# Patient Record
Sex: MALE | Race: BLACK OR AFRICAN AMERICAN | NOT HISPANIC OR LATINO | Employment: OTHER | URBAN - METROPOLITAN AREA
[De-identification: names, ages, dates, MRNs, and addresses within clinical notes are randomized per-mention and may not be internally consistent; named-entity substitution may affect disease eponyms.]

---

## 2017-10-13 ENCOUNTER — HISTORICAL (OUTPATIENT)
Dept: ADMINISTRATIVE | Facility: HOSPITAL | Age: 42
End: 2017-10-13

## 2017-10-20 LAB — FINAL CULTURE: NORMAL

## 2019-01-25 ENCOUNTER — HISTORICAL (OUTPATIENT)
Dept: ADMINISTRATIVE | Facility: HOSPITAL | Age: 44
End: 2019-01-25

## 2019-01-31 LAB
FINAL CULTURE: NORMAL
FINAL CULTURE: NORMAL

## 2019-02-01 LAB — FINAL CULTURE: NORMAL

## 2019-03-24 ENCOUNTER — HISTORICAL (OUTPATIENT)
Dept: ADMINISTRATIVE | Facility: HOSPITAL | Age: 44
End: 2019-03-24

## 2019-03-31 LAB
FINAL CULTURE: NORMAL
FINAL CULTURE: NORMAL

## 2022-04-10 ENCOUNTER — HISTORICAL (OUTPATIENT)
Dept: ADMINISTRATIVE | Facility: HOSPITAL | Age: 47
End: 2022-04-10

## 2022-04-11 ENCOUNTER — HISTORICAL (OUTPATIENT)
Dept: ADMINISTRATIVE | Facility: HOSPITAL | Age: 47
End: 2022-04-11

## 2022-04-28 VITALS
WEIGHT: 206.81 LBS | SYSTOLIC BLOOD PRESSURE: 152 MMHG | DIASTOLIC BLOOD PRESSURE: 86 MMHG | BODY MASS INDEX: 32.46 KG/M2 | OXYGEN SATURATION: 97 % | HEIGHT: 67 IN

## 2022-04-28 VITALS
DIASTOLIC BLOOD PRESSURE: 86 MMHG | OXYGEN SATURATION: 97 % | SYSTOLIC BLOOD PRESSURE: 152 MMHG | BODY MASS INDEX: 32.46 KG/M2 | HEIGHT: 67 IN | WEIGHT: 206.81 LBS

## 2022-11-13 ENCOUNTER — HOSPITAL ENCOUNTER (INPATIENT)
Facility: HOSPITAL | Age: 47
LOS: 4 days | Discharge: HOME | DRG: 101 | End: 2022-11-17
Attending: STUDENT IN AN ORGANIZED HEALTH CARE EDUCATION/TRAINING PROGRAM | Admitting: INTERNAL MEDICINE
Payer: MEDICAID

## 2022-11-13 DIAGNOSIS — N17.9 AKI (ACUTE KIDNEY INJURY): ICD-10-CM

## 2022-11-13 DIAGNOSIS — R56.9 SEIZURE: Primary | ICD-10-CM

## 2022-11-13 DIAGNOSIS — R73.9 HYPERGLYCEMIA: ICD-10-CM

## 2022-11-13 DIAGNOSIS — G40.909 SEIZURE DISORDER: ICD-10-CM

## 2022-11-13 LAB
ALBUMIN SERPL-MCNC: 1.6 GM/DL (ref 3.5–5)
ALBUMIN/GLOB SERPL: 0.4 RATIO (ref 1.1–2)
ALP SERPL-CCNC: 162 UNIT/L (ref 40–150)
ALT SERPL-CCNC: 101 UNIT/L (ref 0–55)
ANION GAP SERPL CALC-SCNC: 16 MMOL/L (ref 8–16)
APPEARANCE UR: CLEAR
AST SERPL-CCNC: 105 UNIT/L (ref 5–34)
B-OH-BUTYR SERPL-MCNC: 2.1 MMOL/L
BACTERIA #/AREA URNS AUTO: NORMAL /HPF
BASOPHILS # BLD AUTO: 0.14 X10(3)/MCL (ref 0–0.2)
BASOPHILS NFR BLD AUTO: 0.9 %
BILIRUB UR QL STRIP.AUTO: NEGATIVE MG/DL
BILIRUBIN DIRECT+TOT PNL SERPL-MCNC: 0.4 MG/DL
BUN SERPL-MCNC: 19.1 MG/DL (ref 8.9–20.6)
BUN SERPL-MCNC: 21 MG/DL (ref 6–30)
CALCIUM SERPL-MCNC: 7.4 MG/DL (ref 8.4–10.2)
CHLORIDE SERPL-SCNC: 103 MMOL/L (ref 98–107)
CHLORIDE SERPL-SCNC: 106 MMOL/L (ref 95–110)
CO2 SERPL-SCNC: 17 MMOL/L (ref 22–29)
COLOR UR AUTO: YELLOW
CORRECTED TEMPERATURE (PCO2): 42 MMHG
CORRECTED TEMPERATURE (PH): 7.26 (ref 7.29–7.61)
CORRECTED TEMPERATURE (PO2): 30 MMHG
CREAT SERPL-MCNC: 2 MG/DL (ref 0.5–1.4)
CREAT SERPL-MCNC: 2.43 MG/DL (ref 0.73–1.18)
EOSINOPHIL # BLD AUTO: 0.09 X10(3)/MCL (ref 0–0.9)
EOSINOPHIL NFR BLD AUTO: 0.6 %
ERYTHROCYTE [DISTWIDTH] IN BLOOD BY AUTOMATED COUNT: 12.9 % (ref 11.5–17)
GFR SERPLBLD CREATININE-BSD FMLA CKD-EPI: 33 MLS/MIN/1.73/M2
GLOBULIN SER-MCNC: 3.8 GM/DL (ref 2.4–3.5)
GLUCOSE SERPL-MCNC: 592 MG/DL (ref 70–110)
GLUCOSE SERPL-MCNC: 605 MG/DL (ref 74–100)
GLUCOSE UR QL STRIP.AUTO: ABNORMAL MG/DL
HCO3 UR-SCNC: 18.8 MMOL/L
HCT VFR BLD AUTO: 36.6 % (ref 42–52)
HCT VFR BLD CALC: 43 %PCV (ref 36–54)
HGB BLD-MCNC: 11.2 G/DL
HGB BLD-MCNC: 12.4 GM/DL (ref 14–18)
HGB BLD-MCNC: 15 G/DL
IMM GRANULOCYTES # BLD AUTO: 0.05 X10(3)/MCL (ref 0–0.04)
IMM GRANULOCYTES NFR BLD AUTO: 0.3 %
INR BLD: 1.06 (ref 0–1.3)
KETONES UR QL STRIP.AUTO: ABNORMAL MG/DL
LEUKOCYTE ESTERASE UR QL STRIP.AUTO: NEGATIVE UNIT/L
LYMPHOCYTES # BLD AUTO: 2.95 X10(3)/MCL (ref 0.6–4.6)
LYMPHOCYTES NFR BLD AUTO: 18.7 %
MCH RBC QN AUTO: 30.2 PG (ref 27–31)
MCHC RBC AUTO-ENTMCNC: 33.9 MG/DL (ref 33–36)
MCV RBC AUTO: 89.1 FL (ref 80–94)
MONOCYTES # BLD AUTO: 0.56 X10(3)/MCL (ref 0.1–1.3)
MONOCYTES NFR BLD AUTO: 3.5 %
NEUTROPHILS # BLD AUTO: 12 X10(3)/MCL (ref 2.1–9.2)
NEUTROPHILS NFR BLD AUTO: 76 %
NITRITE UR QL STRIP.AUTO: NEGATIVE
NRBC BLD AUTO-RTO: 0 %
PCO2 BLDA: 42 MMHG
PH SMN: 7.26 [PH] (ref 7.29–7.61)
PH UR STRIP.AUTO: 6 [PH]
PLATELET # BLD AUTO: 250 X10(3)/MCL (ref 130–400)
PMV BLD AUTO: 11.6 FL (ref 7.4–10.4)
PO2 BLDA: 30 MMHG
POC BASE DEFICIT: -7.9 MMOL/L
POC COHB: 1.8 %
POC IONIZED CALCIUM: 0.87 MMOL/L (ref 1.06–1.42)
POC IONIZED CALCIUM: 1.02 MMOL/L (ref 1.12–1.23)
POC METHB: 0.9 %
POC O2HB: 46.6 %
POC PTINR: 1.3 (ref 0.9–1.2)
POC PTWBT: 15.2 SEC (ref 9.7–14.3)
POC SATURATED O2: 46.2 %
POC TCO2 (MEASURED): 17 MMOL/L (ref 23–29)
POC TEMPERATURE: 37 °C
POCT GLUCOSE: 350 MG/DL (ref 70–110)
POCT GLUCOSE: >500 MG/DL (ref 70–110)
POTASSIUM BLD-SCNC: 3.1 MMOL/L (ref 3.5–5)
POTASSIUM BLD-SCNC: 4.5 MMOL/L (ref 3.5–5.1)
POTASSIUM SERPL-SCNC: 3.8 MMOL/L (ref 3.5–5.1)
PROT SERPL-MCNC: 5.4 GM/DL (ref 6.4–8.3)
PROT UR QL STRIP.AUTO: ABNORMAL MG/DL
PROTHROMBIN TIME: 13.7 SECONDS (ref 12.5–14.5)
RBC # BLD AUTO: 4.11 X10(6)/MCL (ref 4.7–6.1)
RBC #/AREA URNS AUTO: <5 /HPF
RBC UR QL AUTO: ABNORMAL UNIT/L
SAMPLE: ABNORMAL
SAMPLE: ABNORMAL
SODIUM BLD-SCNC: 134 MMOL/L (ref 136–145)
SODIUM BLD-SCNC: 134 MMOL/L (ref 137–145)
SODIUM SERPL-SCNC: 133 MMOL/L (ref 136–145)
SP GR UR STRIP.AUTO: 1.03 (ref 1–1.03)
SPECIMEN SOURCE: ABNORMAL
SQUAMOUS #/AREA URNS AUTO: <5 /HPF
TSH SERPL-ACNC: 3.61 UIU/ML (ref 0.35–4.94)
UROBILINOGEN UR STRIP-ACNC: 0.2 MG/DL
WBC # SPEC AUTO: 15.8 X10(3)/MCL (ref 4.5–11.5)
WBC #/AREA URNS AUTO: <5 /HPF

## 2022-11-13 PROCEDURE — 81003 URINALYSIS AUTO W/O SCOPE: CPT | Performed by: STUDENT IN AN ORGANIZED HEALTH CARE EDUCATION/TRAINING PROGRAM

## 2022-11-13 PROCEDURE — 93010 EKG 12-LEAD: ICD-10-PCS | Mod: ,,, | Performed by: INTERNAL MEDICINE

## 2022-11-13 PROCEDURE — 93010 ELECTROCARDIOGRAM REPORT: CPT | Mod: ,,, | Performed by: INTERNAL MEDICINE

## 2022-11-13 PROCEDURE — 84443 ASSAY THYROID STIM HORMONE: CPT | Performed by: STUDENT IN AN ORGANIZED HEALTH CARE EDUCATION/TRAINING PROGRAM

## 2022-11-13 PROCEDURE — 63600175 PHARM REV CODE 636 W HCPCS: Performed by: STUDENT IN AN ORGANIZED HEALTH CARE EDUCATION/TRAINING PROGRAM

## 2022-11-13 PROCEDURE — 80053 COMPREHEN METABOLIC PANEL: CPT | Performed by: STUDENT IN AN ORGANIZED HEALTH CARE EDUCATION/TRAINING PROGRAM

## 2022-11-13 PROCEDURE — 80047 BASIC METABLC PNL IONIZED CA: CPT

## 2022-11-13 PROCEDURE — 25000003 PHARM REV CODE 250: Performed by: STUDENT IN AN ORGANIZED HEALTH CARE EDUCATION/TRAINING PROGRAM

## 2022-11-13 PROCEDURE — 93005 ELECTROCARDIOGRAM TRACING: CPT

## 2022-11-13 PROCEDURE — 85025 COMPLETE CBC W/AUTO DIFF WBC: CPT | Performed by: STUDENT IN AN ORGANIZED HEALTH CARE EDUCATION/TRAINING PROGRAM

## 2022-11-13 PROCEDURE — 85610 PROTHROMBIN TIME: CPT | Performed by: STUDENT IN AN ORGANIZED HEALTH CARE EDUCATION/TRAINING PROGRAM

## 2022-11-13 PROCEDURE — 99285 EMERGENCY DEPT VISIT HI MDM: CPT | Mod: 25

## 2022-11-13 PROCEDURE — 96361 HYDRATE IV INFUSION ADD-ON: CPT

## 2022-11-13 PROCEDURE — 11000001 HC ACUTE MED/SURG PRIVATE ROOM

## 2022-11-13 PROCEDURE — 82010 KETONE BODYS QUAN: CPT | Performed by: STUDENT IN AN ORGANIZED HEALTH CARE EDUCATION/TRAINING PROGRAM

## 2022-11-13 PROCEDURE — 81001 URINALYSIS AUTO W/SCOPE: CPT | Performed by: STUDENT IN AN ORGANIZED HEALTH CARE EDUCATION/TRAINING PROGRAM

## 2022-11-13 PROCEDURE — 96376 TX/PRO/DX INJ SAME DRUG ADON: CPT

## 2022-11-13 PROCEDURE — 82803 BLOOD GASES ANY COMBINATION: CPT

## 2022-11-13 PROCEDURE — 96375 TX/PRO/DX INJ NEW DRUG ADDON: CPT

## 2022-11-13 PROCEDURE — 82962 GLUCOSE BLOOD TEST: CPT

## 2022-11-13 PROCEDURE — 96374 THER/PROPH/DIAG INJ IV PUSH: CPT

## 2022-11-13 PROCEDURE — 25500020 PHARM REV CODE 255: Performed by: STUDENT IN AN ORGANIZED HEALTH CARE EDUCATION/TRAINING PROGRAM

## 2022-11-13 PROCEDURE — 99900035 HC TECH TIME PER 15 MIN (STAT)

## 2022-11-13 RX ORDER — PANCRELIPASE 24000; 76000; 120000 [USP'U]/1; [USP'U]/1; [USP'U]/1
3 CAPSULE, DELAYED RELEASE PELLETS ORAL 3 TIMES DAILY
Status: ON HOLD | COMMUNITY
Start: 2022-10-10 | End: 2023-04-04 | Stop reason: HOSPADM

## 2022-11-13 RX ORDER — METOPROLOL TARTRATE 25 MG/1
25 TABLET, FILM COATED ORAL 2 TIMES DAILY
Status: DISCONTINUED | OUTPATIENT
Start: 2022-11-14 | End: 2022-11-17 | Stop reason: HOSPADM

## 2022-11-13 RX ORDER — SODIUM CHLORIDE 0.9 % (FLUSH) 0.9 %
10 SYRINGE (ML) INJECTION
Status: DISCONTINUED | OUTPATIENT
Start: 2022-11-14 | End: 2022-11-17 | Stop reason: HOSPADM

## 2022-11-13 RX ORDER — LEVETIRACETAM 500 MG/1
500 TABLET ORAL 2 TIMES DAILY
Status: ON HOLD | COMMUNITY
Start: 2022-10-10 | End: 2022-11-17 | Stop reason: SDUPTHER

## 2022-11-13 RX ORDER — ACETAMINOPHEN 325 MG/1
650 TABLET ORAL EVERY 6 HOURS PRN
Status: DISCONTINUED | OUTPATIENT
Start: 2022-11-14 | End: 2022-11-17 | Stop reason: HOSPADM

## 2022-11-13 RX ORDER — INSULIN GLARGINE 100 [IU]/ML
20 INJECTION, SOLUTION SUBCUTANEOUS NIGHTLY
Status: ON HOLD | COMMUNITY
End: 2023-12-22 | Stop reason: HOSPADM

## 2022-11-13 RX ORDER — FUROSEMIDE 40 MG/1
40 TABLET ORAL DAILY
Status: ON HOLD | COMMUNITY
Start: 2022-07-20 | End: 2023-04-04 | Stop reason: HOSPADM

## 2022-11-13 RX ORDER — AMLODIPINE BESYLATE 5 MG/1
10 TABLET ORAL DAILY
Status: DISCONTINUED | OUTPATIENT
Start: 2022-11-14 | End: 2022-11-17 | Stop reason: HOSPADM

## 2022-11-13 RX ORDER — AMLODIPINE BESYLATE 10 MG/1
10 TABLET ORAL DAILY
Status: ON HOLD | COMMUNITY
Start: 2022-10-10 | End: 2023-04-04 | Stop reason: HOSPADM

## 2022-11-13 RX ORDER — TAMSULOSIN HYDROCHLORIDE 0.4 MG/1
0.4 CAPSULE ORAL DAILY
Status: DISCONTINUED | OUTPATIENT
Start: 2022-11-14 | End: 2022-11-17 | Stop reason: HOSPADM

## 2022-11-13 RX ORDER — GABAPENTIN 300 MG/1
300 CAPSULE ORAL 3 TIMES DAILY
Status: DISCONTINUED | OUTPATIENT
Start: 2022-11-13 | End: 2022-11-17 | Stop reason: HOSPADM

## 2022-11-13 RX ORDER — TIZANIDINE 4 MG/1
TABLET ORAL
COMMUNITY
Start: 2022-06-07

## 2022-11-13 RX ORDER — LOPERAMIDE HYDROCHLORIDE 2 MG/1
CAPSULE ORAL
Status: ON HOLD | COMMUNITY
Start: 2022-10-10 | End: 2023-12-22 | Stop reason: HOSPADM

## 2022-11-13 RX ORDER — LABETALOL HYDROCHLORIDE 5 MG/ML
10 INJECTION, SOLUTION INTRAVENOUS
Status: COMPLETED | OUTPATIENT
Start: 2022-11-13 | End: 2022-11-13

## 2022-11-13 RX ORDER — ENOXAPARIN SODIUM 100 MG/ML
30 INJECTION SUBCUTANEOUS EVERY 24 HOURS
Status: DISCONTINUED | OUTPATIENT
Start: 2022-11-14 | End: 2022-11-14

## 2022-11-13 RX ORDER — CYCLOBENZAPRINE HYDROCHLORIDE 7.5 MG/1
7.5 TABLET, FILM COATED ORAL 3 TIMES DAILY PRN
Status: ON HOLD | COMMUNITY
Start: 2022-10-20 | End: 2023-03-12

## 2022-11-13 RX ORDER — IBUPROFEN 200 MG
24 TABLET ORAL
Status: DISCONTINUED | OUTPATIENT
Start: 2022-11-14 | End: 2022-11-17 | Stop reason: HOSPADM

## 2022-11-13 RX ORDER — GLUCAGON 1 MG
1 KIT INJECTION
Status: DISCONTINUED | OUTPATIENT
Start: 2022-11-14 | End: 2022-11-17 | Stop reason: HOSPADM

## 2022-11-13 RX ORDER — LEVETIRACETAM 500 MG/1
500 TABLET ORAL 2 TIMES DAILY
Status: DISCONTINUED | OUTPATIENT
Start: 2022-11-14 | End: 2022-11-17 | Stop reason: HOSPADM

## 2022-11-13 RX ORDER — INSULIN ASPART 100 [IU]/ML
1-10 INJECTION, SOLUTION INTRAVENOUS; SUBCUTANEOUS
Status: DISCONTINUED | OUTPATIENT
Start: 2022-11-14 | End: 2022-11-17 | Stop reason: HOSPADM

## 2022-11-13 RX ORDER — TAMSULOSIN HYDROCHLORIDE 0.4 MG/1
0.4 CAPSULE ORAL DAILY
Status: ON HOLD | COMMUNITY
Start: 2022-10-10 | End: 2023-04-04 | Stop reason: HOSPADM

## 2022-11-13 RX ORDER — TALC
6 POWDER (GRAM) TOPICAL NIGHTLY PRN
Status: DISCONTINUED | OUTPATIENT
Start: 2022-11-14 | End: 2022-11-17 | Stop reason: HOSPADM

## 2022-11-13 RX ORDER — IBUPROFEN 200 MG
16 TABLET ORAL
Status: DISCONTINUED | OUTPATIENT
Start: 2022-11-14 | End: 2022-11-17 | Stop reason: HOSPADM

## 2022-11-13 RX ORDER — SYRING-NEEDL,DISP,INSUL,0.3 ML 31GX15/64"
SYRINGE, EMPTY DISPOSABLE MISCELLANEOUS
COMMUNITY
Start: 2022-10-10

## 2022-11-13 RX ORDER — LEVETIRACETAM 10 MG/ML
1000 INJECTION INTRAVASCULAR
Status: COMPLETED | OUTPATIENT
Start: 2022-11-13 | End: 2022-11-13

## 2022-11-13 RX ORDER — INSULIN GLARGINE 100 [IU]/ML
10 INJECTION, SOLUTION SUBCUTANEOUS NIGHTLY
Status: ON HOLD | COMMUNITY
Start: 2022-10-10 | End: 2022-11-17 | Stop reason: HOSPADM

## 2022-11-13 RX ORDER — METOPROLOL TARTRATE 25 MG/1
25 TABLET, FILM COATED ORAL 2 TIMES DAILY
Status: ON HOLD | COMMUNITY
Start: 2022-10-10 | End: 2023-12-22 | Stop reason: HOSPADM

## 2022-11-13 RX ORDER — GABAPENTIN 400 MG/1
400 CAPSULE ORAL 2 TIMES DAILY
COMMUNITY
Start: 2021-09-22

## 2022-11-13 RX ORDER — SODIUM CHLORIDE 9 MG/ML
INJECTION, SOLUTION INTRAVENOUS CONTINUOUS
Status: DISCONTINUED | OUTPATIENT
Start: 2022-11-14 | End: 2022-11-17 | Stop reason: HOSPADM

## 2022-11-13 RX ORDER — HYDROCODONE BITARTRATE AND ACETAMINOPHEN 10; 325 MG/1; MG/1
1 TABLET ORAL EVERY 8 HOURS PRN
COMMUNITY
Start: 2022-10-20 | End: 2023-11-25 | Stop reason: CLARIF

## 2022-11-13 RX ORDER — POTASSIUM CHLORIDE 20 MEQ/1
20 TABLET, EXTENDED RELEASE ORAL DAILY
Status: ON HOLD | COMMUNITY
Start: 2022-07-20 | End: 2023-04-04 | Stop reason: HOSPADM

## 2022-11-13 RX ORDER — LISINOPRIL AND HYDROCHLOROTHIAZIDE 20; 25 MG/1; MG/1
TABLET ORAL
Status: ON HOLD | COMMUNITY
Start: 2021-09-22 | End: 2023-04-04 | Stop reason: HOSPADM

## 2022-11-13 RX ADMIN — SODIUM CHLORIDE, POTASSIUM CHLORIDE, SODIUM LACTATE AND CALCIUM CHLORIDE 1000 ML: 600; 310; 30; 20 INJECTION, SOLUTION INTRAVENOUS at 02:11

## 2022-11-13 RX ADMIN — LEVETIRACETAM 1000 MG: 10 INJECTION INTRAVENOUS at 02:11

## 2022-11-13 RX ADMIN — INSULIN HUMAN 5 UNITS: 100 INJECTION, SOLUTION PARENTERAL at 06:11

## 2022-11-13 RX ADMIN — IOPAMIDOL 100 ML: 755 INJECTION, SOLUTION INTRAVENOUS at 02:11

## 2022-11-13 RX ADMIN — INSULIN HUMAN 5 UNITS: 100 INJECTION, SOLUTION PARENTERAL at 07:11

## 2022-11-13 RX ADMIN — LABETALOL HYDROCHLORIDE 10 MG: 5 INJECTION, SOLUTION INTRAVENOUS at 03:11

## 2022-11-13 RX ADMIN — SODIUM CHLORIDE, POTASSIUM CHLORIDE, SODIUM LACTATE AND CALCIUM CHLORIDE 1000 ML: 600; 310; 30; 20 INJECTION, SOLUTION INTRAVENOUS at 04:11

## 2022-11-13 NOTE — ED PROVIDER NOTES
Encounter Date: 11/13/2022    SCRIBE #1 NOTE: I, Carmita Kincaid, am scribing for, and in the presence of,  Ajith Gonzalez MD. I have scribed the following portions of the note - the EKG reading. Other sections scribed: HPI, ROS, PE.     History     Chief Complaint   Patient presents with    Cerebrovascular Accident     Pt and family report new onset L side weakness, R side facial droop, L hand is weaker in  strength. Last seen normal at 1220 today.      Patient is a 47-year-old male with past medical history of HTN, DM, and CHF presenting to ED via EMS for new onset of right sided facial droop, slurred speech, and weakness onset today. Last known known 1220 per EMS. Pt reports of numbness to bilateral hands and weakness to bilateral lower extremities. Pt also c/o polyurias and polydipsia. Stroke protocol activated at this time.    PCP: Rodolfo RUBIN MD    The history is provided by the patient and the EMS personnel. No  was used.   Cerebrovascular Accident  Primary symptoms do not include headaches, dizziness, fever, nausea or vomiting. The symptoms began today. The symptoms are unchanged.   Additional symptoms include weakness. Medical issues also include diabetes and hypertension.   Review of patient's allergies indicates:  Not on File  No past medical history on file.  No past surgical history on file.  No family history on file.     Review of Systems   Constitutional:  Negative for chills, fatigue and fever.   HENT:  Negative for congestion, ear discharge, ear pain, nosebleeds, rhinorrhea and sore throat.         Polydipsia    Eyes:  Negative for pain, redness and visual disturbance.   Respiratory:  Negative for cough, chest tightness and shortness of breath.    Cardiovascular:  Negative for chest pain and leg swelling.   Gastrointestinal:  Negative for abdominal pain, blood in stool, constipation, diarrhea, nausea and vomiting.   Genitourinary:  Negative for dysuria and hematuria.         Polyuria    Musculoskeletal:  Negative for arthralgias, joint swelling, myalgias and neck pain.   Skin:  Negative for color change, pallor and wound.   Neurological:  Positive for speech difficulty, weakness and numbness. Negative for dizziness, light-headedness and headaches.     Physical Exam     Initial Vitals [11/13/22 1345]   BP Pulse Resp Temp SpO2   (!) 200/109 85 20 98.6 °F (37 °C) 95 %      MAP       --         Physical Exam    Nursing note and vitals reviewed.  Constitutional: He appears well-developed and well-nourished. He is not diaphoretic. No distress.   HENT:   Head: Normocephalic and atraumatic.   Right Ear: External ear normal.   Left Ear: External ear normal.   Nose: Nose normal.   Mouth/Throat: Oropharynx is clear and moist.   Bite marks to the right side of the tongue    Eyes: Conjunctivae and EOM are normal. Pupils are equal, round, and reactive to light. Right eye exhibits no discharge. Left eye exhibits no discharge.   Neck: Neck supple. No tracheal deviation present.   Normal range of motion.  Cardiovascular:  Normal rate, regular rhythm, normal heart sounds and intact distal pulses.     Exam reveals no gallop and no friction rub.       No murmur heard.  Pulmonary/Chest: Breath sounds normal. No stridor. No respiratory distress. He has no wheezes. He has no rhonchi. He has no rales. He exhibits no tenderness.   Abdominal: Abdomen is soft. Bowel sounds are normal. He exhibits no distension and no mass. There is no abdominal tenderness. There is no guarding.   Musculoskeletal:         General: No tenderness or edema. Normal range of motion.      Cervical back: Normal range of motion and neck supple.     Neurological: He is alert and oriented to person, place, and time.   Patient with right sided facial droop, slurred speech, and weakness to bilateral upper and lower extremities    Skin: Skin is warm and dry. Capillary refill takes less than 2 seconds. No rash noted. No erythema. No pallor.        ED Course   Procedures  Labs Reviewed   COMPREHENSIVE METABOLIC PANEL - Abnormal; Notable for the following components:       Result Value    Sodium Level 133 (*)     Carbon Dioxide 17 (*)     Glucose Level 605 (*)     Creatinine 2.43 (*)     Calcium Level Total 7.4 (*)     Protein Total 5.4 (*)     Albumin Level 1.6 (*)     Globulin 3.8 (*)     Albumin/Globulin Ratio 0.4 (*)     Alkaline Phosphatase 162 (*)     Alanine Aminotransferase 101 (*)     Aspartate Aminotransferase 105 (*)     All other components within normal limits   CBC WITH DIFFERENTIAL - Abnormal; Notable for the following components:    WBC 15.8 (*)     RBC 4.11 (*)     Hgb 12.4 (*)     Hct 36.6 (*)     MPV 11.6 (*)     Neut # 12.0 (*)     IG# 0.05 (*)     All other components within normal limits   URINALYSIS, REFLEX TO URINE CULTURE - Abnormal; Notable for the following components:    Protein, UA 4+ (*)     Glucose, UA 3+ (*)     Ketones, UA 1+ (*)     Blood, UA 2+ (*)     All other components within normal limits   BETA - HYDROXYBUTYRATE, SERUM - Abnormal; Notable for the following components:    Beta Hydroxybutyrate 2.10 (*)     All other components within normal limits   ISTAT PROCEDURE - Abnormal; Notable for the following components:    POC PTWBT 15.2 (*)     POC PTINR 1.3 (*)     All other components within normal limits   ISTAT CHEM8 - Abnormal; Notable for the following components:    POC Glucose 592 (*)     POC Creatinine 2.0 (*)     POC Sodium 134 (*)     POC TCO2 (MEASURED) 17 (*)     POC Ionized Calcium 0.87 (*)     All other components within normal limits   POCT GLUCOSE - Abnormal; Notable for the following components:    POCT Glucose >500 (*)     All other components within normal limits   POCT GLUCOSE - Abnormal; Notable for the following components:    POCT Glucose 350 (*)     All other components within normal limits   PROTIME-INR - Normal   TSH - Normal   URINALYSIS, MICROSCOPIC - Normal   CBC W/ AUTO DIFFERENTIAL     Narrative:     The following orders were created for panel order CBC W/ AUTO DIFFERENTIAL.  Procedure                               Abnormality         Status                     ---------                               -----------         ------                     CBC with Differential[[id number]]        Abnormal            Final result                 Please view results for these tests on the individual orders.   POCT GLUCOSE, HAND-HELD DEVICE     EKG Readings: (Independently Interpreted)   EKG done 1445: Sinus rhythm a 82 bpm with prolonged act at 521. No ST depression or elevations with poor R wave progression.      Imaging Results              CTA Head and Neck (xpd) (Final result)  Result time 11/13/22 14:59:38      Final result by Mirela Granados MD (11/13/22 14:59:38)                   Impression:      1. No large vessel occlusion.  2. Moderate focal narrowing of the right M1 segment.  3. Atherosclerotic changes of the carotid bulbs with 20-30% stenosis on the right by NASCET criteria.  4. Mild narrowing of the right intracranial vertebral artery and bilateral posterior cerebral arteries.      Electronically signed by: Mirela Granados  Date:    11/13/2022  Time:    14:59               Narrative:    EXAMINATION:  CTA HEAD AND NECK (XPD)    CLINICAL HISTORY:  Neuro deficit, acute, stroke suspected;    TECHNIQUE:  Axial images obtained through the cervical region and [place] of [place] before and after the administration of intravenous contrast.    Coronal, sagittal, MIP and 3D reconstructions were obtained from the axial data.    Automatic exposure control was utilized to limit radiation dose.    Radiation Dose:    Total DLP: 1977 mGy*cm    COMPARISON:  CT head from the same day    FINDINGS:  Head CT with contrast:    No interval changes when compared to the previous CT.    No enhancing abnormalities.    If present, stenosis of the carotid bulbs is measured based on NASCET criteria,    i.e. area of maximal  stenosis compared to the cervical ICA distal to the bulb.    Cervical CTA:    The origins of the great vessels are patent with mild scattered calcifications.    The common carotid arteries are normal in caliber.  There are atherosclerotic changes at the carotid bulbs with 20-30% stenosis on the right by NASCET criteria.  The internal carotid arteries are normal in caliber.    The vertebral arteries are patent.    Intracranial CTA:    The internal carotid arteries, most enlarged anterior cerebral arteries are patent with moderate focal narrowing of the right M1 segment.    The vertebral arteries, basilar artery and posterior cerebral arteries are patent with mild narrowing of the right vertebral artery and bilateral posterior cerebral arteries.    The dural venous sinuses are patent.                                       CT HEAD FOR STROKE (Final result)  Result time 11/13/22 14:10:16   Procedure changed from CT Head Without Contrast     Final result by Mirela Granados MD (11/13/22 14:10:16)                   Impression:      1. No acute intracranial abnormality.  2. Chronic microvascular ischemic changes.  Code fast findings given to Dr. Gonzalez at the time of dictation.      Electronically signed by: Mirela Granados  Date:    11/13/2022  Time:    14:10               Narrative:    EXAMINATION:  CT HEAD FOR STROKE    CLINICAL HISTORY:  Neuro deficit, acute, stroke suspected;    TECHNIQUE:  Axial scans were obtained from skull base to the vertex.    Coronal and sagittal reconstructions obtained from the axial data.    Automatic exposure control was utilized to limit radiation dose.    Contrast: None    Radiation Dose:    Total DLP: 2105 mGy*cm    COMPARISON:  None    FINDINGS:  There is no acute intracranial hemorrhage or edema. The gray-white matter differentiation is preserved.  Scattered hypodensities in the subcortical and periventricular white matter and right basal ganglia likely chronic microvascular  ischemic changes.    There is no mass effect or midline shift.  There is diffuse parenchymal volume loss.  The basal cisterns are patent. There is no abnormal extra-axial fluid collection.  Carotid and vertebral artery calcifications are noted.    The calvarium and skull base are intact.  There is a left mastoid effusion.                                       Medications   lactated ringers bolus 1,000 mL (0 mLs Intravenous Stopped 11/13/22 1840)   levETIRAcetam in NaCl (iso-os) IVPB 1,000 mg (0 mg Intravenous Stopped 11/13/22 1510)   labetaloL injection 10 mg (10 mg Intravenous Given 11/13/22 1500)   iopamidoL (ISOVUE-370) injection 100 mL (100 mLs Intravenous Given 11/13/22 1449)   lactated ringers bolus 1,000 mL (0 mLs Intravenous Stopped 11/13/22 1840)   insulin regular injection 5 Units 0.05 mL (5 Units Intravenous Given 11/13/22 1815)   insulin regular injection 5 Units 0.05 mL (5 Units Intravenous Given 11/13/22 1902)     Medical Decision Making:   Initial Assessment:   Patient presents with generalized weakness, altered mental status, possible facial droop  Differential Diagnosis:   CVA, TIA, electrolyte abnormality, generalized weakness, infection, viral syndrome, Juan Manuel's paralysis, postictal state  Clinical Tests:   Lab Tests: Reviewed and Ordered  Radiological Study: Reviewed and Ordered  Medical Tests: Reviewed and Ordered  ED Management:  Patient is initially poorly oriented,, diffusely weak.  History is difficult to gather.  EMS can not offer much.  However his stepmother arrives and states that he was found unresponsive on the floor, has history of seizures and he is acting like he does after he has a seizure.  Patient did indeed bite his tongue, unknown if he had any bowel or bladder incontinence.  He CT head is unremarkable.  As is his CTA of the head.  I have discussed with neurology who recommends loading with Keppra.  Patient's labs also reveal elevated glucose 600, elevated creatinine 2, this is  significantly elevated from his prior.  He is given some fluid, insulin here in the emergency department, his glucose reduces 02/03/2050.  He will be admitted for further evaluation and management.  On re-evaluation he is awake, his strength is improved, his facial droop has also improved.  Low NIH, not a candidate for tPA, also believe that this was all secondary to his seizure, postictal state.  Patient is agreeable to admission for further evaluation management.  He will be admitted to the hospitalist.     Dispo:  INPT      Data Reviewed/Counseling: I have personally reviewed the patient's vital signs, nursing notes, and other relevant tests, information, and imaging. I had a detailed discussion regarding the historical points, exam findings, and any diagnostic results supporting the discharge diagnosis.      Portions of this note were dictated using voice recognition software. Although it was reviewed for accuracy, some inherent voice recognition errors may have occurred and be present in this document.             Scribe Attestation:   Scribe #1: I performed the above scribed service and the documentation accurately describes the services I performed. I attest to the accuracy of the note.    Attending Attestation:           Physician Attestation for Scribe:  Physician Attestation Statement for Scribe #1: I, Ajith Gonzalez MD, reviewed documentation, as scribed by Carmita Kincaid in my presence, and it is both accurate and complete.           ED Course as of 11/13/22 2121   Sun Nov 13, 2022   1410 Nothing acute on non con CT per Radiology [MM]   1436 Paged Neurology  [DP]   1442 Call and Consult Dr. Fermin Andrade MD Neurology [DP]   9533 Spoke with stepmother in room, apparently patient has history of seizures, collapsed and was unresponsive briefly briefly and is very confused but is slowly returning to baseline per mother. [MM]   1447 Spoke with Dr. Andrade, neurologist, states concern for seizure especially  given history of seizures recommends loading with 1 g of Keppra, will follow-up.  Advised of slight right-sided facial droop, normal CT. [MM]   1720 Patient is significantly more awake and alert here.  It seems as postictal state seems to have worn off.  He states he has had polydipsia and polyuria.  His labs are noted to show hyperglycemia 600, acute kidney injury, today creatinine 2.4, normally 0.8.  No anion gap. [MM]   1737 Paged Hospitalist  [DP]      ED Course User Index  [DP] Amna Kincaid  [MM] Ajith Gonzalez MD                   Clinical Impression:   Final diagnoses:  [R56.9] Seizure (Primary)  [R73.9] Hyperglycemia  [N17.9] FABIO (acute kidney injury)      ED Disposition Condition    Admit Stable                  Ajith Gonzalez MD  11/13/22 2121

## 2022-11-14 PROBLEM — G40.909 SEIZURE DISORDER: Status: ACTIVE | Noted: 2022-11-14

## 2022-11-14 LAB
ALBUMIN SERPL-MCNC: 1.6 GM/DL (ref 3.5–5)
ALBUMIN/GLOB SERPL: 0.6 RATIO (ref 1.1–2)
ALP SERPL-CCNC: 140 UNIT/L (ref 40–150)
ALT SERPL-CCNC: 73 UNIT/L (ref 0–55)
AST SERPL-CCNC: 41 UNIT/L (ref 5–34)
B-OH-BUTYR SERPL-MCNC: 1.5 MMOL/L
BASOPHILS # BLD AUTO: 0.15 X10(3)/MCL (ref 0–0.2)
BASOPHILS NFR BLD AUTO: 1.1 %
BILIRUBIN DIRECT+TOT PNL SERPL-MCNC: 0.5 MG/DL
BUN SERPL-MCNC: 21.8 MG/DL (ref 8.9–20.6)
CALCIUM SERPL-MCNC: 7.3 MG/DL (ref 8.4–10.2)
CHLORIDE SERPL-SCNC: 107 MMOL/L (ref 98–107)
CO2 SERPL-SCNC: 19 MMOL/L (ref 22–29)
CREAT SERPL-MCNC: 2.1 MG/DL (ref 0.73–1.18)
EOSINOPHIL # BLD AUTO: 0.11 X10(3)/MCL (ref 0–0.9)
EOSINOPHIL NFR BLD AUTO: 0.8 %
ERYTHROCYTE [DISTWIDTH] IN BLOOD BY AUTOMATED COUNT: 12.6 % (ref 11.5–17)
GFR SERPLBLD CREATININE-BSD FMLA CKD-EPI: 39 MLS/MIN/1.73/M2
GLOBULIN SER-MCNC: 2.9 GM/DL (ref 2.4–3.5)
GLUCOSE SERPL-MCNC: 321 MG/DL (ref 74–100)
HCT VFR BLD AUTO: 32.6 % (ref 42–52)
HGB BLD-MCNC: 11.1 GM/DL (ref 14–18)
IMM GRANULOCYTES # BLD AUTO: 0.06 X10(3)/MCL (ref 0–0.04)
IMM GRANULOCYTES NFR BLD AUTO: 0.4 %
LACTATE SERPL-SCNC: 2.1 MMOL/L (ref 0.5–2.2)
LYMPHOCYTES # BLD AUTO: 4.05 X10(3)/MCL (ref 0.6–4.6)
LYMPHOCYTES NFR BLD AUTO: 28.5 %
MCH RBC QN AUTO: 29.7 PG (ref 27–31)
MCHC RBC AUTO-ENTMCNC: 34 MG/DL (ref 33–36)
MCV RBC AUTO: 87.2 FL (ref 80–94)
MONOCYTES # BLD AUTO: 0.8 X10(3)/MCL (ref 0.1–1.3)
MONOCYTES NFR BLD AUTO: 5.6 %
NEUTROPHILS # BLD AUTO: 9 X10(3)/MCL (ref 2.1–9.2)
NEUTROPHILS NFR BLD AUTO: 63.6 %
NRBC BLD AUTO-RTO: 0 %
PLATELET # BLD AUTO: 283 X10(3)/MCL (ref 130–400)
PMV BLD AUTO: 12 FL (ref 7.4–10.4)
POCT GLUCOSE: 143 MG/DL (ref 70–110)
POCT GLUCOSE: 150 MG/DL (ref 70–110)
POCT GLUCOSE: 226 MG/DL (ref 70–110)
POCT GLUCOSE: 318 MG/DL (ref 70–110)
POCT GLUCOSE: 67 MG/DL (ref 70–110)
POCT GLUCOSE: >500 MG/DL (ref 70–110)
POTASSIUM SERPL-SCNC: 3.2 MMOL/L (ref 3.5–5.1)
PROT SERPL-MCNC: 4.5 GM/DL (ref 6.4–8.3)
RBC # BLD AUTO: 3.74 X10(6)/MCL (ref 4.7–6.1)
SODIUM SERPL-SCNC: 134 MMOL/L (ref 136–145)
WBC # SPEC AUTO: 14.2 X10(3)/MCL (ref 4.5–11.5)

## 2022-11-14 PROCEDURE — 99222 1ST HOSP IP/OBS MODERATE 55: CPT | Mod: ,,, | Performed by: SPECIALIST

## 2022-11-14 PROCEDURE — 83605 ASSAY OF LACTIC ACID: CPT | Performed by: STUDENT IN AN ORGANIZED HEALTH CARE EDUCATION/TRAINING PROGRAM

## 2022-11-14 PROCEDURE — 63600175 PHARM REV CODE 636 W HCPCS: Performed by: STUDENT IN AN ORGANIZED HEALTH CARE EDUCATION/TRAINING PROGRAM

## 2022-11-14 PROCEDURE — 25000003 PHARM REV CODE 250: Performed by: INTERNAL MEDICINE

## 2022-11-14 PROCEDURE — 63600175 PHARM REV CODE 636 W HCPCS: Performed by: INTERNAL MEDICINE

## 2022-11-14 PROCEDURE — 85025 COMPLETE CBC W/AUTO DIFF WBC: CPT | Performed by: INTERNAL MEDICINE

## 2022-11-14 PROCEDURE — 25000003 PHARM REV CODE 250: Performed by: STUDENT IN AN ORGANIZED HEALTH CARE EDUCATION/TRAINING PROGRAM

## 2022-11-14 PROCEDURE — 82010 KETONE BODYS QUAN: CPT | Performed by: STUDENT IN AN ORGANIZED HEALTH CARE EDUCATION/TRAINING PROGRAM

## 2022-11-14 PROCEDURE — 99222 PR INITIAL HOSPITAL CARE,LEVL II: ICD-10-PCS | Mod: ,,, | Performed by: SPECIALIST

## 2022-11-14 PROCEDURE — 80053 COMPREHEN METABOLIC PANEL: CPT | Performed by: INTERNAL MEDICINE

## 2022-11-14 PROCEDURE — 21400001 HC TELEMETRY ROOM

## 2022-11-14 PROCEDURE — 11000001 HC ACUTE MED/SURG PRIVATE ROOM

## 2022-11-14 RX ORDER — POTASSIUM CHLORIDE 20 MEQ/1
40 TABLET, EXTENDED RELEASE ORAL ONCE
Status: COMPLETED | OUTPATIENT
Start: 2022-11-14 | End: 2022-11-14

## 2022-11-14 RX ORDER — POTASSIUM CHLORIDE 14.9 MG/ML
40 INJECTION INTRAVENOUS ONCE
Status: COMPLETED | OUTPATIENT
Start: 2022-11-14 | End: 2022-11-14

## 2022-11-14 RX ORDER — ENOXAPARIN SODIUM 100 MG/ML
40 INJECTION SUBCUTANEOUS EVERY 24 HOURS
Status: DISCONTINUED | OUTPATIENT
Start: 2022-11-14 | End: 2022-11-17 | Stop reason: HOSPADM

## 2022-11-14 RX ORDER — HYDROCODONE BITARTRATE AND ACETAMINOPHEN 7.5; 325 MG/1; MG/1
1 TABLET ORAL EVERY 8 HOURS PRN
Status: DISCONTINUED | OUTPATIENT
Start: 2022-11-14 | End: 2022-11-16

## 2022-11-14 RX ADMIN — POTASSIUM CHLORIDE 40 MEQ: 1500 TABLET, EXTENDED RELEASE ORAL at 12:11

## 2022-11-14 RX ADMIN — ACETAMINOPHEN 650 MG: 325 TABLET, FILM COATED ORAL at 09:11

## 2022-11-14 RX ADMIN — PANCRELIPASE 3 CAPSULE: 60000; 12000; 38000 CAPSULE, DELAYED RELEASE PELLETS ORAL at 04:11

## 2022-11-14 RX ADMIN — SODIUM CHLORIDE, POTASSIUM CHLORIDE, SODIUM LACTATE AND CALCIUM CHLORIDE 500 ML: 600; 310; 30; 20 INJECTION, SOLUTION INTRAVENOUS at 04:11

## 2022-11-14 RX ADMIN — INSULIN DETEMIR 20 UNITS: 100 INJECTION, SOLUTION SUBCUTANEOUS at 09:11

## 2022-11-14 RX ADMIN — LEVETIRACETAM 500 MG: 500 TABLET, FILM COATED ORAL at 09:11

## 2022-11-14 RX ADMIN — ENOXAPARIN SODIUM 40 MG: 40 INJECTION SUBCUTANEOUS at 04:11

## 2022-11-14 RX ADMIN — HYDROCODONE BITARTRATE AND ACETAMINOPHEN 1 TABLET: 7.5; 325 TABLET ORAL at 10:11

## 2022-11-14 RX ADMIN — HYDROCODONE BITARTRATE AND ACETAMINOPHEN 1 TABLET: 7.5; 325 TABLET ORAL at 12:11

## 2022-11-14 RX ADMIN — INSULIN DETEMIR 20 UNITS: 100 INJECTION, SOLUTION SUBCUTANEOUS at 12:11

## 2022-11-14 RX ADMIN — METOPROLOL TARTRATE 25 MG: 25 TABLET, FILM COATED ORAL at 09:11

## 2022-11-14 RX ADMIN — INSULIN ASPART 8 UNITS: 100 INJECTION, SOLUTION INTRAVENOUS; SUBCUTANEOUS at 06:11

## 2022-11-14 RX ADMIN — GABAPENTIN 300 MG: 300 CAPSULE ORAL at 09:11

## 2022-11-14 RX ADMIN — ACETAMINOPHEN 650 MG: 325 TABLET, FILM COATED ORAL at 03:11

## 2022-11-14 RX ADMIN — AMLODIPINE BESYLATE 10 MG: 5 TABLET ORAL at 09:11

## 2022-11-14 RX ADMIN — GABAPENTIN 300 MG: 300 CAPSULE ORAL at 12:11

## 2022-11-14 RX ADMIN — PANCRELIPASE 3 CAPSULE: 60000; 12000; 38000 CAPSULE, DELAYED RELEASE PELLETS ORAL at 09:11

## 2022-11-14 RX ADMIN — SODIUM CHLORIDE: 9 INJECTION, SOLUTION INTRAVENOUS at 12:11

## 2022-11-14 RX ADMIN — MELATONIN TAB 3 MG 6 MG: 3 TAB at 03:11

## 2022-11-14 RX ADMIN — GABAPENTIN 300 MG: 300 CAPSULE ORAL at 02:11

## 2022-11-14 RX ADMIN — TAMSULOSIN HYDROCHLORIDE 0.4 MG: 0.4 CAPSULE ORAL at 09:11

## 2022-11-14 RX ADMIN — POTASSIUM CHLORIDE 40 MEQ: 14.9 INJECTION, SOLUTION INTRAVENOUS at 12:11

## 2022-11-14 NOTE — H&P
[facility] Hospital Medicine History & Physical Examination       Patient Name: Kamran Huerta  MRN: [id number]  Patient Class: IP- Inpatient   Admission Date: 11/13/2022  1:59 PM  Length of Stay: 0  Admitting Service: Hospital Medicine   Attending Physician: Jose Curry MD   Primary Care Provider: Primary Doctor No  History source: EMR, patient and/or patient's family    CHIEF COMPLAINT   Cerebrovascular Accident (Pt and family report new onset L side weakness, R side facial droop, L hand is weaker in  strength. Last seen normal at 1220 today. )    HISTORY OF PRESENT ILLNESS:   Patient is a 47-year-old male with a history of seizure disorder, type 2 diabetes mellitus insulin dependent, and hypertension who was found unresponsive today on the ground possibly in a postictal state.  He was reported to have left-sided weakness and was brought to the ER where CT head was obtained and was unremarkable.  Neurology was consulted and related to the ER physician this was likely a postictal state and recommended Keppra loading  Hospitalist service was consulted for admission for hyperglycemia and acute kidney injury seen on laboratory work.  At bedside patient reports noncompliance with his home medications including Lantus, Keppra over the last 48 hours with the reason being that he felt tired.  He also reports having lumbar spine surgery about a month ago and chronic back pain and is asking for pain medication.  He reports chronic pain in his left lower extremity from the hip down that is unchanged this evening.    PAST MEDICAL HISTORY:   Hypertension  Seizure disorder   Insulin-dependent type 2 diabetes mellitus  Chronic back pain    PAST SURGICAL HISTORY:   Back surgery    ALLERGIES:   Patient has no allergy information on record.    FAMILY HISTORY:   Reviewed and non-contributory     SOCIAL HISTORY:   Quit drinking alcohol several months ago  Denies tobacco or drug  use    HOME MEDICATIONS:     Prior to Admission medications    Not on File       REVIEW OF SYSTEMS:   Except as documented, all other systems reviewed and negative     PHYSICAL EXAM:   T 98.1 °F (36.7 °C)   BP (!) 144/110   P 105   RR 12   O2 100 %  GENERAL: awake, alert, oriented and in no acute distress, non-toxic appearing   HEENT: normocephalic atraumatic   NECK: supple   LUNGS: Clear bilaterally, no wheezing or rales, no accessory muscle use   CVS: Regular rate and rhythm, normal peripheral perfusion  ABD: Soft, non-tender, non-distended, bowel sounds present  EXTREMITIES: no clubbing or cyanosis  SKIN: Warm, dry.   NEURO: alert and oriented, grossly without focal deficits (pain in left lower extremity limits him lifting it for prolonged period off the bed but he has good tone and strength otherwise)   PSYCHIATRIC: Cooperative    LABS AND IMAGING:     Recent Labs     11/13/22  1428 11/13/22  1518   WBC  --  15.8*   RBC  --  4.11*   HGB  --  12.4*   HCT 43 36.6*   MCV  --  89.1   MCH  --  30.2   MCHC  --  33.9   RDW  --  12.9   PLT  --  250     No results for input(s): LACTIC in the last 72 hours.  Recent Labs     11/13/22  1518   INR 1.06     No results for input(s): HGBA1C, CHOL, TRIG, LDL, VLDL, HDL in the last 72 hours.   Recent Labs     11/13/22  1518   *   K 3.8   CHLORIDE 103   CO2 17*   BUN 19.1   CREATININE 2.43*   GLUCOSE 605*   CALCIUM 7.4*   ALBUMIN 1.6*   GLOBULIN 3.8*   ALKPHOS 162*   *   *   BILITOT 0.4   TSH 3.6068     No results for input(s): BNP, CPK, TROPONINI in the last 72 hours.       CTA Head and Neck (xpd)  Impression: 1. No large vessel occlusion.  2. Moderate focal narrowing of the right M1 segment.  3. Atherosclerotic changes of the carotid bulbs with 20-30% stenosis on the right by NASCET criteria.  4. Mild narrowing of the right intracranial vertebral artery and bilateral posterior cerebral arteries.  Electronically signed by: Mirela  Darwin  Date:    11/13/2022  Time:    14:59    CT HEAD FOR STROKE  Impression: 1. No acute intracranial abnormality.  2. Chronic microvascular ischemic changes.  Code fast findings given to Dr. Gonzalez at the time of dictation.  Electronically signed by: Mirela Granados  Date:    11/13/2022  Time:    14:10      ASSESSMENT & PLAN:   Seizure and postictal state   Hyperglycemia, poorly controlled IDDM 2   Uncontrolled hypertension   Acute kidney injury   Medication noncompliance     -resume Keppra, seizure precautions   -IV fluids, ISS, holding renal sensitive meds  -encouraged compliance with home meds   -repeat labs in the morning if acute kidney injury improved he can be discharged home    DVT prophylaxis: lovenox  Code status: full code    If patient was admitted under observational status it is with my approval/permission.     At least 55 min was spent on this history and physical.  Time seen: 11PM   Jose Curry MD

## 2022-11-14 NOTE — SUBJECTIVE & OBJECTIVE
"Past Medical History:   Diagnosis Date    Chronic back pain     DM (diabetes mellitus)     HTN (hypertension)     Seizures        No past surgical history on file.    Review of patient's allergies indicates:  No Known Allergies    Current Neurological Medications:     No current facility-administered medications on file prior to encounter.     Current Outpatient Medications on File Prior to Encounter   Medication Sig    gabapentin (NEURONTIN) 400 MG capsule Take 400 mg by mouth.    lisinopriL-hydrochlorothiazide (PRINZIDE,ZESTORETIC) 20-25 mg Tab Take by mouth.    amLODIPine (NORVASC) 10 MG tablet Take 10 mg by mouth once daily.    BD VEO INSULIN SYR, HALF UNIT, 0.3 mL 31 gauge x 15" Syrg use as directed    CREON 24,000-76,000 -120,000 unit capsule Take 3 capsules by mouth 3 (three) times daily.    cyclobenzaprine (FEXMID) 7.5 MG Tab Take 7.5 mg by mouth 3 (three) times daily as needed.    furosemide (LASIX) 40 MG tablet Take 40 mg by mouth once daily.    HYDROcodone-acetaminophen (NORCO)  mg per tablet Take 1 tablet by mouth every 8 (eight) hours as needed.    insulin glargine 100 units/mL SubQ pen Inject 30 Units into the skin.    LANTUS U-100 INSULIN 100 unit/mL injection Inject 10 Units into the skin every evening.    levETIRAcetam (KEPPRA) 500 MG Tab Take 500 mg by mouth 2 (two) times daily.    loperamide (IMODIUM) 2 mg capsule Take by mouth.    metoprolol tartrate (LOPRESSOR) 25 MG tablet Take 25 mg by mouth 2 (two) times daily.    potassium chloride SA (K-DUR,KLOR-CON) 20 MEQ tablet Take 20 mEq by mouth once daily.    PRENATAL VITAMIN PLUS LOW IRON 27 mg iron- 1 mg Tab Take 1 tablet by mouth once daily.    tamsulosin (FLOMAX) 0.4 mg Cap SMARTSI Capsule(s) By Mouth Every Evening    tiZANidine (ZANAFLEX) 4 MG tablet Take by mouth.     Family History    None       Tobacco Use    Smoking status: Not on file    Smokeless tobacco: Not on file   Substance and Sexual Activity    Alcohol use: Not on file "    Drug use: Not on file    Sexual activity: Not on file     Review of Systems  A 14pt ros was reviewed & is negative unless o/w documented in the hpi    Objective:     Vital Signs (Most Recent):  Temp: 98.1 °F (36.7 °C) (11/13/22 1352)  Pulse: 91 (11/14/22 0800)  Resp: 16 (11/14/22 0800)  BP: (!) 136/104 (11/14/22 0800)  SpO2: 99 % (11/14/22 0800)   Vital Signs (24h Range):  Temp:  [98.1 °F (36.7 °C)-98.6 °F (37 °C)] 98.1 °F (36.7 °C)  Pulse:  [] 91  Resp:  [11-22] 16  SpO2:  [95 %-100 %] 99 %  BP: (117-217)/() 136/104     Weight: 87 kg (191 lb 12.8 oz)  Body mass index is 29.16 kg/m².    Physical Exam  Vitals reviewed.   Constitutional:       General: He is sleeping.      Appearance: Normal appearance.   HENT:      Head: Normocephalic and atraumatic.      Nose: Nose normal.      Mouth/Throat:      Mouth: Mucous membranes are moist.      Pharynx: Oropharynx is clear.   Eyes:      Extraocular Movements: Extraocular movements intact and EOM normal.      Pupils: Pupils are equal, round, and reactive to light.   Pulmonary:      Effort: Pulmonary effort is normal.   Skin:     General: Skin is warm and dry.   Neurological:      General: No focal deficit present.      Mental Status: He is oriented to person, place, and time and easily aroused.      Coordination: Finger-Nose-Finger Test normal.   Psychiatric:         Speech: Speech normal.       NEUROLOGICAL EXAMINATION:     MENTAL STATUS   Oriented to person, place, and time.   Follows 1 step commands.   Attention: normal. Concentration: normal.   Speech: speech is normal   Level of consciousness: drowsy ,  arousable by verbal stimuli  Knowledge: good.   Able to name object.     CRANIAL NERVES     CN II   Visual fields full to confrontation.     CN III, IV, VI   Pupils are equal, round, and reactive to light.  Extraocular motions are normal.   Nystagmus: none   Conjugate gaze: present    CN V   Facial sensation intact.     CN VII   Facial expression full,  symmetric.     MOTOR EXAM   Muscle bulk: normal  Overall muscle tone: normal    Strength   Right deltoid: 5/5  Left deltoid: 5/5  Right biceps: 5/5  Left biceps: 5/5  Right triceps: 5/5  Left triceps: 5/5  Right quadriceps: 5/5  Left quadriceps: 5/5  Right hamstrin/5  Left hamstrin/5  Right glutei: 5/5  Left glutei: 5/5  Right anterior tibial: 5/5  Left anterior tibial: 5/5  Right posterior tibial: 5/5  Left posterior tibial: 5/5    REFLEXES     Reflexes   Right plantar: normal  Left plantar: normal  Right ankle clonus: absent  Left ankle clonus: absent    SENSORY EXAM   Light touch normal.     GAIT AND COORDINATION      Coordination   Finger to nose coordination: normal    Significant Labs:   Recent Lab Results  (Last 5 results in the past 24 hours)        22  0530   22  2317   22  1948   22  1938        Base Deficit               Correct Temperature (PH)               Corrected Temperature (pCO2)               Corrected Temperature (pO2)               POC COHb               POC MetHb               POC O2Hb               Specimen source               Albumin/Globulin Ratio 0.6               Albumin 1.6               Alkaline Phosphatase 140               ALT 73               Appearance, UA     Clear           AST 41               Bacteria, UA     None Seen           Baso # 0.15               Basophil % 1.1               Beta-Hydroxybutyrate               BILIRUBIN TOTAL 0.5               Bilirubin, UA     Negative           BUN 21.8               Calcium 7.3               Chloride 107               CO2 19               Color, UA     Yellow           Creatinine 2.10               eGFR 39               Eos # 0.11               Eosinophil % 0.8               Globulin, Total 2.9               Glucose 321               Glucose, UA     3+           Hematocrit 32.6               Hemoglobin 11.1               Immature Grans (Abs) 0.06               Immature Granulocytes  0.4               INR               Ketones, UA     1+           Leukocytes, UA     Negative           Lymph # 4.05               LYMPH % 28.5               MCH 29.7               MCHC 34.0               MCV 87.2               Mono # 0.80               Mono % 5.6               MPV 12.0               Neut # 9.0               Neut % 63.6               NITRITE UA     Negative           nRBC 0.0               Occult Blood UA     2+           pH, UA     6.0           Platelets 283               POC HCO3               POC HEMOGLOBIN               POC Ionized Calcium               POC PCO2               POC PH               POC PO2               POC Potassium               POC SATURATED O2               POC Sodium               POC Temp               POCT Glucose   318     >500   350       Potassium 3.2               PROTEIN TOTAL 4.5               Protein, UA     4+           Protime               RBC 3.74               RBC, UA     <5           RDW 12.6               Sodium 134               Specific Gravity,UA     1.028           Squam Epithel, UA     <5           Thyroid Stimulating Hormone               Urobilinogen, UA     0.2           WBC, UA     <5           WBC 14.2                                      Significant Imaging:   CT head w/o 11/13/2022:  FINDINGS:  There is no acute intracranial hemorrhage or edema. The gray-white matter differentiation is preserved.  Scattered hypodensities in the subcortical and periventricular white matter and right basal ganglia likely chronic microvascular ischemic changes.     There is no mass effect or midline shift.  There is diffuse parenchymal volume loss.  The basal cisterns are patent. There is no abnormal extra-axial fluid collection.  Carotid and vertebral artery calcifications are noted.     The calvarium and skull base are intact.  There is a left mastoid effusion.     Impression:     1. No acute intracranial abnormality.  2. Chronic microvascular ischemic  changes.    CTA head/neck 11/13/2022:  FINDINGS:  Head CT with contrast:     No interval changes when compared to the previous CT.     No enhancing abnormalities.     If present, stenosis of the carotid bulbs is measured based on NASCET criteria,     i.e. area of maximal stenosis compared to the cervical ICA distal to the bulb.     Cervical CTA:     The origins of the great vessels are patent with mild scattered calcifications.     The common carotid arteries are normal in caliber.  There are atherosclerotic changes at the carotid bulbs with 20-30% stenosis on the right by NASCET criteria.  The internal carotid arteries are normal in caliber.     The vertebral arteries are patent.     Intracranial CTA:     The internal carotid arteries, most enlarged anterior cerebral arteries are patent with moderate focal narrowing of the right M1 segment.     The vertebral arteries, basilar artery and posterior cerebral arteries are patent with mild narrowing of the right vertebral artery and bilateral posterior cerebral arteries.     The dural venous sinuses are patent.     Impression:     1. No large vessel occlusion.  2. Moderate focal narrowing of the right M1 segment.  3. Atherosclerotic changes of the carotid bulbs with 20-30% stenosis on the right by NASCET criteria.  4. Mild narrowing of the right intracranial vertebral artery and bilateral posterior cerebral arteries.    I have reviewed all pertinent imaging results/findings within the past 24 hours.

## 2022-11-14 NOTE — CONSULTS
"[facility] - Emergency Dept  Neurology  Consult Note    Patient Name: Kamran Huerta  MRN: [id number]  Admission Date: 11/13/2022  Hospital Length of Stay: 1 days  Code Status: Full Code   Attending Provider: Jose Curry MD   Consulting Provider: ERNA JuneCNP-BC  Primary Care Physician: Primary Doctor No  Principal Problem:FABIO (acute kidney injury)    Inpatient consult to Neurology  Consult performed by: JAMAL June  Consult ordered by: Jose Curry MD         Subjective:     Chief Complaint:       HPI:   46 y/o M with PMH seizure d/o, DM II, chronic back pain, and HTN who presented to ED on 11/13 d/t being found unresponsive. pt reported had L sided weakness upon waking and was likely in postictal state on inital ED presentation. pt reportedly noncompliant with lantus and keppra for approx 48 hrs PTA d/t c/o being tired. Reports missing 2-3 doses. O/p AED keppra 500 mg BID.     CT head w/o, CTA head/neck, and MRI brain w/o  unrevaling for acute intracranial findings.    Pt reports that he lives with his brother and does not drive.        Past Medical History:   Diagnosis Date    Chronic back pain     DM (diabetes mellitus)     HTN (hypertension)     Seizures        No past surgical history on file.    Review of patient's allergies indicates:  No Known Allergies    Current Neurological Medications:     No current facility-administered medications on file prior to encounter.     Current Outpatient Medications on File Prior to Encounter   Medication Sig    gabapentin (NEURONTIN) 400 MG capsule Take 400 mg by mouth.    lisinopriL-hydrochlorothiazide (PRINZIDE,ZESTORETIC) 20-25 mg Tab Take by mouth.    amLODIPine (NORVASC) 10 MG tablet Take 10 mg by mouth once daily.    BD VEO INSULIN SYR, HALF UNIT, 0.3 mL 31 gauge x 15/64" Syrg use as directed    CREON 24,000-76,000 -120,000 unit capsule Take 3 capsules by mouth 3 (three) times daily.    cyclobenzaprine " (FEXMID) 7.5 MG Tab Take 7.5 mg by mouth 3 (three) times daily as needed.    furosemide (LASIX) 40 MG tablet Take 40 mg by mouth once daily.    HYDROcodone-acetaminophen (NORCO)  mg per tablet Take 1 tablet by mouth every 8 (eight) hours as needed.    insulin glargine 100 units/mL SubQ pen Inject 30 Units into the skin.    LANTUS U-100 INSULIN 100 unit/mL injection Inject 10 Units into the skin every evening.    levETIRAcetam (KEPPRA) 500 MG Tab Take 500 mg by mouth 2 (two) times daily.    loperamide (IMODIUM) 2 mg capsule Take by mouth.    metoprolol tartrate (LOPRESSOR) 25 MG tablet Take 25 mg by mouth 2 (two) times daily.    potassium chloride SA (K-DUR,KLOR-CON) 20 MEQ tablet Take 20 mEq by mouth once daily.    PRENATAL VITAMIN PLUS LOW IRON 27 mg iron- 1 mg Tab Take 1 tablet by mouth once daily.    tamsulosin (FLOMAX) 0.4 mg Cap SMARTSI Capsule(s) By Mouth Every Evening    tiZANidine (ZANAFLEX) 4 MG tablet Take by mouth.     Family History    None       Tobacco Use    Smoking status: Not on file    Smokeless tobacco: Not on file   Substance and Sexual Activity    Alcohol use: Not on file    Drug use: Not on file    Sexual activity: Not on file     Review of Systems  A 14pt ros was reviewed & is negative unless o/w documented in the hpi    Objective:     Vital Signs (Most Recent):  Temp: 98.1 °F (36.7 °C) (22 1352)  Pulse: 91 (22 0800)  Resp: 16 (22 0800)  BP: (!) 136/104 (22 0800)  SpO2: 99 % (22 0800)   Vital Signs (24h Range):  Temp:  [98.1 °F (36.7 °C)-98.6 °F (37 °C)] 98.1 °F (36.7 °C)  Pulse:  [] 91  Resp:  [11-22] 16  SpO2:  [95 %-100 %] 99 %  BP: (117-217)/() 136/104     Weight: 87 kg (191 lb 12.8 oz)  Body mass index is 29.16 kg/m².    Physical Exam  Vitals reviewed.   Constitutional:       General: He is sleeping.      Appearance: Normal appearance.   HENT:      Head: Normocephalic and atraumatic.      Nose: Nose normal.       Mouth/Throat:      Mouth: Mucous membranes are moist.      Pharynx: Oropharynx is clear.   Eyes:      Extraocular Movements: Extraocular movements intact and EOM normal.      Pupils: Pupils are equal, round, and reactive to light.   Pulmonary:      Effort: Pulmonary effort is normal.   Skin:     General: Skin is warm and dry.   Neurological:      General: No focal deficit present.      Mental Status: He is oriented to person, place, and time and easily aroused.      Coordination: Finger-Nose-Finger Test normal.   Psychiatric:         Speech: Speech normal.       NEUROLOGICAL EXAMINATION:     MENTAL STATUS   Oriented to person, place, and time.   Follows 1 step commands.   Attention: normal. Concentration: normal.   Speech: speech is normal   Level of consciousness: drowsy ,  arousable by verbal stimuli  Knowledge: good.   Able to name object.     CRANIAL NERVES     CN II   Visual fields full to confrontation.     CN III, IV, VI   Pupils are equal, round, and reactive to light.  Extraocular motions are normal.   Nystagmus: none   Conjugate gaze: present    CN V   Facial sensation intact.     CN VII   Facial expression full, symmetric.     MOTOR EXAM   Muscle bulk: normal  Overall muscle tone: normal    Strength   Right deltoid: 5/5  Left deltoid: 5/5  Right biceps: 5/5  Left biceps: 5/5  Right triceps: 5/5  Left triceps: 5/5  Right quadriceps: 5/5  Left quadriceps: 5/5  Right hamstrin/5  Left hamstrin/5  Right glutei: 5/5  Left glutei: 5/5  Right anterior tibial: 5/5  Left anterior tibial: 5/5  Right posterior tibial: 5/5  Left posterior tibial: 5/5    REFLEXES     Reflexes   Right plantar: normal  Left plantar: normal  Right ankle clonus: absent  Left ankle clonus: absent    SENSORY EXAM   Light touch normal.     GAIT AND COORDINATION      Coordination   Finger to nose coordination: normal    Significant Labs:   Recent Lab Results  (Last 5 results in the past 24 hours)        22  3407   22  1165    11/13/22 2031 11/13/22 1948 11/13/22 1938        Base Deficit               Correct Temperature (PH)               Corrected Temperature (pCO2)               Corrected Temperature (pO2)               POC COHb               POC MetHb               POC O2Hb               Specimen source               Albumin/Globulin Ratio 0.6               Albumin 1.6               Alkaline Phosphatase 140               ALT 73               Appearance, UA     Clear           AST 41               Bacteria, UA     None Seen           Baso # 0.15               Basophil % 1.1               Beta-Hydroxybutyrate               BILIRUBIN TOTAL 0.5               Bilirubin, UA     Negative           BUN 21.8               Calcium 7.3               Chloride 107               CO2 19               Color, UA     Yellow           Creatinine 2.10               eGFR 39               Eos # 0.11               Eosinophil % 0.8               Globulin, Total 2.9               Glucose 321               Glucose, UA     3+           Hematocrit 32.6               Hemoglobin 11.1               Immature Grans (Abs) 0.06               Immature Granulocytes 0.4               INR               Ketones, UA     1+           Leukocytes, UA     Negative           Lymph # 4.05               LYMPH % 28.5               MCH 29.7               MCHC 34.0               MCV 87.2               Mono # 0.80               Mono % 5.6               MPV 12.0               Neut # 9.0               Neut % 63.6               NITRITE UA     Negative           nRBC 0.0               Occult Blood UA     2+           pH, UA     6.0           Platelets 283               POC HCO3               POC HEMOGLOBIN               POC Ionized Calcium               POC PCO2               POC PH               POC PO2               POC Potassium               POC SATURATED O2               POC Sodium               POC Temp               POCT Glucose   318     >500   350       Potassium 3.2                PROTEIN TOTAL 4.5               Protein, UA     4+           Protime               RBC 3.74               RBC, UA     <5           RDW 12.6               Sodium 134               Specific Gravity,UA     1.028           Squam Epithel, UA     <5           Thyroid Stimulating Hormone               Urobilinogen, UA     0.2           WBC, UA     <5           WBC 14.2                                      Significant Imaging:   CT head w/o 11/13/2022:  FINDINGS:  There is no acute intracranial hemorrhage or edema. The gray-white matter differentiation is preserved.  Scattered hypodensities in the subcortical and periventricular white matter and right basal ganglia likely chronic microvascular ischemic changes.     There is no mass effect or midline shift.  There is diffuse parenchymal volume loss.  The basal cisterns are patent. There is no abnormal extra-axial fluid collection.  Carotid and vertebral artery calcifications are noted.     The calvarium and skull base are intact.  There is a left mastoid effusion.     Impression:     1. No acute intracranial abnormality.  2. Chronic microvascular ischemic changes.    CTA head/neck 11/13/2022:  FINDINGS:  Head CT with contrast:     No interval changes when compared to the previous CT.     No enhancing abnormalities.     If present, stenosis of the carotid bulbs is measured based on NASCET criteria,     i.e. area of maximal stenosis compared to the cervical ICA distal to the bulb.     Cervical CTA:     The origins of the great vessels are patent with mild scattered calcifications.     The common carotid arteries are normal in caliber.  There are atherosclerotic changes at the carotid bulbs with 20-30% stenosis on the right by NASCET criteria.  The internal carotid arteries are normal in caliber.     The vertebral arteries are patent.     Intracranial CTA:     The internal carotid arteries, most enlarged anterior cerebral arteries are patent with moderate focal  narrowing of the right M1 segment.     The vertebral arteries, basilar artery and posterior cerebral arteries are patent with mild narrowing of the right vertebral artery and bilateral posterior cerebral arteries.     The dural venous sinuses are patent.     Impression:     1. No large vessel occlusion.  2. Moderate focal narrowing of the right M1 segment.  3. Atherosclerotic changes of the carotid bulbs with 20-30% stenosis on the right by NASCET criteria.  4. Mild narrowing of the right intracranial vertebral artery and bilateral posterior cerebral arteries.    I have reviewed all pertinent imaging results/findings within the past 24 hours.    Assessment and Plan:     Seizure disorder  Continue keppra 500 mg BID PO  Seizure precautions   Notify neurology for additional witnessed seizures          VTE Risk Mitigation (From admission, onward)         Ordered     enoxaparin injection 30 mg  Daily         11/13/22 2317     IP VTE HIGH RISK PATIENT  Once         11/13/22 2317     Place sequential compression device  Until discontinued         11/13/22 2317                Thank you for your consult. Further recommendations to follow per MD Tanisha Finnegan, AGACNP-BC  Inpatient Neurology  [facility] - Emergency Dept

## 2022-11-14 NOTE — ASSESSMENT & PLAN NOTE
Continue keppra 500 mg BID PO  Seizure precautions   Notify neurology for additional witnessed seizures

## 2022-11-14 NOTE — HPI
48 y/o M with PMH seizure d/o, DM II, chronic back pain, and HTN who presented to ED on 11/13 d/t being found unresponsive. pt reported had L sided weakness upon waking and was likely in postictal state on inital ED presentation. pt reportedly noncompliant with lantus and keppra for approx 48 hrs PTA d/t c/o being tired. Reports missing 2-3 doses. O/p AED keppra 500 mg BID.     CT head w/o, CTA head/neck, and MRI brain w/o  unrevaling for acute intracranial findings.    Pt reports that he lives with his brother and does not drive.

## 2022-11-14 NOTE — PROGRESS NOTES
[facility] Hospital Medicine Progress Note        Chief Complaint: Inpatient Follow-up for     HPI:   Mr Huerta is a 47-year-old gentleman with PMH of Seizure, Type 2 DM, HTN who was found unresponsive on the ground possibly in a postictal state. He was reported to have left-sided weakness and was brought to the ER where CT Head was obtained and was unremarkable. Neurology was consulted and related to the ER physician this was likely a postictal state and recommended loading with Keppra. Hospitalist service was consulted for admission for hyperglycemia and acute kidney injury seen on laboratory work.  At bedside patient reported noncompliance with his home medications including Lantus, Keppra over the last 48 hours because he felt tired.  He also reports having lumbar spine surgery about a month ago and chronic back pain and is asking for pain medication.  He reports chronic pain in his left lower extremity from the hip down. He was given 2 L IV LR as boluses and kept on IV  ml/hr.     Interval Hx:   Today, Mr Huerta was seen at bedside in the ER. He was noted to be awake but a little drowsy and oriented x 3. His BUN/Cr was noted to be 21.8/2.10 which was improved since yesterday. He will be started on [place] 7.5-325 q8hrs PRN for his back pain. Will give another 500 ml IV LR as bolus & monitor renal indices for improvement. BGs in 200s today, will monitor & increase insulin if needed. MRI Brain showed chronic microvascular ischemic changes. Will continue to monitor    Objective/physical exam:  General: alert male lying comfortably in bed, in no acute distress  HENT: oral and oropharyngeal mucosa moist, pink, with no erythema or exudates, no ear pain or discharge  Neck: normal neck movement, no lymph nodes or swellings, no JVD or Carotid bruit  Respiratory: clear breathing sounds bilaterally, no crackles, rales, ronchi or wheezes  Cardiovascular: clear S1 and S2, no murmurs, rubs or  gallops  Peripheral Vascular: no lesions, ulcers or erosions, normal peripheral pulses and no pedal edema  Gastrointestinal: soft, non-tender, non-distended abdomen, no guarding, rigidity or rebound tenderness, normal bowel sounds  Integumentary: normal skin color, no rashes or lesions  Neuro: AAO x 3; motor strength 5/5 in B/L UEs & 4/5 in B/L LEs; sensation diminished in B/L LEs to gross and fine touch B/L particularly in feet & anterior legs; CN II-XII grossly intact      VITAL SIGNS: 24 HRS MIN & MAX LAST   Temp  Min: 98.1 °F (36.7 °C)  Max: 98.6 °F (37 °C) 98.1 °F (36.7 °C)   BP  Min: 117/94  Max: 217/116 (!) 136/104     Pulse  Min: 85  Max: 105  86   Resp  Min: 11  Max: 22 16   SpO2  Min: 95 %  Max: 100 % 100 %       Recent Labs   Lab 11/13/22  1428 11/13/22  1518 11/14/22  0530   WBC  --  15.8* 14.2*   RBC  --  4.11* 3.74*   HGB  --  12.4* 11.1*   HCT 43 36.6* 32.6*   MCV  --  89.1 87.2   MCH  --  30.2 29.7   MCHC  --  33.9 34.0   RDW  --  12.9 12.6   PLT  --  250 283   MPV  --  11.6* 12.0*       Recent Labs   Lab 11/13/22  1518 11/13/22  1937 11/14/22  0530   *  --  134*   K 3.8  --  3.2*   CO2 17*  --  19*   BUN 19.1  --  21.8*   CREATININE 2.43*  --  2.10*   CALCIUM 7.4*  --  7.3*   PH  --  7.26*  --    ALBUMIN 1.6*  --  1.6*   ALKPHOS 162*  --  140   *  --  73*   *  --  41*   BILITOT 0.4  --  0.5          Microbiology Results (last 7 days)       ** No results found for the last 168 hours. **             See below for Radiology    Scheduled Med:   amLODIPine  10 mg Oral Daily    enoxaparin  30 mg Subcutaneous Daily    gabapentin  300 mg Oral TID    insulin detemir U-100  20 Units Subcutaneous BID    levETIRAcetam  500 mg Oral BID    lipase-protease-amylase 12,000-38,000-60,000 units  3 capsule Oral TID    metoprolol tartrate  25 mg Oral BID    potassium chloride  40 mEq Intravenous Once    potassium chloride  40 mEq Oral Once    tamsulosin  0.4 mg Oral Daily        Continuous  Infusions:   sodium chloride 0.9% 125 mL/hr at 11/14/22 0000        PRN Meds:  acetaminophen, dextrose 10%, dextrose 10%, glucagon (human recombinant), glucose, glucose, insulin aspart U-100, melatonin, sodium chloride 0.9%     Assessment/Plan:  FABIO 2/2 Intravascular Depletion  AMS 2/2 Post-Ictal State  Seizures  HTN  DM II  Recent Spinal Surgery with Chronic Back Pain & B/L LE Numbness  Normocytic Anemia  Leukocytosis  Hypokalemia  AGMA 2/2 Ketonemia 2/2 Insulin Noncompliance vs Starvation    - Patient continues to be admitted for close monitoring  - Will continue on IV /hr & give a 500 ml IV LR bolus  - Will continue Keppra 500 mg BID  - Continued on Amlodipine 10 mg, Metoprolol Tartrate 25 mg BID  - On Detemir 20 units BID & ISS  - PRN [place] 7.5-325 q8hrs  - Continue monitoring symptoms    VTE prophylaxis: Lovenox    Patient condition:  Stable    Anticipated discharge and Disposition:     Pending renal function recovery; PT recs pending    All diagnosis and differential diagnosis have been reviewed; assessment and plan has been documented; I have personally reviewed the labs and test results that are presently available; I have reviewed the patients medication list; I have reviewed the consulting providers response and recommendations. I have reviewed or attempted to review medical records based upon their availability    All of the patient's questions have been  addressed and answered. Patient's is agreeable to the above stated plan. I will continue to monitor closely and make adjustments to medical management as needed.  _____________________________________________________________________    Nutrition Status: Diabetic    Radiology:  MRI Brain Without Contrast  Narrative: EXAMINATION:  MRI BRAIN WITHOUT CONTRAST    CLINICAL HISTORY:  Seizure disorder, clinical change;    TECHNIQUE:  Multiplanar, multisequence MR images of the brain were obtained without the administration of intravenous  contrast.    COMPARISON:  CT head and CT angio head neck dated 11/13/2022    FINDINGS:  There is no restricted diffusion, hemorrhage or edema.  There are mild scattered T2/FLAIR hyperintensities in the subcortical and periventricular white matter and right basal ganglia, nonspecific but likely representing chronic microvascular ischemic changes.  The hippocampi are similar in size and signal.    There is no mass effect or midline shift.  The basal cisterns are patent.  There is mild diffuse parenchymal volume loss.  There is no hydrocephalus or abnormal extra-axial fluid collection.  The major intracranial flow voids are patent.  The paranasal sinuses are clear.  There are bilateral mastoid effusions.  Impression: 1. No acute intracranial abnormality.  2. Mild chronic microvascular ischemic changes.    Electronically signed by: Mirela Granados  Date:    11/14/2022  Time:    10:28      Marcos Saldana MD   11/14/2022

## 2022-11-15 LAB
ALBUMIN SERPL-MCNC: 1.5 GM/DL (ref 3.5–5)
ALBUMIN/GLOB SERPL: 0.4 RATIO (ref 1.1–2)
ALP SERPL-CCNC: 139 UNIT/L (ref 40–150)
ALT SERPL-CCNC: 58 UNIT/L (ref 0–55)
AST SERPL-CCNC: 48 UNIT/L (ref 5–34)
BASOPHILS # BLD AUTO: 0.11 X10(3)/MCL (ref 0–0.2)
BASOPHILS NFR BLD AUTO: 0.9 %
BILIRUBIN DIRECT+TOT PNL SERPL-MCNC: 0.2 MG/DL
BUN SERPL-MCNC: 19 MG/DL (ref 8.9–20.6)
CALCIUM SERPL-MCNC: 7.4 MG/DL (ref 8.4–10.2)
CHLORIDE SERPL-SCNC: 111 MMOL/L (ref 98–107)
CO2 SERPL-SCNC: 20 MMOL/L (ref 22–29)
CREAT SERPL-MCNC: 1.78 MG/DL (ref 0.73–1.18)
EOSINOPHIL # BLD AUTO: 0.34 X10(3)/MCL (ref 0–0.9)
EOSINOPHIL NFR BLD AUTO: 2.8 %
ERYTHROCYTE [DISTWIDTH] IN BLOOD BY AUTOMATED COUNT: 12.8 % (ref 11.5–17)
GFR SERPLBLD CREATININE-BSD FMLA CKD-EPI: 47 MLS/MIN/1.73/M2
GLOBULIN SER-MCNC: 3.6 GM/DL (ref 2.4–3.5)
GLUCOSE SERPL-MCNC: 143 MG/DL (ref 74–100)
HCT VFR BLD AUTO: 34.7 % (ref 42–52)
HGB BLD-MCNC: 11.7 GM/DL (ref 14–18)
IMM GRANULOCYTES # BLD AUTO: 0.04 X10(3)/MCL (ref 0–0.04)
IMM GRANULOCYTES NFR BLD AUTO: 0.3 %
LYMPHOCYTES # BLD AUTO: 3.5 X10(3)/MCL (ref 0.6–4.6)
LYMPHOCYTES NFR BLD AUTO: 28.6 %
MAGNESIUM SERPL-MCNC: 1.1 MG/DL (ref 1.6–2.6)
MCH RBC QN AUTO: 29.7 PG (ref 27–31)
MCHC RBC AUTO-ENTMCNC: 33.7 MG/DL (ref 33–36)
MCV RBC AUTO: 88.1 FL (ref 80–94)
MONOCYTES # BLD AUTO: 0.6 X10(3)/MCL (ref 0.1–1.3)
MONOCYTES NFR BLD AUTO: 4.9 %
NEUTROPHILS # BLD AUTO: 7.6 X10(3)/MCL (ref 2.1–9.2)
NEUTROPHILS NFR BLD AUTO: 62.5 %
NRBC BLD AUTO-RTO: 0 %
PHOSPHATE SERPL-MCNC: 3.5 MG/DL (ref 2.3–4.7)
PLATELET # BLD AUTO: 308 X10(3)/MCL (ref 130–400)
PMV BLD AUTO: 12.1 FL (ref 7.4–10.4)
POCT GLUCOSE: 141 MG/DL (ref 70–110)
POCT GLUCOSE: 154 MG/DL (ref 70–110)
POCT GLUCOSE: 252 MG/DL (ref 70–110)
POCT GLUCOSE: 93 MG/DL (ref 70–110)
POTASSIUM SERPL-SCNC: 3.3 MMOL/L (ref 3.5–5.1)
PROT SERPL-MCNC: 5.1 GM/DL (ref 6.4–8.3)
RBC # BLD AUTO: 3.94 X10(6)/MCL (ref 4.7–6.1)
SODIUM SERPL-SCNC: 136 MMOL/L (ref 136–145)
WBC # SPEC AUTO: 12.2 X10(3)/MCL (ref 4.5–11.5)

## 2022-11-15 PROCEDURE — 25000003 PHARM REV CODE 250: Performed by: STUDENT IN AN ORGANIZED HEALTH CARE EDUCATION/TRAINING PROGRAM

## 2022-11-15 PROCEDURE — 80053 COMPREHEN METABOLIC PANEL: CPT | Performed by: STUDENT IN AN ORGANIZED HEALTH CARE EDUCATION/TRAINING PROGRAM

## 2022-11-15 PROCEDURE — 36410 VNPNXR 3YR/> PHY/QHP DX/THER: CPT

## 2022-11-15 PROCEDURE — 83735 ASSAY OF MAGNESIUM: CPT | Performed by: STUDENT IN AN ORGANIZED HEALTH CARE EDUCATION/TRAINING PROGRAM

## 2022-11-15 PROCEDURE — 25000003 PHARM REV CODE 250: Performed by: INTERNAL MEDICINE

## 2022-11-15 PROCEDURE — A4216 STERILE WATER/SALINE, 10 ML: HCPCS | Performed by: INTERNAL MEDICINE

## 2022-11-15 PROCEDURE — 85025 COMPLETE CBC W/AUTO DIFF WBC: CPT | Performed by: STUDENT IN AN ORGANIZED HEALTH CARE EDUCATION/TRAINING PROGRAM

## 2022-11-15 PROCEDURE — 36415 COLL VENOUS BLD VENIPUNCTURE: CPT | Performed by: STUDENT IN AN ORGANIZED HEALTH CARE EDUCATION/TRAINING PROGRAM

## 2022-11-15 PROCEDURE — 97162 PT EVAL MOD COMPLEX 30 MIN: CPT

## 2022-11-15 PROCEDURE — 84100 ASSAY OF PHOSPHORUS: CPT | Performed by: STUDENT IN AN ORGANIZED HEALTH CARE EDUCATION/TRAINING PROGRAM

## 2022-11-15 PROCEDURE — 63600175 PHARM REV CODE 636 W HCPCS: Performed by: INTERNAL MEDICINE

## 2022-11-15 PROCEDURE — 21400001 HC TELEMETRY ROOM

## 2022-11-15 PROCEDURE — C1751 CATH, INF, PER/CENT/MIDLINE: HCPCS

## 2022-11-15 RX ORDER — SODIUM CHLORIDE 0.9 % (FLUSH) 0.9 %
10 SYRINGE (ML) INJECTION
Status: DISCONTINUED | OUTPATIENT
Start: 2022-11-15 | End: 2022-11-17 | Stop reason: HOSPADM

## 2022-11-15 RX ORDER — SODIUM CHLORIDE 0.9 % (FLUSH) 0.9 %
10 SYRINGE (ML) INJECTION EVERY 6 HOURS
Status: DISCONTINUED | OUTPATIENT
Start: 2022-11-15 | End: 2022-11-17 | Stop reason: HOSPADM

## 2022-11-15 RX ADMIN — ENOXAPARIN SODIUM 40 MG: 40 INJECTION SUBCUTANEOUS at 04:11

## 2022-11-15 RX ADMIN — SODIUM CHLORIDE: 9 INJECTION, SOLUTION INTRAVENOUS at 10:11

## 2022-11-15 RX ADMIN — TAMSULOSIN HYDROCHLORIDE 0.4 MG: 0.4 CAPSULE ORAL at 09:11

## 2022-11-15 RX ADMIN — AMLODIPINE BESYLATE 10 MG: 5 TABLET ORAL at 09:11

## 2022-11-15 RX ADMIN — SODIUM CHLORIDE, PRESERVATIVE FREE 10 ML: 5 INJECTION INTRAVENOUS at 10:11

## 2022-11-15 RX ADMIN — HYDROCODONE BITARTRATE AND ACETAMINOPHEN 1 TABLET: 7.5; 325 TABLET ORAL at 04:11

## 2022-11-15 RX ADMIN — INSULIN DETEMIR 20 UNITS: 100 INJECTION, SOLUTION SUBCUTANEOUS at 09:11

## 2022-11-15 RX ADMIN — SODIUM CHLORIDE, PRESERVATIVE FREE 10 ML: 5 INJECTION INTRAVENOUS at 04:11

## 2022-11-15 RX ADMIN — GABAPENTIN 300 MG: 300 CAPSULE ORAL at 09:11

## 2022-11-15 RX ADMIN — LEVETIRACETAM 500 MG: 500 TABLET, FILM COATED ORAL at 09:11

## 2022-11-15 RX ADMIN — METOPROLOL TARTRATE 25 MG: 25 TABLET, FILM COATED ORAL at 09:11

## 2022-11-15 RX ADMIN — PANCRELIPASE 3 CAPSULE: 60000; 12000; 38000 CAPSULE, DELAYED RELEASE PELLETS ORAL at 04:11

## 2022-11-15 RX ADMIN — INSULIN DETEMIR 20 UNITS: 100 INJECTION, SOLUTION SUBCUTANEOUS at 10:11

## 2022-11-15 RX ADMIN — PANCRELIPASE 3 CAPSULE: 60000; 12000; 38000 CAPSULE, DELAYED RELEASE PELLETS ORAL at 09:11

## 2022-11-15 RX ADMIN — GABAPENTIN 300 MG: 300 CAPSULE ORAL at 04:11

## 2022-11-15 RX ADMIN — INSULIN ASPART 6 UNITS: 100 INJECTION, SOLUTION INTRAVENOUS; SUBCUTANEOUS at 11:11

## 2022-11-15 NOTE — PT/OT/SLP EVAL
Physical Therapy Evaluation/Re-Evaluation    Patient Name:  Kamran Huerta   MRN:  [id number]    Recommendations:     Discharge Recommendations:  HH PT  Discharge Equipment Recommendations: none  Home Modification Needed: none  Barriers to discharge: None  Recommended Discharge Transportation: Private Auto  Recommendations for activity with nursing staff: Can ambulate with nursing    Assessment:     Kamran Huerta is a 47 y.o. male admitted with a medical diagnosis of FABIO. Patient reports living with brother in house with 3 steps to enter. He is usually independent and ambulates with RW. The following impairments/functional limitations were identified:  weakness, impaired endurance, impaired self care skills, impaired functional mobility, gait instability, impaired balance, decreased lower extremity function. He required MIN A for bed mobility. Ambulated 80 ft with RW & CGA. Slowed pace. Unsteady at times. Will need HH PT at discharge. Progress patient as tolerated.         Rehab Prognosis: Good; patient would benefit from acute skilled PT services to address above deficits and reach maximum level of function.    Recent Surgery: * No surgery found *  .      History:     Past Medical History:   Diagnosis Date    Chronic back pain     DM (diabetes mellitus)     HTN (hypertension)     Seizures        No past surgical history on file.      Plan:     During this hospitalization, patient to be seen   to address the identified rehab impairments and progress toward the following goals:    Plan of Care Expires:     GOALS:   Multidisciplinary Problems       Physical Therapy Goals          Problem: Physical Therapy    Goal Priority Disciplines Outcome Goal Variances Interventions   Physical Therapy Goal     PT, PT/OT Ongoing, Progressing     Description: Goals to be met by: 12/15/22     Patient will increase functional independence with mobility by performin. Supine to sit with [place]  2. Sit to supine  with [place]  3. Gait  x 300 feet with Modified [place] using Rolling Walker.   4. Ascend/descend 3 steps with standby assistance                             Subjective     Chief Complaint: none  Patient/Family Comments/goals: none  Pain/Comfort: 4/10 LBP    Living Environment:  Lives with brother in single level home with 3 steps to enter.   Prior to admission, patients level of function was independent   Equipment used at home: rolling walker   Upon discharge, patient will have assistance from: brother    Objective:     Communicated with nurse prior to session.  Patient found supine     General Precautions: fall  Orthopedic Precautions:    Braces:       Tests and Measures:    Cognitive Exam:  Patient Affect:awake and alert.  Basic command following: WFL  Patient oriented to Oriented x4        Sensation:   Right Upper Extremity    Light Touch Intact    Left Upper Extremity    Intact    Right Lower Extremity    numbness    Left Lower Extremity    numbness      Lower Extremity Strength:  Right Lower Extremity:     Knee Extension 4-  Knee Flexion 4-  Hip Flexion 4-  Hip Abduction 4-  Hip Adduction 4-  Ankle Dorsiflexion 4-  Ankle Plantar Flexion 4-    Left Lower Extremity:     Knee Extension 3+  Knee Flexion 3+  Hip Flexion 3+  Hip Abduction 3+  Hip Adduction 3+  Ankle Dorsiflexion 3+  Ankle Plantar Flexion 3+      Balance:    Static Sitting- fair                    Dynamic Sitting-  fair                    Static Standing- fair                    Dynamic Standing-  fair    Functional Mobility:     Bed Mobility:      Supine to Sit: minimum assistance  Sit to Supine: minimum assistance    Transfers:    Sit to Stand:  contact guard assistance with rolling walker    Gait:  contact guard   Device used: walker   Distance: 80 feet    Patient Safety:   Skin Integrity-Therapeutic positioning provided at conclusion of session to maintain skin integrity.    Gait belt applied - Yes    Rehabilitation technician present  to enhance patient safety- Yes    Patient left supine with all lines intact and call button in reach.    Case Conference: Assessment/recommendations discussed with patient's nurse      Time Tracking:   PT Received On: 11/15/22  PT Start Time: 0800     PT Stop Time: 0820  PT Total Time (min): 20 min     Billable Minutes: Evaluation 20 minutes      11/15/2022

## 2022-11-15 NOTE — PROGRESS NOTES
[facility] Hospital Medicine Progress Note        Chief Complaint: Inpatient Follow-up for FABIO     HPI:   Mr Huerta is a 47-year-old gentleman with PMH of Seizure, Type 2 DM, HTN who was found unresponsive on the ground possibly in a postictal state. He was reported to have left-sided weakness and was brought to the ER where CT Head was obtained and was unremarkable. Neurology was consulted and related to the ER physician this was likely a postictal state and recommended loading with Keppra. Hospitalist service was consulted for admission for hyperglycemia and acute kidney injury seen on laboratory work.  At bedside patient reported noncompliance with his home medications including Lantus, Keppra over the last 48 hours because he felt tired.  He also reports having lumbar spine surgery about a month ago and chronic back pain and is asking for pain medication.  He reports chronic pain in his left lower extremity from the hip down. He was given 2 L IV LR as boluses and kept on IV  ml/hr. His BUN/Cr was noted to be 21.8/2.10 which was improved since yesterday. He will be started on [place] 7.5-325 q8hrs PRN for his back pain. MRI Brain showed chronic microvascular ischemic changes.     Interval Hx:   Today, Mr Huerta was seen at bedside, he reported no new complaints. Will recheck labs & plan for DC tomorrow if renal indices improved.    Objective/physical exam:  General: alert male lying comfortably in bed, in no acute distress  HENT: oral and oropharyngeal mucosa moist, pink, with no erythema or exudates, no ear pain or discharge  Neck: normal neck movement, no lymph nodes or swellings, no JVD or Carotid bruit  Respiratory: clear breathing sounds bilaterally, no crackles, rales, ronchi or wheezes  Cardiovascular: clear S1 and S2, no murmurs, rubs or gallops  Peripheral Vascular: no lesions, ulcers or erosions, normal peripheral pulses and no pedal edema  Gastrointestinal: soft,  non-tender, non-distended abdomen, no guarding, rigidity or rebound tenderness, normal bowel sounds  Integumentary: normal skin color, no rashes or lesions  Neuro: AAO x 3; motor strength 5/5 in B/L UEs & 4/5 in B/L LEs; sensation diminished in B/L LEs to gross and fine touch B/L particularly in feet & anterior legs; CN II-XII grossly intact      VITAL SIGNS: 24 HRS MIN & MAX LAST   Temp  Min: 97.2 °F (36.2 °C)  Max: 98.1 °F (36.7 °C) 97.2 °F (36.2 °C)   BP  Min: 128/76  Max: 167/102 138/72     Pulse  Min: 56  Max: 75  75   Resp  Min: 18  Max: 20 20   SpO2  Min: 97 %  Max: 100 % 100 %       Recent Labs   Lab 11/13/22  1428 11/13/22  1518 11/14/22  0530   WBC  --  15.8* 14.2*   RBC  --  4.11* 3.74*   HGB  --  12.4* 11.1*   HCT 43 36.6* 32.6*   MCV  --  89.1 87.2   MCH  --  30.2 29.7   MCHC  --  33.9 34.0   RDW  --  12.9 12.6   PLT  --  250 283   MPV  --  11.6* 12.0*         Recent Labs   Lab 11/13/22  1518 11/13/22  1937 11/14/22  0530   *  --  134*   K 3.8  --  3.2*   CO2 17*  --  19*   BUN 19.1  --  21.8*   CREATININE 2.43*  --  2.10*   CALCIUM 7.4*  --  7.3*   PH  --  7.26*  --    ALBUMIN 1.6*  --  1.6*   ALKPHOS 162*  --  140   *  --  73*   *  --  41*   BILITOT 0.4  --  0.5            Microbiology Results (last 7 days)       ** No results found for the last 168 hours. **             See below for Radiology    Scheduled Med:   amLODIPine  10 mg Oral Daily    enoxaparin  40 mg Subcutaneous Daily    gabapentin  300 mg Oral TID    insulin detemir U-100  20 Units Subcutaneous BID    levETIRAcetam  500 mg Oral BID    lipase-protease-amylase 12,000-38,000-60,000 units  3 capsule Oral TID    metoprolol tartrate  25 mg Oral BID    sodium chloride 0.9%  10 mL Intravenous Q6H    tamsulosin  0.4 mg Oral Daily        Continuous Infusions:   sodium chloride 0.9% 125 mL/hr at 11/14/22 0000        PRN Meds:  acetaminophen, dextrose 10%, dextrose 10%, glucagon (human recombinant), glucose, glucose,  HYDROcodone-acetaminophen, insulin aspart U-100, melatonin, sodium chloride 0.9%, [place] PICC Protocol **AND** sodium chloride 0.9% **AND** sodium chloride 0.9%     Assessment/Plan:  FABIO 2/2 Intravascular Depletion  AMS 2/2 Post-Ictal State  Seizures  HTN  DM II  Recent Spinal Surgery with Chronic Back Pain & B/L LE Numbness  Normocytic Anemia  Leukocytosis  Hypokalemia  AGMA 2/2 Ketonemia 2/2 Insulin Noncompliance vs Starvation    - Patient continues to be admitted for close monitoring  - Will continue on IV /hr & give a 500 ml IV LR bolus  - Will continue Keppra 500 mg BID as unable to increase to 750 mg BID; will increase to 1000 mg BID on DC  - Continued on Amlodipine 10 mg, Metoprolol Tartrate 25 mg BID  - On Detemir 20 units BID & ISS  - PRN Norco 7.5-325 q8hrs  - Continue monitoring symptoms    VTE prophylaxis: Lovenox    Patient condition:  Stable    Anticipated discharge and Disposition:     Pending renal function recovery; PT recs pending    All diagnosis and differential diagnosis have been reviewed; assessment and plan has been documented; I have personally reviewed the labs and test results that are presently available; I have reviewed the patients medication list; I have reviewed the consulting providers response and recommendations. I have reviewed or attempted to review medical records based upon their availability    All of the patient's questions have been  addressed and answered. Patient's is agreeable to the above stated plan. I will continue to monitor closely and make adjustments to medical management as needed.  _____________________________________________________________________    Nutrition Status: Diabetic    Radiology:  MRI Brain Without Contrast  Narrative: EXAMINATION:  MRI BRAIN WITHOUT CONTRAST    CLINICAL HISTORY:  Seizure disorder, clinical change;    TECHNIQUE:  Multiplanar, multisequence MR images of the brain were obtained without the administration of intravenous  contrast.    COMPARISON:  CT head and CT angio head neck dated 11/13/2022    FINDINGS:  There is no restricted diffusion, hemorrhage or edema.  There are mild scattered T2/FLAIR hyperintensities in the subcortical and periventricular white matter and right basal ganglia, nonspecific but likely representing chronic microvascular ischemic changes.  The hippocampi are similar in size and signal.    There is no mass effect or midline shift.  The basal cisterns are patent.  There is mild diffuse parenchymal volume loss.  There is no hydrocephalus or abnormal extra-axial fluid collection.  The major intracranial flow voids are patent.  The paranasal sinuses are clear.  There are bilateral mastoid effusions.  Impression: 1. No acute intracranial abnormality.  2. Mild chronic microvascular ischemic changes.    Electronically signed by: Mirela Granados  Date:    11/14/2022  Time:    10:28      Marcos Saldana MD   11/15/2022

## 2022-11-15 NOTE — PLAN OF CARE
Problem: Skin Injury Risk Increased  Goal: Skin Health and Integrity  11/15/2022 0741 by Randi Merida RN  Outcome: Ongoing, Progressing  11/14/2022 1810 by Randi Merida RN  Outcome: Ongoing, Progressing     Problem: Adult Inpatient Plan of Care  Goal: Plan of Care Review  11/15/2022 0741 by Randi Merida RN  Outcome: Ongoing, Progressing  11/14/2022 1810 by Randi Merida RN  Outcome: Ongoing, Progressing  Goal: Patient-Specific Goal (Individualized)  11/15/2022 0741 by Randi Merida RN  Outcome: Ongoing, Progressing  11/14/2022 1810 by Randi Merida RN  Outcome: Ongoing, Progressing  Goal: Absence of Hospital-Acquired Illness or Injury  11/15/2022 0741 by Randi Merida RN  Outcome: Ongoing, Progressing  11/14/2022 1810 by Randi Merida RN  Outcome: Ongoing, Progressing  Goal: Optimal Comfort and Wellbeing  11/15/2022 0741 by Randi Merida RN  Outcome: Ongoing, Progressing  11/14/2022 1810 by Randi Merida RN  Outcome: Ongoing, Progressing  Goal: Readiness for Transition of Care  11/15/2022 0741 by Randi Merida RN  Outcome: Ongoing, Progressing  11/14/2022 1810 by Randi Merida RN  Outcome: Ongoing, Progressing     Problem: Adult Inpatient Plan of Care  Goal: Plan of Care Review  11/15/2022 0741 by Randi Merida RN  Outcome: Ongoing, Progressing  11/14/2022 1810 by Randi Merida RN  Outcome: Ongoing, Progressing  Goal: Patient-Specific Goal (Individualized)  11/15/2022 0741 by Randi Merida RN  Outcome: Ongoing, Progressing  11/14/2022 1810 by Randi Merida RN  Outcome: Ongoing, Progressing  Goal: Absence of Hospital-Acquired Illness or Injury  11/15/2022 0741 by Randi Merida RN  Outcome: Ongoing, Progressing  11/14/2022 1810 by Randi Merida RN  Outcome: Ongoing, Progressing  Goal: Optimal Comfort and Wellbeing  11/15/2022 0741 by Randi Merida RN  Outcome: Ongoing, Progressing  11/14/2022 1810 by Randi Merida RN  Outcome: Ongoing, Progressing  Goal: Readiness for Transition of Care  11/15/2022 0741 by Randi Merida, RN  Outcome: Ongoing, Progressing  11/14/2022 1810 by  Randi Merida RN  Outcome: Ongoing, Progressing     Problem: Fluid and Electrolyte Imbalance (Acute Kidney Injury/Impairment)  Goal: Fluid and Electrolyte Balance  11/15/2022 0741 by Randi Merida RN  Outcome: Ongoing, Progressing  11/14/2022 1810 by Randi Merida RN  Outcome: Ongoing, Progressing     Problem: Oral Intake Inadequate (Acute Kidney Injury/Impairment)  Goal: Optimal Nutrition Intake  11/15/2022 0741 by Randi Merida RN  Outcome: Ongoing, Progressing  11/14/2022 1810 by Randi Merida RN  Outcome: Ongoing, Progressing     Problem: Renal Function Impairment (Acute Kidney Injury/Impairment)  Goal: Effective Renal Function  11/15/2022 0741 by Randi Merida RN  Outcome: Ongoing, Progressing  11/14/2022 1810 by Randi Merida RN  Outcome: Ongoing, Progressing

## 2022-11-15 NOTE — PLAN OF CARE
Problem: Skin Injury Risk Increased  Goal: Skin Health and Integrity  Outcome: Ongoing, Progressing     Problem: Adult Inpatient Plan of Care  Goal: Plan of Care Review  Outcome: Ongoing, Progressing  Goal: Patient-Specific Goal (Individualized)  Outcome: Ongoing, Progressing  Goal: Absence of Hospital-Acquired Illness or Injury  Outcome: Ongoing, Progressing  Goal: Optimal Comfort and Wellbeing  Outcome: Ongoing, Progressing  Goal: Readiness for Transition of Care  Outcome: Ongoing, Progressing     Problem: Fluid and Electrolyte Imbalance (Acute Kidney Injury/Impairment)  Goal: Fluid and Electrolyte Balance  Outcome: Ongoing, Progressing

## 2022-11-15 NOTE — PLAN OF CARE
Problem: Skin Injury Risk Increased  Goal: Skin Health and Integrity  11/15/2022 0655 by Gonzalo Chin RN  Outcome: Ongoing, Progressing  11/15/2022 0654 by Gonzalo Chin RN  Outcome: Ongoing, Progressing     Problem: Adult Inpatient Plan of Care  Goal: Plan of Care Review  11/15/2022 0655 by Gonzalo Chin RN  Outcome: Ongoing, Progressing  11/15/2022 0654 by Gonzalo Chin RN  Outcome: Ongoing, Progressing  Goal: Patient-Specific Goal (Individualized)  11/15/2022 0655 by Gonzalo Chin RN  Outcome: Ongoing, Progressing  11/15/2022 0654 by Gonzalo Chin RN  Outcome: Ongoing, Progressing  Goal: Absence of Hospital-Acquired Illness or Injury  11/15/2022 0655 by Gonzalo Chin RN  Outcome: Ongoing, Progressing  11/15/2022 0654 by Gonzalo Chin RN  Outcome: Ongoing, Progressing

## 2022-11-15 NOTE — PROCEDURES
"Kamran Huerta is a 47 y.o. male patient.    Temp: 97.6 °F (36.4 °C) (11/15/22 0712)  Pulse: 70 (11/15/22 0712)  Resp: 20 (11/15/22 0407)  BP: (!) 164/102 (11/15/22 0712)  SpO2: 100 % (11/15/22 0712)  Weight: 87 kg (191 lb 12.8 oz) (11/13/22 1400)  Height: 5' 8" (172.7 cm) (11/13/22 1345)    PICC  Date/Time: 11/15/2022 8:39 AM  Performed by: Shahbaz Gay RN  Consent Done: Yes  Time out: Immediately prior to procedure a time out was called to verify the correct patient, procedure, equipment, support staff and site/side marked as required  Indications: med administration  Preparation: skin prepped with ChloraPrep  Skin prep agent dried: skin prep agent completely dried prior to procedure  Sterile barriers: all five maximum sterile barriers used - cap, mask, sterile gown, sterile gloves, and large sterile sheet  Hand hygiene: hand hygiene performed prior to central venous catheter insertion  Location details: left brachial  Catheter type: single lumen  Catheter size: 4 Fr  Catheter Length: 15cm    Ultrasound guidance: yes  Vessel Caliber: medium and small and patent, compressibility normal  Vascular Doppler: not done  Needle advanced into vessel with real time Ultrasound guidance.  Guidewire confirmed in vessel.  Sterile sheath used.  Number of attempts: 1  Post-procedure: blood return through all ports          Shahbaz Gay RN  11/15/2022    "

## 2022-11-15 NOTE — PLAN OF CARE
Problem: Physical Therapy  Goal: Physical Therapy Goal  Description: Goals to be met by: 12/15/22     Patient will increase functional independence with mobility by performin. Supine to sit with [place]  2. Sit to supine with [place]  3. Gait  x 300 feet with Modified [place] using Rolling Walker.   4. Ascend/descend 3 steps with standby assistance    Outcome: Ongoing, Progressing

## 2022-11-16 LAB
ALBUMIN SERPL-MCNC: 1.8 GM/DL (ref 3.5–5)
ALBUMIN/GLOB SERPL: 0.4 RATIO (ref 1.1–2)
ALP SERPL-CCNC: 147 UNIT/L (ref 40–150)
ALT SERPL-CCNC: 59 UNIT/L (ref 0–55)
ANION GAP SERPL CALC-SCNC: 5 MEQ/L
AST SERPL-CCNC: 43 UNIT/L (ref 5–34)
BILIRUBIN DIRECT+TOT PNL SERPL-MCNC: 0.3 MG/DL
BUN SERPL-MCNC: 16.8 MG/DL (ref 8.9–20.6)
BUN SERPL-MCNC: 17.8 MG/DL (ref 8.9–20.6)
CALCIUM SERPL-MCNC: 7.1 MG/DL (ref 8.4–10.2)
CALCIUM SERPL-MCNC: 7.8 MG/DL (ref 8.4–10.2)
CHLORIDE SERPL-SCNC: 113 MMOL/L (ref 98–107)
CHLORIDE SERPL-SCNC: 114 MMOL/L (ref 98–107)
CO2 SERPL-SCNC: 17 MMOL/L (ref 22–29)
CO2 SERPL-SCNC: 19 MMOL/L (ref 22–29)
CREAT SERPL-MCNC: 1.46 MG/DL (ref 0.73–1.18)
CREAT SERPL-MCNC: 1.56 MG/DL (ref 0.73–1.18)
CREAT/UREA NIT SERPL: 11
GFR SERPLBLD CREATININE-BSD FMLA CKD-EPI: 55 MLS/MIN/1.73/M2
GFR SERPLBLD CREATININE-BSD FMLA CKD-EPI: 60 MLS/MIN/1.73/M2
GLOBULIN SER-MCNC: 4.2 GM/DL (ref 2.4–3.5)
GLUCOSE SERPL-MCNC: 139 MG/DL (ref 74–100)
GLUCOSE SERPL-MCNC: 190 MG/DL (ref 74–100)
MAGNESIUM SERPL-MCNC: 1.2 MG/DL (ref 1.6–2.6)
MAGNESIUM SERPL-MCNC: 1.5 MG/DL (ref 1.6–2.6)
PHOSPHATE SERPL-MCNC: 3.5 MG/DL (ref 2.3–4.7)
PHOSPHATE SERPL-MCNC: 3.6 MG/DL (ref 2.3–4.7)
POCT GLUCOSE: 174 MG/DL (ref 70–110)
POCT GLUCOSE: 264 MG/DL (ref 70–110)
POCT GLUCOSE: 276 MG/DL (ref 70–110)
POCT GLUCOSE: 80 MG/DL (ref 70–110)
POTASSIUM SERPL-SCNC: 3.5 MMOL/L (ref 3.5–5.1)
POTASSIUM SERPL-SCNC: 4.5 MMOL/L (ref 3.5–5.1)
PROT SERPL-MCNC: 6 GM/DL (ref 6.4–8.3)
SODIUM SERPL-SCNC: 136 MMOL/L (ref 136–145)
SODIUM SERPL-SCNC: 138 MMOL/L (ref 136–145)

## 2022-11-16 PROCEDURE — 84100 ASSAY OF PHOSPHORUS: CPT | Performed by: STUDENT IN AN ORGANIZED HEALTH CARE EDUCATION/TRAINING PROGRAM

## 2022-11-16 PROCEDURE — 21400001 HC TELEMETRY ROOM

## 2022-11-16 PROCEDURE — 80053 COMPREHEN METABOLIC PANEL: CPT | Performed by: STUDENT IN AN ORGANIZED HEALTH CARE EDUCATION/TRAINING PROGRAM

## 2022-11-16 PROCEDURE — 63600175 PHARM REV CODE 636 W HCPCS: Performed by: STUDENT IN AN ORGANIZED HEALTH CARE EDUCATION/TRAINING PROGRAM

## 2022-11-16 PROCEDURE — A4216 STERILE WATER/SALINE, 10 ML: HCPCS | Performed by: INTERNAL MEDICINE

## 2022-11-16 PROCEDURE — 25000003 PHARM REV CODE 250: Performed by: STUDENT IN AN ORGANIZED HEALTH CARE EDUCATION/TRAINING PROGRAM

## 2022-11-16 PROCEDURE — 83735 ASSAY OF MAGNESIUM: CPT | Performed by: STUDENT IN AN ORGANIZED HEALTH CARE EDUCATION/TRAINING PROGRAM

## 2022-11-16 PROCEDURE — 36415 COLL VENOUS BLD VENIPUNCTURE: CPT | Performed by: STUDENT IN AN ORGANIZED HEALTH CARE EDUCATION/TRAINING PROGRAM

## 2022-11-16 PROCEDURE — 25000003 PHARM REV CODE 250: Performed by: INTERNAL MEDICINE

## 2022-11-16 PROCEDURE — 97530 THERAPEUTIC ACTIVITIES: CPT

## 2022-11-16 PROCEDURE — 63600175 PHARM REV CODE 636 W HCPCS: Performed by: INTERNAL MEDICINE

## 2022-11-16 RX ORDER — MAGNESIUM SULFATE HEPTAHYDRATE 40 MG/ML
2 INJECTION, SOLUTION INTRAVENOUS ONCE
Status: COMPLETED | OUTPATIENT
Start: 2022-11-16 | End: 2022-11-16

## 2022-11-16 RX ORDER — MAGNESIUM SULFATE HEPTAHYDRATE 40 MG/ML
4 INJECTION, SOLUTION INTRAVENOUS ONCE
Status: DISCONTINUED | OUTPATIENT
Start: 2022-11-16 | End: 2022-11-16

## 2022-11-16 RX ORDER — MAGNESIUM SULFATE HEPTAHYDRATE 40 MG/ML
4 INJECTION, SOLUTION INTRAVENOUS ONCE
Status: COMPLETED | OUTPATIENT
Start: 2022-11-16 | End: 2022-11-16

## 2022-11-16 RX ORDER — POTASSIUM CHLORIDE 14.9 MG/ML
40 INJECTION INTRAVENOUS ONCE
Status: COMPLETED | OUTPATIENT
Start: 2022-11-16 | End: 2022-11-16

## 2022-11-16 RX ORDER — HYDROCODONE BITARTRATE AND ACETAMINOPHEN 7.5; 325 MG/1; MG/1
1 TABLET ORAL EVERY 6 HOURS PRN
Status: DISCONTINUED | OUTPATIENT
Start: 2022-11-16 | End: 2022-11-17 | Stop reason: HOSPADM

## 2022-11-16 RX ORDER — POTASSIUM CHLORIDE 20 MEQ/1
40 TABLET, EXTENDED RELEASE ORAL ONCE
Status: COMPLETED | OUTPATIENT
Start: 2022-11-16 | End: 2022-11-16

## 2022-11-16 RX ORDER — HYDRALAZINE HYDROCHLORIDE 20 MG/ML
10 INJECTION INTRAMUSCULAR; INTRAVENOUS EVERY 4 HOURS PRN
Status: DISCONTINUED | OUTPATIENT
Start: 2022-11-16 | End: 2022-11-17 | Stop reason: HOSPADM

## 2022-11-16 RX ADMIN — METOPROLOL TARTRATE 25 MG: 25 TABLET, FILM COATED ORAL at 09:11

## 2022-11-16 RX ADMIN — SODIUM CHLORIDE 1000 ML: 9 INJECTION, SOLUTION INTRAVENOUS at 11:11

## 2022-11-16 RX ADMIN — PANCRELIPASE 3 CAPSULE: 60000; 12000; 38000 CAPSULE, DELAYED RELEASE PELLETS ORAL at 09:11

## 2022-11-16 RX ADMIN — PANCRELIPASE 3 CAPSULE: 60000; 12000; 38000 CAPSULE, DELAYED RELEASE PELLETS ORAL at 02:11

## 2022-11-16 RX ADMIN — MAGNESIUM SULFATE HEPTAHYDRATE 2 G: 40 INJECTION, SOLUTION INTRAVENOUS at 09:11

## 2022-11-16 RX ADMIN — SODIUM CHLORIDE, PRESERVATIVE FREE 10 ML: 5 INJECTION INTRAVENOUS at 06:11

## 2022-11-16 RX ADMIN — MAGNESIUM SULFATE HEPTAHYDRATE 2 G: 40 INJECTION, SOLUTION INTRAVENOUS at 06:11

## 2022-11-16 RX ADMIN — SODIUM CHLORIDE: 9 INJECTION, SOLUTION INTRAVENOUS at 11:11

## 2022-11-16 RX ADMIN — PANCRELIPASE 3 CAPSULE: 60000; 12000; 38000 CAPSULE, DELAYED RELEASE PELLETS ORAL at 08:11

## 2022-11-16 RX ADMIN — GABAPENTIN 300 MG: 300 CAPSULE ORAL at 02:11

## 2022-11-16 RX ADMIN — HYDROCODONE BITARTRATE AND ACETAMINOPHEN 1 TABLET: 7.5; 325 TABLET ORAL at 02:11

## 2022-11-16 RX ADMIN — POTASSIUM CHLORIDE 40 MEQ: 1500 TABLET, EXTENDED RELEASE ORAL at 09:11

## 2022-11-16 RX ADMIN — HYDROCODONE BITARTRATE AND ACETAMINOPHEN 1 TABLET: 7.5; 325 TABLET ORAL at 12:11

## 2022-11-16 RX ADMIN — GABAPENTIN 300 MG: 300 CAPSULE ORAL at 09:11

## 2022-11-16 RX ADMIN — INSULIN DETEMIR 20 UNITS: 100 INJECTION, SOLUTION SUBCUTANEOUS at 09:11

## 2022-11-16 RX ADMIN — GABAPENTIN 300 MG: 300 CAPSULE ORAL at 08:11

## 2022-11-16 RX ADMIN — METOPROLOL TARTRATE 25 MG: 25 TABLET, FILM COATED ORAL at 06:11

## 2022-11-16 RX ADMIN — SODIUM CHLORIDE: 9 INJECTION, SOLUTION INTRAVENOUS at 04:11

## 2022-11-16 RX ADMIN — AMLODIPINE BESYLATE 10 MG: 5 TABLET ORAL at 06:11

## 2022-11-16 RX ADMIN — INSULIN ASPART 6 UNITS: 100 INJECTION, SOLUTION INTRAVENOUS; SUBCUTANEOUS at 11:11

## 2022-11-16 RX ADMIN — HYDROCODONE BITARTRATE AND ACETAMINOPHEN 1 TABLET: 7.5; 325 TABLET ORAL at 10:11

## 2022-11-16 RX ADMIN — SODIUM CHLORIDE, PRESERVATIVE FREE 10 ML: 5 INJECTION INTRAVENOUS at 12:11

## 2022-11-16 RX ADMIN — SODIUM CHLORIDE, PRESERVATIVE FREE 10 ML: 5 INJECTION INTRAVENOUS at 11:11

## 2022-11-16 RX ADMIN — INSULIN DETEMIR 20 UNITS: 100 INJECTION, SOLUTION SUBCUTANEOUS at 08:11

## 2022-11-16 RX ADMIN — LEVETIRACETAM 500 MG: 500 TABLET, FILM COATED ORAL at 09:11

## 2022-11-16 RX ADMIN — TAMSULOSIN HYDROCHLORIDE 0.4 MG: 0.4 CAPSULE ORAL at 08:11

## 2022-11-16 RX ADMIN — LEVETIRACETAM 500 MG: 500 TABLET, FILM COATED ORAL at 08:11

## 2022-11-16 RX ADMIN — SODIUM CHLORIDE, PRESERVATIVE FREE 10 ML: 5 INJECTION INTRAVENOUS at 01:11

## 2022-11-16 RX ADMIN — POTASSIUM CHLORIDE 20 MEQ: 14.9 INJECTION, SOLUTION INTRAVENOUS at 11:11

## 2022-11-16 RX ADMIN — HYDROCODONE BITARTRATE AND ACETAMINOPHEN 1 TABLET: 7.5; 325 TABLET ORAL at 09:11

## 2022-11-16 NOTE — PLAN OF CARE
Problem: Skin Injury Risk Increased  Goal: Skin Health and Integrity  11/16/2022 1538 by Michelle Valdez RN  Outcome: Ongoing, Progressing  11/16/2022 1538 by Michelle Valdez RN  Outcome: Ongoing, Progressing     Problem: Adult Inpatient Plan of Care  Goal: Plan of Care Review  11/16/2022 1538 by Michelle Valdez RN  Outcome: Ongoing, Progressing  11/16/2022 1538 by Michelle Valdez RN  Outcome: Ongoing, Progressing  Goal: Patient-Specific Goal (Individualized)  11/16/2022 1538 by Michelle Valdez RN  Outcome: Ongoing, Progressing  11/16/2022 1538 by Michelle Valdez RN  Outcome: Ongoing, Progressing  Goal: Absence of Hospital-Acquired Illness or Injury  11/16/2022 1538 by Michelle Valdez RN  Outcome: Ongoing, Progressing  11/16/2022 1538 by Michelle Valdez RN  Outcome: Ongoing, Progressing  Goal: Optimal Comfort and Wellbeing  11/16/2022 1538 by Michelle Valdez RN  Outcome: Ongoing, Progressing  11/16/2022 1538 by Michelle Valdez RN  Outcome: Ongoing, Progressing  Goal: Readiness for Transition of Care  11/16/2022 1538 by Michelle Valdez RN  Outcome: Ongoing, Progressing  11/16/2022 1538 by Michelle Valdez RN  Outcome: Ongoing, Progressing     Problem: Fluid and Electrolyte Imbalance (Acute Kidney Injury/Impairment)  Goal: Fluid and Electrolyte Balance  11/16/2022 1538 by Michelle Valdez RN  Outcome: Ongoing, Progressing  11/16/2022 1538 by Michelle Valdez RN  Outcome: Ongoing, Progressing     Problem: Oral Intake Inadequate (Acute Kidney Injury/Impairment)  Goal: Optimal Nutrition Intake  11/16/2022 1538 by Michelle Valdez RN  Outcome: Ongoing, Progressing  11/16/2022 1538 by Michelle Valdez RN  Outcome: Ongoing, Progressing     Problem: Renal Function Impairment (Acute Kidney Injury/Impairment)  Goal: Effective Renal Function  11/16/2022 1538 by Michelle Valdez RN  Outcome: Ongoing, Progressing  11/16/2022 1538 by Michelle Valdez RN  Outcome: Ongoing, Progressing     Problem: Infection  Goal: Absence of Infection Signs and  Symptoms  11/16/2022 1538 by Michelle Valdez, RN  Outcome: Ongoing, Progressing  11/16/2022 1538 by Michelle Valdez, RN  Outcome: Ongoing, Progressing

## 2022-11-16 NOTE — PLAN OF CARE
Problem: Skin Injury Risk Increased  Goal: Skin Health and Integrity  Outcome: Ongoing, Progressing     Problem: Adult Inpatient Plan of Care  Goal: Plan of Care Review  Outcome: Ongoing, Progressing  Goal: Patient-Specific Goal (Individualized)  Outcome: Ongoing, Progressing  Goal: Absence of Hospital-Acquired Illness or Injury  Outcome: Ongoing, Progressing  Goal: Optimal Comfort and Wellbeing  Outcome: Ongoing, Progressing  Goal: Readiness for Transition of Care  Outcome: Ongoing, Progressing     Problem: Fluid and Electrolyte Imbalance (Acute Kidney Injury/Impairment)  Goal: Fluid and Electrolyte Balance  Outcome: Ongoing, Progressing     Problem: Oral Intake Inadequate (Acute Kidney Injury/Impairment)  Goal: Optimal Nutrition Intake  Outcome: Ongoing, Progressing     Problem: Renal Function Impairment (Acute Kidney Injury/Impairment)  Goal: Effective Renal Function  Outcome: Ongoing, Progressing     Problem: Infection  Goal: Absence of Infection Signs and Symptoms  Outcome: Ongoing, Progressing

## 2022-11-16 NOTE — PLAN OF CARE
PT states he lives with his dad and brother in a single level home with a ramp to enter home. Pt states family assist in his care. He states he was at [facility] last month. Pt is in agreement for hh at [place]. Will call to receive hh in rotation. Pt states family will drive him home. Denies any other dc needs at this time.

## 2022-11-16 NOTE — PROGRESS NOTES
[facility] Hospital Medicine Progress Note        Chief Complaint: Inpatient Follow-up for FABIO     HPI:   Mr Huerta is a 47-year-old gentleman with PMH of Seizure, Type 2 DM, HTN who was found unresponsive on the ground possibly in a postictal state. He was reported to have left-sided weakness and was brought to the ER where CT Head was obtained and was unremarkable. Neurology was consulted and related to the ER physician this was likely a postictal state and recommended loading with Keppra. Hospitalist service was consulted for admission for hyperglycemia and acute kidney injury seen on laboratory work.  At bedside patient reported noncompliance with his home medications including Lantus, Keppra over the last 48 hours because he felt tired.  He also reports having lumbar spine surgery about a month ago and chronic back pain and is asking for pain medication.  He reports chronic pain in his left lower extremity from the hip down. He was given 2 L IV LR as boluses and kept on IV  ml/hr. His BUN/Cr was noted to be 21.8/2.10 which was improved since yesterday. He will be started on [place] 7.5-325 q8hrs PRN for his back pain. MRI Brain showed chronic microvascular ischemic changes. Renal indices improving with IVF.    Interval Hx:   Today, Mr Huerta was seen at bedside, he reported no new complaints. Will recheck labs & plan for DC. Repleting K & Mg. CM consulted for HH with PT.    Objective/physical exam:  General: alert male lying comfortably in bed, in no acute distress  HENT: oral and oropharyngeal mucosa moist, pink, with no erythema or exudates, no ear pain or discharge  Neck: normal neck movement, no lymph nodes or swellings, no JVD or Carotid bruit  Respiratory: clear breathing sounds bilaterally, no crackles, rales, ronchi or wheezes  Cardiovascular: clear S1 and S2, no murmurs, rubs or gallops  Peripheral Vascular: no lesions, ulcers or erosions, normal peripheral pulses and  no pedal edema  Gastrointestinal: soft, non-tender, non-distended abdomen, no guarding, rigidity or rebound tenderness, normal bowel sounds  Integumentary: normal skin color, no rashes or lesions  Neuro: AAO x 3; motor strength 5/5 in B/L UEs & 4/5 in B/L LEs; sensation diminished in B/L LEs to gross and fine touch B/L particularly in feet & anterior legs; CN II-XII grossly intact    VITAL SIGNS: 24 HRS MIN & MAX LAST   Temp  Min: 97.2 °F (36.2 °C)  Max: 97.8 °F (36.6 °C) 97.5 °F (36.4 °C)   BP  Min: 137/90  Max: 176/101 (!) 174/113     Pulse  Min: 68  Max: 78  78   Resp  Min: 18  Max: 20 18   SpO2  Min: 99 %  Max: 100 % 100 %       Recent Labs   Lab 11/13/22  1518 11/14/22  0530 11/15/22  1543   WBC 15.8* 14.2* 12.2*   RBC 4.11* 3.74* 3.94*   HGB 12.4* 11.1* 11.7*   HCT 36.6* 32.6* 34.7*   MCV 89.1 87.2 88.1   MCH 30.2 29.7 29.7   MCHC 33.9 34.0 33.7   RDW 12.9 12.6 12.8    283 308   MPV 11.6* 12.0* 12.1*         Recent Labs   Lab 11/13/22  1518 11/13/22  1937 11/14/22  0530 11/15/22  1543   *  --  134* 136   K 3.8  --  3.2* 3.3*   CO2 17*  --  19* 20*   BUN 19.1  --  21.8* 19.0   CREATININE 2.43*  --  2.10* 1.78*   CALCIUM 7.4*  --  7.3* 7.4*   PH  --  7.26*  --   --    MG  --   --   --  1.10*   ALBUMIN 1.6*  --  1.6* 1.5*   ALKPHOS 162*  --  140 139   *  --  73* 58*   *  --  41* 48*   BILITOT 0.4  --  0.5 0.2            Microbiology Results (last 7 days)       ** No results found for the last 168 hours. **             See below for Radiology    Scheduled Med:   amLODIPine  10 mg Oral Daily    enoxaparin  40 mg Subcutaneous Daily    gabapentin  300 mg Oral TID    insulin detemir U-100  20 Units Subcutaneous BID    levETIRAcetam  500 mg Oral BID    lipase-protease-amylase 12,000-38,000-60,000 units  3 capsule Oral TID    magnesium sulfate IVPB  4 g Intravenous Once    metoprolol tartrate  25 mg Oral BID    potassium chloride  40 mEq Intravenous Once    potassium chloride  40 mEq Oral Once     sodium chloride 0.9%  10 mL Intravenous Q6H    tamsulosin  0.4 mg Oral Daily        Continuous Infusions:   sodium chloride 0.9% 125 mL/hr at 11/16/22 0419        PRN Meds:  acetaminophen, dextrose 10%, dextrose 10%, glucagon (human recombinant), glucose, glucose, hydrALAZINE, HYDROcodone-acetaminophen, insulin aspart U-100, melatonin, sodium chloride 0.9%, [place] PICC Protocol **AND** sodium chloride 0.9% **AND** sodium chloride 0.9%     Assessment/Plan:  FABIO 2/2 Intravascular Depletion - Improving  AMS 2/2 Post-Ictal State  Seizures  HTN  DM II  Recent Spinal Surgery with Chronic Back Pain & B/L LE Numbness  Normocytic Anemia  Leukocytosis  Hypokalemia  AGMA 2/2 Ketonemia 2/2 Insulin Noncompliance vs Starvation    - Patient continues to be admitted for close monitoring  - Will continue on IV /hr & give a 1000 ml IV NS bolus  - Will continue Keppra 500 mg BID as unable to increase to 750 mg BID; will increase to 1000 mg BID on DC  - Continued on Amlodipine 10 mg, Metoprolol Tartrate 25 mg BID  - On Detemir 20 units BID & ISS; BG well controlled  - PRN [place] 7.5-325 q6hrs  - Plan for DC today with HH if renal indices improved enough  - Continue monitoring symptoms    VTE prophylaxis: Lovenox    Patient condition:  Stable    Anticipated discharge and Disposition:     Pending renal function recovery; PT recs pending    All diagnosis and differential diagnosis have been reviewed; assessment and plan has been documented; I have personally reviewed the labs and test results that are presently available; I have reviewed the patients medication list; I have reviewed the consulting providers response and recommendations. I have reviewed or attempted to review medical records based upon their availability    All of the patient's questions have been  addressed and answered. Patient's is agreeable to the above stated plan. I will continue to monitor closely and make adjustments to medical management as  needed.  _____________________________________________________________________    Nutrition Status: Diabetic    Radiology:  MRI Brain Without Contrast  Narrative: EXAMINATION:  MRI BRAIN WITHOUT CONTRAST    CLINICAL HISTORY:  Seizure disorder, clinical change;    TECHNIQUE:  Multiplanar, multisequence MR images of the brain were obtained without the administration of intravenous contrast.    COMPARISON:  CT head and CT angio head neck dated 11/13/2022    FINDINGS:  There is no restricted diffusion, hemorrhage or edema.  There are mild scattered T2/FLAIR hyperintensities in the subcortical and periventricular white matter and right basal ganglia, nonspecific but likely representing chronic microvascular ischemic changes.  The hippocampi are similar in size and signal.    There is no mass effect or midline shift.  The basal cisterns are patent.  There is mild diffuse parenchymal volume loss.  There is no hydrocephalus or abnormal extra-axial fluid collection.  The major intracranial flow voids are patent.  The paranasal sinuses are clear.  There are bilateral mastoid effusions.  Impression: 1. No acute intracranial abnormality.  2. Mild chronic microvascular ischemic changes.    Electronically signed by: Mirela Granados  Date:    11/14/2022  Time:    10:28      Marcos Saldana MD   11/16/2022

## 2022-11-16 NOTE — PT/OT/SLP PROGRESS
Physical Therapy Treatment    Patient Name:  Kamran Huerta   MRN:  [id number]    Recommendations:     Discharge Recommendations:  home with home health   Discharge Equipment Recommendations: none   Barriers to discharge: None    Assessment:     Kamran Huerta is a 47 y.o. male admitted with a medical diagnosis of FABIO (acute kidney injury).  He presents with the following impairments/functional limitations:  weakness, impaired endurance, impaired self care skills, impaired functional mobility, gait instability, impaired balance, decreased lower extremity function, decreased safety awareness. Patient going home with father. Has all necessary equipment. Ready for discharge.     Rehab Prognosis: Good; patient would benefit from acute skilled PT services to address these deficits and reach maximum level of function.    Recent Surgery: * No surgery found *      Plan:     During this hospitalization, patient to be seen 5 x/week to address the identified rehab impairments via gait training, therapeutic exercises, therapeutic activities, neuromuscular re-education and progress toward the following goals:    Plan of Care Expires:  12/16/22    Subjective     Chief Complaint: none  Patient/Family Comments/goals: none  Pain/Comfort:  Pain Rating 1: 0/10      Objective:     Communicated with nurse prior to session.  Patient found supine with telemetry upon PT entry to room.     General Precautions: Standard, fall   Orthopedic Precautions:N/A   Braces: N/A  Respiratory Status: Room air     Functional Mobility:  Bed Mobility:     Supine to Sit: stand by assistance  Transfers:     Sit to Stand:  stand by assistance with rolling walker  Gait: 215 ft with RW & SBA  Balance: fair      AM-PAC 6 CLICK MOBILITY  Turning over in bed (including adjusting bedclothes, sheets and blankets)?: 4  Sitting down on and standing up from a chair with arms (e.g., wheelchair, bedside commode, etc.): 4  Moving from lying on back to sitting on  the side of the bed?: 4  Moving to and from a bed to a chair (including a wheelchair)?: 3  Need to walk in hospital room?: 3  Climbing 3-5 steps with a railing?: 3  Basic Mobility Total Score: 21     Patient left up in chair with all lines intact and call button in reach..    GOALS:   Multidisciplinary Problems       Physical Therapy Goals          Problem: Physical Therapy    Goal Priority Disciplines Outcome Goal Variances Interventions   Physical Therapy Goal     PT, PT/OT Ongoing, Progressing     Description: Goals to be met by: 12/15/22     Patient will increase functional independence with mobility by performin. Supine to sit with [place]  2. Sit to supine with [place]  3. Gait  x 300 feet with Modified [place] using Rolling Walker.   4. Ascend/descend 3 steps with standby assistance                         Time Tracking:     PT Received On: 22  PT Start Time: 735     PT Stop Time: 0755  PT Total Time (min): 20 min     Billable Minutes: Therapeutic Activity 20 minutes    Treatment Type: Treatment  PT/PTA: PT     PTA Visit Number: 1     2022

## 2022-11-17 VITALS
RESPIRATION RATE: 18 BRPM | TEMPERATURE: 97 F | HEIGHT: 68 IN | DIASTOLIC BLOOD PRESSURE: 103 MMHG | HEART RATE: 75 BPM | SYSTOLIC BLOOD PRESSURE: 164 MMHG | BODY MASS INDEX: 29.07 KG/M2 | WEIGHT: 191.81 LBS | OXYGEN SATURATION: 100 %

## 2022-11-17 LAB
ALBUMIN SERPL-MCNC: 1.5 GM/DL (ref 3.5–5)
ALBUMIN/GLOB SERPL: 0.5 RATIO (ref 1.1–2)
ALP SERPL-CCNC: 144 UNIT/L (ref 40–150)
ALT SERPL-CCNC: 54 UNIT/L (ref 0–55)
AST SERPL-CCNC: 45 UNIT/L (ref 5–34)
BILIRUBIN DIRECT+TOT PNL SERPL-MCNC: 0.2 MG/DL
BUN SERPL-MCNC: 19.2 MG/DL (ref 8.9–20.6)
CALCIUM SERPL-MCNC: 7.2 MG/DL (ref 8.4–10.2)
CHLORIDE SERPL-SCNC: 112 MMOL/L (ref 98–107)
CO2 SERPL-SCNC: 18 MMOL/L (ref 22–29)
CREAT SERPL-MCNC: 1.47 MG/DL (ref 0.73–1.18)
GFR SERPLBLD CREATININE-BSD FMLA CKD-EPI: 60 MLS/MIN/1.73/M2
GLOBULIN SER-MCNC: 3.3 GM/DL (ref 2.4–3.5)
GLUCOSE SERPL-MCNC: 318 MG/DL (ref 74–100)
MAGNESIUM SERPL-MCNC: 1.7 MG/DL (ref 1.6–2.6)
PHOSPHATE SERPL-MCNC: 3.5 MG/DL (ref 2.3–4.7)
POCT GLUCOSE: 274 MG/DL (ref 70–110)
POCT GLUCOSE: 294 MG/DL (ref 70–110)
POTASSIUM SERPL-SCNC: 4.1 MMOL/L (ref 3.5–5.1)
PROT SERPL-MCNC: 4.8 GM/DL (ref 6.4–8.3)
SODIUM SERPL-SCNC: 135 MMOL/L (ref 136–145)

## 2022-11-17 PROCEDURE — 97530 THERAPEUTIC ACTIVITIES: CPT

## 2022-11-17 PROCEDURE — A4216 STERILE WATER/SALINE, 10 ML: HCPCS | Performed by: INTERNAL MEDICINE

## 2022-11-17 PROCEDURE — 63600175 PHARM REV CODE 636 W HCPCS: Performed by: INTERNAL MEDICINE

## 2022-11-17 PROCEDURE — 63600175 PHARM REV CODE 636 W HCPCS: Performed by: NURSE PRACTITIONER

## 2022-11-17 PROCEDURE — 25000003 PHARM REV CODE 250: Performed by: INTERNAL MEDICINE

## 2022-11-17 PROCEDURE — 83735 ASSAY OF MAGNESIUM: CPT | Performed by: STUDENT IN AN ORGANIZED HEALTH CARE EDUCATION/TRAINING PROGRAM

## 2022-11-17 PROCEDURE — 80053 COMPREHEN METABOLIC PANEL: CPT | Performed by: STUDENT IN AN ORGANIZED HEALTH CARE EDUCATION/TRAINING PROGRAM

## 2022-11-17 PROCEDURE — 84100 ASSAY OF PHOSPHORUS: CPT | Performed by: STUDENT IN AN ORGANIZED HEALTH CARE EDUCATION/TRAINING PROGRAM

## 2022-11-17 PROCEDURE — 25000003 PHARM REV CODE 250: Performed by: STUDENT IN AN ORGANIZED HEALTH CARE EDUCATION/TRAINING PROGRAM

## 2022-11-17 PROCEDURE — 36415 COLL VENOUS BLD VENIPUNCTURE: CPT | Performed by: STUDENT IN AN ORGANIZED HEALTH CARE EDUCATION/TRAINING PROGRAM

## 2022-11-17 RX ORDER — LEVETIRACETAM 500 MG/1
1000 TABLET ORAL 2 TIMES DAILY
Qty: 60 TABLET | Refills: 6 | Status: ON HOLD | OUTPATIENT
Start: 2022-11-17 | End: 2023-04-04 | Stop reason: HOSPADM

## 2022-11-17 RX ADMIN — HYDRALAZINE HYDROCHLORIDE 10 MG: 20 INJECTION INTRAMUSCULAR; INTRAVENOUS at 12:11

## 2022-11-17 RX ADMIN — PANCRELIPASE 3 CAPSULE: 60000; 12000; 38000 CAPSULE, DELAYED RELEASE PELLETS ORAL at 08:11

## 2022-11-17 RX ADMIN — INSULIN ASPART 6 UNITS: 100 INJECTION, SOLUTION INTRAVENOUS; SUBCUTANEOUS at 11:11

## 2022-11-17 RX ADMIN — SODIUM CHLORIDE, PRESERVATIVE FREE 10 ML: 5 INJECTION INTRAVENOUS at 05:11

## 2022-11-17 RX ADMIN — HYDRALAZINE HYDROCHLORIDE 10 MG: 20 INJECTION INTRAMUSCULAR; INTRAVENOUS at 05:11

## 2022-11-17 RX ADMIN — INSULIN DETEMIR 20 UNITS: 100 INJECTION, SOLUTION SUBCUTANEOUS at 08:11

## 2022-11-17 RX ADMIN — AMLODIPINE BESYLATE 10 MG: 5 TABLET ORAL at 08:11

## 2022-11-17 RX ADMIN — HYDROCODONE BITARTRATE AND ACETAMINOPHEN 1 TABLET: 7.5; 325 TABLET ORAL at 05:11

## 2022-11-17 RX ADMIN — TAMSULOSIN HYDROCHLORIDE 0.4 MG: 0.4 CAPSULE ORAL at 08:11

## 2022-11-17 RX ADMIN — GABAPENTIN 300 MG: 300 CAPSULE ORAL at 08:11

## 2022-11-17 RX ADMIN — LEVETIRACETAM 500 MG: 500 TABLET, FILM COATED ORAL at 08:11

## 2022-11-17 RX ADMIN — METOPROLOL TARTRATE 25 MG: 25 TABLET, FILM COATED ORAL at 08:11

## 2022-11-17 RX ADMIN — INSULIN ASPART 6 UNITS: 100 INJECTION, SOLUTION INTRAVENOUS; SUBCUTANEOUS at 05:11

## 2022-11-17 NOTE — PLAN OF CARE
11/17/22 1447   Final Note   Assessment Type Final Discharge Note   Anticipated Discharge Disposition Home-Health  ([facility])   Post-Acute Status   Post-Acute Authorization Home Health   Home Health Status Set-up Complete/Auth obtained   Discharge information sent to [facility] via [facility].

## 2022-11-17 NOTE — PT/OT/SLP PROGRESS
Physical Therapy Treatment    Patient Name:  Kamran Huerta   MRN:  [id number]    Recommendations:     Discharge Recommendations:  home with home health   Discharge Equipment Recommendations: none   Barriers to discharge: None    Assessment:     Kamran Huerta is a 47 y.o. male admitted with a medical diagnosis of FABIO (acute kidney injury).  He presents with the following impairments/functional limitations:  weakness, impaired self care skills, impaired functional mobility, gait instability, impaired balance, decreased lower extremity function.    Rehab Prognosis: Good; patient would benefit from acute skilled PT services to address these deficits and reach maximum level of function.    Recent Surgery: * No surgery found *      Plan:     During this hospitalization, patient to be seen 5 x/week to address the identified rehab impairments via gait training, therapeutic activities, therapeutic exercises, neuromuscular re-education and progress toward the following goals:    Plan of Care Expires:  12/16/22    Subjective     Chief Complaint: none  Patient/Family Comments/goals: none  Pain/Comfort:  Pain Rating 1: 0/10      Objective:     Communicated with nurse prior to session.  Patient found up in chair with telemetry upon PT entry to room.     General Precautions: Standard, fall   Orthopedic Precautions:N/A   Braces: N/A  Respiratory Status: Room air     Functional Mobility:  Transfers:     Sit to Stand:  stand by assistance with rolling walker  Gait: 215 SBA  Balance: fair      AM-PAC 6 CLICK MOBILITY  Turning over in bed (including adjusting bedclothes, sheets and blankets)?: 4  Sitting down on and standing up from a chair with arms (e.g., wheelchair, bedside commode, etc.): 4  Moving from lying on back to sitting on the side of the bed?: 4  Moving to and from a bed to a chair (including a wheelchair)?: 4  Need to walk in hospital room?: 3  Climbing 3-5 steps with a railing?: 3  Basic Mobility Total Score:         Patient left up in chair with all lines intact and call button in reach..    GOALS:   Multidisciplinary Problems       Physical Therapy Goals          Problem: Physical Therapy    Goal Priority Disciplines Outcome Goal Variances Interventions   Physical Therapy Goal     PT, PT/OT Ongoing, Progressing     Description: Goals to be met by: 12/15/22     Patient will increase functional independence with mobility by performin. Supine to sit with [place]  2. Sit to supine with [place]  3. Gait  x 300 feet with Modified [place] using Rolling Walker.   4. Ascend/descend 3 steps with standby assistance                         Time Tracking:     PT Received On: 22  PT Start Time: 1027     PT Stop Time: 1040  PT Total Time (min): 13 min     Billable Minutes: Therapeutic Activity 13 minutes    Treatment Type: Treatment  PT/PTA: PT     PTA Visit Number: 2     2022

## 2022-11-17 NOTE — DISCHARGE SUMMARY
[facility] Medicine Discharge Summary    Admit Date: 11/13/2022  Discharge Date and Time: 11/17/202211:24 AM  Admitting Physician:  Team  Discharging Physician: Marcos Saldana MD.  Primary Care Physician: Ailyn Reynolds MD  Consults: Neurology    Discharge Diagnoses:  AMS 2/2 Postictal State  FABIO 2/2 Intravascular Depletion    Hospital Course:   Mr Huerta is a 47-year-old gentleman with PMH of Seizure, Type 2 DM, HTN who was found unresponsive on the ground possibly in a postictal state. He was reported to have left-sided weakness and was brought to the ER where CT Head was obtained and was unremarkable. Neurology was consulted and related to the ER physician this was likely a postictal state and recommended loading with Keppra. Hospitalist service was consulted for admission for hyperglycemia and acute kidney injury seen on laboratory work.  At bedside patient reported noncompliance with his home medications including Lantus, Keppra over the last 48 hours because he felt tired.  He also reports having lumbar spine surgery about a month ago and chronic back pain and is asking for pain medication.  He reports chronic pain in his left lower extremity from the hip down. He was given 2 L IV LR as boluses and kept on IV  ml/hr. His BUN/Cr was noted to be 21.8/2.10 which was improved since yesterday. He will be started on [place] 7.5-325 q8hrs PRN for his back pain. MRI Brain showed chronic microvascular ischemic changes. Renal indices improving with IVF. CM consulted for HH with PT.    Mr Huerta was seen and examined on the day of discharge. He stated that he was doing well and had no new complaints. He wished to be discharged so he could visit his pain management physician.    Vitals:  VITAL SIGNS: 24 HRS MIN & MAX LAST   Temp  Min: 97.2 °F (36.2 °C)  Max: 98.2 °F (36.8 °C) 97.2 °F (36.2 °C)   BP  Min: 137/94  Max: 164/103 (!) 164/103     Pulse  Min: 75  Max: 86  75   Resp   Min: 18  Max: 18 18   SpO2  Min: 100 %  Max: 100 % 100 %       Physical Exam:  General: alert male lying comfortably in bed, in no acute distress  HENT: oral and oropharyngeal mucosa moist, pink, with no erythema or exudates, no ear pain or discharge  Neck: normal neck movement, no lymph nodes or swellings, no JVD or Carotid bruit  Respiratory: clear breathing sounds bilaterally, no crackles, rales, ronchi or wheezes  Cardiovascular: clear S1 and S2, no murmurs, rubs or gallops  Peripheral Vascular: no lesions, ulcers or erosions, normal peripheral pulses and no pedal edema  Gastrointestinal: soft, non-tender, non-distended abdomen, no guarding, rigidity or rebound tenderness, normal bowel sounds  Integumentary: normal skin color, no rashes or lesions  Neuro: AAO x 3; motor strength 5/5 in B/L UEs & 4/5 in B/L LEs; sensation diminished in B/L LEs to gross and fine touch B/L particularly in feet & anterior legs; CN II-XII grossly intact    Procedures Performed: No admission procedures for hospital encounter.     Significant Diagnostic Studies: See Full reports for all details    Recent Labs   Lab 11/13/22  1518 11/14/22  0530 11/15/22  1543   WBC 15.8* 14.2* 12.2*   RBC 4.11* 3.74* 3.94*   HGB 12.4* 11.1* 11.7*   HCT 36.6* 32.6* 34.7*   MCV 89.1 87.2 88.1   MCH 30.2 29.7 29.7   MCHC 33.9 34.0 33.7   RDW 12.9 12.6 12.8    283 308   MPV 11.6* 12.0* 12.1*       Recent Labs   Lab 11/13/22  1937 11/14/22  0530 11/15/22  1543 11/16/22  0811 11/16/22  1604 11/17/22  0407   NA  --    < > 136 138 136 135*   K  --    < > 3.3* 3.5 4.5 4.1   CO2  --    < > 20* 19* 17* 18*   BUN  --    < > 19.0 16.8 17.8 19.2   CREATININE  --    < > 1.78* 1.46* 1.56* 1.47*   CALCIUM  --    < > 7.4* 7.8* 7.1* 7.2*   PH 7.26*  --   --   --   --   --    MG  --    < > 1.10* 1.20* 1.50* 1.70   ALBUMIN  --    < > 1.5* 1.8*  --  1.5*   ALKPHOS  --    < > 139 147  --  144   ALT  --    < > 58* 59*  --  54   AST  --    < > 48* 43*  --  45*   BILITOT  " --    < > 0.2 0.3  --  0.2    < > = values in this interval not displayed.        Microbiology Results (last 7 days)       ** No results found for the last 168 hours. **             MRI Brain Without Contrast  Narrative: EXAMINATION:  MRI BRAIN WITHOUT CONTRAST    CLINICAL HISTORY:  Seizure disorder, clinical change;    TECHNIQUE:  Multiplanar, multisequence MR images of the brain were obtained without the administration of intravenous contrast.    COMPARISON:  CT head and CT angio head neck dated 11/13/2022    FINDINGS:  There is no restricted diffusion, hemorrhage or edema.  There are mild scattered T2/FLAIR hyperintensities in the subcortical and periventricular white matter and right basal ganglia, nonspecific but likely representing chronic microvascular ischemic changes.  The hippocampi are similar in size and signal.    There is no mass effect or midline shift.  The basal cisterns are patent.  There is mild diffuse parenchymal volume loss.  There is no hydrocephalus or abnormal extra-axial fluid collection.  The major intracranial flow voids are patent.  The paranasal sinuses are clear.  There are bilateral mastoid effusions.  Impression: 1. No acute intracranial abnormality.  2. Mild chronic microvascular ischemic changes.    Electronically signed by: Mirela Granados  Date:    11/14/2022  Time:    10:28         Medication List        CHANGE how you take these medications      insulin glargine 100 units/mL SubQ pen  What changed: Another medication with the same name was removed. Continue taking this medication, and follow the directions you see here.     levETIRAcetam 500 MG Tab  Commonly known as: KEPPRA  Take 2 tablets (1,000 mg total) by mouth 2 (two) times daily.  What changed: how much to take            CONTINUE taking these medications      amLODIPine 10 MG tablet  Commonly known as: NORVASC     BD VEO INSULIN SYR (HALF UNIT) 0.3 mL 31 gauge x 15/64" Syrg  Generic drug: insulin syr/ndl U100 half " aaron LEMA 24,000-76,000 -120,000 unit capsule  Generic drug: lipase-protease-amylase 24,000-76,000-120,000 units     cyclobenzaprine 7.5 MG Tab  Commonly known as: FEXMID     furosemide 40 MG tablet  Commonly known as: LASIX     gabapentin 400 MG capsule  Commonly known as: NEURONTIN     HYDROcodone-acetaminophen  mg per tablet  Commonly known as: NORCO     lisinopriL-hydrochlorothiazide 20-25 mg Tab  Commonly known as: PRINZIDE,ZESTORETIC     loperamide 2 mg capsule  Commonly known as: IMODIUM     metoprolol tartrate 25 MG tablet  Commonly known as: LOPRESSOR     potassium chloride SA 20 MEQ tablet  Commonly known as: K-DUR,KLOR-CON     PRENATAL VITAMIN PLUS LOW IRON 27 mg iron- 1 mg Tab  Generic drug: PNV,calcium 72-iron-folic acid     tamsulosin 0.4 mg Cap  Commonly known as: FLOMAX     tiZANidine 4 MG tablet  Commonly known as: ZANAFLEX               Where to Get Your Medications        You can get these medications from any pharmacy    Bring a paper prescription for each of these medications  levETIRAcetam 500 MG Tab          Explained in detail to the patient about the discharge plan, medications, and follow-up visits. Pt understands and agrees with the treatment plan  Discharge Disposition: Home  Discharged Condition: Stable  Diet - Diabetic  Dietary Orders (From admission, onward)       Start     Ordered    11/13/22 2318  Diet diabetic  Diet effective now         11/13/22 2318                   Medications Per [place] med rec  Activities as tolerated   Follow-up Information       [facility]    Specialties: Home Health Services, Home Therapy Services, Home Living Aide Services  Why: This will be your home health provider.  Contact information:  [address] [facility] [address]  [phone]             Ailyn Reynolds MD Follow up in 1 week(s).    Specialty: Family Medicine  Contact information:  [address] [facility] [address]  [phone]                            For further questions contact hospitalist office    Discharge time 33 minutes    For worsening symptoms, chest pain, shortness of breath, increased abdominal pain, high grade fever, stroke or stroke like symptoms, immediately go to the nearest Emergency Room or call 911 as soon as possible.      Marcos Wynne M.D, on 11/17/2022. at 11:24 AM.

## 2022-11-18 ENCOUNTER — PATIENT OUTREACH (OUTPATIENT)
Dept: ADMINISTRATIVE | Facility: CLINIC | Age: 47
End: 2022-11-18
Payer: MEDICAID

## 2022-11-18 NOTE — PROGRESS NOTES
C3 nurse attempted to contact Kamran Huerta for a TCC post hospital discharge follow up call. No answer, VM box not set up, unable to leave msg.  The patient does not have a scheduled HOSFU appointment that I can locate.

## 2022-11-21 NOTE — PROGRESS NOTES
C3 nurse spoke with Kamran Huerta   for a TCC post hospital discharge follow up call. The patient does not have a scheduled HOSFU appointment with Ailyn Reynolds MD   within 5-7 days post hospital discharge date 11/17/2022. C3 nurse was unable to schedule HOSFU appointment in [facility].  Needs refill on medications , needs cough medication, also needs to varify hsinsulin type and dosage to be taken, He also stated he has  some depression due to being sick for some time.

## 2023-02-13 PROBLEM — N17.9 AKI (ACUTE KIDNEY INJURY): Status: RESOLVED | Noted: 2022-11-13 | Resolved: 2023-02-13

## 2023-03-11 ENCOUNTER — HOSPITAL ENCOUNTER (INPATIENT)
Facility: HOSPITAL | Age: 48
LOS: 24 days | Discharge: HOME HEALTH | DRG: 853 | End: 2023-04-04
Attending: EMERGENCY MEDICINE | Admitting: INTERNAL MEDICINE
Payer: MEDICARE

## 2023-03-11 DIAGNOSIS — M86.471 CHRONIC OSTEOMYELITIS OF RIGHT FOOT WITH DRAINING SINUS: ICD-10-CM

## 2023-03-11 DIAGNOSIS — E11.65 TYPE 2 DIABETES MELLITUS WITH HYPERGLYCEMIA, WITH LONG-TERM CURRENT USE OF INSULIN: Primary | ICD-10-CM

## 2023-03-11 DIAGNOSIS — N49.2 CELLULITIS OF SCROTUM: ICD-10-CM

## 2023-03-11 DIAGNOSIS — R06.02 SHORTNESS OF BREATH: ICD-10-CM

## 2023-03-11 DIAGNOSIS — R60.9 SWELLING: ICD-10-CM

## 2023-03-11 DIAGNOSIS — R06.02 SOB (SHORTNESS OF BREATH): ICD-10-CM

## 2023-03-11 DIAGNOSIS — Z79.4 TYPE 2 DIABETES MELLITUS WITH HYPERGLYCEMIA, WITH LONG-TERM CURRENT USE OF INSULIN: Primary | ICD-10-CM

## 2023-03-11 DIAGNOSIS — I73.9 PAD (PERIPHERAL ARTERY DISEASE): ICD-10-CM

## 2023-03-11 DIAGNOSIS — R07.9 CHEST PAIN: ICD-10-CM

## 2023-03-11 DIAGNOSIS — I82.409 DVT (DEEP VENOUS THROMBOSIS): ICD-10-CM

## 2023-03-11 DIAGNOSIS — K70.31 ALCOHOLIC CIRRHOSIS OF LIVER WITH ASCITES: ICD-10-CM

## 2023-03-11 DIAGNOSIS — N39.0 ACUTE UTI: ICD-10-CM

## 2023-03-11 DIAGNOSIS — E88.09 HYPOALBUMINEMIA: ICD-10-CM

## 2023-03-11 DIAGNOSIS — R60.1 ANASARCA: ICD-10-CM

## 2023-03-11 DIAGNOSIS — R60.0 PERIPHERAL EDEMA: ICD-10-CM

## 2023-03-11 PROBLEM — A41.9 SEPSIS: Status: ACTIVE | Noted: 2023-03-11

## 2023-03-11 PROBLEM — K74.60 CIRRHOSIS: Status: ACTIVE | Noted: 2023-03-11

## 2023-03-11 LAB
ALBUMIN SERPL-MCNC: 1.3 G/DL (ref 3.5–5)
ALBUMIN/GLOB SERPL: 0.3 RATIO (ref 1.1–2)
ALP SERPL-CCNC: 155 UNIT/L (ref 40–150)
ALT SERPL-CCNC: 37 UNIT/L (ref 0–55)
APPEARANCE UR: ABNORMAL
AST SERPL-CCNC: 29 UNIT/L (ref 5–34)
BACTERIA #/AREA URNS AUTO: ABNORMAL /HPF
BASOPHILS # BLD AUTO: 0.07 X10(3)/MCL (ref 0–0.2)
BASOPHILS NFR BLD AUTO: 0.4 %
BILIRUB UR QL STRIP.AUTO: NEGATIVE MG/DL
BILIRUBIN DIRECT+TOT PNL SERPL-MCNC: 0.3 MG/DL
BNP BLD-MCNC: 146.2 PG/ML
BUN SERPL-MCNC: 34.6 MG/DL (ref 8.9–20.6)
CALCIUM SERPL-MCNC: 8 MG/DL (ref 8.4–10.2)
CHLORIDE SERPL-SCNC: 116 MMOL/L (ref 98–107)
CO2 SERPL-SCNC: 16 MMOL/L (ref 22–29)
COLOR UR AUTO: YELLOW
CREAT SERPL-MCNC: 2.17 MG/DL (ref 0.73–1.18)
CREAT UR-MCNC: 29.6 MG/DL (ref 63–166)
EOSINOPHIL # BLD AUTO: 0.27 X10(3)/MCL (ref 0–0.9)
EOSINOPHIL NFR BLD AUTO: 1.7 %
ERYTHROCYTE [DISTWIDTH] IN BLOOD BY AUTOMATED COUNT: 14.8 % (ref 11.5–17)
GFR SERPLBLD CREATININE-BSD FMLA CKD-EPI: 37 MLS/MIN/1.73/M2
GLOBULIN SER-MCNC: 4.2 GM/DL (ref 2.4–3.5)
GLUCOSE SERPL-MCNC: 420 MG/DL (ref 74–100)
GLUCOSE UR QL STRIP.AUTO: ABNORMAL MG/DL
HCT VFR BLD AUTO: 36.2 % (ref 42–52)
HGB BLD-MCNC: 11.4 G/DL (ref 14–18)
IMM GRANULOCYTES # BLD AUTO: 0.08 X10(3)/MCL (ref 0–0.04)
IMM GRANULOCYTES NFR BLD AUTO: 0.5 %
KETONES UR QL STRIP.AUTO: NEGATIVE MG/DL
LEUKOCYTE ESTERASE UR QL STRIP.AUTO: ABNORMAL UNIT/L
LYMPHOCYTES # BLD AUTO: 2.38 X10(3)/MCL (ref 0.6–4.6)
LYMPHOCYTES NFR BLD AUTO: 14.6 %
MCH RBC QN AUTO: 30.4 PG
MCHC RBC AUTO-ENTMCNC: 31.5 G/DL (ref 33–36)
MCV RBC AUTO: 96.5 FL (ref 80–94)
MONOCYTES # BLD AUTO: 1.2 X10(3)/MCL (ref 0.1–1.3)
MONOCYTES NFR BLD AUTO: 7.3 %
NEUTROPHILS # BLD AUTO: 12.34 X10(3)/MCL (ref 2.1–9.2)
NEUTROPHILS NFR BLD AUTO: 75.5 %
NITRITE UR QL STRIP.AUTO: NEGATIVE
NRBC BLD AUTO-RTO: 0 %
PH UR STRIP.AUTO: 7 [PH]
PLATELET # BLD AUTO: 346 X10(3)/MCL (ref 130–400)
PMV BLD AUTO: 11.3 FL (ref 7.4–10.4)
POCT GLUCOSE: 255 MG/DL (ref 70–110)
POCT GLUCOSE: 281 MG/DL (ref 70–110)
POCT GLUCOSE: 330 MG/DL (ref 70–110)
POCT GLUCOSE: 386 MG/DL (ref 70–110)
POTASSIUM SERPL-SCNC: 5 MMOL/L (ref 3.5–5.1)
PROT SERPL-MCNC: 5.5 GM/DL (ref 6.4–8.3)
PROT UR QL STRIP.AUTO: ABNORMAL MG/DL
PROT UR STRIP-MCNC: 322.2 MG/DL
RBC # BLD AUTO: 3.75 X10(6)/MCL (ref 4.7–6.1)
RBC #/AREA URNS AUTO: ABNORMAL /HPF
RBC UR QL AUTO: ABNORMAL UNIT/L
SODIUM SERPL-SCNC: 139 MMOL/L (ref 136–145)
SODIUM UR-SCNC: 114 MMOL/L
SP GR UR STRIP.AUTO: 1.02 (ref 1–1.03)
SQUAMOUS #/AREA URNS AUTO: ABNORMAL /HPF
TROPONIN I SERPL-MCNC: <0.01 NG/ML (ref 0–0.04)
URINE PROTEIN/CREATININE RATIO (OHS): 10.9
UROBILINOGEN UR STRIP-ACNC: 0.2 MG/DL
UUN UR-MCNC: 139 MG/DL
WBC # SPEC AUTO: 16.3 X10(3)/MCL (ref 4.5–11.5)
WBC #/AREA URNS AUTO: >=100 /HPF

## 2023-03-11 PROCEDURE — 87040 BLOOD CULTURE FOR BACTERIA: CPT | Performed by: EMERGENCY MEDICINE

## 2023-03-11 PROCEDURE — 96365 THER/PROPH/DIAG IV INF INIT: CPT

## 2023-03-11 PROCEDURE — 93010 EKG 12-LEAD: ICD-10-PCS | Mod: ,,, | Performed by: INTERNAL MEDICINE

## 2023-03-11 PROCEDURE — 84484 ASSAY OF TROPONIN QUANT: CPT | Performed by: PHYSICIAN ASSISTANT

## 2023-03-11 PROCEDURE — 63600175 PHARM REV CODE 636 W HCPCS: Performed by: EMERGENCY MEDICINE

## 2023-03-11 PROCEDURE — 93005 ELECTROCARDIOGRAM TRACING: CPT

## 2023-03-11 PROCEDURE — 82570 ASSAY OF URINE CREATININE: CPT | Performed by: INTERNAL MEDICINE

## 2023-03-11 PROCEDURE — 96367 TX/PROPH/DG ADDL SEQ IV INF: CPT

## 2023-03-11 PROCEDURE — 80053 COMPREHEN METABOLIC PANEL: CPT | Performed by: PHYSICIAN ASSISTANT

## 2023-03-11 PROCEDURE — 82962 GLUCOSE BLOOD TEST: CPT

## 2023-03-11 PROCEDURE — P9047 ALBUMIN (HUMAN), 25%, 50ML: HCPCS | Mod: JZ,JG | Performed by: EMERGENCY MEDICINE

## 2023-03-11 PROCEDURE — 93010 ELECTROCARDIOGRAM REPORT: CPT | Mod: ,,, | Performed by: INTERNAL MEDICINE

## 2023-03-11 PROCEDURE — 87088 URINE BACTERIA CULTURE: CPT | Performed by: EMERGENCY MEDICINE

## 2023-03-11 PROCEDURE — 81001 URINALYSIS AUTO W/SCOPE: CPT | Performed by: EMERGENCY MEDICINE

## 2023-03-11 PROCEDURE — 87077 CULTURE AEROBIC IDENTIFY: CPT | Performed by: EMERGENCY MEDICINE

## 2023-03-11 PROCEDURE — 25500020 PHARM REV CODE 255: Performed by: EMERGENCY MEDICINE

## 2023-03-11 PROCEDURE — 85025 COMPLETE CBC W/AUTO DIFF WBC: CPT | Performed by: PHYSICIAN ASSISTANT

## 2023-03-11 PROCEDURE — 83880 ASSAY OF NATRIURETIC PEPTIDE: CPT | Performed by: PHYSICIAN ASSISTANT

## 2023-03-11 PROCEDURE — 25000003 PHARM REV CODE 250: Performed by: EMERGENCY MEDICINE

## 2023-03-11 PROCEDURE — 96375 TX/PRO/DX INJ NEW DRUG ADDON: CPT

## 2023-03-11 PROCEDURE — 96366 THER/PROPH/DIAG IV INF ADDON: CPT

## 2023-03-11 PROCEDURE — 84520 ASSAY OF UREA NITROGEN: CPT | Performed by: INTERNAL MEDICINE

## 2023-03-11 PROCEDURE — 99285 EMERGENCY DEPT VISIT HI MDM: CPT | Mod: 25

## 2023-03-11 PROCEDURE — 11000001 HC ACUTE MED/SURG PRIVATE ROOM

## 2023-03-11 PROCEDURE — 84300 ASSAY OF URINE SODIUM: CPT | Performed by: INTERNAL MEDICINE

## 2023-03-11 RX ORDER — GLUCAGON 1 MG
1 KIT INJECTION
Status: DISCONTINUED | OUTPATIENT
Start: 2023-03-12 | End: 2023-04-04 | Stop reason: HOSPADM

## 2023-03-11 RX ORDER — TALC
6 POWDER (GRAM) TOPICAL NIGHTLY PRN
Status: DISCONTINUED | OUTPATIENT
Start: 2023-03-11 | End: 2023-04-04 | Stop reason: HOSPADM

## 2023-03-11 RX ORDER — VANCOMYCIN HCL IN 5 % DEXTROSE 1G/250ML
2000 PLASTIC BAG, INJECTION (ML) INTRAVENOUS
Status: COMPLETED | OUTPATIENT
Start: 2023-03-11 | End: 2023-03-11

## 2023-03-11 RX ORDER — AMLODIPINE BESYLATE 5 MG/1
10 TABLET ORAL
Status: COMPLETED | OUTPATIENT
Start: 2023-03-11 | End: 2023-03-11

## 2023-03-11 RX ORDER — ALBUMIN HUMAN 250 G/1000ML
12.5 SOLUTION INTRAVENOUS ONCE
Status: COMPLETED | OUTPATIENT
Start: 2023-03-11 | End: 2023-03-11

## 2023-03-11 RX ORDER — FUROSEMIDE 10 MG/ML
80 INJECTION INTRAMUSCULAR; INTRAVENOUS
Status: DISCONTINUED | OUTPATIENT
Start: 2023-03-11 | End: 2023-03-13

## 2023-03-11 RX ORDER — INSULIN ASPART 100 [IU]/ML
1-10 INJECTION, SOLUTION INTRAVENOUS; SUBCUTANEOUS
Status: DISCONTINUED | OUTPATIENT
Start: 2023-03-12 | End: 2023-04-04 | Stop reason: HOSPADM

## 2023-03-11 RX ORDER — LEVETIRACETAM 500 MG/1
1000 TABLET ORAL 2 TIMES DAILY
Status: DISCONTINUED | OUTPATIENT
Start: 2023-03-11 | End: 2023-03-27

## 2023-03-11 RX ORDER — SODIUM CHLORIDE 0.9 % (FLUSH) 0.9 %
10 SYRINGE (ML) INJECTION
Status: DISCONTINUED | OUTPATIENT
Start: 2023-03-11 | End: 2023-04-04 | Stop reason: HOSPADM

## 2023-03-11 RX ORDER — HYDROCODONE BITARTRATE AND ACETAMINOPHEN 5; 325 MG/1; MG/1
1 TABLET ORAL EVERY 6 HOURS PRN
Status: DISCONTINUED | OUTPATIENT
Start: 2023-03-11 | End: 2023-03-21

## 2023-03-11 RX ORDER — ENOXAPARIN SODIUM 100 MG/ML
30 INJECTION SUBCUTANEOUS EVERY 24 HOURS
Status: DISCONTINUED | OUTPATIENT
Start: 2023-03-12 | End: 2023-03-11

## 2023-03-11 RX ORDER — LISINOPRIL 10 MG/1
20 TABLET ORAL
Status: COMPLETED | OUTPATIENT
Start: 2023-03-11 | End: 2023-03-11

## 2023-03-11 RX ORDER — ACETAMINOPHEN 500 MG
1000 TABLET ORAL EVERY 6 HOURS PRN
Status: DISCONTINUED | OUTPATIENT
Start: 2023-03-11 | End: 2023-04-04 | Stop reason: HOSPADM

## 2023-03-11 RX ORDER — HYDROCODONE BITARTRATE AND ACETAMINOPHEN 10; 325 MG/1; MG/1
1 TABLET ORAL EVERY 6 HOURS PRN
Status: DISCONTINUED | OUTPATIENT
Start: 2023-03-12 | End: 2023-03-14

## 2023-03-11 RX ORDER — AMOXICILLIN 250 MG
2 CAPSULE ORAL 2 TIMES DAILY PRN
Status: DISCONTINUED | OUTPATIENT
Start: 2023-03-11 | End: 2023-04-04 | Stop reason: HOSPADM

## 2023-03-11 RX ORDER — ONDANSETRON 2 MG/ML
4 INJECTION INTRAMUSCULAR; INTRAVENOUS
Status: COMPLETED | OUTPATIENT
Start: 2023-03-11 | End: 2023-03-11

## 2023-03-11 RX ORDER — ALBUMIN HUMAN 250 G/1000ML
25 SOLUTION INTRAVENOUS EVERY 12 HOURS
Status: COMPLETED | OUTPATIENT
Start: 2023-03-11 | End: 2023-03-12

## 2023-03-11 RX ORDER — FUROSEMIDE 10 MG/ML
40 INJECTION INTRAMUSCULAR; INTRAVENOUS
Status: COMPLETED | OUTPATIENT
Start: 2023-03-11 | End: 2023-03-11

## 2023-03-11 RX ORDER — MUPIROCIN 20 MG/G
OINTMENT TOPICAL 2 TIMES DAILY
Status: COMPLETED | OUTPATIENT
Start: 2023-03-12 | End: 2023-03-16

## 2023-03-11 RX ORDER — HYDROCHLOROTHIAZIDE 25 MG/1
25 TABLET ORAL
Status: COMPLETED | OUTPATIENT
Start: 2023-03-11 | End: 2023-03-11

## 2023-03-11 RX ORDER — METOPROLOL TARTRATE 25 MG/1
25 TABLET, FILM COATED ORAL 2 TIMES DAILY
Status: DISCONTINUED | OUTPATIENT
Start: 2023-03-12 | End: 2023-04-04 | Stop reason: HOSPADM

## 2023-03-11 RX ORDER — METOPROLOL TARTRATE 25 MG/1
25 TABLET, FILM COATED ORAL
Status: COMPLETED | OUTPATIENT
Start: 2023-03-11 | End: 2023-03-11

## 2023-03-11 RX ORDER — DEXTROSE 40 %
30 GEL (GRAM) ORAL
Status: DISCONTINUED | OUTPATIENT
Start: 2023-03-12 | End: 2023-04-04 | Stop reason: HOSPADM

## 2023-03-11 RX ORDER — MORPHINE SULFATE 4 MG/ML
4 INJECTION, SOLUTION INTRAMUSCULAR; INTRAVENOUS
Status: COMPLETED | OUTPATIENT
Start: 2023-03-11 | End: 2023-03-11

## 2023-03-11 RX ORDER — MAG HYDROX/ALUMINUM HYD/SIMETH 200-200-20
30 SUSPENSION, ORAL (FINAL DOSE FORM) ORAL 4 TIMES DAILY PRN
Status: DISCONTINUED | OUTPATIENT
Start: 2023-03-11 | End: 2023-04-04 | Stop reason: HOSPADM

## 2023-03-11 RX ORDER — PROCHLORPERAZINE EDISYLATE 5 MG/ML
5 INJECTION INTRAMUSCULAR; INTRAVENOUS EVERY 6 HOURS PRN
Status: DISCONTINUED | OUTPATIENT
Start: 2023-03-11 | End: 2023-04-04 | Stop reason: HOSPADM

## 2023-03-11 RX ORDER — ACETAMINOPHEN 325 MG/1
650 TABLET ORAL EVERY 4 HOURS PRN
Status: DISCONTINUED | OUTPATIENT
Start: 2023-03-11 | End: 2023-04-04 | Stop reason: HOSPADM

## 2023-03-11 RX ORDER — SIMETHICONE 80 MG
1 TABLET,CHEWABLE ORAL 4 TIMES DAILY PRN
Status: DISCONTINUED | OUTPATIENT
Start: 2023-03-11 | End: 2023-04-04 | Stop reason: HOSPADM

## 2023-03-11 RX ORDER — ONDANSETRON 2 MG/ML
4 INJECTION INTRAMUSCULAR; INTRAVENOUS EVERY 4 HOURS PRN
Status: DISCONTINUED | OUTPATIENT
Start: 2023-03-11 | End: 2023-04-04 | Stop reason: HOSPADM

## 2023-03-11 RX ORDER — GABAPENTIN 400 MG/1
400 CAPSULE ORAL 2 TIMES DAILY
Status: DISCONTINUED | OUTPATIENT
Start: 2023-03-12 | End: 2023-03-27

## 2023-03-11 RX ORDER — DEXTROSE 40 %
15 GEL (GRAM) ORAL
Status: DISCONTINUED | OUTPATIENT
Start: 2023-03-12 | End: 2023-04-04 | Stop reason: HOSPADM

## 2023-03-11 RX ORDER — POLYETHYLENE GLYCOL 3350 17 G/17G
17 POWDER, FOR SOLUTION ORAL 2 TIMES DAILY PRN
Status: DISCONTINUED | OUTPATIENT
Start: 2023-03-11 | End: 2023-03-30

## 2023-03-11 RX ORDER — ENOXAPARIN SODIUM 100 MG/ML
40 INJECTION SUBCUTANEOUS EVERY 24 HOURS
Status: DISCONTINUED | OUTPATIENT
Start: 2023-03-12 | End: 2023-03-23

## 2023-03-11 RX ORDER — AMLODIPINE BESYLATE 5 MG/1
10 TABLET ORAL DAILY
Status: DISCONTINUED | OUTPATIENT
Start: 2023-03-12 | End: 2023-03-17

## 2023-03-11 RX ADMIN — MORPHINE SULFATE 4 MG: 4 INJECTION INTRAVENOUS at 07:03

## 2023-03-11 RX ADMIN — VANCOMYCIN HYDROCHLORIDE 2000 MG: 1 INJECTION, POWDER, LYOPHILIZED, FOR SOLUTION INTRAVENOUS at 06:03

## 2023-03-11 RX ADMIN — ALBUMIN (HUMAN) 12.5 G: 5 SOLUTION INTRAVENOUS at 07:03

## 2023-03-11 RX ADMIN — INSULIN ASPART 4 UNITS: 100 INJECTION, SOLUTION INTRAVENOUS; SUBCUTANEOUS at 11:03

## 2023-03-11 RX ADMIN — METOPROLOL TARTRATE 25 MG: 25 TABLET, FILM COATED ORAL at 05:03

## 2023-03-11 RX ADMIN — LISINOPRIL 20 MG: 10 TABLET ORAL at 05:03

## 2023-03-11 RX ADMIN — AMLODIPINE BESYLATE 10 MG: 5 TABLET ORAL at 05:03

## 2023-03-11 RX ADMIN — INSULIN HUMAN 10 UNITS: 100 INJECTION, SOLUTION PARENTERAL at 05:03

## 2023-03-11 RX ADMIN — PIPERACILLIN AND TAZOBACTAM 4.5 G: 4; .5 INJECTION, POWDER, LYOPHILIZED, FOR SOLUTION INTRAVENOUS; PARENTERAL at 07:03

## 2023-03-11 RX ADMIN — FUROSEMIDE 40 MG: 10 INJECTION, SOLUTION INTRAMUSCULAR; INTRAVENOUS at 05:03

## 2023-03-11 RX ADMIN — HYDROCHLOROTHIAZIDE 25 MG: 25 TABLET ORAL at 05:03

## 2023-03-11 RX ADMIN — ONDANSETRON 4 MG: 2 INJECTION INTRAMUSCULAR; INTRAVENOUS at 07:03

## 2023-03-11 RX ADMIN — IOPAMIDOL 100 ML: 755 INJECTION, SOLUTION INTRAVENOUS at 08:03

## 2023-03-11 NOTE — FIRST PROVIDER EVALUATION
Medical screening examination initiated.  I have conducted a focused provider triage encounter, findings are as follows:    Chief Complaint   Patient presents with    Chest Pain    Shortness of Breath    Leg Swelling    Groin Swelling     Pt presents c/o bilateral lower extremity swelling/ testicular swelling, CP and SOB.  Onset x 2 weeks/ admit to [facility]/ [place]/ dr [email or web address].       Brief history of present illness: 48 y.o. male presents to the ED with SOB, chest pain, along with bilateral LE and groin swelling for the last 2 weeks. Has been seen at [facility] recently for the same. Hx of CHF, on fluid pills which he has been compliant with. Cardiologist is Dr Christopher. Notes significant weight gain from the fluid over the last few weeks.     Vitals:    03/11/23 1552   BP: (!) 179/102   BP Location: Left arm   Patient Position: Sitting   Pulse: 104   Resp: 18   Temp: 97.5 °F (36.4 °C)   TempSrc: Oral   SpO2: 100%   Weight: 117.5 kg (259 lb)       Pertinent physical exam:  Awake, alert, non-labored respirations    Brief workup plan:  labs, UA, EKG, CXR     Preliminary workup initiated; this workup will be continued and followed by the physician or advanced practice provider that is assigned to the patient when roomed.

## 2023-03-11 NOTE — ED PROVIDER NOTES
Encounter Date: 3/11/2023    SCRIBE #1 NOTE: I, John Yun, am scribing for, and in the presence of,  Randi Higuera DO. I have scribed the following portions of the note - Other sections scribed: HPI, ROS, PE, MDM.     History     Chief Complaint   Patient presents with    Chest Pain    Shortness of Breath    Leg Swelling    Groin Swelling     Pt presents c/o bilateral lower extremity swelling/ testicular swelling, CP and SOB.  Onset x 2 weeks/ admit to [facility]/ [place]F/ dr [email or web address].       47 y/o male with a history of CHF, HTN, DM, and seizures presents to the ED with left-sided chest pain and chest, testicular, and bilateral lower extremity swelling. Pt had a recent admission into the hospital and was discharged 3 weeks ago, but swelling has worsened since then. He also complains of SOB. His family at the bedside says that he has been experienced multiple falls recently and has trouble walking small distances. Pt has been compliant with his fluid pills.     The history is provided by the patient and the spouse. No  was used.   Chest Pain  The current episode started several weeks ago. The chest pain is worsening. Primary symptoms include shortness of breath.   The patient's medical history is significant for CHF.   Associated symptoms include lower extremity edema. He tried medication for the symptoms. Risk factors include male gender.   His past medical history is significant for CHF, diabetes and hypertension.   Review of patient's allergies indicates:  No Known Allergies  Past Medical History:   Diagnosis Date    Chronic back pain     DM (diabetes mellitus)     HTN (hypertension)     Seizures      No past surgical history on file.  No family history on file.     Review of Systems   Respiratory:  Positive for shortness of breath.    Cardiovascular:  Positive for chest pain and leg swelling.   Genitourinary:         Testicular swelling     Physical Exam     Initial Vitals [03/11/23 1552]   BP  Pulse Resp Temp SpO2   (!) 179/102 104 18 97.5 °F (36.4 °C) 100 %      MAP       --         Physical Exam    Nursing note and vitals reviewed.  Constitutional: He appears well-developed and well-nourished. He is not diaphoretic. No distress.   HENT:   Head: Normocephalic and atraumatic.   Right Ear: External ear normal.   Left Ear: External ear normal.   Eyes: Conjunctivae are normal.   Neck: Neck supple.   Normal range of motion.  Cardiovascular:  Normal rate, regular rhythm and normal heart sounds.           Pulmonary/Chest: Effort normal and breath sounds normal. No respiratory distress.   Abdominal: He exhibits no distension.   Genitourinary:    Genitourinary Comments: Significant edema to penis and scrotum, induration to scrotum. Purulent drainage from urethra      Musculoskeletal:      Cervical back: Normal range of motion and neck supple.      Comments: 2+ pitting edema to bilateral lower extremities     Neurological: He is alert and oriented to person, place, and time. He has normal strength. GCS score is 15. GCS eye subscore is 4. GCS verbal subscore is 5. GCS motor subscore is 6.   Skin: Skin is warm and dry.   Psychiatric: He has a normal mood and affect.       ED Course   Procedures  Labs Reviewed   COMPREHENSIVE METABOLIC PANEL - Abnormal; Notable for the following components:       Result Value    Chloride 116 (*)     Carbon Dioxide 16 (*)     Glucose Level 420 (*)     Blood Urea Nitrogen 34.6 (*)     Creatinine 2.17 (*)     Calcium Level Total 8.0 (*)     Protein Total 5.5 (*)     Albumin Level 1.3 (*)     Globulin 4.2 (*)     Albumin/Globulin Ratio 0.3 (*)     Alkaline Phosphatase 155 (*)     All other components within normal limits   B-TYPE NATRIURETIC PEPTIDE - Abnormal; Notable for the following components:    Natriuretic Peptide 146.2 (*)     All other components within normal limits   CBC WITH DIFFERENTIAL - Abnormal; Notable for the following components:    WBC 16.3 (*)     RBC 3.75 (*)      Hgb 11.4 (*)     Hct 36.2 (*)     MCV 96.5 (*)     MCHC 31.5 (*)     MPV 11.3 (*)     Neut # 12.34 (*)     IG# 0.08 (*)     All other components within normal limits   URINALYSIS, REFLEX TO URINE CULTURE - Abnormal; Notable for the following components:    Appearance, UA Turbid (*)     Protein, UA 3+ (*)     Glucose, UA Trace (*)     Blood, UA 2+ (*)     Leukocyte Esterase, UA 3+ (*)     All other components within normal limits   URINALYSIS, MICROSCOPIC - Abnormal; Notable for the following components:    WBC, UA >=100 (*)     Bacteria, UA 2+ (*)     All other components within normal limits   POCT GLUCOSE - Abnormal; Notable for the following components:    POCT Glucose 386 (*)     All other components within normal limits   POCT GLUCOSE - Abnormal; Notable for the following components:    POCT Glucose 281 (*)     All other components within normal limits   POCT GLUCOSE - Abnormal; Notable for the following components:    POCT Glucose 255 (*)     All other components within normal limits   TROPONIN I - Normal   BLOOD CULTURE [facility]   BLOOD CULTURE [facility]   WOUND CULTURE ([facility])   CULTURE, URINE   CBC W/ AUTO DIFFERENTIAL    Narrative:     The following orders were created for panel order CBC auto differential.  Procedure                               Abnormality         Status                     ---------                               -----------         ------                     CBC with Differential[[id number]]        Abnormal            Final result                 Please view results for these tests on the individual orders.          Imaging Results              CT Abdomen Pelvis With Contrast (Final result)  Result time 03/11/23 20:39:42      Final result by Dm Alberto MD (03/11/23 20:39:42)                   Impression:      1. Left pleural effusion.    2. Hepatic cirrhotic morphology, splenomegaly, upper abdominal varices and ascites.  3.  Chronic pancreatitis changes.    4. Extensive subcutaneous anasarca  edema.      Electronically signed by: Dm Wangahim  Date:    03/11/2023  Time:    20:39               Narrative:    EXAMINATION:  CT ABDOMEN PELVIS WITH CONTRAST    CLINICAL HISTORY:  penile/testicular cellulitis/drainage;    TECHNIQUE:  Multidetector axial images were obtained of the abdomen and pelvis following the administration of IV contrast. Oral contrast was not administered.    Dose length product of 948 mGycm. Automated exposure control was utilized to minimize radiation dose.    COMPARISON:  None available    FINDINGS:  There is small left dependent pleural effusion and left basilar atelectasis.    There is hepatic cirrhotic morphology and there is moderate intraperitoneal free fluid consistent with ascites. Gallbladder wall is not thickened and there is no intra luminal calcified calculus. No biliary dilation identified.  There are multiple pancreatic calcifications which is atrophic consistent with chronic pancreatitis.  There is also some dilatation pancreatic duct.  Spleen is mildly enlarged in size with maximum diameter of 13.5 cm without focal space occupying lesion.  Upper abdomen multiple varices..    The adrenal glands appear within normal limits. The kidneys are unremarkable in size and contour. No solid or cystic renal lesion identified. There is no hydronephrosis. No perinephric fluid strandings or collections identified.    Stomach is mostly decompressed.  No abnormal dilatation of the loops of small bowel.  There is also no abnormal dilatation of the colon which contains moderate fecal loading.  No suggestion of bowel obstruction.  No pneumoperitoneum.  Extensive abdomen and pelvic subcutaneous anasarca edema.    Urinary bladder is decompressed around the Segovia's catheter.  There are degenerative changes of lumbar spine which are more pronounced at the level of L5-S1.                                       X-Ray Chest AP Portable (Final result)  Result time 03/11/23 16:31:09      Final result  by Chad Espinoza MD (03/11/23 16:31:09)                   Impression:      No acute cardiopulmonary process.      Electronically signed by: Chad Espinoza  Date:    03/11/2023  Time:    16:31               Narrative:    EXAMINATION:  XR CHEST AP PORTABLE    CLINICAL HISTORY:  shortness of breath;    TECHNIQUE:  Single view of the chest    COMPARISON:  No prior imaging available for comparison.    FINDINGS:  No focal opacification, pleural effusion, or pneumothorax.    The cardiomediastinal silhouette is within normal limits.    No acute osseous abnormality.                                       Medications   insulin regular injection 10 Units 0.1 mL (10 Units Intravenous Given 3/11/23 1736)   metoprolol tartrate (LOPRESSOR) tablet 25 mg (25 mg Oral Given 3/11/23 1750)   amLODIPine tablet 10 mg (10 mg Oral Given 3/11/23 1750)   lisinopriL tablet 20 mg (20 mg Oral Given 3/11/23 1751)   hydroCHLOROthiazide tablet 25 mg (25 mg Oral Given 3/11/23 1750)   furosemide injection 40 mg (40 mg Intravenous Given 3/11/23 1751)   albumin human 25% bottle 12.5 g (12.5 g Intravenous New Bag 3/11/23 1915)   vancomycin in dextrose 5 % 1 gram/250 mL IVPB 2,000 mg (2,000 mg Intravenous New Bag 3/11/23 1815)   piperacillin-tazobactam (ZOSYN) 4.5 g in dextrose 5 % in water (D5W) 5 % 100 mL IVPB (MB+) (0 g Intravenous Stopped 3/11/23 1930)   morphine injection 4 mg (4 mg Intravenous Given 3/11/23 1900)   ondansetron injection 4 mg (4 mg Intravenous Given 3/11/23 1900)   iopamidoL (ISOVUE-370) injection 100 mL (100 mLs Intravenous Given 3/11/23 2023)     Medical Decision Making:   Clinical Tests:   Lab Tests: Ordered and Reviewed  Radiological Study: Ordered and Reviewed        Scribe Attestation:   Scribe #1: I performed the above scribed service and the documentation accurately describes the services I performed. I attest to the accuracy of the note.    Attending Attestation:           Physician Attestation for Scribe:  Physician  Attestation Statement for Scribe #1: I, Randi Higuera, DO, reviewed documentation, as scribed by John Yun in my presence, and it is both accurate and complete.                 Medical Decision Making  Peripheral edema, scrotal cellulitis/edema and discharge from urethra    DDX:  Necrotizing fasciitis, scrotal cellulitis, UTI, DKA, CHF, electrolyte abnormality, renal failure      Home blood pressure medications were given with improvement of blood pressure   IV Lasix for edema  Replaced albumin  IV insulin for hyperglycemia  Vanc and Zosyn after blood cultures were obtained  CT scan without significant findings  Consults-hospitalist for admission    Problems Addressed:  Acute UTI: acute illness or injury that poses a threat to life or bodily functions  Cellulitis of scrotum: acute illness or injury that poses a threat to life or bodily functions  Hypoalbuminemia: acute illness or injury that poses a threat to life or bodily functions  Peripheral edema: acute illness or injury that poses a threat to life or bodily functions  Shortness of breath: acute illness or injury that poses a threat to life or bodily functions  Type 2 diabetes mellitus with hyperglycemia, with long-term current use of insulin: chronic illness or injury with exacerbation, progression, or side effects of treatment    Amount and/or Complexity of Data Reviewed  External Data Reviewed: notes.     Details: Reviewed last hospitalization which was November 2022 for seizure.  He was also found to have hyperglycemia and acute kidney injury.  Patient at that time reported noncompliance with his medications.  Labs: ordered. Decision-making details documented in ED Course.     Details: hyperglycemia, leukocytosis, hypoalbuminemia, slightly worsened CKD  Radiology: ordered. Decision-making details documented in ED Course.    Risk  Prescription drug management.  Decision regarding hospitalization.             Clinical Impression:   Final  diagnoses:  [R06.02] Shortness of breath  [N39.0] Acute UTI  [N49.2] Cellulitis of scrotum  [R60.9] Peripheral edema  [E11.65, Z79.4] Type 2 diabetes mellitus with hyperglycemia, with long-term current use of insulin (Primary)  [E88.09] Hypoalbuminemia        ED Disposition Condition    Admit Stable                Randi Higuera MD  03/11/23 2056

## 2023-03-12 LAB
AFP-TM SERPL-MCNC: 2.8 NG/ML
ALBUMIN SERPL-MCNC: 2.3 G/DL (ref 3.5–5)
ALBUMIN/GLOB SERPL: 0.6 RATIO (ref 1.1–2)
ALP SERPL-CCNC: 156 UNIT/L (ref 40–150)
ALT SERPL-CCNC: 38 UNIT/L (ref 0–55)
AST SERPL-CCNC: 27 UNIT/L (ref 5–34)
AV INDEX (PROSTH): 0.81
AV MEAN GRADIENT: 3 MMHG
AV PEAK GRADIENT: 6 MMHG
AV VELOCITY RATIO: 0.86
BASOPHILS # BLD AUTO: 0.07 X10(3)/MCL (ref 0–0.2)
BASOPHILS NFR BLD AUTO: 0.5 %
BILIRUBIN DIRECT+TOT PNL SERPL-MCNC: 0.3 MG/DL
BUN SERPL-MCNC: 34.6 MG/DL (ref 8.9–20.6)
C TRACH DNA SPEC QL NAA+PROBE: NOT DETECTED
C3 SERPL-MCNC: 157 MG/DL (ref 80–173)
C4 SERPL-MCNC: 49.7 MG/DL (ref 13–46)
CALCIUM SERPL-MCNC: 8.5 MG/DL (ref 8.4–10.2)
CHLORIDE SERPL-SCNC: 115 MMOL/L (ref 98–107)
CO2 SERPL-SCNC: 17 MMOL/L (ref 22–29)
CREAT SERPL-MCNC: 2.07 MG/DL (ref 0.73–1.18)
CRP SERPL HS-MCNC: 12.85 MG/L
CV ECHO LV RWT: 0.53 CM
DEPRECATED CALCIDIOL+CALCIFEROL SERPL-MC: <3.5 NG/ML (ref 30–80)
DOP CALC AO PEAK VEL: 1.27 M/S
DOP CALC AO VTI: 27.2 CM
DOP CALC LVOT PEAK VEL: 1.09 M/S
DOP CALCLVOT PEAK VEL VTI: 21.9 CM
E WAVE DECELERATION TIME: 174 MSEC
E/A RATIO: 1.35
E/E' RATIO: 9.58 M/S
ECHO LV POSTERIOR WALL: 1.32 CM (ref 0.6–1.1)
EJECTION FRACTION: 60 %
EOSINOPHIL # BLD AUTO: 0.3 X10(3)/MCL (ref 0–0.9)
EOSINOPHIL NFR BLD AUTO: 2 %
ERYTHROCYTE [DISTWIDTH] IN BLOOD BY AUTOMATED COUNT: 15 % (ref 11.5–17)
ERYTHROCYTE [SEDIMENTATION RATE] IN BLOOD: 76 MM/HR (ref 0–15)
EST. AVERAGE GLUCOSE BLD GHB EST-MCNC: 191.5 MG/DL
FERRITIN SERPL-MCNC: 304.82 NG/ML (ref 21.81–274.66)
FRACTIONAL SHORTENING: 34 % (ref 28–44)
GFR SERPLBLD CREATININE-BSD FMLA CKD-EPI: 39 MLS/MIN/1.73/M2
GLOBULIN SER-MCNC: 3.9 GM/DL (ref 2.4–3.5)
GLUCOSE SERPL-MCNC: 204 MG/DL (ref 74–100)
HBA1C MFR BLD: 8.3 %
HCT VFR BLD AUTO: 33 % (ref 42–52)
HGB BLD-MCNC: 10.3 G/DL (ref 14–18)
HIV 1+2 AB+HIV1 P24 AG SERPL QL IA: NONREACTIVE
IMM GRANULOCYTES # BLD AUTO: 0.07 X10(3)/MCL (ref 0–0.04)
IMM GRANULOCYTES NFR BLD AUTO: 0.5 %
INR BLD: 0.96 (ref 0–1.3)
INTERVENTRICULAR SEPTUM: 1.24 CM (ref 0.6–1.1)
IRON SATN MFR SERPL: 22 % (ref 20–50)
IRON SERPL-MCNC: 35 UG/DL (ref 65–175)
LEFT ATRIUM SIZE: 3.8 CM
LEFT ATRIUM VOLUME MOD: 50.7 CM3
LEFT INTERNAL DIMENSION IN SYSTOLE: 3.25 CM (ref 2.1–4)
LEFT VENTRICLE DIASTOLIC VOLUME: 115 ML
LEFT VENTRICLE SYSTOLIC VOLUME: 42.5 ML
LEFT VENTRICULAR INTERNAL DIMENSION IN DIASTOLE: 4.94 CM (ref 3.5–6)
LEFT VENTRICULAR MASS: 251.3 G
LV LATERAL E/E' RATIO: 7.19 M/S
LV SEPTAL E/E' RATIO: 14.38 M/S
LVOT MG: 3 MMHG
LVOT MV: 0.73 CM/S
LYMPHOCYTES # BLD AUTO: 3.62 X10(3)/MCL (ref 0.6–4.6)
LYMPHOCYTES NFR BLD AUTO: 24.5 %
MAGNESIUM SERPL-MCNC: 1.3 MG/DL (ref 1.6–2.6)
MCH RBC QN AUTO: 30.3 PG
MCHC RBC AUTO-ENTMCNC: 31.2 G/DL (ref 33–36)
MCV RBC AUTO: 97.1 FL (ref 80–94)
MONOCYTES # BLD AUTO: 0.9 X10(3)/MCL (ref 0.1–1.3)
MONOCYTES NFR BLD AUTO: 6.1 %
MV PEAK A VEL: 0.85 M/S
MV PEAK E VEL: 1.15 M/S
N GONORRHOEA DNA SPEC QL NAA+PROBE: NOT DETECTED
NEUTROPHILS # BLD AUTO: 9.84 X10(3)/MCL (ref 2.1–9.2)
NEUTROPHILS NFR BLD AUTO: 66.4 %
NRBC BLD AUTO-RTO: 0 %
PHOSPHATE SERPL-MCNC: 4.5 MG/DL (ref 2.3–4.7)
PLATELET # BLD AUTO: 434 X10(3)/MCL (ref 130–400)
PMV BLD AUTO: 11.1 FL (ref 7.4–10.4)
POCT GLUCOSE: 140 MG/DL (ref 70–110)
POCT GLUCOSE: 176 MG/DL (ref 70–110)
POTASSIUM SERPL-SCNC: 4.3 MMOL/L (ref 3.5–5.1)
PROT SERPL-MCNC: 6.2 GM/DL (ref 6.4–8.3)
PROTHROMBIN TIME: 12.7 SECONDS (ref 12.5–14.5)
PTH-INTACT SERPL-MCNC: 305.2 PG/ML (ref 8.7–77)
PV PEAK VELOCITY: 0.8 CM/S
RBC # BLD AUTO: 3.4 X10(6)/MCL (ref 4.7–6.1)
SODIUM SERPL-SCNC: 142 MMOL/L (ref 136–145)
T PALLIDUM AB SER QL: NONREACTIVE
TDI LATERAL: 0.16 M/S
TDI SEPTAL: 0.08 M/S
TDI: 0.12 M/S
TIBC SERPL-MCNC: 122 UG/DL (ref 69–240)
TIBC SERPL-MCNC: 157 UG/DL (ref 250–450)
TRANSFERRIN SERPL-MCNC: 131 MG/DL (ref 174–364)
TRICUSPID ANNULAR PLANE SYSTOLIC EXCURSION: 1.98 CM
VIT B12 SERPL-MCNC: 925 PG/ML (ref 213–816)
WBC # SPEC AUTO: 14.8 X10(3)/MCL (ref 4.5–11.5)

## 2023-03-12 PROCEDURE — 82525 ASSAY OF COPPER: CPT | Mod: 90 | Performed by: INTERNAL MEDICINE

## 2023-03-12 PROCEDURE — 25000003 PHARM REV CODE 250: Performed by: INTERNAL MEDICINE

## 2023-03-12 PROCEDURE — 82607 VITAMIN B-12: CPT | Performed by: INTERNAL MEDICINE

## 2023-03-12 PROCEDURE — 83036 HEMOGLOBIN GLYCOSYLATED A1C: CPT | Performed by: INTERNAL MEDICINE

## 2023-03-12 PROCEDURE — 86381 MITOCHONDRIAL ANTIBODY EACH: CPT | Mod: 90 | Performed by: INTERNAL MEDICINE

## 2023-03-12 PROCEDURE — 82390 ASSAY OF CERULOPLASMIN: CPT | Mod: 90 | Performed by: INTERNAL MEDICINE

## 2023-03-12 PROCEDURE — 63600175 PHARM REV CODE 636 W HCPCS: Performed by: INTERNAL MEDICINE

## 2023-03-12 PROCEDURE — 80074 ACUTE HEPATITIS PANEL: CPT | Performed by: INTERNAL MEDICINE

## 2023-03-12 PROCEDURE — 80053 COMPREHEN METABOLIC PANEL: CPT | Performed by: INTERNAL MEDICINE

## 2023-03-12 PROCEDURE — 86039 ANTINUCLEAR ANTIBODIES (ANA): CPT | Mod: 90 | Performed by: INTERNAL MEDICINE

## 2023-03-12 PROCEDURE — 82728 ASSAY OF FERRITIN: CPT | Performed by: INTERNAL MEDICINE

## 2023-03-12 PROCEDURE — 82306 VITAMIN D 25 HYDROXY: CPT | Performed by: INTERNAL MEDICINE

## 2023-03-12 PROCEDURE — 83550 IRON BINDING TEST: CPT | Performed by: INTERNAL MEDICINE

## 2023-03-12 PROCEDURE — 86160 COMPLEMENT ANTIGEN: CPT | Performed by: INTERNAL MEDICINE

## 2023-03-12 PROCEDURE — 83970 ASSAY OF PARATHORMONE: CPT | Performed by: INTERNAL MEDICINE

## 2023-03-12 PROCEDURE — 87389 HIV-1 AG W/HIV-1&-2 AB AG IA: CPT | Performed by: INTERNAL MEDICINE

## 2023-03-12 PROCEDURE — P9047 ALBUMIN (HUMAN), 25%, 50ML: HCPCS | Mod: JZ,JG | Performed by: INTERNAL MEDICINE

## 2023-03-12 PROCEDURE — 85651 RBC SED RATE NONAUTOMATED: CPT | Performed by: INTERNAL MEDICINE

## 2023-03-12 PROCEDURE — 82105 ALPHA-FETOPROTEIN SERUM: CPT | Performed by: INTERNAL MEDICINE

## 2023-03-12 PROCEDURE — 85610 PROTHROMBIN TIME: CPT | Performed by: INTERNAL MEDICINE

## 2023-03-12 PROCEDURE — 86036 ANCA SCREEN EACH ANTIBODY: CPT | Mod: 90 | Performed by: INTERNAL MEDICINE

## 2023-03-12 PROCEDURE — 87591 N.GONORRHOEAE DNA AMP PROB: CPT | Performed by: INTERNAL MEDICINE

## 2023-03-12 PROCEDURE — 11000001 HC ACUTE MED/SURG PRIVATE ROOM

## 2023-03-12 PROCEDURE — 84100 ASSAY OF PHOSPHORUS: CPT | Performed by: INTERNAL MEDICINE

## 2023-03-12 PROCEDURE — 86376 MICROSOMAL ANTIBODY EACH: CPT | Mod: 90 | Performed by: INTERNAL MEDICINE

## 2023-03-12 PROCEDURE — 86141 C-REACTIVE PROTEIN HS: CPT | Performed by: INTERNAL MEDICINE

## 2023-03-12 PROCEDURE — 83735 ASSAY OF MAGNESIUM: CPT | Performed by: INTERNAL MEDICINE

## 2023-03-12 PROCEDURE — 85025 COMPLETE CBC W/AUTO DIFF WBC: CPT | Performed by: INTERNAL MEDICINE

## 2023-03-12 PROCEDURE — 21400001 HC TELEMETRY ROOM

## 2023-03-12 PROCEDURE — 86780 TREPONEMA PALLIDUM: CPT | Performed by: INTERNAL MEDICINE

## 2023-03-12 PROCEDURE — 86015 ACTIN ANTIBODY EACH: CPT | Performed by: INTERNAL MEDICINE

## 2023-03-12 RX ORDER — ERGOCALCIFEROL 1.25 MG/1
50000 CAPSULE ORAL
Status: DISCONTINUED | OUTPATIENT
Start: 2023-03-12 | End: 2023-04-04 | Stop reason: HOSPADM

## 2023-03-12 RX ORDER — MAGNESIUM SULFATE HEPTAHYDRATE 40 MG/ML
4 INJECTION, SOLUTION INTRAVENOUS ONCE
Status: COMPLETED | OUTPATIENT
Start: 2023-03-12 | End: 2023-03-12

## 2023-03-12 RX ORDER — LABETALOL HYDROCHLORIDE 5 MG/ML
10 INJECTION, SOLUTION INTRAVENOUS EVERY 4 HOURS PRN
Status: DISCONTINUED | OUTPATIENT
Start: 2023-03-12 | End: 2023-04-04 | Stop reason: HOSPADM

## 2023-03-12 RX ORDER — HYDRALAZINE HYDROCHLORIDE 20 MG/ML
10 INJECTION INTRAMUSCULAR; INTRAVENOUS EVERY 4 HOURS PRN
Status: DISCONTINUED | OUTPATIENT
Start: 2023-03-12 | End: 2023-03-12

## 2023-03-12 RX ORDER — INSULIN ASPART 100 [IU]/ML
8 INJECTION, SOLUTION INTRAVENOUS; SUBCUTANEOUS
Status: DISCONTINUED | OUTPATIENT
Start: 2023-03-12 | End: 2023-03-13

## 2023-03-12 RX ORDER — HYDRALAZINE HYDROCHLORIDE 20 MG/ML
10 INJECTION INTRAMUSCULAR; INTRAVENOUS EVERY 4 HOURS PRN
Status: DISCONTINUED | OUTPATIENT
Start: 2023-03-12 | End: 2023-04-04 | Stop reason: HOSPADM

## 2023-03-12 RX ORDER — CLONIDINE HYDROCHLORIDE 0.2 MG/1
0.2 TABLET ORAL 3 TIMES DAILY PRN
Status: DISCONTINUED | OUTPATIENT
Start: 2023-03-12 | End: 2023-04-04 | Stop reason: HOSPADM

## 2023-03-12 RX ORDER — SODIUM BICARBONATE 650 MG/1
1300 TABLET ORAL 3 TIMES DAILY
Status: DISCONTINUED | OUTPATIENT
Start: 2023-03-12 | End: 2023-04-01

## 2023-03-12 RX ADMIN — METOPROLOL TARTRATE 25 MG: 25 TABLET, FILM COATED ORAL at 09:03

## 2023-03-12 RX ADMIN — ERGOCALCIFEROL 50000 UNITS: 1.25 CAPSULE ORAL at 08:03

## 2023-03-12 RX ADMIN — LEVETIRACETAM 1000 MG: 500 TABLET, FILM COATED ORAL at 09:03

## 2023-03-12 RX ADMIN — ALBUMIN (HUMAN) 25 G: 0.25 INJECTION, SOLUTION INTRAVENOUS at 09:03

## 2023-03-12 RX ADMIN — HYDROCODONE BITARTRATE AND ACETAMINOPHEN 1 TABLET: 10; 325 TABLET ORAL at 09:03

## 2023-03-12 RX ADMIN — CEFTRIAXONE 2 G: 2 INJECTION, POWDER, FOR SOLUTION INTRAMUSCULAR; INTRAVENOUS at 12:03

## 2023-03-12 RX ADMIN — GABAPENTIN 400 MG: 100 CAPSULE ORAL at 09:03

## 2023-03-12 RX ADMIN — HYDROCODONE BITARTRATE AND ACETAMINOPHEN 1 TABLET: 10; 325 TABLET ORAL at 06:03

## 2023-03-12 RX ADMIN — NEPHROCAP 1 CAPSULE: 1 CAP ORAL at 09:03

## 2023-03-12 RX ADMIN — FUROSEMIDE 80 MG: 10 INJECTION, SOLUTION INTRAMUSCULAR; INTRAVENOUS at 12:03

## 2023-03-12 RX ADMIN — CEFTRIAXONE 2 G: 2 INJECTION, POWDER, FOR SOLUTION INTRAMUSCULAR; INTRAVENOUS at 11:03

## 2023-03-12 RX ADMIN — INSULIN DETEMIR 30 UNITS: 100 INJECTION, SOLUTION SUBCUTANEOUS at 12:03

## 2023-03-12 RX ADMIN — Medication 6 MG: at 12:03

## 2023-03-12 RX ADMIN — MUPIROCIN: 20 OINTMENT TOPICAL at 09:03

## 2023-03-12 RX ADMIN — PANCRELIPASE 6 CAPSULE: 60000; 12000; 38000 CAPSULE, DELAYED RELEASE PELLETS ORAL at 09:03

## 2023-03-12 RX ADMIN — LEVETIRACETAM 1000 MG: 500 TABLET, FILM COATED ORAL at 12:03

## 2023-03-12 RX ADMIN — SODIUM BICARBONATE 1300 MG: 650 TABLET ORAL at 06:03

## 2023-03-12 RX ADMIN — ONDANSETRON 4 MG: 2 INJECTION INTRAMUSCULAR; INTRAVENOUS at 12:03

## 2023-03-12 RX ADMIN — AMLODIPINE BESYLATE 10 MG: 5 TABLET ORAL at 09:03

## 2023-03-12 RX ADMIN — ALBUMIN (HUMAN) 25 G: 0.25 INJECTION, SOLUTION INTRAVENOUS at 12:03

## 2023-03-12 RX ADMIN — HYDRALAZINE HYDROCHLORIDE 10 MG: 20 INJECTION INTRAMUSCULAR; INTRAVENOUS at 12:03

## 2023-03-12 RX ADMIN — HYDROCODONE BITARTRATE AND ACETAMINOPHEN 1 TABLET: 10; 325 TABLET ORAL at 03:03

## 2023-03-12 RX ADMIN — FUROSEMIDE 80 MG: 10 INJECTION, SOLUTION INTRAMUSCULAR; INTRAVENOUS at 11:03

## 2023-03-12 RX ADMIN — HYDROCODONE BITARTRATE AND ACETAMINOPHEN 1 TABLET: 10; 325 TABLET ORAL at 12:03

## 2023-03-12 RX ADMIN — SODIUM BICARBONATE 1300 MG: 650 TABLET ORAL at 09:03

## 2023-03-12 RX ADMIN — SODIUM BICARBONATE 1300 MG: 650 TABLET ORAL at 03:03

## 2023-03-12 RX ADMIN — PANCRELIPASE 6 CAPSULE: 60000; 12000; 38000 CAPSULE, DELAYED RELEASE PELLETS ORAL at 03:03

## 2023-03-12 RX ADMIN — ENOXAPARIN SODIUM 40 MG: 40 INJECTION SUBCUTANEOUS at 09:03

## 2023-03-12 RX ADMIN — MAGNESIUM SULFATE HEPTAHYDRATE 4 G: 40 INJECTION, SOLUTION INTRAVENOUS at 09:03

## 2023-03-12 NOTE — NURSING
Nurses Note -- 4 Eyes      3/12/2023   6:02 PM      Skin assessed during: Admit      [] No Pressure Injuries Present    []Prevention Measures Documented      [] Yes- Altered Skin Integrity Present or Discovered   [] LDA Added if Not in Epic (Describe Wound)   [] New Altered Skin Integrity was Present on Admit and Documented in LDA   [] Wound Image Taken    Wound Care Consulted? No    Attending Nurse:  Martha Canseco RN     Second RN/Staff Member:   Claudia Dominguez RN -> Flower Mann RN-> Angela CarpenterRN

## 2023-03-12 NOTE — PROGRESS NOTES
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     Present to the ED with complaint of diffuse body swelling specially abdomen, groin, penis and testicles and bilateral lower extremities, dyspnea on minimal exertion and bilateral lower extremity pain and cramping.  Report subjective fever and chills.  Report he was just hospitalized for similar symptom at [facility] about 2 weeks ago and was discharged home after few days.     On arrival to ED, he was tachycardic, hypertensive, saturating 100% on room air.  EKG appears sinus rhythm on my review without ischemic changes.  Labs notable for WBC 16.3, hemoglobin 11.4, platelets 346, sodium 139, potassium 5.0, chloride 116, CO2 16, creatinine 2.17, BUN 34, glucose 420, calcium 8.0, albumin 1.3, , negative troponin.     CT abdomen and pelvis with contrast show left pleural effusion, hepatic cirrhotic morphology, splenomegaly and upper abdominal varices and ascites, chronic pancreatitis changes, extensive subcutaneous anasarca edema.  Kidneys unremarkable without hydronephrosis.     Per ED provider exam he was noted to have purulent penile discharge, sent for culture and Escobar catheter placed.  He was given albumin 12.5 g, Lasix 40 mg IV, vancomycin and Zosyn and subsequently referred to hospital medicine service for further evaluation and management.     Urinated about 700 mL since the Lasix 40.  We added urine protein creatinine ratio which came about 10.9.     Interval Hx:   Patient today awake but lethargic. States he has generalized body pain. Has a escobar draining clear urine. Denies any chest pain, SOB, fever or chills.    [place] [facility] [place]     Objective/physical exam:  General: In no acute distress, Obese   Chest: Clear to auscultation bilaterally  Heart: RRR, +S1, S2, no appreciable murmur  Abdomen: + distension, nontender, BS +  MSK:+ pitting edema and thickening of skin from abdomen to foot  + Penile and scrotal edema   Neurologic: Alert and oriented x4, Cranial nerve II-XII intact, Strength 5/5 in all 4 extremities    VITAL SIGNS: 24 HRS MIN & MAX LAST   Temp  Min: 97.5 °F (36.4 °C)  Max: 97.5 °F (36.4 °C) 97.5 °F (36.4 °C)   BP  Min: 124/82  Max: 184/119 136/80   Pulse  Min: 75  Max: 110  91   Resp  Min: 16  Max: 22 (!) 21     SpO2  Min: 98 %  Max: 100 % 98 %       Recent Labs   Lab 03/11/23  1625 03/12/23  0617   WBC 16.3* 14.8*   RBC 3.75* 3.40*   HGB 11.4* 10.3*   HCT 36.2* 33.0*   MCV 96.5* 97.1*   MCH 30.4 30.3   MCHC 31.5* 31.2*   RDW 14.8 15.0    434*   MPV 11.3* 11.1*       Recent Labs   Lab 03/11/23  1625 03/12/23  0617    142   K 5.0 4.3   CO2 16* 17*   BUN 34.6* 34.6*   CREATININE 2.17* 2.07*   CALCIUM 8.0* 8.5   MG  --  1.30*   ALBUMIN 1.3* 2.3*   ALKPHOS 155* 156*   ALT 37 38   AST 29 27   BILITOT 0.3 0.3          Microbiology Results (last 7 days)       Procedure Component Value Units Date/Time    Wound Culture [[id number]]  (Abnormal) Collected: 03/11/23 1835    Order Status: Completed Specimen: Drainage from Urethral Updated: 03/12/23 0800     Wound Culture Many Gram-negative Rods    Chlamydia/GC, PCR [[id number]] Collected: 03/12/23 0618    Order Status: Resulted Specimen: Urine Updated: 03/12/23 0623    Urine culture [[id number]] Collected: 03/11/23 2005    Order Status: Sent Specimen: Urine Updated: 03/11/23 2039    Blood Culture #1 **CANNOT BE ORDERED STAT** [[id number]] Collected: 03/11/23 1848    Order Status: Resulted Specimen: Blood Updated: 03/11/23 1901    Blood Culture #2 **CANNOT BE ORDERED STAT** [[id number]] Collected: 03/11/23 1848    Order Status: Resulted  Specimen: Blood Updated: 03/11/23 9657             See below for Radiology    Scheduled Med:   albumin human 25%  25 g Intravenous Q12H    amLODIPine  10 mg Oral Daily    cefTRIAXone (ROCEPHIN) IVPB  2 g Intravenous Q24H    enoxaparin  40 mg Subcutaneous Daily    ergocalciferol  50,000 Units Oral Q3 Days    furosemide (LASIX) injection  80 mg Intravenous Q12H    gabapentin  400 mg Oral BID    insulin aspart U-100  8 Units Subcutaneous TIDWM    insulin detemir U-100  30 Units Subcutaneous QHS    levETIRAcetam  1,000 mg Oral BID    lipase-protease-amylase 12,000-38,000-60,000 units  6 capsule Oral TID    magnesium sulfate IVPB  4 g Intravenous Once    metoprolol tartrate  25 mg Oral BID    mupirocin   Nasal BID    sodium bicarbonate  1,300 mg Oral TID    vitamin renal formula (B-complex-vitamin c-folic acid)  1 capsule Oral Daily        Continuous Infusions:       PRN Meds:  acetaminophen, acetaminophen, aluminum-magnesium hydroxide-simethicone, dextrose 10%, dextrose 10%, dextrose, dextrose, glucagon (human recombinant), HYDROcodone-acetaminophen, HYDROcodone-acetaminophen, insulin aspart U-100, melatonin, ondansetron, polyethylene glycol, prochlorperazine, senna-docusate 8.6-50 mg, simethicone, sodium chloride 0.9%       Assessment/Plan:  Sepsis secondary to Urethritis + GNR  Anasarca  FABIO superimposed on CKD2  Nephrotic syndrome/ proteinuria 10.9 g  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 Will cont po prn pain meds  He has massive proteinuria and anasarca.  Cont IV lasix and low salt diet   F/U on renal team recommendations  Will closely monitor patients daily weight, urine out put, renal parameters and volume status    May need kidney biopsy for definitive diagnosis   Blood sugars is uncontrolled. Will cont levemir and added aspart  Will  adjust insulin dose to target glucose of <180  BP is now much better. Cont Norvasc and Metoprolol   ACEI/ARB when ok with renal team   Mag replaced   Cont IV rocephin for urethritis, F/U on final C&S   Reviewed today's labs and  WBC 14.8, Hb 10, Plt 434, Na 142, K 4.3, BUN 34.6, Crt 2.07, Mag 1.30, Alb 2.3  Personally reviewed the XR and other images and I agree with the radiology interpretation     Cont supportive care   Avoid NSAIDs     Labs in am       Critical care note:  Critical care diagnosis: Anasarca/Volume overload needing iv lasix   Critical care interventions: Hands-on evaluation, review of labs/radiographs/records and discussion with patient and family if present  Critical care time spent: 35 minutes       VTE prophylaxis: Lovenox     Patient condition:  Guarded    Anticipated discharge and Disposition:         All diagnosis and differential diagnosis have been reviewed; assessment and plan has been documented; I have personally reviewed the labs and test results that are presently available; I have reviewed the patients medication list; I have reviewed the consulting providers response and recommendations. I have reviewed or attempted to review medical records based upon their availability    All of the patient's questions have been  addressed and answered. Patient's is agreeable to the above stated plan. I will continue to monitor closely and make adjustments to medical management as needed.  _____________________________________________________________________    Nutrition Status:    Radiology:  US Liver with Doppler (xpd)  START OF REPORT:  Technique: Real time gray scale and color Doppler ultrasound was performed on the liver.    Comparison: None.    Clinical history: ER20. Severe pain. Cirrhosis evaluation.    Findings: Evaluation is limited as the patient was uncooperative due to severe pain and the examination was terminated.  Liver: Unremarkable appearing liver which measures 14.5 cmin greatest  dimension. There is slight intrahepatic biliary duct dilatation.  Biliary ducts: The common bile duct is suboptimally visualized.  Gallbladder: Unremarkable.  Pancreas: The pancreas is suboptimally visualized due to bowel gas.  Aorta: The aorta is within normal limits to the extent visualized.  Vascular: The main, right and left hepatic arteries are patent. The Doppler evaluation was not performed as the patient was uncooperative due to pain.  IVC: The IVC is within normal limits.  Portal vein: Portal flow parameters are within normal limits with hepatopetal flow. The velocity measures 29.6 cm/sec. The right and left main portal veins are patent and demonstrate hepatopetal flow. The main, right and left hepatic veins are patent. The peak systolic velocity of the right hepatic is 23.3 cm.    Impression:  1. There is slight intrahepatic biliary duct dilatation.  2. Portal flow parameters are within normal limits with hepatopetal flow. The hepatic veins and arteries are patent. However, Doppler evaluation was not performed as the patient was uncooperative.  3. Evaluation is limited as the patient was uncooperative due to severe pain and the examination was terminated.  4. Details and other findings as above.      Figueroa Daniels MD   03/12/2023

## 2023-03-12 NOTE — NURSING
Nurses Note -- 4 Eyes      3/12/2023   6:04 PM      Skin assessed during: Admit      [x] No Pressure Injuries Present    []Prevention Measures Documented      [] Yes- Altered Skin Integrity Present or Discovered   [] LDA Added if Not in Epic (Describe Wound)   [] New Altered Skin Integrity was Present on Admit and Documented in LDA   [] Wound Image Taken    Wound Care Consulted? No    Attending Nurse:  Martha Canseco RN     Second RN/Staff Member:  Claudia Dominguez RN->Flower Mann RN->Angela CarpenterRN

## 2023-03-12 NOTE — H&P
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    Present to the ED with complaint of diffuse body swelling specially abdomen, groin, penis and testicles and bilateral lower extremities, dyspnea on minimal exertion and bilateral lower extremity pain and cramping.  Report subjective fever and chills.  Report he was just hospitalized for similar symptom at [facility] about 2 weeks ago and was discharged home after few days.    On arrival to ED, he was tachycardic, hypertensive, saturating 100% on room air.  EKG appears sinus rhythm on my review without ischemic changes.  Labs notable for WBC 16.3, hemoglobin 11.4, platelets 346, sodium 139, potassium 5.0, chloride 116, CO2 16, creatinine 2.17, BUN 34, glucose 420, calcium 8.0, albumin 1.3, , negative troponin.    CT abdomen and pelvis with contrast show left pleural effusion, hepatic cirrhotic morphology, splenomegaly and upper abdominal varices and ascites, chronic pancreatitis changes, extensive subcutaneous anasarca edema.  Kidneys unremarkable without hydronephrosis.    Per ED provider exam he was noted to have purulent penile discharge, sent for culture and Segovia catheter placed.  He was given albumin 12.5 g, Lasix 40 mg IV, vancomycin and Zosyn and subsequently referred to hospital medicine service for further evaluation and management.    Urinated about 700 mL since the  Lasix 40.  We added urine protein creatinine ratio which came about 10.9.    ROS   Except as documented, all other systems reviewed and negative     Past Medical History   Poorly controlled T2DM  Diabetic neuropathy  CKD stage 2-3b ( Cr 1.47 in 11/2022)  Hypertension  Seizure disorder  Chronic back pain/DDD/lumbar radiculopathy  Obesity  History of alcoholic liver disease  Pancreatic insufficiency/chronic pancreatitis    Past Surgical History   Denies    Social History   Former daily alcohol use/beer quit beginning of 2022  Denies smoking or illicit drug use    Family History   Reviewed and negative    Allergies   Patient has no known allergies.    Home Medications     Prior to Admission medications    Medication Sig Start Date End Date Taking? Authorizing Provider   CREON 24,000-76,000 -120,000 unit capsule Take 3 capsules by mouth 3 (three) times daily. 10/10/22  Yes Historical Provider   furosemide (LASIX) 40 MG tablet Take 40 mg by mouth once daily. 7/20/22  Yes Historical Provider   gabapentin (NEURONTIN) 400 MG capsule Take 400 mg by mouth. 9/22/21  Yes Historical Provider   HYDROcodone-acetaminophen (NORCO)  mg per tablet Take 1 tablet by mouth every 8 (eight) hours as needed. 10/20/22  Yes Historical Provider   insulin glargine 100 units/mL SubQ pen Inject 30 Units into the skin.   Yes Historical Provider   levETIRAcetam (KEPPRA) 500 MG Tab Take 2 tablets (1,000 mg total) by mouth 2 (two) times daily. 11/17/22  Yes Marcos Saldana MD   lisinopriL-hydrochlorothiazide (PRINZIDE,ZESTORETIC) 20-25 mg Tab Take by mouth. 9/22/21  Yes Historical Provider   metoprolol tartrate (LOPRESSOR) 25 MG tablet Take 25 mg by mouth 2 (two) times daily. 10/10/22  Yes Historical Provider   potassium chloride SA (K-DUR,KLOR-CON) 20 MEQ tablet Take 20 mEq by mouth once daily. 7/20/22  Yes Historical Provider   PRENATAL VITAMIN PLUS LOW IRON 27 mg iron- 1 mg Tab Take 1 tablet by mouth once daily. 10/10/22  Yes Historical  "Provider   amLODIPine (NORVASC) 10 MG tablet Take 10 mg by mouth once daily. 10/10/22   Historical Provider   BD VEO INSULIN SYR, HALF UNIT, 0.3 mL 31 gauge x 15/64" Syrg use as directed 10/10/22   Historical Provider   cyclobenzaprine (FEXMID) 7.5 MG Tab Take 7.5 mg by mouth 3 (three) times daily as needed. 10/20/22   Historical Provider   loperamide (IMODIUM) 2 mg capsule Take by mouth. 10/10/22   Historical Provider   tamsulosin (FLOMAX) 0.4 mg Cap SMARTSI Capsule(s) By Mouth Every Evening 10/10/22   Historical Provider   tiZANidine (ZANAFLEX) 4 MG tablet Take by mouth. 22   Historical Provider        Physical Exam   Vital Signs  Temp:  [97.5 °F (36.4 °C)]   Pulse:  []   Resp:  [16-22]   BP: (124-184)/()   SpO2:  [100 %]    General: Appears comfortable  HEENT: NC/AT  Neck:  No JVD  Chest: CTABL  CVS: Regular rhythm. Normal S1/S2. +4 pitting edema bilaterally up to the pelvis  Abdomen: distended, normoactive BS, soft and non-tender, Segovia catheter in place with clear yellow urine  MSK: No obvious deformity or joint swelling  Skin: Warm and dry  Neuro: AAOx3, no focal neurological deficit  Psych: Cooperative    Labs     Recent Labs     23  1625   WBC 16.3*   RBC 3.75*   HGB 11.4*   HCT 36.2*   MCV 96.5*   MCH 30.4   MCHC 31.5*   RDW 14.8           Recent Labs     23  1625      K 5.0   CHLORIDE 116*   CO2 16*   BUN 34.6*   CREATININE 2.17*   EGFRNORACEVR 37   GLUCOSE 420*   CALCIUM 8.0*   ALBUMIN 1.3*   GLOBULIN 4.2*   ALKPHOS 155*   ALT 37   AST 29   BILITOT 0.3   .2*     Recent Labs     23  1625   TROPONINI <0.010        Microbiology Results (last 7 days)       Procedure Component Value Units Date/Time    Chlamydia/GC, PCR [[id number]] Collected: 232    Order Status: Sent Specimen: Urine Updated: 23 2319    Urine culture [[id number]] Collected: 23    Order Status: Sent Specimen: Urine Updated: 23    Blood Culture #1 " **CANNOT BE ORDERED STAT** [[id number]] Collected: 03/11/23 1848    Order Status: Resulted Specimen: Blood Updated: 03/11/23 1901    Blood Culture #2 **CANNOT BE ORDERED STAT** [[id number]] Collected: 03/11/23 1848    Order Status: Resulted Specimen: Blood Updated: 03/11/23 1858    Wound Culture [[id number]] Collected: 03/11/23 1835    Order Status: Sent Specimen: Drainage from Urethral Updated: 03/11/23 1837           Imaging     CT Abdomen Pelvis With Contrast   Final Result      1. Left pleural effusion.      2. Hepatic cirrhotic morphology, splenomegaly, upper abdominal varices and ascites.  3.  Chronic pancreatitis changes.      4. Extensive subcutaneous anasarca edema.         Electronically signed by: Dm Alberto   Date:    03/11/2023   Time:    20:39      X-Ray Chest AP Portable   Final Result      No acute cardiopulmonary process.         Electronically signed by: Chad Espinoza   Date:    03/11/2023   Time:    16:31      US Liver with Doppler (xpd)    (Results Pending)     Assessment & Plan   Sepsis secondary to UTI  Anasarca  FABIO superimposed on CKD2  Nephrotic syndrome/ proteinuria 10.9 g  Liver cirrhosis  -alcoholic vs GARCIA  Hyperglycemia-T2DM  Hypertensive urgency  Vitamin-D deficiency  Chronic anemia    HX Seizure, chronic pancreatitis    Plan:      Liver ultrasound with Doppler  Transthoracic echo  BLE venous and arterial US  ESR, CRP, C3, C4, HI  AMA, ASMA, LKM, ceruloplasmin, Ferritin, iron profile  INR, AFP level  HIV, hepatitis panel, gonorrhea and chlamydia, syphilis  ---  Change antibiotic to ceftriaxone 2 g IV daily, urine and blood cultures sent  Lasix 80 mg IV q12h, Accurate I/O, daily weight  Albumin 25g q12h for 3 doses  Sodium bicarb 1300 mg TID  SSI Q6H, resume long-acting 30 units at bedtime, check A1c  Start ergocalciferol 50K q 3 days for 12 doses  Resume amlodipine, metoprolol, Keppra, gabapentin  Hold HCTZ-lisinopril  Nephrology consult  VTE Prophylaxis:  Enoxaparin 40 mg subQ  daily    Critical care time: 35 minutes  Critical care diagnosis:  Anasarca due to nephrotic syndrome, cirrhosis and decompensated diastolic heart failure    Neno Moran MD  Internal Medicine

## 2023-03-12 NOTE — CONSULTS
[facility]  Nephrology  Consult Note    Patient Name: Kamran Huerta  MRN: [id number]  Admission Date: 3/11/2023  Hospital Length of Stay: 1 days  Attending Provider: Figueroa Daniels MD   Primary Care Physician: Ailyn Reynolds MD  Principal Problem:Sepsis    Consults  Subjective:     HPI:  48-year-old male with history CKD 3B followed by Dr. Alonso in [facility] with last reported creatinine 1.47, HTN, seizure disorder, alcoholic liver disease, chronic pancreatitis.  Presented to the ED with complaints of worsening swelling in his abdomen, and groin area.  Also complained of subjective fever and chills.  Reports being hospitalized at least 3 times at [facility] in the last month for these symptoms.  NAD was noted to be hypertensive and tachycardic with O2 sat of 100% on room air.  Notable labs significant for WBC 16.3, sodium 139, K 5.0, CO2 16, creatinine 2.17, BUN 34, calcium 8.0, albumin 1.3, .  CT abdomen and pelvis with contrast with findings of left pleural effusion, hepatic cirrhotic morphology, splenomegaly, upper abdominal varices and ascites, chronic pancreatitis changes, and extensive subcutaneous anasarca edema.  He does tell me that he was told that he was supposed to have a biopsy recently but has not had that done yet.  He was admitted and started on Lasix push b.i.d..  Urinated approximately 700 mL with 1st dose.  Of no protein and creatinine ratio with 10.9 g    Past Medical History:   Diagnosis Date    Chronic back pain     DM (diabetes mellitus)     HTN (hypertension)     Seizures        No past surgical history on file.    Review of patient's allergies indicates:  No Known Allergies  Current Facility-Administered Medications   Medication Frequency    acetaminophen tablet 1,000 mg Q6H PRN    acetaminophen tablet 650 mg Q4H PRN    albumin human 25% bottle 25 g Q12H    aluminum-magnesium hydroxide-simethicone 200-200-20 mg/5 mL suspension 30 mL QID PRN     amLODIPine tablet 10 mg Daily    cefTRIAXone (ROCEPHIN) 2 g in dextrose 5 % in water (D5W) 5 % 50 mL IVPB (MB+) Q24H    dextrose 10% bolus 125 mL 125 mL PRN    dextrose 10% bolus 250 mL 250 mL PRN    dextrose 40 % gel 15,000 mg PRN    dextrose 40 % gel 30,000 mg PRN    enoxaparin injection 40 mg Daily    ergocalciferol capsule 50,000 Units Q3 Days    furosemide injection 80 mg Q12H    gabapentin capsule 400 mg BID    glucagon (human recombinant) injection 1 mg PRN    hydrALAZINE injection 10 mg Q4H PRN    HYDROcodone-acetaminophen  mg per tablet 1 tablet Q6H PRN    HYDROcodone-acetaminophen 5-325 mg per tablet 1 tablet Q6H PRN    insulin aspart U-100 injection 1-10 Units QID (AC + HS) PRN    insulin aspart U-100 injection 8 Units TIDWM    insulin detemir U-100 injection 30 Units QHS    levETIRAcetam tablet 1,000 mg BID    lipase-protease-amylase 12,000-38,000-60,000 units per capsule 6 capsule TID    melatonin tablet 6 mg Nightly PRN    metoprolol tartrate (LOPRESSOR) tablet 25 mg BID    mupirocin 2 % ointment BID    ondansetron injection 4 mg Q4H PRN    polyethylene glycol packet 17 g BID PRN    prochlorperazine injection Soln 5 mg Q6H PRN    senna-docusate 8.6-50 mg per tablet 2 tablet BID PRN    simethicone chewable tablet 80 mg QID PRN    sodium bicarbonate tablet 1,300 mg TID    sodium chloride 0.9% flush 10 mL PRN    vitamin renal formula (B-complex-vitamin c-folic acid) 1 mg per capsule 1 capsule Daily     Current Outpatient Medications   Medication    CREON 24,000-76,000 -120,000 unit capsule    furosemide (LASIX) 40 MG tablet    gabapentin (NEURONTIN) 400 MG capsule    HYDROcodone-acetaminophen (NORCO)  mg per tablet    insulin glargine 100 units/mL SubQ pen    levETIRAcetam (KEPPRA) 500 MG Tab    lisinopriL-hydrochlorothiazide (PRINZIDE,ZESTORETIC) 20-25 mg Tab    metoprolol tartrate (LOPRESSOR) 25 MG tablet    potassium chloride SA (K-DUR,KLOR-CON) 20 MEQ tablet    PRENATAL VITAMIN PLUS LOW  "IRON 27 mg iron- 1 mg Tab    amLODIPine (NORVASC) 10 MG tablet    BD VEO INSULIN SYR, HALF UNIT, 0.3 mL 31 gauge x 15/64" Syrg    cyclobenzaprine (FEXMID) 7.5 MG Tab    loperamide (IMODIUM) 2 mg capsule    tamsulosin (FLOMAX) 0.4 mg Cap    tiZANidine (ZANAFLEX) 4 MG tablet     Family History    None       Tobacco Use    Smoking status: Not on file    Smokeless tobacco: Not on file   Substance and Sexual Activity    Alcohol use: Not on file    Drug use: Not on file    Sexual activity: Not on file     Review of Systems   Constitutional:  Positive for fatigue.   Respiratory:  Positive for shortness of breath. Negative for chest tightness and wheezing.    Cardiovascular:  Positive for leg swelling. Negative for palpitations.   Gastrointestinal:  Positive for abdominal pain and constipation. Negative for diarrhea, nausea and vomiting.   Genitourinary:  Positive for decreased urine volume, difficulty urinating, penile discharge, penile swelling and scrotal swelling.   Musculoskeletal:  Positive for back pain.   Skin: Negative.    Neurological:  Positive for weakness (Left-sided weakness with history of CVA).   Objective:     Vital Signs (Most Recent):  Temp: 97.5 °F (36.4 °C) (03/11/23 1552)  Pulse: 74 (03/12/23 1302)  Resp: 18 (03/12/23 1302)  BP: 123/89 (03/12/23 1302)  SpO2: 100 % (03/12/23 1302) Vital Signs (24h Range):  Temp:  [97.5 °F (36.4 °C)] 97.5 °F (36.4 °C)  Pulse:  [] 74  Resp:  [16-22] 18  SpO2:  [98 %-100 %] 100 %  BP: (123-184)/() 123/89     Weight: 117.5 kg (259 lb) (03/11/23 1552)  Body mass index is 39.38 kg/m².  Body surface area is 2.37 meters squared.    I/O last 3 completed shifts:  In: 150 [IV Piggyback:150]  Out: 1000 [Urine:1000]    Physical Exam  Vitals reviewed.   Constitutional:       General: He is not in acute distress.     Appearance: He is ill-appearing.   HENT:      Head: Normocephalic and atraumatic.   Eyes:      Extraocular Movements: Extraocular movements intact.      " Pupils: Pupils are equal, round, and reactive to light.   Neck:      Comments: thick  Cardiovascular:      Rate and Rhythm: Normal rate and regular rhythm.   Pulmonary:      Comments: Labored  Abdominal:      General: There is distension.      Tenderness: There is abdominal tenderness.      Comments: + abdominal wall edema   Musculoskeletal:      Comments: Generalized full body anasarca tense pitting edema by including abdominal wall   Skin:     General: Skin is warm and dry.   Neurological:      Mental Status: He is alert. Mental status is at baseline.       Significant Labs:  BMP:   Recent Labs   Lab 03/12/23 0617      K 4.3   CO2 17*   BUN 34.6*   CREATININE 2.07*   CALCIUM 8.5   MG 1.30*     CBC:   Recent Labs   Lab 03/12/23 0617   WBC 14.8*   RBC 3.40*   HGB 10.3*   HCT 33.0*   *   MCV 97.1*   MCH 30.3   MCHC 31.2*     CMP:   Recent Labs   Lab 03/12/23 0617   CALCIUM 8.5   ALBUMIN 2.3*      K 4.3   CO2 17*   BUN 34.6*   CREATININE 2.07*   ALKPHOS 156*   ALT 38   AST 27   BILITOT 0.3     PTH:   Recent Labs   Lab 03/12/23 0617   .2*     Recent Labs   Lab 03/11/23 2005   PROTEINUA 3+*   BACTERIA 2+*   LEUKOCYTESUR 3+*   UROBILINOGEN 0.2       Assessment/Plan:     FABIO/CKDIII- possible diabetic nephropathy vs other etiology.  Nephrotic range proteinuria with UPCR 10.9 g. Concern for RONALDO with contrasted imaging.   Sepsis 2/2 urethritis   Liver Cirrhosis  Metabolic acidosis   Anasarca  HTN  DMII    Continue IVP lasix for now-   Check serologies   Will need kidney biopsy but cannot be done now with positive urine culture  Sodium bicarbonate  Antibiotics per hospitalist       Tamara Dumont, ACNP  Nephrology  [facility] - Emergency Dept

## 2023-03-13 LAB
ALBUMIN SERPL-MCNC: 1.6 G/DL (ref 3.5–5)
ALBUMIN/GLOB SERPL: 0.7 RATIO (ref 1.1–2)
ALP SERPL-CCNC: 109 UNIT/L (ref 40–150)
ALT SERPL-CCNC: 22 UNIT/L (ref 0–55)
AST SERPL-CCNC: 19 UNIT/L (ref 5–34)
BACTERIA UR CULT: ABNORMAL
BASOPHILS # BLD AUTO: 0.05 X10(3)/MCL (ref 0–0.2)
BASOPHILS NFR BLD AUTO: 0.5 %
BILIRUBIN DIRECT+TOT PNL SERPL-MCNC: 0.2 MG/DL
BUN SERPL-MCNC: 32.8 MG/DL (ref 8.9–20.6)
CALCIUM SERPL-MCNC: 7.5 MG/DL (ref 8.4–10.2)
CHLORIDE SERPL-SCNC: 116 MMOL/L (ref 98–107)
CO2 SERPL-SCNC: 17 MMOL/L (ref 22–29)
CREAT SERPL-MCNC: 1.93 MG/DL (ref 0.73–1.18)
EOSINOPHIL # BLD AUTO: 0.29 X10(3)/MCL (ref 0–0.9)
EOSINOPHIL NFR BLD AUTO: 2.8 %
ERYTHROCYTE [DISTWIDTH] IN BLOOD BY AUTOMATED COUNT: 15 % (ref 11.5–17)
GFR SERPLBLD CREATININE-BSD FMLA CKD-EPI: 43 MLS/MIN/1.73/M2
GLOBULIN SER-MCNC: 2.4 GM/DL (ref 2.4–3.5)
GLUCOSE SERPL-MCNC: 276 MG/DL (ref 74–100)
HAV IGM SERPL QL IA: NONREACTIVE
HBV CORE IGM SERPL QL IA: NONREACTIVE
HBV SURFACE AG SERPL QL IA: NONREACTIVE
HCT VFR BLD AUTO: 27.2 % (ref 42–52)
HCV AB SERPL QL IA: NONREACTIVE
HGB BLD-MCNC: 8.7 G/DL (ref 14–18)
IMM GRANULOCYTES # BLD AUTO: 0.04 X10(3)/MCL (ref 0–0.04)
IMM GRANULOCYTES NFR BLD AUTO: 0.4 %
LYMPHOCYTES # BLD AUTO: 2.66 X10(3)/MCL (ref 0.6–4.6)
LYMPHOCYTES NFR BLD AUTO: 25.8 %
MAGNESIUM SERPL-MCNC: 1.5 MG/DL (ref 1.6–2.6)
MCH RBC QN AUTO: 30.6 PG
MCHC RBC AUTO-ENTMCNC: 32 G/DL (ref 33–36)
MCV RBC AUTO: 95.8 FL (ref 80–94)
MONOCYTES # BLD AUTO: 0.82 X10(3)/MCL (ref 0.1–1.3)
MONOCYTES NFR BLD AUTO: 8 %
NEUTROPHILS # BLD AUTO: 6.44 X10(3)/MCL (ref 2.1–9.2)
NEUTROPHILS NFR BLD AUTO: 62.5 %
NRBC BLD AUTO-RTO: 0 %
PLATELET # BLD AUTO: 305 X10(3)/MCL (ref 130–400)
PMV BLD AUTO: 11.4 FL (ref 7.4–10.4)
POCT GLUCOSE: 295 MG/DL (ref 70–110)
POCT GLUCOSE: 339 MG/DL (ref 70–110)
POTASSIUM SERPL-SCNC: 4.2 MMOL/L (ref 3.5–5.1)
PROT SERPL-MCNC: 4 GM/DL (ref 6.4–8.3)
RBC # BLD AUTO: 2.84 X10(6)/MCL (ref 4.7–6.1)
SODIUM SERPL-SCNC: 138 MMOL/L (ref 136–145)
URATE SERPL-MCNC: 9.7 MG/DL (ref 3.5–7.2)
WBC # SPEC AUTO: 10.3 X10(3)/MCL (ref 4.5–11.5)

## 2023-03-13 PROCEDURE — 25000003 PHARM REV CODE 250: Performed by: NURSE PRACTITIONER

## 2023-03-13 PROCEDURE — 63600175 PHARM REV CODE 636 W HCPCS: Performed by: NURSE PRACTITIONER

## 2023-03-13 PROCEDURE — 85025 COMPLETE CBC W/AUTO DIFF WBC: CPT | Performed by: INTERNAL MEDICINE

## 2023-03-13 PROCEDURE — 21400001 HC TELEMETRY ROOM

## 2023-03-13 PROCEDURE — 63600175 PHARM REV CODE 636 W HCPCS: Performed by: INTERNAL MEDICINE

## 2023-03-13 PROCEDURE — 83735 ASSAY OF MAGNESIUM: CPT | Performed by: INTERNAL MEDICINE

## 2023-03-13 PROCEDURE — 80053 COMPREHEN METABOLIC PANEL: CPT | Performed by: INTERNAL MEDICINE

## 2023-03-13 PROCEDURE — 25000003 PHARM REV CODE 250: Performed by: INTERNAL MEDICINE

## 2023-03-13 PROCEDURE — P9047 ALBUMIN (HUMAN), 25%, 50ML: HCPCS | Mod: JZ,JG | Performed by: NURSE PRACTITIONER

## 2023-03-13 PROCEDURE — 84550 ASSAY OF BLOOD/URIC ACID: CPT | Performed by: INTERNAL MEDICINE

## 2023-03-13 RX ORDER — INSULIN ASPART 100 [IU]/ML
10 INJECTION, SOLUTION INTRAVENOUS; SUBCUTANEOUS
Status: DISCONTINUED | OUTPATIENT
Start: 2023-03-13 | End: 2023-03-15

## 2023-03-13 RX ORDER — ALBUMIN HUMAN 250 G/1000ML
12.5 SOLUTION INTRAVENOUS DAILY
Status: DISCONTINUED | OUTPATIENT
Start: 2023-03-13 | End: 2023-03-14

## 2023-03-13 RX ADMIN — MUPIROCIN: 20 OINTMENT TOPICAL at 10:03

## 2023-03-13 RX ADMIN — PANCRELIPASE 6 CAPSULE: 60000; 12000; 38000 CAPSULE, DELAYED RELEASE PELLETS ORAL at 09:03

## 2023-03-13 RX ADMIN — GABAPENTIN 400 MG: 100 CAPSULE ORAL at 09:03

## 2023-03-13 RX ADMIN — ALBUMIN (HUMAN) 12.5 G: 0.25 INJECTION, SOLUTION INTRAVENOUS at 02:03

## 2023-03-13 RX ADMIN — INSULIN ASPART 10 UNITS: 100 INJECTION, SOLUTION INTRAVENOUS; SUBCUTANEOUS at 05:03

## 2023-03-13 RX ADMIN — MUPIROCIN: 20 OINTMENT TOPICAL at 09:03

## 2023-03-13 RX ADMIN — METOPROLOL TARTRATE 25 MG: 25 TABLET, FILM COATED ORAL at 09:03

## 2023-03-13 RX ADMIN — FUROSEMIDE 80 MG: 10 INJECTION, SOLUTION INTRAMUSCULAR; INTRAVENOUS at 12:03

## 2023-03-13 RX ADMIN — HYDROCODONE BITARTRATE AND ACETAMINOPHEN 1 TABLET: 10; 325 TABLET ORAL at 05:03

## 2023-03-13 RX ADMIN — SODIUM BICARBONATE 1300 MG: 650 TABLET ORAL at 09:03

## 2023-03-13 RX ADMIN — FUROSEMIDE 10 MG/HR: 10 INJECTION, SOLUTION INTRAMUSCULAR; INTRAVENOUS at 02:03

## 2023-03-13 RX ADMIN — LEVETIRACETAM 1000 MG: 500 TABLET, FILM COATED ORAL at 09:03

## 2023-03-13 RX ADMIN — ENOXAPARIN SODIUM 40 MG: 40 INJECTION SUBCUTANEOUS at 05:03

## 2023-03-13 RX ADMIN — HYDROCODONE BITARTRATE AND ACETAMINOPHEN 1 TABLET: 10; 325 TABLET ORAL at 09:03

## 2023-03-13 RX ADMIN — SODIUM BICARBONATE 1300 MG: 650 TABLET ORAL at 03:03

## 2023-03-13 RX ADMIN — PANCRELIPASE 6 CAPSULE: 60000; 12000; 38000 CAPSULE, DELAYED RELEASE PELLETS ORAL at 03:03

## 2023-03-13 RX ADMIN — AMLODIPINE BESYLATE 10 MG: 5 TABLET ORAL at 09:03

## 2023-03-13 RX ADMIN — INSULIN ASPART 8 UNITS: 100 INJECTION, SOLUTION INTRAVENOUS; SUBCUTANEOUS at 08:03

## 2023-03-13 RX ADMIN — HYDROCODONE BITARTRATE AND ACETAMINOPHEN 1 TABLET: 10; 325 TABLET ORAL at 01:03

## 2023-03-13 RX ADMIN — INSULIN DETEMIR 35 UNITS: 100 INJECTION, SOLUTION SUBCUTANEOUS at 09:03

## 2023-03-13 RX ADMIN — INSULIN ASPART 10 UNITS: 100 INJECTION, SOLUTION INTRAVENOUS; SUBCUTANEOUS at 11:03

## 2023-03-13 RX ADMIN — NEPHROCAP 1 CAPSULE: 1 CAP ORAL at 09:03

## 2023-03-13 NOTE — PROGRESS NOTES
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     Present to the ED with complaint of diffuse body swelling specially abdomen, groin, penis and testicles and bilateral lower extremities, dyspnea on minimal exertion and bilateral lower extremity pain and cramping.  Report subjective fever and chills.  Report he was just hospitalized for similar symptom at [facility] about 2 weeks ago and was discharged home after few days.     On arrival to ED, he was tachycardic, hypertensive, saturating 100% on room air.  EKG appears sinus rhythm on my review without ischemic changes.  Labs notable for WBC 16.3, hemoglobin 11.4, platelets 346, sodium 139, potassium 5.0, chloride 116, CO2 16, creatinine 2.17, BUN 34, glucose 420, calcium 8.0, albumin 1.3, , negative troponin.     CT abdomen and pelvis with contrast show left pleural effusion, hepatic cirrhotic morphology, splenomegaly and upper abdominal varices and ascites, chronic pancreatitis changes, extensive subcutaneous anasarca edema.  Kidneys unremarkable without hydronephrosis.     Per ED provider exam he was noted to have purulent penile discharge, sent for culture and Segovia catheter placed.  He was given albumin 12.5 g, Lasix 40 mg IV, vancomycin and Zosyn and subsequently referred to hospital medicine service for further evaluation and management.     Urinated about 700 mL since the Lasix 40.  We added urine protein creatinine ratio which came about 10.9.    Patient was continued on iv lasix. Seen by Renal team and recommended iv lasix drip and renal biopsy.      Interval Hx:   Patient today awake and more alert today.  Denies any fever, chills or SOB. Has not been ambulating much. He is still swollen. Segovia draining clear urine.     Objective/physical exam:  General: In no acute distress, Obese   Chest: Clear to auscultation bilaterally  Heart: RRR, +S1, S2, no appreciable murmur  Abdomen: + distension, nontender, BS +  MSK:+ pitting edema and thickening of skin from abdomen to foot  + Penile and scrotal edema   Neurologic: Alert and oriented x4, Cranial nerve II-XII intact, Strength 5/5 in all 4 extremities    VITAL SIGNS: 24 HRS MIN & MAX LAST   Temp  Min: 97.6 °F (36.4 °C)  Max: 98.2 °F (36.8 °C) 97.6 °F (36.4 °C)   BP  Min: 123/75  Max: 142/83 138/84   Pulse  Min: 75  Max: 86  75   Resp  Min: 16  Max: 20 18     SpO2  Min: 99 %  Max: 100 % 100 %       Recent Labs   Lab 03/11/23  1625 03/12/23  0617 03/13/23  0710   WBC 16.3* 14.8* 10.3   RBC 3.75* 3.40* 2.84*   HGB 11.4* 10.3* 8.7*   HCT 36.2* 33.0* 27.2*   MCV 96.5* 97.1* 95.8*   MCH 30.4 30.3 30.6   MCHC 31.5* 31.2* 32.0*   RDW 14.8 15.0 15.0    434* 305   MPV 11.3* 11.1* 11.4*       Recent Labs   Lab 03/11/23  1625 03/12/23  0617 03/13/23  0710    142 138   K 5.0 4.3 4.2   CO2 16* 17* 17*   BUN 34.6* 34.6* 32.8*   CREATININE 2.17* 2.07* 1.93*   CALCIUM 8.0* 8.5 7.5*   MG  --  1.30* 1.50*   ALBUMIN 1.3* 2.3* 1.6*   ALKPHOS 155* 156* 109   ALT 37 38 22   AST 29 27 19   BILITOT 0.3 0.3 0.2          Microbiology Results (last 7 days)       Procedure Component Value Units Date/Time    Wound Culture [[id number]]  (Abnormal) Collected: 03/11/23 1835    Order Status: Completed Specimen: Drainage from Urethral Updated: 03/13/23 0834     Wound Culture Many Gram-negative Rods      Many Staphylococcus aureus    Urine culture [[id number]]  (Abnormal)  (Susceptibility) Collected: 03/11/23 2005    Order Status: Completed Specimen: Urine Updated: 03/13/23 0749     Urine Culture >/= 100,000 colonies/ml Pluralibacter gergoviae    Blood Culture #2 **CANNOT BE ORDERED STAT**  [[id number]]  (Normal) Collected: 03/11/23 1848    Order Status: Completed Specimen: Blood Updated: 03/12/23 2102     CULTURE, BLOOD ([facility]) No Growth At 24 Hours    Blood Culture #1 **CANNOT BE ORDERED STAT** [[id number]]  (Normal) Collected: 03/11/23 1848    Order Status: Completed Specimen: Blood Updated: 03/12/23 2102     CULTURE, BLOOD ([facility]) No Growth At 24 Hours    Chlamydia/GC, PCR [[id number]]  (Normal) Collected: 03/12/23 0618    Order Status: Completed Specimen: Urine Updated: 03/12/23 0901     Chlamydia trachomatis PCR Not Detected     N. gonorrhea PCR Not Detected    Narrative:      The Xpert CT/NG test, performed on the GeneXpert system is a qualitative in vitro real-time polymerase chain reaction (PCR) test for the automated detected and differentiation for genomic DNA from Chlamydia trachomatis (CT) and/or Neisseria gonorrhoeae (NG).             See below for Radiology    Scheduled Med:   albumin human 25%  12.5 g Intravenous Daily    amLODIPine  10 mg Oral Daily    cefTRIAXone (ROCEPHIN) IVPB  2 g Intravenous Q24H    enoxaparin  40 mg Subcutaneous Daily    ergocalciferol  50,000 Units Oral Q3 Days    gabapentin  400 mg Oral BID    insulin aspart U-100  10 Units Subcutaneous TIDWM    insulin detemir U-100  35 Units Subcutaneous QHS    levETIRAcetam  1,000 mg Oral BID    lipase-protease-amylase 12,000-38,000-60,000 units  6 capsule Oral TID    metoprolol tartrate  25 mg Oral BID    mupirocin   Nasal BID    sodium bicarbonate  1,300 mg Oral TID    vitamin renal formula (B-complex-vitamin c-folic acid)  1 capsule Oral Daily        Continuous Infusions:   furosemide (LASIX) 2 mg/mL continuous infusion (non-titrating)          PRN Meds:  acetaminophen, acetaminophen, aluminum-magnesium hydroxide-simethicone, cloNIDine, dextrose 10%, dextrose 10%, dextrose, dextrose, glucagon (human recombinant), hydrALAZINE, HYDROcodone-acetaminophen, HYDROcodone-acetaminophen, insulin aspart U-100, labetalol, melatonin,  ondansetron, polyethylene glycol, prochlorperazine, senna-docusate 8.6-50 mg, simethicone, sodium chloride 0.9%       Assessment/Plan:  Sepsis secondary to UTI/Urethritis + Staph and Pluralibacter gergoviae    Anasarca  FABIO superimposed on CKD2  Nephrotic syndrome/ proteinuria 10.9 g  Liver cirrhosis  -alcoholic vs GARCIA  Hyperglycemia-T2DM  Hypertension, benign   Vitamin-D deficiency  Chronic anemia  Hypomagnesemia      HX Seizure, chronic pancreatitis    Plan:  Patient looks more comfortable today. Has been afebrile.   Cont IV lasix/albumin drip per renal team   IR consulted for renal biopsy   Will closely monitor patients daily weight, urine out put, renal parameters and volume status    Blood sugars is uncontrolled. Will cont levemir and aspart  Will adjust insulin dose to target glucose of <180  BP is now much better. Cont Norvasc and Metoprolol   ACEI/ARB when ok with renal team   Mag replaced   Cont IV rocephin day 2 for urethritis and UTI  Reviewed today's labs and  WBC 10, Hb 8.7, Plt 305, Na 138, K 4.2, BUN 32, Crt 1.93, Mag 1.50, Alb 1.6  Monitor H&H closely   Will also get GI team involved to evaluate his cirrhosis   ECHO was unremarkable   Cont supportive care   Avoid NSAIDs     Labs in am       Critical care note:  Critical care diagnosis: Anasarca/Volume overload needing iv lasix drip   Critical care interventions: Hands-on evaluation, review of labs/radiographs/records and discussion with patient and family if present  Critical care time spent: 35 minutes       VTE prophylaxis: Lovenox     Patient condition:  Guarded    Anticipated discharge and Disposition:         All diagnosis and differential diagnosis have been reviewed; assessment and plan has been documented; I have personally reviewed the labs and test results that are presently available; I have reviewed the patients medication list; I have reviewed the consulting providers response and recommendations. I have reviewed or attempted to review  medical records based upon their availability    All of the patient's questions have been  addressed and answered. Patient's is agreeable to the above stated plan. I will continue to monitor closely and make adjustments to medical management as needed.  _____________________________________________________________________    Nutrition Status:    Radiology:  Echo  · Concentric hypertrophy and normal systolic function.  · The estimated ejection fraction is 60%.  · Normal left ventricular diastolic function.  · Normal right ventricular size with normal right ventricular systolic   function.     US Liver with Doppler (xpd)  Narrative: Current report    Technique:Real time gray scale and color Doppler ultrasound was performed on the liver.    Comparison:None.    Clinical history:ER20. Severe pain. Cirrhosis evaluation.    Findings:Evaluation is limited as the patient was uncooperative due to severe pain and the examination was terminated.    Liver:Unremarkable appearing liver which measures 14.5 cm in greatest dimension. There is slight intrahepatic biliary duct dilatation.    Biliary ducts:The common bile duct is suboptimally visualized.    Gallbladder: Partially visualized gallbladder is un remark unremarkable.    Vascular:The main, right and left hepatic arteries are patent. The Doppler evaluation was not performed as the patient was uncooperative due to pain.    IVC:The IVC is within normal limits.    Portal vein:Portal flow parameters are within normal limits with hepatopetal flow. The velocity measures 29.6 cm/sec. The right and left main portal veins are patent and demonstrate hepatopetal flow. The main, right and left hepatic veins are patent. The peak systolic velocity of the right hepatic is 23.3 cm.  Impression: Impression:    1. There is slight intrahepatic biliary duct dilatation.    2. Portal flow parameters are within normal limits with hepatopetal flow. The hepatic veins and arteries are patent. However,  Doppler evaluation was not performed as the patient was uncooperative.    3. Evaluation is limited as the patient was uncooperative due to severe pain and the examination was terminated.    4. Details and other findings as above.    No significant discrepancy with overnight report.    Electronically signed by: Dm Alberto  Date:    03/12/2023  Time:    09:58      Figueroa Daniels MD   03/13/2023

## 2023-03-13 NOTE — PROGRESS NOTES
"[facility]  Nephrology Progress Note  Patient Name: Kamran Huerta  Age: 48 y.o.  : 1975  MRN: [id number]  Admission Date: 3/11/2023        Chief Complaint: Chest Pain, Shortness of Breath, Leg Swelling, and Groin Swelling (Pt presents c/o bilateral lower extremity swelling/ testicular swelling, CP and SOB.  Onset x 2 weeks/ admit to [facility]/ CHF/ dr [email or web address].  )      History of Present Illness:  48-year-old male with history CKD 3B followed by Dr. Alonso in [facility] with last reported creatinine 1.47, HTN, seizure disorder, alcoholic liver disease, chronic pancreatitis.  Presented to the ED with complaints of worsening swelling in his abdomen, and groin area.  Also complained of subjective fever and chills.  Reports being hospitalized at least 3 times at [facility] in the last month for these symptoms.  NAD was noted to be hypertensive and tachycardic with O2 sat of 100% on room air.  Notable labs significant for WBC 16.3, sodium 139, K 5.0, CO2 16, creatinine 2.17, BUN 34, calcium 8.0, albumin 1.3, .  CT abdomen and pelvis with contrast with findings of left pleural effusion, hepatic cirrhotic morphology, splenomegaly, upper abdominal varices and ascites, chronic pancreatitis changes, and extensive subcutaneous anasarca edema.      Patient was pending outpatient biopsy that was put off for uncontrolled HTN. He was started on IV diuresis and urinating well.     Patient is diuresing well with IV Lasix putting out 4600mL in the past 24 hours. Renal indices stable overnight. HE remains markedly edematous. According to patient, dry weight is about 205 lb and he is currently near 270 lb.     Physical Exam:   BP (!) 140/88   Pulse 79   Temp 98.2 °F (36.8 °C) (Oral)   Resp 16   Ht 5' 5" (1.651 m)   Wt 121.9 kg (268 lb 11.9 oz)   SpO2 100%   BMI 44.72 kg/m²  Body mass index is 44.72 kg/m².    Physical Exam  Constitutional:       Appearance: He is obese. He is ill-appearing. "   HENT:      Head: Atraumatic.      Nose: Nose normal.      Mouth/Throat:      Mouth: Mucous membranes are moist.      Pharynx: Oropharynx is clear.   Eyes:      Extraocular Movements: Extraocular movements intact.      Conjunctiva/sclera: Conjunctivae normal.   Cardiovascular:      Rate and Rhythm: Normal rate and regular rhythm.      Pulses: Normal pulses.   Pulmonary:      Breath sounds: Normal breath sounds.   Abdominal:      Palpations: Abdomen is soft.      Comments: Abdominal wall edema, protuberant    Genitourinary:     Comments: Urinary catheter with light yellow clear urine   Musculoskeletal:         General: Swelling present.      Cervical back: Neck supple.      Comments: Tense anasarca    Skin:     General: Skin is warm and dry.   Neurological:      Mental Status: He is alert and oriented to person, place, and time.   Psychiatric:         Mood and Affect: Mood normal.         Behavior: Behavior normal.         Inpatient Medications:     Current Facility-Administered Medications:     acetaminophen tablet 1,000 mg, 1,000 mg, Oral, Q6H PRN, Neno Moran MD    acetaminophen tablet 650 mg, 650 mg, Oral, Q4H PRN, Neno Moran MD    aluminum-magnesium hydroxide-simethicone 200-200-20 mg/5 mL suspension 30 mL, 30 mL, Oral, QID PRN, Neno Moran MD    amLODIPine tablet 10 mg, 10 mg, Oral, Daily, Neno Moran MD, 10 mg at 03/13/23 0921    cefTRIAXone (ROCEPHIN) 2 g in dextrose 5 % in water (D5W) 5 % 50 mL IVPB (MB+), 2 g, Intravenous, Q24H, Neno Moran MD, Stopped at 03/13/23 0028    cloNIDine tablet 0.2 mg, 0.2 mg, Oral, TID PRN, Neno Moran MD    dextrose 10% bolus 125 mL 125 mL, 12.5 g, Intravenous, PRN, Neno Moran MD    dextrose 10% bolus 250 mL 250 mL, 25 g, Intravenous, PRN, Neno Moran MD    dextrose 40 % gel 15,000 mg, 15 g, Oral, PRN, Neno Moran MD    dextrose 40 % gel 30,000 mg, 30 g, Oral, PRN, Neno Moran MD    enoxaparin injection 40 mg, 40 mg, Subcutaneous, Daily, Neno Moran MD, 40 mg at  03/12/23 2137    ergocalciferol capsule 50,000 Units, 50,000 Units, Oral, Q3 Days, Neno Moran MD, 50,000 Units at 03/12/23 0830    furosemide injection 80 mg, 80 mg, Intravenous, Q12H, Neno Moran MD, 80 mg at 03/12/23 2359    gabapentin capsule 400 mg, 400 mg, Oral, BID, Neno Moran MD, 400 mg at 03/13/23 0921    glucagon (human recombinant) injection 1 mg, 1 mg, Intramuscular, PRN, Neno Moran MD    hydrALAZINE injection 10 mg, 10 mg, Intravenous, Q4H PRN, Figueroa Daniels MD, 10 mg at 03/12/23 1224    HYDROcodone-acetaminophen  mg per tablet 1 tablet, 1 tablet, Oral, Q6H PRN, Neno Moran MD, 1 tablet at 03/13/23 0540    HYDROcodone-acetaminophen 5-325 mg per tablet 1 tablet, 1 tablet, Oral, Q6H PRN, Neno Moran MD    insulin aspart U-100 injection 1-10 Units, 1-10 Units, Subcutaneous, QID (AC + HS) PRN, Neno Moran MD, 10 Units at 03/13/23 0540    insulin aspart U-100 injection 10 Units, 10 Units, Subcutaneous, TIDWM, Figueroa Daniels MD    insulin detemir U-100 injection 35 Units, 35 Units, Subcutaneous, QHS, Figueroa Daniels MD    labetaloL injection 10 mg, 10 mg, Intravenous, Q4H PRN, Neno Moran MD    levETIRAcetam tablet 1,000 mg, 1,000 mg, Oral, BID, Neno Moran MD, 1,000 mg at 03/13/23 0921    lipase-protease-amylase 12,000-38,000-60,000 units per capsule 6 capsule, 6 capsule, Oral, TID, Neno Moran MD, 6 capsule at 03/13/23 0900    melatonin tablet 6 mg, 6 mg, Oral, Nightly PRN, Neno Moran MD, 6 mg at 03/12/23 0012    metoprolol tartrate (LOPRESSOR) tablet 25 mg, 25 mg, Oral, BID, Neno Moran MD, 25 mg at 03/13/23 0921    mupirocin 2 % ointment, , Nasal, BID, Neno Moran MD, Given at 03/13/23 0921    ondansetron injection 4 mg, 4 mg, Intravenous, Q4H PRN, Neno Moran MD, 4 mg at 03/12/23 0011    polyethylene glycol packet 17 g, 17 g, Oral, BID PRN, eNno Moran MD    prochlorperazine injection Soln 5 mg, 5 mg, Intravenous, Q6H PRN, Neno Moran MD    senna-docusate  8.6-50 mg per tablet 2 tablet, 2 tablet, Oral, BID PRN, Neno Moran MD    simethicone chewable tablet 80 mg, 1 tablet, Oral, QID PRN, Neno Moran MD    sodium bicarbonate tablet 1,300 mg, 1,300 mg, Oral, TID, Neno Moran MD, 1,300 mg at 03/13/23 0921    sodium chloride 0.9% flush 10 mL, 10 mL, Intravenous, PRN, Neno Moran MD    vitamin renal formula (B-complex-vitamin c-folic acid) 1 mg per capsule 1 capsule, 1 capsule, Oral, Daily, Neno Moran MD, 1 capsule at 03/13/23 0921     Imaging:  US Liver with Doppler (xpd)   Final Result   Impression:      1. There is slight intrahepatic biliary duct dilatation.      2. Portal flow parameters are within normal limits with hepatopetal flow. The hepatic veins and arteries are patent. However, Doppler evaluation was not performed as the patient was uncooperative.      3. Evaluation is limited as the patient was uncooperative due to severe pain and the examination was terminated.      4. Details and other findings as above.      No significant discrepancy with overnight report.         Electronically signed by: Dm Alberto   Date:    03/12/2023   Time:    09:58      CT Abdomen Pelvis With Contrast   Final Result      1. Left pleural effusion.      2. Hepatic cirrhotic morphology, splenomegaly, upper abdominal varices and ascites.  3.  Chronic pancreatitis changes.      4. Extensive subcutaneous anasarca edema.         Electronically signed by: Dm Alberto   Date:    03/11/2023   Time:    20:39      X-Ray Chest AP Portable   Final Result      No acute cardiopulmonary process.         Electronically signed by: Chad Espinoza   Date:    03/11/2023   Time:    16:31          Laboratory Data:  Recent Labs   Lab 03/12/23  0617 03/13/23  0710    138   K 4.3 4.2   CO2 17* 17*   BUN 34.6* 32.8*   CREATININE 2.07* 1.93*   GLUCOSE 204* 276*   CALCIUM 8.5 7.5*   PHOS 4.5  --      Recent Labs   Lab 03/13/23  0710   WBC 10.3   HGB 8.7*   HCT 27.2*            Impression:    FABIO/CKDIII- possible diabetic nephropathy vs other etiology.  Nephrotic range proteinuria with UPCR 10.9 g. Concern for RONALDO with contrasted imaging.   Sepsis 2/2 urethritis   Liver Cirrhosis  Metabolic acidosis   Anasarca  HTN  DM uncontrolled A1C >8  Severe Vit D Deficiency       -Change diuretics to continuous Lasix infusion to deliver 10 mg per hour of Lasix with daily Albumin   -Continue treatment of UTI  -Consult IR not to defer timing of biopsy to radiologist       Jaqueline Avina, ASMARIA  Nephrology  3/13/2023 12:37 PM

## 2023-03-13 NOTE — PLAN OF CARE
Problem: Infection  Goal: Absence of Infection Signs and Symptoms  Outcome: Ongoing, Progressing     Problem: Adult Inpatient Plan of Care  Goal: Plan of Care Review  Outcome: Ongoing, Progressing  Goal: Absence of Hospital-Acquired Illness or Injury  Outcome: Ongoing, Progressing  Goal: Optimal Comfort and Wellbeing  Outcome: Ongoing, Progressing     Problem: Adjustment to Illness (Sepsis/Septic Shock)  Goal: Optimal Coping  Outcome: Ongoing, Progressing     Problem: Infection Progression (Sepsis/Septic Shock)  Goal: Absence of Infection Signs and Symptoms  Outcome: Ongoing, Progressing     Problem: Nutrition Impaired (Sepsis/Septic Shock)  Goal: Optimal Nutrition Intake  Outcome: Ongoing, Progressing     Problem: Fluid and Electrolyte Imbalance (Acute Kidney Injury/Impairment)  Goal: Fluid and Electrolyte Balance  Outcome: Ongoing, Progressing     Problem: Oral Intake Inadequate (Acute Kidney Injury/Impairment)  Goal: Optimal Nutrition Intake  Outcome: Ongoing, Progressing     Problem: Bariatric Environmental Safety  Goal: Safety Maintained with Care  Outcome: Ongoing, Progressing

## 2023-03-14 LAB
ANA SER QL HEP2 SUBST: NORMAL
ANION GAP SERPL CALC-SCNC: 9 MEQ/L
BASOPHILS # BLD AUTO: 0.07 X10(3)/MCL (ref 0–0.2)
BASOPHILS NFR BLD AUTO: 0.6 %
BUN SERPL-MCNC: 31.1 MG/DL (ref 8.9–20.6)
C-ANCA TITR SER IF: NEGATIVE {TITER}
CALCIUM SERPL-MCNC: 7.6 MG/DL (ref 8.4–10.2)
CERULOPLASMIN SERPL-MCNC: 17.5 MG/DL (ref 19–31)
CHLORIDE SERPL-SCNC: 116 MMOL/L (ref 98–107)
CO2 SERPL-SCNC: 18 MMOL/L (ref 22–29)
COPPER SERPL-MCNC: 38 MCG/DL (ref 73–129)
CREAT SERPL-MCNC: 1.97 MG/DL (ref 0.73–1.18)
CREAT/UREA NIT SERPL: 16
EOSINOPHIL # BLD AUTO: 0.26 X10(3)/MCL (ref 0–0.9)
EOSINOPHIL NFR BLD AUTO: 2.3 %
ERYTHROCYTE [DISTWIDTH] IN BLOOD BY AUTOMATED COUNT: 15 % (ref 11.5–17)
GFR SERPLBLD CREATININE-BSD FMLA CKD-EPI: 42 MLS/MIN/1.73/M2
GLUCOSE SERPL-MCNC: 340 MG/DL (ref 74–100)
HCT VFR BLD AUTO: 28.6 % (ref 42–52)
HGB BLD-MCNC: 9.1 G/DL (ref 14–18)
IMM GRANULOCYTES # BLD AUTO: 0.05 X10(3)/MCL (ref 0–0.04)
IMM GRANULOCYTES NFR BLD AUTO: 0.4 %
LKM-1 AB SER-ACNC: <5 U
LYMPHOCYTES # BLD AUTO: 3.27 X10(3)/MCL (ref 0.6–4.6)
LYMPHOCYTES NFR BLD AUTO: 28.9 %
MCH RBC QN AUTO: 30.6 PG
MCHC RBC AUTO-ENTMCNC: 31.8 G/DL (ref 33–36)
MCV RBC AUTO: 96.3 FL (ref 80–94)
MONOCYTES # BLD AUTO: 0.92 X10(3)/MCL (ref 0.1–1.3)
MONOCYTES NFR BLD AUTO: 8.1 %
NEUTROPHILS # BLD AUTO: 6.76 X10(3)/MCL (ref 2.1–9.2)
NEUTROPHILS NFR BLD AUTO: 59.7 %
NRBC BLD AUTO-RTO: 0 %
P-ANCA SER QL IF: NEGATIVE
PATH REV: NORMAL
PLATELET # BLD AUTO: 334 X10(3)/MCL (ref 130–400)
PMV BLD AUTO: 11.5 FL (ref 7.4–10.4)
POCT GLUCOSE: 239 MG/DL (ref 70–110)
POCT GLUCOSE: 251 MG/DL (ref 70–110)
POCT GLUCOSE: 301 MG/DL (ref 70–110)
POCT GLUCOSE: 358 MG/DL (ref 70–110)
POTASSIUM SERPL-SCNC: 4.2 MMOL/L (ref 3.5–5.1)
RBC # BLD AUTO: 2.97 X10(6)/MCL (ref 4.7–6.1)
SMOOTH MUSCLE AB SER QL IF: NEGATIVE
SODIUM SERPL-SCNC: 143 MMOL/L (ref 136–145)
WBC # SPEC AUTO: 11.3 X10(3)/MCL (ref 4.5–11.5)

## 2023-03-14 PROCEDURE — 21400001 HC TELEMETRY ROOM

## 2023-03-14 PROCEDURE — 36410 VNPNXR 3YR/> PHY/QHP DX/THER: CPT

## 2023-03-14 PROCEDURE — 80048 BASIC METABOLIC PNL TOTAL CA: CPT | Performed by: INTERNAL MEDICINE

## 2023-03-14 PROCEDURE — 85025 COMPLETE CBC W/AUTO DIFF WBC: CPT | Performed by: INTERNAL MEDICINE

## 2023-03-14 PROCEDURE — 85610 PROTHROMBIN TIME: CPT | Performed by: PHYSICIAN ASSISTANT

## 2023-03-14 PROCEDURE — A4216 STERILE WATER/SALINE, 10 ML: HCPCS | Performed by: INTERNAL MEDICINE

## 2023-03-14 PROCEDURE — 63600175 PHARM REV CODE 636 W HCPCS: Mod: JZ,JG | Performed by: INTERNAL MEDICINE

## 2023-03-14 PROCEDURE — 63600175 PHARM REV CODE 636 W HCPCS: Performed by: INTERNAL MEDICINE

## 2023-03-14 PROCEDURE — P9047 ALBUMIN (HUMAN), 25%, 50ML: HCPCS | Mod: JZ,JG | Performed by: INTERNAL MEDICINE

## 2023-03-14 PROCEDURE — 25000003 PHARM REV CODE 250: Performed by: INTERNAL MEDICINE

## 2023-03-14 RX ORDER — ALBUMIN HUMAN 250 G/1000ML
25 SOLUTION INTRAVENOUS DAILY
Status: DISCONTINUED | OUTPATIENT
Start: 2023-03-14 | End: 2023-03-28

## 2023-03-14 RX ORDER — FENTANYL CITRATE 50 UG/ML
INJECTION, SOLUTION INTRAMUSCULAR; INTRAVENOUS
Status: DISCONTINUED
Start: 2023-03-14 | End: 2023-03-14 | Stop reason: WASHOUT

## 2023-03-14 RX ORDER — LIDOCAINE HYDROCHLORIDE 20 MG/ML
INJECTION, SOLUTION EPIDURAL; INFILTRATION; INTRACAUDAL; PERINEURAL
Status: DISPENSED
Start: 2023-03-14 | End: 2023-03-14

## 2023-03-14 RX ORDER — MIDAZOLAM HYDROCHLORIDE 1 MG/ML
INJECTION INTRAMUSCULAR; INTRAVENOUS
Status: DISCONTINUED
Start: 2023-03-14 | End: 2023-03-14 | Stop reason: WASHOUT

## 2023-03-14 RX ORDER — SODIUM CHLORIDE 0.9 % (FLUSH) 0.9 %
10 SYRINGE (ML) INJECTION EVERY 6 HOURS
Status: DISCONTINUED | OUTPATIENT
Start: 2023-03-14 | End: 2023-04-04 | Stop reason: HOSPADM

## 2023-03-14 RX ORDER — LISINOPRIL 10 MG/1
20 TABLET ORAL DAILY
Status: DISCONTINUED | OUTPATIENT
Start: 2023-03-14 | End: 2023-03-15

## 2023-03-14 RX ORDER — SODIUM CHLORIDE 0.9 % (FLUSH) 0.9 %
10 SYRINGE (ML) INJECTION
Status: DISCONTINUED | OUTPATIENT
Start: 2023-03-14 | End: 2023-04-04 | Stop reason: HOSPADM

## 2023-03-14 RX ORDER — HYDROCODONE BITARTRATE AND ACETAMINOPHEN 10; 325 MG/1; MG/1
1 TABLET ORAL EVERY 4 HOURS PRN
Status: DISCONTINUED | OUTPATIENT
Start: 2023-03-14 | End: 2023-03-17

## 2023-03-14 RX ADMIN — SODIUM BICARBONATE 1300 MG: 650 TABLET ORAL at 02:03

## 2023-03-14 RX ADMIN — INSULIN ASPART 10 UNITS: 100 INJECTION, SOLUTION INTRAVENOUS; SUBCUTANEOUS at 12:03

## 2023-03-14 RX ADMIN — METOPROLOL TARTRATE 25 MG: 25 TABLET, FILM COATED ORAL at 08:03

## 2023-03-14 RX ADMIN — HYDROCODONE BITARTRATE AND ACETAMINOPHEN 1 TABLET: 10; 325 TABLET ORAL at 04:03

## 2023-03-14 RX ADMIN — CEFTRIAXONE 2 G: 2 INJECTION, POWDER, FOR SOLUTION INTRAMUSCULAR; INTRAVENOUS at 01:03

## 2023-03-14 RX ADMIN — FUROSEMIDE 12 MG/HR: 10 INJECTION, SOLUTION INTRAMUSCULAR; INTRAVENOUS at 10:03

## 2023-03-14 RX ADMIN — SODIUM CHLORIDE, PRESERVATIVE FREE 10 ML: 5 INJECTION INTRAVENOUS at 06:03

## 2023-03-14 RX ADMIN — HYDROCODONE BITARTRATE AND ACETAMINOPHEN 1 TABLET: 10; 325 TABLET ORAL at 08:03

## 2023-03-14 RX ADMIN — PANCRELIPASE 6 CAPSULE: 60000; 12000; 38000 CAPSULE, DELAYED RELEASE PELLETS ORAL at 08:03

## 2023-03-14 RX ADMIN — MUPIROCIN: 20 OINTMENT TOPICAL at 08:03

## 2023-03-14 RX ADMIN — MUPIROCIN: 20 OINTMENT TOPICAL at 10:03

## 2023-03-14 RX ADMIN — NEPHROCAP 1 CAPSULE: 1 CAP ORAL at 08:03

## 2023-03-14 RX ADMIN — LEVETIRACETAM 1000 MG: 500 TABLET, FILM COATED ORAL at 08:03

## 2023-03-14 RX ADMIN — ENOXAPARIN SODIUM 40 MG: 40 INJECTION SUBCUTANEOUS at 04:03

## 2023-03-14 RX ADMIN — GABAPENTIN 400 MG: 100 CAPSULE ORAL at 08:03

## 2023-03-14 RX ADMIN — ALBUMIN (HUMAN) 25 G: 5 SOLUTION INTRAVENOUS at 08:03

## 2023-03-14 RX ADMIN — INSULIN ASPART 10 UNITS: 100 INJECTION, SOLUTION INTRAVENOUS; SUBCUTANEOUS at 04:03

## 2023-03-14 RX ADMIN — LISINOPRIL 20 MG: 10 TABLET ORAL at 08:03

## 2023-03-14 RX ADMIN — AMLODIPINE BESYLATE 10 MG: 5 TABLET ORAL at 08:03

## 2023-03-14 RX ADMIN — SODIUM BICARBONATE 1300 MG: 650 TABLET ORAL at 08:03

## 2023-03-14 RX ADMIN — PANCRELIPASE 6 CAPSULE: 60000; 12000; 38000 CAPSULE, DELAYED RELEASE PELLETS ORAL at 02:03

## 2023-03-14 RX ADMIN — INSULIN DETEMIR 40 UNITS: 100 INJECTION, SOLUTION SUBCUTANEOUS at 08:03

## 2023-03-14 RX ADMIN — INSULIN ASPART 10 UNITS: 100 INJECTION, SOLUTION INTRAVENOUS; SUBCUTANEOUS at 06:03

## 2023-03-14 RX ADMIN — HYDROCODONE BITARTRATE AND ACETAMINOPHEN 1 TABLET: 10; 325 TABLET ORAL at 12:03

## 2023-03-14 NOTE — PLAN OF CARE
03/14/23 1026   Discharge Assessment   Assessment Type Discharge Planning Assessment   Confirmed/corrected address, phone number and insurance Yes   Source of Information patient   When was your last doctors appointment?   (Patient reports last PCP appointment was about 6 months ago.)   Reason For Admission Body Swelling   People in Home sibling(s)   Do you expect to return to your current living situation? Yes   Do you have help at home or someone to help you manage your care at home? Yes   Current cognitive status: Alert/Oriented   Walking or Climbing Stairs ambulation difficulty, requires equipment  (Ambulates with a cane.)   Home Accessibility stairs to enter home   Number of Stairs, Main Entrance five   Home Layout Able to live on 1st floor;Other (see comments)   Equipment Currently Used at Home walker, rolling;cane, straight   Readmission within 30 days? No   Patient currently being followed by outpatient case management? No   Do you currently have service(s) that help you manage your care at home? No   Do you take prescription medications? Yes   Do you have prescription coverage? Yes   Do you have any problems affording any of your prescribed medications? No   How do you get to doctors appointments? car, drives self   Are you on dialysis? No   Do you take coumadin? No   Discharge Plan A Home   Discharge Plan B Home with family   Discharge Plan discussed with: Patient

## 2023-03-14 NOTE — CONSULTS
Gastroenterology Consult    Reason for Consult:     Cirrhosis    HPI:  Kamran Huerta is a 48 y.o. male unknown to our service w/ pmhx of CHF, CVA, poorly controlled DM, obesity, HTN, CKD, seizures, etoh abuse, reported chronic pancreatitis admitted 3/11/23 with acutely worsened renal function, anasarca, proteinuria. Awaiting renal biopsy.  We have been consulted to evaluate new finding of cirrhosis on CT.     Patient states that he used to be a very heavy daily drinker for many years, but reports that he quit drinking completely about 6 months ago at the advice of his doctors.  He has no known history of cirrhosis and has never been told that he has liver disease.  He does state that he had 1 prior screening colonoscopy done in [place], reportedly a long time ago.  No history of upper endoscopy.  He has no known relevant family history of GI disease.  States that his stools have been looser and more frequent than usual, but denies melena or hematochezia.  He also denies drug use and history of any abdominal surgeries.  He denies ever having an abdominal paracentesis.    He does endorse intermittent abdominal pain over the past couple of years, which is located to different parts of the abdomen without any associated signs or symptoms. Currently has abd pain located to the mid-abdomen, periumbilical area.     Tbili 0.3, ---109, AST/ALT wnl. AFP wnl, 2.8. viral hep panel, HIV, and syphilis ab are negative. CT abd/pelv w/ iv contrast 3/11/23 reveals left pleural effusion, hepatic cirrhosis, splenomegaly, upper abd varices, ascites, chronic pancreatitis, extensive subcu anasarca edema.  H&H since admission has been stable.    US liver w/ doppler 3/12/23 reveals slight intrahepatic biliary duct dilatation, normal PV flow, patent hepatic veins/arteries. Pt noted to be uncooperative with doppler evaluation due to pain and US was terminated prematurely.        PCP:  Ailyn Reynolds MD    Review of  patient's allergies indicates:  No Known Allergies    Current Facility-Administered Medications   Medication Dose Route Frequency Provider Last Rate Last Admin    fentaNYL (SUBLIMAZE) 50 mcg/mL injection             LIDOcaine (PF) 20 mg/mL (2%) 20 mg/mL (2 %) injection             midazolam (VERSED) 1 mg/mL injection             acetaminophen tablet 1,000 mg  1,000 mg Oral Q6H PRN [facility] MD Dean        acetaminophen tablet 650 mg  650 mg Oral Q4H PRN Ali M MD Dean        albumin human 25% bottle 25 g  25 g Intravenous Daily Figueroa Daniels MD        aluminum-magnesium hydroxide-simethicone 200-200-20 mg/5 mL suspension 30 mL  30 mL Oral QID PRN [facility] MD Dean        amLODIPine tablet 10 mg  10 mg Oral Daily [facility] MD Dean   10 mg at 03/13/23 0921    cefTRIAXone (ROCEPHIN) 2 g in dextrose 5 % in water (D5W) 5 % 50 mL IVPB (MB+)  2 g Intravenous Q24H Ali M MD Dean   Stopped at 03/14/23 0140    cloNIDine tablet 0.2 mg  0.2 mg Oral TID PRN Ali M MD Dean        dextrose 10% bolus 125 mL 125 mL  12.5 g Intravenous PRN Ali M MD Dean        dextrose 10% bolus 250 mL 250 mL  25 g Intravenous PRN Ali M MD Dean        dextrose 40 % gel 15,000 mg  15 g Oral PRN Ali M MD Dean        dextrose 40 % gel 30,000 mg  30 g Oral PRN Ali M MD Dean        enoxaparin injection 40 mg  40 mg Subcutaneous Daily Ali M MD Dean   40 mg at 03/13/23 1704    ergocalciferol capsule 50,000 Units  50,000 Units Oral Q3 Days Ali M MD Dean   50,000 Units at 03/12/23 0830    furosemide (LASIX) 200 mg in dextrose 5 % (D5W) SolP 100 mL continuous infusion (conc: 2 mg/mL)  12 mg/hr Intravenous Continuous Benjamín Sharp MD 7.5 mL/hr at 03/14/23 0353 15 mg/hr at 03/14/23 0353    gabapentin capsule 400 mg  400 mg Oral BID Ali M MD Dean   400 mg at 03/13/23 2147    glucagon (human recombinant) injection 1 mg  1 mg Intramuscular PRN Ali M MD Dean        hydrALAZINE injection 10 mg  10 mg Intravenous Q4H PRN Figueroa Daniels MD    10 mg at 03/12/23 1224    HYDROcodone-acetaminophen  mg per tablet 1 tablet  1 tablet Oral Q4H PRN Figueroa Daniels MD   1 tablet at 03/14/23 0434    HYDROcodone-acetaminophen 5-325 mg per tablet 1 tablet  1 tablet Oral Q6H PRN Neno Moran MD        insulin aspart U-100 injection 1-10 Units  1-10 Units Subcutaneous QID (AC + HS) PRN Neno Moran MD   10 Units at 03/13/23 0540    insulin aspart U-100 injection 10 Units  10 Units Subcutaneous TIDWM Figueroa Daniels MD   10 Units at 03/14/23 0655    insulin detemir U-100 injection 40 Units  40 Units Subcutaneous QHS Figueroa Daniels MD        labetaloL injection 10 mg  10 mg Intravenous Q4H PRN Neno Moran MD        levETIRAcetam tablet 1,000 mg  1,000 mg Oral BID eNno Moran MD   1,000 mg at 03/13/23 2147    lipase-protease-amylase 12,000-38,000-60,000 units per capsule 6 capsule  6 capsule Oral TID Neno Moran MD   6 capsule at 03/13/23 2154    lisinopriL tablet 20 mg  20 mg Oral Daily Figueroa Daniels MD        melatonin tablet 6 mg  6 mg Oral Nightly PRN Neno Moran MD   6 mg at 03/12/23 0012    metoprolol tartrate (LOPRESSOR) tablet 25 mg  25 mg Oral BID Neno Moran MD   25 mg at 03/13/23 2147    mupirocin 2 % ointment   Nasal BID Neno Moran MD   Given at 03/13/23 2242    ondansetron injection 4 mg  4 mg Intravenous Q4H PRN Neno Moran MD   4 mg at 03/12/23 0011    polyethylene glycol packet 17 g  17 g Oral BID PRN Neno Moran MD        prochlorperazine injection Soln 5 mg  5 mg Intravenous Q6H PRN Neno Moran MD        senna-docusate 8.6-50 mg per tablet 2 tablet  2 tablet Oral BID PRN Neno Moran MD        simethicone chewable tablet 80 mg  1 tablet Oral QID PRN Neno Moran MD        sodium bicarbonate tablet 1,300 mg  1,300 mg Oral TID Neno Moran MD   1,300 mg at 03/13/23 2147    sodium chloride 0.9% flush 10 mL  10 mL Intravenous PRN Neno Moran MD        vitamin renal formula (B-complex-vitamin c-folic acid) 1 mg per  "capsule 1 capsule  1 capsule Oral Daily Neno Moran MD   1 capsule at 03/13/23 0921     Medications Prior to Admission   Medication Sig Dispense Refill Last Dose    amLODIPine (NORVASC) 10 MG tablet Take 10 mg by mouth once daily.   3/11/2023    CREON 24,000-76,000 -120,000 unit capsule Take 3 capsules by mouth 3 (three) times daily.   3/11/2023    furosemide (LASIX) 40 MG tablet Take 40 mg by mouth once daily.   3/11/2023    gabapentin (NEURONTIN) 400 MG capsule Take 400 mg by mouth 2 (two) times daily.   3/11/2023    HYDROcodone-acetaminophen (NORCO)  mg per tablet Take 1 tablet by mouth every 8 (eight) hours as needed.   3/10/2023    insulin glargine 100 units/mL SubQ pen Inject 30 Units into the skin.   3/10/2023    levETIRAcetam (KEPPRA) 500 MG Tab Take 2 tablets (1,000 mg total) by mouth 2 (two) times daily. 60 tablet 6 3/11/2023    lisinopriL-hydrochlorothiazide (PRINZIDE,ZESTORETIC) 20-25 mg Tab Take by mouth.   3/11/2023    metoprolol tartrate (LOPRESSOR) 25 MG tablet Take 25 mg by mouth 2 (two) times daily.   3/12/2023    potassium chloride SA (K-DUR,KLOR-CON) 20 MEQ tablet Take 20 mEq by mouth once daily.   3/11/2023    PRENATAL VITAMIN PLUS LOW IRON 27 mg iron- 1 mg Tab Take 1 tablet by mouth once daily.   3/11/2023    BD VEO INSULIN SYR, HALF UNIT, 0.3 mL 31 gauge x 15/64" Syrg use as directed       loperamide (IMODIUM) 2 mg capsule Take by mouth.       tamsulosin (FLOMAX) 0.4 mg Cap Take 0.4 mg by mouth once daily.       tiZANidine (ZANAFLEX) 4 MG tablet Take by mouth.       [DISCONTINUED] cyclobenzaprine (FEXMID) 7.5 MG Tab Take 7.5 mg by mouth 3 (three) times daily as needed.   Unknown       Past Medical History:  Past Medical History:   Diagnosis Date    CHF (congestive heart failure)     Chronic back pain     CVA (cerebral vascular accident) 01/2023    DM (diabetes mellitus)     HTN (hypertension)     Seizures        Past Surgical History:  Past Surgical History:   Procedure Laterality Date "    BACK SURGERY         Family History:  No family history on file.    Social History:  Social History     Tobacco Use    Smoking status: Never    Smokeless tobacco: Never   Substance Use Topics    Alcohol use: Not Currently           Review of Systems:    Review of Systems   Constitutional:  Negative for fever and unexpected weight change.   Respiratory:  Negative for cough and shortness of breath.    Cardiovascular:  Negative for chest pain and leg swelling.   Gastrointestinal:  Positive for abdominal distention. Negative for abdominal pain, blood in stool, diarrhea, nausea and vomiting.   Genitourinary:  Positive for scrotal swelling.   Musculoskeletal:  Negative for back pain and myalgias.   Skin:  Negative for color change and pallor.   Neurological:  Negative for speech difficulty and weakness.   All other systems reviewed and are negative.    Objective:      VITAL SIGNS: 24 HR MIN & MAX LAST  Temp  Min: 97.6 °F (36.4 °C)  Max: 98.2 °F (36.8 °C) 98.2 °F (36.8 °C)  BP  Min: 138/84  Max: 162/99 (!) 162/99  Pulse  Min: 75  Max: 93 82  Resp  Min: 16  Max: 18 18  SpO2  Min: 93 %  Max: 100 % 100 %      Intake/Output Summary (Last 24 hours) at 3/14/2023 0821  Last data filed at 3/14/2023 0511  Gross per 24 hour   Intake 240 ml   Output 3275 ml   Net -3035 ml       Physical Exam  Vitals and nursing note reviewed.   Constitutional:       Appearance: Normal appearance.   HENT:      Head: Normocephalic and atraumatic.   Eyes:      General: No scleral icterus.     Extraocular Movements: Extraocular movements intact.   Cardiovascular:      Rate and Rhythm: Normal rate and regular rhythm.      Heart sounds: Normal heart sounds.   Pulmonary:      Effort: Pulmonary effort is normal. No respiratory distress.      Breath sounds: Normal breath sounds.   Abdominal:      General: Abdomen is flat. Bowel sounds are normal. There is distension.      Palpations: Abdomen is soft.      Tenderness: There is abdominal tenderness.    Musculoskeletal:         General: No swelling or deformity. Normal range of motion.      Right lower leg: No edema.      Left lower leg: No edema.   Skin:     General: Skin is warm and dry.   Neurological:      General: No focal deficit present.      Mental Status: He is alert and oriented to person, place, and time.   Psychiatric:         Mood and Affect: Mood normal.         Behavior: Behavior normal.       Recent Results (from the past 48 hour(s))   Echo    Collection Time: 03/12/23  9:30 AM   Result Value Ref Range    TDI SEPTAL 0.08 m/s    LV LATERAL E/E' RATIO 7.19 m/s    LV SEPTAL E/E' RATIO 14.38 m/s    EF 60 %    Left Ventricular Outflow Tract Mean Velocity 0.73 cm/s    Left Ventricular Outflow Tract Mean Gradient 3.00 mmHg    TDI LATERAL 0.16 m/s    PV PEAK VELOCITY 0.80 cm/s    LVIDd 4.94 3.5 - 6.0 cm    IVS 1.24 (A) 0.6 - 1.1 cm    Posterior Wall 1.32 (A) 0.6 - 1.1 cm    LVIDs 3.25 2.1 - 4.0 cm    FS 34 28 - 44 %    LV mass 251.30 g    LA size 3.80 cm    TAPSE 1.98 cm    Left Ventricle Relative Wall Thickness 0.53 cm    AV mean gradient 3 mmHg    AV Velocity Ratio 0.86     AV index (prosthetic) 0.81     E/A ratio 1.35     Mean e' 0.12 m/s    E wave deceleration time 174.00 msec    LVOT peak cliff 1.09 m/s    LVOT peak VTI 21.90 cm    Ao peak cliff 1.27 m/s    Ao VTI 27.2 cm    AV peak gradient 6 mmHg    E/E' ratio 9.58 m/s    MV Peak E Cliff 1.15 m/s    MV Peak A Cliff 0.85 m/s    LV Systolic Volume 42.50 mL    LV Diastolic Volume 115.00 mL    LA volume (mod) 50.70 cm3   POCT glucose    Collection Time: 03/12/23 11:03 AM   Result Value Ref Range    POCT Glucose 140 (H) 70 - 110 mg/dL   POCT glucose    Collection Time: 03/13/23  5:34 AM   Result Value Ref Range    POCT Glucose 339 (H) 70 - 110 mg/dL   Comprehensive Metabolic Panel    Collection Time: 03/13/23  7:10 AM   Result Value Ref Range    Sodium Level 138 136 - 145 mmol/L    Potassium Level 4.2 3.5 - 5.1 mmol/L    Chloride 116 (H) 98 - 107 mmol/L     Carbon Dioxide 17 (L) 22 - 29 mmol/L    Glucose Level 276 (H) 74 - 100 mg/dL    Blood Urea Nitrogen 32.8 (H) 8.9 - 20.6 mg/dL    Creatinine 1.93 (H) 0.73 - 1.18 mg/dL    Calcium Level Total 7.5 (L) 8.4 - 10.2 mg/dL    Protein Total 4.0 (L) 6.4 - 8.3 gm/dL    Albumin Level 1.6 (L) 3.5 - 5.0 g/dL    Globulin 2.4 2.4 - 3.5 gm/dL    Albumin/Globulin Ratio 0.7 (L) 1.1 - 2.0 ratio    Bilirubin Total 0.2 <=1.5 mg/dL    Alkaline Phosphatase 109 40 - 150 unit/L    Alanine Aminotransferase 22 0 - 55 unit/L    Aspartate Aminotransferase 19 5 - 34 unit/L    eGFR 43 mls/min/1.73/m2   Magnesium    Collection Time: 03/13/23  7:10 AM   Result Value Ref Range    Magnesium Level 1.50 (L) 1.60 - 2.60 mg/dL   Uric Acid    Collection Time: 03/13/23  7:10 AM   Result Value Ref Range    Uric Acid 9.7 (H) 3.5 - 7.2 mg/dL   CBC with Differential    Collection Time: 03/13/23  7:10 AM   Result Value Ref Range    WBC 10.3 4.5 - 11.5 x10(3)/mcL    RBC 2.84 (L) 4.70 - 6.10 x10(6)/mcL    Hgb 8.7 (L) 14.0 - 18.0 g/dL    Hct 27.2 (L) 42.0 - 52.0 %    MCV 95.8 (H) 80.0 - 94.0 fL    MCH 30.6 pg    MCHC 32.0 (L) 33.0 - 36.0 g/dL    RDW 15.0 11.5 - 17.0 %    Platelet 305 130 - 400 x10(3)/mcL    MPV 11.4 (H) 7.4 - 10.4 fL    Neut % 62.5 %    Lymph % 25.8 %    Mono % 8.0 %    Eos % 2.8 %    Basophil % 0.5 %    Lymph # 2.66 0.6 - 4.6 x10(3)/mcL    Neut # 6.44 2.1 - 9.2 x10(3)/mcL    Mono # 0.82 0.1 - 1.3 x10(3)/mcL    Eos # 0.29 0 - 0.9 x10(3)/mcL    Baso # 0.05 0 - 0.2 x10(3)/mcL    IG# 0.04 0 - 0.04 x10(3)/mcL    IG% 0.4 %    NRBC% 0.0 %   POCT glucose    Collection Time: 03/13/23 12:31 PM   Result Value Ref Range    POCT Glucose 295 (H) 70 - 110 mg/dL   POCT glucose    Collection Time: 03/13/23  8:11 PM   Result Value Ref Range    POCT Glucose 239 (H) 70 - 110 mg/dL   Basic Metabolic Panel    Collection Time: 03/14/23  3:44 AM   Result Value Ref Range    Sodium Level 143 136 - 145 mmol/L    Potassium Level 4.2 3.5 - 5.1 mmol/L    Chloride 116 (H) 98 -  107 mmol/L    Carbon Dioxide 18 (L) 22 - 29 mmol/L    Glucose Level 340 (H) 74 - 100 mg/dL    Blood Urea Nitrogen 31.1 (H) 8.9 - 20.6 mg/dL    Creatinine 1.97 (H) 0.73 - 1.18 mg/dL    BUN/Creatinine Ratio 16     Calcium Level Total 7.6 (L) 8.4 - 10.2 mg/dL    Anion Gap 9.0 mEq/L    eGFR 42 mls/min/1.73/m2   CBC with Differential    Collection Time: 03/14/23  3:44 AM   Result Value Ref Range    WBC 11.3 4.5 - 11.5 x10(3)/mcL    RBC 2.97 (L) 4.70 - 6.10 x10(6)/mcL    Hgb 9.1 (L) 14.0 - 18.0 g/dL    Hct 28.6 (L) 42.0 - 52.0 %    MCV 96.3 (H) 80.0 - 94.0 fL    MCH 30.6 pg    MCHC 31.8 (L) 33.0 - 36.0 g/dL    RDW 15.0 11.5 - 17.0 %    Platelet 334 130 - 400 x10(3)/mcL    MPV 11.5 (H) 7.4 - 10.4 fL    Neut % 59.7 %    Lymph % 28.9 %    Mono % 8.1 %    Eos % 2.3 %    Basophil % 0.6 %    Lymph # 3.27 0.6 - 4.6 x10(3)/mcL    Neut # 6.76 2.1 - 9.2 x10(3)/mcL    Mono # 0.92 0.1 - 1.3 x10(3)/mcL    Eos # 0.26 0 - 0.9 x10(3)/mcL    Baso # 0.07 0 - 0.2 x10(3)/mcL    IG# 0.05 (H) 0 - 0.04 x10(3)/mcL    IG% 0.4 %    NRBC% 0.0 %   POCT glucose    Collection Time: 03/14/23  5:02 AM   Result Value Ref Range    POCT Glucose 358 (H) 70 - 110 mg/dL       CT Abdomen Pelvis With Contrast    Result Date: 3/11/2023  EXAMINATION: CT ABDOMEN PELVIS WITH CONTRAST CLINICAL HISTORY: penile/testicular cellulitis/drainage; TECHNIQUE: Multidetector axial images were obtained of the abdomen and pelvis following the administration of IV contrast. Oral contrast was not administered. Dose length product of 948 mGycm. Automated exposure control was utilized to minimize radiation dose. COMPARISON: None available FINDINGS: There is small left dependent pleural effusion and left basilar atelectasis. There is hepatic cirrhotic morphology and there is moderate intraperitoneal free fluid consistent with ascites. Gallbladder wall is not thickened and there is no intra luminal calcified calculus. No biliary dilation identified.  There are multiple pancreatic  calcifications which is atrophic consistent with chronic pancreatitis.  There is also some dilatation pancreatic duct.  Spleen is mildly enlarged in size with maximum diameter of 13.5 cm without focal space occupying lesion.  Upper abdomen multiple varices.. The adrenal glands appear within normal limits. The kidneys are unremarkable in size and contour. No solid or cystic renal lesion identified. There is no hydronephrosis. No perinephric fluid strandings or collections identified. Stomach is mostly decompressed.  No abnormal dilatation of the loops of small bowel.  There is also no abnormal dilatation of the colon which contains moderate fecal loading.  No suggestion of bowel obstruction.  No pneumoperitoneum.  Extensive abdomen and pelvic subcutaneous anasarca edema. Urinary bladder is decompressed around the Segovia's catheter.  There are degenerative changes of lumbar spine which are more pronounced at the level of L5-S1.     1. Left pleural effusion. 2. Hepatic cirrhotic morphology, splenomegaly, upper abdominal varices and ascites.  3.  Chronic pancreatitis changes. 4. Extensive subcutaneous anasarca edema. Electronically signed by: Dm Alberto Date:    03/11/2023 Time:    20:39    X-Ray Chest AP Portable    Result Date: 3/11/2023  EXAMINATION: XR CHEST AP PORTABLE CLINICAL HISTORY: shortness of breath; TECHNIQUE: Single view of the chest COMPARISON: No prior imaging available for comparison. FINDINGS: No focal opacification, pleural effusion, or pneumothorax. The cardiomediastinal silhouette is within normal limits. No acute osseous abnormality.     No acute cardiopulmonary process. Electronically signed by: Chad Espinoza Date:    03/11/2023 Time:    16:31    Echo    Result Date: 3/12/2023  · Concentric hypertrophy and normal systolic function. · The estimated ejection fraction is 60%. · Normal left ventricular diastolic function. · Normal right ventricular size with normal right ventricular systolic  function.      CV Ultrasound doppler arterial legs bilat    Result Date: 3/13/2023  TDS: Severe edema. Unable to visualize distal femoral arteries. The right lower extremity demonstrated mild to moderate arterial flow reduction primarily in the tibial vessels. The left lower extremity demonstrated mild arterial flow reduction primarily in the tibial vessels. Non compressable tibial vessels.    CV Ultrasound doppler venous legs bilat    Result Date: 3/13/2023  TDS: Severe edema.  Unable to visualize bilateral distal femoral veins. There was no evidence of deep or superficial vein thrombosis in bilateral lower extremities.    US Liver with Doppler (xpd)    Result Date: 3/12/2023  Current report Technique:Real time gray scale and color Doppler ultrasound was performed on the liver. Comparison:None. Clinical history:ER20. Severe pain. Cirrhosis evaluation. Findings:Evaluation is limited as the patient was uncooperative due to severe pain and the examination was terminated. Liver:Unremarkable appearing liver which measures 14.5 cm in greatest dimension. There is slight intrahepatic biliary duct dilatation. Biliary ducts:The common bile duct is suboptimally visualized. Gallbladder: Partially visualized gallbladder is un remark unremarkable. Vascular:The main, right and left hepatic arteries are patent. The Doppler evaluation was not performed as the patient was uncooperative due to pain. IVC:The IVC is within normal limits. Portal vein:Portal flow parameters are within normal limits with hepatopetal flow. The velocity measures 29.6 cm/sec. The right and left main portal veins are patent and demonstrate hepatopetal flow. The main, right and left hepatic veins are patent. The peak systolic velocity of the right hepatic is 23.3 cm.     Impression: 1. There is slight intrahepatic biliary duct dilatation. 2. Portal flow parameters are within normal limits with hepatopetal flow. The hepatic veins and arteries are patent.  However, Doppler evaluation was not performed as the patient was uncooperative. 3. Evaluation is limited as the patient was uncooperative due to severe pain and the examination was terminated. 4. Details and other findings as above. No significant discrepancy with overnight report. Electronically signed by: Dm Alberto Date:    03/12/2023 Time:    09:58        Assessment & Plan:  48 y.o. male unknown to our service w/ pmhx of CHF, CVA, poorly controlled DM, obesity, HTN, CKD, seizures, etoh abuse, reported chronic pancreatitis admitted 3/11/23 with acutely worsened renal function, anasarca, proteinuria. Awaiting renal biopsy.  We have been consulted to evaluate new finding of cirrhosis on CT.     Acute on chronic CKD  - nephrology following. Renal bx pending    Poorly controlled DM 2  - BG > 400 on admission. Still >300 today    3. Cirrhosis - new diagnosis  - cirrhotic liver morphology, varices, and ascites noted on CT abd/pelv w/ contrast 3/11/23  - etiology: Hx of etoh abuse (quit 6 months ago).   - Transplant candidate? Will refer for evaluation w/ hepatologist   - obtain repeat PT/INR today for accurate MELD calculation  - US liver w/o doppler reveals patent vasculature (doppler canceled due to pain)  - ascites: modrate amount present on CT. No hx of paracentesis. Currently on lasix gtt.   - varices: noted on CT. No hx of screening EGD  - No clinical s/s of HE. No asterixis.   - AFP wnl. No signs of liver lesions/neoplasm on imaging  - INR 0.96 on 3/12/23  - plts wnl 334,000   - continue prophylactic rocephin 2 g daily  - LFTs wnl     4. Chronic pancreatitis  - continue digestive enzyme supplementation    - renal bx pending today.  - continue low sodium diet  - No signs of active GIB. There are varices noted on CT  - may require diagnostic paracentesis this admission.     - EGD tomorrow. NPO after midnight      Thank you for allowing us to participate in this patient's care.    Lluvia Sharp PA-C  [place]  [facility]

## 2023-03-14 NOTE — PROGRESS NOTES
NEPHROLOGY: Progress   48-year-old a.m. with history of poorly controlled type 2 diabetes, obesity, creatinine of 1.47 in November of 2022 with a GFR at that time of approximately 60 however , it was also as low as 33 at that time.  He was being followed by Dr. Alonso in [facility].  He has a history of poorly controlled hypertension as well, seizure disorder and alcoholic liver disease with chronic pancreatitis.  Over the past several months the patient has had increasing volume retention, worsening anasarca and increased immobility due to the edema.  He has reportedly gained 65 lb over the past several months.    Patient with high-grade proteinuria over 10 g likely related to diabetic nephropathy but percutaneous biopsy is planned for today.    Currently receiving 15 mg furosemide per hour.  Is diuresing well (3.5 L overnight).  Scrotal discomfort is improved              Current Facility-Administered Medications:     fentaNYL (SUBLIMAZE) 50 mcg/mL injection, , , ,     LIDOcaine (PF) 20 mg/mL (2%) 20 mg/mL (2 %) injection, , , ,     midazolam (VERSED) 1 mg/mL injection, , , ,     acetaminophen tablet 1,000 mg, 1,000 mg, Oral, Q6H PRN, Neno Moran MD    acetaminophen tablet 650 mg, 650 mg, Oral, Q4H PRN, Neno Moran MD    albumin human 25% bottle 25 g, 25 g, Intravenous, Daily, Figueroa Daniels MD    aluminum-magnesium hydroxide-simethicone 200-200-20 mg/5 mL suspension 30 mL, 30 mL, Oral, QID PRN, Neno Moran MD    amLODIPine tablet 10 mg, 10 mg, Oral, Daily, Neno Moran MD, 10 mg at 03/13/23 0921    cefTRIAXone (ROCEPHIN) 2 g in dextrose 5 % in water (D5W) 5 % 50 mL IVPB (MB+), 2 g, Intravenous, Q24H, Neno Moran MD, Stopped at 03/14/23 0140    cloNIDine tablet 0.2 mg, 0.2 mg, Oral, TID PRN, Neno Moran MD    dextrose 10% bolus 125 mL 125 mL, 12.5 g, Intravenous, PRN, Ali M  MD Dean    dextrose 10% bolus 250 mL 250 mL, 25 g, Intravenous, PRN, Neno Moran MD    dextrose 40 % gel 15,000 mg, 15 g, Oral, PRN, Neno Moran MD    dextrose 40 % gel 30,000 mg, 30 g, Oral, PRN, Neno Moran MD    enoxaparin injection 40 mg, 40 mg, Subcutaneous, Daily, Neno Moran MD, 40 mg at 03/13/23 1704    ergocalciferol capsule 50,000 Units, 50,000 Units, Oral, Q3 Days, Neno Moran MD, 50,000 Units at 03/12/23 0830    furosemide (LASIX) 200 mg in dextrose 5 % (D5W) SolP 100 mL continuous infusion (conc: 2 mg/mL), 12 mg/hr, Intravenous, Continuous, Benjamín Sharp MD, Last Rate: 7.5 mL/hr at 03/14/23 0353, 15 mg/hr at 03/14/23 0353    gabapentin capsule 400 mg, 400 mg, Oral, BID, Neno Moran MD, 400 mg at 03/13/23 2147    glucagon (human recombinant) injection 1 mg, 1 mg, Intramuscular, PRN, Neno Moran MD    hydrALAZINE injection 10 mg, 10 mg, Intravenous, Q4H PRN, Figueroa Daniels MD, 10 mg at 03/12/23 1224    HYDROcodone-acetaminophen  mg per tablet 1 tablet, 1 tablet, Oral, Q4H PRN, Figueroa Daniels MD, 1 tablet at 03/14/23 0434    HYDROcodone-acetaminophen 5-325 mg per tablet 1 tablet, 1 tablet, Oral, Q6H PRN, Neno Moran MD    insulin aspart U-100 injection 1-10 Units, 1-10 Units, Subcutaneous, QID (AC + HS) PRN, Neno Moran MD, 10 Units at 03/13/23 0540    insulin aspart U-100 injection 10 Units, 10 Units, Subcutaneous, TIDWM, Figueroa Daniels MD, 10 Units at 03/14/23 0655    insulin detemir U-100 injection 35 Units, 35 Units, Subcutaneous, QHS, Figueroa Daniels MD, 35 Units at 03/13/23 2147    labetaloL injection 10 mg, 10 mg, Intravenous, Q4H PRN, Neno Moran MD    levETIRAcetam tablet 1,000 mg, 1,000 mg, Oral, BID, Neno Moran MD, 1,000 mg at 03/13/23 2147    lipase-protease-amylase 12,000-38,000-60,000 units per capsule 6 capsule, 6 capsule, Oral, TID, Neno Moran MD, 6 capsule at 03/13/23 2154    melatonin tablet 6 mg, 6 mg, Oral, Nightly PRN, Neno Moran MD, 6  "mg at 03/12/23 0012    metoprolol tartrate (LOPRESSOR) tablet 25 mg, 25 mg, Oral, BID, Neno Moran MD, 25 mg at 03/13/23 2147    mupirocin 2 % ointment, , Nasal, BID, Neno Moran MD, Given at 03/13/23 2242    ondansetron injection 4 mg, 4 mg, Intravenous, Q4H PRN, Neno Moran MD, 4 mg at 03/12/23 0011    polyethylene glycol packet 17 g, 17 g, Oral, BID PRN, Neno Moran MD    prochlorperazine injection Soln 5 mg, 5 mg, Intravenous, Q6H PRN, Neno Moran MD    senna-docusate 8.6-50 mg per tablet 2 tablet, 2 tablet, Oral, BID PRN, Neno Moran MD    simethicone chewable tablet 80 mg, 1 tablet, Oral, QID PRN, Neno Moran MD    sodium bicarbonate tablet 1,300 mg, 1,300 mg, Oral, TID, Neno Moran MD, 1,300 mg at 03/13/23 2147    sodium chloride 0.9% flush 10 mL, 10 mL, Intravenous, PRN, Neno Moran MD    vitamin renal formula (B-complex-vitamin c-folic acid) 1 mg per capsule 1 capsule, 1 capsule, Oral, Daily, Neno Moran MD, 1 capsule at 03/13/23 0921        BP (!) 162/99   Pulse 82   Temp 98.2 °F (36.8 °C) (Oral)   Resp 18   Ht 5' 5" (1.651 m)   Wt 126.1 kg (278 lb)   SpO2 100%   BMI 46.26 kg/m²     Physical Exam:    GEN: Awake, alert and appropriate.  No distress.  Complaining of discomfort in the scrotum area.   HEENT: Atraumatic. EOMI, no icterus  NECK : No JVD  CARD : RRR s rub or gallop  LUNGS :  Decreased breath sounds at the bases  ABD : Soft,non-tender. BS active, obese.  :  Scrotum is still swollen but improved.  EXT :  Patient with 3 to 4+ pitting edema up to his thighs bilaterally.  Some abdominal wall edema also noted.           Intake/Output Summary (Last 24 hours) at 3/14/2023 0820  Last data filed at 3/14/2023 0511  Gross per 24 hour   Intake 240 ml   Output 3275 ml   Net -3035 ml         Laboratory:  Recent Results (from the past 24 hour(s))   POCT glucose    Collection Time: 03/13/23 12:31 PM   Result Value Ref Range    POCT Glucose 295 (H) 70 - 110 mg/dL   POCT glucose    Collection " Time: 03/13/23  8:11 PM   Result Value Ref Range    POCT Glucose 239 (H) 70 - 110 mg/dL   Basic Metabolic Panel    Collection Time: 03/14/23  3:44 AM   Result Value Ref Range    Sodium Level 143 136 - 145 mmol/L    Potassium Level 4.2 3.5 - 5.1 mmol/L    Chloride 116 (H) 98 - 107 mmol/L    Carbon Dioxide 18 (L) 22 - 29 mmol/L    Glucose Level 340 (H) 74 - 100 mg/dL    Blood Urea Nitrogen 31.1 (H) 8.9 - 20.6 mg/dL    Creatinine 1.97 (H) 0.73 - 1.18 mg/dL    BUN/Creatinine Ratio 16     Calcium Level Total 7.6 (L) 8.4 - 10.2 mg/dL    Anion Gap 9.0 mEq/L    eGFR 42 mls/min/1.73/m2   CBC with Differential    Collection Time: 03/14/23  3:44 AM   Result Value Ref Range    WBC 11.3 4.5 - 11.5 x10(3)/mcL    RBC 2.97 (L) 4.70 - 6.10 x10(6)/mcL    Hgb 9.1 (L) 14.0 - 18.0 g/dL    Hct 28.6 (L) 42.0 - 52.0 %    MCV 96.3 (H) 80.0 - 94.0 fL    MCH 30.6 pg    MCHC 31.8 (L) 33.0 - 36.0 g/dL    RDW 15.0 11.5 - 17.0 %    Platelet 334 130 - 400 x10(3)/mcL    MPV 11.5 (H) 7.4 - 10.4 fL    Neut % 59.7 %    Lymph % 28.9 %    Mono % 8.1 %    Eos % 2.3 %    Basophil % 0.6 %    Lymph # 3.27 0.6 - 4.6 x10(3)/mcL    Neut # 6.76 2.1 - 9.2 x10(3)/mcL    Mono # 0.92 0.1 - 1.3 x10(3)/mcL    Eos # 0.26 0 - 0.9 x10(3)/mcL    Baso # 0.07 0 - 0.2 x10(3)/mcL    IG# 0.05 (H) 0 - 0.04 x10(3)/mcL    IG% 0.4 %    NRBC% 0.0 %   POCT glucose    Collection Time: 03/14/23  5:02 AM   Result Value Ref Range    POCT Glucose 358 (H) 70 - 110 mg/dL         Assessment/Plan:   Advanced stage III CKD likely diabetic nephropathy   Poorly controlled hypertension and diabetes   Hepatic cirrhosis   UTI   Profound vitamin-D deficiency-56299 units ergo q 72  Secondary hyperparathyroidism    Plans for percutaneous biopsy today.  Reduce furosemide to 12 mg/hour   Case discussed with attending Dr. Daniels and will begin to add ACE inhibitors today.  With this degree of proteinuria it is not likely that any significant reduction will occur that will prevent rapidly  deteriorating renal function.  We will leave [place] in at least another day due to the significant penile retraction but scrotal edema is improved    Benjamín Sharp MD, FASN

## 2023-03-14 NOTE — PLAN OF CARE
Problem: Infection  Goal: Absence of Infection Signs and Symptoms  Outcome: Ongoing, Progressing     Problem: Adult Inpatient Plan of Care  Goal: Plan of Care Review  Outcome: Ongoing, Progressing  Goal: Patient-Specific Goal (Individualized)  Outcome: Ongoing, Progressing  Goal: Absence of Hospital-Acquired Illness or Injury  Outcome: Ongoing, Progressing  Goal: Optimal Comfort and Wellbeing  Outcome: Ongoing, Progressing  Goal: Readiness for Transition of Care  Outcome: Ongoing, Progressing     Problem: Adjustment to Illness (Sepsis/Septic Shock)  Goal: Optimal Coping  Outcome: Ongoing, Progressing     Problem: Bleeding (Sepsis/Septic Shock)  Goal: Absence of Bleeding  Outcome: Ongoing, Progressing     Problem: Glycemic Control Impaired (Sepsis/Septic Shock)  Goal: Blood Glucose Level Within Desired Range  Outcome: Ongoing, Progressing     Problem: Infection Progression (Sepsis/Septic Shock)  Goal: Absence of Infection Signs and Symptoms  Outcome: Ongoing, Progressing     Problem: Nutrition Impaired (Sepsis/Septic Shock)  Goal: Optimal Nutrition Intake  Outcome: Ongoing, Progressing     Problem: Fluid and Electrolyte Imbalance (Acute Kidney Injury/Impairment)  Goal: Fluid and Electrolyte Balance  Outcome: Ongoing, Progressing     Problem: Oral Intake Inadequate (Acute Kidney Injury/Impairment)  Goal: Optimal Nutrition Intake  Outcome: Ongoing, Progressing     Problem: Renal Function Impairment (Acute Kidney Injury/Impairment)  Goal: Effective Renal Function  Outcome: Ongoing, Progressing     Problem: Bariatric Environmental Safety  Goal: Safety Maintained with Care  Outcome: Ongoing, Progressing     Problem: Skin Injury Risk Increased  Goal: Skin Health and Integrity  Outcome: Ongoing, Progressing

## 2023-03-14 NOTE — PROCEDURES
"Kamran Huerta is a 48 y.o. male patient.    Temp: 98.2 °F (36.8 °C) (03/14/23 0751)  Pulse: 83 (03/14/23 1134)  Resp: 18 (03/14/23 1234)  BP: 137/85 (03/14/23 1235)  SpO2: 95 % (03/14/23 1134)  Weight: 126.1 kg (278 lb) (03/14/23 0700)  Height: 5' 5" (165.1 cm) (03/12/23 1745)    PICC  Date/Time: 3/14/2023 2:39 PM  Performed by: Dillon Mann RN  Consent Done: Yes  Time out: Immediately prior to procedure a time out was called to verify the correct patient, procedure, equipment, support staff and site/side marked as required  Preparation: skin prepped with ChloraPrep  Skin prep agent dried: skin prep agent completely dried prior to procedure  Sterile barriers: all five maximum sterile barriers used - cap, mask, sterile gown, sterile gloves, and large sterile sheet  Hand hygiene: hand hygiene performed prior to central venous catheter insertion  Location details: right brachial  Catheter type: single lumen  Catheter size: 4 Fr  Catheter Length: 16cm    Ultrasound guidance: yes  Vessel Caliber: medium and patent, compressibility normal  Number of attempts: 1  Post-procedure: blood return through all ports and sterile dressing applied          Name Dillon Mann RN  3/14/2023    "

## 2023-03-14 NOTE — PROGRESS NOTES
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     Present to the ED with complaint of diffuse body swelling specially abdomen, groin, penis and testicles and bilateral lower extremities, dyspnea on minimal exertion and bilateral lower extremity pain and cramping.  Report subjective fever and chills.  Report he was just hospitalized for similar symptom at [facility] about 2 weeks ago and was discharged home after few days.     On arrival to ED, he was tachycardic, hypertensive, saturating 100% on room air.  EKG appears sinus rhythm on my review without ischemic changes.  Labs notable for WBC 16.3, hemoglobin 11.4, platelets 346, sodium 139, potassium 5.0, chloride 116, CO2 16, creatinine 2.17, BUN 34, glucose 420, calcium 8.0, albumin 1.3, , negative troponin.     CT abdomen and pelvis with contrast show left pleural effusion, hepatic cirrhotic morphology, splenomegaly and upper abdominal varices and ascites, chronic pancreatitis changes, extensive subcutaneous anasarca edema.  Kidneys unremarkable without hydronephrosis.     Per ED provider exam he was noted to have purulent penile discharge, sent for culture and [place] catheter placed.  He was given albumin 12.5 g, Lasix 40 mg IV, vancomycin and Zosyn and subsequently referred to hospital medicine service for further evaluation and management.     Urinated about 700 mL since the Lasix 40.  We added urine protein creatinine ratio which came about 10.9.    Patient was continued on iv lasix. Seen by Renal team and recommended iv lasix drip and renal biopsy.      Interval Hx:   Patient today awake and alert today. Feels  his leg swelling is better today. [place] draining clear urine. Eating well.   Educated on life style modification and strict DM and HTN control.     Objective/physical exam:  General: In no acute distress, Obese   Chest: Clear to auscultation bilaterally  Heart: RRR, +S1, S2, no appreciable murmur  Abdomen: + distension, nontender, BS +  MSK:+ pitting edema and thickening of skin from abdomen to foot  + Penile and scrotal edema   Neurologic: Alert and oriented x4, Cranial nerve II-XII intact, Strength 5/5 in all 4 extremities    VITAL SIGNS: 24 HRS MIN & MAX LAST   Temp  Min: 97.6 °F (36.4 °C)  Max: 98.2 °F (36.8 °C) 98.2 °F (36.8 °C)   BP  Min: 137/85  Max: 175/104 137/85   Pulse  Min: 75  Max: 93  83   Resp  Min: 10  Max: 18 18     SpO2  Min: 93 %  Max: 100 % 95 %       Recent Labs   Lab 03/12/23  0617 03/13/23  0710 03/14/23  0344   WBC 14.8* 10.3 11.3   RBC 3.40* 2.84* 2.97*   HGB 10.3* 8.7* 9.1*   HCT 33.0* 27.2* 28.6*   MCV 97.1* 95.8* 96.3*   MCH 30.3 30.6 30.6   MCHC 31.2* 32.0* 31.8*   RDW 15.0 15.0 15.0   * 305 334   MPV 11.1* 11.4* 11.5*       Recent Labs   Lab 03/11/23  1625 03/12/23  0617 03/13/23  0710 03/14/23  0344    142 138 143   K 5.0 4.3 4.2 4.2   CO2 16* 17* 17* 18*   BUN 34.6* 34.6* 32.8* 31.1*   CREATININE 2.17* 2.07* 1.93* 1.97*   CALCIUM 8.0* 8.5 7.5* 7.6*   MG  --  1.30* 1.50*  --    ALBUMIN 1.3* 2.3* 1.6*  --    ALKPHOS 155* 156* 109  --    ALT 37 38 22  --    AST 29 27 19  --    BILITOT 0.3 0.3 0.2  --           Microbiology Results (last 7 days)       Procedure Component Value Units Date/Time    Wound Culture [[id number]]  (Abnormal) Collected: 03/11/23 1835    Order Status: Completed Specimen: Drainage from Urethral Updated: 03/14/23 0957     Wound Culture Many Pluralibacter gergoviae      Many Staphylococcus aureus    Blood Culture #2 **CANNOT BE ORDERED STAT** [[id number]]  (Normal) Collected: 03/11/23 1848    Order Status: Completed Specimen: Blood Updated: 03/13/23 2100      CULTURE, BLOOD ([facility]) No Growth At 48 Hours    Blood Culture #1 **CANNOT BE ORDERED STAT** [[id number]]  (Normal) Collected: 03/11/23 1848    Order Status: Completed Specimen: Blood Updated: 03/13/23 2100     CULTURE, BLOOD ([facility]) No Growth At 48 Hours    Urine culture [[id number]]  (Abnormal)  (Susceptibility) Collected: 03/11/23 2005    Order Status: Completed Specimen: Urine Updated: 03/13/23 0749     Urine Culture >/= 100,000 colonies/ml Pluralibacter gergoviae    Chlamydia/GC, PCR [[id number]]  (Normal) Collected: 03/12/23 0618    Order Status: Completed Specimen: Urine Updated: 03/12/23 0901     Chlamydia trachomatis PCR Not Detected     N. gonorrhea PCR Not Detected    Narrative:      The Xpert CT/NG test, performed on the GeneXpert system is a qualitative in vitro real-time polymerase chain reaction (PCR) test for the automated detected and differentiation for genomic DNA from Chlamydia trachomatis (CT) and/or Neisseria gonorrhoeae (NG).             See below for Radiology    Scheduled Med:   albumin human 25%  25 g Intravenous Daily    amLODIPine  10 mg Oral Daily    cefTRIAXone (ROCEPHIN) IVPB  2 g Intravenous Q24H    enoxaparin  40 mg Subcutaneous Daily    ergocalciferol  50,000 Units Oral Q3 Days    fentaNYL        gabapentin  400 mg Oral BID    insulin aspart U-100  10 Units Subcutaneous TIDWM    insulin detemir U-100  40 Units Subcutaneous QHS    levETIRAcetam  1,000 mg Oral BID    LIDOcaine (PF) 20 mg/mL (2%)        lipase-protease-amylase 12,000-38,000-60,000 units  6 capsule Oral TID    lisinopriL  20 mg Oral Daily    metoprolol tartrate  25 mg Oral BID    midazolam        mupirocin   Nasal BID    sodium bicarbonate  1,300 mg Oral TID    vitamin renal formula (B-complex-vitamin c-folic acid)  1 capsule Oral Daily        Continuous Infusions:   furosemide (LASIX) 2 mg/mL continuous infusion (non-titrating) 12 mg/hr (03/14/23 0815)        PRN Meds:  acetaminophen, acetaminophen, aluminum-magnesium  hydroxide-simethicone, cloNIDine, dextrose 10%, dextrose 10%, dextrose, dextrose, glucagon (human recombinant), hydrALAZINE, HYDROcodone-acetaminophen, HYDROcodone-acetaminophen, insulin aspart U-100, labetalol, melatonin, ondansetron, polyethylene glycol, prochlorperazine, senna-docusate 8.6-50 mg, simethicone, sodium chloride 0.9%       Assessment/Plan:  Sepsis secondary to UTI/Urethritis + Staph and Pluralibacter gergoviae    Anasarca  FABIO superimposed on CKD2  Nephrotic syndrome/ proteinuria 10.9 g  Liver cirrhosis  -alcoholic vs GARCIA  Hyperglycemia-T2DM  Hypertension, benign   Vitamin-D deficiency  Chronic anemia  Hypomagnesemia      HX Seizure, chronic pancreatitis    Plan:  Patient feeling better today. Encouraged to ambulate with PT   Cont IV lasix/albumin drip per renal team   IR consulted for renal biopsy, will be done in am   Will closely monitor patients daily weight, urine out put, renal parameters and volume status    Blood sugars is uncontrolled. Will cont levemir and aspart and adjust insulin dose to target glucose of <180  BP is now much better. Cont Norvasc and Metoprolol   ACEI/ARB when ok with renal team   Mag replaced   Cont IV rocephin day 3/5 for urethritis and UTI   Reviewed today's labs and  WBC 11, Hb 9.1, Plt 334, Na 143, K 4.2, BUN 31, Crt 1.97  H&H is now stable.   F/U on GI recommendations for cirrhosis     Tolerating po diet     Cont supportive care   Avoid NSAIDs     Labs in am       Critical care note:  Critical care diagnosis: Anasarca/Volume overload needing iv lasix drip   Critical care interventions: Hands-on evaluation, review of labs/radiographs/records and discussion with patient and family if present  Critical care time spent: 35 minutes       VTE prophylaxis: Lovenox     Patient condition:  Guarded    Anticipated discharge and Disposition:         All diagnosis and differential diagnosis have been reviewed; assessment and plan has been documented; I have personally reviewed  the labs and test results that are presently available; I have reviewed the patients medication list; I have reviewed the consulting providers response and recommendations. I have reviewed or attempted to review medical records based upon their availability    All of the patient's questions have been  addressed and answered. Patient's is agreeable to the above stated plan. I will continue to monitor closely and make adjustments to medical management as needed.  _____________________________________________________________________    Nutrition Status:    Radiology:  US Scrotum And Testicles  Narrative: EXAMINATION:  US SCROTUM AND TESTICLES    CLINICAL HISTORY:  acute scrotal pain and edema;    COMPARISON:  No priors    FINDINGS:  Real-time exam with grayscale, color, and spectral imaging of the scrotum was performed.    The right testicle measures 3.0 x 2.7 x 2.3 cm and the left measures 3.4 x 2.5 x 2.2 cm. Color-flow and arterial waveforms are present within both testicles. There are no findings of torsion. The testicles are homogeneous in echotexture without intratesticular mass.    There are moderate bilateral hydroceles.  There is prominent scrotal wall thickening/edema.  Impression: 1. Negative for testicular torsion and solid mass.  2. Moderate bilateral hydroceles.  3. Prominent scrotal wall thickening/edema.    Electronically signed by: Esvin Trejo  Date:    03/14/2023  Time:    11:54      Figueroa Daniels MD   03/14/2023

## 2023-03-15 ENCOUNTER — ANESTHESIA (OUTPATIENT)
Dept: ENDOSCOPY | Facility: HOSPITAL | Age: 48
DRG: 853 | End: 2023-03-15
Payer: MEDICARE

## 2023-03-15 ENCOUNTER — ANESTHESIA EVENT (OUTPATIENT)
Dept: ENDOSCOPY | Facility: HOSPITAL | Age: 48
DRG: 853 | End: 2023-03-15
Payer: MEDICARE

## 2023-03-15 LAB
ANION GAP SERPL CALC-SCNC: 8 MEQ/L
BACTERIA SPEC CULT: ABNORMAL
BACTERIA SPEC CULT: ABNORMAL
BASOPHILS # BLD AUTO: 0.07 X10(3)/MCL (ref 0–0.2)
BASOPHILS NFR BLD AUTO: 0.6 %
BUN SERPL-MCNC: 32.9 MG/DL (ref 8.9–20.6)
CALCIUM SERPL-MCNC: 7.4 MG/DL (ref 8.4–10.2)
CHLORIDE SERPL-SCNC: 116 MMOL/L (ref 98–107)
CO2 SERPL-SCNC: 19 MMOL/L (ref 22–29)
CREAT SERPL-MCNC: 1.88 MG/DL (ref 0.73–1.18)
CREAT/UREA NIT SERPL: 18
EOSINOPHIL # BLD AUTO: 0.2 X10(3)/MCL (ref 0–0.9)
EOSINOPHIL NFR BLD AUTO: 1.8 %
ERYTHROCYTE [DISTWIDTH] IN BLOOD BY AUTOMATED COUNT: 14.8 % (ref 11.5–17)
GFR SERPLBLD CREATININE-BSD FMLA CKD-EPI: 44 MLS/MIN/1.73/M2
GLUCOSE SERPL-MCNC: 292 MG/DL (ref 74–100)
HCT VFR BLD AUTO: 26.2 % (ref 42–52)
HGB BLD-MCNC: 8.7 G/DL (ref 14–18)
IMM GRANULOCYTES # BLD AUTO: 0.05 X10(3)/MCL (ref 0–0.04)
IMM GRANULOCYTES NFR BLD AUTO: 0.5 %
INR BLD: 1.05 (ref 0–1.3)
LYMPHOCYTES # BLD AUTO: 3.14 X10(3)/MCL (ref 0.6–4.6)
LYMPHOCYTES NFR BLD AUTO: 28.6 %
MCH RBC QN AUTO: 31.6 PG
MCHC RBC AUTO-ENTMCNC: 33.2 G/DL (ref 33–36)
MCV RBC AUTO: 95.3 FL (ref 80–94)
MONOCYTES # BLD AUTO: 0.91 X10(3)/MCL (ref 0.1–1.3)
MONOCYTES NFR BLD AUTO: 8.3 %
NEUTROPHILS # BLD AUTO: 6.59 X10(3)/MCL (ref 2.1–9.2)
NEUTROPHILS NFR BLD AUTO: 60.2 %
NRBC BLD AUTO-RTO: 0 %
PLATELET # BLD AUTO: 355 X10(3)/MCL (ref 130–400)
PMV BLD AUTO: 11.6 FL (ref 7.4–10.4)
POCT GLUCOSE: 200 MG/DL (ref 70–110)
POCT GLUCOSE: 221 MG/DL (ref 70–110)
POCT GLUCOSE: 271 MG/DL (ref 70–110)
POCT GLUCOSE: 321 MG/DL (ref 70–110)
POTASSIUM SERPL-SCNC: 4.4 MMOL/L (ref 3.5–5.1)
PROTHROMBIN TIME: 13.6 SECONDS (ref 12.5–14.5)
RBC # BLD AUTO: 2.75 X10(6)/MCL (ref 4.7–6.1)
SODIUM SERPL-SCNC: 143 MMOL/L (ref 136–145)
WBC # SPEC AUTO: 11 X10(3)/MCL (ref 4.5–11.5)

## 2023-03-15 PROCEDURE — 63600175 PHARM REV CODE 636 W HCPCS: Performed by: INTERNAL MEDICINE

## 2023-03-15 PROCEDURE — A4216 STERILE WATER/SALINE, 10 ML: HCPCS | Performed by: INTERNAL MEDICINE

## 2023-03-15 PROCEDURE — 37000008 HC ANESTHESIA 1ST 15 MINUTES: Performed by: INTERNAL MEDICINE

## 2023-03-15 PROCEDURE — 82962 GLUCOSE BLOOD TEST: CPT | Performed by: INTERNAL MEDICINE

## 2023-03-15 PROCEDURE — 37000009 HC ANESTHESIA EA ADD 15 MINS: Performed by: INTERNAL MEDICINE

## 2023-03-15 PROCEDURE — 25000003 PHARM REV CODE 250: Performed by: NURSE PRACTITIONER

## 2023-03-15 PROCEDURE — 25000003 PHARM REV CODE 250: Performed by: ANESTHESIOLOGY

## 2023-03-15 PROCEDURE — 80048 BASIC METABOLIC PNL TOTAL CA: CPT | Performed by: INTERNAL MEDICINE

## 2023-03-15 PROCEDURE — 85025 COMPLETE CBC W/AUTO DIFF WBC: CPT | Performed by: INTERNAL MEDICINE

## 2023-03-15 PROCEDURE — 96365 THER/PROPH/DIAG IV INF INIT: CPT | Performed by: INTERNAL MEDICINE

## 2023-03-15 PROCEDURE — 25000003 PHARM REV CODE 250: Performed by: INTERNAL MEDICINE

## 2023-03-15 PROCEDURE — 27201423 OPTIME MED/SURG SUP & DEVICES STERILE SUPPLY: Performed by: INTERNAL MEDICINE

## 2023-03-15 PROCEDURE — 25000003 PHARM REV CODE 250: Performed by: NURSE ANESTHETIST, CERTIFIED REGISTERED

## 2023-03-15 PROCEDURE — P9047 ALBUMIN (HUMAN), 25%, 50ML: HCPCS | Mod: JZ,JG | Performed by: INTERNAL MEDICINE

## 2023-03-15 PROCEDURE — 21400001 HC TELEMETRY ROOM

## 2023-03-15 PROCEDURE — 63600175 PHARM REV CODE 636 W HCPCS: Performed by: NURSE ANESTHETIST, CERTIFIED REGISTERED

## 2023-03-15 PROCEDURE — 43239 EGD BIOPSY SINGLE/MULTIPLE: CPT | Performed by: INTERNAL MEDICINE

## 2023-03-15 RX ORDER — PROPOFOL 10 MG/ML
VIAL (ML) INTRAVENOUS
Status: DISPENSED
Start: 2023-03-15 | End: 2023-03-16

## 2023-03-15 RX ORDER — SODIUM CHLORIDE 9 MG/ML
INJECTION, SOLUTION INTRAVENOUS ONCE
Status: COMPLETED | OUTPATIENT
Start: 2023-03-15 | End: 2023-03-15

## 2023-03-15 RX ORDER — LIDOCAINE HYDROCHLORIDE 10 MG/ML
1 INJECTION, SOLUTION EPIDURAL; INFILTRATION; INTRACAUDAL; PERINEURAL ONCE
Status: CANCELLED | OUTPATIENT
Start: 2023-03-15 | End: 2023-03-15

## 2023-03-15 RX ORDER — IPRATROPIUM BROMIDE AND ALBUTEROL SULFATE 2.5; .5 MG/3ML; MG/3ML
3 SOLUTION RESPIRATORY (INHALATION)
Status: CANCELLED | OUTPATIENT
Start: 2023-03-15

## 2023-03-15 RX ORDER — MEPERIDINE HYDROCHLORIDE 25 MG/ML
12.5 INJECTION INTRAMUSCULAR; INTRAVENOUS; SUBCUTANEOUS EVERY 10 MIN PRN
Status: CANCELLED | OUTPATIENT
Start: 2023-03-15 | End: 2023-03-16

## 2023-03-15 RX ORDER — INSULIN ASPART 100 [IU]/ML
14 INJECTION, SOLUTION INTRAVENOUS; SUBCUTANEOUS
Status: DISCONTINUED | OUTPATIENT
Start: 2023-03-15 | End: 2023-04-04 | Stop reason: HOSPADM

## 2023-03-15 RX ORDER — MIDAZOLAM HYDROCHLORIDE 1 MG/ML
INJECTION INTRAMUSCULAR; INTRAVENOUS
Status: DISCONTINUED
Start: 2023-03-15 | End: 2023-03-15 | Stop reason: WASHOUT

## 2023-03-15 RX ORDER — LIDOCAINE HCL/PF 100 MG/5ML
SYRINGE (ML) INTRAVENOUS
Status: DISCONTINUED | OUTPATIENT
Start: 2023-03-15 | End: 2023-03-15

## 2023-03-15 RX ORDER — LORAZEPAM 2 MG/ML
0.25 INJECTION INTRAMUSCULAR ONCE AS NEEDED
Status: CANCELLED | OUTPATIENT
Start: 2023-03-15 | End: 2034-08-11

## 2023-03-15 RX ORDER — PROPOFOL 10 MG/ML
VIAL (ML) INTRAVENOUS
Status: COMPLETED
Start: 2023-03-15 | End: 2023-03-15

## 2023-03-15 RX ORDER — SODIUM CHLORIDE, SODIUM GLUCONATE, SODIUM ACETATE, POTASSIUM CHLORIDE AND MAGNESIUM CHLORIDE 30; 37; 368; 526; 502 MG/100ML; MG/100ML; MG/100ML; MG/100ML; MG/100ML
INJECTION, SOLUTION INTRAVENOUS CONTINUOUS
Status: CANCELLED | OUTPATIENT
Start: 2023-03-15 | End: 2023-04-14

## 2023-03-15 RX ORDER — HYDROCHLOROTHIAZIDE 12.5 MG/1
12.5 TABLET ORAL DAILY
Status: DISCONTINUED | OUTPATIENT
Start: 2023-03-15 | End: 2023-03-16

## 2023-03-15 RX ORDER — DOXAZOSIN 1 MG/1
2 TABLET ORAL NIGHTLY
Status: DISCONTINUED | OUTPATIENT
Start: 2023-03-15 | End: 2023-04-04 | Stop reason: HOSPADM

## 2023-03-15 RX ORDER — PROPOFOL 10 MG/ML
INJECTION, EMULSION INTRAVENOUS
Status: DISCONTINUED | OUTPATIENT
Start: 2023-03-15 | End: 2023-03-15

## 2023-03-15 RX ORDER — ONDANSETRON 4 MG/1
8 TABLET, ORALLY DISINTEGRATING ORAL EVERY 6 HOURS PRN
Status: CANCELLED | OUTPATIENT
Start: 2023-03-15

## 2023-03-15 RX ORDER — FENTANYL CITRATE 50 UG/ML
INJECTION, SOLUTION INTRAMUSCULAR; INTRAVENOUS
Status: DISCONTINUED
Start: 2023-03-15 | End: 2023-03-15 | Stop reason: WASHOUT

## 2023-03-15 RX ORDER — LIDOCAINE HYDROCHLORIDE 20 MG/ML
INJECTION, SOLUTION EPIDURAL; INFILTRATION; INTRACAUDAL; PERINEURAL
Status: DISPENSED
Start: 2023-03-15 | End: 2023-03-15

## 2023-03-15 RX ORDER — ONDANSETRON 2 MG/ML
4 INJECTION INTRAMUSCULAR; INTRAVENOUS DAILY PRN
Status: CANCELLED | OUTPATIENT
Start: 2023-03-15

## 2023-03-15 RX ORDER — PROCHLORPERAZINE EDISYLATE 5 MG/ML
5 INJECTION INTRAMUSCULAR; INTRAVENOUS EVERY 30 MIN PRN
Status: CANCELLED | OUTPATIENT
Start: 2023-03-15

## 2023-03-15 RX ORDER — LISINOPRIL 10 MG/1
20 TABLET ORAL 2 TIMES DAILY
Status: DISCONTINUED | OUTPATIENT
Start: 2023-03-15 | End: 2023-03-27

## 2023-03-15 RX ADMIN — PANCRELIPASE 6 CAPSULE: 60000; 12000; 38000 CAPSULE, DELAYED RELEASE PELLETS ORAL at 02:03

## 2023-03-15 RX ADMIN — HYDROCODONE BITARTRATE AND ACETAMINOPHEN 1 TABLET: 10; 325 TABLET ORAL at 02:03

## 2023-03-15 RX ADMIN — PROPOFOL 40 MG: 10 INJECTION, EMULSION INTRAVENOUS at 11:03

## 2023-03-15 RX ADMIN — CEFTRIAXONE 2 G: 2 INJECTION, POWDER, FOR SOLUTION INTRAMUSCULAR; INTRAVENOUS at 02:03

## 2023-03-15 RX ADMIN — AMLODIPINE BESYLATE 10 MG: 5 TABLET ORAL at 08:03

## 2023-03-15 RX ADMIN — SODIUM BICARBONATE 1300 MG: 650 TABLET ORAL at 02:03

## 2023-03-15 RX ADMIN — SODIUM CHLORIDE, PRESERVATIVE FREE 10 ML: 5 INJECTION INTRAVENOUS at 06:03

## 2023-03-15 RX ADMIN — METOPROLOL TARTRATE 25 MG: 25 TABLET, FILM COATED ORAL at 08:03

## 2023-03-15 RX ADMIN — LISINOPRIL 20 MG: 10 TABLET ORAL at 09:03

## 2023-03-15 RX ADMIN — LIDOCAINE HYDROCHLORIDE 80 MG: 20 INJECTION, SOLUTION INTRAVENOUS at 11:03

## 2023-03-15 RX ADMIN — HYDROCODONE BITARTRATE AND ACETAMINOPHEN 1 TABLET: 10; 325 TABLET ORAL at 11:03

## 2023-03-15 RX ADMIN — HYDROCODONE BITARTRATE AND ACETAMINOPHEN 1 TABLET: 10; 325 TABLET ORAL at 01:03

## 2023-03-15 RX ADMIN — ENOXAPARIN SODIUM 40 MG: 40 INJECTION SUBCUTANEOUS at 04:03

## 2023-03-15 RX ADMIN — NEPHROCAP 1 CAPSULE: 1 CAP ORAL at 08:03

## 2023-03-15 RX ADMIN — GABAPENTIN 400 MG: 100 CAPSULE ORAL at 09:03

## 2023-03-15 RX ADMIN — LEVETIRACETAM 1000 MG: 500 TABLET, FILM COATED ORAL at 09:03

## 2023-03-15 RX ADMIN — SODIUM BICARBONATE 1300 MG: 650 TABLET ORAL at 09:03

## 2023-03-15 RX ADMIN — FUROSEMIDE 12 MG/HR: 10 INJECTION, SOLUTION INTRAMUSCULAR; INTRAVENOUS at 11:03

## 2023-03-15 RX ADMIN — ALBUMIN (HUMAN) 25 G: 5 SOLUTION INTRAVENOUS at 08:03

## 2023-03-15 RX ADMIN — PROPOFOL 50 MG: 10 INJECTION, EMULSION INTRAVENOUS at 11:03

## 2023-03-15 RX ADMIN — LISINOPRIL 20 MG: 10 TABLET ORAL at 08:03

## 2023-03-15 RX ADMIN — HYDROCODONE BITARTRATE AND ACETAMINOPHEN 1 TABLET: 10; 325 TABLET ORAL at 08:03

## 2023-03-15 RX ADMIN — HYDROCHLOROTHIAZIDE 12.5 MG: 12.5 TABLET ORAL at 02:03

## 2023-03-15 RX ADMIN — METOPROLOL TARTRATE 25 MG: 25 TABLET, FILM COATED ORAL at 09:03

## 2023-03-15 RX ADMIN — INSULIN DETEMIR 45 UNITS: 100 INJECTION, SOLUTION SUBCUTANEOUS at 09:03

## 2023-03-15 RX ADMIN — INSULIN ASPART 6 UNITS: 100 INJECTION, SOLUTION INTRAVENOUS; SUBCUTANEOUS at 08:03

## 2023-03-15 RX ADMIN — LEVETIRACETAM 1000 MG: 500 TABLET, FILM COATED ORAL at 08:03

## 2023-03-15 RX ADMIN — MUPIROCIN: 20 OINTMENT TOPICAL at 08:03

## 2023-03-15 RX ADMIN — SODIUM BICARBONATE 1300 MG: 650 TABLET ORAL at 08:03

## 2023-03-15 RX ADMIN — MUPIROCIN: 20 OINTMENT TOPICAL at 09:03

## 2023-03-15 RX ADMIN — PANCRELIPASE 6 CAPSULE: 60000; 12000; 38000 CAPSULE, DELAYED RELEASE PELLETS ORAL at 09:03

## 2023-03-15 RX ADMIN — GABAPENTIN 400 MG: 100 CAPSULE ORAL at 08:03

## 2023-03-15 RX ADMIN — HYDROCODONE BITARTRATE AND ACETAMINOPHEN 1 TABLET: 10; 325 TABLET ORAL at 07:03

## 2023-03-15 RX ADMIN — INSULIN ASPART 2 UNITS: 100 INJECTION, SOLUTION INTRAVENOUS; SUBCUTANEOUS at 02:03

## 2023-03-15 RX ADMIN — INSULIN ASPART 14 UNITS: 100 INJECTION, SOLUTION INTRAVENOUS; SUBCUTANEOUS at 04:03

## 2023-03-15 RX ADMIN — SODIUM CHLORIDE: 9 INJECTION, SOLUTION INTRAVENOUS at 10:03

## 2023-03-15 RX ADMIN — DOXAZOSIN 2 MG: 1 TABLET ORAL at 09:03

## 2023-03-15 NOTE — ANESTHESIA POSTPROCEDURE EVALUATION
Anesthesia Post Evaluation    Patient: Kamran Huerta    Procedure(s) Performed: Procedure(s) (LRB):  EGD (N/A)  EGD, WITH CLOSED BIOPSY    Final Anesthesia Type: general      Patient location during evaluation: GI PACU  Patient participation: Yes- Able to Participate  Level of consciousness: awake and alert  Post-procedure vital signs: reviewed and stable  Pain management: adequate  Airway patency: patent    PONV status at discharge: No PONV  Anesthetic complications: no      Cardiovascular status: blood pressure returned to baseline  Respiratory status: spontaneous ventilation and room air  Hydration status: euvolemic  Follow-up not needed.          Vitals Value Taken Time   /88 03/15/23 1212   Temp 36.8 °C (98.2 °F) 03/15/23 1205   Pulse 75 03/15/23 1212   Resp 18 03/15/23 1212   SpO2 99 % 03/15/23 1212         No case tracking events are documented in the log.      Pain/Mary Score: Pain Rating Prior to Med Admin: 8 (3/15/2023  1:52 PM)  Pain Rating Post Med Admin: 2 (3/15/2023  9:06 AM)  Mary Score: 8 (3/15/2023 12:05 PM)

## 2023-03-15 NOTE — PLAN OF CARE
Received from [facility], with on-going albumin infusion about to consume. Discontinued here in GI lab.  -On going continuous furosemide drip running at 12 mg/minute via infusion pump.   -informed  [place] about it and he is happy to continue furosemide drip.  -CBG checked and recorded  -kept monitored  -consents witnessed.

## 2023-03-15 NOTE — ANESTHESIA PREPROCEDURE EVALUATION
03/15/2023  Kamran Huerta is a 48 y.o., male with history of alcoholism and chronic pancreatitis and cirrhosis and ascites admitted March 11th with anasarca and further workup revealed UTI/sepsis with acute on chronic renal insufficiency.  Patient tolerated general anesthesia for mandibular surgery at outside hospital in 2018 (grade 1 view with video laryngoscopy).  GI has been consulted for EGD to rule out varices.  Patient notes normal bowel movements and flatus.  At present patient is on a Lasix drip and receiving albumin.     Transthoracic echo March 12, 2023   EF 60% with normal diastolic function  No significant valvular disease noted    .CMP  Sodium Level   Date Value Ref Range Status   03/15/2023 143 136 - 145 mmol/L Final     Potassium Level   Date Value Ref Range Status   03/15/2023 4.4 3.5 - 5.1 mmol/L Final     Carbon Dioxide   Date Value Ref Range Status   03/15/2023 19 (L) 22 - 29 mmol/L Final     Blood Urea Nitrogen   Date Value Ref Range Status   03/15/2023 32.9 (H) 8.9 - 20.6 mg/dL Final     Creatinine   Date Value Ref Range Status   03/15/2023 1.88 (H) 0.73 - 1.18 mg/dL Final     Calcium Level Total   Date Value Ref Range Status   03/15/2023 7.4 (L) 8.4 - 10.2 mg/dL Final     Albumin Level   Date Value Ref Range Status   03/13/2023 1.6 (L) 3.5 - 5.0 g/dL Final     Bilirubin Total   Date Value Ref Range Status   03/13/2023 0.2 <=1.5 mg/dL Final     Alkaline Phosphatase   Date Value Ref Range Status   03/13/2023 109 40 - 150 unit/L Final     Aspartate Aminotransferase   Date Value Ref Range Status   03/13/2023 19 5 - 34 unit/L Final     Alanine Aminotransferase   Date Value Ref Range Status   03/13/2023 22 0 - 55 unit/L Final     Estimated GFR-Non    Date Value Ref Range Status   01/20/2021 >60 mL/min/1.73 m2 Final        Lab Results   Component Value Date    HGBA1C 8.3  (H) 03/12/2023         BNP  Recent Labs   Lab 03/11/23  1625   .2*        Recent Labs   Lab 03/11/23  1625   TROPONINI <0.010        Lab Results   Component Value Date    INR 1.05 03/14/2023    INR 0.96 03/12/2023    INR 1.06 11/13/2022    PROTIME 13.6 03/14/2023    PROTIME 12.7 03/12/2023    PROTIME 13.7 11/13/2022       Pre-op Assessment    I have reviewed the Patient Summary Reports.    I have reviewed the NPO Status.   I have reviewed the Medications.     Review of Systems  Anesthesia Hx:   Denies Personal Hx of Anesthesia complications.   Social:  Non-Smoker    Cardiovascular:   Hypertension  Functional Capacity good / => 4 METS    Renal/:   Chronic Renal Disease, ARF, CKD    Hepatic/GI:  Liver Disease , Cirrhosis  Pancreatic Disease   Neurological:   CVA Seizures    Endocrine:   Diabetes, type 2  Morbid Obesity / BMI > 40      Physical Exam  General: Well nourished, Cooperative, Alert and Oriented    Airway:  Mallampati: II   Mouth Opening: Normal  TM Distance: Normal  Tongue: Normal  Neck ROM: Normal ROM    Dental:  Intact    Chest/Lungs:  Clear to auscultation, Normal Respiratory Rate    Heart:  Rate: Normal  Rhythm: Regular Rhythm    Abdomen:  Soft, Distention        Anesthesia Plan  Type of Anesthesia, risks & benefits discussed:    Anesthesia Type: Gen Natural Airway  Intra-op Monitoring Plan: Standard ASA Monitors  Induction:  IV  Informed Consent: Informed consent signed with the Patient and all parties understand the risks and agree with anesthesia plan.  All questions answered.   ASA Score: 3  Day of Surgery Review of History & Physical: H&P Update referred to the surgeon/provider.    Ready For Surgery From Anesthesia Perspective.     .

## 2023-03-15 NOTE — PROGRESS NOTES
NEPHROLOGY: Progress   48-year-old a.m. with history of poorly controlled type 2 diabetes, obesity, creatinine of 1.47 in November of 2022 with a GFR at that time of approximately 60 however , it was also as low as 33 at that time.  He was being followed by Dr. Alonso in [facility].  He has a history of poorly controlled hypertension as well, seizure disorder and alcoholic liver disease with chronic pancreatitis.  Over the past several months the patient has had increasing volume retention, worsening anasarca and increased immobility due to the edema.  He has reportedly gained 65 lb over the past several months.    Patient with high-grade proteinuria over 10 g likely related to diabetic nephropathy but percutaneous biopsy is planned for today.    Currently receiving 12 mg furosemide per hour. He has not lost any weight despite diuresis. Report of eating food and drink from outside sources.     He is s/p EGD today s/t new cirrhosis diagnosis. Report pending.             Current Facility-Administered Medications:     acetaminophen tablet 1,000 mg, 1,000 mg, Oral, Q6H PRN, Neno Moran MD    acetaminophen tablet 650 mg, 650 mg, Oral, Q4H PRN, Neno Moran MD    albumin human 25% bottle 25 g, 25 g, Intravenous, Daily, Figueroa Daniels MD, Stopped at 03/15/23 0917    aluminum-magnesium hydroxide-simethicone 200-200-20 mg/5 mL suspension 30 mL, 30 mL, Oral, QID PRN, Neno Moran MD    amLODIPine tablet 10 mg, 10 mg, Oral, Daily, Neno Moran MD, 10 mg at 03/15/23 0806    cefTRIAXone (ROCEPHIN) 2 g in dextrose 5 % in water (D5W) 5 % 50 mL IVPB (MB+), 2 g, Intravenous, Q24H, Neno Moran MD, Stopped at 03/15/23 0237    cloNIDine tablet 0.2 mg, 0.2 mg, Oral, TID PRN, Neno Moran MD    dextrose 10% bolus 125 mL 125 mL, 12.5 g, Intravenous, PRN, Neno Moran MD    dextrose 10% bolus 250 mL 250  mL, 25 g, Intravenous, PRN, Neno Moran MD    dextrose 40 % gel 15,000 mg, 15 g, Oral, PRN, Neno Moran MD    dextrose 40 % gel 30,000 mg, 30 g, Oral, PRN, Neno Moran MD    enoxaparin injection 40 mg, 40 mg, Subcutaneous, Daily, Neno Moran MD, 40 mg at 03/14/23 1605    ergocalciferol capsule 50,000 Units, 50,000 Units, Oral, Q3 Days, Neno Moran MD, 50,000 Units at 03/12/23 0830    furosemide (LASIX) 200 mg in dextrose 5 % (D5W) SolP 100 mL continuous infusion (conc: 2 mg/mL), 12 mg/hr, Intravenous, Continuous, Benjamín Sharp MD, Last Rate: 6 mL/hr at 03/14/23 2209, 12 mg/hr at 03/14/23 2209    gabapentin capsule 400 mg, 400 mg, Oral, BID, Neno Moran MD, 400 mg at 03/15/23 0806    glucagon (human recombinant) injection 1 mg, 1 mg, Intramuscular, PRN, Neno Moran MD    hydrALAZINE injection 10 mg, 10 mg, Intravenous, Q4H PRN, Figueroa Daniels MD, 10 mg at 03/12/23 1224    HYDROcodone-acetaminophen  mg per tablet 1 tablet, 1 tablet, Oral, Q4H PRN, Figueroa Daniels MD, 1 tablet at 03/15/23 1352    HYDROcodone-acetaminophen 5-325 mg per tablet 1 tablet, 1 tablet, Oral, Q6H PRN, Neno Moran MD    insulin aspart U-100 injection 1-10 Units, 1-10 Units, Subcutaneous, QID (AC + HS) PRN, Neno Moran MD, 2 Units at 03/15/23 1400    insulin aspart U-100 injection 14 Units, 14 Units, Subcutaneous, TIDWM, Figueroa Daniels MD    insulin detemir U-100 injection 45 Units, 45 Units, Subcutaneous, QHS, Figueroa Daniels MD    labetaloL injection 10 mg, 10 mg, Intravenous, Q4H PRN, Neno Moran MD    levETIRAcetam tablet 1,000 mg, 1,000 mg, Oral, BID, Neno Moran MD, 1,000 mg at 03/15/23 0805    LIDOcaine (PF) 20 mg/mL (2%) 20 mg/mL (2 %) injection, , , ,     lipase-protease-amylase 12,000-38,000-60,000 units per capsule 6 capsule, 6 capsule, Oral, TID, Neno Moran MD, 6 capsule at 03/14/23 2053    lisinopriL tablet 20 mg, 20 mg, Oral, Daily, Figueroa Daniels MD, 20 mg at 03/15/23 0806     "melatonin tablet 6 mg, 6 mg, Oral, Nightly PRN, Neno Moran MD, 6 mg at 03/12/23 0012    metoprolol tartrate (LOPRESSOR) tablet 25 mg, 25 mg, Oral, BID, Neno Moran MD, 25 mg at 03/15/23 0806    mupirocin 2 % ointment, , Nasal, BID, Neno Moran MD, Given at 03/15/23 0818    ondansetron injection 4 mg, 4 mg, Intravenous, Q4H PRN, Neno Moran MD, 4 mg at 03/12/23 0011    polyethylene glycol packet 17 g, 17 g, Oral, BID PRN, Neno Moran MD    prochlorperazine injection Soln 5 mg, 5 mg, Intravenous, Q6H PRN, Neno Moran MD    propofol (DIPRIVAN) 10 mg/mL IVP injection, , , ,     senna-docusate 8.6-50 mg per tablet 2 tablet, 2 tablet, Oral, BID PRN, Neno Moran MD    simethicone chewable tablet 80 mg, 1 tablet, Oral, QID PRN, Neno Moran MD    sodium bicarbonate tablet 1,300 mg, 1,300 mg, Oral, TID, Neno Moran MD, 1,300 mg at 03/15/23 0805    sodium chloride 0.9% flush 10 mL, 10 mL, Intravenous, PRN, Neno Moran MD    Flushing PICC Protocol, , , Until Discontinued **AND** sodium chloride 0.9% flush 10 mL, 10 mL, Intravenous, Q6H, 10 mL at 03/14/23 1827 **AND** sodium chloride 0.9% flush 10 mL, 10 mL, Intravenous, PRN, Figueroa Daniels MD    vitamin renal formula (B-complex-vitamin c-folic acid) 1 mg per capsule 1 capsule, 1 capsule, Oral, Daily, Neno Moran MD, 1 capsule at 03/15/23 0806        BP (!) 177/108   Pulse 81   Temp 98.2 °F (36.8 °C)   Resp 17   Ht 5' 5" (1.651 m)   Wt 126.1 kg (278 lb)   SpO2 97%   BMI 46.26 kg/m²     Physical Exam:    GEN: Awake, alert and appropriate.    HEENT: Atraumatic. EOMI, no icterus  NECK : No JVD  CARD : RRR s rub or gallop  LUNGS :  Decreased breath sounds  ABD : Soft,non-tender. BS active, obese.  :  urinary catheter draining yellow urine   EXT :  Patient with 3 to 4+ pitting edema up to his thighs bilaterally.  Some abdominal wall edema also noted.           Intake/Output Summary (Last 24 hours) at 3/15/2023 1405  Last data filed at 3/15/2023 1102  Gross " per 24 hour   Intake 480 ml   Output 4200 ml   Net -3720 ml           Laboratory:  Recent Results (from the past 24 hour(s))   POCT glucose    Collection Time: 03/14/23  4:04 PM   Result Value Ref Range    POCT Glucose 251 (H) 70 - 110 mg/dL   Protime-INR    Collection Time: 03/14/23 11:50 PM   Result Value Ref Range    PT 13.6 12.5 - 14.5 seconds    INR 1.05 0.00 - 1.30   POCT glucose    Collection Time: 03/15/23  4:26 AM   Result Value Ref Range    POCT Glucose 271 (H) 70 - 110 mg/dL   Basic Metabolic Panel    Collection Time: 03/15/23  5:50 AM   Result Value Ref Range    Sodium Level 143 136 - 145 mmol/L    Potassium Level 4.4 3.5 - 5.1 mmol/L    Chloride 116 (H) 98 - 107 mmol/L    Carbon Dioxide 19 (L) 22 - 29 mmol/L    Glucose Level 292 (H) 74 - 100 mg/dL    Blood Urea Nitrogen 32.9 (H) 8.9 - 20.6 mg/dL    Creatinine 1.88 (H) 0.73 - 1.18 mg/dL    BUN/Creatinine Ratio 18     Calcium Level Total 7.4 (L) 8.4 - 10.2 mg/dL    Anion Gap 8.0 mEq/L    eGFR 44 mls/min/1.73/m2   CBC with Differential    Collection Time: 03/15/23  5:50 AM   Result Value Ref Range    WBC 11.0 4.5 - 11.5 x10(3)/mcL    RBC 2.75 (L) 4.70 - 6.10 x10(6)/mcL    Hgb 8.7 (L) 14.0 - 18.0 g/dL    Hct 26.2 (L) 42.0 - 52.0 %    MCV 95.3 (H) 80.0 - 94.0 fL    MCH 31.6 pg    MCHC 33.2 33.0 - 36.0 g/dL    RDW 14.8 11.5 - 17.0 %    Platelet 355 130 - 400 x10(3)/mcL    MPV 11.6 (H) 7.4 - 10.4 fL    Neut % 60.2 %    Lymph % 28.6 %    Mono % 8.3 %    Eos % 1.8 %    Basophil % 0.6 %    Lymph # 3.14 0.6 - 4.6 x10(3)/mcL    Neut # 6.59 2.1 - 9.2 x10(3)/mcL    Mono # 0.91 0.1 - 1.3 x10(3)/mcL    Eos # 0.20 0 - 0.9 x10(3)/mcL    Baso # 0.07 0 - 0.2 x10(3)/mcL    IG# 0.05 (H) 0 - 0.04 x10(3)/mcL    IG% 0.5 %    NRBC% 0.0 %   POCT glucose    Collection Time: 03/15/23  9:53 AM   Result Value Ref Range    POCT Glucose 221 (H) 70 - 110 mg/dL         Assessment/Plan:   Advanced stage III CKD likely diabetic nephropathy   Poorly controlled hypertension and diabetes    Hepatic cirrhosis   UTI   Profound vitamin-D deficiency-94863 units ergo q 72  Secondary hyperparathyroidism    Continue present plan  Renal biopsy deferred per radiologist due to uncontrolled HTN  Increase Lisinopril to twice daily, Add HCTZ.   Repeat lab tomorrow

## 2023-03-15 NOTE — SEDATION DOCUMENTATION
Repeated blood pressure 190/130s, MD spoke to ordering physician and biopsy to be rescheduled for later date.   Nurse notified.

## 2023-03-16 LAB
ALBUMIN SERPL-MCNC: 1.7 G/DL (ref 3.5–5)
ALBUMIN/GLOB SERPL: 0.7 RATIO (ref 1.1–2)
ALP SERPL-CCNC: 125 UNIT/L (ref 40–150)
ALT SERPL-CCNC: 30 UNIT/L (ref 0–55)
AST SERPL-CCNC: 51 UNIT/L (ref 5–34)
BACTERIA BLD CULT: NORMAL
BACTERIA BLD CULT: NORMAL
BASOPHILS # BLD AUTO: 0.05 X10(3)/MCL (ref 0–0.2)
BASOPHILS NFR BLD AUTO: 0.5 %
BILIRUBIN DIRECT+TOT PNL SERPL-MCNC: 0.1 MG/DL
BUN SERPL-MCNC: 32.4 MG/DL (ref 8.9–20.6)
CALCIUM SERPL-MCNC: 7.2 MG/DL (ref 8.4–10.2)
CHLORIDE SERPL-SCNC: 111 MMOL/L (ref 98–107)
CO2 SERPL-SCNC: 21 MMOL/L (ref 22–29)
CREAT SERPL-MCNC: 1.79 MG/DL (ref 0.73–1.18)
EOSINOPHIL # BLD AUTO: 0.19 X10(3)/MCL (ref 0–0.9)
EOSINOPHIL NFR BLD AUTO: 1.8 %
ERYTHROCYTE [DISTWIDTH] IN BLOOD BY AUTOMATED COUNT: 14.5 % (ref 11.5–17)
GFR SERPLBLD CREATININE-BSD FMLA CKD-EPI: 47 MLS/MIN/1.73/M2
GLOBULIN SER-MCNC: 2.5 GM/DL (ref 2.4–3.5)
GLUCOSE SERPL-MCNC: 356 MG/DL (ref 74–100)
HCT VFR BLD AUTO: 25.5 % (ref 42–52)
HGB BLD-MCNC: 8.1 G/DL (ref 14–18)
IMM GRANULOCYTES # BLD AUTO: 0.04 X10(3)/MCL (ref 0–0.04)
IMM GRANULOCYTES NFR BLD AUTO: 0.4 %
LYMPHOCYTES # BLD AUTO: 3.03 X10(3)/MCL (ref 0.6–4.6)
LYMPHOCYTES NFR BLD AUTO: 28 %
MACROCYTES BLD QL SMEAR: ABNORMAL
MCH RBC QN AUTO: 30.5 PG
MCHC RBC AUTO-ENTMCNC: 31.8 G/DL (ref 33–36)
MCV RBC AUTO: 95.9 FL (ref 80–94)
MONOCYTES # BLD AUTO: 0.79 X10(3)/MCL (ref 0.1–1.3)
MONOCYTES NFR BLD AUTO: 7.3 %
NEUTROPHILS # BLD AUTO: 6.71 X10(3)/MCL (ref 2.1–9.2)
NEUTROPHILS NFR BLD AUTO: 62 %
NRBC BLD AUTO-RTO: 0 %
PLATELET # BLD AUTO: 238 X10(3)/MCL (ref 130–400)
PLATELET # BLD EST: ADEQUATE 10*3/UL
PMV BLD AUTO: 11.8 FL (ref 7.4–10.4)
POCT GLUCOSE: 237 MG/DL (ref 70–110)
POCT GLUCOSE: 327 MG/DL (ref 70–110)
POCT GLUCOSE: 381 MG/DL (ref 70–110)
POCT GLUCOSE: 387 MG/DL (ref 70–110)
POCT GLUCOSE: 471 MG/DL (ref 70–110)
POTASSIUM SERPL-SCNC: 4.1 MMOL/L (ref 3.5–5.1)
PROT SERPL-MCNC: 4.2 GM/DL (ref 6.4–8.3)
PSYCHE PATHOLOGY RESULT: NORMAL
RBC # BLD AUTO: 2.66 X10(6)/MCL (ref 4.7–6.1)
RBC MORPH BLD: ABNORMAL
SODIUM SERPL-SCNC: 139 MMOL/L (ref 136–145)
WBC # SPEC AUTO: 10.8 X10(3)/MCL (ref 4.5–11.5)

## 2023-03-16 PROCEDURE — 25000003 PHARM REV CODE 250: Performed by: INTERNAL MEDICINE

## 2023-03-16 PROCEDURE — P9047 ALBUMIN (HUMAN), 25%, 50ML: HCPCS | Mod: JZ,JG | Performed by: INTERNAL MEDICINE

## 2023-03-16 PROCEDURE — 63600175 PHARM REV CODE 636 W HCPCS: Performed by: INTERNAL MEDICINE

## 2023-03-16 PROCEDURE — 97162 PT EVAL MOD COMPLEX 30 MIN: CPT

## 2023-03-16 PROCEDURE — 27000207 HC ISOLATION

## 2023-03-16 PROCEDURE — 80053 COMPREHEN METABOLIC PANEL: CPT | Performed by: INTERNAL MEDICINE

## 2023-03-16 PROCEDURE — 25000003 PHARM REV CODE 250: Performed by: NURSE PRACTITIONER

## 2023-03-16 PROCEDURE — 25000003 PHARM REV CODE 250: Performed by: STUDENT IN AN ORGANIZED HEALTH CARE EDUCATION/TRAINING PROGRAM

## 2023-03-16 PROCEDURE — 63600175 PHARM REV CODE 636 W HCPCS: Mod: JZ,JG | Performed by: INTERNAL MEDICINE

## 2023-03-16 PROCEDURE — 85025 COMPLETE CBC W/AUTO DIFF WBC: CPT | Performed by: INTERNAL MEDICINE

## 2023-03-16 PROCEDURE — 21400001 HC TELEMETRY ROOM

## 2023-03-16 PROCEDURE — A4216 STERILE WATER/SALINE, 10 ML: HCPCS | Performed by: INTERNAL MEDICINE

## 2023-03-16 RX ORDER — MELOXICAM 7.5 MG/1
7.5 TABLET ORAL EVERY 6 HOURS PRN
Status: DISCONTINUED | OUTPATIENT
Start: 2023-03-16 | End: 2023-03-16

## 2023-03-16 RX ORDER — METOLAZONE 5 MG/1
10 TABLET ORAL DAILY
Status: DISCONTINUED | OUTPATIENT
Start: 2023-03-16 | End: 2023-03-24

## 2023-03-16 RX ORDER — KETOROLAC TROMETHAMINE 30 MG/ML
15 INJECTION, SOLUTION INTRAMUSCULAR; INTRAVENOUS ONCE
Status: COMPLETED | OUTPATIENT
Start: 2023-03-16 | End: 2023-03-16

## 2023-03-16 RX ORDER — FUROSEMIDE 10 MG/ML
80 INJECTION INTRAMUSCULAR; INTRAVENOUS EVERY 8 HOURS
Status: DISCONTINUED | OUTPATIENT
Start: 2023-03-16 | End: 2023-03-24

## 2023-03-16 RX ORDER — MELOXICAM 7.5 MG/1
7.5 TABLET ORAL DAILY
Status: DISCONTINUED | OUTPATIENT
Start: 2023-03-16 | End: 2023-03-17

## 2023-03-16 RX ADMIN — INSULIN ASPART 5 UNITS: 100 INJECTION, SOLUTION INTRAVENOUS; SUBCUTANEOUS at 10:03

## 2023-03-16 RX ADMIN — LEVETIRACETAM 1000 MG: 500 TABLET, FILM COATED ORAL at 09:03

## 2023-03-16 RX ADMIN — SODIUM CHLORIDE, PRESERVATIVE FREE 10 ML: 5 INJECTION INTRAVENOUS at 06:03

## 2023-03-16 RX ADMIN — HYDROCHLOROTHIAZIDE 12.5 MG: 12.5 TABLET ORAL at 08:03

## 2023-03-16 RX ADMIN — GABAPENTIN 400 MG: 100 CAPSULE ORAL at 09:03

## 2023-03-16 RX ADMIN — SODIUM BICARBONATE 1300 MG: 650 TABLET ORAL at 02:03

## 2023-03-16 RX ADMIN — INSULIN ASPART 14 UNITS: 100 INJECTION, SOLUTION INTRAVENOUS; SUBCUTANEOUS at 05:03

## 2023-03-16 RX ADMIN — HYDROCODONE BITARTRATE AND ACETAMINOPHEN 1 TABLET: 10; 325 TABLET ORAL at 12:03

## 2023-03-16 RX ADMIN — LISINOPRIL 20 MG: 10 TABLET ORAL at 08:03

## 2023-03-16 RX ADMIN — AMLODIPINE BESYLATE 10 MG: 5 TABLET ORAL at 08:03

## 2023-03-16 RX ADMIN — INSULIN DETEMIR 45 UNITS: 100 INJECTION, SOLUTION SUBCUTANEOUS at 10:03

## 2023-03-16 RX ADMIN — PANCRELIPASE 6 CAPSULE: 60000; 12000; 38000 CAPSULE, DELAYED RELEASE PELLETS ORAL at 02:03

## 2023-03-16 RX ADMIN — MUPIROCIN: 20 OINTMENT TOPICAL at 09:03

## 2023-03-16 RX ADMIN — PANCRELIPASE 6 CAPSULE: 60000; 12000; 38000 CAPSULE, DELAYED RELEASE PELLETS ORAL at 09:03

## 2023-03-16 RX ADMIN — GABAPENTIN 400 MG: 100 CAPSULE ORAL at 08:03

## 2023-03-16 RX ADMIN — HYDROCODONE BITARTRATE AND ACETAMINOPHEN 1 TABLET: 10; 325 TABLET ORAL at 05:03

## 2023-03-16 RX ADMIN — ALBUMIN (HUMAN) 25 G: 5 SOLUTION INTRAVENOUS at 08:03

## 2023-03-16 RX ADMIN — INSULIN ASPART 4 UNITS: 100 INJECTION, SOLUTION INTRAVENOUS; SUBCUTANEOUS at 12:03

## 2023-03-16 RX ADMIN — METOPROLOL TARTRATE 25 MG: 25 TABLET, FILM COATED ORAL at 08:03

## 2023-03-16 RX ADMIN — SODIUM BICARBONATE 1300 MG: 650 TABLET ORAL at 08:03

## 2023-03-16 RX ADMIN — HYDROCODONE BITARTRATE AND ACETAMINOPHEN 1 TABLET: 10; 325 TABLET ORAL at 09:03

## 2023-03-16 RX ADMIN — HYDROCODONE BITARTRATE AND ACETAMINOPHEN 1 TABLET: 10; 325 TABLET ORAL at 08:03

## 2023-03-16 RX ADMIN — MELOXICAM 7.5 MG: 7.5 TABLET ORAL at 02:03

## 2023-03-16 RX ADMIN — SODIUM BICARBONATE 1300 MG: 650 TABLET ORAL at 09:03

## 2023-03-16 RX ADMIN — LISINOPRIL 20 MG: 10 TABLET ORAL at 09:03

## 2023-03-16 RX ADMIN — PANCRELIPASE 6 CAPSULE: 60000; 12000; 38000 CAPSULE, DELAYED RELEASE PELLETS ORAL at 08:03

## 2023-03-16 RX ADMIN — METOPROLOL TARTRATE 25 MG: 25 TABLET, FILM COATED ORAL at 09:03

## 2023-03-16 RX ADMIN — SODIUM CHLORIDE, PRESERVATIVE FREE 10 ML: 5 INJECTION INTRAVENOUS at 12:03

## 2023-03-16 RX ADMIN — METOLAZONE 10 MG: 5 TABLET ORAL at 10:03

## 2023-03-16 RX ADMIN — NEPHROCAP 1 CAPSULE: 1 CAP ORAL at 08:03

## 2023-03-16 RX ADMIN — ENOXAPARIN SODIUM 40 MG: 40 INJECTION SUBCUTANEOUS at 05:03

## 2023-03-16 RX ADMIN — CEFTRIAXONE 2 G: 2 INJECTION, POWDER, FOR SOLUTION INTRAMUSCULAR; INTRAVENOUS at 01:03

## 2023-03-16 RX ADMIN — FUROSEMIDE 80 MG: 10 INJECTION, SOLUTION INTRAMUSCULAR; INTRAVENOUS at 02:03

## 2023-03-16 RX ADMIN — MUPIROCIN: 20 OINTMENT TOPICAL at 08:03

## 2023-03-16 RX ADMIN — KETOROLAC TROMETHAMINE 15 MG: 30 INJECTION, SOLUTION INTRAMUSCULAR; INTRAVENOUS at 10:03

## 2023-03-16 RX ADMIN — INSULIN ASPART 14 UNITS: 100 INJECTION, SOLUTION INTRAVENOUS; SUBCUTANEOUS at 07:03

## 2023-03-16 RX ADMIN — DOXAZOSIN 2 MG: 1 TABLET ORAL at 09:03

## 2023-03-16 RX ADMIN — FUROSEMIDE 80 MG: 10 INJECTION, SOLUTION INTRAMUSCULAR; INTRAVENOUS at 09:03

## 2023-03-16 RX ADMIN — LEVETIRACETAM 1000 MG: 500 TABLET, FILM COATED ORAL at 08:03

## 2023-03-16 NOTE — PROGRESS NOTES
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     Present to the ED with complaint of diffuse body swelling specially abdomen, groin, penis and testicles and bilateral lower extremities, dyspnea on minimal exertion and bilateral lower extremity pain and cramping.  Report subjective fever and chills.  Report he was just hospitalized for similar symptom at [facility] about 2 weeks ago and was discharged home after few days.     On arrival to ED, he was tachycardic, hypertensive, saturating 100% on room air.  EKG appears sinus rhythm on my review without ischemic changes.  Labs notable for WBC 16.3, hemoglobin 11.4, platelets 346, sodium 139, potassium 5.0, chloride 116, CO2 16, creatinine 2.17, BUN 34, glucose 420, calcium 8.0, albumin 1.3, , negative troponin.     CT abdomen and pelvis with contrast show left pleural effusion, hepatic cirrhotic morphology, splenomegaly and upper abdominal varices and ascites, chronic pancreatitis changes, extensive subcutaneous anasarca edema.  Kidneys unremarkable without hydronephrosis.     Per ED provider exam he was noted to have purulent penile discharge, sent for culture and Segovia catheter placed.  He was given albumin 12.5 g, Lasix 40 mg IV, vancomycin and Zosyn and subsequently referred to hospital medicine service for further evaluation and management.     Urinated about 700 mL since the Lasix 40.  We added urine protein creatinine ratio which came about 10.9.    Patient was continued on iv lasix. Seen by Renal team and recommended iv lasix drip and renal biopsy. He was also seen by GI team for evaluation of cirrhosis and EGD  was done to r/o EV.      Interval Hx:   Patient today awake and alert today. No new issues. Making good urine. Denies any fever or chills. EGD planned today. Biopsy with IR was planned later for today.      Objective/physical exam:  General: In no acute distress, Obese   Chest: Clear to auscultation bilaterally  Heart: RRR, +S1, S2, no appreciable murmur  Abdomen: + distension, nontender, BS +  MSK:+ pitting edema and thickening of skin from abdomen to foot  + Penile and scrotal edema   Neurologic: Alert and oriented x4, Cranial nerve II-XII intact, Strength 5/5 in all 4 extremities    VITAL SIGNS: 24 HRS MIN & MAX LAST   Temp  Min: 97.2 °F (36.2 °C)  Max: 98.6 °F (37 °C) 98.6 °F (37 °C)   BP  Min: 118/87  Max: 177/108 (!) 171/96   Pulse  Min: 70  Max: 86  84   Resp  Min: 11  Max: 20 20     SpO2  Min: 97 %  Max: 100 % 100 %       Recent Labs   Lab 03/13/23  0710 03/14/23  0344 03/15/23  0550   WBC 10.3 11.3 11.0   RBC 2.84* 2.97* 2.75*   HGB 8.7* 9.1* 8.7*   HCT 27.2* 28.6* 26.2*   MCV 95.8* 96.3* 95.3*   MCH 30.6 30.6 31.6   MCHC 32.0* 31.8* 33.2   RDW 15.0 15.0 14.8    334 355   MPV 11.4* 11.5* 11.6*       Recent Labs   Lab 03/11/23  1625 03/12/23  0617 03/13/23  0710 03/14/23  0344 03/15/23  0550    142 138 143 143   K 5.0 4.3 4.2 4.2 4.4   CO2 16* 17* 17* 18* 19*   BUN 34.6* 34.6* 32.8* 31.1* 32.9*   CREATININE 2.17* 2.07* 1.93* 1.97* 1.88*   CALCIUM 8.0* 8.5 7.5* 7.6* 7.4*   MG  --  1.30* 1.50*  --   --    ALBUMIN 1.3* 2.3* 1.6*  --   --    ALKPHOS 155* 156* 109  --   --    ALT 37 38 22  --   --    AST 29 27 19  --   --    BILITOT 0.3 0.3 0.2  --   --           Microbiology Results (last 7 days)       Procedure Component Value Units Date/Time    Blood Culture #1 **CANNOT BE ORDERED STAT** [[id number]]  (Normal) Collected: 03/11/23 1848    Order Status: Completed Specimen: Blood Updated: 03/15/23 2100     CULTURE, BLOOD ([facility]) No Growth At 96 Hours    Blood Culture #2 **CANNOT BE ORDERED STAT**  [[id number]]  (Normal) Collected: 03/11/23 1848    Order Status: Completed Specimen: Blood Updated: 03/15/23 2100     CULTURE, BLOOD ([facility]) No Growth At 96 Hours    Wound Culture [[id number]]  (Abnormal)  (Susceptibility) Collected: 03/11/23 1835    Order Status: Completed Specimen: Drainage from Urethral Updated: 03/15/23 1128     Wound Culture Many Pluralibacter gergoviae      Many Methicillin resistant Staphylococcus aureus    Urine culture [[id number]]  (Abnormal)  (Susceptibility) Collected: 03/11/23 2005    Order Status: Completed Specimen: Urine Updated: 03/13/23 0749     Urine Culture >/= 100,000 colonies/ml Pluralibacter gergoviae    Chlamydia/GC, PCR [[id number]]  (Normal) Collected: 03/12/23 0618    Order Status: Completed Specimen: Urine Updated: 03/12/23 0901     Chlamydia trachomatis PCR Not Detected     N. gonorrhea PCR Not Detected    Narrative:      The Xpert CT/NG test, performed on the GeneXpert system is a qualitative in vitro real-time polymerase chain reaction (PCR) test for the automated detected and differentiation for genomic DNA from Chlamydia trachomatis (CT) and/or Neisseria gonorrhoeae (NG).             See below for Radiology    Scheduled Med:   albumin human 25%  25 g Intravenous Daily    amLODIPine  10 mg Oral Daily    cefTRIAXone (ROCEPHIN) IVPB  2 g Intravenous Q24H    doxazosin  2 mg Oral QHS    enoxaparin  40 mg Subcutaneous Daily    ergocalciferol  50,000 Units Oral Q3 Days    gabapentin  400 mg Oral BID    hydroCHLOROthiazide  12.5 mg Oral Daily    insulin aspart U-100  14 Units Subcutaneous TIDWM    insulin detemir U-100  45 Units Subcutaneous QHS    levETIRAcetam  1,000 mg Oral BID    LIDOcaine (PF) 20 mg/mL (2%)        lipase-protease-amylase 12,000-38,000-60,000 units  6 capsule Oral TID    lisinopriL  20 mg Oral BID    metoprolol tartrate  25 mg Oral BID    mupirocin   Nasal BID    propofol        sodium bicarbonate  1,300 mg Oral TID    sodium chloride 0.9%  10 mL Intravenous  Q6H    vitamin renal formula (B-complex-vitamin c-folic acid)  1 capsule Oral Daily        Continuous Infusions:   furosemide (LASIX) 2 mg/mL continuous infusion (non-titrating) 12 mg/hr (03/14/23 2209)        PRN Meds:  acetaminophen, acetaminophen, aluminum-magnesium hydroxide-simethicone, cloNIDine, dextrose 10%, dextrose 10%, dextrose, dextrose, glucagon (human recombinant), hydrALAZINE, HYDROcodone-acetaminophen, HYDROcodone-acetaminophen, insulin aspart U-100, labetalol, melatonin, ondansetron, polyethylene glycol, prochlorperazine, senna-docusate 8.6-50 mg, simethicone, sodium chloride 0.9%, Flushing PICC Protocol **AND** sodium chloride 0.9% **AND** sodium chloride 0.9%       Assessment/Plan:  Sepsis secondary to UTI/Urethritis + Staph and Pluralibacter gergoviae    Anasarca  FABIO superimposed on CKD2  Nephrotic syndrome/ proteinuria 10.9 g  Liver cirrhosis  -alcoholic vs GARCIA  Hyperglycemia-T2DM  Hypertension, benign   Vitamin-D deficiency  Chronic anemia  Hypomagnesemia   Adriel LE weakness      HX Seizure, chronic pancreatitis    Plan:  Patient having good diuresis. Has been afebrile  S/P EGD, F/U on results when available  Not sure if IR will be doing his renal biopsy today. There was some concern about his elevated BP. Will f/u the nurse   Cont IV lasix/albumin drip per renal team   Will closely monitor patients daily weight, urine out put, renal parameters and volume status    Blood sugars is uncontrolled. Will cont levemir and aspart and adjust insulin dose to target glucose of <180  BP is now much better. Cont Norvasc and Metoprolol   Started on lisinopril per renal recommendations   Cont IV rocephin day 4/5 for urethritis and UTI   Reviewed today's labs and  WBC 11, Hb 8.7, Plt 355, Na 143, K 4.4, BUN 32, Crt 1.88  H&H is now stable.     Encouraged to participate with PT  Cont supportive care   Avoid NSAIDs     Labs in am       Critical care note:  Critical care diagnosis: Anasarca/Volume overload  needing iv lasix drip   Critical care interventions: Hands-on evaluation, review of labs/radiographs/records and discussion with patient and family if present  Critical care time spent: 35 minutes       VTE prophylaxis: Lovenox     Patient condition:  Guarded    Anticipated discharge and Disposition:         All diagnosis and differential diagnosis have been reviewed; assessment and plan has been documented; I have personally reviewed the labs and test results that are presently available; I have reviewed the patients medication list; I have reviewed the consulting providers response and recommendations. I have reviewed or attempted to review medical records based upon their availability    All of the patient's questions have been  addressed and answered. Patient's is agreeable to the above stated plan. I will continue to monitor closely and make adjustments to medical management as needed.  _____________________________________________________________________    Nutrition Status:    Radiology:  US Scrotum And Testicles  Narrative: EXAMINATION:  US SCROTUM AND TESTICLES    CLINICAL HISTORY:  acute scrotal pain and edema;    COMPARISON:  No priors    FINDINGS:  Real-time exam with grayscale, color, and spectral imaging of the scrotum was performed.    The right testicle measures 3.0 x 2.7 x 2.3 cm and the left measures 3.4 x 2.5 x 2.2 cm. Color-flow and arterial waveforms are present within both testicles. There are no findings of torsion. The testicles are homogeneous in echotexture without intratesticular mass.    There are moderate bilateral hydroceles.  There is prominent scrotal wall thickening/edema.  Impression: 1. Negative for testicular torsion and solid mass.  2. Moderate bilateral hydroceles.  3. Prominent scrotal wall thickening/edema.    Electronically signed by: Esvin Trejo  Date:    03/14/2023  Time:    11:54      Figueroa Daniels MD   03/15/2023

## 2023-03-16 NOTE — PT/OT/SLP PROGRESS
Occupational Therapy   Treatment    Name: Kamran Huerta  MRN: [id number]    OT eval order received, and chart review completed. OT attempted to see pt for eval. However, pt refused to participate in bed mobility tasks 2/2 pain and edema of scrotum. RN notified. OT will attempt again tomorrow.

## 2023-03-16 NOTE — PROGRESS NOTES
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     Presented to the ED with complaints of diffuse body swelling specially abdomen, groin, penis and testicles and bilateral lower extremities, dyspnea on minimal exertion and bilateral lower extremity pain and cramping.  Report subjective fever and chills.  Report he was just hospitalized for similar symptom at [facility] about 2 weeks ago and was discharged home after few days.     On arrival to ED, he was tachycardic, hypertensive, saturating 100% on room air.  EKG appears sinus rhythm on my review without ischemic changes.  Labs notable for WBC 16.3, hemoglobin 11.4, platelets 346, sodium 139, potassium 5.0, chloride 116, CO2 16, creatinine 2.17, BUN 34, glucose 420, calcium 8.0, albumin 1.3, , negative troponin.     CT abdomen and pelvis with contrast show left pleural effusion, hepatic cirrhotic morphology, splenomegaly and upper abdominal varices and ascites, chronic pancreatitis changes, extensive subcutaneous anasarca edema.  Kidneys unremarkable without hydronephrosis.     Per ED provider exam he was noted to have purulent penile discharge, sent for culture and Segovia catheter placed.  He was given albumin 12.5 g, Lasix 40 mg IV, Vancomycin and Zosyn and subsequently referred to hospital medicine service for further evaluation and management.     Urinated about 700 mL since the Lasix 40.  We added urine protein creatinine ratio which came about 10.9.    Patient was continued on IV Lasix. Seen by Renal team and recommended IV Lasix/Albumin drip and renal biopsy. He was also seen by GI team for evaluation of cirrhosis and EGD  was done to r/o EV. EGD showed erosive gastropathy, normal esophagus & no other evidence of cirrhosis.     Interval Hx:   Today, Mr Huerta complained of persistent swelling and pain in  his B/L LE & scrotum. Nephrology changed IV Lasix/Albumin drip to IV Lasix 80 mg TID with tentative plan for hemodialysis given patient's diuretic resistance. IR to plan for renal biopsy which could not be performed yesterday due to uncontrolled HTN.    Objective/physical exam:  General: In no acute distress, Obese   Chest: Clear to auscultation bilaterally  Heart: RRR, +S1, S2, no appreciable murmur  Abdomen: + distension, nontender, BS +  MSK: 2+ pitting edema and thickening of skin from abdomen to foot; + penile and scrotal edema   Neurologic: Alert and oriented x4, Cranial nerve II-XII intact, Strength 5/5 in all 4 extremities    VITAL SIGNS: 24 HRS MIN & MAX LAST   Temp  Min: 97.9 °F (36.6 °C)  Max: 98.2 °F (36.8 °C) 98.2 °F (36.8 °C)   BP  Min: 124/76  Max: 150/80 (!) 143/81   Pulse  Min: 78  Max: 86  79   Resp  Min: 13  Max: 20 18     SpO2  Min: 98 %  Max: 100 % 98 %       Recent Labs   Lab 03/14/23  0344 03/15/23  0550 03/16/23  0418   WBC 11.3 11.0 10.8   RBC 2.97* 2.75* 2.66*   HGB 9.1* 8.7* 8.1*   HCT 28.6* 26.2* 25.5*   MCV 96.3* 95.3* 95.9*   MCH 30.6 31.6 30.5   MCHC 31.8* 33.2 31.8*   RDW 15.0 14.8 14.5    355 238   MPV 11.5* 11.6* 11.8*         Recent Labs   Lab 03/12/23  0617 03/13/23  0710 03/14/23  0344 03/15/23  0550 03/16/23  0418    138 143 143 139   K 4.3 4.2 4.2 4.4 4.1   CO2 17* 17* 18* 19* 21*   BUN 34.6* 32.8* 31.1* 32.9* 32.4*   CREATININE 2.07* 1.93* 1.97* 1.88* 1.79*   CALCIUM 8.5 7.5* 7.6* 7.4* 7.2*   MG 1.30* 1.50*  --   --   --    ALBUMIN 2.3* 1.6*  --   --  1.7*   ALKPHOS 156* 109  --   --  125   ALT 38 22  --   --  30   AST 27 19  --   --  51*   BILITOT 0.3 0.2  --   --  0.1       Microbiology Results (last 7 days)       Procedure Component Value Units Date/Time    Blood Culture #1  **CANNOT BE ORDERED STAT** [[id number]]  (Normal) Collected: 03/11/23 1848    Order Status: Completed Specimen: Blood Updated: 03/15/23 2100     CULTURE, BLOOD ([facility]) No Growth At 96 Hours    Blood Culture #2 **CANNOT BE ORDERED STAT** [[id number]]  (Normal) Collected: 03/11/23 1848    Order Status: Completed Specimen: Blood Updated: 03/15/23 2100     CULTURE, BLOOD ([facility]) No Growth At 96 Hours    Wound Culture [[id number]]  (Abnormal)  (Susceptibility) Collected: 03/11/23 1835    Order Status: Completed Specimen: Drainage from Urethral Updated: 03/15/23 1128     Wound Culture Many Pluralibacter gergoviae      Many Methicillin resistant Staphylococcus aureus    Urine culture [[id number]]  (Abnormal)  (Susceptibility) Collected: 03/11/23 2005    Order Status: Completed Specimen: Urine Updated: 03/13/23 0749     Urine Culture >/= 100,000 colonies/ml Pluralibacter gergoviae    Chlamydia/GC, PCR [[id number]]  (Normal) Collected: 03/12/23 0618    Order Status: Completed Specimen: Urine Updated: 03/12/23 0901     Chlamydia trachomatis PCR Not Detected     N. gonorrhea PCR Not Detected    Narrative:      The Xpert CT/NG test, performed on the GeneXpert system is a qualitative in vitro real-time polymerase chain reaction (PCR) test for the automated detected and differentiation for genomic DNA from Chlamydia trachomatis (CT) and/or Neisseria gonorrhoeae (NG).           Scheduled Med:   albumin human 25%  25 g Intravenous Daily    amLODIPine  10 mg Oral Daily    doxazosin  2 mg Oral QHS    enoxaparin  40 mg Subcutaneous Daily    ergocalciferol  50,000 Units Oral Q3 Days    furosemide (LASIX) injection  80 mg Intravenous Q8H    gabapentin  400 mg Oral BID    insulin aspart U-100  14 Units Subcutaneous TIDWM    insulin detemir U-100  45 Units Subcutaneous QHS    levETIRAcetam  1,000 mg Oral BID    lipase-protease-amylase 12,000-38,000-60,000 units  6 capsule Oral TID    lisinopriL  20 mg Oral BID    meloxicam  7.5 mg Oral Daily     metOLazone  10 mg Oral Daily    metoprolol tartrate  25 mg Oral BID    mupirocin   Nasal BID    sodium bicarbonate  1,300 mg Oral TID    sodium chloride 0.9%  10 mL Intravenous Q6H    vitamin renal formula (B-complex-vitamin c-folic acid)  1 capsule Oral Daily     PRN Meds:  acetaminophen, acetaminophen, aluminum-magnesium hydroxide-simethicone, cloNIDine, dextrose 10%, dextrose 10%, dextrose, dextrose, glucagon (human recombinant), hydrALAZINE, HYDROcodone-acetaminophen, HYDROcodone-acetaminophen, insulin aspart U-100, labetalol, melatonin, ondansetron, polyethylene glycol, prochlorperazine, senna-docusate 8.6-50 mg, simethicone, sodium chloride 0.9%, Flushing PICC Protocol **AND** sodium chloride 0.9% **AND** sodium chloride 0.9%     Assessment/Plan:  Sepsis 2/2 UTI/Urethritis + Staph and Pluralibacter gergoviae    Anasarca 2/2 possible Diabetic Nephropathy  FABIO superimposed on CKD2  Nephrotic Syndrome/Proteinuria 10.9 g  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  T2DM  Hypertension  Vitamin-D deficiency  Chronic Anemia   B/L LE Weakness   Seizure  Chronic Pancreatitis    Plan:  Patient having good diuresis. Has been afebrile  Nephrology on-board; following recommendations  IR consulted for renal biopsy; will F/U recommendations  Continued on IV Lasix 80 mg TID & Metolazone 10 mg daily with tentative plan for HD  Will closely monitor patients daily weight, urine out put, renal parameters and volume status    Blood sugars is uncontrolled. Will cont levemir 45 units and aspart 14 units TID; adjust insulin dose to target glucose of <180  Continuing Keppra 1000 mg BID, Metoprolol Tartrate 25 mg BID  BP is now much better. Cont Norvasc and Metoprolol   Started on lisinopril per renal recommendations   Completing 5 days of IV Rocephin today for urethritis and UTI   Encouraged to participate with PT  Cont supportive care   Avoid NSAIDs    VTE prophylaxis: Lovenox     Patient condition:  Guarded    Anticipated discharge and  Disposition:     Pending    All diagnosis and differential diagnosis have been reviewed; assessment and plan has been documented; I have personally reviewed the labs and test results that are presently available; I have reviewed the patients medication list; I have reviewed the consulting providers response and recommendations. I have reviewed or attempted to review medical records based upon their availability    All of the patient's questions have been  addressed and answered. Patient's is agreeable to the above stated plan. I will continue to monitor closely and make adjustments to medical management as needed.  _____________________________________________________________________    Nutrition Status: Diabetic    Radiology:  US Scrotum And Testicles  Narrative: EXAMINATION:  US SCROTUM AND TESTICLES    CLINICAL HISTORY:  acute scrotal pain and edema;    COMPARISON:  No priors    FINDINGS:  Real-time exam with grayscale, color, and spectral imaging of the scrotum was performed.    The right testicle measures 3.0 x 2.7 x 2.3 cm and the left measures 3.4 x 2.5 x 2.2 cm. Color-flow and arterial waveforms are present within both testicles. There are no findings of torsion. The testicles are homogeneous in echotexture without intratesticular mass.    There are moderate bilateral hydroceles.  There is prominent scrotal wall thickening/edema.  Impression: 1. Negative for testicular torsion and solid mass.  2. Moderate bilateral hydroceles.  3. Prominent scrotal wall thickening/edema.    Electronically signed by: Esvin Trejo  Date:    03/14/2023  Time:    11:54      Marcos Saldana MD   03/16/2023

## 2023-03-16 NOTE — PLAN OF CARE
Problem: Physical Therapy  Goal: Physical Therapy Goal  Description: Goals to be met by: 23     Patient will increase functional independence with mobility by performin. Supine to sit with Stand-by Assistance  2. Sit to stand transfer with Supervision  3. Bed to chair transfer with Supervision using Rolling Walker  4. Gait  x 50 feet with Min A using Rolling Walker.   5. Ascend/descend 4 stair with right Handrails Minimal Assistance using Rolling Walker.     Outcome: Ongoing, Progressing

## 2023-03-16 NOTE — PROGRESS NOTES
Inpatient Nutrition Evaluation    Admit Date: 3/11/2023   Total duration of encounter: 5 days    Nutrition Recommendation/Prescription     Continue on current diet.  Monitor I&Os, appetite, PO intake, and labs.    Nutrition Assessment     Chart Review    Reason Seen: continuous nutrition monitoring dx-Uncontrolled Hyperglycemia    Malnutrition Screening Tool Results   Have you recently lost weight without trying?: No  Have you been eating poorly because of a decreased appetite?: No   MST Score: 0     Diagnosis:  Advanced stage III CKD likely diabetic nephropathy   Poorly controlled hypertension and diabetes   Hepatic cirrhosis   UTI   Profound vitamin-D deficiency-01671 units ergo q 72  Secondary hyperparathyroidism    Relevant Medical History:   Poorly controlled T2DM  Diabetic neuropathy  CKD stage 2-3b ( Cr 1.47 in 11/2022)  Hypertension  Seizure disorder  Chronic back pain/DDD/lumbar radiculopathy  Obesity  History of alcoholic liver disease  Pancreatic insufficiency/chronic pancreatitis    Nutrition-Related Medications: Albumin, ergocalciferol, furosemide, insulin aspart 14 Units, insulin detemir 45 Units, Lipase-protease-amylase, Sodium bicarbonate, Vitamin Renal formula    Nutrition-Related Labs:  3/16/2023: H/H 8.1/25.5, Cl 111, BUN 32.4, Crea 1.79, Ca 7.2, Alb 1.7, AST 51, Gluc 237    Diet Order: Diet diabetic  Oral Supplement Order: none  Appetite/Oral Intake: good/% of meals  Factors Affecting Nutritional Intake: none identified  Food/Church/Cultural Preferences: none reported  Food Allergies: none reported    Skin Integrity: cracked  Wound(s):       Comments    3/16/2023: Pt reports good appetite/ PO intake prior to admit. Pt reports good appetite/PO intake currently. No reported N/V/D/C and chewing/swallowing difficulties. Pt reports UBW as 95.45 kg. Pt reports gaining 50+ lbs over the past several months due to fluid. Encouraged continuation of Diabetic diet. Will  "monitor.    Anthropometrics    Height: 5' 5" (165.1 cm) Height Method: Stated  Last Weight:  (bed off) (23 0500) Weight Method: Stated  BMI (Calculated): 46.3  BMI Classification: obese grade III (BMI >/=40)        Ideal Body Weight (IBW), Male: 136 lb     % Ideal Body Weight, Male (lb): 204.41 %                 Usual Body Weight (UBW), k.45 kg  % Usual Body Weight: 132.39     Usual Weight Provided By: patient    Wt Readings from Last 3 Encounters:   03/15/23 0945 126.1 kg (278 lb)   23 0700 126.1 kg (278 lb)   23 0426 121.9 kg (268 lb 11.9 oz)   23 1552 117.5 kg (259 lb)   22 1400 87 kg (191 lb 12.8 oz)   22 1345 92.1 kg (203 lb)   21 1414 93.8 kg (206 lb 12.7 oz)   21 1234 77.4 kg (170 lb 10.2 oz)      Weight Change(s) Since Admission:  Admit Weight: 117.5 kg (259 lb) (23 1552)  3/15/2023: 126.1 kg    Patient Education    Not applicable.    Monitoring & Evaluation     Dietitian will monitor food and beverage intake, weight, electrolyte/renal panel, glucose/endocrine profile, and gastrointestinal profile.  Nutrition Risk/Follow-Up: low (follow-up in 5-7 days)  Patients assigned 'low nutrition risk' status do not qualify for a full nutritional assessment but will be monitored and re-evaluated in a 5-7 day time period. Please consult if re-evaluation needed sooner.    "

## 2023-03-16 NOTE — PROVATION PATIENT INSTRUCTIONS
Discharge Summary/Instructions after an Endoscopic Procedure  Patient Name: Kamran Huerta  Patient MRN: [id number]  Patient YOB: 1975  Wednesday, March 15, 2023  Tj Faith MD  Dear patient,  As a result of recent federal legislation (The Federal Cures Act), you may   receive lab or pathology results from your procedure in your [facility]   account before your physician is able to contact you. Your physician or   their representative will relay the results to you with their   recommendations at their soonest availability.  Thank you,  RESTRICTIONS:  During your procedure today, you received medications for sedation.  These   medications may affect your judgment, balance and coordination.  Therefore,   for 24 hours, you have the following restrictions:   - DO NOT drive a car, operate machinery, make legal/financial decisions,   sign important papers or drink alcohol.    ACTIVITY:  Today: no heavy lifting, straining or running due to procedural   sedation/anesthesia.  The following day: return to full activity including work.  DIET:  Eat and drink normally unless instructed otherwise.     TREATMENT FOR COMMON SIDE EFFECTS:  - Mild abdominal pain, nausea, belching, bloating or excessive gas:  rest,   eat lightly and use a heating pad.  - Sore Throat: treat with throat lozenges and/or gargle with warm salt   water.  - Because air was used during the procedure, expelling large amounts of air   from your rectum or belching is normal.  - If a bowel prep was taken, you may not have a bowel movement for 1-3 days.    This is normal.  SYMPTOMS TO WATCH FOR AND REPORT TO YOUR PHYSICIAN:  1. Abdominal pain or bloating, other than gas cramps.  2. Chest pain.  3. Back pain.  4. Signs of infection such as: chills or fever occurring within 24 hours   after the procedure.  5. Rectal bleeding, which would show as bright red, maroon, or black stools.   (A tablespoon of blood from the rectum is not serious, especially  if   hemorrhoids are present.)  6. Vomiting.  7. Weakness or dizziness.  GO DIRECTLY TO THE NEAREST EMERGENCY ROOM IF YOU HAVE ANY OF THE FOLLOWING:      Difficulty breathing              Chills and/or fever over 101 F   Persistent vomiting and/or vomiting blood   Severe abdominal pain   Severe chest pain   Black, tarry stools   Bleeding- more than one tablespoon   Any other symptom or condition that you feel may need urgent attention  Your doctor recommends these additional instructions:  If any biopsies were taken, your doctors clinic will contact you in 1 to 2   weeks with any results.  - [facility] patient to [facility] for [facility] care.   - Resume previous diet today.   - Continue present medications.   - Observe patient's clinical course.   - Await pathology results.   - No further GI recs at this time; no evidence of Cirrhosis on EGD  For questions, problems or results please call your physician - Tj Faith MD at Work:  [phone].  [facility], EMERGENCY ROOM PHONE NUMBER: [phone]  IF A COMPLICATION OR EMERGENCY SITUATION ARISES AND YOU ARE UNABLE TO REACH   YOUR PHYSICIAN - GO DIRECTLY TO THE EMERGENCY ROOM.  Tj Faith MD  3/15/2023 12:03:50 PM  This report has been verified and signed electronically.  Dear patient,  As a result of recent federal legislation (The Federal Cures Act), you may   receive lab or pathology results from your procedure in your [facility]   account before your physician is able to contact you. Your physician or   their representative will relay the results to you with their   recommendations at their soonest availability.  Thank you,  PROVATION

## 2023-03-16 NOTE — PT/OT/SLP EVAL
Physical Therapy Evaluation    Patient Name:  Kamran Huerta   MRN:  [id number]    Recommendations:     Discharge Recommendations: home with home health, rehabilitation facility   Discharge Equipment Recommendations: other (see comments) (TBD)   Barriers to discharge: Inaccessible home and decline from baseline    Assessment:     Kamran Huerta is a 48 y.o. male admitted with a medical diagnosis of Sepsis, SOB, acute UTI, cellulitis of scrotum, peripheral edema, DM2, hypoalbuminemia. Pt reports residual L-sided weakness from recent CVA.  He presents with the following impairments/functional limitations: weakness, impaired endurance, impaired functional mobility, decreased lower extremity function, pain, edema . Pt with severe activity limitations today due to pain related to widespread edema. Unable to tolerate sitting EOB or attempts at standing/transfers on IE.     Rehab Prognosis: Fair; patient would benefit from acute skilled PT services to address these deficits and reach maximum level of function.    Recent Surgery: Procedure(s) (LRB):  EGD (N/A)  EGD, WITH CLOSED BIOPSY 1 Day Post-Op    Plan:     During this hospitalization, patient to be seen 6 x/week to address the identified rehab impairments via gait training, therapeutic activities, therapeutic exercises, neuromuscular re-education and progress toward the following goals:    Plan of Care Expires:  04/16/23    Subjective     Chief Complaint: headache and pain in scrotum  Patient/Family Comments/goals: pain management  Pain/Comfort:  Pain Rating 1: 10/10  Location - Orientation 1: generalized  Location 1: scrotum  Pain Addressed 1: Nurse notified    Patients cultural, spiritual, Voodoo conflicts given the current situation: no    Living Environment:  Pt lives in a [facility] with his brother, 5 CAROLYNN at front entrance and 4 CAROLYNN with 1 HR in rear entrance. Pt uses 4-step entry more often. Reports needing increased amount of assistance with ADLs and stair  negotiation from bother and has been reliant on his RW for mobility.   Prior to admission, patients level of function was required assist.  Equipment used at home: walker, rolling, cane, straight, shower chair.  DME owned (not currently used): none.  Upon discharge, patient will have assistance from brother.    Objective:     Communicated with RN prior to session.  Patient found supine with escobar catheter, peripheral IV, telemetry  upon PT entry to room.    General Precautions: Standard, fall, contact  Orthopedic Precautions:    Braces:    Respiratory Status: Room air    Exams:  Cognitive Exam:  Patient is oriented to Person, Place, Time, and Situation  Skin Integrity/Edema:      -       Edema: Severe scrotum, abdomen  RLE Strength: unable to tolerate MMT, moves BLE through limited range in gravity eliminated position  LLE Strength: see above^    Functional Mobility:  Bed Mobility:     Supine to Sit: maximal assistance      AM-PAC 6 CLICK MOBILITY  Total Score:9       Treatment & Education:  - Edu on importance of mobility in acute setting for recovery. Edu on safety precautions, call light use.     Patient left HOB elevated with all lines intact, call button in reach, and RN notified.    GOALS:   Multidisciplinary Problems       Physical Therapy Goals          Problem: Physical Therapy    Goal Priority Disciplines Outcome Goal Variances Interventions   Physical Therapy Goal     PT, PT/OT Ongoing, Progressing     Description: Goals to be met by: 23     Patient will increase functional independence with mobility by performin. Supine to sit with Stand-by Assistance  2. Sit to stand transfer with Supervision  3. Bed to chair transfer with Supervision using Rolling Walker  4. Gait  x 50 feet with Min A using Rolling Walker.   5. Ascend/descend 4 stair with right Handrails Minimal Assistance using Rolling Walker.                          History:     Past Medical History:   Diagnosis Date    CHF (congestive  heart failure)     Chronic back pain     CVA (cerebral vascular accident) 01/2023    DM (diabetes mellitus)     HTN (hypertension)     Seizures        Past Surgical History:   Procedure Laterality Date    BACK SURGERY      EGD, WITH CLOSED BIOPSY  3/15/2023    Procedure: EGD, WITH CLOSED BIOPSY;  Surgeon: Tj Faith MD;  Location: [facility] ENDOSCOPY;  Service: Gastroenterology;;    ESOPHAGOGASTRODUODENOSCOPY N/A 3/15/2023    Procedure: EGD;  Surgeon: Tj Faith MD;  Location: [facility] ENDOSCOPY;  Service: Gastroenterology;  Laterality: N/A;       Time Tracking:     PT Received On: 03/16/23  PT Start Time: 0941     PT Stop Time: 1005  PT Total Time (min): 24 min     Billable Minutes: Evaluation 24 03/16/2023

## 2023-03-16 NOTE — PROGRESS NOTES
NEPHROLOGY: Progress   48-year-old a.m. with history of poorly controlled type 2 diabetes, obesity, creatinine of 1.47 in November of 2022 with a GFR at that time of approximately 60 however , it was also as low as 33 at that time.  He was being followed by Dr. Alonso in [facility].  He has a history of poorly controlled hypertension as well, seizure disorder and alcoholic liver disease with chronic pancreatitis.  Over the past several months the patient has had increasing volume retention, worsening anasarca and increased immobility due to the edema.  He has reportedly gained 65 lb over the past several months.    Patient with high-grade proteinuria over 10 g likely related to diabetic nephropathy but percutaneous biopsy is planned for today.    Currently receiving 12 mg furosemide per hour.                Current Facility-Administered Medications:     acetaminophen tablet 1,000 mg, 1,000 mg, Oral, Q6H PRN, Neno Moran MD    acetaminophen tablet 650 mg, 650 mg, Oral, Q4H PRN, Neno Moran MD    albumin human 25% bottle 25 g, 25 g, Intravenous, Daily, Figueroa Daniels MD, Last Rate: 100 mL/hr at 03/16/23 0841, 25 g at 03/16/23 0841    aluminum-magnesium hydroxide-simethicone 200-200-20 mg/5 mL suspension 30 mL, 30 mL, Oral, QID PRN, Neno Moran MD    amLODIPine tablet 10 mg, 10 mg, Oral, Daily, Neno Moran MD, 10 mg at 03/16/23 0840    cloNIDine tablet 0.2 mg, 0.2 mg, Oral, TID PRN, Neno Moran MD    dextrose 10% bolus 125 mL 125 mL, 12.5 g, Intravenous, PRN, Neno Moran MD    dextrose 10% bolus 250 mL 250 mL, 25 g, Intravenous, PRN, Neno Moran MD    dextrose 40 % gel 15,000 mg, 15 g, Oral, PRN, Neno Moran MD    dextrose 40 % gel 30,000 mg, 30 g, Oral, PRN, Neno Moran MD    doxazosin tablet 2 mg, 2 mg, Oral, QHS, Jaqueline Avina, AGNP, 2 mg at 03/15/23 2113    enoxaparin  injection 40 mg, 40 mg, Subcutaneous, Daily, Neno Moran MD, 40 mg at 03/15/23 1656    ergocalciferol capsule 50,000 Units, 50,000 Units, Oral, Q3 Days, Neno Moran MD, 50,000 Units at 03/12/23 0830    furosemide (LASIX) 200 mg in dextrose 5 % (D5W) SolP 100 mL continuous infusion (conc: 2 mg/mL), 12 mg/hr, Intravenous, Continuous, Benjamín Sharp MD, Last Rate: 6 mL/hr at 03/15/23 2334, 12 mg/hr at 03/15/23 2334    gabapentin capsule 400 mg, 400 mg, Oral, BID, Neno Moran MD, 400 mg at 03/16/23 0841    glucagon (human recombinant) injection 1 mg, 1 mg, Intramuscular, PRN, Neno Moran MD    hydrALAZINE injection 10 mg, 10 mg, Intravenous, Q4H PRN, Figueroa Daniels MD, 10 mg at 03/12/23 1224    hydroCHLOROthiazide tablet 12.5 mg, 12.5 mg, Oral, Daily, Jaqueline Avina, AGNP, 12.5 mg at 03/16/23 0840    HYDROcodone-acetaminophen  mg per tablet 1 tablet, 1 tablet, Oral, Q4H PRN, Figueroa Daniels MD, 1 tablet at 03/16/23 0840    HYDROcodone-acetaminophen 5-325 mg per tablet 1 tablet, 1 tablet, Oral, Q6H PRN, Neno Moran MD    insulin aspart U-100 injection 1-10 Units, 1-10 Units, Subcutaneous, QID (AC + HS) PRN, Neno Moran MD, 2 Units at 03/15/23 1400    insulin aspart U-100 injection 14 Units, 14 Units, Subcutaneous, TIDWM, Figueroa Daniels MD, 14 Units at 03/16/23 0721    insulin detemir U-100 injection 45 Units, 45 Units, Subcutaneous, QHS, Figueroa Daniels MD, 45 Units at 03/15/23 2114    labetaloL injection 10 mg, 10 mg, Intravenous, Q4H PRN, Neno Moran MD    levETIRAcetam tablet 1,000 mg, 1,000 mg, Oral, BID, Neno Moran MD, 1,000 mg at 03/16/23 0840    lipase-protease-amylase 12,000-38,000-60,000 units per capsule 6 capsule, 6 capsule, Oral, TID, Neno Moran MD, 6 capsule at 03/16/23 0841    lisinopriL tablet 20 mg, 20 mg, Oral, BID, Jaqueline Avina, AGNP, 20 mg at 03/16/23 0841    melatonin tablet 6 mg, 6 mg, Oral, Nightly PRN, Neno Moran MD, 6 mg at 03/12/23 0012     "metoprolol tartrate (LOPRESSOR) tablet 25 mg, 25 mg, Oral, BID, Neno Moran MD, 25 mg at 03/16/23 0841    mupirocin 2 % ointment, , Nasal, BID, Neno Moran MD, Given at 03/16/23 0844    ondansetron injection 4 mg, 4 mg, Intravenous, Q4H PRN, Neno Moran MD, 4 mg at 03/12/23 0011    polyethylene glycol packet 17 g, 17 g, Oral, BID PRN, Neno Moran MD    prochlorperazine injection Soln 5 mg, 5 mg, Intravenous, Q6H PRN, Neno Moran MD    senna-docusate 8.6-50 mg per tablet 2 tablet, 2 tablet, Oral, BID PRN, Neno Moran MD    simethicone chewable tablet 80 mg, 1 tablet, Oral, QID PRN, Neno Moran MD    sodium bicarbonate tablet 1,300 mg, 1,300 mg, Oral, TID, Neno Moran MD, 1,300 mg at 03/16/23 0840    sodium chloride 0.9% flush 10 mL, 10 mL, Intravenous, PRN, Neno Moran MD    Flushing PICC Protocol, , , Until Discontinued **AND** sodium chloride 0.9% flush 10 mL, 10 mL, Intravenous, Q6H, 10 mL at 03/15/23 1818 **AND** sodium chloride 0.9% flush 10 mL, 10 mL, Intravenous, PRN, Figueroa Daniels MD    vitamin renal formula (B-complex-vitamin c-folic acid) 1 mg per capsule 1 capsule, 1 capsule, Oral, Daily, Neno Moran MD, 1 capsule at 03/16/23 0841        BP (!) 148/75   Pulse 83   Temp 98.1 °F (36.7 °C) (Oral)   Resp 18   Ht 5' 5" (1.651 m)   Wt 126.1 kg (278 lb)   SpO2 100%   BMI 46.26 kg/m²     Physical Exam:    GEN: Awake, alert and appropriate.  No distress.  Complaining of discomfort in the scrotum area.   HEENT: Atraumatic. EOMI, no icterus  NECK : No JVD  CARD : RRR s rub or gallop  LUNGS :  Decreased breath sounds at the bases  ABD : Soft,non-tender. BS active, obese.  :  Scrotum is still swollen but improved.  EXT :  Patient with 3 to 4+ pitting edema up to his thighs bilaterally.  Some abdominal wall edema also noted.           Intake/Output Summary (Last 24 hours) at 3/16/2023 0914  Last data filed at 3/16/2023 0500  Gross per 24 hour   Intake 720 ml   Output 3400 ml   Net -2680 ml "         Laboratory:  Recent Results (from the past 24 hour(s))   POCT glucose    Collection Time: 03/15/23  9:53 AM   Result Value Ref Range    POCT Glucose 221 (H) 70 - 110 mg/dL   POCT glucose    Collection Time: 03/15/23  1:51 PM   Result Value Ref Range    POCT Glucose 200 (H) 70 - 110 mg/dL   POCT glucose    Collection Time: 03/15/23  9:12 PM   Result Value Ref Range    POCT Glucose 321 (H) 70 - 110 mg/dL   POCT glucose    Collection Time: 03/16/23  3:40 AM   Result Value Ref Range    POCT Glucose 387 (H) 70 - 110 mg/dL   Comprehensive Metabolic Panel    Collection Time: 03/16/23  4:18 AM   Result Value Ref Range    Sodium Level 139 136 - 145 mmol/L    Potassium Level 4.1 3.5 - 5.1 mmol/L    Chloride 111 (H) 98 - 107 mmol/L    Carbon Dioxide 21 (L) 22 - 29 mmol/L    Glucose Level 356 (H) 74 - 100 mg/dL    Blood Urea Nitrogen 32.4 (H) 8.9 - 20.6 mg/dL    Creatinine 1.79 (H) 0.73 - 1.18 mg/dL    Calcium Level Total 7.2 (L) 8.4 - 10.2 mg/dL    Protein Total 4.2 (L) 6.4 - 8.3 gm/dL    Albumin Level 1.7 (L) 3.5 - 5.0 g/dL    Globulin 2.5 2.4 - 3.5 gm/dL    Albumin/Globulin Ratio 0.7 (L) 1.1 - 2.0 ratio    Bilirubin Total 0.1 <=1.5 mg/dL    Alkaline Phosphatase 125 40 - 150 unit/L    Alanine Aminotransferase 30 0 - 55 unit/L    Aspartate Aminotransferase 51 (H) 5 - 34 unit/L    eGFR 47 mls/min/1.73/m2   CBC with Differential    Collection Time: 03/16/23  4:18 AM   Result Value Ref Range    WBC 10.8 4.5 - 11.5 x10(3)/mcL    RBC 2.66 (L) 4.70 - 6.10 x10(6)/mcL    Hgb 8.1 (L) 14.0 - 18.0 g/dL    Hct 25.5 (L) 42.0 - 52.0 %    MCV 95.9 (H) 80.0 - 94.0 fL    MCH 30.5 pg    MCHC 31.8 (L) 33.0 - 36.0 g/dL    RDW 14.5 11.5 - 17.0 %    Platelet 238 130 - 400 x10(3)/mcL    MPV 11.8 (H) 7.4 - 10.4 fL    Neut % 62.0 %    Lymph % 28.0 %    Mono % 7.3 %    Eos % 1.8 %    Basophil % 0.5 %    Lymph # 3.03 0.6 - 4.6 x10(3)/mcL    Neut # 6.71 2.1 - 9.2 x10(3)/mcL    Mono # 0.79 0.1 - 1.3 x10(3)/mcL    Eos # 0.19 0 - 0.9 x10(3)/mcL     Baso # 0.05 0 - 0.2 x10(3)/mcL    IG# 0.04 0 - 0.04 x10(3)/mcL    IG% 0.4 %    NRBC% 0.0 %   Blood Smear Microscopic Exam    Collection Time: 03/16/23  4:18 AM   Result Value Ref Range    RBC Morph Abnormal (A) Normal    Macrocyte 1+ (A) (none)    Platelet Est Adequate Normal, Adequate         Assessment/Plan:   Advanced stage III CKD likely diabetic nephropathy   Poorly controlled hypertension and diabetes   Hepatic cirrhosis   UTI   Profound vitamin-D deficiency-05310 units ergo q 72  Secondary hyperparathyroidism    Percutaneous biopsy had to be postponed due to poorly controlled hypertension.  He is currently receiving high-dose intravenous furosemide on an hourly rate of 12 mg.  I discussed with the patient his severe diuretic resistance and the possible need for renal replacement therapy at least on a temporary basis until the extracellular in extravascular volume could be mobilized.  I am going to change his Lasix to 120 mg boluses every 8 hours and if diuresis is not improved we will pursue dialysis in the next 24 hours.  We need daily weights.        Benjamín Sharp MD, LUKE

## 2023-03-17 LAB
ALBUMIN SERPL-MCNC: 1.8 G/DL (ref 3.5–5)
ALBUMIN/GLOB SERPL: 0.6 RATIO (ref 1.1–2)
ALP SERPL-CCNC: 131 UNIT/L (ref 40–150)
ALT SERPL-CCNC: 39 UNIT/L (ref 0–55)
AST SERPL-CCNC: 49 UNIT/L (ref 5–34)
BASOPHILS # BLD AUTO: 0.05 X10(3)/MCL (ref 0–0.2)
BASOPHILS NFR BLD AUTO: 0.5 %
BILIRUBIN DIRECT+TOT PNL SERPL-MCNC: 0.2 MG/DL
BUN SERPL-MCNC: 34 MG/DL (ref 8.9–20.6)
CALCIUM SERPL-MCNC: 7.5 MG/DL (ref 8.4–10.2)
CHLORIDE SERPL-SCNC: 109 MMOL/L (ref 98–107)
CO2 SERPL-SCNC: 20 MMOL/L (ref 22–29)
CREAT SERPL-MCNC: 1.68 MG/DL (ref 0.73–1.18)
EOSINOPHIL # BLD AUTO: 0.21 X10(3)/MCL (ref 0–0.9)
EOSINOPHIL NFR BLD AUTO: 2 %
ERYTHROCYTE [DISTWIDTH] IN BLOOD BY AUTOMATED COUNT: 14 % (ref 11.5–17)
GFR SERPLBLD CREATININE-BSD FMLA CKD-EPI: 50 MLS/MIN/1.73/M2
GLOBULIN SER-MCNC: 2.9 GM/DL (ref 2.4–3.5)
GLUCOSE SERPL-MCNC: 264 MG/DL (ref 74–100)
HCT VFR BLD AUTO: 26.5 % (ref 42–52)
HGB BLD-MCNC: 8.4 G/DL (ref 14–18)
IMM GRANULOCYTES # BLD AUTO: 0.03 X10(3)/MCL (ref 0–0.04)
IMM GRANULOCYTES NFR BLD AUTO: 0.3 %
LYMPHOCYTES # BLD AUTO: 2.77 X10(3)/MCL (ref 0.6–4.6)
LYMPHOCYTES NFR BLD AUTO: 26.5 %
MCH RBC QN AUTO: 30.3 PG
MCHC RBC AUTO-ENTMCNC: 31.7 G/DL (ref 33–36)
MCV RBC AUTO: 95.7 FL (ref 80–94)
MITOCHONDRIA M2 AB SER IA-ACNC: NORMAL
MONOCYTES # BLD AUTO: 0.87 X10(3)/MCL (ref 0.1–1.3)
MONOCYTES NFR BLD AUTO: 8.3 %
NEUTROPHILS # BLD AUTO: 6.54 X10(3)/MCL (ref 2.1–9.2)
NEUTROPHILS NFR BLD AUTO: 62.4 %
NRBC BLD AUTO-RTO: 0 %
PLATELET # BLD AUTO: 263 X10(3)/MCL (ref 130–400)
PMV BLD AUTO: 11.3 FL (ref 7.4–10.4)
POCT GLUCOSE: 160 MG/DL (ref 70–110)
POCT GLUCOSE: 184 MG/DL (ref 70–110)
POCT GLUCOSE: 189 MG/DL (ref 70–110)
POCT GLUCOSE: 265 MG/DL (ref 70–110)
POCT GLUCOSE: 280 MG/DL (ref 70–110)
POTASSIUM SERPL-SCNC: 3.8 MMOL/L (ref 3.5–5.1)
PROT SERPL-MCNC: 4.7 GM/DL (ref 6.4–8.3)
RBC # BLD AUTO: 2.77 X10(6)/MCL (ref 4.7–6.1)
SODIUM SERPL-SCNC: 139 MMOL/L (ref 136–145)
WBC # SPEC AUTO: 10.5 X10(3)/MCL (ref 4.5–11.5)

## 2023-03-17 PROCEDURE — 80053 COMPREHEN METABOLIC PANEL: CPT | Performed by: INTERNAL MEDICINE

## 2023-03-17 PROCEDURE — 27000207 HC ISOLATION

## 2023-03-17 PROCEDURE — 25000003 PHARM REV CODE 250: Performed by: STUDENT IN AN ORGANIZED HEALTH CARE EDUCATION/TRAINING PROGRAM

## 2023-03-17 PROCEDURE — 63600175 PHARM REV CODE 636 W HCPCS: Performed by: INTERNAL MEDICINE

## 2023-03-17 PROCEDURE — 25000003 PHARM REV CODE 250: Performed by: INTERNAL MEDICINE

## 2023-03-17 PROCEDURE — 21400001 HC TELEMETRY ROOM

## 2023-03-17 PROCEDURE — 25000003 PHARM REV CODE 250: Performed by: NURSE PRACTITIONER

## 2023-03-17 PROCEDURE — 97530 THERAPEUTIC ACTIVITIES: CPT

## 2023-03-17 PROCEDURE — A4216 STERILE WATER/SALINE, 10 ML: HCPCS | Performed by: INTERNAL MEDICINE

## 2023-03-17 PROCEDURE — P9047 ALBUMIN (HUMAN), 25%, 50ML: HCPCS | Mod: JZ,JG | Performed by: INTERNAL MEDICINE

## 2023-03-17 PROCEDURE — 97166 OT EVAL MOD COMPLEX 45 MIN: CPT

## 2023-03-17 PROCEDURE — 85025 COMPLETE CBC W/AUTO DIFF WBC: CPT | Performed by: INTERNAL MEDICINE

## 2023-03-17 RX ORDER — NIFEDIPINE 90 MG/1
90 TABLET, EXTENDED RELEASE ORAL DAILY
Status: DISCONTINUED | OUTPATIENT
Start: 2023-03-17 | End: 2023-04-01

## 2023-03-17 RX ORDER — OXYCODONE AND ACETAMINOPHEN 5; 325 MG/1; MG/1
1 TABLET ORAL EVERY 4 HOURS PRN
Status: DISCONTINUED | OUTPATIENT
Start: 2023-03-17 | End: 2023-03-21

## 2023-03-17 RX ORDER — OXYCODONE AND ACETAMINOPHEN 5; 325 MG/1; MG/1
1 TABLET ORAL EVERY 6 HOURS PRN
Status: DISCONTINUED | OUTPATIENT
Start: 2023-03-17 | End: 2023-03-17

## 2023-03-17 RX ADMIN — METOLAZONE 10 MG: 5 TABLET ORAL at 08:03

## 2023-03-17 RX ADMIN — HYDROCODONE BITARTRATE AND ACETAMINOPHEN 1 TABLET: 10; 325 TABLET ORAL at 08:03

## 2023-03-17 RX ADMIN — NIFEDIPINE 90 MG: 90 TABLET, FILM COATED, EXTENDED RELEASE ORAL at 09:03

## 2023-03-17 RX ADMIN — GABAPENTIN 400 MG: 100 CAPSULE ORAL at 09:03

## 2023-03-17 RX ADMIN — METOPROLOL TARTRATE 25 MG: 25 TABLET, FILM COATED ORAL at 09:03

## 2023-03-17 RX ADMIN — SODIUM BICARBONATE 1300 MG: 650 TABLET ORAL at 08:03

## 2023-03-17 RX ADMIN — OXYCODONE HYDROCHLORIDE AND ACETAMINOPHEN 1 TABLET: 5; 325 TABLET ORAL at 09:03

## 2023-03-17 RX ADMIN — LISINOPRIL 20 MG: 10 TABLET ORAL at 09:03

## 2023-03-17 RX ADMIN — PANCRELIPASE 6 CAPSULE: 60000; 12000; 38000 CAPSULE, DELAYED RELEASE PELLETS ORAL at 08:03

## 2023-03-17 RX ADMIN — INSULIN ASPART 14 UNITS: 100 INJECTION, SOLUTION INTRAVENOUS; SUBCUTANEOUS at 08:03

## 2023-03-17 RX ADMIN — INSULIN ASPART 14 UNITS: 100 INJECTION, SOLUTION INTRAVENOUS; SUBCUTANEOUS at 11:03

## 2023-03-17 RX ADMIN — AMLODIPINE BESYLATE 10 MG: 5 TABLET ORAL at 08:03

## 2023-03-17 RX ADMIN — MELOXICAM 7.5 MG: 7.5 TABLET ORAL at 08:03

## 2023-03-17 RX ADMIN — SODIUM BICARBONATE 1300 MG: 650 TABLET ORAL at 03:03

## 2023-03-17 RX ADMIN — ENOXAPARIN SODIUM 40 MG: 40 INJECTION SUBCUTANEOUS at 04:03

## 2023-03-17 RX ADMIN — LISINOPRIL 20 MG: 10 TABLET ORAL at 08:03

## 2023-03-17 RX ADMIN — PANCRELIPASE 6 CAPSULE: 60000; 12000; 38000 CAPSULE, DELAYED RELEASE PELLETS ORAL at 09:03

## 2023-03-17 RX ADMIN — SODIUM BICARBONATE 1300 MG: 650 TABLET ORAL at 09:03

## 2023-03-17 RX ADMIN — FUROSEMIDE 80 MG: 10 INJECTION, SOLUTION INTRAMUSCULAR; INTRAVENOUS at 05:03

## 2023-03-17 RX ADMIN — LEVETIRACETAM 1000 MG: 500 TABLET, FILM COATED ORAL at 08:03

## 2023-03-17 RX ADMIN — NEPHROCAP 1 CAPSULE: 1 CAP ORAL at 08:03

## 2023-03-17 RX ADMIN — PANCRELIPASE 6 CAPSULE: 60000; 12000; 38000 CAPSULE, DELAYED RELEASE PELLETS ORAL at 03:03

## 2023-03-17 RX ADMIN — OXYCODONE HYDROCHLORIDE AND ACETAMINOPHEN 1 TABLET: 5; 325 TABLET ORAL at 11:03

## 2023-03-17 RX ADMIN — GABAPENTIN 400 MG: 100 CAPSULE ORAL at 08:03

## 2023-03-17 RX ADMIN — SODIUM CHLORIDE, PRESERVATIVE FREE 10 ML: 5 INJECTION INTRAVENOUS at 05:03

## 2023-03-17 RX ADMIN — SODIUM CHLORIDE, PRESERVATIVE FREE 10 ML: 5 INJECTION INTRAVENOUS at 12:03

## 2023-03-17 RX ADMIN — INSULIN ASPART 14 UNITS: 100 INJECTION, SOLUTION INTRAVENOUS; SUBCUTANEOUS at 04:03

## 2023-03-17 RX ADMIN — FUROSEMIDE 80 MG: 10 INJECTION, SOLUTION INTRAMUSCULAR; INTRAVENOUS at 03:03

## 2023-03-17 RX ADMIN — HYDROCODONE BITARTRATE AND ACETAMINOPHEN 1 TABLET: 10; 325 TABLET ORAL at 02:03

## 2023-03-17 RX ADMIN — FUROSEMIDE 80 MG: 10 INJECTION, SOLUTION INTRAMUSCULAR; INTRAVENOUS at 09:03

## 2023-03-17 RX ADMIN — METOPROLOL TARTRATE 25 MG: 25 TABLET, FILM COATED ORAL at 08:03

## 2023-03-17 RX ADMIN — SODIUM CHLORIDE, PRESERVATIVE FREE 10 ML: 5 INJECTION INTRAVENOUS at 11:03

## 2023-03-17 RX ADMIN — DOXAZOSIN 2 MG: 1 TABLET ORAL at 09:03

## 2023-03-17 RX ADMIN — ALBUMIN (HUMAN) 25 G: 5 SOLUTION INTRAVENOUS at 09:03

## 2023-03-17 RX ADMIN — LEVETIRACETAM 1000 MG: 500 TABLET, FILM COATED ORAL at 09:03

## 2023-03-17 RX ADMIN — OXYCODONE HYDROCHLORIDE AND ACETAMINOPHEN 1 TABLET: 5; 325 TABLET ORAL at 03:03

## 2023-03-17 NOTE — PT/OT/SLP PROGRESS
Physical Therapy Treatment    Patient Name:  Kamran Huerta   MRN:  [id number]    Recommendations:     Discharge Recommendations: rehabilitation facility  Discharge Equipment Recommendations: other (see comments) (TBD)  Barriers to discharge: Inaccessible home and decline from baseline    Assessment:     Pt seen in 2 sessions today. Able to tolerate multiple sit to stand t/f, standing balance, and stand pivot t/f to recliner today with maxAx2. Tolerated sitting up in chair for ~1.5 hours. Significant improvement though appears severely deconditioned from reported baseline. Most limited by widespread edema and residual L-sided weakness from prior CVA.    Rehab Prognosis: Good; patient would benefit from acute skilled PT services to address these deficits and reach maximum level of function.    Recent Surgery: Procedure(s) (LRB):  EGD (N/A)  EGD, WITH CLOSED BIOPSY 2 Days Post-Op    Plan:     During this hospitalization, patient to be seen 6 x/week to address the identified rehab impairments via gait training, therapeutic activities, therapeutic exercises, neuromuscular re-education and progress toward the following goals:    Plan of Care Expires:  04/16/23    Subjective     Chief Complaint: scrotum discomfort 2/2 swelling  Patient/Family Comments/goals: improve pain and get stronger  Pain/Comfort:  Pain Rating 1: 10/10  Location - Orientation 1: generalized  Location 1: scrotum  Pain Addressed 1: Reposition      Objective:     Communicated with RN prior to session.  Patient found sitting edge of bed with peripheral IV, escobar catheter, telemetry upon PT entry to room.     General Precautions: Standard, fall  Orthopedic Precautions:    Braces:    Respiratory Status: Room air     Functional Mobility:  Bed Mobility:     Supine to Sit: maximal assistance and of 2 persons  Sit to Supine: maximal assistance and of 2 persons  Transfers:     Sit to Stand:  maximal assistance and of 2 persons with rolling walker  Bed to  "Chair: maximal assistance and of 2 persons with  rolling walker  using  Stand Pivot  Gait: unable  Balance: modAx1-2, mild trunk sway upon initial stand  Stairs:  Pt ascended/descended 2" curb step with Rolling Walker with bilateral handrails with Maximum Assistance and 2 person assist.       AM-PAC 6 CLICK MOBILITY  Turning over in bed (including adjusting bedclothes, sheets and blankets)?: 3  Sitting down on and standing up from a chair with arms (e.g., wheelchair, bedside commode, etc.): 2  Moving from lying on back to sitting on the side of the bed?: 2  Moving to and from a bed to a chair (including a wheelchair)?: 2  Need to walk in hospital room?: 1  Climbing 3-5 steps with a railing?: 1  Basic Mobility Total Score: 11       Treatment & Education:  Pt seen in 2 sessions  AM:  - Co-tx with OT. STS with modAx2 to RW with TC to maintain neutral hip alignment due to tendency to ER. PT provided modA for standing balance while OT donned scrotum sling in standing. MaxAx2 to negotiate onto scale for nsg to weigh patient and change out bed. MaxAx2 stand pivot t/f to recliner, poor eccentric control. MaxAx2 STS to place pillow under pt.  PM:  - PT only. Pt tolerated sitting UIC x1.5 hrs. MaxAx2 STS and stand pivot t/f to bed. MaxAx2 for sit>sup for trunk and LE management. Increased edema present throughout abdomen, scrotum, and BLEs after sitting up in chair.     VSS throughout sessions.    Patient left HOB elevated with all lines intact, call button in reach, and RN present..    GOALS:   Multidisciplinary Problems       Physical Therapy Goals          Problem: Physical Therapy    Goal Priority Disciplines Outcome Goal Variances Interventions   Physical Therapy Goal     PT, PT/OT Ongoing, Progressing     Description: Goals to be met by: 23     Patient will increase functional independence with mobility by performin. Supine to sit with Stand-by Assistance  2. Sit to stand transfer with Supervision  3. Bed to " chair transfer with Supervision using Rolling Walker  4. Gait  x 50 feet with Min A using Rolling Walker.   5. Ascend/descend 4 stair with right Handrails Minimal Assistance using Rolling Walker.                          Time Tracking:     PT Received On: 03/17/23  PT Start Time: 1130     PT Stop Time: 1155  PT Total Time (min): 25 min     PM: 7565-2038 = 21 min    Billable Minutes: Therapeutic Activity 46 min    Treatment Type: Treatment  PT/PTA: PT     Number of PTA visits since last PT visit: 1 03/17/2023

## 2023-03-17 NOTE — PROGRESS NOTES
NEPHROLOGY: Progress   48-year-old a.m. with history of poorly controlled type 2 diabetes, obesity, creatinine of 1.47 in November of 2022 with a GFR at that time of approximately 60 however , it was also as low as 33 at that time.  He was being followed by Dr. Alonso in [facility].  He has a history of poorly controlled hypertension as well, seizure disorder and alcoholic liver disease with chronic pancreatitis.  Over the past several months the patient has had increasing volume retention, worsening anasarca and increased immobility due to the edema.  He has reportedly gained 65 lb over the past several months.    Patient with high-grade proteinuria over 10 g likely related to diabetic nephropathy but percutaneous biopsy is planned for today.    Patient is on furosemide 80 mg every 8 hours and metolazone 10 mg yesterday and this morning.  He has urinated 5 L in 24 hours but anasarca persists.    Unsure if patient is watching his fluid intake.  We will put an order in for 1500 cc limit.                Current Facility-Administered Medications:     acetaminophen tablet 1,000 mg, 1,000 mg, Oral, Q6H PRN, Neno Moran MD    acetaminophen tablet 650 mg, 650 mg, Oral, Q4H PRN, Neno Moran MD    albumin human 25% bottle 25 g, 25 g, Intravenous, Daily, Figueroa Daniels MD, Last Rate: 100 mL/hr at 03/17/23 0900, 25 g at 03/17/23 0900    aluminum-magnesium hydroxide-simethicone 200-200-20 mg/5 mL suspension 30 mL, 30 mL, Oral, QID PRN, Neno Moran MD    amLODIPine tablet 10 mg, 10 mg, Oral, Daily, Neno Moran MD, 10 mg at 03/17/23 0806    cloNIDine tablet 0.2 mg, 0.2 mg, Oral, TID PRN, Neno Moran MD    dextrose 10% bolus 125 mL 125 mL, 12.5 g, Intravenous, PRN, Neno Moran MD    dextrose 10% bolus 250 mL 250 mL, 25 g, Intravenous, PRN, Neno Moran MD    dextrose 40 % gel 15,000 mg, 15 g,  Oral, PRN, Neno Moran MD    dextrose 40 % gel 30,000 mg, 30 g, Oral, PRN, Neno Moran MD    doxazosin tablet 2 mg, 2 mg, Oral, QHS, Jaqueline Avina, AGNP, 2 mg at 03/16/23 2148    enoxaparin injection 40 mg, 40 mg, Subcutaneous, Daily, Neno Moran MD, 40 mg at 03/16/23 1744    ergocalciferol capsule 50,000 Units, 50,000 Units, Oral, Q3 Days, Neno Moran MD, 50,000 Units at 03/12/23 0830    furosemide injection 80 mg, 80 mg, Intravenous, Q8H, Benjamín Sharp MD, 80 mg at 03/17/23 0516    gabapentin capsule 400 mg, 400 mg, Oral, BID, Neno Moran MD, 400 mg at 03/17/23 0806    glucagon (human recombinant) injection 1 mg, 1 mg, Intramuscular, PRN, Neno Moran MD    hydrALAZINE injection 10 mg, 10 mg, Intravenous, Q4H PRN, Figueroa Daniels MD, 10 mg at 03/12/23 1224    HYDROcodone-acetaminophen  mg per tablet 1 tablet, 1 tablet, Oral, Q4H PRN, Figueroa Daniels MD, 1 tablet at 03/17/23 0806    HYDROcodone-acetaminophen 5-325 mg per tablet 1 tablet, 1 tablet, Oral, Q6H PRN, Neno Moran MD    insulin aspart U-100 injection 1-10 Units, 1-10 Units, Subcutaneous, QID (AC + HS) PRN, Neno Moran MD, 5 Units at 03/16/23 2202    insulin aspart U-100 injection 14 Units, 14 Units, Subcutaneous, TIDWM, Figueroa Daniels MD, 14 Units at 03/17/23 0810    insulin detemir U-100 injection 45 Units, 45 Units, Subcutaneous, QHS, Figueroa Daniels MD, 45 Units at 03/16/23 2200    labetaloL injection 10 mg, 10 mg, Intravenous, Q4H PRN, Neno Moran MD    levETIRAcetam tablet 1,000 mg, 1,000 mg, Oral, BID, Neno Moran MD, 1,000 mg at 03/17/23 0806    lipase-protease-amylase 12,000-38,000-60,000 units per capsule 6 capsule, 6 capsule, Oral, TID, Neno Moran MD, 6 capsule at 03/17/23 0805    lisinopriL tablet 20 mg, 20 mg, Oral, BID, Jaqueline Avina, AGNP, 20 mg at 03/17/23 0806    melatonin tablet 6 mg, 6 mg, Oral, Nightly PRN, Neno Moran MD, 6 mg at 03/12/23 0012    meloxicam tablet 7.5 mg, 7.5 mg, Oral,  "Daily, Marcos Saldana MD, 7.5 mg at 03/17/23 0806    metOLazone tablet 10 mg, 10 mg, Oral, Daily, Benjamín Sharp MD, 10 mg at 03/17/23 0805    metoprolol tartrate (LOPRESSOR) tablet 25 mg, 25 mg, Oral, BID, Neno Moran MD, 25 mg at 03/17/23 0806    ondansetron injection 4 mg, 4 mg, Intravenous, Q4H PRN, Neno Moran MD, 4 mg at 03/12/23 0011    polyethylene glycol packet 17 g, 17 g, Oral, BID PRN, Neno Moran MD    prochlorperazine injection Soln 5 mg, 5 mg, Intravenous, Q6H PRN, Neno Moran MD    senna-docusate 8.6-50 mg per tablet 2 tablet, 2 tablet, Oral, BID PRN, Neno Moran MD    simethicone chewable tablet 80 mg, 1 tablet, Oral, QID PRN, Neno Moran MD    sodium bicarbonate tablet 1,300 mg, 1,300 mg, Oral, TID, Neno Moran MD, 1,300 mg at 03/17/23 0806    sodium chloride 0.9% flush 10 mL, 10 mL, Intravenous, PRN, Neno Moran MD    Flushing PICC Protocol, , , Until Discontinued **AND** sodium chloride 0.9% flush 10 mL, 10 mL, Intravenous, Q6H, 10 mL at 03/17/23 0516 **AND** sodium chloride 0.9% flush 10 mL, 10 mL, Intravenous, PRN, Figueroa Daniels MD    vitamin renal formula (B-complex-vitamin c-folic acid) 1 mg per capsule 1 capsule, 1 capsule, Oral, Daily, Neno Moran MD, 1 capsule at 03/17/23 0806        BP (!) 145/81   Pulse 83   Temp 98.4 °F (36.9 °C) (Oral)   Resp 16   Ht 5' 5" (1.651 m)   Wt 126.1 kg (278 lb)   SpO2 100%   BMI 46.26 kg/m²     Physical Exam:    GEN:  Morbidly obese and chronically ill-appearing black male.  No distress.  Complaining of discomfort in the scrotum area.   HEENT: Atraumatic. EOMI, no icterus  NECK : No JVD  CARD : RRR s rub or gallop  LUNGS :  Decreased breath sounds at the bases  ABD : Soft,non-tender. BS active, obese.  :  Scrotum is still swollen  EXT :  Patient with 3 to 4+ pitting edema up to his thighs bilaterally.  Some abdominal wall edema also noted.           Intake/Output Summary (Last 24 hours) at 3/17/2023 0926  Last data filed at 3/17/2023 " 0600  Gross per 24 hour   Intake 2260 ml   Output 3900 ml   Net -1640 ml         Laboratory:  Recent Results (from the past 24 hour(s))   POCT glucose    Collection Time: 03/16/23 11:30 AM   Result Value Ref Range    POCT Glucose 237 (H) 70 - 110 mg/dL   POCT glucose    Collection Time: 03/16/23  5:44 PM   Result Value Ref Range    POCT Glucose 381 (H) 70 - 110 mg/dL   POCT glucose    Collection Time: 03/16/23  9:58 PM   Result Value Ref Range    POCT Glucose 471 (HH) 70 - 110 mg/dL   Comprehensive Metabolic Panel    Collection Time: 03/17/23  7:44 AM   Result Value Ref Range    Sodium Level 139 136 - 145 mmol/L    Potassium Level 3.8 3.5 - 5.1 mmol/L    Chloride 109 (H) 98 - 107 mmol/L    Carbon Dioxide 20 (L) 22 - 29 mmol/L    Glucose Level 264 (H) 74 - 100 mg/dL    Blood Urea Nitrogen 34.0 (H) 8.9 - 20.6 mg/dL    Creatinine 1.68 (H) 0.73 - 1.18 mg/dL    Calcium Level Total 7.5 (L) 8.4 - 10.2 mg/dL    Protein Total 4.7 (L) 6.4 - 8.3 gm/dL    Albumin Level 1.8 (L) 3.5 - 5.0 g/dL    Globulin 2.9 2.4 - 3.5 gm/dL    Albumin/Globulin Ratio 0.6 (L) 1.1 - 2.0 ratio    Bilirubin Total 0.2 <=1.5 mg/dL    Alkaline Phosphatase 131 40 - 150 unit/L    Alanine Aminotransferase 39 0 - 55 unit/L    Aspartate Aminotransferase 49 (H) 5 - 34 unit/L    eGFR 50 mls/min/1.73/m2   CBC with Differential    Collection Time: 03/17/23  7:44 AM   Result Value Ref Range    WBC 10.5 4.5 - 11.5 x10(3)/mcL    RBC 2.77 (L) 4.70 - 6.10 x10(6)/mcL    Hgb 8.4 (L) 14.0 - 18.0 g/dL    Hct 26.5 (L) 42.0 - 52.0 %    MCV 95.7 (H) 80.0 - 94.0 fL    MCH 30.3 pg    MCHC 31.7 (L) 33.0 - 36.0 g/dL    RDW 14.0 11.5 - 17.0 %    Platelet 263 130 - 400 x10(3)/mcL    MPV 11.3 (H) 7.4 - 10.4 fL    Neut % 62.4 %    Lymph % 26.5 %    Mono % 8.3 %    Eos % 2.0 %    Basophil % 0.5 %    Lymph # 2.77 0.6 - 4.6 x10(3)/mcL    Neut # 6.54 2.1 - 9.2 x10(3)/mcL    Mono # 0.87 0.1 - 1.3 x10(3)/mcL    Eos # 0.21 0 - 0.9 x10(3)/mcL    Baso # 0.05 0 - 0.2 x10(3)/mcL    IG# 0.03 0  - 0.04 x10(3)/mcL    IG% 0.3 %    NRBC% 0.0 %         Assessment/Plan:   Advanced stage III CKD likely diabetic nephropathy   Poorly controlled hypertension and diabetes   Hepatic cirrhosis   UTI   Profound vitamin-D deficiency-84624 units ergo q 72  Secondary hyperparathyroidism    Percutaneous biopsy had to be postponed due to poorly controlled hypertension.   He seems to be responding to diuresis but still appears severely volume overloaded.  We will limit oral fluids to 1500 mL per day.  We discussed the possibility of hemodialysis as well but his renal function is actually stable and improved.  We will apply an athletic support to help with scrotal discomfort.  It has been difficult to obtain daily weights due to his body habitus and his inability to stand.  Bed scale has not been zeroed apparently        Benjamín Sharp MD, LUKE

## 2023-03-17 NOTE — PLAN OF CARE
Problem: Infection  Goal: Absence of Infection Signs and Symptoms  Outcome: Ongoing, Progressing     Problem: Adult Inpatient Plan of Care  Goal: Plan of Care Review  Outcome: Ongoing, Progressing  Goal: Patient-Specific Goal (Individualized)  Outcome: Ongoing, Progressing  Goal: Absence of Hospital-Acquired Illness or Injury  Outcome: Ongoing, Progressing  Goal: Optimal Comfort and Wellbeing  Outcome: Ongoing, Progressing  Goal: Readiness for Transition of Care  Outcome: Ongoing, Progressing     Problem: Adjustment to Illness (Sepsis/Septic Shock)  Goal: Optimal Coping  Outcome: Ongoing, Progressing     Problem: Bleeding (Sepsis/Septic Shock)  Goal: Absence of Bleeding  Outcome: Ongoing, Progressing     Problem: Glycemic Control Impaired (Sepsis/Septic Shock)  Goal: Blood Glucose Level Within Desired Range  Outcome: Ongoing, Progressing     Problem: Infection Progression (Sepsis/Septic Shock)  Goal: Absence of Infection Signs and Symptoms  Outcome: Ongoing, Progressing     Problem: Nutrition Impaired (Sepsis/Septic Shock)  Goal: Optimal Nutrition Intake  Outcome: Ongoing, Progressing     Problem: Fluid and Electrolyte Imbalance (Acute Kidney Injury/Impairment)  Goal: Fluid and Electrolyte Balance  Outcome: Ongoing, Progressing     Problem: Oral Intake Inadequate (Acute Kidney Injury/Impairment)  Goal: Optimal Nutrition Intake  Outcome: Ongoing, Progressing     Problem: Renal Function Impairment (Acute Kidney Injury/Impairment)  Goal: Effective Renal Function  Outcome: Ongoing, Progressing     Problem: Bariatric Environmental Safety  Goal: Safety Maintained with Care  Outcome: Ongoing, Progressing     Problem: Skin Injury Risk Increased  Goal: Skin Health and Integrity  Outcome: Ongoing, Progressing      See today's assessment and documentation for interventions.

## 2023-03-17 NOTE — PLAN OF CARE
Problem: Adult Inpatient Plan of Care  Goal: Plan of Care Review  Outcome: Ongoing, Progressing     Problem: Infection  Goal: Absence of Infection Signs and Symptoms  Outcome: Ongoing, Not Progressing     Problem: Adult Inpatient Plan of Care  Goal: Patient-Specific Goal (Individualized)  Outcome: Ongoing, Not Progressing   Patient AAOx4 with no s/s of acute distress.  Pain prn administered for swollen painful scrotum.  IV c/d/I.  Taught to shift weight while in bed.  Elevated blood sugar readings.  Left sided weakness; patient states old CVA.  Lasix administered with 350 mL clear urine in [place].  Safety rounds performed.  All needs addressed.

## 2023-03-17 NOTE — NURSING
Patient AAOx4 with no s/s of acute distress.  Prn for pain administered.  RA.  Scrotal and leg edema.  Safety checks performed.  Turned q2h.  IV c/d/I.  All needs addressed.

## 2023-03-17 NOTE — PT/OT/SLP EVAL
"Occupational Therapy   Evaluation    Name: Kamran Huerta  MRN: [id number]  Admitting Diagnosis: Sepsis  Recent Surgery: Procedure(s) (LRB):  EGD (N/A)  EGD, WITH CLOSED BIOPSY 2 Days Post-Op    Recommendations:     Discharge Recommendations: rehabilitation facility  Discharge Equipment Recommendations: bath bench, dressing devices (TBD, pending progress)  Barriers to discharge: medical dx    Assessment:     Kamran Huerta is a 48 y.o. male with a medical diagnosis of Sepsis.  He presents with c/o scrotum and stomach pain. Performance deficits affecting function: weakness, impaired endurance, impaired sensation, impaired self care skills, impaired functional mobility, gait instability, impaired balance, decreased safety awareness, decreased upper extremity function, decreased lower extremity function, pain, decreased ROM, edema.      Rehab Prognosis: Good; patient would benefit from acute skilled OT services to address these deficits and reach maximum level of function.       Plan:     Patient to be seen 5 x/week, 6 x/week to address the above listed problems via self-care/home management, therapeutic activities, therapeutic exercises  Plan of Care Expires: 03/31/23  Plan of Care Reviewed with: patient    Subjective     Chief Complaint: Pt c/o scrotum and stomach pain.  Patient/Family Comments/goals: Increase functional independence.  Pt reported h/o CVA (which occurred ~6 months ago).     Occupational Profile:  Living Environment: Lives with brother (who does not work, but has medical issues ((specifically, "heart problems") per pt) in [facility]. Pt owns 5 CAROLYNN front entrance with B HR and 4 CAROLYNN back entrance with L HR. Owns tub/shower with shower chair.  Previous level of function: Required assist with ADLs, dep A with IADLs, and pt was not driving.  DME: Pt owns walker, rolling (was utilizing at all times), bedside commode, shower chair, cane, straight  Assistance upon Discharge: Pt's parents (who live next " door) and friends per pt. However, pt would benefit from placement at this time to increase functional independence and decrease burden of care.    Pain/Comfort:  Pain Rating 1: 10/10    Patients cultural, spiritual, Religion conflicts given the current situation: no    Objective:     Communicated with: RN prior to session. Patient found supine upon OT entry to room.    General Precautions: Standard, fall  Respiratory Status: Room air    Occupational Performance:    Bed Mobility:    Patient completed Sit to Supine with max A x2 and vcs provided, using bed rail.    Functional Mobility/Transfers:  Patient completed Sit <> Stand Transfer from bed with mod A x2 and from chair with max A x2 (2/2 low surface) and vcs provided for proper hand placement, using rolling walker. Pt's B feet were blocked in order to increase pt's safety.  Functional Mobility: Pt t/f on/off standing scale (placed in front) with min A-CGA and vcs provided, using RW and HRs on scale.     Activities of Daily Living:  Lower Body Dressing: Pt required dep A to don scrotum sling.    Cognitive/Visual Perceptual:  Cognitive/Psychosocial Skills:     -       Follows Commands/attention:Follows one-step commands and Follows two-step commands  -       Safety awareness/insight to disability: impaired     Physical Exam:  Edema:  Noted in LUE.  Sensation:    -       Impaired  light/touch on B feet per pt.  Upper Extremity Range of Motion:     -       Right Upper Extremity: WFL  -       Left Upper Extremity: Deficits: Severe deficits noted in AROM of L wrist ext (in gravity eliminated plane), mod deficits noted in AROM of L shoulder flex, and min deficits noted in AROM of L IR.  Upper Extremity Strength:    -       Right Upper Extremity: Grossly 4/5  -       Left Upper Extremity:    L wrist ext=2-/5  L shoulder flex and IR=3-/5   L elbow flex and ER=4-/5   L shoulder ext, elbow ext, and wrist flex=4/5  Fine Motor Coordination:    -       Intact B  finger-to-thumb opposition skills    Patient left up in chair with all lines intact, call button in reach, and RN notified    GOALS:   Multidisciplinary Problems       Occupational Therapy Goals          Problem: Occupational Therapy    Goal Priority Disciplines Outcome Interventions   Occupational Therapy Goal     OT, PT/OT Ongoing, Progressing    Description: Goals to be met by: 3/31/23     Patient will increase functional independence with ADLs by performing:    UE Dressing with Set-up Assistance.  LE Dressing with Stand-by Assistance and Assistive Devices as needed.  Grooming while standing at sink with Stand-by Assistance.  Toileting from toilet (with BSC placed over toilet if needed) with Stand-by Assistance for hygiene and clothing management.                          History:     Past Medical History:   Diagnosis Date    CHF (congestive heart failure)     Chronic back pain     CVA (cerebral vascular accident) 01/2023    DM (diabetes mellitus)     HTN (hypertension)     Seizures          Past Surgical History:   Procedure Laterality Date    BACK SURGERY      EGD, WITH CLOSED BIOPSY  3/15/2023    Procedure: EGD, WITH CLOSED BIOPSY;  Surgeon: Tj Faith MD;  Location: [facility] ENDOSCOPY;  Service: Gastroenterology;;    ESOPHAGOGASTRODUODENOSCOPY N/A 3/15/2023    Procedure: EGD;  Surgeon: Tj Faith MD;  Location: [facility] ENDOSCOPY;  Service: Gastroenterology;  Laterality: N/A;       Time Tracking:     OT Date of Treatment: 3/17/23  OT Start Time: 1138  OT Stop Time: 1211  OT Total Time (min): 33 min    Billable Minutes: Evaluation Mod complexity 33 mins    3/17/2023

## 2023-03-17 NOTE — PLAN OF CARE
Problem: Occupational Therapy  Goal: Occupational Therapy Goal  Description: Goals to be met by: 3/31/23     Patient will increase functional independence with ADLs by performing:    UE Dressing with Set-up Assistance.  LE Dressing with Stand-by Assistance and Assistive Devices as needed.  Grooming while standing at sink with Stand-by Assistance.  Toileting from toilet (with BSC placed over toilet if needed) with Stand-by Assistance for hygiene and clothing management.     Outcome: Ongoing, Progressing

## 2023-03-18 LAB
ALBUMIN SERPL-MCNC: 2 G/DL (ref 3.5–5)
ALBUMIN/GLOB SERPL: 0.9 RATIO (ref 1.1–2)
ALP SERPL-CCNC: 107 UNIT/L (ref 40–150)
ALT SERPL-CCNC: 37 UNIT/L (ref 0–55)
AST SERPL-CCNC: 54 UNIT/L (ref 5–34)
BASOPHILS # BLD AUTO: 0.06 X10(3)/MCL (ref 0–0.2)
BASOPHILS NFR BLD AUTO: 0.5 %
BILIRUBIN DIRECT+TOT PNL SERPL-MCNC: 0.3 MG/DL
BUN SERPL-MCNC: 36.7 MG/DL (ref 8.9–20.6)
CALCIUM SERPL-MCNC: 7.2 MG/DL (ref 8.4–10.2)
CHLORIDE SERPL-SCNC: 110 MMOL/L (ref 98–107)
CO2 SERPL-SCNC: 21 MMOL/L (ref 22–29)
CREAT SERPL-MCNC: 1.81 MG/DL (ref 0.73–1.18)
EOSINOPHIL # BLD AUTO: 0.25 X10(3)/MCL (ref 0–0.9)
EOSINOPHIL NFR BLD AUTO: 2.1 %
ERYTHROCYTE [DISTWIDTH] IN BLOOD BY AUTOMATED COUNT: 13.7 % (ref 11.5–17)
GFR SERPLBLD CREATININE-BSD FMLA CKD-EPI: 46 MLS/MIN/1.73/M2
GLOBULIN SER-MCNC: 2.3 GM/DL (ref 2.4–3.5)
GLUCOSE SERPL-MCNC: 309 MG/DL (ref 74–100)
HCT VFR BLD AUTO: 25.1 % (ref 42–52)
HGB BLD-MCNC: 8 G/DL (ref 14–18)
IMM GRANULOCYTES # BLD AUTO: 0.05 X10(3)/MCL (ref 0–0.04)
IMM GRANULOCYTES NFR BLD AUTO: 0.4 %
LYMPHOCYTES # BLD AUTO: 2.67 X10(3)/MCL (ref 0.6–4.6)
LYMPHOCYTES NFR BLD AUTO: 22.1 %
MCH RBC QN AUTO: 30.2 PG
MCHC RBC AUTO-ENTMCNC: 31.9 G/DL (ref 33–36)
MCV RBC AUTO: 94.7 FL (ref 80–94)
MONOCYTES # BLD AUTO: 1.04 X10(3)/MCL (ref 0.1–1.3)
MONOCYTES NFR BLD AUTO: 8.6 %
NEUTROPHILS # BLD AUTO: 8.02 X10(3)/MCL (ref 2.1–9.2)
NEUTROPHILS NFR BLD AUTO: 66.3 %
NRBC BLD AUTO-RTO: 0 %
PLATELET # BLD AUTO: 250 X10(3)/MCL (ref 130–400)
PMV BLD AUTO: 11.7 FL (ref 7.4–10.4)
POCT GLUCOSE: 176 MG/DL (ref 70–110)
POCT GLUCOSE: 239 MG/DL (ref 70–110)
POCT GLUCOSE: 285 MG/DL (ref 70–110)
POCT GLUCOSE: 308 MG/DL (ref 70–110)
POCT GLUCOSE: 362 MG/DL (ref 70–110)
POCT GLUCOSE: 81 MG/DL (ref 70–110)
POTASSIUM SERPL-SCNC: 4.5 MMOL/L (ref 3.5–5.1)
PROT SERPL-MCNC: 4.3 GM/DL (ref 6.4–8.3)
RBC # BLD AUTO: 2.65 X10(6)/MCL (ref 4.7–6.1)
SODIUM SERPL-SCNC: 139 MMOL/L (ref 136–145)
WBC # SPEC AUTO: 12.1 X10(3)/MCL (ref 4.5–11.5)

## 2023-03-18 PROCEDURE — A4216 STERILE WATER/SALINE, 10 ML: HCPCS | Performed by: INTERNAL MEDICINE

## 2023-03-18 PROCEDURE — 25000003 PHARM REV CODE 250: Performed by: NURSE PRACTITIONER

## 2023-03-18 PROCEDURE — 80053 COMPREHEN METABOLIC PANEL: CPT | Performed by: INTERNAL MEDICINE

## 2023-03-18 PROCEDURE — 25000003 PHARM REV CODE 250: Performed by: INTERNAL MEDICINE

## 2023-03-18 PROCEDURE — P9047 ALBUMIN (HUMAN), 25%, 50ML: HCPCS | Mod: JZ,JG | Performed by: INTERNAL MEDICINE

## 2023-03-18 PROCEDURE — 25000003 PHARM REV CODE 250: Performed by: STUDENT IN AN ORGANIZED HEALTH CARE EDUCATION/TRAINING PROGRAM

## 2023-03-18 PROCEDURE — 63600175 PHARM REV CODE 636 W HCPCS: Performed by: INTERNAL MEDICINE

## 2023-03-18 PROCEDURE — 21400001 HC TELEMETRY ROOM

## 2023-03-18 PROCEDURE — 85025 COMPLETE CBC W/AUTO DIFF WBC: CPT | Performed by: INTERNAL MEDICINE

## 2023-03-18 RX ADMIN — INSULIN ASPART 14 UNITS: 100 INJECTION, SOLUTION INTRAVENOUS; SUBCUTANEOUS at 06:03

## 2023-03-18 RX ADMIN — ENOXAPARIN SODIUM 40 MG: 40 INJECTION SUBCUTANEOUS at 05:03

## 2023-03-18 RX ADMIN — SODIUM CHLORIDE, PRESERVATIVE FREE 10 ML: 5 INJECTION INTRAVENOUS at 05:03

## 2023-03-18 RX ADMIN — INSULIN DETEMIR 45 UNITS: 100 INJECTION, SOLUTION SUBCUTANEOUS at 09:03

## 2023-03-18 RX ADMIN — SODIUM CHLORIDE, PRESERVATIVE FREE 10 ML: 5 INJECTION INTRAVENOUS at 11:03

## 2023-03-18 RX ADMIN — METOPROLOL TARTRATE 25 MG: 25 TABLET, FILM COATED ORAL at 09:03

## 2023-03-18 RX ADMIN — ERGOCALCIFEROL 50000 UNITS: 1.25 CAPSULE ORAL at 09:03

## 2023-03-18 RX ADMIN — INSULIN ASPART 14 UNITS: 100 INJECTION, SOLUTION INTRAVENOUS; SUBCUTANEOUS at 05:03

## 2023-03-18 RX ADMIN — PANCRELIPASE 6 CAPSULE: 60000; 12000; 38000 CAPSULE, DELAYED RELEASE PELLETS ORAL at 09:03

## 2023-03-18 RX ADMIN — OXYCODONE HYDROCHLORIDE AND ACETAMINOPHEN 1 TABLET: 5; 325 TABLET ORAL at 03:03

## 2023-03-18 RX ADMIN — NEPHROCAP 1 CAPSULE: 1 CAP ORAL at 09:03

## 2023-03-18 RX ADMIN — OXYCODONE HYDROCHLORIDE AND ACETAMINOPHEN 1 TABLET: 5; 325 TABLET ORAL at 11:03

## 2023-03-18 RX ADMIN — ALBUMIN (HUMAN) 25 G: 5 SOLUTION INTRAVENOUS at 09:03

## 2023-03-18 RX ADMIN — SODIUM BICARBONATE 1300 MG: 650 TABLET ORAL at 09:03

## 2023-03-18 RX ADMIN — OXYCODONE HYDROCHLORIDE AND ACETAMINOPHEN 1 TABLET: 5; 325 TABLET ORAL at 06:03

## 2023-03-18 RX ADMIN — METOLAZONE 10 MG: 5 TABLET ORAL at 09:03

## 2023-03-18 RX ADMIN — NIFEDIPINE 90 MG: 90 TABLET, FILM COATED, EXTENDED RELEASE ORAL at 09:03

## 2023-03-18 RX ADMIN — LEVETIRACETAM 1000 MG: 500 TABLET, FILM COATED ORAL at 09:03

## 2023-03-18 RX ADMIN — INSULIN ASPART 14 UNITS: 100 INJECTION, SOLUTION INTRAVENOUS; SUBCUTANEOUS at 11:03

## 2023-03-18 RX ADMIN — GABAPENTIN 400 MG: 100 CAPSULE ORAL at 09:03

## 2023-03-18 RX ADMIN — FUROSEMIDE 80 MG: 10 INJECTION, SOLUTION INTRAMUSCULAR; INTRAVENOUS at 05:03

## 2023-03-18 RX ADMIN — DOXAZOSIN 2 MG: 1 TABLET ORAL at 09:03

## 2023-03-18 RX ADMIN — LISINOPRIL 20 MG: 10 TABLET ORAL at 09:03

## 2023-03-18 RX ADMIN — OXYCODONE HYDROCHLORIDE AND ACETAMINOPHEN 1 TABLET: 5; 325 TABLET ORAL at 02:03

## 2023-03-18 RX ADMIN — FUROSEMIDE 80 MG: 10 INJECTION, SOLUTION INTRAMUSCULAR; INTRAVENOUS at 03:03

## 2023-03-18 RX ADMIN — FUROSEMIDE 80 MG: 10 INJECTION, SOLUTION INTRAMUSCULAR; INTRAVENOUS at 09:03

## 2023-03-18 RX ADMIN — PANCRELIPASE 6 CAPSULE: 60000; 12000; 38000 CAPSULE, DELAYED RELEASE PELLETS ORAL at 03:03

## 2023-03-18 RX ADMIN — OXYCODONE HYDROCHLORIDE AND ACETAMINOPHEN 1 TABLET: 5; 325 TABLET ORAL at 07:03

## 2023-03-18 RX ADMIN — SODIUM BICARBONATE 1300 MG: 650 TABLET ORAL at 03:03

## 2023-03-18 NOTE — PROGRESS NOTES
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     Presented to the ED with complaints of diffuse body swelling specially abdomen, groin, penis and testicles and bilateral lower extremities, dyspnea on minimal exertion and bilateral lower extremity pain and cramping.  Report subjective fever and chills.  Report he was just hospitalized for similar symptom at [facility] about 2 weeks ago and was discharged home after few days.     On arrival to ED, he was tachycardic, hypertensive, saturating 100% on room air.  EKG appears sinus rhythm on my review without ischemic changes.  Labs notable for WBC 16.3, hemoglobin 11.4, platelets 346, sodium 139, potassium 5.0, chloride 116, CO2 16, creatinine 2.17, BUN 34, glucose 420, calcium 8.0, albumin 1.3, , negative troponin.     CT abdomen and pelvis with contrast show left pleural effusion, hepatic cirrhotic morphology, splenomegaly and upper abdominal varices and ascites, chronic pancreatitis changes, extensive subcutaneous anasarca edema.  Kidneys unremarkable without hydronephrosis.     Per ED provider exam he was noted to have purulent penile discharge, sent for culture and Seg[place]ia catheter placed.  He was given albumin 12.5 g, Lasix 40 mg IV, Vancomycin and Zosyn and subsequently referred to hospital medicine service for further evaluation and management.     Urinated about 700 mL since the Lasix 40.  We added urine protein creatinine ratio which resulted as 10.9.    Patient was continued on IV Lasix. Seen by Renal team and recommended IV Lasix/Albumin drip and renal biopsy. He was also seen by GI team for evaluation of cirrhosis and EGD  was done to r/o EV. EGD showed erosive gastropathy, normal esophagus & no other evidence of cirrhosis. Nephrology changed IV Lasix/Albumin drip to IV Lasix 80 mg TID with tentative plan for hemodialysis given patient's diuretic resistance. IR to plan for renal biopsy which could not be performed 03/15 due to uncontrolled HTN.     Interval Hx:   Today, Mr Huerta stated he was feeling better than yesterday but still endorsed persistent swelling and pain in B/L LE & scrotum.  Continues to have significant UOP with IV Lasix; UOP of 6900 mL since yesterday.  Nephrology continuing IV Lasix 80 mg t.i.d. and metolazone 10 mg given stable creatinine and holding off on HD for now.  PT/OT continuing to work with patient.  Will add p.r.n. Percocet 5/325 q.4 hours and will discontinue Norco for pain control.  Patient asking for Dilaudid and was told that p.o. pain medication must be tried 1st.  Patient on 1500 mL fluid restriction.  OT to provide brace for scrotal support given patient's discomfort.    Objective/physical exam:  General: In no acute distress, Obese   Chest: Clear to auscultation bilaterally  Heart: RRR, +S1, S2, no appreciable murmur  Abdomen: + distension, nontender, BS +  MSK: 2+ pitting edema and thickening of skin from abdomen to foot; + penile and scrotal edema   Neurologic: Alert and oriented x4, Cranial nerve II-XII intact, Strength 5/5 in all 4 extremities    VITAL SIGNS: 24 HRS MIN & MAX LAST   Temp  Min: 98.1 °F (36.7 °C)  Max: 98.4 °F (36.9 °C) 98.2 °F (36.8 °C)   BP  Min: 138/76  Max: 157/84 (!) 145/78   Pulse  Min: 79  Max: 92  85   Resp  Min: 16  Max: 20 18     SpO2  Min: 96 %  Max: 100 % 98 %       Recent Labs   Lab 03/15/23  0550 03/16/23  0418 03/17/23  0744   WBC 11.0 10.8 10.5   RBC 2.75* 2.66* 2.77*   HGB 8.7* 8.1* 8.4*   HCT 26.2* 25.5* 26.5*   MCV 95.3* 95.9* 95.7*   MCH 31.6 30.5 30.3   MCHC 33.2 31.8* 31.7*   RDW 14.8 14.5 14.0    238 263   MPV 11.6* 11.8* 11.3*         Recent Labs    Lab 03/12/23  0617 03/13/23  0710 03/14/23  0344 03/15/23  0550 03/16/23  0418 03/17/23  0744    138   < > 143 139 139   K 4.3 4.2   < > 4.4 4.1 3.8   CO2 17* 17*   < > 19* 21* 20*   BUN 34.6* 32.8*   < > 32.9* 32.4* 34.0*   CREATININE 2.07* 1.93*   < > 1.88* 1.79* 1.68*   CALCIUM 8.5 7.5*   < > 7.4* 7.2* 7.5*   MG 1.30* 1.50*  --   --   --   --    ALBUMIN 2.3* 1.6*  --   --  1.7* 1.8*   ALKPHOS 156* 109  --   --  125 131   ALT 38 22  --   --  30 39   AST 27 19  --   --  51* 49*   BILITOT 0.3 0.2  --   --  0.1 0.2    < > = values in this interval not displayed.       Microbiology Results (last 7 days)       Procedure Component Value Units Date/Time    Blood Culture #2 **CANNOT BE ORDERED STAT** [[id number]]  (Normal) Collected: 03/11/23 1848    Order Status: Completed Specimen: Blood Updated: 03/16/23 2100     CULTURE, BLOOD ([facility]) No Growth at 5 days    Blood Culture #1 **CANNOT BE ORDERED STAT** [[id number]]  (Normal) Collected: 03/11/23 1848    Order Status: Completed Specimen: Blood Updated: 03/16/23 2100     CULTURE, BLOOD ([facility]) No Growth at 5 days    Wound Culture [[id number]]  (Abnormal)  (Susceptibility) Collected: 03/11/23 1835    Order Status: Completed Specimen: Drainage from Urethral Updated: 03/15/23 1128     Wound Culture Many Pluralibacter gergoviae      Many Methicillin resistant Staphylococcus aureus    Urine culture [[id number]]  (Abnormal)  (Susceptibility) Collected: 03/11/23 2005    Order Status: Completed Specimen: Urine Updated: 03/13/23 0749     Urine Culture >/= 100,000 colonies/ml Pluralibacter gergoviae    Chlamydia/GC, PCR [[id number]]  (Normal) Collected: 03/12/23 0618    Order Status: Completed Specimen: Urine Updated: 03/12/23 0901     Chlamydia trachomatis PCR Not Detected     N. gonorrhea PCR Not Detected    Narrative:      The Xpert CT/NG test, performed on the GeneXpert system is a qualitative in vitro real-time polymerase chain reaction (PCR) test for the automated detected and  differentiation for genomic DNA from Chlamydia trachomatis (CT) and/or Neisseria gonorrhoeae (NG).           Scheduled Med:   albumin human 25%  25 g Intravenous Daily    doxazosin  2 mg Oral QHS    enoxaparin  40 mg Subcutaneous Daily    ergocalciferol  50,000 Units Oral Q3 Days    furosemide (LASIX) injection  80 mg Intravenous Q8H    gabapentin  400 mg Oral BID    insulin aspart U-100  14 Units Subcutaneous TIDWM    insulin detemir U-100  45 Units Subcutaneous QHS    levETIRAcetam  1,000 mg Oral BID    lipase-protease-amylase 12,000-38,000-60,000 units  6 capsule Oral TID    lisinopriL  20 mg Oral BID    metOLazone  10 mg Oral Daily    metoprolol tartrate  25 mg Oral BID    NIFEdipine  90 mg Oral Daily    sodium bicarbonate  1,300 mg Oral TID    sodium chloride 0.9%  10 mL Intravenous Q6H    vitamin renal formula (B-complex-vitamin c-folic acid)  1 capsule Oral Daily     PRN Meds:  acetaminophen, acetaminophen, aluminum-magnesium hydroxide-simethicone, cloNIDine, dextrose 10%, dextrose 10%, dextrose, dextrose, glucagon (human recombinant), hydrALAZINE, HYDROcodone-acetaminophen, insulin aspart U-100, labetalol, melatonin, ondansetron, oxyCODONE-acetaminophen, polyethylene glycol, prochlorperazine, senna-docusate 8.6-50 mg, simethicone, sodium chloride 0.9%, Flushing PICC Protocol **AND** sodium chloride 0.9% **AND** sodium chloride 0.9%     Assessment/Plan:  Sepsis 2/2 UTI/Urethritis + Staph and Pluralibacter gergoviae    Anasarca 2/2 possible Diabetic Nephropathy  FABIO superimposed on CKD2  Nephrotic Syndrome/Proteinuria 10.9 g  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  T2DM  Hypertension  Vitamin-D deficiency  Chronic Anemia   B/L LE Weakness   Seizure  Chronic Pancreatitis    Plan:  Patient continues to be admitted for close monitoring and diuresis  Patient having adequate UOP with IV Lasix and Metolazone; produced close to 7 L of urine since yesterday  Placed on 1500 mL fluid restriction  Nephrology on-board; following  recommendations  IR re-consulted for renal biopsy; will F/U recommendations  Continued on IV Lasix 80 mg TID & Metolazone 10 mg daily with tentative plan for HD   Blood sugars is uncontrolled. Will cont levemir 45 units and aspart 14 units TID; ISS LD ordered  Continuing Keppra 1000 mg BID, Metoprolol Tartrate 25 mg BID  BP is now much better. Cont Norvasc and Metoprolol   Started on lisinopril per renal recommendations   Completing 5 days of IV Rocephin today for urethritis and UTI   PT/OT on board; recommending rehab placement  Cont supportive care   Avoid NSAIDs    VTE prophylaxis: Lovenox     Patient condition:  Guarded    Anticipated discharge and Disposition:     Pending    All diagnosis and differential diagnosis have been reviewed; assessment and plan has been documented; I have personally reviewed the labs and test results that are presently available; I have reviewed the patients medication list; I have reviewed the consulting providers response and recommendations. I have reviewed or attempted to review medical records based upon their availability    All of the patient's questions have been  addressed and answered. Patient's is agreeable to the above stated plan. I will continue to monitor closely and make adjustments to medical management as needed.  _____________________________________________________________________    Nutrition Status: Diabetic    Radiology:  US Scrotum And Testicles  Narrative: EXAMINATION:  US SCROTUM AND TESTICLES    CLINICAL HISTORY:  acute scrotal pain and edema;    COMPARISON:  No priors    FINDINGS:  Real-time exam with grayscale, color, and spectral imaging of the scrotum was performed.    The right testicle measures 3.0 x 2.7 x 2.3 cm and the left measures 3.4 x 2.5 x 2.2 cm. Color-flow and arterial waveforms are present within both testicles. There are no findings of torsion. The testicles are homogeneous in echotexture without intratesticular mass.    There are moderate  bilateral hydroceles.  There is prominent scrotal wall thickening/edema.  Impression: 1. Negative for testicular torsion and solid mass.  2. Moderate bilateral hydroceles.  3. Prominent scrotal wall thickening/edema.    Electronically signed by: Esvin Trejo  Date:    03/14/2023  Time:    11:54      Marcos Saldana MD   03/17/2023

## 2023-03-18 NOTE — PROGRESS NOTES
NEPHROLOGY: Progress   48-year-old a.m. with history of poorly controlled type 2 diabetes, obesity, creatinine of 1.47 in November of 2022 with a GFR at that time of approximately 60 however , it was also as low as 33 at that time.  He was being followed by Dr. Alonso in [facility].  He has a history of poorly controlled hypertension as well, seizure disorder and alcoholic liver disease with chronic pancreatitis.  Over the past several months the patient has had increasing volume retention, worsening anasarca and increased immobility due to the edema.  He has reportedly gained 65 lb over the past several months.    Patient with high-grade proteinuria over 10 g likely related to diabetic nephropathy but percutaneous biopsy is planned for today.    Patient is on furosemide 80 mg every 8 hours and metolazone 10 mg post discontinuation of lasix drip. He was also placed on fluid restriction. According to nurse, he is eating a lot of outside food including pizza last night. He is markedly edematous including pitting edema to BUE.           Current Facility-Administered Medications:     acetaminophen tablet 1,000 mg, 1,000 mg, Oral, Q6H PRN, Neno Moran MD    acetaminophen tablet 650 mg, 650 mg, Oral, Q4H PRN, Neno Moran MD    albumin human 25% bottle 25 g, 25 g, Intravenous, Daily, Figueroa Daniels MD, Stopped at 03/18/23 1000    aluminum-magnesium hydroxide-simethicone 200-200-20 mg/5 mL suspension 30 mL, 30 mL, Oral, QID PRN, Neno Moran MD    cloNIDine tablet 0.2 mg, 0.2 mg, Oral, TID PRN, Neno Moran MD    dextrose 10% bolus 125 mL 125 mL, 12.5 g, Intravenous, PRN, Neno Moran MD    dextrose 10% bolus 250 mL 250 mL, 25 g, Intravenous, PRN, Neno Moran MD    dextrose 40 % gel 15,000 mg, 15 g, Oral, PRN, Neno Moran MD    dextrose 40 % gel 30,000 mg, 30 g, Oral, PRN, Ali M Dean,  MD    doxazosin tablet 2 mg, 2 mg, Oral, QHS, Jaqueline Avina, AGNP, 2 mg at 03/17/23 2134    enoxaparin injection 40 mg, 40 mg, Subcutaneous, Daily, Neno Moran MD, 40 mg at 03/17/23 1639    ergocalciferol capsule 50,000 Units, 50,000 Units, Oral, Q3 Days, Neno Moran MD, 50,000 Units at 03/18/23 0909    furosemide injection 80 mg, 80 mg, Intravenous, Q8H, Benjamín Sharp MD, 80 mg at 03/18/23 0513    gabapentin capsule 400 mg, 400 mg, Oral, BID, Neno Moran MD, 400 mg at 03/18/23 0905    glucagon (human recombinant) injection 1 mg, 1 mg, Intramuscular, PRN, Neno Moran MD    hydrALAZINE injection 10 mg, 10 mg, Intravenous, Q4H PRN, Figueroa Daniels MD, 10 mg at 03/12/23 1224    HYDROcodone-acetaminophen 5-325 mg per tablet 1 tablet, 1 tablet, Oral, Q6H PRN, Neno Moran MD    insulin aspart U-100 injection 1-10 Units, 1-10 Units, Subcutaneous, QID (AC + HS) PRN, Neno Moran MD, 5 Units at 03/16/23 2202    insulin aspart U-100 injection 14 Units, 14 Units, Subcutaneous, TIDWM, Figueroa Daniels MD, 14 Units at 03/18/23 1103    insulin detemir U-100 injection 45 Units, 45 Units, Subcutaneous, QHS, Figueroa Daniels MD, 45 Units at 03/16/23 2200    labetaloL injection 10 mg, 10 mg, Intravenous, Q4H PRN, Neno Moran MD    levETIRAcetam tablet 1,000 mg, 1,000 mg, Oral, BID, Neno Moran MD, 1,000 mg at 03/18/23 0905    lipase-protease-amylase 12,000-38,000-60,000 units per capsule 6 capsule, 6 capsule, Oral, TID, Neno Moran MD, 6 capsule at 03/18/23 0905    lisinopriL tablet 20 mg, 20 mg, Oral, BID, Jaqueline Avina, AGNP, 20 mg at 03/18/23 0905    melatonin tablet 6 mg, 6 mg, Oral, Nightly PRN, Neno Moran MD, 6 mg at 03/12/23 0012    metOLazone tablet 10 mg, 10 mg, Oral, Daily, Benjamín Sharp MD, 10 mg at 03/18/23 0905    metoprolol tartrate (LOPRESSOR) tablet 25 mg, 25 mg, Oral, BID, Neno Moran MD, 25 mg at 03/18/23 0905    NIFEdipine 24 hr tablet 90 mg, 90 mg, Oral, Daily, Benjamín Sharp MD, 90  "mg at 03/18/23 0905    ondansetron injection 4 mg, 4 mg, Intravenous, Q4H PRN, Neno Moran MD, 4 mg at 03/12/23 0011    oxyCODONE-acetaminophen 5-325 mg per tablet 1 tablet, 1 tablet, Oral, Q4H PRN, Marcos Saldana MD, 1 tablet at 03/18/23 1102    polyethylene glycol packet 17 g, 17 g, Oral, BID PRN, Neno Moran MD    prochlorperazine injection Soln 5 mg, 5 mg, Intravenous, Q6H PRN, Neno Moran MD    senna-docusate 8.6-50 mg per tablet 2 tablet, 2 tablet, Oral, BID PRN, Neno Moran MD    simethicone chewable tablet 80 mg, 1 tablet, Oral, QID PRN, Neno Moran MD    sodium bicarbonate tablet 1,300 mg, 1,300 mg, Oral, TID, Neno Moran MD, 1,300 mg at 03/18/23 0905    sodium chloride 0.9% flush 10 mL, 10 mL, Intravenous, PRN, Neno Moran MD    Flushing PICC Protocol, , , Until Discontinued **AND** sodium chloride 0.9% flush 10 mL, 10 mL, Intravenous, Q6H, 10 mL at 03/18/23 1102 **AND** sodium chloride 0.9% flush 10 mL, 10 mL, Intravenous, PRN, Figueroa Daniels MD    vitamin renal formula (B-complex-vitamin c-folic acid) 1 mg per capsule 1 capsule, 1 capsule, Oral, Daily, Neno Moran MD, 1 capsule at 03/18/23 0905        BP (!) 177/83   Pulse 88   Temp 98.2 °F (36.8 °C) (Oral)   Resp 18   Ht 5' 5" (1.651 m)   Wt 124.2 kg (273 lb 13 oz)   SpO2 99%   BMI 45.56 kg/m²     Physical Exam:    GEN:  Morbidly obese and chronically ill-appearing black male.  No distress.   HEENT: Atraumatic. EOMI, no icterus  NECK : No JVD  CARD : RRR s rub or gallop  LUNGS :  Decreased breath sounds at the bases  ABD : Soft,non-tender. BS active, obese.  :  urinary catheter with large volume output   EXT :  Patient with 3 to 4+ pitting edema up to his thighs bilaterally and through BUE        Intake/Output Summary (Last 24 hours) at 3/18/2023 1114  Last data filed at 3/18/2023 0600  Gross per 24 hour   Intake 1200 ml   Output 3350 ml   Net -2150 ml           Laboratory:  Recent Results (from the past 24 hour(s))   POCT " glucose    Collection Time: 03/17/23  4:37 PM   Result Value Ref Range    POCT Glucose 160 (H) 70 - 110 mg/dL   Comprehensive Metabolic Panel    Collection Time: 03/18/23  5:11 AM   Result Value Ref Range    Sodium Level 139 136 - 145 mmol/L    Potassium Level 4.5 3.5 - 5.1 mmol/L    Chloride 110 (H) 98 - 107 mmol/L    Carbon Dioxide 21 (L) 22 - 29 mmol/L    Glucose Level 309 (H) 74 - 100 mg/dL    Blood Urea Nitrogen 36.7 (H) 8.9 - 20.6 mg/dL    Creatinine 1.81 (H) 0.73 - 1.18 mg/dL    Calcium Level Total 7.2 (L) 8.4 - 10.2 mg/dL    Protein Total 4.3 (L) 6.4 - 8.3 gm/dL    Albumin Level 2.0 (L) 3.5 - 5.0 g/dL    Globulin 2.3 (L) 2.4 - 3.5 gm/dL    Albumin/Globulin Ratio 0.9 (L) 1.1 - 2.0 ratio    Bilirubin Total 0.3 <=1.5 mg/dL    Alkaline Phosphatase 107 40 - 150 unit/L    Alanine Aminotransferase 37 0 - 55 unit/L    Aspartate Aminotransferase 54 (H) 5 - 34 unit/L    eGFR 46 mls/min/1.73/m2   CBC with Differential    Collection Time: 03/18/23  5:11 AM   Result Value Ref Range    WBC 12.1 (H) 4.5 - 11.5 x10(3)/mcL    RBC 2.65 (L) 4.70 - 6.10 x10(6)/mcL    Hgb 8.0 (L) 14.0 - 18.0 g/dL    Hct 25.1 (L) 42.0 - 52.0 %    MCV 94.7 (H) 80.0 - 94.0 fL    MCH 30.2 pg    MCHC 31.9 (L) 33.0 - 36.0 g/dL    RDW 13.7 11.5 - 17.0 %    Platelet 250 130 - 400 x10(3)/mcL    MPV 11.7 (H) 7.4 - 10.4 fL    Neut % 66.3 %    Lymph % 22.1 %    Mono % 8.6 %    Eos % 2.1 %    Basophil % 0.5 %    Lymph # 2.67 0.6 - 4.6 x10(3)/mcL    Neut # 8.02 2.1 - 9.2 x10(3)/mcL    Mono # 1.04 0.1 - 1.3 x10(3)/mcL    Eos # 0.25 0 - 0.9 x10(3)/mcL    Baso # 0.06 0 - 0.2 x10(3)/mcL    IG# 0.05 (H) 0 - 0.04 x10(3)/mcL    IG% 0.4 %    NRBC% 0.0 %   POCT glucose    Collection Time: 03/18/23 11:01 AM   Result Value Ref Range    POCT Glucose 285 (H) 70 - 110 mg/dL         Assessment/Plan:   Advanced stage III CKD likely diabetic nephropathy   Poorly controlled hypertension and diabetes   Hepatic cirrhosis   UTI   Profound vitamin-D deficiency-27569 units ergo q  72  Secondary hyperparathyroidism    Continue present treatment with three times daily Lasix and daily metolazone   Patient likely not making tremendous progress due to noncompliance   We will continue to follow

## 2023-03-19 LAB
ANION GAP SERPL CALC-SCNC: 8 MEQ/L
BUN SERPL-MCNC: 39.1 MG/DL (ref 8.9–20.6)
CALCIUM SERPL-MCNC: 7.5 MG/DL (ref 8.4–10.2)
CHLORIDE SERPL-SCNC: 108 MMOL/L (ref 98–107)
CO2 SERPL-SCNC: 23 MMOL/L (ref 22–29)
CREAT SERPL-MCNC: 1.57 MG/DL (ref 0.73–1.18)
CREAT/UREA NIT SERPL: 25
GFR SERPLBLD CREATININE-BSD FMLA CKD-EPI: 55 MLS/MIN/1.73/M2
GLUCOSE SERPL-MCNC: 142 MG/DL (ref 74–100)
MAGNESIUM SERPL-MCNC: 0.9 MG/DL (ref 1.6–2.6)
PHOSPHATE SERPL-MCNC: 4 MG/DL (ref 2.3–4.7)
POCT GLUCOSE: 120 MG/DL (ref 70–110)
POCT GLUCOSE: 143 MG/DL (ref 70–110)
POCT GLUCOSE: 173 MG/DL (ref 70–110)
POTASSIUM SERPL-SCNC: 3.9 MMOL/L (ref 3.5–5.1)
SODIUM SERPL-SCNC: 139 MMOL/L (ref 136–145)

## 2023-03-19 PROCEDURE — 63600175 PHARM REV CODE 636 W HCPCS: Mod: JZ,JG | Performed by: INTERNAL MEDICINE

## 2023-03-19 PROCEDURE — 21400001 HC TELEMETRY ROOM

## 2023-03-19 PROCEDURE — 25000003 PHARM REV CODE 250: Performed by: INTERNAL MEDICINE

## 2023-03-19 PROCEDURE — 80048 BASIC METABOLIC PNL TOTAL CA: CPT | Performed by: NURSE PRACTITIONER

## 2023-03-19 PROCEDURE — 25000003 PHARM REV CODE 250: Performed by: STUDENT IN AN ORGANIZED HEALTH CARE EDUCATION/TRAINING PROGRAM

## 2023-03-19 PROCEDURE — 83735 ASSAY OF MAGNESIUM: CPT | Performed by: INTERNAL MEDICINE

## 2023-03-19 PROCEDURE — A4216 STERILE WATER/SALINE, 10 ML: HCPCS | Performed by: INTERNAL MEDICINE

## 2023-03-19 PROCEDURE — 63600175 PHARM REV CODE 636 W HCPCS: Performed by: INTERNAL MEDICINE

## 2023-03-19 PROCEDURE — 63600175 PHARM REV CODE 636 W HCPCS: Performed by: STUDENT IN AN ORGANIZED HEALTH CARE EDUCATION/TRAINING PROGRAM

## 2023-03-19 PROCEDURE — 25000003 PHARM REV CODE 250: Performed by: NURSE PRACTITIONER

## 2023-03-19 PROCEDURE — P9047 ALBUMIN (HUMAN), 25%, 50ML: HCPCS | Mod: JZ,JG | Performed by: INTERNAL MEDICINE

## 2023-03-19 PROCEDURE — 84100 ASSAY OF PHOSPHORUS: CPT | Performed by: INTERNAL MEDICINE

## 2023-03-19 RX ORDER — MAGNESIUM SULFATE HEPTAHYDRATE 40 MG/ML
4 INJECTION, SOLUTION INTRAVENOUS ONCE
Status: COMPLETED | OUTPATIENT
Start: 2023-03-19 | End: 2023-03-19

## 2023-03-19 RX ADMIN — PANCRELIPASE 6 CAPSULE: 60000; 12000; 38000 CAPSULE, DELAYED RELEASE PELLETS ORAL at 03:03

## 2023-03-19 RX ADMIN — FUROSEMIDE 80 MG: 10 INJECTION, SOLUTION INTRAMUSCULAR; INTRAVENOUS at 06:03

## 2023-03-19 RX ADMIN — FUROSEMIDE 80 MG: 10 INJECTION, SOLUTION INTRAMUSCULAR; INTRAVENOUS at 09:03

## 2023-03-19 RX ADMIN — PANCRELIPASE 6 CAPSULE: 60000; 12000; 38000 CAPSULE, DELAYED RELEASE PELLETS ORAL at 08:03

## 2023-03-19 RX ADMIN — ENOXAPARIN SODIUM 40 MG: 40 INJECTION SUBCUTANEOUS at 04:03

## 2023-03-19 RX ADMIN — OXYCODONE HYDROCHLORIDE AND ACETAMINOPHEN 1 TABLET: 5; 325 TABLET ORAL at 08:03

## 2023-03-19 RX ADMIN — OXYCODONE HYDROCHLORIDE AND ACETAMINOPHEN 1 TABLET: 5; 325 TABLET ORAL at 12:03

## 2023-03-19 RX ADMIN — SODIUM CHLORIDE, PRESERVATIVE FREE 10 ML: 5 INJECTION INTRAVENOUS at 12:03

## 2023-03-19 RX ADMIN — LISINOPRIL 20 MG: 10 TABLET ORAL at 08:03

## 2023-03-19 RX ADMIN — METOPROLOL TARTRATE 25 MG: 25 TABLET, FILM COATED ORAL at 08:03

## 2023-03-19 RX ADMIN — SODIUM BICARBONATE 1300 MG: 650 TABLET ORAL at 03:03

## 2023-03-19 RX ADMIN — GABAPENTIN 400 MG: 100 CAPSULE ORAL at 08:03

## 2023-03-19 RX ADMIN — OXYCODONE HYDROCHLORIDE AND ACETAMINOPHEN 1 TABLET: 5; 325 TABLET ORAL at 04:03

## 2023-03-19 RX ADMIN — FUROSEMIDE 80 MG: 10 INJECTION, SOLUTION INTRAMUSCULAR; INTRAVENOUS at 03:03

## 2023-03-19 RX ADMIN — INSULIN ASPART 14 UNITS: 100 INJECTION, SOLUTION INTRAVENOUS; SUBCUTANEOUS at 12:03

## 2023-03-19 RX ADMIN — MAGNESIUM SULFATE HEPTAHYDRATE 4 G: 40 INJECTION, SOLUTION INTRAVENOUS at 08:03

## 2023-03-19 RX ADMIN — INSULIN DETEMIR 45 UNITS: 100 INJECTION, SOLUTION SUBCUTANEOUS at 08:03

## 2023-03-19 RX ADMIN — NIFEDIPINE 90 MG: 90 TABLET, FILM COATED, EXTENDED RELEASE ORAL at 10:03

## 2023-03-19 RX ADMIN — SODIUM CHLORIDE, PRESERVATIVE FREE 10 ML: 5 INJECTION INTRAVENOUS at 06:03

## 2023-03-19 RX ADMIN — ALBUMIN (HUMAN) 25 G: 5 SOLUTION INTRAVENOUS at 10:03

## 2023-03-19 RX ADMIN — SODIUM BICARBONATE 1300 MG: 650 TABLET ORAL at 08:03

## 2023-03-19 RX ADMIN — LISINOPRIL 20 MG: 10 TABLET ORAL at 10:03

## 2023-03-19 RX ADMIN — NEPHROCAP 1 CAPSULE: 1 CAP ORAL at 08:03

## 2023-03-19 RX ADMIN — DOXAZOSIN 2 MG: 1 TABLET ORAL at 08:03

## 2023-03-19 RX ADMIN — LEVETIRACETAM 1000 MG: 500 TABLET, FILM COATED ORAL at 08:03

## 2023-03-19 RX ADMIN — OXYCODONE HYDROCHLORIDE AND ACETAMINOPHEN 1 TABLET: 5; 325 TABLET ORAL at 03:03

## 2023-03-19 RX ADMIN — METOLAZONE 10 MG: 5 TABLET ORAL at 08:03

## 2023-03-19 RX ADMIN — INSULIN ASPART 14 UNITS: 100 INJECTION, SOLUTION INTRAVENOUS; SUBCUTANEOUS at 08:03

## 2023-03-19 RX ADMIN — SODIUM CHLORIDE, PRESERVATIVE FREE 10 ML: 5 INJECTION INTRAVENOUS at 05:03

## 2023-03-19 RX ADMIN — INSULIN ASPART 14 UNITS: 100 INJECTION, SOLUTION INTRAVENOUS; SUBCUTANEOUS at 04:03

## 2023-03-19 NOTE — PROGRESS NOTES
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     Presented to the ED with complaints of diffuse body swelling specially abdomen, groin, penis and testicles and bilateral lower extremities, dyspnea on minimal exertion and bilateral lower extremity pain and cramping.  Report subjective fever and chills.  Report he was just hospitalized for similar symptom at [facility] about 2 weeks ago and was discharged home after few days.     On arrival to ED, he was tachycardic, hypertensive, saturating 100% on room air.  EKG appears sinus rhythm on my review without ischemic changes.  Labs notable for WBC 16.3, hemoglobin 11.4, platelets 346, sodium 139, potassium 5.0, chloride 116, CO2 16, creatinine 2.17, BUN 34, glucose 420, calcium 8.0, albumin 1.3, , negative troponin.     CT abdomen and pelvis with contrast show left pleural effusion, hepatic cirrhotic morphology, splenomegaly and upper abdominal varices and ascites, chronic pancreatitis changes, extensive subcutaneous anasarca edema.  Kidneys unremarkable without hydronephrosis.     Per ED provider exam he was noted to have purulent penile discharge, sent for culture and Segovia catheter placed.  He was given albumin 12.5 g, Lasix 40 mg IV, Vancomycin and Zosyn and subsequently referred to hospital medicine service for further evaluation and management.     Urinated about 700 mL since the Lasix 40.  We added urine protein creatinine ratio which resulted as 10.9.    Patient was continued on IV Lasix. Seen by Renal team; recommendations made for IV Lasix/Albumin drip and renal biopsy. He was also seen by GI team for evaluation of  cirrhosis and EGD was done to r/o EV. EGD showed erosive gastropathy, normal esophagus & no other evidence of cirrhosis. Nephrology changed IV Lasix/Albumin drip to IV Lasix 80 mg TID with tentative plan for hemodialysis given patient's diuretic resistance. IR to plan for renal biopsy which could not be performed 03/15 due to uncontrolled HTN. Continues to have significant UOP with IV Lasix.  Nephrology continuing IV Lasix 80 mg t.i.d. and Metolazone 10 mg given stable creatinine and holding off on HD for now. Patient on 1500 mL fluid restriction.  OT to provide brace for scrotal support given patient's discomfort.     Interval Hx:   Today, Mr Huerta stated he was feeling better than yesterday but still endorsed persistent swelling and pain in B/L LE & scrotum.  Continued on IV Lasix 80 mg t.i.d. and Metolazone 10 mg with UOP of approximately 5 L since yesterday. Patient noted to have marginally improved bilateral lower extremity edema and abdominal wall edema with persistent scrotal swelling.  Continues to be noncompliant and is eating food from outside including pizza.    Objective/physical exam:  General: In no acute distress, Obese   Chest: Clear to auscultation bilaterally  Heart: RRR, +S1, S2, no appreciable murmur  Abdomen: + distension, nontender, BS +  MSK: 2+ pitting edema and thickening of skin from abdomen to foot; + penile and scrotal edema   Neurologic: Alert and oriented x4, Cranial nerve II-XII intact, Strength 5/5 in all 4 extremities    VITAL SIGNS: 24 HRS MIN & MAX LAST   Temp  Min: 98.1 °F (36.7 °C)  Max: 98.8 °F (37.1 °C) 98.1 °F (36.7 °C)   BP  Min: 120/73  Max: 183/85 (!) 145/77   Pulse  Min: 81  Max: 93  83   Resp  Min: 16  Max: 18 18     SpO2  Min: 94 %  Max: 99 % 97 %       Recent Labs   Lab 03/16/23  0418 03/17/23  0744 03/18/23  0511   WBC 10.8 10.5 12.1*   RBC 2.66* 2.77* 2.65*   HGB 8.1* 8.4* 8.0*   HCT 25.5* 26.5* 25.1*   MCV 95.9* 95.7* 94.7*   MCH 30.5 30.3 30.2   MCHC 31.8* 31.7*  31.9*   RDW 14.5 14.0 13.7    263 250   MPV 11.8* 11.3* 11.7*         Recent Labs   Lab 03/12/23  0617 03/13/23  0710 03/14/23  0344 03/16/23  0418 03/17/23  0744 03/18/23  0511    138   < > 139 139 139   K 4.3 4.2   < > 4.1 3.8 4.5   CO2 17* 17*   < > 21* 20* 21*   BUN 34.6* 32.8*   < > 32.4* 34.0* 36.7*   CREATININE 2.07* 1.93*   < > 1.79* 1.68* 1.81*   CALCIUM 8.5 7.5*   < > 7.2* 7.5* 7.2*   MG 1.30* 1.50*  --   --   --   --    ALBUMIN 2.3* 1.6*  --  1.7* 1.8* 2.0*   ALKPHOS 156* 109  --  125 131 107   ALT 38 22  --  30 39 37   AST 27 19  --  51* 49* 54*   BILITOT 0.3 0.2  --  0.1 0.2 0.3    < > = values in this interval not displayed.       Microbiology Results (last 7 days)       Procedure Component Value Units Date/Time    Blood Culture #2 **CANNOT BE ORDERED STAT** [[id number]]  (Normal) Collected: 03/11/23 1848    Order Status: Completed Specimen: Blood Updated: 03/16/23 2100     CULTURE, BLOOD ([facility]) No Growth at 5 days    Blood Culture #1 **CANNOT BE ORDERED STAT** [[id number]]  (Normal) Collected: 03/11/23 1848    Order Status: Completed Specimen: Blood Updated: 03/16/23 2100     CULTURE, BLOOD ([facility]) No Growth at 5 days    Wound Culture [[id number]]  (Abnormal)  (Susceptibility) Collected: 03/11/23 1835    Order Status: Completed Specimen: Drainage from Urethral Updated: 03/15/23 1128     Wound Culture Many Pluralibacter gergoviae      Many Methicillin resistant Staphylococcus aureus    Urine culture [[id number]]  (Abnormal)  (Susceptibility) Collected: 03/11/23 2005    Order Status: Completed Specimen: Urine Updated: 03/13/23 0749     Urine Culture >/= 100,000 colonies/ml Pluralibacter gergoviae    Chlamydia/GC, PCR [[id number]]  (Normal) Collected: 03/12/23 0618    Order Status: Completed Specimen: Urine Updated: 03/12/23 0901     Chlamydia trachomatis PCR Not Detected     N. gonorrhea PCR Not Detected    Narrative:      The Xpert CT/NG test, performed on the GeneXpert system is a qualitative in  vitro real-time polymerase chain reaction (PCR) test for the automated detected and differentiation for genomic DNA from Chlamydia trachomatis (CT) and/or Neisseria gonorrhoeae (NG).           Scheduled Med:   albumin human 25%  25 g Intravenous Daily    doxazosin  2 mg Oral QHS    enoxaparin  40 mg Subcutaneous Daily    ergocalciferol  50,000 Units Oral Q3 Days    furosemide (LASIX) injection  80 mg Intravenous Q8H    gabapentin  400 mg Oral BID    insulin aspart U-100  14 Units Subcutaneous TIDWM    insulin detemir U-100  45 Units Subcutaneous QHS    levETIRAcetam  1,000 mg Oral BID    lipase-protease-amylase 12,000-38,000-60,000 units  6 capsule Oral TID    lisinopriL  20 mg Oral BID    metOLazone  10 mg Oral Daily    metoprolol tartrate  25 mg Oral BID    NIFEdipine  90 mg Oral Daily    sodium bicarbonate  1,300 mg Oral TID    sodium chloride 0.9%  10 mL Intravenous Q6H    vitamin renal formula (B-complex-vitamin c-folic acid)  1 capsule Oral Daily     PRN Meds:  acetaminophen, acetaminophen, aluminum-magnesium hydroxide-simethicone, cloNIDine, dextrose 10%, dextrose 10%, dextrose, dextrose, glucagon (human recombinant), hydrALAZINE, HYDROcodone-acetaminophen, insulin aspart U-100, labetalol, melatonin, ondansetron, oxyCODONE-acetaminophen, polyethylene glycol, prochlorperazine, senna-docusate 8.6-50 mg, simethicone, sodium chloride 0.9%, Flushing PICC Protocol **AND** sodium chloride 0.9% **AND** sodium chloride 0.9%     Assessment/Plan:  Sepsis 2/2 UTI/Urethritis + Staph and Pluralibacter gergoviae    Anasarca 2/2 possible Diabetic Nephropathy  FABIO superimposed on CKD2  Nephrotic Syndrome/Proteinuria 10.9 g 2/2 Diabetic Nephropathy  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  T2DM  Hypertension  Vitamin-D deficiency  Chronic Anemia   B/L LE Weakness   Seizure  Chronic Pancreatitis    Plan:  Patient continues to be admitted for close monitoring and diuresis  Patient having adequate UOP with IV Lasix and Metolazone; produced  close to 5 L of urine since yesterday  Placed on 1500 mL fluid restriction  Patient noncompliant with low sodium diet and fluid restriction; ate ramsesa that he ordered yesterday  Nephrology on-board; following recommendations  IR re-consulted for renal biopsy; will F/U recommendations  Continued on IV Lasix 80 mg TID & Metolazone 10 mg daily with tentative plan for HD   Will continue levemir 45 units, Aspart 14 units TID & ISS LD  Continuing Keppra 1000 mg BID, Metoprolol Tartrate 25 mg BID  BP is now much better. Cont Norvasc and Metoprolol   On Lisinopril per renal recommendations   Completing 5 days of IV Rocephin for urethritis and UTI   PT/OT on board; recommending rehab placement  Continue supportive care   Avoid NSAIDs    VTE prophylaxis: Lovenox     Patient condition:  Guarded    Anticipated discharge and Disposition:     Pending    All diagnosis and differential diagnosis have been reviewed; assessment and plan has been documented; I have personally reviewed the labs and test results that are presently available; I have reviewed the patients medication list; I have reviewed the consulting providers response and recommendations. I have reviewed or attempted to review medical records based upon their availability    All of the patient's questions have been  addressed and answered. Patient's is agreeable to the above stated plan. I will continue to monitor closely and make adjustments to medical management as needed.  _____________________________________________________________________    Nutrition Status: Diabetic    Radiology:  US Scrotum And Testicles  Narrative: EXAMINATION:  US SCROTUM AND TESTICLES    CLINICAL HISTORY:  acute scrotal pain and edema;    COMPARISON:  No priors    FINDINGS:  Real-time exam with grayscale, color, and spectral imaging of the scrotum was performed.    The right testicle measures 3.0 x 2.7 x 2.3 cm and the left measures 3.4 x 2.5 x 2.2 cm. Color-flow and arterial waveforms are  present within both testicles. There are no findings of torsion. The testicles are homogeneous in echotexture without intratesticular mass.    There are moderate bilateral hydroceles.  There is prominent scrotal wall thickening/edema.  Impression: 1. Negative for testicular torsion and solid mass.  2. Moderate bilateral hydroceles.  3. Prominent scrotal wall thickening/edema.    Electronically signed by: Esvin Trejo  Date:    03/14/2023  Time:    11:54      Marcos Saldana MD   03/18/2023

## 2023-03-19 NOTE — PT/OT/SLP PROGRESS
Attempted at 1117; pt declined despite encouragement. PTA educated on PT plan of care and importance of mobilizing with therapy. Pt states that he feels weak and is requesting to have something to eat before attempting therapy.

## 2023-03-20 LAB
ALBUMIN SERPL-MCNC: 2.1 G/DL (ref 3.5–5)
ALBUMIN/GLOB SERPL: 1 RATIO (ref 1.1–2)
ALP SERPL-CCNC: 73 UNIT/L (ref 40–150)
ALT SERPL-CCNC: 28 UNIT/L (ref 0–55)
AST SERPL-CCNC: 28 UNIT/L (ref 5–34)
BILIRUBIN DIRECT+TOT PNL SERPL-MCNC: 0.4 MG/DL
BUN SERPL-MCNC: 38.6 MG/DL (ref 8.9–20.6)
CALCIUM SERPL-MCNC: 7.5 MG/DL (ref 8.4–10.2)
CHLORIDE SERPL-SCNC: 110 MMOL/L (ref 98–107)
CO2 SERPL-SCNC: 26 MMOL/L (ref 22–29)
CREAT SERPL-MCNC: 1.6 MG/DL (ref 0.73–1.18)
GFR SERPLBLD CREATININE-BSD FMLA CKD-EPI: 53 MLS/MIN/1.73/M2
GLOBULIN SER-MCNC: 2.2 GM/DL (ref 2.4–3.5)
GLUCOSE SERPL-MCNC: 80 MG/DL (ref 74–100)
MAGNESIUM SERPL-MCNC: 1.4 MG/DL (ref 1.6–2.6)
POCT GLUCOSE: 104 MG/DL (ref 70–110)
POCT GLUCOSE: 163 MG/DL (ref 70–110)
POCT GLUCOSE: 210 MG/DL (ref 70–110)
POCT GLUCOSE: 222 MG/DL (ref 70–110)
POCT GLUCOSE: 51 MG/DL (ref 70–110)
POCT GLUCOSE: 71 MG/DL (ref 70–110)
POTASSIUM SERPL-SCNC: 3.9 MMOL/L (ref 3.5–5.1)
PROT SERPL-MCNC: 4.3 GM/DL (ref 6.4–8.3)
SODIUM SERPL-SCNC: 142 MMOL/L (ref 136–145)
URATE SERPL-MCNC: 10.1 MG/DL (ref 3.5–7.2)

## 2023-03-20 PROCEDURE — 63600175 PHARM REV CODE 636 W HCPCS: Performed by: INTERNAL MEDICINE

## 2023-03-20 PROCEDURE — 83735 ASSAY OF MAGNESIUM: CPT | Performed by: STUDENT IN AN ORGANIZED HEALTH CARE EDUCATION/TRAINING PROGRAM

## 2023-03-20 PROCEDURE — 97530 THERAPEUTIC ACTIVITIES: CPT | Mod: CQ

## 2023-03-20 PROCEDURE — 21400001 HC TELEMETRY ROOM

## 2023-03-20 PROCEDURE — A4216 STERILE WATER/SALINE, 10 ML: HCPCS | Performed by: INTERNAL MEDICINE

## 2023-03-20 PROCEDURE — 88305 TISSUE EXAM BY PATHOLOGIST: CPT | Performed by: STUDENT IN AN ORGANIZED HEALTH CARE EDUCATION/TRAINING PROGRAM

## 2023-03-20 PROCEDURE — 25000003 PHARM REV CODE 250: Performed by: INTERNAL MEDICINE

## 2023-03-20 PROCEDURE — 63600175 PHARM REV CODE 636 W HCPCS: Mod: JZ,JG | Performed by: INTERNAL MEDICINE

## 2023-03-20 PROCEDURE — 88313 SPECIAL STAINS GROUP 2: CPT

## 2023-03-20 PROCEDURE — 25000003 PHARM REV CODE 250: Performed by: STUDENT IN AN ORGANIZED HEALTH CARE EDUCATION/TRAINING PROGRAM

## 2023-03-20 PROCEDURE — 88312 SPECIAL STAINS GROUP 1: CPT

## 2023-03-20 PROCEDURE — 97530 THERAPEUTIC ACTIVITIES: CPT | Mod: CO

## 2023-03-20 PROCEDURE — 63600175 PHARM REV CODE 636 W HCPCS: Performed by: RADIOLOGY

## 2023-03-20 PROCEDURE — 25000003 PHARM REV CODE 250: Performed by: RADIOLOGY

## 2023-03-20 PROCEDURE — 63600175 PHARM REV CODE 636 W HCPCS: Performed by: STUDENT IN AN ORGANIZED HEALTH CARE EDUCATION/TRAINING PROGRAM

## 2023-03-20 PROCEDURE — 80053 COMPREHEN METABOLIC PANEL: CPT | Performed by: STUDENT IN AN ORGANIZED HEALTH CARE EDUCATION/TRAINING PROGRAM

## 2023-03-20 PROCEDURE — 84550 ASSAY OF BLOOD/URIC ACID: CPT | Performed by: INTERNAL MEDICINE

## 2023-03-20 PROCEDURE — 25000003 PHARM REV CODE 250: Performed by: NURSE PRACTITIONER

## 2023-03-20 PROCEDURE — P9047 ALBUMIN (HUMAN), 25%, 50ML: HCPCS | Mod: JZ,JG | Performed by: INTERNAL MEDICINE

## 2023-03-20 RX ORDER — HYDRALAZINE HYDROCHLORIDE 20 MG/ML
INJECTION INTRAMUSCULAR; INTRAVENOUS
Status: DISPENSED
Start: 2023-03-20 | End: 2023-03-20

## 2023-03-20 RX ORDER — FENTANYL CITRATE 50 UG/ML
INJECTION, SOLUTION INTRAMUSCULAR; INTRAVENOUS
Status: DISPENSED
Start: 2023-03-20 | End: 2023-03-20

## 2023-03-20 RX ORDER — LIDOCAINE HYDROCHLORIDE 20 MG/ML
INJECTION, SOLUTION INFILTRATION; PERINEURAL
Status: COMPLETED | OUTPATIENT
Start: 2023-03-20 | End: 2023-03-20

## 2023-03-20 RX ORDER — MIDAZOLAM HYDROCHLORIDE 1 MG/ML
INJECTION INTRAMUSCULAR; INTRAVENOUS
Status: COMPLETED | OUTPATIENT
Start: 2023-03-20 | End: 2023-03-20

## 2023-03-20 RX ORDER — HYDRALAZINE HYDROCHLORIDE 20 MG/ML
INJECTION INTRAMUSCULAR; INTRAVENOUS
Status: COMPLETED | OUTPATIENT
Start: 2023-03-20 | End: 2023-03-20

## 2023-03-20 RX ORDER — LANOLIN ALCOHOL/MO/W.PET/CERES
400 CREAM (GRAM) TOPICAL 2 TIMES DAILY
Status: DISCONTINUED | OUTPATIENT
Start: 2023-03-20 | End: 2023-04-02

## 2023-03-20 RX ORDER — MIDAZOLAM HYDROCHLORIDE 1 MG/ML
INJECTION INTRAMUSCULAR; INTRAVENOUS
Status: DISPENSED
Start: 2023-03-20 | End: 2023-03-20

## 2023-03-20 RX ORDER — LIDOCAINE HYDROCHLORIDE 20 MG/ML
INJECTION, SOLUTION EPIDURAL; INFILTRATION; INTRACAUDAL; PERINEURAL
Status: DISPENSED
Start: 2023-03-20 | End: 2023-03-20

## 2023-03-20 RX ORDER — MAGNESIUM SULFATE HEPTAHYDRATE 40 MG/ML
4 INJECTION, SOLUTION INTRAVENOUS ONCE
Status: COMPLETED | OUTPATIENT
Start: 2023-03-20 | End: 2023-03-20

## 2023-03-20 RX ORDER — FENTANYL CITRATE 50 UG/ML
INJECTION, SOLUTION INTRAMUSCULAR; INTRAVENOUS
Status: COMPLETED | OUTPATIENT
Start: 2023-03-20 | End: 2023-03-20

## 2023-03-20 RX ORDER — MAGNESIUM SULFATE HEPTAHYDRATE 40 MG/ML
2 INJECTION, SOLUTION INTRAVENOUS ONCE
Status: DISCONTINUED | OUTPATIENT
Start: 2023-03-20 | End: 2023-03-20

## 2023-03-20 RX ADMIN — PANCRELIPASE 6 CAPSULE: 60000; 12000; 38000 CAPSULE, DELAYED RELEASE PELLETS ORAL at 02:03

## 2023-03-20 RX ADMIN — FENTANYL CITRATE 25 MCG: 50 INJECTION, SOLUTION INTRAMUSCULAR; INTRAVENOUS at 09:03

## 2023-03-20 RX ADMIN — PANCRELIPASE 6 CAPSULE: 60000; 12000; 38000 CAPSULE, DELAYED RELEASE PELLETS ORAL at 08:03

## 2023-03-20 RX ADMIN — LEVETIRACETAM 1000 MG: 500 TABLET, FILM COATED ORAL at 08:03

## 2023-03-20 RX ADMIN — LIDOCAINE HYDROCHLORIDE 5 ML: 20 INJECTION, SOLUTION INFILTRATION; PERINEURAL at 09:03

## 2023-03-20 RX ADMIN — SODIUM BICARBONATE 1300 MG: 650 TABLET ORAL at 02:03

## 2023-03-20 RX ADMIN — HYDRALAZINE HYDROCHLORIDE 10 MG: 20 INJECTION INTRAMUSCULAR; INTRAVENOUS at 09:03

## 2023-03-20 RX ADMIN — SODIUM CHLORIDE, PRESERVATIVE FREE 10 ML: 5 INJECTION INTRAVENOUS at 05:03

## 2023-03-20 RX ADMIN — OXYCODONE HYDROCHLORIDE AND ACETAMINOPHEN 1 TABLET: 5; 325 TABLET ORAL at 12:03

## 2023-03-20 RX ADMIN — DOXAZOSIN 2 MG: 1 TABLET ORAL at 08:03

## 2023-03-20 RX ADMIN — Medication 400 MG: at 08:03

## 2023-03-20 RX ADMIN — NIFEDIPINE 90 MG: 90 TABLET, FILM COATED, EXTENDED RELEASE ORAL at 08:03

## 2023-03-20 RX ADMIN — INSULIN DETEMIR 40 UNITS: 100 INJECTION, SOLUTION SUBCUTANEOUS at 08:03

## 2023-03-20 RX ADMIN — ALBUMIN (HUMAN) 25 G: 5 SOLUTION INTRAVENOUS at 08:03

## 2023-03-20 RX ADMIN — METOLAZONE 10 MG: 5 TABLET ORAL at 08:03

## 2023-03-20 RX ADMIN — SODIUM BICARBONATE 1300 MG: 650 TABLET ORAL at 08:03

## 2023-03-20 RX ADMIN — METOPROLOL TARTRATE 25 MG: 25 TABLET, FILM COATED ORAL at 08:03

## 2023-03-20 RX ADMIN — NEPHROCAP 1 CAPSULE: 1 CAP ORAL at 08:03

## 2023-03-20 RX ADMIN — GABAPENTIN 400 MG: 100 CAPSULE ORAL at 08:03

## 2023-03-20 RX ADMIN — INSULIN ASPART 14 UNITS: 100 INJECTION, SOLUTION INTRAVENOUS; SUBCUTANEOUS at 04:03

## 2023-03-20 RX ADMIN — MIDAZOLAM HYDROCHLORIDE 1 MG: 1 INJECTION, SOLUTION INTRAMUSCULAR; INTRAVENOUS at 09:03

## 2023-03-20 RX ADMIN — OXYCODONE HYDROCHLORIDE AND ACETAMINOPHEN 1 TABLET: 5; 325 TABLET ORAL at 03:03

## 2023-03-20 RX ADMIN — OXYCODONE HYDROCHLORIDE AND ACETAMINOPHEN 1 TABLET: 5; 325 TABLET ORAL at 06:03

## 2023-03-20 RX ADMIN — FUROSEMIDE 80 MG: 10 INJECTION, SOLUTION INTRAMUSCULAR; INTRAVENOUS at 08:03

## 2023-03-20 RX ADMIN — MAGNESIUM SULFATE HEPTAHYDRATE 4 G: 40 INJECTION, SOLUTION INTRAVENOUS at 11:03

## 2023-03-20 RX ADMIN — SODIUM CHLORIDE, PRESERVATIVE FREE 10 ML: 5 INJECTION INTRAVENOUS at 11:03

## 2023-03-20 RX ADMIN — ENOXAPARIN SODIUM 40 MG: 40 INJECTION SUBCUTANEOUS at 04:03

## 2023-03-20 RX ADMIN — LISINOPRIL 20 MG: 10 TABLET ORAL at 08:03

## 2023-03-20 RX ADMIN — GLUCAGON 1 MG: 1 INJECTION, POWDER, LYOPHILIZED, FOR SOLUTION INTRAMUSCULAR; INTRAVENOUS at 05:03

## 2023-03-20 RX ADMIN — OXYCODONE HYDROCHLORIDE AND ACETAMINOPHEN 1 TABLET: 5; 325 TABLET ORAL at 10:03

## 2023-03-20 RX ADMIN — FENTANYL CITRATE 50 MCG: 50 INJECTION, SOLUTION INTRAMUSCULAR; INTRAVENOUS at 09:03

## 2023-03-20 RX ADMIN — FUROSEMIDE 80 MG: 10 INJECTION, SOLUTION INTRAMUSCULAR; INTRAVENOUS at 05:03

## 2023-03-20 RX ADMIN — OXYCODONE HYDROCHLORIDE AND ACETAMINOPHEN 1 TABLET: 5; 325 TABLET ORAL at 08:03

## 2023-03-20 RX ADMIN — SODIUM CHLORIDE, PRESERVATIVE FREE 10 ML: 5 INJECTION INTRAVENOUS at 12:03

## 2023-03-20 RX ADMIN — FUROSEMIDE 80 MG: 10 INJECTION, SOLUTION INTRAMUSCULAR; INTRAVENOUS at 02:03

## 2023-03-20 NOTE — PROGRESS NOTES
NEPHROLOGY: Progress   48-year-old a.m. with history of poorly controlled type 2 diabetes, obesity, creatinine of 1.47 in November of 2022 with a GFR at that time of approximately 60 however , it was also as low as 33 at that time.  He was being followed by Dr. Alonso in [facility].  He has a history of poorly controlled hypertension as well, seizure disorder and alcoholic liver disease with chronic pancreatitis.  Over the past several months the patient has had increasing volume retention, worsening anasarca and increased immobility due to the edema.  He has reportedly gained 65 lb over the past several months.    Patient with high-grade proteinuria over 10 g likely related to diabetic nephropathy but percutaneous biopsy is planned for today.    Patient is on furosemide 80 mg every 8 hours and metolazone 10 mg daily.    Unsure if patient is watching his fluid intake.  We will put an order in for 1500 cc limit.                Current Facility-Administered Medications:     acetaminophen tablet 1,000 mg, 1,000 mg, Oral, Q6H PRN, Neno Moran MD    acetaminophen tablet 650 mg, 650 mg, Oral, Q4H PRN, Neno Moran MD    albumin human 25% bottle 25 g, 25 g, Intravenous, Daily, Figueroa Daniels MD, Stopped at 03/20/23 0951    aluminum-magnesium hydroxide-simethicone 200-200-20 mg/5 mL suspension 30 mL, 30 mL, Oral, QID PRN, Neno Moran MD    cloNIDine tablet 0.2 mg, 0.2 mg, Oral, TID PRN, Neno Moran MD    dextrose 10% bolus 125 mL 125 mL, 12.5 g, Intravenous, PRN, Neno Moran MD    dextrose 10% bolus 250 mL 250 mL, 25 g, Intravenous, PRN, Neno Moran MD    dextrose 40 % gel 15,000 mg, 15 g, Oral, PRN, Neno Moran MD    dextrose 40 % gel 30,000 mg, 30 g, Oral, PRN, Neno Moran MD    doxazosin tablet 2 mg, 2 mg, Oral, QHS, Jaqueline Avina, AGNP, 2 mg at 03/19/23 2009    enoxaparin  injection 40 mg, 40 mg, Subcutaneous, Daily, Neno Moran MD, 40 mg at 03/19/23 1616    ergocalciferol capsule 50,000 Units, 50,000 Units, Oral, Q3 Days, Neno Moran MD, 50,000 Units at 03/18/23 0909    fentaNYL (SUBLIMAZE) 50 mcg/mL injection, , , ,     furosemide injection 80 mg, 80 mg, Intravenous, Q8H, Benjamín Sharp MD, 80 mg at 03/20/23 1420    gabapentin capsule 400 mg, 400 mg, Oral, BID, Neno Moran MD, 400 mg at 03/20/23 0851    glucagon (human recombinant) injection 1 mg, 1 mg, Intramuscular, PRN, Neno Moran MD, 1 mg at 03/20/23 0545    hydrALAZINE (APRESOLINE) 20 mg/mL injection, , , ,     hydrALAZINE injection 10 mg, 10 mg, Intravenous, Q4H PRN, Figueroa Daniels MD, 10 mg at 03/12/23 1224    HYDROcodone-acetaminophen 5-325 mg per tablet 1 tablet, 1 tablet, Oral, Q6H PRN, Neno Moran MD    insulin aspart U-100 injection 1-10 Units, 1-10 Units, Subcutaneous, QID (AC + HS) PRN, Neno Moran MD, 5 Units at 03/16/23 2202    insulin aspart U-100 injection 14 Units, 14 Units, Subcutaneous, TIDWM, Figueroa Daniels MD, 14 Units at 03/19/23 1645    insulin detemir U-100 injection 45 Units, 45 Units, Subcutaneous, QHS, Figueroa Daniels MD, 45 Units at 03/19/23 2010    labetaloL injection 10 mg, 10 mg, Intravenous, Q4H PRN, Neno Moran MD    levETIRAcetam tablet 1,000 mg, 1,000 mg, Oral, BID, Neno Moran MD, 1,000 mg at 03/20/23 0851    LIDOcaine (PF) 20 mg/mL (2%) 20 mg/mL (2 %) injection, , , ,     lipase-protease-amylase 12,000-38,000-60,000 units per capsule 6 capsule, 6 capsule, Oral, TID, Neno Moran MD, 6 capsule at 03/20/23 1420    lisinopriL tablet 20 mg, 20 mg, Oral, BID, Jaqueline Avina, SUSAN, 20 mg at 03/20/23 0851    magnesium oxide tablet 400 mg, 400 mg, Oral, BID, Benjamín Sharp MD    magnesium sulfate 2g in water 50mL IVPB (premix), 2 g, Intravenous, Once, Benjamín Sharp MD    melatonin tablet 6 mg, 6 mg, Oral, Nightly PRN, Neno Moran MD, 6 mg at 03/12/23 0012    metOLazone  "tablet 10 mg, 10 mg, Oral, Daily, Benjamín Sharp MD, 10 mg at 03/20/23 0851    metoprolol tartrate (LOPRESSOR) tablet 25 mg, 25 mg, Oral, BID, Neno Moran MD, 25 mg at 03/20/23 0851    midazolam (VERSED) 1 mg/mL injection, , , ,     NIFEdipine 24 hr tablet 90 mg, 90 mg, Oral, Daily, Benjamín Sharp MD, 90 mg at 03/20/23 0851    ondansetron injection 4 mg, 4 mg, Intravenous, Q4H PRN, Neno Moran MD, 4 mg at 03/12/23 0011    oxyCODONE-acetaminophen 5-325 mg per tablet 1 tablet, 1 tablet, Oral, Q4H PRN, Marcos Saldana MD, 1 tablet at 03/20/23 1513    polyethylene glycol packet 17 g, 17 g, Oral, BID PRN, Neno Moran MD    prochlorperazine injection Soln 5 mg, 5 mg, Intravenous, Q6H PRN, Neno Moran MD    senna-docusate 8.6-50 mg per tablet 2 tablet, 2 tablet, Oral, BID PRN, eNno Moran MD    simethicone chewable tablet 80 mg, 1 tablet, Oral, QID PRN, Neno Moran MD    sodium bicarbonate tablet 1,300 mg, 1,300 mg, Oral, TID, Neno Moran MD, 1,300 mg at 03/20/23 1420    sodium chloride 0.9% flush 10 mL, 10 mL, Intravenous, PRN, Neno Moran MD    Flushing [facility] Protocol, , , Until Discontinued **AND** sodium chloride 0.9% flush 10 mL, 10 mL, Intravenous, Q6H, 10 mL at 03/20/23 1104 **AND** sodium chloride 0.9% flush 10 mL, 10 mL, Intravenous, PRN, Figueroa Daniels MD    vitamin renal formula (B-complex-vitamin c-folic acid) 1 mg per capsule 1 capsule, 1 capsule, Oral, Daily, Neno Moran MD, 1 capsule at 03/20/23 0851        BP (!) 145/77   Pulse 93   Temp 98.1 °F (36.7 °C) (Oral)   Resp 20   Ht 5' 5" (1.651 m)   Wt 123 kg (271 lb 2.7 oz)   SpO2 96%   BMI 45.12 kg/m²     Physical Exam:    GEN:  Morbidly obese and chronically ill-appearing black male.  No distress.  Complaining of discomfort in the scrotum area.   HEENT: Atraumatic. EOMI, no icterus  NECK : No JVD  CARD : RRR s rub or gallop  LUNGS :  Decreased breath sounds at the bases  ABD : Soft,non-tender. BS active, obese.  :  Scrotum is much less " swollen  EXT :  Patient with 2-3 + pitting edema up to his thighs bilaterally.  Some abdominal wall edema also noted.           Intake/Output Summary (Last 24 hours) at 3/20/2023 1606  Last data filed at 3/20/2023 1449  Gross per 24 hour   Intake 1300 ml   Output 6650 ml   Net -5350 ml         Laboratory:  Recent Results (from the past 24 hour(s))   POCT glucose    Collection Time: 03/19/23  4:38 PM   Result Value Ref Range    POCT Glucose 120 (H) 70 - 110 mg/dL   POCT glucose    Collection Time: 03/19/23  7:15 PM   Result Value Ref Range    POCT Glucose 163 (H) 70 - 110 mg/dL   Comprehensive Metabolic Panel    Collection Time: 03/20/23  4:10 AM   Result Value Ref Range    Sodium Level 142 136 - 145 mmol/L    Potassium Level 3.9 3.5 - 5.1 mmol/L    Chloride 110 (H) 98 - 107 mmol/L    Carbon Dioxide 26 22 - 29 mmol/L    Glucose Level 80 74 - 100 mg/dL    Blood Urea Nitrogen 38.6 (H) 8.9 - 20.6 mg/dL    Creatinine 1.60 (H) 0.73 - 1.18 mg/dL    Calcium Level Total 7.5 (L) 8.4 - 10.2 mg/dL    Protein Total 4.3 (L) 6.4 - 8.3 gm/dL    Albumin Level 2.1 (L) 3.5 - 5.0 g/dL    Globulin 2.2 (L) 2.4 - 3.5 gm/dL    Albumin/Globulin Ratio 1.0 (L) 1.1 - 2.0 ratio    Bilirubin Total 0.4 <=1.5 mg/dL    Alkaline Phosphatase 73 40 - 150 unit/L    Alanine Aminotransferase 28 0 - 55 unit/L    Aspartate Aminotransferase 28 5 - 34 unit/L    eGFR 53 mls/min/1.73/m2   Magnesium    Collection Time: 03/20/23  4:10 AM   Result Value Ref Range    Magnesium Level 1.40 (L) 1.60 - 2.60 mg/dL   Uric Acid    Collection Time: 03/20/23  4:10 AM   Result Value Ref Range    Uric Acid 10.1 (H) 3.5 - 7.2 mg/dL   POCT glucose    Collection Time: 03/20/23  5:31 AM   Result Value Ref Range    POCT Glucose 71 70 - 110 mg/dL   POCT glucose    Collection Time: 03/20/23 10:29 AM   Result Value Ref Range    POCT Glucose 51 (L) 70 - 110 mg/dL         Assessment/Plan:   Advanced stage III CKD likely diabetic nephropathy   Poorly controlled hypertension and  diabetes   Hepatic cirrhosis   UTI   Profound vitamin-D deficiency-35548 units ergo q 72  Secondary hyperparathyroidism  Anasarca with diuretic resistance    Percutaneous biopsy was performed earlier today.  Patient's weight is essentially unchanged over the past 5 days.  He is diuresing exceptionally well.  He must be drinking exorbitant amount of unreported or recorded intake.   We will limit oral fluids to 1500 mL per day.  We discussed the possibility of hemodialysis as well but his renal function is actually stable and improved.  We will apply an athletic support to help with scrotal discomfort.  It has been difficult to obtain daily weights due to his body habitus and his inability to stand.  His weight appears to be relatively stable for the past 4 days between 123 and 124 kilos.  He is significantly hypomagnesemic so will replace today IV and start oral magnesium        Benjamín Sharp MD, LUKE

## 2023-03-20 NOTE — PLAN OF CARE
Problem: Infection  Goal: Absence of Infection Signs and Symptoms  Outcome: Ongoing, Progressing     Problem: Adult Inpatient Plan of Care  Goal: Plan of Care Review  Outcome: Ongoing, Progressing  Goal: Patient-Specific Goal (Individualized)  Outcome: Ongoing, Progressing  Goal: Absence of Hospital-Acquired Illness or Injury  Outcome: Ongoing, Progressing  Goal: Optimal Comfort and Wellbeing  Outcome: Ongoing, Progressing     Problem: Adjustment to Illness (Sepsis/Septic Shock)  Goal: Optimal Coping  Outcome: Ongoing, Progressing     Problem: Bleeding (Sepsis/Septic Shock)  Goal: Absence of Bleeding  Outcome: Ongoing, Progressing     Problem: Glycemic Control Impaired (Sepsis/Septic Shock)  Goal: Blood Glucose Level Within Desired Range  Outcome: Ongoing, Progressing     Problem: Infection Progression (Sepsis/Septic Shock)  Goal: Absence of Infection Signs and Symptoms  Outcome: Ongoing, Progressing     Problem: Nutrition Impaired (Sepsis/Septic Shock)  Goal: Optimal Nutrition Intake  Outcome: Ongoing, Progressing     Problem: Fluid and Electrolyte Imbalance (Acute Kidney Injury/Impairment)  Goal: Fluid and Electrolyte Balance  Outcome: Ongoing, Progressing     Problem: Oral Intake Inadequate (Acute Kidney Injury/Impairment)  Goal: Optimal Nutrition Intake  Outcome: Ongoing, Progressing     Problem: Renal Function Impairment (Acute Kidney Injury/Impairment)  Goal: Effective Renal Function  Outcome: Ongoing, Progressing     Problem: Bariatric Environmental Safety  Goal: Safety Maintained with Care  Outcome: Ongoing, Progressing     Problem: Skin Injury Risk Increased  Goal: Skin Health and Integrity  Outcome: Ongoing, Progressing       See today's assessment and documentation for interventions.

## 2023-03-20 NOTE — PROGRESS NOTES
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 Presented to the ED with complaints of diffuse body swelling specially abdomen, groin, penis and testicles and bilateral lower extremities, dyspnea on minimal exertion and bilateral lower extremity pain and cramping.  Report subjective fever and chills.  Report he was just hospitalized for similar symptom at [facility] about 2 weeks ago and was discharged home after few days. On arrival to ED, he was tachycardic, hypertensive, saturating 100% on room air.  EKG appears sinus rhythm on my review without ischemic changes.  Labs notable for WBC 16.3, hemoglobin 11.4, platelets 346, sodium 139, potassium 5.0, chloride 116, CO2 16, creatinine 2.17, BUN 34, glucose 420, calcium 8.0, albumin 1.3, , negative troponin. CT A/P with Contrast showed left pleural effusion, hepatic cirrhotic morphology, splenomegaly and upper abdominal varices and ascites, chronic pancreatitis changes, extensive subcutaneous anasarca edema.  Kidneys unremarkable without hydronephrosis.     Per ED provider exam he was noted to have purulent penile discharge, sent for culture and Segovia catheter placed.  He was given albumin 12.5 g, Lasix 40 mg IV, Vancomycin and Zosyn and subsequently referred to hospital medicine service for further evaluation and management.  mL with IV Lasix 40. Urine protein creatinine ratio noted to be 10.9. Patient was continued on IV Lasix. Seen by Renal team; recommendations made for IV Lasix/Albumin drip and renal biopsy. He was also seen by GI team for evaluation of cirrhosis and EGD was done to r/o EV. EGD showed erosive gastropathy,  normal esophagus & no other evidence of cirrhosis. Nephrology changed IV Lasix/Albumin drip to IV Lasix 80 mg TID with tentative plan for hemodialysis given patient's diuretic resistance. IR to plan for renal biopsy which could not be performed 03/15 due to uncontrolled HTN. Continues to have significant UOP with IV Lasix.  Nephrology continuing IV Lasix 80 mg t.i.d. and Metolazone 10 mg given stable creatinine and holding off on HD for now. Patient on 1500 mL fluid restriction.  OT to provide brace for scrotal support given patient's discomfort. Counseled regarding compliance with low-sodium diet and fluid restriction in order to optimize volume status with diuresis.     Interval Hx:   Today, Mr Huerta reported feeling better than yesterday with mild improvement in bilateral lower extremity swelling as well as scrotal swelling.  UOP of 6.1 L yesterday and 2.7 L today.    Continued on IV Lasix 80 mg t.i.d. and Metolazone 10 mg. Patient may not be compliant with fluid restriction as despite profound UOP with diuretics his weight is unchanged in the last 4-5 days. Underwent renal biopsy with IR today, will F/U results.    Objective/physical exam:  General: In no acute distress, obese   Chest: Clear to auscultation bilaterally  Heart: RRR, +S1, S2, no appreciable murmur  Abdomen: + distension, nontender, BS +  MSK: 2+ pitting edema and thickening of skin from abdomen to foot; + penile and scrotal edema   Neurologic: Alert and oriented x4, Cranial nerve II-XII intact, Strength 5/5 in all 4 extremities    VITAL SIGNS: 24 HRS MIN & MAX LAST   Temp  Min: 98 °F (36.7 °C)  Max: 99.2 °F (37.3 °C) 98.1 °F (36.7 °C)   BP  Min: 142/79  Max: 173/96 (!) 145/77   Pulse  Min: 77  Max: 99  95   Resp  Min: 16  Max: 20 20     SpO2  Min: 94 %  Max: 100 % 96 %       Recent Labs   Lab 03/16/23  0418 03/17/23  0744 03/18/23  0511   WBC 10.8 10.5 12.1*   RBC 2.66* 2.77* 2.65*   HGB 8.1* 8.4* 8.0*   HCT 25.5* 26.5* 25.1*   MCV 95.9* 95.7*  94.7*   MCH 30.5 30.3 30.2   MCHC 31.8* 31.7* 31.9*   RDW 14.5 14.0 13.7    263 250   MPV 11.8* 11.3* 11.7*         Recent Labs   Lab 03/17/23  0744 03/18/23  0511 03/19/23  0644 03/20/23  0410    139 139 142   K 3.8 4.5 3.9 3.9   CO2 20* 21* 23 26   BUN 34.0* 36.7* 39.1* 38.6*   CREATININE 1.68* 1.81* 1.57* 1.60*   CALCIUM 7.5* 7.2* 7.5* 7.5*   MG  --   --  0.90* 1.40*   ALBUMIN 1.8* 2.0*  --  2.1*   ALKPHOS 131 107  --  73   ALT 39 37  --  28   AST 49* 54*  --  28   BILITOT 0.2 0.3  --  0.4       Microbiology Results (last 7 days)       Procedure Component Value Units Date/Time    Blood Culture #2 **CANNOT BE ORDERED STAT** [[id number]]  (Normal) Collected: 03/11/23 1848    Order Status: Completed Specimen: Blood Updated: 03/16/23 2100     CULTURE, BLOOD ([facility]) No Growth at 5 days    Blood Culture #1 **CANNOT BE ORDERED STAT** [[id number]]  (Normal) Collected: 03/11/23 1848    Order Status: Completed Specimen: Blood Updated: 03/16/23 2100     CULTURE, BLOOD ([facility]) No Growth at 5 days    Wound Culture [[id number]]  (Abnormal)  (Susceptibility) Collected: 03/11/23 1835    Order Status: Completed Specimen: Drainage from Urethral Updated: 03/15/23 1128     Wound Culture Many Pluralibacter gergoviae      Many Methicillin resistant Staphylococcus aureus           Scheduled Med:   albumin human 25%  25 g Intravenous Daily    doxazosin  2 mg Oral QHS    enoxaparin  40 mg Subcutaneous Daily    ergocalciferol  50,000 Units Oral Q3 Days    fentaNYL        furosemide (LASIX) injection  80 mg Intravenous Q8H    gabapentin  400 mg Oral BID    hydrALAZINE        insulin aspart U-100  14 Units Subcutaneous TIDWM    insulin detemir U-100  45 Units Subcutaneous QHS    levETIRAcetam  1,000 mg Oral BID    LIDOcaine (PF) 20 mg/mL (2%)        lipase-protease-amylase 12,000-38,000-60,000 units  6 capsule Oral TID    lisinopriL  20 mg Oral BID    magnesium oxide  400 mg Oral BID    metOLazone  10 mg Oral Daily    metoprolol  tartrate  25 mg Oral BID    midazolam        NIFEdipine  90 mg Oral Daily    sodium bicarbonate  1,300 mg Oral TID    sodium chloride 0.9%  10 mL Intravenous Q6H    vitamin renal formula (B-complex-vitamin c-folic acid)  1 capsule Oral Daily     PRN Meds:  acetaminophen, acetaminophen, aluminum-magnesium hydroxide-simethicone, cloNIDine, dextrose 10%, dextrose 10%, dextrose, dextrose, glucagon (human recombinant), hydrALAZINE, HYDROcodone-acetaminophen, insulin aspart U-100, labetalol, melatonin, ondansetron, oxyCODONE-acetaminophen, polyethylene glycol, prochlorperazine, senna-docusate 8.6-50 mg, simethicone, sodium chloride 0.9%, Flushing PICC Protocol **AND** sodium chloride 0.9% **AND** sodium chloride 0.9%     Assessment/Plan:  Sepsis 2/2 UTI/Urethritis + Staph and Pluralibacter gergoviae    Anasarca 2/2 possible Diabetic Nephropathy  FABIO superimposed on CKD2  Nephrotic Syndrome/Proteinuria 10.9 g 2/2 Diabetic Nephropathy  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  T2DM  Hypertension  Vitamin-D deficiency  Chronic Anemia   B/L LE Weakness   Seizure  Chronic Pancreatitis  Hypomagnesemia    Plan:  Patient continues to be admitted for close monitoring and diuresis  Patient having adequate UOP with IV Lasix and Metolazone; UOP 5.8 L yesterday and 2.8 L today  Placed on 1500 mL fluid restriction  Patient likely noncompliant with low sodium diet and fluid restriction as his weight is unchanged despite adequate diuresis; counseled regarding importance of compliance with fluid restriction and low-sodium diet for maximal results with diuresis  Nephrology on-board; following recommendations  IR performed L sided renal biopsy today; will F/U report  Continued on IV Lasix 80 mg TID & Metolazone 10 mg daily with possible plan for HD   Will continue Levemir 40 units, Aspart 14 units TID & ISS LD  Continuing Keppra 1000 mg BID, Metoprolol Tartrate 25 mg BID  Continued on Doxazosin 2 mg, IV Abumin 25 gm daily, Ergocalciferol 66751 u  q3days, Gabapentin 400 mg BID, NG 0 400 mg b.i.d., nifedipine 90 mg daily, NaHCO3 1300 mg t.i.d.  Mg being repleted IV MgSO4  On Lisinopril per renal recommendations   Completed 5 days of IV Rocephin for urethritis and UTI   PT/OT on board; recommending rehab placement  Continue supportive care   Avoid NSAIDs    VTE prophylaxis: Lovenox     Patient condition:  Guarded    Anticipated discharge and Disposition:     Pending    All diagnosis and differential diagnosis have been reviewed; assessment and plan has been documented; I have personally reviewed the labs and test results that are presently available; I have reviewed the patients medication list; I have reviewed the consulting providers response and recommendations. I have reviewed or attempted to review medical records based upon their availability    All of the patient's questions have been  addressed and answered. Patient's is agreeable to the above stated plan. I will continue to monitor closely and make adjustments to medical management as needed.  _____________________________________________________________________    Nutrition Status: Diabetic    Radiology:  CT Biopsy Kidney (xpd)  Narrative: EXAMINATION:  CT BIOPSY KIDNEY (XPD)    CLINICAL HISTORY:  kidney core;    TECHNIQUE:  ATTENDING: Chad Espinoza M.D.    ANESTHESIA:Local and Moderate Sedation - Sedation was provided by physician: An independent trained observer, sedation nurse was present to assist in the monitoring of the patients level of consciousness and physiologic status.    Moderate Sedation was performed for 20 minutes.    Automatic exposure control was utilized to reduce patient radiation dosage.    DLP = 318    CT guided Biopsy of a none targeted left kidney.    After the risks, benefits and alternatives were discussed, consent was obtained. A time out was performed to verify the patient's identity and procedure.    The none targeted left kidney was localized with CT to confirm a window  for biopsy. The skin over the biopsy site was cleaned and prepped in normal sterile fashion. Subcutaneous tissues were anesthetized with a lidocaine solution. A 10 cm 19 gauge coaxial needle was advanced to the in.    After confirming the position the stylus was removed and 2 core biopsies were obtained..  Sample sent to pathology for analysis.    The patient tolerated the procedure well.    Estimated blood loss: < 10 mL.    Followup CT scan demonstrated no evidence of hematoma.  Impression: Successful biopsy    Electronically signed by: Chad Espinoza  Date:    03/20/2023  Time:    10:06      Marcos Saldana MD   03/20/2023

## 2023-03-20 NOTE — PROGRESS NOTES
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     Presented to the ED with complaints of diffuse body swelling specially abdomen, groin, penis and testicles and bilateral lower extremities, dyspnea on minimal exertion and bilateral lower extremity pain and cramping.  Report subjective fever and chills.  Report he was just hospitalized for similar symptom at [facility] about 2 weeks ago and was discharged home after few days.     On arrival to ED, he was tachycardic, hypertensive, saturating 100% on room air.  EKG appears sinus rhythm on my review without ischemic changes.  Labs notable for WBC 16.3, hemoglobin 11.4, platelets 346, sodium 139, potassium 5.0, chloride 116, CO2 16, creatinine 2.17, BUN 34, glucose 420, calcium 8.0, albumin 1.3, , negative troponin.     CT abdomen and pelvis with contrast show left pleural effusion, hepatic cirrhotic morphology, splenomegaly and upper abdominal varices and ascites, chronic pancreatitis changes, extensive subcutaneous anasarca edema.  Kidneys unremarkable without hydronephrosis.     Per ED provider exam he was noted to have purulent penile discharge, sent for culture and Segovia catheter placed.  He was given albumin 12.5 g, Lasix 40 mg IV, Vancomycin and Zosyn and subsequently referred to hospital medicine service for further evaluation and management.  mL with IV Lasix 40. Urine protein creatinine ratio noted to be 10.9. Patient was continued on IV Lasix. Seen by Renal team; recommendations made for IV Lasix/Albumin drip and renal biopsy. He was also seen by GI team for evaluation of cirrhosis and EGD was done to r/o EV. EGD  showed erosive gastropathy, normal esophagus & no other evidence of cirrhosis. Nephrology changed IV Lasix/Albumin drip to IV Lasix 80 mg TID with tentative plan for hemodialysis given patient's diuretic resistance. IR to plan for renal biopsy which could not be performed 03/15 due to uncontrolled HTN. Continues to have significant UOP with IV Lasix.  Nephrology continuing IV Lasix 80 mg t.i.d. and Metolazone 10 mg given stable creatinine and holding off on HD for now. Patient on 1500 mL fluid restriction.  OT to provide brace for scrotal support given patient's discomfort.     Interval Hx:   Today, Mr Huerta stated he was feeling better than yesterday with improvement in bilateral lower extremity swelling as well as scrotal swelling.  UOP of 5.8 L yesterday and 2.8 L today.  Counseled regarding compliance with low-sodium diet and fluid restriction in order to optimize volume status with diuresis.  Continued on IV Lasix 80 mg t.i.d. and Metolazone 10 mg.     Objective/physical exam:  General: In no acute distress, Obese   Chest: Clear to auscultation bilaterally  Heart: RRR, +S1, S2, no appreciable murmur  Abdomen: + distension, nontender, BS +  MSK: 2+ pitting edema and thickening of skin from abdomen to foot; + penile and scrotal edema   Neurologic: Alert and oriented x4, Cranial nerve II-XII intact, Strength 5/5 in all 4 extremities    VITAL SIGNS: 24 HRS MIN & MAX LAST   Temp  Min: 98.5 °F (36.9 °C)  Max: 99.1 °F (37.3 °C) 98.7 °F (37.1 °C)   BP  Min: 124/78  Max: 163/80 (!) 142/79   Pulse  Min: 81  Max: 92  92   Resp  Min: 18  Max: 18 18     SpO2  Min: 95 %  Max: 100 % 95 %       Recent Labs   Lab 03/16/23  0418 03/17/23  0744 03/18/23  0511   WBC 10.8 10.5 12.1*   RBC 2.66* 2.77* 2.65*   HGB 8.1* 8.4* 8.0*   HCT 25.5* 26.5* 25.1*   MCV 95.9* 95.7* 94.7*   MCH 30.5 30.3 30.2   MCHC 31.8* 31.7* 31.9*   RDW 14.5 14.0 13.7    263 250   MPV 11.8* 11.3* 11.7*         Recent Labs   Lab 03/13/23  0737  03/14/23  0344 03/16/23  0418 03/17/23  0744 03/18/23  0511 03/19/23  0644      < > 139 139 139 139   K 4.2   < > 4.1 3.8 4.5 3.9   CO2 17*   < > 21* 20* 21* 23   BUN 32.8*   < > 32.4* 34.0* 36.7* 39.1*   CREATININE 1.93*   < > 1.79* 1.68* 1.81* 1.57*   CALCIUM 7.5*   < > 7.2* 7.5* 7.2* 7.5*   MG 1.50*  --   --   --   --  0.90*   ALBUMIN 1.6*  --  1.7* 1.8* 2.0*  --    ALKPHOS 109  --  125 131 107  --    ALT 22  --  30 39 37  --    AST 19  --  51* 49* 54*  --    BILITOT 0.2  --  0.1 0.2 0.3  --     < > = values in this interval not displayed.       Microbiology Results (last 7 days)       Procedure Component Value Units Date/Time    Blood Culture #2 **CANNOT BE ORDERED STAT** [[id number]]  (Normal) Collected: 03/11/23 1848    Order Status: Completed Specimen: Blood Updated: 03/16/23 2100     CULTURE, BLOOD ([facility]) No Growth at 5 days    Blood Culture #1 **CANNOT BE ORDERED STAT** [[id number]]  (Normal) Collected: 03/11/23 1848    Order Status: Completed Specimen: Blood Updated: 03/16/23 2100     CULTURE, BLOOD ([facility]) No Growth at 5 days    Wound Culture [[id number]]  (Abnormal)  (Susceptibility) Collected: 03/11/23 1835    Order Status: Completed Specimen: Drainage from Urethral Updated: 03/15/23 1128     Wound Culture Many Pluralibacter gergoviae      Many Methicillin resistant Staphylococcus aureus    Urine culture [[id number]]  (Abnormal)  (Susceptibility) Collected: 03/11/23 2005    Order Status: Completed Specimen: Urine Updated: 03/13/23 0749     Urine Culture >/= 100,000 colonies/ml Pluralibacter gergoviae           Scheduled Med:   albumin human 25%  25 g Intravenous Daily    doxazosin  2 mg Oral QHS    enoxaparin  40 mg Subcutaneous Daily    ergocalciferol  50,000 Units Oral Q3 Days    furosemide (LASIX) injection  80 mg Intravenous Q8H    gabapentin  400 mg Oral BID    insulin aspart U-100  14 Units Subcutaneous TIDWM    insulin detemir U-100  45 Units Subcutaneous QHS    levETIRAcetam  1,000 mg Oral BID     lipase-protease-amylase 12,000-38,000-60,000 units  6 capsule Oral TID    lisinopriL  20 mg Oral BID    metOLazone  10 mg Oral Daily    metoprolol tartrate  25 mg Oral BID    NIFEdipine  90 mg Oral Daily    sodium bicarbonate  1,300 mg Oral TID    sodium chloride 0.9%  10 mL Intravenous Q6H    vitamin renal formula (B-complex-vitamin c-folic acid)  1 capsule Oral Daily     PRN Meds:  acetaminophen, acetaminophen, aluminum-magnesium hydroxide-simethicone, cloNIDine, dextrose 10%, dextrose 10%, dextrose, dextrose, glucagon (human recombinant), hydrALAZINE, HYDROcodone-acetaminophen, insulin aspart U-100, labetalol, melatonin, ondansetron, oxyCODONE-acetaminophen, polyethylene glycol, prochlorperazine, senna-docusate 8.6-50 mg, simethicone, sodium chloride 0.9%, Flushing PICC Protocol **AND** sodium chloride 0.9% **AND** sodium chloride 0.9%     Assessment/Plan:  Sepsis 2/2 UTI/Urethritis + Staph and Pluralibacter gergoviae    Anasarca 2/2 possible Diabetic Nephropathy  FABIO superimposed on CKD2  Nephrotic Syndrome/Proteinuria 10.9 g 2/2 Diabetic Nephropathy  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  T2DM  Hypertension  Vitamin-D deficiency  Chronic Anemia   B/L LE Weakness   Seizure  Chronic Pancreatitis  Hypomagnesemia    Plan:  Patient continues to be admitted for close monitoring and diuresis  Patient having adequate UOP with IV Lasix and Metolazone; UOP 5.8 L yesterday and 2.8 L today  Placed on 1500 mL fluid restriction  Patient noncompliant with low sodium diet and fluid restriction; ate pizza that he ordered yesterday; counseled regarding importance of compliance with fluid restriction and low-sodium diet for maximal results were diuresis  Nephrology on-board; following recommendations  IR re-consulted for renal biopsy; will F/U recommendations  Continued on IV Lasix 80 mg TID & Metolazone 10 mg daily with tentative plan for HD   Will continue levemir 45 units, Aspart 14 units TID & ISS LD  Continuing Keppra 1000 mg BID,  Metoprolol Tartrate 25 mg BID  Giving 4 g IV MgSO4  BP is now much better. Cont Norvasc and Metoprolol   On Lisinopril per renal recommendations   Completing 5 days of IV Rocephin for urethritis and UTI   PT/OT on board; recommending rehab placement  Continue supportive care   Avoid NSAIDs    VTE prophylaxis: Lovenox     Patient condition:  Guarded    Anticipated discharge and Disposition:     Pending    All diagnosis and differential diagnosis have been reviewed; assessment and plan has been documented; I have personally reviewed the labs and test results that are presently available; I have reviewed the patients medication list; I have reviewed the consulting providers response and recommendations. I have reviewed or attempted to review medical records based upon their availability    All of the patient's questions have been  addressed and answered. Patient's is agreeable to the above stated plan. I will continue to monitor closely and make adjustments to medical management as needed.  _____________________________________________________________________    Nutrition Status: Diabetic    Radiology:  US Scrotum And Testicles  Narrative: EXAMINATION:  US SCROTUM AND TESTICLES    CLINICAL HISTORY:  acute scrotal pain and edema;    COMPARISON:  No priors    FINDINGS:  Real-time exam with grayscale, color, and spectral imaging of the scrotum was performed.    The right testicle measures 3.0 x 2.7 x 2.3 cm and the left measures 3.4 x 2.5 x 2.2 cm. Color-flow and arterial waveforms are present within both testicles. There are no findings of torsion. The testicles are homogeneous in echotexture without intratesticular mass.    There are moderate bilateral hydroceles.  There is prominent scrotal wall thickening/edema.  Impression: 1. Negative for testicular torsion and solid mass.  2. Moderate bilateral hydroceles.  3. Prominent scrotal wall thickening/edema.    Electronically signed by: Esvin  Maya  Date:    03/14/2023  Time:    11:54      Marcos Saldana MD   03/19/2023

## 2023-03-20 NOTE — PT/OT/SLP PROGRESS
Physical Therapy Treatment    Patient Name:  Kamran Huerta   MRN:  [id number]    Recommendations:     Discharge Recommendations: rehabilitation facility  Discharge Equipment Recommendations: other (see comments) (TBD)  Barriers to discharge:  decreased functional mobility; decline from baseline    Assessment:     Kamran Huerta is a 48 y.o. male admitted with a medical diagnosis of Sepsis.  He presents with the following impairments/functional limitations: weakness, gait instability, impaired endurance, impaired balance, impaired self care skills, impaired functional mobility, decreased lower extremity function, decreased upper extremity function, pain.    Rehab Prognosis: Good; patient would benefit from acute skilled PT services to address these deficits and reach maximum level of function.    Recent Surgery: Procedure(s) (LRB):  EGD (N/A)  EGD, WITH CLOSED BIOPSY 5 Days Post-Op    Plan:     During this hospitalization, patient to be seen 6 x/week to address the identified rehab impairments via gait training, therapeutic activities, therapeutic exercises and progress toward the following goals:    Plan of Care Expires:  04/16/23    Subjective     Chief Complaint: pain from edema  Patient/Family Comments/goals: to get stronger; walk tomorrow   Pain/Comfort:  Pain Rating 1:  (reported pain from edema but did not rate pain)  Pain Addressed 1: Reposition      Objective:     Communicated with RN prior to session.  Patient found up in chair with  Segovia catheter upon PTA entry to room.     General Precautions: Standard, fall  Orthopedic Precautions: N/A  Braces: N/A  Respiratory Status: Room air  Blood Pressure:  Skin Integrity: Visible skin intact      Functional Mobility:  Transfers:    Sit<->stand; min assist  Stand step min assist 2' from recliner to bed with HHA; improved ability to advance LLE and weight shift. Declined to ambulate today but agreed to try tomorrow.  Bed Mobility:  Sit to supine with min  assist to adjust LE's    Therapeutic Activities/Exercises:  Pt performed anterior scooting in chair, improved ability to take steps and overall decrease in edema.     Education:  Patient provided with verbal education regarding POC and reviewed HEP.  Understanding was verbalized.     Patient left HOB elevated with all lines intact and call button in reach..    GOALS:   Multidisciplinary Problems       Physical Therapy Goals          Problem: Physical Therapy    Goal Priority Disciplines Outcome Goal Variances Interventions   Physical Therapy Goal     PT, PT/OT Ongoing, Progressing     Description: Goals to be met by: 23     Patient will increase functional independence with mobility by performin. Supine to sit with Stand-by Assistance  2. Sit to stand transfer with Supervision  3. Bed to chair transfer with Supervision using Rolling Walker  4. Gait  x 50 feet with Min A using Rolling Walker.   5. Ascend/descend 4 stair with right Handrails Minimal Assistance using Rolling Walker.                          Time Tracking:     PT Received On: 23  PT Start Time: 1240     PT Stop Time: 1300  PT Total Time (min): 20 min     Billable Minutes: Therapeutic Activity 1 unit    Treatment Type: Treatment  PT/PTA: PTA     Number of PTA visits since last PT visit: 2     2023

## 2023-03-20 NOTE — PT/OT/SLP PROGRESS
Physical Therapy      Patient Name:  Kamran Huerta   MRN:  [id number]    Patient leaving floor for CT guided biopsy.

## 2023-03-20 NOTE — OP NOTE
Radiology Post-Procedure Note    Pre Op Diagnosis: Renal Dysfunction    Post Op Diagnosis:  As Above    Procedure: Non Targeted renal biopsy    Procedure performed by: Chad Espinoza M.D.    Written Informed Consent Obtained: Yes    Specimen Removed: YES     Estimated Blood Loss: Minimal    Findings:   Standard CT Renal Biopsy    Patient tolerated procedure well.    Chad Espinoza MD

## 2023-03-20 NOTE — PT/OT/SLP PROGRESS
Occupational Therapy   Treatment    Name: Kamran Huerta  MRN: [id number]  Admitting Diagnosis:  Sepsis  5 Days Post-Op    Recommendations:     Discharge Recommendations: rehabilitation facility  Discharge Equipment Recommendations:  bath bench, dressing device  Barriers to discharge:       Assessment:     Kamran Huerta is a 48 y.o. male with a medical diagnosis of Sepsis. Performance deficits affecting function are weakness, impaired endurance, impaired sensation, impaired self care skills, impaired functional mobility, gait instability, impaired balance, decreased safety awareness, decreased upper extremity function, decreased lower extremity function, pain, decreased ROM, edema.     Rehab Prognosis:  Fair; patient would benefit from acute skilled OT services to address these deficits and reach maximum level of function.       Plan:     Patient to be seen 5 x/week, 6 x/week to address the above listed problems via self-care/home management, therapeutic activities, therapeutic exercises  Plan of Care Expires: 03/31/23  Plan of Care Reviewed with: patient    Subjective     Pain/Comfort:       Objective:     Communicated with: RN prior to session.  Patient found HOB elevated with escobar catheter upon OT entry to room.    General Precautions: Standard, fall    Orthopedic Precautions:   Braces:    Respiratory Status: Room air     Occupational Performance:     Bed Mobility:    Patient completed Supine to Sit with moderate assistance     Functional Mobility/Transfers:  Patient completed Sit <> Stand Transfer with minimum assistance  with  rolling walker   Patient completed Bed <> Chair Transfer using Step Transfer technique with moderate assistance with rolling walker  Functional Mobility: patient transferring from EOB to bedside chair using RW requiring Mod A to maintain balance 2/2 patient L LE weak to advance during transfer.     Therapeutic Positioning  Skin integrity: Visible skin intact     The following  interventions were performed in an effort to prevent and/or reduce acquired pressure ulcers: all visible skin was assessed during the course of treatment      Patient left up in chair with all lines intact, call button in reach, and RN notified    GOALS:   Multidisciplinary Problems       Occupational Therapy Goals          Problem: Occupational Therapy    Goal Priority Disciplines Outcome Interventions   Occupational Therapy Goal     OT, PT/OT Ongoing, Progressing    Description: Goals to be met by: 3/31/23     Patient will increase functional independence with ADLs by performing:    UE Dressing with Set-up Assistance.  LE Dressing with Stand-by Assistance and Assistive Devices as needed.  Grooming while standing at sink with Stand-by Assistance.  Toileting from toilet (with BSC placed over toilet if needed) with Stand-by Assistance for hygiene and clothing management.                          Time Tracking:     OT Date of Treatment: 03/20/23  OT Start Time: 1048  OT Stop Time: 1114  OT Total Time (min): 26 min    Billable Minutes:Therapeutic Activity 2    OT/ARIANA: ARIANA     Number of ARIANA visits since last OT visit: 1    3/20/2023

## 2023-03-21 LAB
ALBUMIN SERPL-MCNC: 2.1 G/DL (ref 3.5–5)
ALBUMIN/GLOB SERPL: 0.8 RATIO (ref 1.1–2)
ALP SERPL-CCNC: 78 UNIT/L (ref 40–150)
ALT SERPL-CCNC: 26 UNIT/L (ref 0–55)
AST SERPL-CCNC: 21 UNIT/L (ref 5–34)
BASOPHILS # BLD AUTO: 0.07 X10(3)/MCL (ref 0–0.2)
BASOPHILS NFR BLD AUTO: 0.5 %
BILIRUBIN DIRECT+TOT PNL SERPL-MCNC: 0.5 MG/DL
BUN SERPL-MCNC: 39.1 MG/DL (ref 8.9–20.6)
CALCIUM SERPL-MCNC: 7.8 MG/DL (ref 8.4–10.2)
CHLORIDE SERPL-SCNC: 104 MMOL/L (ref 98–107)
CO2 SERPL-SCNC: 25 MMOL/L (ref 22–29)
CREAT SERPL-MCNC: 1.66 MG/DL (ref 0.73–1.18)
EOSINOPHIL # BLD AUTO: 0.29 X10(3)/MCL (ref 0–0.9)
EOSINOPHIL NFR BLD AUTO: 2.1 %
ERYTHROCYTE [DISTWIDTH] IN BLOOD BY AUTOMATED COUNT: 13.2 % (ref 11.5–17)
GFR SERPLBLD CREATININE-BSD FMLA CKD-EPI: 51 MLS/MIN/1.73/M2
GLOBULIN SER-MCNC: 2.5 GM/DL (ref 2.4–3.5)
GLUCOSE SERPL-MCNC: 158 MG/DL (ref 74–100)
HCT VFR BLD AUTO: 23.7 % (ref 42–52)
HGB BLD-MCNC: 7.7 G/DL (ref 14–18)
IMM GRANULOCYTES # BLD AUTO: 0.07 X10(3)/MCL (ref 0–0.04)
IMM GRANULOCYTES NFR BLD AUTO: 0.5 %
LYMPHOCYTES # BLD AUTO: 2.14 X10(3)/MCL (ref 0.6–4.6)
LYMPHOCYTES NFR BLD AUTO: 15.2 %
MAGNESIUM SERPL-MCNC: 1.6 MG/DL (ref 1.6–2.6)
MCH RBC QN AUTO: 30.2 PG
MCHC RBC AUTO-ENTMCNC: 32.5 G/DL (ref 33–36)
MCV RBC AUTO: 92.9 FL (ref 80–94)
MONOCYTES # BLD AUTO: 1.47 X10(3)/MCL (ref 0.1–1.3)
MONOCYTES NFR BLD AUTO: 10.5 %
NEUTROPHILS # BLD AUTO: 10.02 X10(3)/MCL (ref 2.1–9.2)
NEUTROPHILS NFR BLD AUTO: 71.2 %
NRBC BLD AUTO-RTO: 0 %
PLATELET # BLD AUTO: 215 X10(3)/MCL (ref 130–400)
PMV BLD AUTO: 11.5 FL (ref 7.4–10.4)
POCT GLUCOSE: 124 MG/DL (ref 70–110)
POCT GLUCOSE: 168 MG/DL (ref 70–110)
POCT GLUCOSE: 175 MG/DL (ref 70–110)
POCT GLUCOSE: 95 MG/DL (ref 70–110)
POTASSIUM SERPL-SCNC: 3.7 MMOL/L (ref 3.5–5.1)
PROT SERPL-MCNC: 4.6 GM/DL (ref 6.4–8.3)
RBC # BLD AUTO: 2.55 X10(6)/MCL (ref 4.7–6.1)
SODIUM SERPL-SCNC: 139 MMOL/L (ref 136–145)
WBC # SPEC AUTO: 14.1 X10(3)/MCL (ref 4.5–11.5)

## 2023-03-21 PROCEDURE — 97530 THERAPEUTIC ACTIVITIES: CPT | Mod: CQ

## 2023-03-21 PROCEDURE — 80053 COMPREHEN METABOLIC PANEL: CPT | Performed by: INTERNAL MEDICINE

## 2023-03-21 PROCEDURE — 25000003 PHARM REV CODE 250: Performed by: NURSE PRACTITIONER

## 2023-03-21 PROCEDURE — 25000003 PHARM REV CODE 250: Performed by: INTERNAL MEDICINE

## 2023-03-21 PROCEDURE — 63600175 PHARM REV CODE 636 W HCPCS: Performed by: INTERNAL MEDICINE

## 2023-03-21 PROCEDURE — 25000003 PHARM REV CODE 250: Performed by: STUDENT IN AN ORGANIZED HEALTH CARE EDUCATION/TRAINING PROGRAM

## 2023-03-21 PROCEDURE — A4216 STERILE WATER/SALINE, 10 ML: HCPCS | Performed by: INTERNAL MEDICINE

## 2023-03-21 PROCEDURE — 83735 ASSAY OF MAGNESIUM: CPT | Performed by: INTERNAL MEDICINE

## 2023-03-21 PROCEDURE — 97116 GAIT TRAINING THERAPY: CPT | Mod: CQ

## 2023-03-21 PROCEDURE — 21400001 HC TELEMETRY ROOM

## 2023-03-21 PROCEDURE — P9047 ALBUMIN (HUMAN), 25%, 50ML: HCPCS | Mod: JZ,JG | Performed by: INTERNAL MEDICINE

## 2023-03-21 PROCEDURE — 85025 COMPLETE CBC W/AUTO DIFF WBC: CPT | Performed by: INTERNAL MEDICINE

## 2023-03-21 RX ORDER — HYDROCODONE BITARTRATE AND ACETAMINOPHEN 5; 325 MG/1; MG/1
1 TABLET ORAL EVERY 4 HOURS PRN
Status: DISCONTINUED | OUTPATIENT
Start: 2023-03-21 | End: 2023-04-04 | Stop reason: HOSPADM

## 2023-03-21 RX ORDER — MICONAZOLE NITRATE 2 %
POWDER (GRAM) TOPICAL 2 TIMES DAILY
Status: DISCONTINUED | OUTPATIENT
Start: 2023-03-21 | End: 2023-04-04 | Stop reason: HOSPADM

## 2023-03-21 RX ADMIN — PANCRELIPASE 6 CAPSULE: 60000; 12000; 38000 CAPSULE, DELAYED RELEASE PELLETS ORAL at 08:03

## 2023-03-21 RX ADMIN — INSULIN ASPART 14 UNITS: 100 INJECTION, SOLUTION INTRAVENOUS; SUBCUTANEOUS at 08:03

## 2023-03-21 RX ADMIN — METOPROLOL TARTRATE 25 MG: 25 TABLET, FILM COATED ORAL at 08:03

## 2023-03-21 RX ADMIN — INSULIN ASPART 14 UNITS: 100 INJECTION, SOLUTION INTRAVENOUS; SUBCUTANEOUS at 04:03

## 2023-03-21 RX ADMIN — GABAPENTIN 400 MG: 100 CAPSULE ORAL at 08:03

## 2023-03-21 RX ADMIN — ALBUMIN (HUMAN) 25 G: 5 SOLUTION INTRAVENOUS at 08:03

## 2023-03-21 RX ADMIN — MICONAZOLE NITRATE: 20 POWDER TOPICAL at 08:03

## 2023-03-21 RX ADMIN — HYDROCODONE BITARTRATE AND ACETAMINOPHEN 1 TABLET: 5; 325 TABLET ORAL at 08:03

## 2023-03-21 RX ADMIN — OXYCODONE HYDROCHLORIDE AND ACETAMINOPHEN 1 TABLET: 5; 325 TABLET ORAL at 04:03

## 2023-03-21 RX ADMIN — LISINOPRIL 20 MG: 10 TABLET ORAL at 08:03

## 2023-03-21 RX ADMIN — PANCRELIPASE 6 CAPSULE: 60000; 12000; 38000 CAPSULE, DELAYED RELEASE PELLETS ORAL at 03:03

## 2023-03-21 RX ADMIN — SODIUM CHLORIDE, PRESERVATIVE FREE 10 ML: 5 INJECTION INTRAVENOUS at 11:03

## 2023-03-21 RX ADMIN — MICONAZOLE NITRATE: 20 POWDER TOPICAL at 10:03

## 2023-03-21 RX ADMIN — HYDROCODONE BITARTRATE AND ACETAMINOPHEN 1 TABLET: 5; 325 TABLET ORAL at 01:03

## 2023-03-21 RX ADMIN — SODIUM BICARBONATE 1300 MG: 650 TABLET ORAL at 03:03

## 2023-03-21 RX ADMIN — FUROSEMIDE 80 MG: 10 INJECTION, SOLUTION INTRAMUSCULAR; INTRAVENOUS at 01:03

## 2023-03-21 RX ADMIN — Medication 400 MG: at 08:03

## 2023-03-21 RX ADMIN — FUROSEMIDE 80 MG: 10 INJECTION, SOLUTION INTRAMUSCULAR; INTRAVENOUS at 09:03

## 2023-03-21 RX ADMIN — NIFEDIPINE 90 MG: 90 TABLET, FILM COATED, EXTENDED RELEASE ORAL at 08:03

## 2023-03-21 RX ADMIN — FUROSEMIDE 80 MG: 10 INJECTION, SOLUTION INTRAMUSCULAR; INTRAVENOUS at 05:03

## 2023-03-21 RX ADMIN — HYDROCODONE BITARTRATE AND ACETAMINOPHEN 1 TABLET: 5; 325 TABLET ORAL at 05:03

## 2023-03-21 RX ADMIN — ENOXAPARIN SODIUM 40 MG: 40 INJECTION SUBCUTANEOUS at 04:03

## 2023-03-21 RX ADMIN — LEVETIRACETAM 1000 MG: 500 TABLET, FILM COATED ORAL at 08:03

## 2023-03-21 RX ADMIN — SODIUM CHLORIDE, PRESERVATIVE FREE 10 ML: 5 INJECTION INTRAVENOUS at 05:03

## 2023-03-21 RX ADMIN — NEPHROCAP 1 CAPSULE: 1 CAP ORAL at 08:03

## 2023-03-21 RX ADMIN — OXYCODONE HYDROCHLORIDE AND ACETAMINOPHEN 1 TABLET: 5; 325 TABLET ORAL at 12:03

## 2023-03-21 RX ADMIN — METOLAZONE 10 MG: 5 TABLET ORAL at 08:03

## 2023-03-21 RX ADMIN — ERGOCALCIFEROL 50000 UNITS: 1.25 CAPSULE ORAL at 08:03

## 2023-03-21 RX ADMIN — SODIUM BICARBONATE 1300 MG: 650 TABLET ORAL at 08:03

## 2023-03-21 RX ADMIN — DOXAZOSIN 2 MG: 1 TABLET ORAL at 08:03

## 2023-03-21 RX ADMIN — OXYCODONE HYDROCHLORIDE AND ACETAMINOPHEN 1 TABLET: 5; 325 TABLET ORAL at 08:03

## 2023-03-21 RX ADMIN — Medication: at 10:03

## 2023-03-21 RX ADMIN — SODIUM CHLORIDE, PRESERVATIVE FREE 10 ML: 5 INJECTION INTRAVENOUS at 09:03

## 2023-03-21 RX ADMIN — Medication: at 08:03

## 2023-03-21 NOTE — PT/OT/SLP PROGRESS
Physical Therapy      Patient Name:  Kamran Huerta   MRN:  [id number]    Noted podiatry consult placed for R foot wound. Will await recommendations prior to ambulating.

## 2023-03-21 NOTE — PT/OT/SLP PROGRESS
Occupational Therapy      Patient Name:  Kamran Huerta   MRN:  [id number]    Patient not seen today secondary to patient currently working with PT. Patient with wound on R LE awaiting RN to assess. Will follow-up as able.    3/21/2023

## 2023-03-21 NOTE — PROGRESS NOTES
[facility] - 6th Floor Medical Telemetry  Wound Care    Patient Name:  Kamran Huerta   MRN:  [id number]  Date: 3/21/2023  Diagnosis: Sepsis    History:     Past Medical History:   Diagnosis Date    CHF (congestive heart failure)     Chronic back pain     CVA (cerebral vascular accident) 01/2023    DM (diabetes mellitus)     HTN (hypertension)     Seizures        Social History     Socioeconomic History    Marital status: Single   Tobacco Use    Smoking status: Never    Smokeless tobacco: Never   Substance and Sexual Activity    Alcohol use: Not Currently    Drug use: Not Currently    Sexual activity: Not Currently     Social Determinants of Health     Social Connections: Unknown    Marital Status:        Precautions:     Allergies as of 03/11/2023    (No Known Allergies)       WO Assessment Details/Treatment     Initial visit, affected areas at right plantar foot and right lateral foot, cleansed and assesed and redressed. Patient reports wound at lateral foot chronic and he is followed by wound clinic in [place], he was unaware of large blister / bullae at plantar foot . Discussed same with assigned nurse Flower SKY, recommended consult to DPM or surgery regarding right foot affected areas, she is to followup  with attending MD for consult.      03/21/23 1422   Pain Reassessment   Pain Rating Post Med Admin 3        Altered Skin Integrity 03/21/23 1030 Right lateral Foot Non pressure chronic ulcer Partial thickness tissue loss. Shallow open ulcer with a red or pink wound bed, without slough. Intact or Open/Ruptured Serum-filled blister.   Date First Assessed/Time First Assessed: 03/21/23 1030   Altered Skin Integrity Present on Admission - Did Patient arrive to the hospital with altered skin?: (c) yes  Side: Right  Orientation: lateral  Location: Foot  Primary Wound Type: Non pressure ...   Wound Image    Description of Altered Skin Integrity   (diabetic foot wound)   Dressing Appearance  Dried drainage   Drainage Amount Small   Drainage Characteristics/Odor Serosanguineous   Appearance Red;Moist   Tissue loss description Full thickness   Red (%), Wound Tissue Color 100 %   Periwound Area Dry  (calloused)   Wound Edges Defined   Wound Length (cm) 2.5 cm   Wound Width (cm) 0.7 cm   Wound Depth (cm) 1 cm   Wound Volume (cm^3) 1.75 cm^3   Wound Surface Area (cm^2) 1.75 cm^2   Care Antimicrobial agent   Dressing Changed;Silver;Calcium alginate;Gauze;Rolled gauze   Off Loading Football dressing   Dressing Change Due 03/22/23        Altered Skin Integrity 03/21/23 1031 Right Plantar Blister(s)   Date First Assessed/Time First Assessed: 03/21/23 1031   Altered Skin Integrity Present on Admission - Did Patient arrive to the hospital with altered skin?: suspected hospital acquired  Side: Right  Location: Plantar  Primary Wound Type: Blister(s)   Wound Image    Description of Altered Skin Integrity Partial thickness tissue loss. Shallow open ulcer with a red or pink wound bed, without slough. Intact or Open/Ruptured Serum-filled blister.   Drainage Amount Small   Drainage Characteristics/Odor Serous   Appearance Maroon;Pink;Tan  (bullae)   Tissue loss description Partial thickness   Periwound Area Intact   Wound Length (cm) 4 cm   Wound Width (cm) 7 cm   Wound Surface Area (cm^2) 28 cm^2   Care Antimicrobial agent   Dressing Changed;Gauze;Rolled gauze   Dressing Change Due 03/22/23       Recommendations made to primary team for local wound care and MD consult  . Orders placed.     03/21/2023

## 2023-03-21 NOTE — PLAN OF CARE
Problem: Infection  Goal: Absence of Infection Signs and Symptoms  Outcome: Ongoing, Progressing     Problem: Adult Inpatient Plan of Care  Goal: Plan of Care Review  Outcome: Ongoing, Progressing  Goal: Patient-Specific Goal (Individualized)  Outcome: Ongoing, Progressing  Goal: Absence of Hospital-Acquired Illness or Injury  Outcome: Ongoing, Progressing  Goal: Optimal Comfort and Wellbeing  Outcome: Ongoing, Progressing  Goal: Readiness for Transition of Care  Outcome: Ongoing, Progressing     Problem: Adjustment to Illness (Sepsis/Septic Shock)  Goal: Optimal Coping  Outcome: Ongoing, Progressing     Problem: Bleeding (Sepsis/Septic Shock)  Goal: Absence of Bleeding  Outcome: Ongoing, Progressing     Problem: Glycemic Control Impaired (Sepsis/Septic Shock)  Goal: Blood Glucose Level Within Desired Range  Outcome: Ongoing, Progressing     Problem: Infection Progression (Sepsis/Septic Shock)  Goal: Absence of Infection Signs and Symptoms  Outcome: Ongoing, Progressing     Problem: Nutrition Impaired (Sepsis/Septic Shock)  Goal: Optimal Nutrition Intake  Outcome: Ongoing, Progressing     Problem: Fluid and Electrolyte Imbalance (Acute Kidney Injury/Impairment)  Goal: Fluid and Electrolyte Balance  Outcome: Ongoing, Progressing     Problem: Oral Intake Inadequate (Acute Kidney Injury/Impairment)  Goal: Optimal Nutrition Intake  Outcome: Ongoing, Progressing     Problem: Renal Function Impairment (Acute Kidney Injury/Impairment)  Goal: Effective Renal Function  Outcome: Ongoing, Progressing     Problem: Bariatric Environmental Safety  Goal: Safety Maintained with Care  Outcome: Ongoing, Progressing     Problem: Skin Injury Risk Increased  Goal: Skin Health and Integrity  Outcome: Ongoing, Progressing     Problem: Impaired Wound Healing  Goal: Optimal Wound Healing  Outcome: Ongoing, Progressing       See today's assessment and documentation for interventions.

## 2023-03-21 NOTE — PT/OT/SLP PROGRESS
Physical Therapy Treatment    Patient Name:  Kamran Huerta   MRN:  [id number]    Recommendations:     Discharge Recommendations: rehabilitation facility  Discharge Equipment Recommendations: other (see comments) (TBD)  Barriers to discharge:  decreased functional mobility; decline from baseline    Assessment:     Kamran Huerta is a 48 y.o. male admitted with a medical diagnosis of Sepsis.  He presents with the following impairments/functional limitations: weakness, gait instability, impaired endurance, impaired balance, impaired self care skills, impaired functional mobility, decreased lower extremity function, decreased upper extremity function, pain.    Rehab Prognosis: Good; patient would benefit from acute skilled PT services to address these deficits and reach maximum level of function.    Recent Surgery: Procedure(s) (LRB):  EGD (N/A)  EGD, WITH CLOSED BIOPSY 6 Days Post-Op    Plan:     During this hospitalization, patient to be seen 6 x/week to address the identified rehab impairments via gait training, therapeutic activities, therapeutic exercises and progress toward the following goals:    Plan of Care Expires:  04/16/23    Subjective     Chief Complaint: pain from edema  Patient/Family Comments/goals: to get stronger  Pain/Comfort:  Pain Rating 1: 0/10 (reported scrotal edema pain overnight)      Objective:     Communicated with RN prior to session.  Patient found HOB elevated with  Segovia catheter upon PTA entry to room.     General Precautions: Standard, fall  Orthopedic Precautions: N/A  Braces: N/A  Respiratory Status: Room air  Blood Pressure:  Skin Integrity: Visible skin intact, however upon standing noted drainage on the floor, discovered a wound on R foot (lateral border and fluid filled blister on plantar surface). RN called to assess and redress wound.       Functional Mobility:  Bed Mobility:   Supine<->sit with SBA x 2 trials; returned to bed to allow RN to assess foot  Transfers:  **  t/f onto standing scale  Sit<->stand; min assist x 2 trials at RW  Gait: 30' with RW, min assist with chair following for safety. Decreased stance phase on LLE noted. Distance limited by therapist; waiting for wound care consult to assess wound prior to ambulating long distances.     *Pt declined scrotal sling; did not report pain with mobility.     Education:  Patient provided with verbal education regarding POC.  Understanding was verbalized.     Patient left HOB elevated with all lines intact and call button in reach..    GOALS:   Multidisciplinary Problems       Physical Therapy Goals          Problem: Physical Therapy    Goal Priority Disciplines Outcome Goal Variances Interventions   Physical Therapy Goal     PT, PT/OT Ongoing, Progressing     Description: Goals to be met by: 23     Patient will increase functional independence with mobility by performin. Supine to sit with Stand-by Assistance  2. Sit to stand transfer with Supervision  3. Bed to chair transfer with Supervision using Rolling Walker  4. Gait  x 50 feet with Min A using Rolling Walker.   5. Ascend/descend 4 stair with right Handrails Minimal Assistance using Rolling Walker.                          Time Tracking:     PT Received On: 23  PT Start Time: 920     PT Stop Time: 1005  PT Total Time (min): 45 min     Billable Minutes: Gait Training 1 unit and Therapeutic Activity 2 units    Treatment Type: Treatment  PT/PTA: PTA     Number of PTA visits since last PT visit: 3     2023

## 2023-03-22 LAB
ALBUMIN SERPL-MCNC: 2.2 G/DL (ref 3.5–5)
ALBUMIN/GLOB SERPL: 0.7 RATIO (ref 1.1–2)
ALP SERPL-CCNC: 79 UNIT/L (ref 40–150)
ALT SERPL-CCNC: 22 UNIT/L (ref 0–55)
AST SERPL-CCNC: 23 UNIT/L (ref 5–34)
BASOPHILS # BLD AUTO: 0.1 X10(3)/MCL (ref 0–0.2)
BASOPHILS NFR BLD AUTO: 0.7 %
BILIRUBIN DIRECT+TOT PNL SERPL-MCNC: 0.6 MG/DL
BUN SERPL-MCNC: 39.3 MG/DL (ref 8.9–20.6)
CALCIUM SERPL-MCNC: 8.3 MG/DL (ref 8.4–10.2)
CHLORIDE SERPL-SCNC: 104 MMOL/L (ref 98–107)
CO2 SERPL-SCNC: 24 MMOL/L (ref 22–29)
CREAT SERPL-MCNC: 1.72 MG/DL (ref 0.73–1.18)
EOSINOPHIL # BLD AUTO: 0.31 X10(3)/MCL (ref 0–0.9)
EOSINOPHIL NFR BLD AUTO: 2.2 %
ERYTHROCYTE [DISTWIDTH] IN BLOOD BY AUTOMATED COUNT: 12.9 % (ref 11.5–17)
GFR SERPLBLD CREATININE-BSD FMLA CKD-EPI: 49 MLS/MIN/1.73/M2
GLOBULIN SER-MCNC: 3 GM/DL (ref 2.4–3.5)
GLUCOSE SERPL-MCNC: 291 MG/DL (ref 74–100)
HCT VFR BLD AUTO: 24 % (ref 42–52)
HGB BLD-MCNC: 7.8 G/DL (ref 14–18)
IMM GRANULOCYTES # BLD AUTO: 0.12 X10(3)/MCL (ref 0–0.04)
IMM GRANULOCYTES NFR BLD AUTO: 0.8 %
LYMPHOCYTES # BLD AUTO: 2.51 X10(3)/MCL (ref 0.6–4.6)
LYMPHOCYTES NFR BLD AUTO: 17.8 %
MAGNESIUM SERPL-MCNC: 1.5 MG/DL (ref 1.6–2.6)
MCH RBC QN AUTO: 30.5 PG
MCHC RBC AUTO-ENTMCNC: 32.5 G/DL (ref 33–36)
MCV RBC AUTO: 93.8 FL (ref 80–94)
MONOCYTES # BLD AUTO: 1.55 X10(3)/MCL (ref 0.1–1.3)
MONOCYTES NFR BLD AUTO: 11 %
NEUTROPHILS # BLD AUTO: 9.54 X10(3)/MCL (ref 2.1–9.2)
NEUTROPHILS NFR BLD AUTO: 67.5 %
NRBC BLD AUTO-RTO: 0 %
PHOSPHATE SERPL-MCNC: 3.8 MG/DL (ref 2.3–4.7)
PLATELET # BLD AUTO: 220 X10(3)/MCL (ref 130–400)
PMV BLD AUTO: 12 FL (ref 7.4–10.4)
POCT GLUCOSE: 261 MG/DL (ref 70–110)
POCT GLUCOSE: 270 MG/DL (ref 70–110)
POCT GLUCOSE: 306 MG/DL (ref 70–110)
POCT GLUCOSE: 308 MG/DL (ref 70–110)
POCT GLUCOSE: 316 MG/DL (ref 70–110)
POTASSIUM SERPL-SCNC: 4.2 MMOL/L (ref 3.5–5.1)
PROT SERPL-MCNC: 5.2 GM/DL (ref 6.4–8.3)
RBC # BLD AUTO: 2.56 X10(6)/MCL (ref 4.7–6.1)
SODIUM SERPL-SCNC: 137 MMOL/L (ref 136–145)
WBC # SPEC AUTO: 14.1 X10(3)/MCL (ref 4.5–11.5)

## 2023-03-22 PROCEDURE — 25000003 PHARM REV CODE 250: Performed by: INTERNAL MEDICINE

## 2023-03-22 PROCEDURE — 25000003 PHARM REV CODE 250: Performed by: STUDENT IN AN ORGANIZED HEALTH CARE EDUCATION/TRAINING PROGRAM

## 2023-03-22 PROCEDURE — 63600175 PHARM REV CODE 636 W HCPCS: Performed by: INTERNAL MEDICINE

## 2023-03-22 PROCEDURE — 63600175 PHARM REV CODE 636 W HCPCS: Mod: JZ,JG | Performed by: INTERNAL MEDICINE

## 2023-03-22 PROCEDURE — 83735 ASSAY OF MAGNESIUM: CPT | Performed by: STUDENT IN AN ORGANIZED HEALTH CARE EDUCATION/TRAINING PROGRAM

## 2023-03-22 PROCEDURE — 63600175 PHARM REV CODE 636 W HCPCS: Performed by: STUDENT IN AN ORGANIZED HEALTH CARE EDUCATION/TRAINING PROGRAM

## 2023-03-22 PROCEDURE — 21400001 HC TELEMETRY ROOM

## 2023-03-22 PROCEDURE — 27000221 HC OXYGEN, UP TO 24 HOURS

## 2023-03-22 PROCEDURE — 85025 COMPLETE CBC W/AUTO DIFF WBC: CPT | Performed by: STUDENT IN AN ORGANIZED HEALTH CARE EDUCATION/TRAINING PROGRAM

## 2023-03-22 PROCEDURE — P9047 ALBUMIN (HUMAN), 25%, 50ML: HCPCS | Mod: JZ,JG | Performed by: INTERNAL MEDICINE

## 2023-03-22 PROCEDURE — A4216 STERILE WATER/SALINE, 10 ML: HCPCS | Performed by: INTERNAL MEDICINE

## 2023-03-22 PROCEDURE — 80053 COMPREHEN METABOLIC PANEL: CPT | Performed by: STUDENT IN AN ORGANIZED HEALTH CARE EDUCATION/TRAINING PROGRAM

## 2023-03-22 PROCEDURE — 97530 THERAPEUTIC ACTIVITIES: CPT | Mod: CO

## 2023-03-22 PROCEDURE — 84100 ASSAY OF PHOSPHORUS: CPT | Performed by: STUDENT IN AN ORGANIZED HEALTH CARE EDUCATION/TRAINING PROGRAM

## 2023-03-22 PROCEDURE — 25000003 PHARM REV CODE 250: Performed by: NURSE PRACTITIONER

## 2023-03-22 RX ORDER — MAGNESIUM SULFATE HEPTAHYDRATE 40 MG/ML
2 INJECTION, SOLUTION INTRAVENOUS ONCE
Status: COMPLETED | OUTPATIENT
Start: 2023-03-22 | End: 2023-03-22

## 2023-03-22 RX ORDER — OXYCODONE AND ACETAMINOPHEN 5; 325 MG/1; MG/1
1 TABLET ORAL EVERY 8 HOURS PRN
Status: DISCONTINUED | OUTPATIENT
Start: 2023-03-22 | End: 2023-03-22

## 2023-03-22 RX ORDER — OXYCODONE AND ACETAMINOPHEN 5; 325 MG/1; MG/1
1 TABLET ORAL EVERY 6 HOURS PRN
Status: DISCONTINUED | OUTPATIENT
Start: 2023-03-22 | End: 2023-04-04 | Stop reason: HOSPADM

## 2023-03-22 RX ADMIN — HYDROCODONE BITARTRATE AND ACETAMINOPHEN 1 TABLET: 5; 325 TABLET ORAL at 05:03

## 2023-03-22 RX ADMIN — NIFEDIPINE 90 MG: 90 TABLET, FILM COATED, EXTENDED RELEASE ORAL at 09:03

## 2023-03-22 RX ADMIN — METOPROLOL TARTRATE 25 MG: 25 TABLET, FILM COATED ORAL at 09:03

## 2023-03-22 RX ADMIN — INSULIN ASPART 14 UNITS: 100 INJECTION, SOLUTION INTRAVENOUS; SUBCUTANEOUS at 04:03

## 2023-03-22 RX ADMIN — HYDROCODONE BITARTRATE AND ACETAMINOPHEN 1 TABLET: 5; 325 TABLET ORAL at 09:03

## 2023-03-22 RX ADMIN — HYDROCODONE BITARTRATE AND ACETAMINOPHEN 1 TABLET: 5; 325 TABLET ORAL at 01:03

## 2023-03-22 RX ADMIN — LEVETIRACETAM 1000 MG: 500 TABLET, FILM COATED ORAL at 09:03

## 2023-03-22 RX ADMIN — INSULIN ASPART 14 UNITS: 100 INJECTION, SOLUTION INTRAVENOUS; SUBCUTANEOUS at 11:03

## 2023-03-22 RX ADMIN — LISINOPRIL 20 MG: 10 TABLET ORAL at 09:03

## 2023-03-22 RX ADMIN — OXYCODONE HYDROCHLORIDE AND ACETAMINOPHEN 1 TABLET: 5; 325 TABLET ORAL at 10:03

## 2023-03-22 RX ADMIN — DOXAZOSIN 2 MG: 1 TABLET ORAL at 09:03

## 2023-03-22 RX ADMIN — SODIUM CHLORIDE, PRESERVATIVE FREE 10 ML: 5 INJECTION INTRAVENOUS at 10:03

## 2023-03-22 RX ADMIN — MICONAZOLE NITRATE: 20 POWDER TOPICAL at 09:03

## 2023-03-22 RX ADMIN — PANCRELIPASE 6 CAPSULE: 60000; 12000; 38000 CAPSULE, DELAYED RELEASE PELLETS ORAL at 09:03

## 2023-03-22 RX ADMIN — METOLAZONE 10 MG: 5 TABLET ORAL at 09:03

## 2023-03-22 RX ADMIN — SODIUM BICARBONATE 1300 MG: 650 TABLET ORAL at 09:03

## 2023-03-22 RX ADMIN — ENOXAPARIN SODIUM 40 MG: 40 INJECTION SUBCUTANEOUS at 05:03

## 2023-03-22 RX ADMIN — FUROSEMIDE 80 MG: 10 INJECTION, SOLUTION INTRAMUSCULAR; INTRAVENOUS at 09:03

## 2023-03-22 RX ADMIN — ALBUMIN (HUMAN) 25 G: 5 SOLUTION INTRAVENOUS at 09:03

## 2023-03-22 RX ADMIN — SODIUM BICARBONATE 1300 MG: 650 TABLET ORAL at 05:03

## 2023-03-22 RX ADMIN — Medication 400 MG: at 09:03

## 2023-03-22 RX ADMIN — PANCRELIPASE 6 CAPSULE: 60000; 12000; 38000 CAPSULE, DELAYED RELEASE PELLETS ORAL at 05:03

## 2023-03-22 RX ADMIN — NEPHROCAP 1 CAPSULE: 1 CAP ORAL at 09:03

## 2023-03-22 RX ADMIN — GABAPENTIN 400 MG: 100 CAPSULE ORAL at 09:03

## 2023-03-22 RX ADMIN — FUROSEMIDE 80 MG: 10 INJECTION, SOLUTION INTRAMUSCULAR; INTRAVENOUS at 05:03

## 2023-03-22 RX ADMIN — Medication: at 09:03

## 2023-03-22 RX ADMIN — OXYCODONE HYDROCHLORIDE AND ACETAMINOPHEN 1 TABLET: 5; 325 TABLET ORAL at 05:03

## 2023-03-22 RX ADMIN — INSULIN ASPART 14 UNITS: 100 INJECTION, SOLUTION INTRAVENOUS; SUBCUTANEOUS at 09:03

## 2023-03-22 RX ADMIN — FUROSEMIDE 80 MG: 10 INJECTION, SOLUTION INTRAMUSCULAR; INTRAVENOUS at 01:03

## 2023-03-22 RX ADMIN — INSULIN DETEMIR 43 UNITS: 100 INJECTION, SOLUTION SUBCUTANEOUS at 09:03

## 2023-03-22 RX ADMIN — HYDROCODONE BITARTRATE AND ACETAMINOPHEN 1 TABLET: 5; 325 TABLET ORAL at 12:03

## 2023-03-22 RX ADMIN — MAGNESIUM SULFATE HEPTAHYDRATE 2 G: 40 INJECTION, SOLUTION INTRAVENOUS at 11:03

## 2023-03-22 RX ADMIN — INSULIN ASPART 8 UNITS: 100 INJECTION, SOLUTION INTRAVENOUS; SUBCUTANEOUS at 05:03

## 2023-03-22 NOTE — PLAN OF CARE
Problem: Infection  Goal: Absence of Infection Signs and Symptoms  Outcome: Ongoing, Progressing     Problem: Adult Inpatient Plan of Care  Goal: Plan of Care Review  Outcome: Ongoing, Progressing  Goal: Patient-Specific Goal (Individualized)  Outcome: Ongoing, Progressing  Goal: Absence of Hospital-Acquired Illness or Injury  Outcome: Ongoing, Progressing  Goal: Optimal Comfort and Wellbeing  Outcome: Ongoing, Progressing  Goal: Readiness for Transition of Care  Outcome: Ongoing, Progressing     Problem: Adjustment to Illness (Sepsis/Septic Shock)  Goal: Optimal Coping  Outcome: Ongoing, Progressing     Problem: Bleeding (Sepsis/Septic Shock)  Goal: Absence of Bleeding  Outcome: Ongoing, Progressing     Problem: Glycemic Control Impaired (Sepsis/Septic Shock)  Goal: Blood Glucose Level Within Desired Range  Outcome: Ongoing, Progressing     Problem: Infection Progression (Sepsis/Septic Shock)  Goal: Absence of Infection Signs and Symptoms  Outcome: Ongoing, Progressing     Problem: Nutrition Impaired (Sepsis/Septic Shock)  Goal: Optimal Nutrition Intake  Outcome: Ongoing, Progressing     Problem: Fluid and Electrolyte Imbalance (Acute Kidney Injury/Impairment)  Goal: Fluid and Electrolyte Balance  Outcome: Ongoing, Progressing     Problem: Oral Intake Inadequate (Acute Kidney Injury/Impairment)  Goal: Optimal Nutrition Intake  Outcome: Ongoing, Progressing     Problem: Renal Function Impairment (Acute Kidney Injury/Impairment)  Goal: Effective Renal Function  Outcome: Ongoing, Progressing     Problem: Bariatric Environmental Safety  Goal: Safety Maintained with Care  Outcome: Ongoing, Progressing     Problem: Skin Injury Risk Increased  Goal: Skin Health and Integrity  Outcome: Ongoing, Progressing     Problem: Impaired Wound Healing  Goal: Optimal Wound Healing  Outcome: Ongoing, Progressing

## 2023-03-22 NOTE — PROGRESS NOTES
[facility]                       [address] [address]    PATIENT NAME:       GODFREY MORLEY  YOB: 1975  CSN:                [id number]   MRN:                [id number]  ADMIT DATE:         03/11/2023 15:58:00  PHYSICIAN:          Emre Vazquez DPM                            PROGRESS NOTE    DATE:  03/22/2023 00:00:00    SUBJECTIVE:  The patient was seen today.  He is doing well.  He is sitting up in   his chair.  Not voicing any major complaints.  No reported fevers or chills.    PHYSICAL EXAM:  VITAL SIGNS:  Stable.  He is currently afebrile.    LABORATORY DATA:  Reviewed.  White cell count 14.1, H and H 7.8 and 24, BUN   39.3, creatinine 1.7.    His x-rays of the right foot reveals some destructive changes to the 5th   metatarsal head and base of the proximal phalanx suggesting infectious process.    Wound site is about the same.  Wound is relatively clean.  There is no   significant amount of purulence or drainage from the area.  Really no sinus   tracts or bone exposure.    ASSESSMENT:  Nonhealing, recurrent wound, right foot with evidence of suspected   chronic osteomyelitis, 5th MPJ.    PLAN:  The patient was instructed on the findings of physical exam, labs, x-ray.    At this point, I have recommended proceeding with amputation and/or   debridement of the area in an effort to eradicate this process and allow   healing.  The patient is now in agreement whereas in the past he refused to have   the procedure.  We will go ahead and tentatively put him on the schedule for   Friday.  Continue with all the care.        ______________________________  Emre Vazquez DPM    GAS/AQS  DD:  03/22/2023  Time:  06:07PM  DT:  03/22/2023  Time:  06:40PM  Job #:  [id number]      PROGRESS NOTE

## 2023-03-22 NOTE — PT/OT/SLP PROGRESS
Occupational Therapy   Treatment    Name: Kamran Huerta  MRN: [id number]  Admitting Diagnosis:  Sepsis  7 Days Post-Op    Recommendations:     Discharge Recommendations: rehabilitation facility  Discharge Equipment Recommendations:  bath bench, dressing device  Barriers to discharge:       Assessment:     Kamran Huerta is a 48 y.o. male with a medical diagnosis of Sepsis.  Performance deficits affecting function are weakness, impaired endurance, impaired sensation, impaired self care skills, impaired functional mobility, gait instability, impaired balance, decreased safety awareness, decreased upper extremity function, decreased lower extremity function, pain, decreased ROM, edema.     Rehab Prognosis:  Good; patient would benefit from acute skilled OT services to address these deficits and reach maximum level of function.       Plan:     Patient to be seen 5 x/week, 6 x/week to address the above listed problems via self-care/home management, therapeutic activities, therapeutic exercises  Plan of Care Expires: 03/31/23  Plan of Care Reviewed with: patient    Subjective     Pain/Comfort:       Objective:     Communicated with: RN prior to session.  Patient found HOB elevated with   upon OT entry to room.    General Precautions: Standard, fall    Orthopedic Precautions: Patient with new R LE wound 3/21 reached out to podiatrist 3/22 clearing patient to ambulate with darco shoe   Braces:       Occupational Performance:     Bed Mobility:    Patient completed Supine to Sit with stand by assistance     Functional Mobility/Transfers:  Patient completed Sit <> Stand Transfer with stand by assistance  with  rolling walker   Patient completed Bed <> Chair Transfer using Step Transfer technique with stand by assistance with rolling walker  Functional Mobility: from EOB to bedisde chair wearing darco shoe    Activities of Daily Living:  Lower Body Dressing: total assistance donning darco shoe    Therapeutic  Positioning  Skin integrity: Visible skin intact     Patient left up in chair with all lines intact and call button in reach    GOALS:   Multidisciplinary Problems       Occupational Therapy Goals          Problem: Occupational Therapy    Goal Priority Disciplines Outcome Interventions   Occupational Therapy Goal     OT, PT/OT Ongoing, Progressing    Description: Goals to be met by: 3/31/23     Patient will increase functional independence with ADLs by performing:    UE Dressing with Set-up Assistance.  LE Dressing with Stand-by Assistance and Assistive Devices as needed.  Grooming while standing at sink with Stand-by Assistance.  Toileting from toilet (with BSC placed over toilet if needed) with Stand-by Assistance for hygiene and clothing management.                          Time Tracking:     OT Date of Treatment: 03/22/23  OT Start Time: 1545  OT Stop Time: 1600  OT Total Time (min): 15 min    Billable Minutes:Therapeutic Activity 1    OT/ARIANA: ARIANA     Number of ARIANA visits since last OT visit: 2    3/22/2023

## 2023-03-22 NOTE — CONSULTS
[facility]                       [address] [place] [address]    PATIENT NAME:       GODFREY MORLEY  YOB: 1975  CSN:                [id number]   MRN:                [id number]  ADMIT DATE:         03/11/2023 15:58:00  PHYSICIAN:          Emre Vazquez DPM                            CONSULTATION    DATE OF CONSULT:  03/21/2023 08:24:43    HISTORY OF PRESENT ILLNESS:  Mr. Morley is a 48-year-old    gentleman with multiple comorbidities, who presented to the hospital with   generalized anasarca, shortness of breath, dyspnea on exertion, pain, cramping.    He has got a history of poorly-controlled diabetes, renal insufficiency,   hypertension, chronic pancreatitis, and alcoholic liver disease.  I had seen him   in the past concerning wounds on his right foot, in which the patient refused   to have any procedure done here and instead ended up going to [facility]   and apparently had some type of limited amputation of his digit along with some   formal debridements.  He has been and going to the wound care center there,   which he states regularly for further evaluation.  He states he was just there   prior to him coming to this facility.  He has no other complaints.  I have been   asked to see him concerning this wound along with the development of a blister   on the bottom of the foot.  He is not using Darco shoes for any mobilization.    There are multiple specialties on board taking care of him.  Was seen by Wound   Care earlier today, in which they recommended I re-evaluate him.    PAST MEDICAL HISTORY:  Again extensive for recent sepsis, anasarca,   acute-on-chronic kidney disease, cirrhosis, diabetes with neuropathy,   hypertension, lower extremity weakness, anemia, seizure disorder, pancreatitis,   hypomagnesemia.    MEDICATIONS:  As per the chart.    ALLERGIES:  NO KNOWN DRUG OR  FOOD ALLERGIES.     SOCIAL HISTORY:  Denies tobacco, alcohol, or IV drug abuse at this time.  He   quit drinking about a year ago.    FAMILY HISTORY:  Noncontributory at this point.    PHYSICAL EXAMINATION:  GENERAL:  Reveals an older-appearing gentleman than stated age.  Currently lying   in bed, does not appear to be in any distress.  VITAL SIGNS:  His current vital signs are stable and he is afebrile.  HEART/LUNGS/ABDOMEN:  Deferred.  EXTREMITIES:  He has got generalized edema throughout, although it has been   reported that he is markedly improved from admission.  The feet and the legs are   warm.  I am unable to palpate any pedal pulses secondary to that.  Elevation   dependent tests were deferred.  NEUROLOGICAL:  He definitely has a loss of monofilament testing on both feet.    He states he is able to perceive touch.  MUSCULOSKELETAL:  He is able to move the extremities, although he is a little   bit weaker.  Stance and gait exam deferred.  No contractures.  DERM:  He has got residual wound over the lateral 5th metatarsal head, which   looks relatively clean.  He has got previous postsurgical changes to the 5th   digit.  He also has a bullous lesion, plantar aspect of the lateral forefoot,   which is relatively clear.  No necrosis subjacent.    LABORATORY DATA:  Labs reviewed.  Most recent white cell count is 14.1, H and H   7.7 and 23.7.  BUN and creatinine 39 and 1.6.    ASSESSMENT:    1. Chronic right foot wound with what I suspect is possibly some chronic   osteomyelitic process due to the recurrences.    2. Diabetes with neuropathy.    PLAN:  The patient was instructed on the findings of physical exam.  Dressings   were replaced.  He has had previous cultures.  He is currently off antibiotics.    We will continue the current care.  X-rays will determine any further needs   prior to any sort of debridement of this bullous area or the wound itself.  The   patient is in agreement with the plan.    Thank you  for the consultation.        ______________________________  WHITNEY Moyer  DD:  03/21/2023  Time:  07:20PM  DT:  03/21/2023  Time:  10:16PM  Job #:  [id number]      CONSULTATION

## 2023-03-22 NOTE — PROGRESS NOTES
"                                                                                                                        NEPHROLOGY: Progress   48-year-old a.m. with history of poorly controlled type 2 diabetes, obesity, creatinine of 1.47 in November of 2022 with a GFR at that time of approximately 60 however , it was also as low as 33 at that time.  He was being followed by Dr. Alonso in [facility].  He has a history of poorly controlled hypertension as well, seizure disorder and alcoholic liver disease with chronic pancreatitis.  Over the past several months the patient has had increasing volume retention, worsening anasarca and increased immobility due to the edema.  He has reportedly gained 65 lb over the past several months.    Patient with high-grade proteinuria over 10 g likely related to diabetic nephropathy but percutaneous biopsy is planned for today.    Patient is on furosemide 80 mg every 8 hours and metolazone 10 mg daily.    Finally progress noted on both weight and physical appearance. "I can move my legs now."         Current Facility-Administered Medications:     acetaminophen tablet 1,000 mg, 1,000 mg, Oral, Q6H PRN, Neno Moran MD    acetaminophen tablet 650 mg, 650 mg, Oral, Q4H PRN, Neno Moran MD    albumin human 25% bottle 25 g, 25 g, Intravenous, Daily, Figueroa Daniels MD, Stopped at 03/22/23 1015    aluminum-magnesium hydroxide-simethicone 200-200-20 mg/5 mL suspension 30 mL, 30 mL, Oral, QID PRN, Neno Moran MD    cloNIDine tablet 0.2 mg, 0.2 mg, Oral, TID PRN, Neno Moran MD    dextrose 10% bolus 125 mL 125 mL, 12.5 g, Intravenous, PRN, Neno Moran MD    dextrose 10% bolus 250 mL 250 mL, 25 g, Intravenous, PRN, Neno Moran MD    dextrose 40 % gel 15,000 mg, 15 g, Oral, PRN, Neno Moran MD    dextrose 40 % gel 30,000 mg, 30 g, Oral, PRN, Neno Moran MD    doxazosin tablet 2 mg, 2 mg, Oral, QHS, Jaqueline Avina, VIELKAP, 2 mg at 03/21/23 2043    enoxaparin injection 40 mg, " 40 mg, Subcutaneous, Daily, Neno Moran MD, 40 mg at 03/21/23 1656    ergocalciferol capsule 50,000 Units, 50,000 Units, Oral, Q3 Days, Neno Moran MD, 50,000 Units at 03/21/23 0833    furosemide injection 80 mg, 80 mg, Intravenous, Q8H, Benjamín Sharp MD, 80 mg at 03/22/23 0509    gabapentin capsule 400 mg, 400 mg, Oral, BID, Neno Moran MD, 400 mg at 03/22/23 0913    glucagon (human recombinant) injection 1 mg, 1 mg, Intramuscular, PRN, Neno Moran MD, 1 mg at 03/20/23 0545    hydrALAZINE injection 10 mg, 10 mg, Intravenous, Q4H PRN, Figueroa Daniels MD, 10 mg at 03/12/23 1224    HYDROcodone-acetaminophen 5-325 mg per tablet 1 tablet, 1 tablet, Oral, Q4H PRN, Marcos Saldana MD, 1 tablet at 03/22/23 0913    insulin aspart U-100 injection 1-10 Units, 1-10 Units, Subcutaneous, QID (AC + HS) PRN, Neno Moran MD, 8 Units at 03/22/23 0523    insulin aspart U-100 injection 14 Units, 14 Units, Subcutaneous, TIDWM, Figueroa Daniels MD, 14 Units at 03/22/23 0912    insulin detemir U-100 injection 40 Units, 40 Units, Subcutaneous, QHS, Marcos Saldana MD, 40 Units at 03/20/23 2039    labetaloL injection 10 mg, 10 mg, Intravenous, Q4H PRN, Neno Moran MD    levETIRAcetam tablet 1,000 mg, 1,000 mg, Oral, BID, Neno Moran MD, 1,000 mg at 03/22/23 0914    lipase-protease-amylase 12,000-38,000-60,000 units per capsule 6 capsule, 6 capsule, Oral, TID, Neno Moran MD, 6 capsule at 03/22/23 0913    lisinopriL tablet 20 mg, 20 mg, Oral, BID, SUSAN Zabala, 20 mg at 03/22/23 0915    magnesium oxide tablet 400 mg, 400 mg, Oral, BID, Benjamín Sharp MD, 400 mg at 03/22/23 0914    melatonin tablet 6 mg, 6 mg, Oral, Nightly PRN, Neno Moran MD, 6 mg at 03/12/23 0012    metOLazone tablet 10 mg, 10 mg, Oral, Daily, Benjamín Sharp MD, 10 mg at 03/22/23 0912    metoprolol tartrate (LOPRESSOR) tablet 25 mg, 25 mg, Oral, BID, Neno Moran MD, 25 mg at 03/22/23 0914    miconazole NITRATE 2 % top powder, , Topical (Top),  "BID, Marcos Saldana MD, Given at 03/21/23 2045    NIFEdipine 24 hr tablet 90 mg, 90 mg, Oral, Daily, Benjamín Sharp MD, 90 mg at 03/22/23 0914    ondansetron injection 4 mg, 4 mg, Intravenous, Q4H PRN, Neno Moran MD, 4 mg at 03/12/23 0011    polyethylene glycol packet 17 g, 17 g, Oral, BID PRN, Neno Moran MD    prochlorperazine injection Soln 5 mg, 5 mg, Intravenous, Q6H PRN, Neno Moran MD    senna-docusate 8.6-50 mg per tablet 2 tablet, 2 tablet, Oral, BID PRN, Neno Moran MD    simethicone chewable tablet 80 mg, 1 tablet, Oral, QID PRN, Neno Moran MD    sodium bicarbonate tablet 1,300 mg, 1,300 mg, Oral, TID, Neno Moran MD, 1,300 mg at 03/22/23 0913    sodium chloride 0.9% flush 10 mL, 10 mL, Intravenous, PRN, Neno Moran MD    Flushing PICC Protocol, , , Until Discontinued **AND** sodium chloride 0.9% flush 10 mL, 10 mL, Intravenous, Q6H, 10 mL at 03/21/23 2100 **AND** sodium chloride 0.9% flush 10 mL, 10 mL, Intravenous, PRN, Figueroa Daniels MD    vitamin renal formula (B-complex-vitamin c-folic acid) 1 mg per capsule 1 capsule, 1 capsule, Oral, Daily, Neno Moran MD, 1 capsule at 03/22/23 0912    zinc oxide-cod liver oil 40 % paste, , Topical (Top), BID, Marcos Saldana MD, Given at 03/21/23 2045        BP (!) 162/85   Pulse 92   Temp 98.8 °F (37.1 °C) (Oral)   Resp 18   Ht 5' 5" (1.651 m)   Wt 115.2 kg (253 lb 15.5 oz)   SpO2 (!) 92%   BMI 42.26 kg/m²     Physical Exam:    GEN:  Morbidly obese and chronically ill-appearing black male.    HEENT: Atraumatic. EOMI, no icterus  NECK : No JVD  CARD : RRR s rub or gallop  LUNGS :  Decreased breath sounds at the bases  ABD : Soft,non-tender. BS active, obese.  EXT :  Patient with 2-3 + pitting edema up to his thighs bilaterally with now noted softening and improvement         Intake/Output Summary (Last 24 hours) at 3/22/2023 1042  Last data filed at 3/22/2023 0824  Gross per 24 hour   Intake 1560 ml   Output 7200 ml   Net -5640 ml "           Laboratory:  Recent Results (from the past 24 hour(s))   POCT glucose    Collection Time: 03/21/23  4:56 PM   Result Value Ref Range    POCT Glucose 168 (H) 70 - 110 mg/dL   POCT glucose    Collection Time: 03/21/23  8:39 PM   Result Value Ref Range    POCT Glucose 124 (H) 70 - 110 mg/dL   POCT glucose    Collection Time: 03/22/23  5:18 AM   Result Value Ref Range    POCT Glucose 308 (H) 70 - 110 mg/dL   POCT glucose    Collection Time: 03/22/23  9:11 AM   Result Value Ref Range    POCT Glucose 316 (H) 70 - 110 mg/dL   Comprehensive Metabolic Panel    Collection Time: 03/22/23  9:16 AM   Result Value Ref Range    Sodium Level 137 136 - 145 mmol/L    Potassium Level 4.2 3.5 - 5.1 mmol/L    Chloride 104 98 - 107 mmol/L    Carbon Dioxide 24 22 - 29 mmol/L    Glucose Level 291 (H) 74 - 100 mg/dL    Blood Urea Nitrogen 39.3 (H) 8.9 - 20.6 mg/dL    Creatinine 1.72 (H) 0.73 - 1.18 mg/dL    Calcium Level Total 8.3 (L) 8.4 - 10.2 mg/dL    Protein Total 5.2 (L) 6.4 - 8.3 gm/dL    Albumin Level 2.2 (L) 3.5 - 5.0 g/dL    Globulin 3.0 2.4 - 3.5 gm/dL    Albumin/Globulin Ratio 0.7 (L) 1.1 - 2.0 ratio    Bilirubin Total 0.6 <=1.5 mg/dL    Alkaline Phosphatase 79 40 - 150 unit/L    Alanine Aminotransferase 22 0 - 55 unit/L    Aspartate Aminotransferase 23 5 - 34 unit/L    eGFR 49 mls/min/1.73/m2   Magnesium    Collection Time: 03/22/23  9:16 AM   Result Value Ref Range    Magnesium Level 1.50 (L) 1.60 - 2.60 mg/dL   Phosphorus    Collection Time: 03/22/23  9:16 AM   Result Value Ref Range    Phosphorus Level 3.8 2.3 - 4.7 mg/dL   CBC with Differential    Collection Time: 03/22/23  9:16 AM   Result Value Ref Range    WBC 14.1 (H) 4.5 - 11.5 x10(3)/mcL    RBC 2.56 (L) 4.70 - 6.10 x10(6)/mcL    Hgb 7.8 (L) 14.0 - 18.0 g/dL    Hct 24.0 (L) 42.0 - 52.0 %    MCV 93.8 80.0 - 94.0 fL    MCH 30.5 pg    MCHC 32.5 (L) 33.0 - 36.0 g/dL    RDW 12.9 11.5 - 17.0 %    Platelet 220 130 - 400 x10(3)/mcL    MPV 12.0 (H) 7.4 - 10.4 fL     Neut % 67.5 %    Lymph % 17.8 %    Mono % 11.0 %    Eos % 2.2 %    Basophil % 0.7 %    Lymph # 2.51 0.6 - 4.6 x10(3)/mcL    Neut # 9.54 (H) 2.1 - 9.2 x10(3)/mcL    Mono # 1.55 (H) 0.1 - 1.3 x10(3)/mcL    Eos # 0.31 0 - 0.9 x10(3)/mcL    Baso # 0.10 0 - 0.2 x10(3)/mcL    IG# 0.12 (H) 0 - 0.04 x10(3)/mcL    IG% 0.8 %    NRBC% 0.0 %         Assessment/Plan:   Advanced stage III CKD likely diabetic nephropathy   Poorly controlled hypertension and diabetes   Hepatic cirrhosis   UTI   Profound vitamin-D deficiency-37314 units ergo q 72  Secondary hyperparathyroidism  Anasarca with diuretic resistance    Awaiting renal biopsy results, done 3/20  For now, continue present Lasix and Metolazone regimen. He is now mobile enough to be weighed on standing scale. Repeat lab tomorrow

## 2023-03-22 NOTE — PROGRESS NOTES
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 Presented to the ED with complaints of diffuse body swelling specially abdomen, groin, penis and testicles and bilateral lower extremities, dyspnea on minimal exertion and bilateral lower extremity pain and cramping.  Report subjective fever and chills.  Report he was just hospitalized for similar symptom at [facility] about 2 weeks ago and was discharged home after few days. On arrival to ED, he was tachycardic, hypertensive, saturating 100% on room air.  EKG appears sinus rhythm on my review without ischemic changes.  Labs notable for WBC 16.3, hemoglobin 11.4, platelets 346, sodium 139, potassium 5.0, chloride 116, CO2 16, creatinine 2.17, BUN 34, glucose 420, calcium 8.0, albumin 1.3, , negative troponin. CT A/P with Contrast showed left pleural effusion, hepatic cirrhotic morphology, splenomegaly and upper abdominal varices and ascites, chronic pancreatitis changes, extensive subcutaneous anasarca edema.  Kidneys unremarkable without hydronephrosis.     Per ED provider exam he was noted to have purulent penile discharge, sent for culture and Segovia catheter placed.  He was given albumin 12.5 g, Lasix 40 mg IV, Vancomycin and Zosyn and subsequently referred to hospital medicine service for further evaluation and management.  mL with IV Lasix 40. Urine protein creatinine ratio noted to be 10.9. Patient was continued on IV Lasix. Seen by Renal team; recommendations made for IV Lasix/Albumin drip and renal biopsy. He was also seen by GI team for evaluation of cirrhosis and EGD was done to r/o EV. EGD showed erosive gastropathy,  normal esophagus & no other evidence of cirrhosis. Nephrology changed IV Lasix/Albumin drip to IV Lasix 80 mg TID with tentative plan for hemodialysis given patient's diuretic resistance. IR to plan for renal biopsy which could not be performed 03/15 due to uncontrolled HTN. Continues to have significant UOP with IV Lasix.  Nephrology continuing IV Lasix 80 mg t.i.d. and Metolazone 10 mg given stable creatinine and holding off on HD for now. Patient on 1500 mL fluid restriction.  OT to provide brace for scrotal support given patient's discomfort. Counseled regarding compliance with low-sodium diet and fluid restriction in order to optimize volume status with diuresis. Underwent renal biopsy with IR 03/20, will F/U results.     Interval Hx:   Today, Mr Huerta reported feeling better than yesterday with mild improvement in bilateral lower extremity swelling as well as scrotal swelling.  He was noted to have lost 7 kg since yesterday.  UOP of 5.9 L yesterday and 3 L today. Continued on IV Lasix 80 mg t.i.d. and Metolazone 10 mg.  Per Nephrology patient will require hemodialysis soon or later, will follow recommendations.    Objective/physical exam:  General: In no acute distress, obese   Chest: Clear to auscultation bilaterally  Heart: RRR, +S1, S2, no appreciable murmur  Abdomen: + distension, nontender, BS +  MSK: 2+ pitting edema and thickening of skin from abdomen to foot; + penile and scrotal edema   Neurologic: Alert and oriented x4, Cranial nerve II-XII intact, Strength 5/5 in all 4 extremities    VITAL SIGNS: 24 HRS MIN & MAX LAST   Temp  Min: 98.2 °F (36.8 °C)  Max: 99 °F (37.2 °C) 98.4 °F (36.9 °C)   BP  Min: 145/77  Max: 174/81 (!) 153/95   Pulse  Min: 75  Max: 93  87   Resp  Min: 18  Max: 20 18     SpO2  Min: 93 %  Max: 97 % 97 %       Recent Labs   Lab 03/17/23  0744 03/18/23  0511 03/21/23  0643   WBC 10.5 12.1* 14.1*   RBC 2.77* 2.65* 2.55*   HGB 8.4* 8.0* 7.7*   HCT 26.5* 25.1* 23.7*   MCV 95.7* 94.7*  92.9   MCH 30.3 30.2 30.2   MCHC 31.7* 31.9* 32.5*   RDW 14.0 13.7 13.2    250 215   MPV 11.3* 11.7* 11.5*         Recent Labs   Lab 03/18/23  0511 03/19/23  0644 03/20/23  0410 03/21/23  0643    139 142 139   K 4.5 3.9 3.9 3.7   CO2 21* 23 26 25   BUN 36.7* 39.1* 38.6* 39.1*   CREATININE 1.81* 1.57* 1.60* 1.66*   CALCIUM 7.2* 7.5* 7.5* 7.8*   MG  --  0.90* 1.40* 1.60   ALBUMIN 2.0*  --  2.1* 2.1*   ALKPHOS 107  --  73 78   ALT 37  --  28 26   AST 54*  --  28 21   BILITOT 0.3  --  0.4 0.5       Microbiology Results (last 7 days)       Procedure Component Value Units Date/Time    Blood Culture #2 **CANNOT BE ORDERED STAT** [[id number]]  (Normal) Collected: 03/11/23 1848    Order Status: Completed Specimen: Blood Updated: 03/16/23 2100     CULTURE, BLOOD ([facility]) No Growth at 5 days    Blood Culture #1 **CANNOT BE ORDERED STAT** [[id number]]  (Normal) Collected: 03/11/23 1848    Order Status: Completed Specimen: Blood Updated: 03/16/23 2100     CULTURE, BLOOD ([facility]) No Growth at 5 days    Wound Culture [[id number]]  (Abnormal)  (Susceptibility) Collected: 03/11/23 1835    Order Status: Completed Specimen: Drainage from Urethral Updated: 03/15/23 1128     Wound Culture Many Pluralibacter gergoviae      Many Methicillin resistant Staphylococcus aureus           Scheduled Med:   albumin human 25%  25 g Intravenous Daily    doxazosin  2 mg Oral QHS    enoxaparin  40 mg Subcutaneous Daily    ergocalciferol  50,000 Units Oral Q3 Days    furosemide (LASIX) injection  80 mg Intravenous Q8H    gabapentin  400 mg Oral BID    insulin aspart U-100  14 Units Subcutaneous TIDWM    insulin detemir U-100  40 Units Subcutaneous QHS    levETIRAcetam  1,000 mg Oral BID    lipase-protease-amylase 12,000-38,000-60,000 units  6 capsule Oral TID    lisinopriL  20 mg Oral BID    magnesium oxide  400 mg Oral BID    metOLazone  10 mg Oral Daily    metoprolol tartrate  25 mg Oral BID    miconazole NITRATE 2 %   Topical (Top) BID     NIFEdipine  90 mg Oral Daily    sodium bicarbonate  1,300 mg Oral TID    sodium chloride 0.9%  10 mL Intravenous Q6H    vitamin renal formula (B-complex-vitamin c-folic acid)  1 capsule Oral Daily    zinc oxide-cod liver oil   Topical (Top) BID     PRN Meds:  acetaminophen, acetaminophen, aluminum-magnesium hydroxide-simethicone, cloNIDine, dextrose 10%, dextrose 10%, dextrose, dextrose, glucagon (human recombinant), hydrALAZINE, HYDROcodone-acetaminophen, insulin aspart U-100, labetalol, melatonin, ondansetron, polyethylene glycol, prochlorperazine, senna-docusate 8.6-50 mg, simethicone, sodium chloride 0.9%, Flushing PICC Protocol **AND** sodium chloride 0.9% **AND** sodium chloride 0.9%     Assessment/Plan:  Sepsis 2/2 UTI/Urethritis + Staph and Pluralibacter gergoviae    Anasarca 2/2 possible Diabetic Nephropathy  FABIO superimposed on CKD2  Nephrotic Syndrome/Proteinuria 10.9 g 2/2 Diabetic Nephropathy  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  T2DM  Hypertension  Vitamin-D deficiency  Chronic Anemia   B/L LE Weakness   Seizure  Chronic Pancreatitis  Hypomagnesemia    Plan:  - Patient continues to be admitted for close monitoring and diuresis  - Patient having adequate UOP with IV Lasix and Metolazone; UOP 5.9 L yesterday and 3 L today  - Placed on 1500 mL fluid restriction  - Patient likely noncompliant with low sodium diet and fluid restriction as his weight is unchanged despite adequate diuresis; counseled regarding importance of compliance with fluid restriction and low-sodium diet for maximal results with diuresis  - Nephrology on-board; following recommendations  - IR performed L sided renal biopsy 03/20; will F/U report  - Continued on IV Lasix 80 mg TID & Metolazone 10 mg daily with possible plan for HD   - Will continue Levemir 40 units, Aspart 14 units TID & ISS LD  - Continuing Keppra 1000 mg BID, Metoprolol Tartrate 25 mg BID  - Continued on Doxazosin 2 mg, IV Abumin 25 gm daily, Ergocalciferol 86597 u q3days,  Gabapentin 400 mg BID, NG 0 400 mg b.i.d., nifedipine 90 mg daily, NaHCO3 1300 mg t.i.d.  Mg being repleted IV MgSO4  - On Lisinopril per renal recommendations   - Completed 5 days of IV Rocephin for urethritis and UTI   - PT/OT on board; recommending rehab placement  - Continue supportive care   - Avoid NSAIDs    VTE prophylaxis: Lovenox     Patient condition:  Guarded    Anticipated discharge and Disposition:     Pending    All diagnosis and differential diagnosis have been reviewed; assessment and plan has been documented; I have personally reviewed the labs and test results that are presently available; I have reviewed the patients medication list; I have reviewed the consulting providers response and recommendations. I have reviewed or attempted to review medical records based upon their availability    All of the patient's questions have been  addressed and answered. Patient's is agreeable to the above stated plan. I will continue to monitor closely and make adjustments to medical management as needed.  _____________________________________________________________________    Nutrition Status: Diabetic    Radiology:  CT Biopsy Kidney (xpd)  Narrative: EXAMINATION:  CT BIOPSY KIDNEY (XPD)    CLINICAL HISTORY:  kidney core;    TECHNIQUE:  ATTENDING: Chad Espinoza M.D.    ANESTHESIA:Local and Moderate Sedation - Sedation was provided by physician: An independent trained observer, sedation nurse was present to assist in the monitoring of the patients level of consciousness and physiologic status.    Moderate Sedation was performed for 20 minutes.    Automatic exposure control was utilized to reduce patient radiation dosage.    DLP = 318    CT guided Biopsy of a none targeted left kidney.    After the risks, benefits and alternatives were discussed, consent was obtained. A time out was performed to verify the patient's identity and procedure.    The none targeted left kidney was localized with CT to confirm a  window for biopsy. The skin over the biopsy site was cleaned and prepped in normal sterile fashion. Subcutaneous tissues were anesthetized with a lidocaine solution. A 10 cm 19 gauge coaxial needle was advanced to the in.    After confirming the position the stylus was removed and 2 core biopsies were obtained..  Sample sent to pathology for analysis.    The patient tolerated the procedure well.    Estimated blood loss: < 10 mL.    Followup CT scan demonstrated no evidence of hematoma.  Impression: Successful biopsy    Electronically signed by: Chad Espinoza  Date:    03/20/2023  Time:    10:06      Marcos Saldana MD   03/21/2023

## 2023-03-23 LAB
AMMONIA PLAS-MSCNC: 22.3 UMOL/L (ref 18–72)
ANION GAP SERPL CALC-SCNC: 12 MEQ/L
ANION GAP SERPL CALC-SCNC: 9 MEQ/L
BASOPHILS # BLD AUTO: 0.07 X10(3)/MCL (ref 0–0.2)
BASOPHILS NFR BLD AUTO: 0.5 %
BUN SERPL-MCNC: 43.4 MG/DL (ref 8.9–20.6)
BUN SERPL-MCNC: 48.2 MG/DL (ref 8.9–20.6)
CALCIUM SERPL-MCNC: 8.3 MG/DL (ref 8.4–10.2)
CALCIUM SERPL-MCNC: 8.5 MG/DL (ref 8.4–10.2)
CHLORIDE SERPL-SCNC: 102 MMOL/L (ref 98–107)
CHLORIDE SERPL-SCNC: 103 MMOL/L (ref 98–107)
CO2 SERPL-SCNC: 22 MMOL/L (ref 22–29)
CO2 SERPL-SCNC: 24 MMOL/L (ref 22–29)
CORRECTED TEMPERATURE (PCO2): 39 MMHG (ref 35–45)
CORRECTED TEMPERATURE (PCO2): 40 MMHG (ref 35–45)
CORRECTED TEMPERATURE (PH): 7.48 (ref 7.35–7.45)
CORRECTED TEMPERATURE (PH): 7.48 (ref 7.35–7.45)
CORRECTED TEMPERATURE (PO2): 55 MMHG
CORRECTED TEMPERATURE (PO2): 56 MMHG (ref 80–100)
CREAT SERPL-MCNC: 1.8 MG/DL (ref 0.73–1.18)
CREAT SERPL-MCNC: 1.87 MG/DL (ref 0.73–1.18)
CREAT/UREA NIT SERPL: 24
CREAT/UREA NIT SERPL: 26
EOSINOPHIL # BLD AUTO: 0.36 X10(3)/MCL (ref 0–0.9)
EOSINOPHIL NFR BLD AUTO: 2.4 %
ERYTHROCYTE [DISTWIDTH] IN BLOOD BY AUTOMATED COUNT: 12.6 % (ref 11.5–17)
GFR SERPLBLD CREATININE-BSD FMLA CKD-EPI: 44 MLS/MIN/1.73/M2
GFR SERPLBLD CREATININE-BSD FMLA CKD-EPI: 46 MLS/MIN/1.73/M2
GLUCOSE SERPL-MCNC: 190 MG/DL (ref 74–100)
GLUCOSE SERPL-MCNC: 214 MG/DL (ref 74–100)
HCO3 UR-SCNC: 29 MMOL/L (ref 22–26)
HCO3 UR-SCNC: 29.8 MMOL/L (ref 22–26)
HCT VFR BLD AUTO: 23.3 % (ref 42–52)
HGB BLD-MCNC: 7.6 G/DL (ref 14–18)
HGB BLD-MCNC: 8 G/DL (ref 12–16)
HGB BLD-MCNC: 8.2 G/DL (ref 12–16)
IMM GRANULOCYTES # BLD AUTO: 0.08 X10(3)/MCL (ref 0–0.04)
IMM GRANULOCYTES NFR BLD AUTO: 0.5 %
LYMPHOCYTES # BLD AUTO: 2.1 X10(3)/MCL (ref 0.6–4.6)
LYMPHOCYTES NFR BLD AUTO: 14.1 %
MAGNESIUM SERPL-MCNC: 1.7 MG/DL (ref 1.6–2.6)
MCH RBC QN AUTO: 30.3 PG
MCHC RBC AUTO-ENTMCNC: 32.6 G/DL (ref 33–36)
MCV RBC AUTO: 92.8 FL (ref 80–94)
MONOCYTES # BLD AUTO: 1.47 X10(3)/MCL (ref 0.1–1.3)
MONOCYTES NFR BLD AUTO: 9.9 %
NEUTROPHILS # BLD AUTO: 10.79 X10(3)/MCL (ref 2.1–9.2)
NEUTROPHILS NFR BLD AUTO: 72.6 %
NRBC BLD AUTO-RTO: 0 %
PCO2 BLDA: 39 MMHG (ref 35–45)
PCO2 BLDA: 40 MMHG (ref 35–45)
PH SMN: 7.48 [PH] (ref 7.35–7.45)
PH SMN: 7.48 [PH] (ref 7.35–7.45)
PLATELET # BLD AUTO: 238 X10(3)/MCL (ref 130–400)
PMV BLD AUTO: 12 FL (ref 7.4–10.4)
PO2 BLDA: 55 MMHG
PO2 BLDA: 56 MMHG (ref 80–100)
POC BASE DEFICIT: 5.1 MMOL/L (ref -2–2)
POC BASE DEFICIT: 5.8 MMOL/L (ref -2–2)
POC COHB: 2.2 %
POC COHB: 2.2 %
POC IONIZED CALCIUM: 1.1 MMOL/L (ref 1.12–1.23)
POC IONIZED CALCIUM: 1.1 MMOL/L (ref 1.12–1.23)
POC METHB: 1 % (ref 0.4–1.5)
POC METHB: 1.2 % (ref 0.4–1.5)
POC O2HB: 88.5 % (ref 94–97)
POC O2HB: 89 % (ref 94–97)
POC SATURATED O2: 90.5 %
POC SATURATED O2: 90.9 %
POC TEMPERATURE: 37 °C
POC TEMPERATURE: 37 °C
POCT GLUCOSE: 115 MG/DL (ref 70–110)
POCT GLUCOSE: 161 MG/DL (ref 70–110)
POCT GLUCOSE: 172 MG/DL (ref 70–110)
POCT GLUCOSE: 224 MG/DL (ref 70–110)
POTASSIUM BLD-SCNC: 3.6 MMOL/L (ref 3.5–5)
POTASSIUM BLD-SCNC: 3.8 MMOL/L (ref 3.5–5)
POTASSIUM SERPL-SCNC: 4.6 MMOL/L (ref 3.5–5.1)
POTASSIUM SERPL-SCNC: 4.9 MMOL/L (ref 3.5–5.1)
RBC # BLD AUTO: 2.51 X10(6)/MCL (ref 4.7–6.1)
SODIUM BLD-SCNC: 134 MMOL/L (ref 137–145)
SODIUM BLD-SCNC: 134 MMOL/L (ref 137–145)
SODIUM SERPL-SCNC: 136 MMOL/L (ref 136–145)
SODIUM SERPL-SCNC: 136 MMOL/L (ref 136–145)
SPECIMEN SOURCE: ABNORMAL
SPECIMEN SOURCE: ABNORMAL
WBC # SPEC AUTO: 14.9 X10(3)/MCL (ref 4.5–11.5)

## 2023-03-23 PROCEDURE — 94761 N-INVAS EAR/PLS OXIMETRY MLT: CPT

## 2023-03-23 PROCEDURE — 25000003 PHARM REV CODE 250: Performed by: INTERNAL MEDICINE

## 2023-03-23 PROCEDURE — 21400001 HC TELEMETRY ROOM

## 2023-03-23 PROCEDURE — 97530 THERAPEUTIC ACTIVITIES: CPT | Mod: CO

## 2023-03-23 PROCEDURE — 82803 BLOOD GASES ANY COMBINATION: CPT

## 2023-03-23 PROCEDURE — 85025 COMPLETE CBC W/AUTO DIFF WBC: CPT | Performed by: STUDENT IN AN ORGANIZED HEALTH CARE EDUCATION/TRAINING PROGRAM

## 2023-03-23 PROCEDURE — 63600175 PHARM REV CODE 636 W HCPCS: Performed by: STUDENT IN AN ORGANIZED HEALTH CARE EDUCATION/TRAINING PROGRAM

## 2023-03-23 PROCEDURE — 63600175 PHARM REV CODE 636 W HCPCS: Performed by: INTERNAL MEDICINE

## 2023-03-23 PROCEDURE — 97530 THERAPEUTIC ACTIVITIES: CPT | Mod: CQ

## 2023-03-23 PROCEDURE — 99900035 HC TECH TIME PER 15 MIN (STAT)

## 2023-03-23 PROCEDURE — 80048 BASIC METABOLIC PNL TOTAL CA: CPT | Performed by: NURSE PRACTITIONER

## 2023-03-23 PROCEDURE — 36600 WITHDRAWAL OF ARTERIAL BLOOD: CPT

## 2023-03-23 PROCEDURE — 82140 ASSAY OF AMMONIA: CPT | Performed by: INTERNAL MEDICINE

## 2023-03-23 PROCEDURE — 25000003 PHARM REV CODE 250: Performed by: NURSE PRACTITIONER

## 2023-03-23 PROCEDURE — 80048 BASIC METABOLIC PNL TOTAL CA: CPT | Performed by: INTERNAL MEDICINE

## 2023-03-23 PROCEDURE — 27000221 HC OXYGEN, UP TO 24 HOURS

## 2023-03-23 PROCEDURE — 86900 BLOOD TYPING SEROLOGIC ABO: CPT | Performed by: STUDENT IN AN ORGANIZED HEALTH CARE EDUCATION/TRAINING PROGRAM

## 2023-03-23 PROCEDURE — 37799 UNLISTED PX VASCULAR SURGERY: CPT

## 2023-03-23 PROCEDURE — 63600175 PHARM REV CODE 636 W HCPCS: Mod: JZ,JG | Performed by: INTERNAL MEDICINE

## 2023-03-23 PROCEDURE — A4216 STERILE WATER/SALINE, 10 ML: HCPCS | Performed by: INTERNAL MEDICINE

## 2023-03-23 PROCEDURE — 86923 COMPATIBILITY TEST ELECTRIC: CPT | Performed by: INTERNAL MEDICINE

## 2023-03-23 PROCEDURE — 25000003 PHARM REV CODE 250: Performed by: STUDENT IN AN ORGANIZED HEALTH CARE EDUCATION/TRAINING PROGRAM

## 2023-03-23 PROCEDURE — 83735 ASSAY OF MAGNESIUM: CPT | Performed by: STUDENT IN AN ORGANIZED HEALTH CARE EDUCATION/TRAINING PROGRAM

## 2023-03-23 PROCEDURE — P9047 ALBUMIN (HUMAN), 25%, 50ML: HCPCS | Mod: JZ,JG | Performed by: INTERNAL MEDICINE

## 2023-03-23 RX ORDER — ENOXAPARIN SODIUM 100 MG/ML
40 INJECTION SUBCUTANEOUS EVERY 12 HOURS
Status: DISCONTINUED | OUTPATIENT
Start: 2023-03-23 | End: 2023-04-04 | Stop reason: HOSPADM

## 2023-03-23 RX ADMIN — GABAPENTIN 400 MG: 100 CAPSULE ORAL at 08:03

## 2023-03-23 RX ADMIN — ENOXAPARIN SODIUM 40 MG: 40 INJECTION SUBCUTANEOUS at 04:03

## 2023-03-23 RX ADMIN — INSULIN DETEMIR 43 UNITS: 100 INJECTION, SOLUTION SUBCUTANEOUS at 09:03

## 2023-03-23 RX ADMIN — NEPHROCAP 1 CAPSULE: 1 CAP ORAL at 08:03

## 2023-03-23 RX ADMIN — METOPROLOL TARTRATE 25 MG: 25 TABLET, FILM COATED ORAL at 08:03

## 2023-03-23 RX ADMIN — FUROSEMIDE 80 MG: 10 INJECTION, SOLUTION INTRAMUSCULAR; INTRAVENOUS at 05:03

## 2023-03-23 RX ADMIN — FUROSEMIDE 80 MG: 10 INJECTION, SOLUTION INTRAMUSCULAR; INTRAVENOUS at 10:03

## 2023-03-23 RX ADMIN — DOXAZOSIN 2 MG: 1 TABLET ORAL at 10:03

## 2023-03-23 RX ADMIN — LISINOPRIL 20 MG: 10 TABLET ORAL at 08:03

## 2023-03-23 RX ADMIN — METOPROLOL TARTRATE 25 MG: 25 TABLET, FILM COATED ORAL at 10:03

## 2023-03-23 RX ADMIN — GABAPENTIN 400 MG: 100 CAPSULE ORAL at 10:03

## 2023-03-23 RX ADMIN — OXYCODONE HYDROCHLORIDE AND ACETAMINOPHEN 1 TABLET: 5; 325 TABLET ORAL at 07:03

## 2023-03-23 RX ADMIN — PIPERACILLIN AND TAZOBACTAM 4.5 G: 4; .5 INJECTION, POWDER, FOR SOLUTION INTRAVENOUS; PARENTERAL at 10:03

## 2023-03-23 RX ADMIN — SODIUM CHLORIDE, PRESERVATIVE FREE 10 ML: 5 INJECTION INTRAVENOUS at 10:03

## 2023-03-23 RX ADMIN — INSULIN ASPART 14 UNITS: 100 INJECTION, SOLUTION INTRAVENOUS; SUBCUTANEOUS at 04:03

## 2023-03-23 RX ADMIN — Medication: at 10:03

## 2023-03-23 RX ADMIN — PANCRELIPASE 6 CAPSULE: 60000; 12000; 38000 CAPSULE, DELAYED RELEASE PELLETS ORAL at 08:03

## 2023-03-23 RX ADMIN — LEVETIRACETAM 1000 MG: 500 TABLET, FILM COATED ORAL at 08:03

## 2023-03-23 RX ADMIN — METOLAZONE 10 MG: 5 TABLET ORAL at 08:03

## 2023-03-23 RX ADMIN — INSULIN ASPART 14 UNITS: 100 INJECTION, SOLUTION INTRAVENOUS; SUBCUTANEOUS at 12:03

## 2023-03-23 RX ADMIN — Medication 400 MG: at 08:03

## 2023-03-23 RX ADMIN — SODIUM BICARBONATE 1300 MG: 650 TABLET ORAL at 10:03

## 2023-03-23 RX ADMIN — PANCRELIPASE 6 CAPSULE: 60000; 12000; 38000 CAPSULE, DELAYED RELEASE PELLETS ORAL at 10:03

## 2023-03-23 RX ADMIN — OXYCODONE HYDROCHLORIDE AND ACETAMINOPHEN 1 TABLET: 5; 325 TABLET ORAL at 12:03

## 2023-03-23 RX ADMIN — VANCOMYCIN HYDROCHLORIDE 2000 MG: 10 INJECTION, POWDER, LYOPHILIZED, FOR SOLUTION INTRAVENOUS at 03:03

## 2023-03-23 RX ADMIN — NIFEDIPINE 90 MG: 90 TABLET, FILM COATED, EXTENDED RELEASE ORAL at 08:03

## 2023-03-23 RX ADMIN — Medication 400 MG: at 10:03

## 2023-03-23 RX ADMIN — LISINOPRIL 20 MG: 10 TABLET ORAL at 10:03

## 2023-03-23 RX ADMIN — PIPERACILLIN AND TAZOBACTAM 4.5 G: 4; .5 INJECTION, POWDER, FOR SOLUTION INTRAVENOUS; PARENTERAL at 03:03

## 2023-03-23 RX ADMIN — SODIUM BICARBONATE 1300 MG: 650 TABLET ORAL at 03:03

## 2023-03-23 RX ADMIN — MICONAZOLE NITRATE: 20 POWDER TOPICAL at 10:03

## 2023-03-23 RX ADMIN — OXYCODONE HYDROCHLORIDE AND ACETAMINOPHEN 1 TABLET: 5; 325 TABLET ORAL at 05:03

## 2023-03-23 RX ADMIN — SODIUM BICARBONATE 1300 MG: 650 TABLET ORAL at 08:03

## 2023-03-23 RX ADMIN — PANCRELIPASE 6 CAPSULE: 60000; 12000; 38000 CAPSULE, DELAYED RELEASE PELLETS ORAL at 03:03

## 2023-03-23 RX ADMIN — FUROSEMIDE 80 MG: 10 INJECTION, SOLUTION INTRAMUSCULAR; INTRAVENOUS at 03:03

## 2023-03-23 RX ADMIN — LEVETIRACETAM 1000 MG: 500 TABLET, FILM COATED ORAL at 10:03

## 2023-03-23 RX ADMIN — ALBUMIN (HUMAN) 25 G: 5 SOLUTION INTRAVENOUS at 08:03

## 2023-03-23 RX ADMIN — INSULIN ASPART 14 UNITS: 100 INJECTION, SOLUTION INTRAVENOUS; SUBCUTANEOUS at 08:03

## 2023-03-23 NOTE — PROGRESS NOTES
Pharmacist Renal Dose Adjustment Note    Kamran Huerta is a 48 y.o. male being treated with the medication lovenox    Patient Data:    Vital Signs (Most Recent):  Temp: 98.4 °F (36.9 °C) (03/23/23 1100)  Pulse: 82 (03/23/23 1100)  Resp: 19 (03/23/23 1229)  BP: (!) 156/85 (03/23/23 1100)  SpO2: (!) 93 % (03/23/23 1100)   Vital Signs (72h Range):  Temp:  [98 °F (36.7 °C)-99 °F (37.2 °C)]   Pulse:  []   Resp:  [16-22]   BP: (133-174)/(62-95)   SpO2:  [90 %-97 %]      Recent Labs   Lab 03/21/23  0643 03/22/23  0916 03/23/23  0345   CREATININE 1.66* 1.72* 1.80*     Serum creatinine: 1.8 mg/dL (H) 03/23/23 0345  Estimated creatinine clearance: 59.6 mL/min (A)    Medication:lovenox dose: 40mg  frequency q24h will be changed to medication:lovenox dose:40mg frequency:q12h. Pt 's BMI > 40 kg/m2.    Pharmacist's Name: Shira Reyez  Pharmacist's Extension: [phone]

## 2023-03-23 NOTE — PROGRESS NOTES
Pharmacokinetic Initial Assessment: IV Vancomycin    Assessment/Plan:    Initiate intravenous vancomycin with loading dose of 2000 mg once followed by a maintenance dose of vancomycin 1750mg IV every 24 hours  Desired empiric serum trough concentration is 10 to 20 mcg/mL  Draw vancomycin trough level 60 min prior to third dose on 03/25 at approximately 1400  Pharmacy will continue to follow and monitor vancomycin.      Please contact pharmacy at extension [phone] with any questions regarding this assessment.     Thank you for the consult,   Shira Reyez       Patient brief summary:  Kamran Huerta is a 48 y.o. male initiated on antimicrobial therapy with IV Vancomycin for treatment of suspected lower respiratory infection    Drug Allergies:   Review of patient's allergies indicates:  No Known Allergies    Actual Body Weight:   113.3kg    Renal Function:   Estimated Creatinine Clearance: 57.4 mL/min (A) (based on SCr of 1.87 mg/dL (H)).,     Dialysis Method (if applicable):  N/A    CBC (last 72 hours):  Recent Labs   Lab Result Units 03/21/23  0643 03/22/23  0916 03/23/23  0626   WBC x10(3)/mcL 14.1* 14.1* 14.9*   Hgb g/dL 7.7* 7.8* 7.6*   Hct % 23.7* 24.0* 23.3*   Platelet x10(3)/mcL 215 220 238   Mono % % 10.5 11.0 9.9   Eos % % 2.1 2.2 2.4   Basophil % % 0.5 0.7 0.5       Metabolic Panel (last 72 hours):  Recent Labs   Lab Result Units 03/21/23  0643 03/22/23  0916 03/23/23  0345 03/23/23  1320   Sodium Level mmol/L 139 137 136 136   Potassium Level mmol/L 3.7 4.2 4.6 4.9   Chloride mmol/L 104 104 102 103   Carbon Dioxide mmol/L 25 24 22 24   Glucose Level mg/dL 158* 291* 190* 214*   Blood Urea Nitrogen mg/dL 39.1* 39.3* 43.4* 48.2*   Creatinine mg/dL 1.66* 1.72* 1.80* 1.87*   Albumin Level g/dL 2.1* 2.2*  --   --    Bilirubin Total mg/dL 0.5 0.6  --   --    Alkaline Phosphatase unit/L 78 79  --   --    Aspartate Aminotransferase unit/L 21 23  --   --    Alanine Aminotransferase unit/L 26 22  --   --     Magnesium Level mg/dL 1.60 1.50* 1.70  --    Phosphorus Level mg/dL  --  3.8  --   --        Drug levels (last 3 results):  No results for input(s): VANCOMYCINRA, VANCORANDOM, VANCOMYCINPE, VANCOPEAK, VANCOMYCINTR, VANCOTROUGH in the last 72 hours.    Microbiologic Results:  Microbiology Results (last 7 days)       Procedure Component Value Units Date/Time    Blood Culture #2 **CANNOT BE ORDERED STAT** [[id number]]  (Normal) Collected: 03/11/23 1848    Order Status: Completed Specimen: Blood Updated: 03/16/23 2100     CULTURE, BLOOD ([facility]) No Growth at 5 days    Blood Culture #1 **CANNOT BE ORDERED STAT** [[id number]]  (Normal) Collected: 03/11/23 1848    Order Status: Completed Specimen: Blood Updated: 03/16/23 2100     CULTURE, BLOOD ([facility]) No Growth at 5 days

## 2023-03-23 NOTE — PROGRESS NOTES
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 Presented to the ED with complaints of diffuse body swelling specially abdomen, groin, penis and testicles and bilateral lower extremities, dyspnea on minimal exertion and bilateral lower extremity pain and cramping.  Report subjective fever and chills.  Report he was just hospitalized for similar symptom at [facility] about 2 weeks ago and was discharged home after few days. On arrival to ED, he was tachycardic, hypertensive, saturating 100% on room air.  EKG appears sinus rhythm on my review without ischemic changes.  Labs notable for WBC 16.3, hemoglobin 11.4, platelets 346, sodium 139, potassium 5.0, chloride 116, CO2 16, creatinine 2.17, BUN 34, glucose 420, calcium 8.0, albumin 1.3, , negative troponin. CT A/P with Contrast showed left pleural effusion, hepatic cirrhotic morphology, splenomegaly and upper abdominal varices and ascites, chronic pancreatitis changes, extensive subcutaneous anasarca edema.  Kidneys unremarkable without hydronephrosis.     Per ED provider exam he was noted to have purulent penile discharge, sent for culture and Segovia catheter placed.  He was given albumin 12.5 g, Lasix 40 mg IV, Vancomycin and Zosyn and subsequently referred to hospital medicine service for further evaluation and management.  mL with IV Lasix 40. Urine protein creatinine ratio noted to be 10.9. Patient was continued on IV Lasix. Seen by Renal team; recommendations made for IV Lasix/Albumin drip and renal biopsy. He was also seen by GI team for evaluation of cirrhosis and EGD was done to r/o EV. EGD showed erosive gastropathy,  normal esophagus & no other evidence of cirrhosis. Nephrology changed IV Lasix/Albumin drip to IV Lasix 80 mg TID with tentative plan for hemodialysis given patient's diuretic resistance. IR to plan for renal biopsy which could not be performed 03/15 due to uncontrolled HTN. Continues to have significant UOP with IV Lasix.  Nephrology continuing IV Lasix 80 mg t.i.d. and Metolazone 10 mg given stable creatinine and holding off on HD for now. Patient on 1500 mL fluid restriction.  OT to provide brace for scrotal support given patient's discomfort. Counseled regarding compliance with low-sodium diet and fluid restriction in order to optimize volume status with diuresis. Underwent renal biopsy with IR 03/20, will F/U results.     Interval Hx:   Today, Mr Huerta reported feeling better than yesterday with mild improvement in bilateral lower extremity swelling as well as scrotal swelling. UOP of 5.9 L yesterday and 2.3 L today. Continued on IV Lasix 80 mg t.i.d. and Metolazone 10 mg.  Podiatry ordered XR R Foot which showed erosion of 5th metatarsophalangeal joint with osteomyelitis not excluded. Podiatry planning for amputation/debridement of R foot on Friday. Noted to have improved edema in B/L LE edema, abdominal wall edema & scrotal edema.    Objective/physical exam:  General: In no acute distress, obese   Chest: Clear to auscultation bilaterally  Heart: RRR, +S1, S2, no appreciable murmur  Abdomen: + distension, nontender, BS +  MSK: 2+ pitting edema and thickening of skin from abdomen to foot; + penile and scrotal edema   Neurologic: Alert and oriented x4, Cranial nerve II-XII intact, Strength 5/5 in all 4 extremities  Inteegumentary: blister noted on sole of R foot    VITAL SIGNS: 24 HRS MIN & MAX LAST   Temp  Min: 98 °F (36.7 °C)  Max: 98.9 °F (37.2 °C) 98.1 °F (36.7 °C)   BP  Min: 133/79  Max: 162/85 (!) 143/80   Pulse  Min: 81  Max: 100  87   Resp  Min: 16  Max: 22 18     SpO2  Min: 90 %  Max: 93 % (!) 92 %        Recent Labs   Lab 03/18/23  0511 03/21/23  0643 03/22/23  0916   WBC 12.1* 14.1* 14.1*   RBC 2.65* 2.55* 2.56*   HGB 8.0* 7.7* 7.8*   HCT 25.1* 23.7* 24.0*   MCV 94.7* 92.9 93.8   MCH 30.2 30.2 30.5   MCHC 31.9* 32.5* 32.5*   RDW 13.7 13.2 12.9    215 220   MPV 11.7* 11.5* 12.0*         Recent Labs   Lab 03/20/23  0410 03/21/23  0643 03/22/23  0916    139 137   K 3.9 3.7 4.2   CO2 26 25 24   BUN 38.6* 39.1* 39.3*   CREATININE 1.60* 1.66* 1.72*   CALCIUM 7.5* 7.8* 8.3*   MG 1.40* 1.60 1.50*   ALBUMIN 2.1* 2.1* 2.2*   ALKPHOS 73 78 79   ALT 28 26 22   AST 28 21 23   BILITOT 0.4 0.5 0.6       Microbiology Results (last 7 days)       Procedure Component Value Units Date/Time    Blood Culture #2 **CANNOT BE ORDERED STAT** [[id number]]  (Normal) Collected: 03/11/23 1848    Order Status: Completed Specimen: Blood Updated: 03/16/23 2100     CULTURE, BLOOD ([facility]) No Growth at 5 days    Blood Culture #1 **CANNOT BE ORDERED STAT** [[id number]]  (Normal) Collected: 03/11/23 1848    Order Status: Completed Specimen: Blood Updated: 03/16/23 2100     CULTURE, BLOOD ([facility]) No Growth at 5 days           Scheduled Med:   albumin human 25%  25 g Intravenous Daily    doxazosin  2 mg Oral QHS    enoxaparin  40 mg Subcutaneous Daily    ergocalciferol  50,000 Units Oral Q3 Days    furosemide (LASIX) injection  80 mg Intravenous Q8H    gabapentin  400 mg Oral BID    insulin aspart U-100  14 Units Subcutaneous TIDWM    insulin detemir U-100  40 Units Subcutaneous QHS    levETIRAcetam  1,000 mg Oral BID    lipase-protease-amylase 12,000-38,000-60,000 units  6 capsule Oral TID    lisinopriL  20 mg Oral BID    magnesium oxide  400 mg Oral BID    metOLazone  10 mg Oral Daily    metoprolol tartrate  25 mg Oral BID    miconazole NITRATE 2 %   Topical (Top) BID    NIFEdipine  90 mg Oral Daily    sodium bicarbonate  1,300 mg Oral TID    sodium chloride 0.9%  10 mL Intravenous Q6H    vitamin renal formula (B-complex-vitamin c-folic  acid)  1 capsule Oral Daily    zinc oxide-cod liver oil   Topical (Top) BID     PRN Meds:  acetaminophen, acetaminophen, aluminum-magnesium hydroxide-simethicone, cloNIDine, dextrose 10%, dextrose 10%, dextrose, dextrose, glucagon (human recombinant), hydrALAZINE, HYDROcodone-acetaminophen, insulin aspart U-100, labetalol, melatonin, ondansetron, oxyCODONE-acetaminophen, polyethylene glycol, prochlorperazine, senna-docusate 8.6-50 mg, simethicone, sodium chloride 0.9%, Flushing PICC Protocol **AND** sodium chloride 0.9% **AND** sodium chloride 0.9%     Assessment/Plan:  Sepsis 2/2 UTI/Urethritis + Staph and Pluralibacter gergoviae    Anasarca 2/2 possible Diabetic Nephropathy  FABIO superimposed on CKD2  Nephrotic Syndrome/Proteinuria 10.9 g 2/2 Diabetic Nephropathy  Chronic Osteomyelitis R Foot 5th Metatarsalophalangeal Joint  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  T2DM  Hypertension  Vitamin-D deficiency  Chronic Anemia   B/L LE Weakness   Seizure  Chronic Pancreatitis  Hypomagnesemia    Plan:  - Patient continues to be admitted for close monitoring and diuresis  - Patient having adequate UOP with IV Lasix and Metolazone; UOP 5.9 L yesterday and 2.3 L today  - Placed on 1500 mL fluid restriction  - Patient likely noncompliant with low sodium diet and fluid restriction as his weight is unchanged despite adequate diuresis; counseled regarding importance of compliance with fluid restriction and low-sodium diet for maximal results with diuresis  - Nephrology on-board; following recommendations  - IR performed L sided renal biopsy 03/20; will F/U report  - Continued on IV Lasix 80 mg TID & Metolazone 10 mg daily with possible plan for HD   - Podiatry on-board; planning for amputation for R foot osteomyelitis  - Will continue Levemir 43 units, Aspart 14 units TID & ISS LD  - Continuing Keppra 1000 mg BID, Metoprolol Tartrate 25 mg BID  - Continued on Doxazosin 2 mg, IV Abumin 25 gm daily, Ergocalciferol 54105 u q3days, Gabapentin  400 mg BID, NG 0 400 mg b.i.d., nifedipine 90 mg daily, NaHCO3 1300 mg t.i.d.  - Mg repleted with IV MgSO4  - On Lisinopril per renal recommendations   - Completed 5 days of IV Rocephin for urethritis and UTI   - PT/OT on board; recommending rehab placement  - Continue supportive care   - Avoid NSAIDs    VTE prophylaxis: Lovenox     Patient condition:  Guarded    Anticipated discharge and Disposition:     Pending    All diagnosis and differential diagnosis have been reviewed; assessment and plan has been documented; I have personally reviewed the labs and test results that are presently available; I have reviewed the patients medication list; I have reviewed the consulting providers response and recommendations. I have reviewed or attempted to review medical records based upon their availability    All of the patient's questions have been  addressed and answered. Patient's is agreeable to the above stated plan. I will continue to monitor closely and make adjustments to medical management as needed.  _____________________________________________________________________    Nutrition Status: Diabetic    Radiology:  X-Ray Foot Complete Right  Narrative: EXAMINATION:  XR FOOT COMPLETE 3 VIEW RIGHT    CLINICAL HISTORY:  wound;    TECHNIQUE:  Radiographs of the right foot with AP, lateral and oblique  views.    COMPARISON:  No prior imaging available for comparison    FINDINGS:  Sock material obscures fine bony detail.  Degenerative changes throughout with erosive changes of the 5th metatarsophalangeal joint.  Infectious process is not excluded.  Osteomyelitis is not excluded.  No acute fracture identified.  Impression: As above.    Electronically signed by: Chad Espinoza  Date:    03/21/2023  Time:    20:42      Marcos Saldana MD   03/22/2023

## 2023-03-23 NOTE — PT/OT/SLP PROGRESS
Occupational Therapy  Treatment    Kamran Huerta   MRN: [id number]   Admitting Diagnosis: Sepsis    OT Date of Treatment: 03/23/23   OT Start Time: 1400  OT Stop Time: 1415  OT Total Time (min): 15 min      OT/ARIANA: ARIANA     Number of ARIANA visits since last OT visit: 3    General Precautions: Standard, fall  Orthopedic Precautions:    Braces:    Oxygen: .5 L 92% pre-mobility 87% during mobility    Spiritual, Cultural Beliefs, Sabianist Practices, Values that Affect Care: no    Subjective:  Communicated with RN prior to session.     Patient found in chair upon entry asleep easily awaken however very lethargic.     Objective:       Functional Mobility:  Bed Mobility:   Sit to supine: Minimal Assistance    Transfer Training:   Sit to stand:Moderate Assistance with Rolling Walker from chair  Bed <> Chair:  Step Transfer with Maximum Assistance with Rolling Walker from chair to EOB. Assistance needed 2/2 lethargic and major LOB     LE Dressing:  Patient doffing darco shoe requiring total A    Patient left HOB elevated with all lines intact and call button in reach    ASSESSMENT:  Kamran Huerta is a 48 y.o. male with a medical diagnosis of Sepsis. Patient very lethargic today compared to previous treatment requiring more assistance with transfers. RN aware    Rehab potential is excellent    Activity tolerance: Excellent    Discharge recommendations: rehabilitation facility     Equipment recommendations:       GOALS:   Multidisciplinary Problems       Occupational Therapy Goals          Problem: Occupational Therapy    Goal Priority Disciplines Outcome Interventions   Occupational Therapy Goal     OT, PT/OT Ongoing, Progressing    Description: Goals to be met by: 3/31/23     Patient will increase functional independence with ADLs by performing:    UE Dressing with Set-up Assistance.  LE Dressing with Stand-by Assistance and Assistive Devices as needed.  Grooming while standing at sink with Stand-by  Assistance.  Toileting from toilet (with BSC placed over toilet if needed) with Stand-by Assistance for hygiene and clothing management.                          Plan:  Patient to be seen 5 x/week, 6 x/week to address the above listed problems via self-care/home management, therapeutic activities, therapeutic exercises  Plan of Care expires: 03/31/23  Plan of Care reviewed with: patient         03/23/2023

## 2023-03-23 NOTE — PROGRESS NOTES
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 Presented to the ED with complaints of diffuse body swelling specially abdomen, groin, penis and testicles and bilateral lower extremities, dyspnea on minimal exertion and bilateral lower extremity pain and cramping.  Report subjective fever and chills.  Report he was just hospitalized for similar symptom at [facility] about 2 weeks ago and was discharged home after few days. On arrival to ED, he was tachycardic, hypertensive, saturating 100% on room air.  EKG appears sinus rhythm on my review without ischemic changes.  Labs notable for WBC 16.3, hemoglobin 11.4, platelets 346, sodium 139, potassium 5.0, chloride 116, CO2 16, creatinine 2.17, BUN 34, glucose 420, calcium 8.0, albumin 1.3, , negative troponin. CT A/P with Contrast showed left pleural effusion, hepatic cirrhotic morphology, splenomegaly and upper abdominal varices and ascites, chronic pancreatitis changes, extensive subcutaneous anasarca edema.  Kidneys unremarkable without hydronephrosis.     Per ED provider exam he was noted to have purulent penile discharge, sent for culture and Segovia catheter placed.  He was given albumin 12.5 g, Lasix 40 mg IV, Vancomycin and Zosyn and subsequently referred to hospital medicine service for further evaluation and management.  mL with IV Lasix 40. Urine protein creatinine ratio noted to be 10.9. Patient was continued on IV Lasix. Seen by Renal team; recommendations made for IV Lasix/Albumin drip and renal biopsy. He was also seen by GI team for evaluation of cirrhosis and EGD was done to r/o EV. EGD showed erosive gastropathy,  normal esophagus & no other evidence of cirrhosis. Nephrology changed IV Lasix/Albumin drip to IV Lasix 80 mg TID with tentative plan for hemodialysis given patient's diuretic resistance. IR to plan for renal biopsy which could not be performed 03/15 due to uncontrolled HTN. Continues to have significant UOP with IV Lasix.  Nephrology continuing IV Lasix 80 mg t.i.d. and Metolazone 10 mg given stable creatinine and holding off on HD for now. Patient on 1500 mL fluid restriction.  OT to provide brace for scrotal support given patient's discomfort. Counseled regarding compliance with low-sodium diet and fluid restriction in order to optimize volume status with diuresis. Underwent renal biopsy with IR 03/20    Continued on IV Lasix 80 mg t.i.d. and Metolazone 10 mg.  Podiatry ordered XR R Foot which showed erosion of 5th metatarsophalangeal joint with osteomyelitis not excluded. Podiatry planning for amputation/debridement of R foot on Friday.  Noted to have improved edema in B/L LE edema, abdominal wall edema & scrotal edema as of 3/22/23     Interval Hx:     Patient was seen and examined at bedside, appears to be very drowsy, reports having intermittent cough and some SOB    Chart was reviewed, On Nasal Cannula, Tmax 99 with a white count 14 and does have some anemia, cr 1.8, UOP 3.3 L          Objective/physical exam:  General: In no acute distress, obese   Chest: Clear to auscultation bilaterally  Heart: RRR, +S1, S2, no appreciable murmur  Abdomen: + distension, nontender, BS +  MSK: 2+ pitting edema and thickening of skin from abdomen to foot; + penile and scrotal edema   Neurologic: Alert and oriented x4, Cranial nerve II-XII intact, Strength 5/5 in all 4 extremities  Inteegumentary: blister noted on sole of R foot    VITAL SIGNS: 24 HRS MIN & MAX LAST   Temp  Min: 98 °F (36.7 °C)  Max: 99 °F (37.2 °C) 98.9 °F (37.2 °C)   BP  Min: 136/62  Max: 173/91 136/62   Pulse  Min: 81  Max: 91  89   Resp  Min: 18  Max: 20  19     SpO2  Min: 92 %  Max: 93 % (!) 93 %       Recent Labs   Lab 03/21/23  0643 03/22/23  0916 03/23/23  0626   WBC 14.1* 14.1* 14.9*   RBC 2.55* 2.56* 2.51*   HGB 7.7* 7.8* 7.6*   HCT 23.7* 24.0* 23.3*   MCV 92.9 93.8 92.8   MCH 30.2 30.5 30.3   MCHC 32.5* 32.5* 32.6*   RDW 13.2 12.9 12.6    220 238   MPV 11.5* 12.0* 12.0*       Recent Labs   Lab 03/20/23  0410 03/21/23  0643 03/22/23  0916 03/23/23  0345    139 137 136   K 3.9 3.7 4.2 4.6   CO2 26 25 24 22   BUN 38.6* 39.1* 39.3* 43.4*   CREATININE 1.60* 1.66* 1.72* 1.80*   CALCIUM 7.5* 7.8* 8.3* 8.3*   MG 1.40* 1.60 1.50* 1.70   ALBUMIN 2.1* 2.1* 2.2*  --    ALKPHOS 73 78 79  --    ALT 28 26 22  --    AST 28 21 23  --    BILITOT 0.4 0.5 0.6  --      Microbiology Results (last 7 days)       Procedure Component Value Units Date/Time    Blood Culture #2 **CANNOT BE ORDERED STAT** [[id number]]  (Normal) Collected: 03/11/23 1848    Order Status: Completed Specimen: Blood Updated: 03/16/23 2100     CULTURE, BLOOD ([facility]) No Growth at 5 days    Blood Culture #1 **CANNOT BE ORDERED STAT** [[id number]]  (Normal) Collected: 03/11/23 1848    Order Status: Completed Specimen: Blood Updated: 03/16/23 2100     CULTURE, BLOOD ([facility]) No Growth at 5 days           Scheduled Med:   albumin human 25%  25 g Intravenous Daily    doxazosin  2 mg Oral QHS    enoxaparin  40 mg Subcutaneous Daily    ergocalciferol  50,000 Units Oral Q3 Days    furosemide (LASIX) injection  80 mg Intravenous Q8H    gabapentin  400 mg Oral BID    insulin aspart U-100  14 Units Subcutaneous TIDWM    insulin detemir U-100  43 Units Subcutaneous QHS    levETIRAcetam  1,000 mg Oral BID    lipase-protease-amylase 12,000-38,000-60,000 units  6 capsule Oral TID    lisinopriL  20 mg Oral BID    magnesium oxide  400 mg Oral BID    metOLazone  10 mg Oral Daily    metoprolol tartrate  25 mg Oral BID    miconazole NITRATE 2 %   Topical (Top) BID    NIFEdipine  90 mg Oral Daily    sodium bicarbonate  1,300 mg  Oral TID    sodium chloride 0.9%  10 mL Intravenous Q6H    vitamin renal formula (B-complex-vitamin c-folic acid)  1 capsule Oral Daily    zinc oxide-cod liver oil   Topical (Top) BID     PRN Meds:  acetaminophen, acetaminophen, aluminum-magnesium hydroxide-simethicone, cloNIDine, dextrose 10%, dextrose 10%, dextrose, dextrose, glucagon (human recombinant), hydrALAZINE, HYDROcodone-acetaminophen, insulin aspart U-100, labetalol, melatonin, ondansetron, oxyCODONE-acetaminophen, polyethylene glycol, prochlorperazine, senna-docusate 8.6-50 mg, simethicone, sodium chloride 0.9%, Flushing PICC Protocol **AND** sodium chloride 0.9% **AND** sodium chloride 0.9%     Assessment/Plan:    Anasarca 2/2 possible Diabetic Nephropathy  FABIO superimposed on CKD2  Nephrotic Syndrome/Proteinuria 10.9 g 2/2 Diabetic Nephropathy  Sepsis 2/2 UTI/Urethritis + Staph and Pluralibacter gergoviae  , Leukocytosis  Chronic Osteomyelitis R Foot 5th Metatarsalophalangeal Joint  Chronic Anemia   Lethargy  AHRF (normal EF)  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  T2DM  Hypertension  Vitamin-D deficiency  B/L LE Weakness   Seizure  Chronic Pancreatitis  Hypomagnesemia    Plan:    -Nephrology on-board; following recommendations,Patient having adequate UOP with IV Lasix and Metolazone,Placed on 1500 mL fluid restriction.IR performed L sided renal biopsy 03/20; will F/U report.Continue on IV Lasix 80 mg TID & Metolazone 10 mg daily with possible plan for HD ,Cont Lisinopril per renal recommendations   - Completed 5 days of IV Rocephin for urethritis and UTI   - Podiatry on-board; planning for amputation for R foot osteomyelitis  -Monitor Leukocytosis  -Monitor H and H  -Check Ammonia, pH,Avoid Polypharmacy  -Wean O2, Obtain CXR  -Will continue Levemir 43 units, Aspart 14 units TID & ISS LD  -Continue Keppra 1000 mg BID, Metoprolol Tartrate 25 mg BID, Doxazosin 2 mg, Ergocalciferol 51091 u q3days, Gabapentin 400 mg BID, NG 0 400 mg b.i.d., nifedipine 90 mg  daily, NaHCO3 1300 mg t.i.d.  - PT/OT on board; recommending rehab placement    Critical care Time Spent>35 min   [facility]    VTE prophylaxis: Lovenox     Patient condition:  critical    Anticipated discharge and Disposition:         All diagnosis and differential diagnosis have been reviewed; assessment and plan has been documented; I have personally reviewed the labs and test results that are presently available; I have reviewed the patients medication list; I have reviewed the consulting providers response and recommendations. I have reviewed or attempted to review medical records based upon their availability    All of the patient's questions have been  addressed and answered. Patient's is agreeable to the above stated plan. I will continue to monitor closely and make adjustments to medical management as needed.  _____________________________________________________________________    Nutrition Status: Diabetic    Radiology:  X-Ray Foot Complete Right  Narrative: EXAMINATION:  XR FOOT COMPLETE 3 VIEW RIGHT    CLINICAL HISTORY:  wound;    TECHNIQUE:  Radiographs of the right foot with AP, lateral and oblique  views.    COMPARISON:  No prior imaging available for comparison    FINDINGS:  Sock material obscures fine bony detail.  Degenerative changes throughout with erosive changes of the 5th metatarsophalangeal joint.  Infectious process is not excluded.  Osteomyelitis is not excluded.  No acute fracture identified.  Impression: As above.    Electronically signed by: Chad Espinoza  Date:    03/21/2023  Time:    20:42      Michelle Camacho MD   03/23/2023

## 2023-03-23 NOTE — PROGRESS NOTES
NEPHROLOGY: Progress   48-year-old a.m. with history of poorly controlled type 2 diabetes, obesity, creatinine of 1.47 in November of 2022 with a GFR at that time of approximately 60 however , it was also as low as 33 at that time.  He was being followed by Dr. Alonso in [facility].  He has a history of poorly controlled hypertension as well, seizure disorder and alcoholic liver disease with chronic pancreatitis.  Over the past several months the patient has had increasing volume retention, worsening anasarca and increased immobility due to the edema.  He has reportedly gained 65 lb over the past several months.    Patient with high-grade proteinuria over 10 g likely related to diabetic nephropathy but percutaneous biopsy is planned for today.    Patient is on furosemide 80 mg every 8 hours and metolazone 10 mg daily.    Patient in general is feeling much better.  He has lost at least 10 kilos in the last 4 days.    Patient is scheduled for 5th right metatarsophalangeal joint resection for suspected osteomyelitis Friday.            Current Facility-Administered Medications:     acetaminophen tablet 1,000 mg, 1,000 mg, Oral, Q6H PRN, Neno Moran MD    acetaminophen tablet 650 mg, 650 mg, Oral, Q4H PRN, Neno Moran MD    albumin human 25% bottle 25 g, 25 g, Intravenous, Daily, Figueroa Daniels MD, Stopped at 03/23/23 0948    aluminum-magnesium hydroxide-simethicone 200-200-20 mg/5 mL suspension 30 mL, 30 mL, Oral, QID PRN, Neno Moran MD    cloNIDine tablet 0.2 mg, 0.2 mg, Oral, TID PRN, Neno Moran MD    dextrose 10% bolus 125 mL 125 mL, 12.5 g, Intravenous, PRN, Neno Moran MD    dextrose 10% bolus 250 mL 250 mL, 25 g, Intravenous, PRN, Neno Moran MD    dextrose 40 % gel 15,000 mg, 15 g, Oral, PRN, Neno Moran MD    dextrose 40 % gel 30,000 mg, 30 g, Oral, PRN, Ali M Dean,  MD    doxazosin tablet 2 mg, 2 mg, Oral, QHS, Jaqueline Avina, AGNP, 2 mg at 03/22/23 2140    enoxaparin injection 40 mg, 40 mg, Subcutaneous, Daily, Neno Moran MD, 40 mg at 03/22/23 1701    ergocalciferol capsule 50,000 Units, 50,000 Units, Oral, Q3 Days, Neno Moran MD, 50,000 Units at 03/21/23 0833    furosemide injection 80 mg, 80 mg, Intravenous, Q8H, Benjamín Sharp MD, 80 mg at 03/23/23 0512    gabapentin capsule 400 mg, 400 mg, Oral, BID, Neno Moran MD, 400 mg at 03/23/23 0850    glucagon (human recombinant) injection 1 mg, 1 mg, Intramuscular, PRN, Neno Moran MD, 1 mg at 03/20/23 0545    hydrALAZINE injection 10 mg, 10 mg, Intravenous, Q4H PRN, Figueroa Daniels MD, 10 mg at 03/12/23 1224    HYDROcodone-acetaminophen 5-325 mg per tablet 1 tablet, 1 tablet, Oral, Q4H PRN, Marcos Saldana MD, 1 tablet at 03/22/23 1305    insulin aspart U-100 injection 1-10 Units, 1-10 Units, Subcutaneous, QID (AC + HS) PRN, Neno Moran MD, 8 Units at 03/22/23 0523    insulin aspart U-100 injection 14 Units, 14 Units, Subcutaneous, TIDWM, Figueroa Daniels MD, 14 Units at 03/23/23 0850    insulin detemir U-100 injection 43 Units, 43 Units, Subcutaneous, QHS, Marcos Saldana MD, 43 Units at 03/22/23 2140    labetaloL injection 10 mg, 10 mg, Intravenous, Q4H PRN, Neno Moran MD    levETIRAcetam tablet 1,000 mg, 1,000 mg, Oral, BID, Neno Moran MD, 1,000 mg at 03/23/23 0850    lipase-protease-amylase 12,000-38,000-60,000 units per capsule 6 capsule, 6 capsule, Oral, TID, Neno Moran MD, 6 capsule at 03/23/23 0849    lisinopriL tablet 20 mg, 20 mg, Oral, BID, Jaqueline Avina, SUSAN, 20 mg at 03/23/23 0849    magnesium oxide tablet 400 mg, 400 mg, Oral, BID, Benjamín Sharp MD, 400 mg at 03/23/23 0850    melatonin tablet 6 mg, 6 mg, Oral, Nightly PRN, Neno Moran MD, 6 mg at 03/12/23 0012    metOLazone tablet 10 mg, 10 mg, Oral, Daily, Benjamín Sharp MD, 10 mg at 03/23/23 0849    metoprolol tartrate (LOPRESSOR) tablet  "25 mg, 25 mg, Oral, BID, Neno Moran MD, 25 mg at 03/23/23 0850    miconazole NITRATE 2 % top powder, , Topical (Top), BID, Marcos Saldana MD, Given at 03/22/23 2141    NIFEdipine 24 hr tablet 90 mg, 90 mg, Oral, Daily, Benjamín Sharp MD, 90 mg at 03/23/23 0850    ondansetron injection 4 mg, 4 mg, Intravenous, Q4H PRN, Neno Moran MD, 4 mg at 03/12/23 0011    oxyCODONE-acetaminophen 5-325 mg per tablet 1 tablet, 1 tablet, Oral, Q6H PRN, Marcos Saldana MD, 1 tablet at 03/23/23 0512    polyethylene glycol packet 17 g, 17 g, Oral, BID PRN, Neno Moran MD    prochlorperazine injection Soln 5 mg, 5 mg, Intravenous, Q6H PRN, Neno Moran MD    senna-docusate 8.6-50 mg per tablet 2 tablet, 2 tablet, Oral, BID PRN, Neno Moran MD    simethicone chewable tablet 80 mg, 1 tablet, Oral, QID PRN, Neno Moran MD    sodium bicarbonate tablet 1,300 mg, 1,300 mg, Oral, TID, Neno Moran MD, 1,300 mg at 03/23/23 0850    sodium chloride 0.9% flush 10 mL, 10 mL, Intravenous, PRN, Neno Moran MD    Flushing PICC Protocol, , , Until Discontinued **AND** sodium chloride 0.9% flush 10 mL, 10 mL, Intravenous, Q6H, 10 mL at 03/22/23 2246 **AND** sodium chloride 0.9% flush 10 mL, 10 mL, Intravenous, PRN, Figueroa Dnaiels MD    vitamin renal formula (B-complex-vitamin c-folic acid) 1 mg per capsule 1 capsule, 1 capsule, Oral, Daily, Neno Moran MD, 1 capsule at 03/23/23 0849    zinc oxide-cod liver oil 40 % paste, , Topical (Top), BID, Marcos Saldana MD, Given at 03/22/23 2141        /62   Pulse 89   Temp 98.9 °F (37.2 °C) (Oral)   Resp 19   Ht 5' 5" (1.651 m)   Wt 113.3 kg (249 lb 12.5 oz)   SpO2 (!) 93%   BMI 41.57 kg/m²     Physical Exam:    GEN:  Morbidly obese and chronically ill-appearing black male.  No distress.  Complaining of discomfort in the scrotum area.   HEENT: Atraumatic. EOMI, no icterus  NECK : No JVD  CARD : RRR s rub or gallop  LUNGS :  Decreased breath sounds at the bases  ABD : Soft,non-tender. BS " active, obese.  :  Scrotum is much less swollen  EXT :  Patient with 2-3 + pitting edema up to his thighs bilaterally.  Some abdominal wall edema also noted.           Intake/Output Summary (Last 24 hours) at 3/23/2023 1035  Last data filed at 3/23/2023 0933  Gross per 24 hour   Intake 582 ml   Output 5000 ml   Net -4418 ml         Laboratory:  Recent Results (from the past 24 hour(s))   POCT glucose    Collection Time: 03/22/23 10:38 AM   Result Value Ref Range    POCT Glucose 306 (H) 70 - 110 mg/dL   POCT glucose    Collection Time: 03/22/23  4:53 PM   Result Value Ref Range    POCT Glucose 270 (H) 70 - 110 mg/dL   POCT glucose    Collection Time: 03/22/23  9:38 PM   Result Value Ref Range    POCT Glucose 261 (H) 70 - 110 mg/dL   Basic Metabolic Panel    Collection Time: 03/23/23  3:45 AM   Result Value Ref Range    Sodium Level 136 136 - 145 mmol/L    Potassium Level 4.6 3.5 - 5.1 mmol/L    Chloride 102 98 - 107 mmol/L    Carbon Dioxide 22 22 - 29 mmol/L    Glucose Level 190 (H) 74 - 100 mg/dL    Blood Urea Nitrogen 43.4 (H) 8.9 - 20.6 mg/dL    Creatinine 1.80 (H) 0.73 - 1.18 mg/dL    BUN/Creatinine Ratio 24     Calcium Level Total 8.3 (L) 8.4 - 10.2 mg/dL    Anion Gap 12.0 mEq/L    eGFR 46 mls/min/1.73/m2   Magnesium    Collection Time: 03/23/23  3:45 AM   Result Value Ref Range    Magnesium Level 1.70 1.60 - 2.60 mg/dL   POCT glucose    Collection Time: 03/23/23  5:11 AM   Result Value Ref Range    POCT Glucose 172 (H) 70 - 110 mg/dL   CBC with Differential    Collection Time: 03/23/23  6:26 AM   Result Value Ref Range    WBC 14.9 (H) 4.5 - 11.5 x10(3)/mcL    RBC 2.51 (L) 4.70 - 6.10 x10(6)/mcL    Hgb 7.6 (L) 14.0 - 18.0 g/dL    Hct 23.3 (L) 42.0 - 52.0 %    MCV 92.8 80.0 - 94.0 fL    MCH 30.3 pg    MCHC 32.6 (L) 33.0 - 36.0 g/dL    RDW 12.6 11.5 - 17.0 %    Platelet 238 130 - 400 x10(3)/mcL    MPV 12.0 (H) 7.4 - 10.4 fL    Neut % 72.6 %    Lymph % 14.1 %    Mono % 9.9 %    Eos % 2.4 %    Basophil % 0.5 %     Lymph # 2.10 0.6 - 4.6 x10(3)/mcL    Neut # 10.79 (H) 2.1 - 9.2 x10(3)/mcL    Mono # 1.47 (H) 0.1 - 1.3 x10(3)/mcL    Eos # 0.36 0 - 0.9 x10(3)/mcL    Baso # 0.07 0 - 0.2 x10(3)/mcL    IG# 0.08 (H) 0 - 0.04 x10(3)/mcL    IG% 0.5 %    NRBC% 0.0 %         Assessment/Plan:   Advanced stage III CKD likely diabetic nephropathy   Poorly controlled hypertension and diabetes   Hepatic cirrhosis   UTI   Peripheral arterial disease-suspected right 5th MPJ osteo  Profound vitamin-D deficiency-44797 units ergo q 72  Secondary hyperparathyroidism  Anasarca with diuretic resistance    Percutaneous biopsy preliminary report should be available soon.  His weight is down 10 kilos in the last 4 days.  We are restricting fluids to 1500 a day.         Benjamín Sharp MD, FASN

## 2023-03-23 NOTE — PROGRESS NOTES
Inpatient Nutrition Evaluation    Admit Date: 3/11/2023   Total duration of encounter: 12 days    Nutrition Recommendation/Prescription     Continue on current diet.  Continue NICOLAS BID (provides 90 kcal and 2.5 g protein per serving)  Monitor I&Os, appetite, PO intake, and labs.    Nutrition Assessment     Chart Review    Reason Seen: continuous nutrition monitoring dx-Uncontrolled Hyperglycemia    Malnutrition Screening Tool Results   Have you recently lost weight without trying?: No  Have you been eating poorly because of a decreased appetite?: No   MST Score: 0     Diagnosis:  Advanced stage III CKD likely diabetic nephropathy   Poorly controlled hypertension and diabetes   Hepatic cirrhosis   UTI   Profound vitamin-D deficiency-81131 units ergo q 72  Secondary hyperparathyroidism    Relevant Medical History:   Poorly controlled T2DM  Diabetic neuropathy  CKD stage 2-3b ( Cr 1.47 in 11/2022)  Hypertension  Seizure disorder  Chronic back pain/DDD/lumbar radiculopathy  Obesity  History of alcoholic liver disease  Pancreatic insufficiency/chronic pancreatitis    Nutrition-Related Medications: Albumin, ergocalciferol, furosemide, insulin aspart 14 Units, insulin detemir 43 Units, Lipase-protease-amylase, Magnesium oxide, Sodium bicarbonate, Vitamin Renal formula    Nutrition-Related Labs:  3/16/2023: H/H 8.1/25.5, Cl 111, BUN 32.4, Crea 1.79, Ca 7.2, Alb 1.7, AST 51, Gluc 237  3/23/2023: WBC 14.9, H/H 7.6/23.3, BUN 43.4, Crea 1.80, Ca 8.3, Gluc 172    Diet Order: Diet diabetic Low Sodium; Fluid - 1500mL  Diet NPO Except for: Sips with Medication  Oral Supplement Order: Nicolas  Appetite/Oral Intake: good/% of meals  Factors Affecting Nutritional Intake: none identified  Food/Religion/Cultural Preferences: none reported  Food Allergies: none reported    Skin Integrity: cracked, wound  Wound(s):      Altered Skin Integrity 03/21/23 1030 Right lateral Foot Non pressure chronic ulcer Partial thickness tissue loss.  "Shallow open ulcer with a red or pink wound bed, without slough. Intact or Open/Ruptured Serum-filled blister.-Tissue loss description: Full thickness       Altered Skin Integrity 23 1031 Right Plantar Blister(s)-Tissue loss description: Partial thickness     Comments    3/16/2023: Pt reports good appetite/ PO intake prior to admit. Pt reports good appetite/PO intake currently. No reported N/V/D/C and chewing/swallowing difficulties. Pt reports UBW as 95.45 kg. Pt reports gaining 50+ lbs over the past several months due to fluid. Encouraged continuation of Diabetic diet. Will monitor.    3/23/2023: Pt reports >75% PO intake and a good appetite. Pt denies N/V/D/C. Last BM documented as 3/21/2023. Pt receives MARIANA BID for wound healing. No new complaints. Per MD note, podiatry planning for amputation/debridement of right foot on Friday. Will monitor.    Anthropometrics    Height: 5' 5" (165.1 cm) Height Method: Stated  Last Weight: 113.3 kg (249 lb 12.5 oz) (23 09) Weight Method: Bed Scale  BMI (Calculated): 41.6  BMI Classification: obese grade III (BMI >/=40)        Ideal Body Weight (IBW), Male: 136 lb     % Ideal Body Weight, Male (lb): 204.41 %                 Usual Body Weight (UBW), k.45 kg  % Usual Body Weight: 132.39     Usual Weight Provided By: patient    Wt Readings from Last 3 Encounters:   23 0933 113.3 kg (249 lb 12.5 oz)   23 0509 115.2 kg (253 lb 15.5 oz)   23 0930 115.6 kg (254 lb 12.8 oz)   23 0452 117.3 kg (258 lb 9.6 oz)   23 0400 123 kg (271 lb 2.7 oz)   23 0500 123.3 kg (271 lb 13.2 oz)   23 0516 124.2 kg (273 lb 13 oz)   23 1230 128 kg (282 lb 3 oz)   23 1228 125.5 kg (276 lb 10.8 oz)   03/15/23 0945 126.1 kg (278 lb)   23 0700 126.1 kg (278 lb)   23 0426 121.9 kg (268 lb 11.9 oz)   23 1552 117.5 kg (259 lb)   22 1400 87 kg (191 lb 12.8 oz)   22 1345 92.1 kg (203 lb)   21 1414 93.8 kg " (206 lb 12.7 oz)   01/14/21 1234 77.4 kg (170 lb 10.2 oz)      Weight Change(s) Since Admission:  Admit Weight: 117.5 kg (259 lb) (03/11/23 1552)  3/15/2023: 126.1 kg  3/23/2023: 113.3 kg pt on IV Lasix    Patient Education    Not applicable.    Monitoring & Evaluation     Dietitian will monitor food and beverage intake, weight, electrolyte/renal panel, glucose/endocrine profile, and gastrointestinal profile.  Nutrition Risk/Follow-Up: low (follow-up in 5-7 days)  Patients assigned 'low nutrition risk' status do not qualify for a full nutritional assessment but will be monitored and re-evaluated in a 5-7 day time period. Please consult if re-evaluation needed sooner.

## 2023-03-23 NOTE — PT/OT/SLP PROGRESS
Physical Therapy Treatment    Patient Name:  Kamran Huerta   MRN:  [id number]    Recommendations:     Discharge Recommendations: rehabilitation facility  Discharge Equipment Recommendations: other (see comments) (TBD)  Barriers to discharge:  decreased functional mobility; decline from baseline ; due to have sx on Friday for toe amputation    Assessment:     Kamran Huerta is a 48 y.o. male admitted with a medical diagnosis of Sepsis.  He presents with the following impairments/functional limitations: weakness, gait instability, impaired endurance, impaired balance, impaired self care skills, impaired functional mobility, decreased lower extremity function, decreased upper extremity function, pain.    Rehab Prognosis: Good; patient would benefit from acute skilled PT services to address these deficits and reach maximum level of function.    Recent Surgery: Procedure(s) (LRB):  EGD (N/A)  EGD, WITH CLOSED BIOPSY 8 Days Post-Op    Plan:     During this hospitalization, patient to be seen 6 x/week to address the identified rehab impairments via gait training, therapeutic activities, therapeutic exercises and progress toward the following goals:    Plan of Care Expires:  04/16/23    Subjective     Chief Complaint: LLE pain d/t decrease in pain medication  Patient/Family Comments/goals: to improve pain  Pain/Comfort:  Pain Rating 1: 10/10  Location - Side 1: Left  Location 1: leg  Pain Addressed 1: Reposition, Nurse notified      Objective:     Communicated with RN prior to session.  Patient found up in chair with  Segovia catheter upon PTA entry to room.     General Precautions: Standard, fall  Orthopedic Precautions: RLE WBAT in Darco shoe  Braces: N/A  Respiratory Status: Room air; 93%  Blood Pressure:  Skin Integrity: Visible skin intact; wound covered on R foot      Functional Mobility:  Bed mobility:   Supine to sit with min assist for LLE advancement ; increased time to complete  Transfers:    Sit<->stand;  mod assist on first trial, min assist on second  T/f onto scale with min assist  Stand step t/f x 4' with RW and darco. Min assist however difficulty keeping JENI Le's between poles of RW.     Therapeutic Activities/Exercises:  Pain limiting mobility today. Darco shoe applied to RLE for Wb activities. Performed LE therex. Discussed possibility of WB status changed after sx and need to strengthen Ue's and LLE.     Education:  Patient provided with verbal education regarding POC and reviewed HEP.  Understanding was verbalized.     Patient left HOB elevated with all lines intact and call button in reach.    GOALS:   Multidisciplinary Problems       Physical Therapy Goals          Problem: Physical Therapy    Goal Priority Disciplines Outcome Goal Variances Interventions   Physical Therapy Goal     PT, PT/OT Ongoing, Progressing     Description: Goals to be met by: 23     Patient will increase functional independence with mobility by performin. Supine to sit with Stand-by Assistance  2. Sit to stand transfer with Supervision  3. Bed to chair transfer with Supervision using Rolling Walker  4. Gait  x 50 feet with Min A using Rolling Walker.   5. Ascend/descend 4 stair with right Handrails Minimal Assistance using Rolling Walker.                          Time Tracking:     PT Received On: 23  PT Start Time: 904     PT Stop Time: 928  PT Total Time (min): 24 min     Billable Minutes: Therapeutic Activity 2 units    Treatment Type: Treatment  PT/PTA: PTA     Number of PTA visits since last PT visit: 2023

## 2023-03-24 ENCOUNTER — ANESTHESIA EVENT (OUTPATIENT)
Dept: SURGERY | Facility: HOSPITAL | Age: 48
DRG: 853 | End: 2023-03-24
Payer: MEDICARE

## 2023-03-24 ENCOUNTER — ANESTHESIA (OUTPATIENT)
Dept: SURGERY | Facility: HOSPITAL | Age: 48
DRG: 853 | End: 2023-03-24
Payer: MEDICARE

## 2023-03-24 LAB
ABORH RETYPE: NORMAL
ANION GAP SERPL CALC-SCNC: 9 MEQ/L
BASOPHILS # BLD AUTO: 0.08 X10(3)/MCL (ref 0–0.2)
BASOPHILS NFR BLD AUTO: 0.5 %
BUN SERPL-MCNC: 53.2 MG/DL (ref 8.9–20.6)
CALCIUM SERPL-MCNC: 8.2 MG/DL (ref 8.4–10.2)
CHLORIDE SERPL-SCNC: 104 MMOL/L (ref 98–107)
CO2 SERPL-SCNC: 28 MMOL/L (ref 22–29)
CREAT SERPL-MCNC: 1.79 MG/DL (ref 0.73–1.18)
CREAT/UREA NIT SERPL: 30
EOSINOPHIL # BLD AUTO: 0.37 X10(3)/MCL (ref 0–0.9)
EOSINOPHIL NFR BLD AUTO: 2.5 %
ERYTHROCYTE [DISTWIDTH] IN BLOOD BY AUTOMATED COUNT: 12.5 % (ref 11.5–17)
GFR SERPLBLD CREATININE-BSD FMLA CKD-EPI: 47 MLS/MIN/1.73/M2
GLUCOSE SERPL-MCNC: 63 MG/DL (ref 74–100)
GROUP & RH: NORMAL
HCT VFR BLD AUTO: 22.8 % (ref 42–52)
HGB BLD-MCNC: 7.3 G/DL (ref 14–18)
IMM GRANULOCYTES # BLD AUTO: 0.06 X10(3)/MCL (ref 0–0.04)
IMM GRANULOCYTES NFR BLD AUTO: 0.4 %
INDIRECT COOMBS GEL: NORMAL
LYMPHOCYTES # BLD AUTO: 2.47 X10(3)/MCL (ref 0.6–4.6)
LYMPHOCYTES NFR BLD AUTO: 16.6 %
MCH RBC QN AUTO: 29.9 PG (ref 27–31)
MCHC RBC AUTO-ENTMCNC: 32 G/DL (ref 33–36)
MCV RBC AUTO: 93.4 FL (ref 80–94)
MONOCYTES # BLD AUTO: 1.62 X10(3)/MCL (ref 0.1–1.3)
MONOCYTES NFR BLD AUTO: 10.9 %
NEUTROPHILS # BLD AUTO: 10.29 X10(3)/MCL (ref 2.1–9.2)
NEUTROPHILS NFR BLD AUTO: 69.1 %
NRBC BLD AUTO-RTO: 0 %
PLATELET # BLD AUTO: 244 X10(3)/MCL (ref 130–400)
PMV BLD AUTO: 12 FL (ref 7.4–10.4)
POCT GLUCOSE: 104 MG/DL (ref 70–110)
POCT GLUCOSE: 207 MG/DL (ref 70–110)
POCT GLUCOSE: 38 MG/DL (ref 70–110)
POCT GLUCOSE: 65 MG/DL (ref 70–110)
POCT GLUCOSE: 68 MG/DL (ref 70–110)
POTASSIUM SERPL-SCNC: 3.9 MMOL/L (ref 3.5–5.1)
RBC # BLD AUTO: 2.44 X10(6)/MCL (ref 4.7–6.1)
SODIUM SERPL-SCNC: 141 MMOL/L (ref 136–145)
SPECIMEN OUTDATE: NORMAL
WBC # SPEC AUTO: 14.9 X10(3)/MCL (ref 4.5–11.5)

## 2023-03-24 PROCEDURE — P9047 ALBUMIN (HUMAN), 25%, 50ML: HCPCS | Mod: JZ,JG | Performed by: INTERNAL MEDICINE

## 2023-03-24 PROCEDURE — 25000003 PHARM REV CODE 250: Performed by: STUDENT IN AN ORGANIZED HEALTH CARE EDUCATION/TRAINING PROGRAM

## 2023-03-24 PROCEDURE — 27201423 OPTIME MED/SURG SUP & DEVICES STERILE SUPPLY: Performed by: PODIATRIST

## 2023-03-24 PROCEDURE — 37000009 HC ANESTHESIA EA ADD 15 MINS: Performed by: PODIATRIST

## 2023-03-24 PROCEDURE — 80048 BASIC METABOLIC PNL TOTAL CA: CPT | Performed by: INTERNAL MEDICINE

## 2023-03-24 PROCEDURE — 25000003 PHARM REV CODE 250: Performed by: PODIATRIST

## 2023-03-24 PROCEDURE — A4216 STERILE WATER/SALINE, 10 ML: HCPCS | Performed by: INTERNAL MEDICINE

## 2023-03-24 PROCEDURE — 37000008 HC ANESTHESIA 1ST 15 MINUTES: Performed by: PODIATRIST

## 2023-03-24 PROCEDURE — 25000003 PHARM REV CODE 250: Performed by: NURSE ANESTHETIST, CERTIFIED REGISTERED

## 2023-03-24 PROCEDURE — 63600175 PHARM REV CODE 636 W HCPCS: Mod: TB,JG | Performed by: PODIATRIST

## 2023-03-24 PROCEDURE — 87075 CULTR BACTERIA EXCEPT BLOOD: CPT | Performed by: PODIATRIST

## 2023-03-24 PROCEDURE — 87070 CULTURE OTHR SPECIMN AEROBIC: CPT | Performed by: PODIATRIST

## 2023-03-24 PROCEDURE — 36000707: Performed by: PODIATRIST

## 2023-03-24 PROCEDURE — 63600175 PHARM REV CODE 636 W HCPCS: Performed by: INTERNAL MEDICINE

## 2023-03-24 PROCEDURE — 85025 COMPLETE CBC W/AUTO DIFF WBC: CPT | Performed by: INTERNAL MEDICINE

## 2023-03-24 PROCEDURE — 27000221 HC OXYGEN, UP TO 24 HOURS

## 2023-03-24 PROCEDURE — 63600175 PHARM REV CODE 636 W HCPCS: Performed by: PODIATRIST

## 2023-03-24 PROCEDURE — 25000003 PHARM REV CODE 250: Performed by: INTERNAL MEDICINE

## 2023-03-24 PROCEDURE — 63600175 PHARM REV CODE 636 W HCPCS: Performed by: NURSE ANESTHETIST, CERTIFIED REGISTERED

## 2023-03-24 PROCEDURE — 21400001 HC TELEMETRY ROOM

## 2023-03-24 PROCEDURE — 63600175 PHARM REV CODE 636 W HCPCS: Mod: JZ,JG | Performed by: INTERNAL MEDICINE

## 2023-03-24 PROCEDURE — 36000706: Performed by: PODIATRIST

## 2023-03-24 RX ORDER — ONDANSETRON 2 MG/ML
INJECTION INTRAMUSCULAR; INTRAVENOUS
Status: DISCONTINUED | OUTPATIENT
Start: 2023-03-24 | End: 2023-03-24

## 2023-03-24 RX ORDER — PROPOFOL 10 MG/ML
INJECTION, EMULSION INTRAVENOUS CONTINUOUS PRN
Status: DISCONTINUED | OUTPATIENT
Start: 2023-03-24 | End: 2023-03-24

## 2023-03-24 RX ORDER — DEXTROSE MONOHYDRATE 50 MG/ML
INJECTION, SOLUTION INTRAVENOUS CONTINUOUS PRN
Status: DISCONTINUED | OUTPATIENT
Start: 2023-03-24 | End: 2023-03-24

## 2023-03-24 RX ORDER — MIDAZOLAM HYDROCHLORIDE 1 MG/ML
INJECTION INTRAMUSCULAR; INTRAVENOUS
Status: DISCONTINUED | OUTPATIENT
Start: 2023-03-24 | End: 2023-03-24

## 2023-03-24 RX ORDER — PROPOFOL 10 MG/ML
INJECTION, EMULSION INTRAVENOUS
Status: DISCONTINUED | OUTPATIENT
Start: 2023-03-24 | End: 2023-03-24

## 2023-03-24 RX ORDER — DEXTROSE MONOHYDRATE 100 MG/ML
INJECTION, SOLUTION INTRAVENOUS CONTINUOUS PRN
Status: DISCONTINUED | OUTPATIENT
Start: 2023-03-24 | End: 2023-03-24

## 2023-03-24 RX ORDER — LIDOCAINE HYDROCHLORIDE 20 MG/ML
INJECTION INTRAVENOUS
Status: DISCONTINUED | OUTPATIENT
Start: 2023-03-24 | End: 2023-03-24

## 2023-03-24 RX ORDER — TORSEMIDE 20 MG/1
40 TABLET ORAL 2 TIMES DAILY
Status: DISCONTINUED | OUTPATIENT
Start: 2023-03-24 | End: 2023-03-27

## 2023-03-24 RX ORDER — FENTANYL CITRATE 50 UG/ML
INJECTION, SOLUTION INTRAMUSCULAR; INTRAVENOUS
Status: DISCONTINUED | OUTPATIENT
Start: 2023-03-24 | End: 2023-03-24

## 2023-03-24 RX ORDER — BUPIVACAINE HYDROCHLORIDE 5 MG/ML
INJECTION, SOLUTION EPIDURAL; INTRACAUDAL
Status: DISCONTINUED | OUTPATIENT
Start: 2023-03-24 | End: 2023-03-24 | Stop reason: HOSPADM

## 2023-03-24 RX ORDER — CHLORTHALIDONE 25 MG/1
25 TABLET ORAL DAILY
Status: DISCONTINUED | OUTPATIENT
Start: 2023-03-24 | End: 2023-03-27

## 2023-03-24 RX ORDER — DEXAMETHASONE SODIUM PHOSPHATE 4 MG/ML
INJECTION, SOLUTION INTRA-ARTICULAR; INTRALESIONAL; INTRAMUSCULAR; INTRAVENOUS; SOFT TISSUE
Status: DISCONTINUED | OUTPATIENT
Start: 2023-03-24 | End: 2023-03-24

## 2023-03-24 RX ADMIN — DEXAMETHASONE SODIUM PHOSPHATE 4 MG: 4 INJECTION, SOLUTION INTRA-ARTICULAR; INTRALESIONAL; INTRAMUSCULAR; INTRAVENOUS; SOFT TISSUE at 03:03

## 2023-03-24 RX ADMIN — METOPROLOL TARTRATE 25 MG: 25 TABLET, FILM COATED ORAL at 09:03

## 2023-03-24 RX ADMIN — METOPROLOL TARTRATE 25 MG: 25 TABLET, FILM COATED ORAL at 02:03

## 2023-03-24 RX ADMIN — FENTANYL CITRATE 50 MCG: 50 INJECTION, SOLUTION INTRAMUSCULAR; INTRAVENOUS at 03:03

## 2023-03-24 RX ADMIN — MICONAZOLE NITRATE: 20 POWDER TOPICAL at 09:03

## 2023-03-24 RX ADMIN — LIDOCAINE HYDROCHLORIDE 80 MG: 20 INJECTION, SOLUTION INTRAVENOUS at 03:03

## 2023-03-24 RX ADMIN — LISINOPRIL 20 MG: 10 TABLET ORAL at 09:03

## 2023-03-24 RX ADMIN — SODIUM BICARBONATE 1300 MG: 650 TABLET ORAL at 09:03

## 2023-03-24 RX ADMIN — PIPERACILLIN AND TAZOBACTAM 4.5 G: 4; .5 INJECTION, POWDER, FOR SOLUTION INTRAVENOUS; PARENTERAL at 06:03

## 2023-03-24 RX ADMIN — LEVETIRACETAM 1000 MG: 500 TABLET, FILM COATED ORAL at 09:03

## 2023-03-24 RX ADMIN — FUROSEMIDE 80 MG: 10 INJECTION, SOLUTION INTRAMUSCULAR; INTRAVENOUS at 06:03

## 2023-03-24 RX ADMIN — Medication 400 MG: at 09:03

## 2023-03-24 RX ADMIN — VANCOMYCIN HYDROCHLORIDE 1750 MG: 10 INJECTION, POWDER, LYOPHILIZED, FOR SOLUTION INTRAVENOUS at 04:03

## 2023-03-24 RX ADMIN — DOXAZOSIN 2 MG: 1 TABLET ORAL at 09:03

## 2023-03-24 RX ADMIN — GABAPENTIN 400 MG: 100 CAPSULE ORAL at 09:03

## 2023-03-24 RX ADMIN — DEXTROSE: 5 SOLUTION INTRAVENOUS at 03:03

## 2023-03-24 RX ADMIN — MIDAZOLAM HYDROCHLORIDE 2 MG: 1 INJECTION, SOLUTION INTRAMUSCULAR; INTRAVENOUS at 03:03

## 2023-03-24 RX ADMIN — OXYCODONE HYDROCHLORIDE AND ACETAMINOPHEN 1 TABLET: 5; 325 TABLET ORAL at 04:03

## 2023-03-24 RX ADMIN — ENOXAPARIN SODIUM 40 MG: 40 INJECTION SUBCUTANEOUS at 09:03

## 2023-03-24 RX ADMIN — PIPERACILLIN AND TAZOBACTAM 4.5 G: 4; .5 INJECTION, POWDER, FOR SOLUTION INTRAVENOUS; PARENTERAL at 03:03

## 2023-03-24 RX ADMIN — PROPOFOL 30 MG: 10 INJECTION, EMULSION INTRAVENOUS at 03:03

## 2023-03-24 RX ADMIN — ALBUMIN (HUMAN) 25 G: 5 SOLUTION INTRAVENOUS at 10:03

## 2023-03-24 RX ADMIN — Medication: at 09:03

## 2023-03-24 RX ADMIN — FENTANYL CITRATE 25 MCG: 50 INJECTION, SOLUTION INTRAMUSCULAR; INTRAVENOUS at 03:03

## 2023-03-24 RX ADMIN — ONDANSETRON 8 MG: 2 INJECTION INTRAMUSCULAR; INTRAVENOUS at 03:03

## 2023-03-24 RX ADMIN — SODIUM CHLORIDE, PRESERVATIVE FREE 10 ML: 5 INJECTION INTRAVENOUS at 06:03

## 2023-03-24 RX ADMIN — OXYCODONE HYDROCHLORIDE AND ACETAMINOPHEN 1 TABLET: 5; 325 TABLET ORAL at 09:03

## 2023-03-24 RX ADMIN — MIDAZOLAM HYDROCHLORIDE 50 MG: 1 INJECTION, SOLUTION INTRAMUSCULAR; INTRAVENOUS at 03:03

## 2023-03-24 RX ADMIN — PANCRELIPASE 6 CAPSULE: 60000; 12000; 38000 CAPSULE, DELAYED RELEASE PELLETS ORAL at 09:03

## 2023-03-24 RX ADMIN — PROPOFOL 50 MCG/KG/MIN: 10 INJECTION, EMULSION INTRAVENOUS at 03:03

## 2023-03-24 NOTE — PT/OT/SLP PROGRESS
Occupational Therapy      Patient Name:  Kamran Huerta   MRN:  [id number]    Patient going for debridement and amputation of R LE. Will differ tx at this time. OT to re-eval when appropriate.    3/24/2023

## 2023-03-24 NOTE — PROGRESS NOTES
[facility]                       [address] [address]    PATIENT NAME:       GODFREY MORLEY  YOB: 1975  CSN:                [id number]   MRN:                [id number]  ADMIT DATE:         03/11/2023 15:58:00  PHYSICIAN:          Emre Vazquez DPM                            PROGRESS NOTE    DATE:  03/23/2023 00:00:00    SUBJECTIVE:  Mr. Morley seen today.  He is doing well.  No major complaints.    Overall status remains about the same.  He did have a little bit of intermittent   cough earlier today, but it seems to be better.  No other issues.  No reported   fevers.    PHYSICAL EXAM:  VITAL SIGNS:  Stable.  He is afebrile.    LABORATORY DATA:  Labs were reviewed.  White cell counts remain elevated at   14.9, H and H 7.6 and 23.3.  BUN and creatinine 48.2 and 1.87 respectively.    No major changes in the foot.  He has quite a bit of denuded tissues around the   5th digit with the open wound laterally.  No pus or odor.  No fluctuance or   crepitation.  The foot is warm.    Again, x-rays reveal destructive changes to the 5th MPJ suggesting   osteomyelitis.    PLAN:  The patient was instructed as to the findings of the physical exam.    Clinically, he has recurrent and/or nonhealing wound to the right foot.  My   feeling is that he has a chronic osteomyelitis of the area, which continues to   cause this area to breakdown.  I have suggested proceeding with extensive   debridement of this area and/or amputation of the 5th ray in an effort to   maximize healing potential, understanding some of the inherent risks and   complications associated with such.  He does understand all those involved and   wishes to proceed with this procedure.  The only alternatives would be to   continue local wound care and offloading.  I wish to proceed with surgery.  We   will hold him n.p.o. past midnight.  He has been  placed on schedule for   tomorrow.        ______________________________  WHITNEY Moyer  DD:  03/23/2023  Time:  05:47PM  DT:  03/23/2023  Time:  07:06PM  Job #:  [id number]      PROGRESS NOTE

## 2023-03-24 NOTE — ANESTHESIA RELEASE NOTE
"Anesthesia Release from PACU Note    Patient: Kamran Huerta    Procedure(s) Performed: Procedure(s) (LRB):  AMPUTATION, TOE (Right)    Anesthesia type: MAC    Post pain: Adequate analgesia    Post assessment: no apparent anesthetic complications, tolerated procedure well and no evidence of recall    Last Vitals:   Visit Vitals  /86 (BP Location: Left arm, Patient Position: Lying)   Pulse 68   Temp 36 °C (96.8 °F)   Resp 16   Ht 5' 5" (1.651 m)   Wt 111.3 kg (245 lb 6 oz)   SpO2 100%   BMI 40.83 kg/m²       Post vital signs: stable    Level of consciousness: awake, alert , oriented and responds to stimulation    Nausea/Vomiting: no nausea/no vomiting    Complications: none    Airway Patency: patent    Respiratory: unassisted, spontaneous ventilation, face mask    Cardiovascular: stable and blood pressure at baseline    Hydration: euvolemic  "

## 2023-03-24 NOTE — PROGRESS NOTES
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 Presented to the ED with complaints of diffuse body swelling specially abdomen, groin, penis and testicles and bilateral lower extremities, dyspnea on minimal exertion and bilateral lower extremity pain and cramping.  Report subjective fever and chills.  Report he was just hospitalized for similar symptom at [facility] about 2 weeks ago and was discharged home after few days. On arrival to ED, he was tachycardic, hypertensive, saturating 100% on room air.  EKG appears sinus rhythm on my review without ischemic changes.  Labs notable for WBC 16.3, hemoglobin 11.4, platelets 346, sodium 139, potassium 5.0, chloride 116, CO2 16, creatinine 2.17, BUN 34, glucose 420, calcium 8.0, albumin 1.3, , negative troponin. CT A/P with Contrast showed left pleural effusion, hepatic cirrhotic morphology, splenomegaly and upper abdominal varices and ascites, chronic pancreatitis changes, extensive subcutaneous anasarca edema.  Kidneys unremarkable without hydronephrosis.     Per ED provider exam he was noted to have purulent penile discharge, sent for culture and Segovia catheter placed.  He was given albumin 12.5 g, Lasix 40 mg IV, Vancomycin and Zosyn and subsequently referred to hospital medicine service for further evaluation and management.  mL with IV Lasix 40. Urine protein creatinine ratio noted to be 10.9. Patient was continued on IV Lasix. Seen by Renal team; recommendations made for IV Lasix/Albumin drip and renal biopsy. He was also seen by GI team for evaluation of cirrhosis and EGD was done to r/o EV. EGD showed erosive gastropathy,  normal esophagus & no other evidence of cirrhosis. Nephrology changed IV Lasix/Albumin drip to IV Lasix 80 mg TID with tentative plan for hemodialysis given patient's diuretic resistance. IR to plan for renal biopsy which could not be performed 03/15 due to uncontrolled HTN. Continues to have significant UOP with IV Lasix.  Nephrology continuing IV Lasix 80 mg t.i.d. and Metolazone 10 mg given stable creatinine and holding off on HD for now. Patient on 1500 mL fluid restriction.  OT to provide brace for scrotal support given patient's discomfort. Counseled regarding compliance with low-sodium diet and fluid restriction in order to optimize volume status with diuresis. Underwent renal biopsy with IR 03/20    Continued on IV Lasix 80 mg t.i.d. and Metolazone 10 mg.  Podiatry ordered XR R Foot which showed erosion of 5th metatarsophalangeal joint with osteomyelitis not excluded. Podiatry planning for amputation/debridement of R foot on Friday.  Noted to have improved edema in B/L LE edema, abdominal wall edema & scrotal edema as of 3/22/23     Interval Hx:     Patient was seen and examined at bedside, appears to be very drowsy, reports swelling is better but he feels weak.    Chart was reviewed, On Nasal Cannula, Tmax 101 with a white count 14.9 and does have some anemia 7.3, cr 1.8>1.7, UOP 6L      Patient is started on Vanc,Zosyn to cover Possible Respiratory and  Bone Infection    Objective/physical exam:  General: In no acute distress, obese   Chest: Clear to auscultation bilaterally  Heart: RRR, +S1, S2, no appreciable murmur  Abdomen: + distension, nontender, BS +  MSK: 2+ pitting edema and thickening of skin from abdomen to foot; + penile and scrotal edema   Neurologic: Alert and oriented x4, Cranial nerve II-XII intact, Strength 5/5 in all 4 extremities  Inteegumentary: blister noted on sole of R foot          VITAL SIGNS: 24 HRS MIN & MAX LAST   Temp  Min: 98 °F (36.7 °C)  Max: 101 °F (38.3 °C) 98 °F (36.7 °C)    BP  Min: 135/74  Max: 149/82 (!) 147/73   Pulse  Min: 83  Max: 93  84   Resp  Min: 16  Max: 20 17     SpO2  Min: 91 %  Max: 100 % 100 %       Recent Labs   Lab 03/22/23  0916 03/23/23  0626 03/24/23  0742   WBC 14.1* 14.9* 14.9*   RBC 2.56* 2.51* 2.44*   HGB 7.8* 7.6* 7.3*   HCT 24.0* 23.3* 22.8*   MCV 93.8 92.8 93.4   MCH 30.5 30.3 29.9   MCHC 32.5* 32.6* 32.0*   RDW 12.9 12.6 12.5    238 244   MPV 12.0* 12.0* 12.0*       Recent Labs   Lab 03/20/23  0410 03/21/23  0643 03/22/23  0916 03/23/23  0345 03/23/23  1305 03/23/23  1320 03/23/23  1731 03/24/23  0742    139 137 136  --  136  --  141   K 3.9 3.7 4.2 4.6  --  4.9  --  3.9   CO2 26 25 24 22  --  24  --  28   BUN 38.6* 39.1* 39.3* 43.4*  --  48.2*  --  53.2*   CREATININE 1.60* 1.66* 1.72* 1.80*  --  1.87*  --  1.79*   CALCIUM 7.5* 7.8* 8.3* 8.3*  --  8.5  --  8.2*   PH  --   --   --   --  7.48*  --  7.48*  --    MG 1.40* 1.60 1.50* 1.70  --   --   --   --    ALBUMIN 2.1* 2.1* 2.2*  --   --   --   --   --    ALKPHOS 73 78 79  --   --   --   --   --    ALT 28 26 22  --   --   --   --   --    AST 28 21 23  --   --   --   --   --    BILITOT 0.4 0.5 0.6  --   --   --   --   --      Microbiology Results (last 7 days)       ** No results found for the last 168 hours. **           Scheduled Med:   albumin human 25%  25 g Intravenous Daily    chlorthalidone  25 mg Oral Daily    doxazosin  2 mg Oral QHS    enoxaparin  40 mg Subcutaneous Q12H    ergocalciferol  50,000 Units Oral Q3 Days    gabapentin  400 mg Oral BID    insulin aspart U-100  14 Units Subcutaneous TIDWM    insulin detemir U-100  43 Units Subcutaneous QHS    levETIRAcetam  1,000 mg Oral BID    lipase-protease-amylase 12,000-38,000-60,000 units  6 capsule Oral TID    lisinopriL  20 mg Oral BID    magnesium oxide  400 mg Oral BID    metoprolol tartrate  25 mg Oral BID    miconazole NITRATE 2 %   Topical (Top) BID    NIFEdipine  90 mg Oral Daily    piperacillin-tazobactam (ZOSYN) IVPB  4.5 g  Intravenous Q8H    sodium bicarbonate  1,300 mg Oral TID    sodium chloride 0.9%  10 mL Intravenous Q6H    torsemide  40 mg Oral BID loop    vancomycin (VANCOCIN) IVPB  1,750 mg Intravenous Q24H    vitamin renal formula (B-complex-vitamin c-folic acid)  1 capsule Oral Daily    zinc oxide-cod liver oil   Topical (Top) BID     PRN Meds:  acetaminophen, acetaminophen, aluminum-magnesium hydroxide-simethicone, cloNIDine, dextrose 10%, dextrose 10%, dextrose, dextrose, glucagon (human recombinant), hydrALAZINE, HYDROcodone-acetaminophen, insulin aspart U-100, labetalol, melatonin, ondansetron, oxyCODONE-acetaminophen, polyethylene glycol, prochlorperazine, senna-docusate 8.6-50 mg, simethicone, sodium chloride 0.9%, Flushing PICC Protocol **AND** sodium chloride 0.9% **AND** sodium chloride 0.9%, vancomycin - pharmacy to dose     Assessment/Plan:    Anasarca 2/2 possible Diabetic Nephropathy  FABIO superimposed on CKD2  Nephrotic Syndrome/Proteinuria 10.9 g 2/2 Diabetic Nephropathy  Sepsis 2/2 UTI/Urethritis + Staph and Pluralibacter gergoviae  , Leukocytosis  Osteomyelitis R Foot 5th Metatarsalophalangeal Joint  Chronic Anemia   Lethargy  AHRF (normal EF)  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  T2DM  Hypertension  Vitamin-D deficiency  B/L LE Weakness   Seizure  Chronic Pancreatitis  Hypomagnesemia    Plan:  -Nephrology on-board; following recommendations,Patient having adequate UOP ,Placed on 1500 mL fluid restriction.IR performed L sided renal biopsy 03/20; will F/U report.Continue diuretics,was on IV Lasix 80 mg TID & Metolazone 10 mg daily , now switched to torsemide 40mg BID, cont Chlorthalisone,Cont Lisinopril per renal recommendations   -Completed 5 days of IV Rocephin for Urethritis and UTI   -Podiatry on-board;  amputation for R foot osteomyelitis, will f/u Cultures  -Monitor Leukocytosis and fever curve, started on Vanc and Zosyn to cover possible Osteomyelitis and /Pneumonia?, f/u Cultures,Check COVID/Flu  -Oxygen  supplementation to continue to keep Sats >92%  -Will continue Levemir 43 units, Aspart 14 units TID & ISS LD  -Continue Keppra 1000 mg BID, Metoprolol Tartrate 25 mg BID, Doxazosin 2 mg, Ergocalciferol 42581 u q3days, Gabapentin 400 mg BID, NG 0 400 mg b.i.d., nifedipine 90 mg daily, NaHCO3 1300 mg t.i.d.  - PT/OT on board; recommending rehab placement    Critical care Time Spent>35 min   AHRF,Sepsis    VTE prophylaxis: Lovenox     Patient condition:  critical    Anticipated discharge and Disposition:         All diagnosis and differential diagnosis have been reviewed; assessment and plan has been documented; I have personally reviewed the labs and test results that are presently available; I have reviewed the patients medication list; I have reviewed the consulting providers response and recommendations. I have reviewed or attempted to review medical records based upon their availability    All of the patient's questions have been  addressed and answered. Patient's is agreeable to the above stated plan. I will continue to monitor closely and make adjustments to medical management as needed.  _____________________________________________________________________    Nutrition Status: Diabetic    Radiology:  NM Lung Scan Ventilation Perfusion  Narrative: EXAMINATION:  NM LUNG VENTILATION AND PERFUSION IMAGING    CLINICAL HISTORY:  Chest pain, nonspecific;    TECHNIQUE:  Ventilation and perfusion scans were performed in multiple projections. 33.2 mCi of Technetium-99m Pyrophosphate aerosol was used for ventilation scan and 4.6 mCi of Technitium-99m MAA was administered intravenously for the perfusion scan.    COMPARISON:  Chest radiograph March 23, 2023.    FINDINGS:  Ventilation images show bilateral generalized heterogeneity of pyrophosphate distribution.    Perfusion images show more homogeneous MAA activity within bilateral lungs.  There are no unmatched segmental or subsegmental perfusion defects compared to the  ventilation images  Impression: Very low probability for acute pulmonary embolism.    Electronically signed by: Dm Alberto  Date:    03/23/2023  Time:    15:53  X-Ray Chest 1 View  Narrative: EXAMINATION:  XR CHEST 1 VIEW    CLINICAL HISTORY:  ahrf;    TECHNIQUE:  Frontal view(s) of the chest.    COMPARISON:  Radiography 03/11/2023    FINDINGS:  Development of ill-defined bilateral perihilar and lower lung opacities, greater on the right.  No pneumothorax or significant pleural fluid identified.  Cardiac silhouette within normal limits.  Impression: Suspect mild pulmonary edema.    Electronically signed by: Jose E Tracy  Date:    03/23/2023  Time:    13:29      Michelle Camacho MD   03/24/2023

## 2023-03-24 NOTE — PROGRESS NOTES
Checked CBG upon arrival to holding and it was 38. Per Dr. Jung, I gave 250 mL 5% dextrose bolus; rechecked 15 mins later and pt was up to 65; anesthesia was okay with proceeding

## 2023-03-24 NOTE — ANESTHESIA PREPROCEDURE EVALUATION
"                                                                                                             03/24/2023  Kamran Huerta is a 48 y.o., male   Pre-operative evaluation for Procedure(s) (LRB):  AMPUTATION, TOE (Right)    Kamran Huerta is a 48 y.o. male     Patient Active Problem List   Diagnosis    FABIO (acute kidney injury)    Seizure disorder    Sepsis    UTI (urinary tract infection)    Cirrhosis    Anasarca       Review of patient's allergies indicates:  No Known Allergies    No current facility-administered medications on file prior to encounter.     Current Outpatient Medications on File Prior to Encounter   Medication Sig Dispense Refill    amLODIPine (NORVASC) 10 MG tablet Take 10 mg by mouth once daily.      CREON 24,000-76,000 -120,000 unit capsule Take 3 capsules by mouth 3 (three) times daily.      furosemide (LASIX) 40 MG tablet Take 40 mg by mouth once daily.      gabapentin (NEURONTIN) 400 MG capsule Take 400 mg by mouth 2 (two) times daily.      HYDROcodone-acetaminophen (NORCO)  mg per tablet Take 1 tablet by mouth every 8 (eight) hours as needed.      insulin glargine 100 units/mL SubQ pen Inject 30 Units into the skin.      levETIRAcetam (KEPPRA) 500 MG Tab Take 2 tablets (1,000 mg total) by mouth 2 (two) times daily. 60 tablet 6    lisinopriL-hydrochlorothiazide (PRINZIDE,ZESTORETIC) 20-25 mg Tab Take by mouth.      metoprolol tartrate (LOPRESSOR) 25 MG tablet Take 25 mg by mouth 2 (two) times daily.      potassium chloride SA (K-DUR,KLOR-CON) 20 MEQ tablet Take 20 mEq by mouth once daily.      PRENATAL VITAMIN PLUS LOW IRON 27 mg iron- 1 mg Tab Take 1 tablet by mouth once daily.      BD VEO INSULIN SYR, HALF UNIT, 0.3 mL 31 gauge x 15/64" Syrg use as directed      loperamide (IMODIUM) 2 mg capsule Take by mouth.      tamsulosin (FLOMAX) 0.4 mg Cap Take 0.4 mg by mouth once daily.      tiZANidine (ZANAFLEX) 4 MG tablet Take by mouth.         Past " Surgical History:   Procedure Laterality Date    BACK SURGERY      EGD, WITH CLOSED BIOPSY  3/15/2023    Procedure: EGD, WITH CLOSED BIOPSY;  Surgeon: Tj Faith MD;  Location: [facility] ENDOSCOPY;  Service: Gastroenterology;;    ESOPHAGOGASTRODUODENOSCOPY N/A 3/15/2023    Procedure: EGD;  Surgeon: Tj Faith MD;  Location: [facility] ENDOSCOPY;  Service: Gastroenterology;  Laterality: N/A;       Social History     Socioeconomic History    Marital status: Single   Tobacco Use    Smoking status: Never    Smokeless tobacco: Never   Substance and Sexual Activity    Alcohol use: Not Currently    Drug use: Not Currently    Sexual activity: Not Currently     Social Determinants of Health     Social Connections: Unknown    Marital Status:        Blood Type O pos.  CBC:   Recent Labs     03/23/23  0626 03/24/23  0742   WBC 14.9* 14.9*   RBC 2.51* 2.44*   HGB 7.6* 7.3*   HCT 23.3* 22.8*    244   MCV 92.8 93.4   MCH 30.3 29.9   MCHC 32.6* 32.0*       CMP: eGFR mod low=47.  Recent Labs     03/22/23  0916 03/23/23  0345 03/23/23  1320 03/24/23  0742    136 136 141   K 4.2 4.6 4.9 3.9   CO2 24 22 24 28   BUN 39.3* 43.4* 48.2* 53.2*   CREATININE 1.72* 1.80* 1.87* 1.79*   MG 1.50* 1.70  --   --    PHOS 3.8  --   --   --    CALCIUM 8.3* 8.3* 8.5 8.2*   ALBUMIN 2.2*  --   --   --    ALKPHOS 79  --   --   --    ALT 22  --   --   --    AST 23  --   --   --    BILITOT 0.6  --   --   --        INR  No results for input(s): PT, INR, PROTIME, APTT in the last 72 hours.        Diagnostic Studies:  CXR 3/23/2023 : suspect Mild Pulm edema.    CT AbdPelv 3/11/2023: small left dependent pleural effusion and left basilar atelectasis.   Chr pancreatitis. Upper abd multiple varices.  Extensive abdomen and pelvic subcutaneous anasarca edema.hepatic cirrhotic morphology and there is moderate intraperitoneal free fluid consistent with ascites.    CTA Head 11/13/2022: No large vessel occlusion.  2. Moderate  focal narrowing of the right M1 segment.  3. Atherosclerotic changes of the carotid bulbs with 20-30% stenosis on the right by NASCET criteria.  4. Mild narrowing of the right intracranial vertebral artery and bilateral posterior cerebral arteries.    EKG 3/11/2023: Sinus Ruoaj=031. Cannot r/o ant infarct          2D Echo :  No results found for this or any previous visit..            Pre-op Assessment    I have reviewed the Patient Summary Reports.    I have reviewed the NPO Status.   I have reviewed the Medications.     Review of Systems  Anesthesia Hx:  No problems with previous Anesthesia  History of prior surgery of interest to airway management or planning: Previous anesthesia: General 3/15/2023 EGD: TIVA natural AW; 11/10/2018 Oral Mandibular abscess ID:  Ramp; Mask with OAW; CMAC4  Gr1,x1 Nsal MARGIE with general anesthesia.  Procedure performed at an [facility]. Airway issues documented on chart review include GETA, videolaryngoscope used , view on video-laryngoscopy Grade I    Denies Family Hx of Anesthesia complications.   Denies Personal Hx of Anesthesia complications.   Social:  Non-Smoker    Hematology/Oncology:         -- Anemia:   Cardiovascular:   Hypertension CHF ECG has been reviewed. PAD lower exts. Functional Capacity good / => 4 METS    Pulmonary:   CXR 3/23/2023 : suspect Mild Pulm edema.    CT AbdPelv 3/11/2023: small left dependent pleural effusion and left basilar atelectasis.    Renal/:   Chronic Renal Disease, ARF, CKD    Hepatic/GI:   Liver Disease, Hepatic cirrhotic morpho, splenomegaly, upper abd varices and ascites.  Liver Disease , Cirrhosis  Pancreatic Disease   Musculoskeletal:   degen changes of lumbar spine more pronounced at the L5-S1. Degen canal stenosis from C5 through C7. Spine Disorders: lumbar and cervical Degenerative disease    Neurological:   CVA Seizures SZ: On levETIRAcetam tablet 1,000 mg BID.  11/2022 CTA Head: Atherosclerotic changes of the carotid bulbs with  20-30% stenosis on the right by NASCET criteria.  Mild narrowing of the right intracranial vertebral artery and B posterior cerebral arts.  - Mild chronic microvascular ischemic changes.   Endocrine:   Diabetes, type 2, using insulin  Morbid Obesity / BMI > 40      Physical Exam  General: Alert and Oriented    Airway:  Mouth Opening: Normal  TM Distance: Normal  Tongue: Normal  Neck: Girth Increased    Dental:  Intact        Anesthesia Plan  Type of Anesthesia, risks & benefits discussed:    Anesthesia Type: MAC  Intra-op Monitoring Plan: Standard ASA Monitors  Induction:  IV  Airway Plan:  with ramp  Informed Consent: Informed consent signed with the Patient and all parties understand the risks and agree with anesthesia plan.  All questions answered.   ASA Score: 4  Day of Surgery Review of History & Physical: H&P Update referred to the surgeon/provider.I have interviewed and examined the patient. I have reviewed the patient's H&P dated:   Anesthesia Plan Notes: TIVA with mask/NC, GA back-up.     Ready For Surgery From Anesthesia Perspective.     .

## 2023-03-24 NOTE — TRANSFER OF CARE
"Anesthesia Transfer of Care Note    Patient: Kamran Huerta    Procedure(s) Performed: Procedure(s) (LRB):  AMPUTATION, TOE (Right)    Patient location: [facility] Surgical Floor    Anesthesia Type: MAC    Transport from OR: Transported from OR on 6-10 L/min O2 by face mask with adequate spontaneous ventilation    Post pain: adequate analgesia    Post assessment: no apparent anesthetic complications and tolerated procedure well    Post vital signs: stable    Level of consciousness: awake, alert, responds to stimulation and oriented    Nausea/Vomiting: no nausea/vomiting    Complications: none    Transfer of care protocol was followed      Last vitals:   Visit Vitals  /86 (BP Location: Left arm, Patient Position: Lying)   Pulse 68   Temp 36 °C (96.8 °F)   Resp 16   Ht 5' 5" (1.651 m)   Wt 111.3 kg (245 lb 6 oz)   SpO2 100%   BMI 40.83 kg/m²     "

## 2023-03-24 NOTE — PROGRESS NOTES
NEPHROLOGY: Progress   48-year-old a.m. with history of poorly controlled type 2 diabetes, obesity, creatinine of 1.47 in November of 2022 with a GFR at that time of approximately 60 however , it was also as low as 33 at that time.  He was being followed by Dr. Alonso in [facility].  He has a history of poorly controlled hypertension as well, seizure disorder and alcoholic liver disease with chronic pancreatitis.  Over the past several months the patient has had increasing volume retention, worsening anasarca and increased immobility due to the edema.  He has reportedly gained 65 lb over the past several months.    Patient with high-grade proteinuria over 10 g likely related to diabetic nephropathy but percutaneous biopsy is planned for today.    Patient is on furosemide 80 mg every 8 hours and metolazone 10 mg daily.    Patient in general is feeling much better.  He has lost at least 10 kilos in the last 4 days.    Patient is scheduled for 5th right metatarsophalangeal joint resection for suspected osteomyelitis Friday.            Current Facility-Administered Medications:     acetaminophen tablet 1,000 mg, 1,000 mg, Oral, Q6H PRN, Neno Moran MD    acetaminophen tablet 650 mg, 650 mg, Oral, Q4H PRN, Neno Moran MD    albumin human 25% bottle 25 g, 25 g, Intravenous, Daily, Figueroa Daniels MD, Last Rate: 100 mL/hr at 03/24/23 1018, 25 g at 03/24/23 1018    aluminum-magnesium hydroxide-simethicone 200-200-20 mg/5 mL suspension 30 mL, 30 mL, Oral, QID PRN, Neno Moran MD    cloNIDine tablet 0.2 mg, 0.2 mg, Oral, TID PRN, Neno Moran MD    dextrose 10% bolus 125 mL 125 mL, 12.5 g, Intravenous, PRN, Neno Moran MD    dextrose 10% bolus 250 mL 250 mL, 25 g, Intravenous, PRN, Neno Moran MD    dextrose 40 % gel 15,000 mg, 15 g, Oral, PRN, Neno Moran MD    dextrose 40 % gel 30,000  mg, 30 g, Oral, PRN, Neno Moran MD    doxazosin tablet 2 mg, 2 mg, Oral, QHS, Jaqueline S Avina, AGNP, 2 mg at 03/23/23 2219    enoxaparin injection 40 mg, 40 mg, Subcutaneous, Q12H, Marcos Saldana MD, 40 mg at 03/23/23 1655    ergocalciferol capsule 50,000 Units, 50,000 Units, Oral, Q3 Days, Neno Moran MD, 50,000 Units at 03/21/23 0833    furosemide injection 80 mg, 80 mg, Intravenous, Q8H, Benjamín Sharp MD, 80 mg at 03/24/23 0612    gabapentin capsule 400 mg, 400 mg, Oral, BID, Neno Moran MD, 400 mg at 03/23/23 2220    glucagon (human recombinant) injection 1 mg, 1 mg, Intramuscular, PRN, Neno Moran MD, 1 mg at 03/20/23 0545    hydrALAZINE injection 10 mg, 10 mg, Intravenous, Q4H PRN, Figueroa Daniels MD, 10 mg at 03/12/23 1224    HYDROcodone-acetaminophen 5-325 mg per tablet 1 tablet, 1 tablet, Oral, Q4H PRN, Marcos Saldana MD, 1 tablet at 03/22/23 1305    insulin aspart U-100 injection 1-10 Units, 1-10 Units, Subcutaneous, QID (AC + HS) PRN, Neno Moran MD, 8 Units at 03/22/23 0523    insulin aspart U-100 injection 14 Units, 14 Units, Subcutaneous, TIDWM, Figueroa Daniels MD, 14 Units at 03/23/23 1655    insulin detemir U-100 injection 43 Units, 43 Units, Subcutaneous, QHS, Marcos Saldana MD, 43 Units at 03/23/23 2100    labetaloL injection 10 mg, 10 mg, Intravenous, Q4H PRN, Neno Moran MD    levETIRAcetam tablet 1,000 mg, 1,000 mg, Oral, BID, Neno Moran MD, 1,000 mg at 03/23/23 2218    lipase-protease-amylase 12,000-38,000-60,000 units per capsule 6 capsule, 6 capsule, Oral, TID, Neno Moran MD, 6 capsule at 03/23/23 2222    lisinopriL tablet 20 mg, 20 mg, Oral, BID, Jaqueline Avina, SUSAN, 20 mg at 03/23/23 2220    magnesium oxide tablet 400 mg, 400 mg, Oral, BID, Benjamín Sharp MD, 400 mg at 03/23/23 2220    melatonin tablet 6 mg, 6 mg, Oral, Nightly PRN, Neno Moran MD, 6 mg at 03/12/23 0012    metOLazone tablet 10 mg, 10 mg, Oral, Daily, Benjamín Sharp MD, 10 mg at 03/23/23 0849     metoprolol tartrate (LOPRESSOR) tablet 25 mg, 25 mg, Oral, BID, Neno Moran MD, 25 mg at 03/23/23 2220    miconazole NITRATE 2 % top powder, , Topical (Top), BID, Marcos Saldana MD, Given at 03/23/23 2249    NIFEdipine 24 hr tablet 90 mg, 90 mg, Oral, Daily, Benjamín Sharp MD, 90 mg at 03/23/23 0850    ondansetron injection 4 mg, 4 mg, Intravenous, Q4H PRN, Neno Moran MD, 4 mg at 03/12/23 0011    oxyCODONE-acetaminophen 5-325 mg per tablet 1 tablet, 1 tablet, Oral, Q6H PRN, Marcos Saldana MD, 1 tablet at 03/24/23 0452    piperacillin-tazobactam (ZOSYN) 4.5 g in dextrose 5 % in water (D5W) 5 % 100 mL IVPB (MB+), 4.5 g, Intravenous, Q8H, Michelle Camacho MD, Last Rate: 25 mL/hr at 03/24/23 0615, 4.5 g at 03/24/23 0615    polyethylene glycol packet 17 g, 17 g, Oral, BID PRN, Neno Moran MD    prochlorperazine injection Soln 5 mg, 5 mg, Intravenous, Q6H PRN, Neno Moran MD    senna-docusate 8.6-50 mg per tablet 2 tablet, 2 tablet, Oral, BID PRN, Neno Moran MD    simethicone chewable tablet 80 mg, 1 tablet, Oral, QID PRN, Neno Moran MD    sodium bicarbonate tablet 1,300 mg, 1,300 mg, Oral, TID, Neno Moran MD, 1,300 mg at 03/23/23 2218    sodium chloride 0.9% flush 10 mL, 10 mL, Intravenous, PRN, Neno Moran MD    Flushing PICC Protocol, , , Until Discontinued **AND** sodium chloride 0.9% flush 10 mL, 10 mL, Intravenous, Q6H, 10 mL at 03/24/23 0612 **AND** sodium chloride 0.9% flush 10 mL, 10 mL, Intravenous, PRN, Figueroa Daniels MD    vancomycin (VANCOCIN) 1,750 mg in dextrose 5 % (D5W) 500 mL IVPB, 1,750 mg, Intravenous, Q24H, Marcos Saldana MD    vancomycin - pharmacy to dose, , Intravenous, pharmacy to manage frequency, Marcos Saldana MD    vitamin renal formula (B-complex-vitamin c-folic acid) 1 mg per capsule 1 capsule, 1 capsule, Oral, Daily, Neno Moran MD, 1 capsule at 03/23/23 0849    zinc oxide-cod liver oil 40 % paste, , Topical (Top), BID, Marcos Saldana MD, Given at 03/23/23 1746        BP  "(!) 140/84 (BP Location: Left arm, Patient Position: Lying)   Pulse 83   Temp 99.6 °F (37.6 °C) (Axillary)   Resp 18   Ht 5' 5" (1.651 m)   Wt 111.3 kg (245 lb 6 oz)   SpO2 100%   BMI 40.83 kg/m²     Physical Exam:    GEN:  Morbidly obese and chronically ill-appearing black male.  No distress.  Complaining of discomfort in the scrotum area.   HEENT: Atraumatic. EOMI, no icterus  NECK : No JVD  CARD : RRR s rub or gallop  LUNGS :  Decreased breath sounds at the bases  ABD : Soft,non-tender. BS active, obese.  :  Scrotum is much less swollen  EXT :  Patient with 2-3 + pitting edema up to his thighs bilaterally.  Some abdominal wall edema also noted.           Intake/Output Summary (Last 24 hours) at 3/24/2023 1023  Last data filed at 3/24/2023 0700  Gross per 24 hour   Intake 420 ml   Output 6150 ml   Net -5730 ml         Laboratory:  Recent Results (from the past 24 hour(s))   POCT glucose    Collection Time: 03/23/23 10:54 AM   Result Value Ref Range    POCT Glucose 207 (H) 70 - 110 mg/dL   POCT ARTERIAL BLOOD GAS    Collection Time: 03/23/23  1:05 PM   Result Value Ref Range    POC PH 7.48 (A) 7.35 - 7.45    POC PCO2 39 35 - 45 mmHg    POC PO2 56 (A) 80 - 100 mmHg    POC HEMOGLOBIN 8.0 (A) 12 - 16 g/dL    POC SATURATED O2 90.9 %    POC O2Hb 89.0 (A) 94.0 - 97.0 %    POC COHb 2.2 %    POC MetHb 1.2 0.40 - 1.5 %    POC Potassium 3.8 3.5 - 5.0 mmol/l    POC Sodium 134 (A) 137 - 145 mmol/l    POC Ionized Calcium 1.10 (A) 1.12 - 1.23 mmol/l    Correct Temperature (PH) 7.48 (A) 7.35 - 7.45    Corrected Temperature (pCO2) 39 35 - 45 mmHg    Corrected Temperature (pO2) 56 (A) 80 - 100 mmHg    POC HCO3 29.0 (A) 22.0 - 26.0 mmol/l    Base Deficit 5.1 (A) -2.0 - 2.0 mmol/l    POC Temp 37.0 °C    Specimen source Arterial sample    Ammonia    Collection Time: 03/23/23  1:20 PM   Result Value Ref Range    Ammonia Level 22.3 18.0 - 72.0 umol/L   Basic Metabolic Panel    Collection Time: 03/23/23  1:20 PM   Result Value " Ref Range    Sodium Level 136 136 - 145 mmol/L    Potassium Level 4.9 3.5 - 5.1 mmol/L    Chloride 103 98 - 107 mmol/L    Carbon Dioxide 24 22 - 29 mmol/L    Glucose Level 214 (H) 74 - 100 mg/dL    Blood Urea Nitrogen 48.2 (H) 8.9 - 20.6 mg/dL    Creatinine 1.87 (H) 0.73 - 1.18 mg/dL    BUN/Creatinine Ratio 26     Calcium Level Total 8.5 8.4 - 10.2 mg/dL    Anion Gap 9.0 mEq/L    eGFR 44 mls/min/1.73/m2   POCT glucose    Collection Time: 03/23/23  4:55 PM   Result Value Ref Range    POCT Glucose 161 (H) 70 - 110 mg/dL   POCT ARTERIAL BLOOD GAS    Collection Time: 03/23/23  5:31 PM   Result Value Ref Range    POC PH 7.48 (A) 7.35 - 7.45    POC PCO2 40 35 - 45 mmHg    POC PO2 55 (AA) 56 - mmHg    POC HEMOGLOBIN 8.2 (A) 12 - 16 g/dL    POC SATURATED O2 90.5 %    POC O2Hb 88.5 (A) 94.0 - 97.0 %    POC COHb 2.2 %    POC MetHb 1.0 0.40 - 1.5 %    POC Potassium 3.6 3.5 - 5.0 mmol/l    POC Sodium 134 (A) 137 - 145 mmol/l    POC Ionized Calcium 1.10 (A) 1.12 - 1.23 mmol/l    Correct Temperature (PH) 7.48 (A) 7.35 - 7.45    Corrected Temperature (pCO2) 40 35 - 45 mmHg    Corrected Temperature (pO2) 55 (AA) 56 - mmHg    POC HCO3 29.8 (A) 22.0 - 26.0 mmol/l    Base Deficit 5.8 (A) -2.0 - 2.0 mmol/l    POC Temp 37.0 °C    Specimen source Arterial sample    POCT glucose    Collection Time: 03/23/23 10:11 PM   Result Value Ref Range    POCT Glucose 115 (H) 70 - 110 mg/dL   Type & Screen    Collection Time: 03/23/23 11:38 PM   Result Value Ref Range    Group & Rh O POS     Indirect Miriam GEL NEG     Specimen Outdate 03/26/2023 23:59    Basic Metabolic Panel    Collection Time: 03/24/23  7:42 AM   Result Value Ref Range    Sodium Level 141 136 - 145 mmol/L    Potassium Level 3.9 3.5 - 5.1 mmol/L    Chloride 104 98 - 107 mmol/L    Carbon Dioxide 28 22 - 29 mmol/L    Glucose Level 63 (L) 74 - 100 mg/dL    Blood Urea Nitrogen 53.2 (H) 8.9 - 20.6 mg/dL    Creatinine 1.79 (H) 0.73 - 1.18 mg/dL    BUN/Creatinine Ratio 30     Calcium  Level Total 8.2 (L) 8.4 - 10.2 mg/dL    Anion Gap 9.0 mEq/L    eGFR 47 mls/min/1.73/m2   CBC with Differential    Collection Time: 03/24/23  7:42 AM   Result Value Ref Range    WBC 14.9 (H) 4.5 - 11.5 x10(3)/mcL    RBC 2.44 (L) 4.70 - 6.10 x10(6)/mcL    Hgb 7.3 (L) 14.0 - 18.0 g/dL    Hct 22.8 (L) 42.0 - 52.0 %    MCV 93.4 80.0 - 94.0 fL    MCH 29.9 27.0 - 31.0 pg    MCHC 32.0 (L) 33.0 - 36.0 g/dL    RDW 12.5 11.5 - 17.0 %    Platelet 244 130 - 400 x10(3)/mcL    MPV 12.0 (H) 7.4 - 10.4 fL    Neut % 69.1 %    Lymph % 16.6 %    Mono % 10.9 %    Eos % 2.5 %    Basophil % 0.5 %    Lymph # 2.47 0.6 - 4.6 x10(3)/mcL    Neut # 10.29 (H) 2.1 - 9.2 x10(3)/mcL    Mono # 1.62 (H) 0.1 - 1.3 x10(3)/mcL    Eos # 0.37 0 - 0.9 x10(3)/mcL    Baso # 0.08 0 - 0.2 x10(3)/mcL    IG# 0.06 (H) 0 - 0.04 x10(3)/mcL    IG% 0.4 %    NRBC% 0.0 %         Assessment/Plan:   Advanced stage III CKD likely diabetic nephropathy   Hypertension-slowly improving   Hepatic cirrhosis   UTI   Peripheral arterial disease-suspected right 5th MPJ osteo  Profound vitamin-D deficiency-20594 units ergo q 72  Secondary hyperparathyroidism  Anasarca with diuretic resistance    I just checked online to see if the renal biopsy report was available she does not.  I am going to discontinue his IV furosemide and start oral torsemide tomorrow morning 40 mg twice a day we will also continue chlorthalidone for his hypertension and also for some thiazide like diuretic effect.         Benjaímn Sharp MD, REILLYN

## 2023-03-25 LAB
ALBUMIN SERPL-MCNC: 2.4 G/DL (ref 3.5–5)
ALBUMIN/GLOB SERPL: 0.9 RATIO (ref 1.1–2)
ALP SERPL-CCNC: 107 UNIT/L (ref 40–150)
ALT SERPL-CCNC: 37 UNIT/L (ref 0–55)
ANION GAP SERPL CALC-SCNC: 10 MEQ/L
AST SERPL-CCNC: 41 UNIT/L (ref 5–34)
BASOPHILS # BLD AUTO: 0.04 X10(3)/MCL (ref 0–0.2)
BASOPHILS NFR BLD AUTO: 0.3 %
BILIRUBIN DIRECT+TOT PNL SERPL-MCNC: 0.4 MG/DL
BUN SERPL-MCNC: 59.8 MG/DL (ref 8.9–20.6)
BUN SERPL-MCNC: 62.6 MG/DL (ref 8.9–20.6)
CALCIUM SERPL-MCNC: 8.1 MG/DL (ref 8.4–10.2)
CALCIUM SERPL-MCNC: 8.8 MG/DL (ref 8.4–10.2)
CHLORIDE SERPL-SCNC: 98 MMOL/L (ref 98–107)
CHLORIDE SERPL-SCNC: 98 MMOL/L (ref 98–107)
CO2 SERPL-SCNC: 26 MMOL/L (ref 22–29)
CO2 SERPL-SCNC: 28 MMOL/L (ref 22–29)
CREAT SERPL-MCNC: 2.87 MG/DL (ref 0.73–1.18)
CREAT SERPL-MCNC: 2.95 MG/DL (ref 0.73–1.18)
CREAT/UREA NIT SERPL: 21
EOSINOPHIL # BLD AUTO: 0.02 X10(3)/MCL (ref 0–0.9)
EOSINOPHIL NFR BLD AUTO: 0.1 %
ERYTHROCYTE [DISTWIDTH] IN BLOOD BY AUTOMATED COUNT: 12.4 % (ref 11.5–17)
FLUAV AG UPPER RESP QL IA.RAPID: NOT DETECTED
FLUBV AG UPPER RESP QL IA.RAPID: NOT DETECTED
GFR SERPLBLD CREATININE-BSD FMLA CKD-EPI: 26 MLS/MIN/1.73/M2
GFR SERPLBLD CREATININE-BSD FMLA CKD-EPI: 27 MLS/MIN/1.73/M2
GLOBULIN SER-MCNC: 2.8 GM/DL (ref 2.4–3.5)
GLUCOSE SERPL-MCNC: 445 MG/DL (ref 74–100)
GLUCOSE SERPL-MCNC: 726 MG/DL (ref 74–100)
HCT VFR BLD AUTO: 22.6 % (ref 42–52)
HGB BLD-MCNC: 7.3 G/DL (ref 14–18)
IMM GRANULOCYTES # BLD AUTO: 0.08 X10(3)/MCL (ref 0–0.04)
IMM GRANULOCYTES NFR BLD AUTO: 0.5 %
LYMPHOCYTES # BLD AUTO: 1.84 X10(3)/MCL (ref 0.6–4.6)
LYMPHOCYTES NFR BLD AUTO: 12.6 %
MCH RBC QN AUTO: 30.4 PG (ref 27–31)
MCHC RBC AUTO-ENTMCNC: 32.3 G/DL (ref 33–36)
MCV RBC AUTO: 94.2 FL (ref 80–94)
MONOCYTES # BLD AUTO: 1.12 X10(3)/MCL (ref 0.1–1.3)
MONOCYTES NFR BLD AUTO: 7.7 %
NEUTROPHILS # BLD AUTO: 11.52 X10(3)/MCL (ref 2.1–9.2)
NEUTROPHILS NFR BLD AUTO: 78.8 %
NRBC BLD AUTO-RTO: 0 %
PLATELET # BLD AUTO: 273 X10(3)/MCL (ref 130–400)
PMV BLD AUTO: 12.4 FL (ref 7.4–10.4)
POCT GLUCOSE: 490 MG/DL (ref 70–110)
POCT GLUCOSE: 77 MG/DL (ref 70–110)
POCT GLUCOSE: >500 MG/DL (ref 70–110)
POTASSIUM SERPL-SCNC: 4.3 MMOL/L (ref 3.5–5.1)
POTASSIUM SERPL-SCNC: 4.7 MMOL/L (ref 3.5–5.1)
PROT SERPL-MCNC: 5.2 GM/DL (ref 6.4–8.3)
RBC # BLD AUTO: 2.4 X10(6)/MCL (ref 4.7–6.1)
SARS-COV-2 RNA RESP QL NAA+PROBE: NOT DETECTED
SODIUM SERPL-SCNC: 134 MMOL/L (ref 136–145)
SODIUM SERPL-SCNC: 135 MMOL/L (ref 136–145)
VANCOMYCIN TROUGH SERPL-MCNC: 23.2 UG/ML (ref 15–20)
WBC # SPEC AUTO: 14.6 X10(3)/MCL (ref 4.5–11.5)

## 2023-03-25 PROCEDURE — 25000003 PHARM REV CODE 250: Performed by: PODIATRIST

## 2023-03-25 PROCEDURE — 63600175 PHARM REV CODE 636 W HCPCS: Performed by: PODIATRIST

## 2023-03-25 PROCEDURE — 80202 ASSAY OF VANCOMYCIN: CPT | Performed by: PODIATRIST

## 2023-03-25 PROCEDURE — 85025 COMPLETE CBC W/AUTO DIFF WBC: CPT | Performed by: PODIATRIST

## 2023-03-25 PROCEDURE — 63600175 PHARM REV CODE 636 W HCPCS: Performed by: INTERNAL MEDICINE

## 2023-03-25 PROCEDURE — P9047 ALBUMIN (HUMAN), 25%, 50ML: HCPCS | Mod: JZ,JG | Performed by: PODIATRIST

## 2023-03-25 PROCEDURE — A4216 STERILE WATER/SALINE, 10 ML: HCPCS | Performed by: PODIATRIST

## 2023-03-25 PROCEDURE — 27000221 HC OXYGEN, UP TO 24 HOURS

## 2023-03-25 PROCEDURE — 80053 COMPREHEN METABOLIC PANEL: CPT | Performed by: PODIATRIST

## 2023-03-25 PROCEDURE — 21400001 HC TELEMETRY ROOM

## 2023-03-25 PROCEDURE — 0240U COVID/FLU A&B PCR: CPT | Performed by: PODIATRIST

## 2023-03-25 PROCEDURE — 94761 N-INVAS EAR/PLS OXIMETRY MLT: CPT

## 2023-03-25 PROCEDURE — 87040 BLOOD CULTURE FOR BACTERIA: CPT | Performed by: PODIATRIST

## 2023-03-25 RX ORDER — ACETAMINOPHEN 10 MG/ML
1000 INJECTION, SOLUTION INTRAVENOUS ONCE
Status: COMPLETED | OUTPATIENT
Start: 2023-03-25 | End: 2023-03-25

## 2023-03-25 RX ORDER — MORPHINE SULFATE 4 MG/ML
1 INJECTION, SOLUTION INTRAMUSCULAR; INTRAVENOUS ONCE
Status: DISCONTINUED | OUTPATIENT
Start: 2023-03-25 | End: 2023-03-25

## 2023-03-25 RX ORDER — MORPHINE SULFATE 4 MG/ML
1 INJECTION, SOLUTION INTRAMUSCULAR; INTRAVENOUS ONCE
Status: COMPLETED | OUTPATIENT
Start: 2023-03-25 | End: 2023-03-25

## 2023-03-25 RX ADMIN — LEVETIRACETAM 1000 MG: 500 TABLET, FILM COATED ORAL at 10:03

## 2023-03-25 RX ADMIN — Medication 400 MG: at 08:03

## 2023-03-25 RX ADMIN — LISINOPRIL 20 MG: 10 TABLET ORAL at 08:03

## 2023-03-25 RX ADMIN — CHLORTHALIDONE 25 MG: 25 TABLET ORAL at 08:03

## 2023-03-25 RX ADMIN — SODIUM CHLORIDE, PRESERVATIVE FREE 10 ML: 5 INJECTION INTRAVENOUS at 06:03

## 2023-03-25 RX ADMIN — PANCRELIPASE 6 CAPSULE: 60000; 12000; 38000 CAPSULE, DELAYED RELEASE PELLETS ORAL at 08:03

## 2023-03-25 RX ADMIN — OXYCODONE HYDROCHLORIDE AND ACETAMINOPHEN 1 TABLET: 5; 325 TABLET ORAL at 03:03

## 2023-03-25 RX ADMIN — SODIUM BICARBONATE 1300 MG: 650 TABLET ORAL at 03:03

## 2023-03-25 RX ADMIN — SODIUM CHLORIDE, PRESERVATIVE FREE 10 ML: 5 INJECTION INTRAVENOUS at 12:03

## 2023-03-25 RX ADMIN — ALBUMIN (HUMAN) 25 G: 5 SOLUTION INTRAVENOUS at 08:03

## 2023-03-25 RX ADMIN — Medication: at 10:03

## 2023-03-25 RX ADMIN — INSULIN ASPART 10 UNITS: 100 INJECTION, SOLUTION INTRAVENOUS; SUBCUTANEOUS at 09:03

## 2023-03-25 RX ADMIN — PIPERACILLIN AND TAZOBACTAM 4.5 G: 4; .5 INJECTION, POWDER, FOR SOLUTION INTRAVENOUS; PARENTERAL at 10:03

## 2023-03-25 RX ADMIN — METOPROLOL TARTRATE 25 MG: 25 TABLET, FILM COATED ORAL at 10:03

## 2023-03-25 RX ADMIN — PANCRELIPASE 6 CAPSULE: 60000; 12000; 38000 CAPSULE, DELAYED RELEASE PELLETS ORAL at 10:03

## 2023-03-25 RX ADMIN — INSULIN ASPART 14 UNITS: 100 INJECTION, SOLUTION INTRAVENOUS; SUBCUTANEOUS at 11:03

## 2023-03-25 RX ADMIN — ENOXAPARIN SODIUM 40 MG: 40 INJECTION SUBCUTANEOUS at 08:03

## 2023-03-25 RX ADMIN — GABAPENTIN 400 MG: 100 CAPSULE ORAL at 10:03

## 2023-03-25 RX ADMIN — INSULIN DETEMIR 20 UNITS: 100 INJECTION, SOLUTION SUBCUTANEOUS at 10:03

## 2023-03-25 RX ADMIN — INSULIN DETEMIR 10 UNITS: 100 INJECTION, SOLUTION SUBCUTANEOUS at 12:03

## 2023-03-25 RX ADMIN — PIPERACILLIN AND TAZOBACTAM 4.5 G: 4; .5 INJECTION, POWDER, FOR SOLUTION INTRAVENOUS; PARENTERAL at 05:03

## 2023-03-25 RX ADMIN — LEVETIRACETAM 1000 MG: 500 TABLET, FILM COATED ORAL at 08:03

## 2023-03-25 RX ADMIN — NIFEDIPINE 90 MG: 90 TABLET, FILM COATED, EXTENDED RELEASE ORAL at 08:03

## 2023-03-25 RX ADMIN — NEPHROCAP 1 CAPSULE: 1 CAP ORAL at 08:03

## 2023-03-25 RX ADMIN — INSULIN ASPART 14 UNITS: 100 INJECTION, SOLUTION INTRAVENOUS; SUBCUTANEOUS at 04:03

## 2023-03-25 RX ADMIN — OXYCODONE HYDROCHLORIDE AND ACETAMINOPHEN 1 TABLET: 5; 325 TABLET ORAL at 09:03

## 2023-03-25 RX ADMIN — TORSEMIDE 40 MG: 20 TABLET ORAL at 08:03

## 2023-03-25 RX ADMIN — INSULIN ASPART 14 UNITS: 100 INJECTION, SOLUTION INTRAVENOUS; SUBCUTANEOUS at 07:03

## 2023-03-25 RX ADMIN — SODIUM BICARBONATE 1300 MG: 650 TABLET ORAL at 10:03

## 2023-03-25 RX ADMIN — SODIUM CHLORIDE, PRESERVATIVE FREE 10 ML: 5 INJECTION INTRAVENOUS at 05:03

## 2023-03-25 RX ADMIN — OXYCODONE HYDROCHLORIDE AND ACETAMINOPHEN 1 TABLET: 5; 325 TABLET ORAL at 10:03

## 2023-03-25 RX ADMIN — OXYCODONE HYDROCHLORIDE AND ACETAMINOPHEN 1 TABLET: 5; 325 TABLET ORAL at 04:03

## 2023-03-25 RX ADMIN — GABAPENTIN 400 MG: 100 CAPSULE ORAL at 08:03

## 2023-03-25 RX ADMIN — ACETAMINOPHEN 1000 MG: 10 INJECTION, SOLUTION INTRAVENOUS at 12:03

## 2023-03-25 RX ADMIN — TORSEMIDE 40 MG: 20 TABLET ORAL at 04:03

## 2023-03-25 RX ADMIN — SODIUM BICARBONATE 1300 MG: 650 TABLET ORAL at 08:03

## 2023-03-25 RX ADMIN — MICONAZOLE NITRATE: 20 POWDER TOPICAL at 08:03

## 2023-03-25 RX ADMIN — METOPROLOL TARTRATE 25 MG: 25 TABLET, FILM COATED ORAL at 08:03

## 2023-03-25 RX ADMIN — MICONAZOLE NITRATE: 20 POWDER TOPICAL at 10:03

## 2023-03-25 RX ADMIN — ENOXAPARIN SODIUM 40 MG: 40 INJECTION SUBCUTANEOUS at 10:03

## 2023-03-25 RX ADMIN — PIPERACILLIN AND TAZOBACTAM 4.5 G: 4; .5 INJECTION, POWDER, FOR SOLUTION INTRAVENOUS; PARENTERAL at 03:03

## 2023-03-25 RX ADMIN — DOXAZOSIN 2 MG: 1 TABLET ORAL at 10:03

## 2023-03-25 RX ADMIN — PANCRELIPASE 6 CAPSULE: 60000; 12000; 38000 CAPSULE, DELAYED RELEASE PELLETS ORAL at 03:03

## 2023-03-25 RX ADMIN — Medication: at 08:03

## 2023-03-25 RX ADMIN — MORPHINE SULFATE 1 MG: 4 INJECTION, SOLUTION INTRAMUSCULAR; INTRAVENOUS at 03:03

## 2023-03-25 RX ADMIN — Medication 400 MG: at 10:03

## 2023-03-25 RX ADMIN — LISINOPRIL 20 MG: 10 TABLET ORAL at 10:03

## 2023-03-25 NOTE — PROGRESS NOTES
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 Presented to the ED with complaints of diffuse body swelling specially abdomen, groin, penis and testicles and bilateral lower extremities, dyspnea on minimal exertion and bilateral lower extremity pain and cramping.  Report subjective fever and chills.  Report he was just hospitalized for similar symptom at [facility] about 2 weeks ago and was discharged home after few days. On arrival to ED, he was tachycardic, hypertensive, saturating 100% on room air.  EKG appears sinus rhythm on my review without ischemic changes.  Labs notable for WBC 16.3, hemoglobin 11.4, platelets 346, sodium 139, potassium 5.0, chloride 116, CO2 16, creatinine 2.17, BUN 34, glucose 420, calcium 8.0, albumin 1.3, , negative troponin. CT A/P with Contrast showed left pleural effusion, hepatic cirrhotic morphology, splenomegaly and upper abdominal varices and ascites, chronic pancreatitis changes, extensive subcutaneous anasarca edema.  Kidneys unremarkable without hydronephrosis.     Per ED provider exam he was noted to have purulent penile discharge, sent for culture and Segovia catheter placed.  He was given albumin 12.5 g, Lasix 40 mg IV, Vancomycin and Zosyn and subsequently referred to hospital medicine service for further evaluation and management.  mL with IV Lasix 40. Urine protein creatinine ratio noted to be 10.9. Patient was continued on IV Lasix. Seen by Renal team; recommendations made for IV Lasix/Albumin drip and renal biopsy. He was also seen by GI team for evaluation of cirrhosis and EGD was done to r/o EV. EGD showed erosive gastropathy,  normal esophagus & no other evidence of cirrhosis. Nephrology changed IV Lasix/Albumin drip to IV Lasix 80 mg TID with tentative plan for hemodialysis given patient's diuretic resistance. IR to plan for renal biopsy which could not be performed 03/15 due to uncontrolled HTN. Continues to have significant UOP with IV Lasix.  Nephrology continuing IV Lasix 80 mg t.i.d. and Metolazone 10 mg given stable creatinine and holding off on HD for now. Patient on 1500 mL fluid restriction.  OT to provide brace for scrotal support given patient's discomfort. Counseled regarding compliance with low-sodium diet and fluid restriction in order to optimize volume status with diuresis. Underwent renal biopsy with IR 03/20    Continued on IV Lasix 80 mg t.i.d. and Metolazone 10 mg.    Noted to have improved edema in B/L LE edema, abdominal wall edema & scrotal edema as of 3/22/23    Podiatry ordered XR R Foot which showed erosion of 5th metatarsophalangeal joint with osteomyelitis not excluded. Podiatry performed excisional debridement of R foot on 3/24/23.         Spiked Fever 101 3/23/23,Patient is started on Vanc,Zosyn to cover Possible Respiratory and  Bone Infection    Interval Hx:     Patient was seen and examined at bedside, reports swelling is better but he feels weak. S/P excisional debridement, right foot wound    Chart was reviewed, On Nasal Cannula, Tmax 98.6, with a white count 14.6 and does have some anemia 7.3, cr worsening 1.8>1.7>2.8, UOP around 2L    Sugars being high.      Objective/physical exam:  General: In no acute distress, obese   Chest: Clear to auscultation bilaterally  Heart: RRR, +S1, S2, no appreciable murmur  Abdomen: + distension, nontender, BS +  MSK: 2+ pitting edema and thickening of skin from abdomen to foot; + penile and scrotal edema   Neurologic: Alert and oriented x4, Cranial nerve II-XII intact, Strength 5/5 in all 4 extremities  Inteegumentary: blister noted on sole of R foot          VITAL  SIGNS: 24 HRS MIN & MAX LAST   Temp  Min: 96.8 °F (36 °C)  Max: 98.6 °F (37 °C) 98.1 °F (36.7 °C)   BP  Min: 117/68  Max: 178/101 (!) 144/79   Pulse  Min: 68  Max: 86  81   Resp  Min: 16  Max: 20 16     SpO2  Min: 90 %  Max: 100 % 95 %       Recent Labs   Lab 03/23/23  0626 03/24/23  0742 03/25/23  0829   WBC 14.9* 14.9* 14.6*   RBC 2.51* 2.44* 2.40*   HGB 7.6* 7.3* 7.3*   HCT 23.3* 22.8* 22.6*   MCV 92.8 93.4 94.2*   MCH 30.3 29.9 30.4   MCHC 32.6* 32.0* 32.3*   RDW 12.6 12.5 12.4    244 273   MPV 12.0* 12.0* 12.4*       Recent Labs   Lab 03/21/23  0643 03/22/23  0916 03/23/23  0345 03/23/23  1305 03/23/23  1320 03/23/23  1731 03/24/23  0742 03/25/23  0850    137 136  --  136  --  141 135*   K 3.7 4.2 4.6  --  4.9  --  3.9 4.7   CO2 25 24 22  --  24  --  28 28   BUN 39.1* 39.3* 43.4*  --  48.2*  --  53.2* 59.8*   CREATININE 1.66* 1.72* 1.80*  --  1.87*  --  1.79* 2.87*   CALCIUM 7.8* 8.3* 8.3*  --  8.5  --  8.2* 8.1*   PH  --   --   --  7.48*  --  7.48*  --   --    MG 1.60 1.50* 1.70  --   --   --   --   --    ALBUMIN 2.1* 2.2*  --   --   --   --   --  2.4*   ALKPHOS 78 79  --   --   --   --   --  107   ALT 26 22  --   --   --   --   --  37   AST 21 23  --   --   --   --   --  41*   BILITOT 0.5 0.6  --   --   --   --   --  0.4     Microbiology Results (last 7 days)       Procedure Component Value Units Date/Time    Blood Culture [[id number]] Collected: 03/25/23 0834    Order Status: Resulted Specimen: Blood from Hand, Left Updated: 03/25/23 0855    Blood Culture [[id number]] Collected: 03/25/23 0829    Order Status: Resulted Specimen: Blood from Hand, Right Updated: 03/25/23 0855    Tissue Culture - Aerobic [[id number]] Collected: 03/24/23 5254    Order Status: Completed Specimen: Bone from Foot, Right Updated: 03/25/23 0647     CULTURE, TISSUE- AEROBIC ([facility]) No Growth At 24 Hours    Anaerobic Culture [[id number]] Collected: 03/24/23 1554    Order Status: Sent Specimen: Bone from Foot, Right Updated:  03/24/23 1621    Respiratory Culture [[id number]]     Order Status: Sent Specimen: Sputum, Expectorated            Scheduled Med:   albumin human 25%  25 g Intravenous Daily    chlorthalidone  25 mg Oral Daily    doxazosin  2 mg Oral QHS    enoxaparin  40 mg Subcutaneous Q12H    ergocalciferol  50,000 Units Oral Q3 Days    gabapentin  400 mg Oral BID    insulin aspart U-100  14 Units Subcutaneous TIDWM    insulin detemir U-100  20 Units Subcutaneous QHS    levETIRAcetam  1,000 mg Oral BID    lipase-protease-amylase 12,000-38,000-60,000 units  6 capsule Oral TID    lisinopriL  20 mg Oral BID    magnesium oxide  400 mg Oral BID    metoprolol tartrate  25 mg Oral BID    miconazole NITRATE 2 %   Topical (Top) BID    NIFEdipine  90 mg Oral Daily    piperacillin-tazobactam (ZOSYN) IVPB  4.5 g Intravenous Q8H    sodium bicarbonate  1,300 mg Oral TID    sodium chloride 0.9%  10 mL Intravenous Q6H    torsemide  40 mg Oral BID loop    vancomycin (VANCOCIN) IVPB  1,750 mg Intravenous Q24H    vitamin renal formula (B-complex-vitamin c-folic acid)  1 capsule Oral Daily    zinc oxide-cod liver oil   Topical (Top) BID     PRN Meds:  acetaminophen, acetaminophen, aluminum-magnesium hydroxide-simethicone, cloNIDine, dextrose 10%, dextrose 10%, dextrose, dextrose, glucagon (human recombinant), hydrALAZINE, HYDROcodone-acetaminophen, insulin aspart U-100, labetalol, melatonin, ondansetron, oxyCODONE-acetaminophen, polyethylene glycol, prochlorperazine, senna-docusate 8.6-50 mg, simethicone, sodium chloride 0.9%, Flushing PICC Protocol **AND** sodium chloride 0.9% **AND** sodium chloride 0.9%, vancomycin - pharmacy to dose     Assessment/Plan:    Anasarca 2/2 possible Diabetic Nephropathy  FABIO superimposed on CKD2  Nephrotic Syndrome/Proteinuria 10.9 g 2/2 Diabetic Nephropathy  Sepsis 2/2 UTI/Urethritis + Staph and Pluralibacter gergoviae  , Leukocytosis  Osteomyelitis R Foot 5th Metatarsalophalangeal Joint s/p Excisional Debridement  3/24/23  Chronic Anemia   Lethargy  AHRF (normal EF)  Brittle Diabetic  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  T2DM  Hypertension  Vitamin-D deficiency  B/L LE Weakness   Seizure  Chronic Pancreatitis  Hypomagnesemia    Plan:  -Nephrology on-board; following recommendations,Patient having adequate UOP ,Placed on 1500 mL fluid restriction.IR performed L sided renal biopsy 03/20; will F/U report.Continue diuretics,was on IV Lasix 80 mg TID & Metolazone 10 mg daily , now switched to torsemide 40mg BID, cont Chlorthalisone,Cont Lisinopril per renal recommendations   -Completed 5 days of IV Rocephin for Urethritis and UTI   -Podiatry on-board;  amputation for R foot osteomyelitis, will f/u Cultures  -Monitor Leukocytosis and fever curve, started on Vanc and Zosyn to cover possible Osteomyelitis and Aspiration Pneumonia?, f/u Cultures and adjust antibiotics accordingly,Check COVID/Flu  -Oxygen supplementation to continue to keep Sats >92%  -Will continue Long Acting Insulin, adjustments to be made on blood sugars,will continue Aspart 14 units TID & ISS LD  -Continue Keppra 1000 mg BID, Metoprolol Tartrate 25 mg BID, Doxazosin 2 mg, Ergocalciferol 53031 u q3days, Gabapentin 400 mg BID, NG 0 400 mg b.i.d., nifedipine 90 mg daily, NaHCO3 1300 mg t.i.d.  - PT/OT on board; recommending rehab placement    Critical care Time Spent>35 min   AHRF,Sepsis    VTE prophylaxis: Lovenox     Patient condition:  critical    Anticipated discharge and Disposition:         All diagnosis and differential diagnosis have been reviewed; assessment and plan has been documented; I have personally reviewed the labs and test results that are presently available; I have reviewed the patients medication list; I have reviewed the consulting providers response and recommendations. I have reviewed or attempted to review medical records based upon their availability    All of the patient's questions have been  addressed and answered. Patient's is agreeable to the  above stated plan. I will continue to monitor closely and make adjustments to medical management as needed.  _____________________________________________________________________    Nutrition Status: Diabetic    Radiology:  NM Lung Scan Ventilation Perfusion  Narrative: EXAMINATION:  NM LUNG VENTILATION AND PERFUSION IMAGING    CLINICAL HISTORY:  Chest pain, nonspecific;    TECHNIQUE:  Ventilation and perfusion scans were performed in multiple projections. 33.2 mCi of Technetium-99m Pyrophosphate aerosol was used for ventilation scan and 4.6 mCi of Technitium-99m MAA was administered intravenously for the perfusion scan.    COMPARISON:  Chest radiograph March 23, 2023.    FINDINGS:  Ventilation images show bilateral generalized heterogeneity of pyrophosphate distribution.    Perfusion images show more homogeneous MAA activity within bilateral lungs.  There are no unmatched segmental or subsegmental perfusion defects compared to the ventilation images  Impression: Very low probability for acute pulmonary embolism.    Electronically signed by: Dm Alberto  Date:    03/23/2023  Time:    15:53  X-Ray Chest 1 View  Narrative: EXAMINATION:  XR CHEST 1 VIEW    CLINICAL HISTORY:  ahrf;    TECHNIQUE:  Frontal view(s) of the chest.    COMPARISON:  Radiography 03/11/2023    FINDINGS:  Development of ill-defined bilateral perihilar and lower lung opacities, greater on the right.  No pneumothorax or significant pleural fluid identified.  Cardiac silhouette within normal limits.  Impression: Suspect mild pulmonary edema.    Electronically signed by: Jose E Tracy  Date:    03/23/2023  Time:    13:29      Michelle Camacho MD   03/25/2023

## 2023-03-25 NOTE — PROGRESS NOTES
Pharmacokinetic Assessment Follow Up: IV Vancomycin    Vancomycin serum concentration assessment(s):    The trough level was drawn correctly and can be used to guide therapy at this time. The measurement is above the desired definitive target range of 10 to 20 mcg/mL.    Vancomycin Regimen Plan:    Discontinue the scheduled vancomycin regimen and re-dose when the random level is less than 20 mcg/mL, next level to be drawn at 0430 on 03/26.    Drug levels (last 3 results):  Recent Labs   Lab Result Units 03/25/23  1425   Vancomycin Trough ug/ml 23.2*       Pharmacy will continue to follow and monitor vancomycin.    Please contact pharmacy at extension [phone] for questions regarding this assessment.    Thank you for the consult,   Shira Reyez       Patient brief summary:  Kamran Huerta is a 48 y.o. male initiated on antimicrobial therapy with IV Vancomycin for treatment of lower respiratory infection    The patient's current regimen is vanc 1.75g q24h.    Drug Allergies:   Review of patient's allergies indicates:  No Known Allergies    Actual Body Weight:   115.5kg    Renal Function:   Estimated Creatinine Clearance: 37.8 mL/min (A) (based on SCr of 2.87 mg/dL (H)).,     Dialysis Method (if applicable):  N/A    CBC (last 72 hours):  Recent Labs   Lab Result Units 03/23/23  0626 03/24/23  0742 03/25/23  0829   WBC x10(3)/mcL 14.9* 14.9* 14.6*   Hgb g/dL 7.6* 7.3* 7.3*   Hct % 23.3* 22.8* 22.6*   Platelet x10(3)/mcL 238 244 273   Mono % % 9.9 10.9 7.7   Eos % % 2.4 2.5 0.1   Basophil % % 0.5 0.5 0.3       Metabolic Panel (last 72 hours):  Recent Labs   Lab Result Units 03/23/23  0345 03/23/23  1320 03/24/23  0742 03/25/23  0850   Sodium Level mmol/L 136 136 141 135*   Potassium Level mmol/L 4.6 4.9 3.9 4.7   Chloride mmol/L 102 103 104 98   Carbon Dioxide mmol/L 22 24 28 28   Glucose Level mg/dL 190* 214* 63* 726*   Blood Urea Nitrogen mg/dL 43.4* 48.2* 53.2* 59.8*   Creatinine mg/dL 1.80* 1.87* 1.79* 2.87*    Albumin Level g/dL  --   --   --  2.4*   Bilirubin Total mg/dL  --   --   --  0.4   Alkaline Phosphatase unit/L  --   --   --  107   Aspartate Aminotransferase unit/L  --   --   --  41*   Alanine Aminotransferase unit/L  --   --   --  37   Magnesium Level mg/dL 1.70  --   --   --        Vancomycin Administrations:  vancomycin given in the last 96 hours                     vancomycin (VANCOCIN) 1,750 mg in dextrose 5 % (D5W) 500 mL IVPB (mg) 1,750 mg New Bag 03/24/23 1623    vancomycin (VANCOCIN) 2,000 mg in dextrose 5 % (D5W) 500 mL IVPB (mg) 2,000 mg New Bag 03/23/23 1549                    Microbiologic Results:  Microbiology Results (last 7 days)       Procedure Component Value Units Date/Time    Blood Culture [[id number]] Collected: 03/25/23 0834    Order Status: Resulted Specimen: Blood from Hand, Left Updated: 03/25/23 0855    Blood Culture [[id number]] Collected: 03/25/23 0829    Order Status: Resulted Specimen: Blood from Hand, Right Updated: 03/25/23 0855    Tissue Culture - Aerobic [[id number]] Collected: 03/24/23 1554    Order Status: Completed Specimen: Bone from Foot, Right Updated: 03/25/23 0647     CULTURE, TISSUE- AEROBIC ([facility]) No Growth At 24 Hours    Anaerobic Culture [[id number]] Collected: 03/24/23 1554    Order Status: Sent Specimen: Bone from Foot, Right Updated: 03/24/23 1621    Respiratory Culture [[id number]]     Order Status: Sent Specimen: Sputum, Expectorated

## 2023-03-25 NOTE — ANESTHESIA POSTPROCEDURE EVALUATION
Anesthesia Post Evaluation    Patient: Kamran Huerta    Procedure(s) Performed: Procedure(s) (LRB):  AMPUTATION, TOE (Right)    Final Anesthesia Type: MAC      Patient location during evaluation: PACU  Patient participation: Yes- Able to Participate  Level of consciousness: awake  Post-procedure vital signs: reviewed and stable  Pain management: adequate  Airway patency: patent  ZEB mitigation strategies: Multimodal analgesia    Anesthetic complications: no      Cardiovascular status: stable  Respiratory status: unassisted  Hydration status: euvolemic  Follow-up not needed.          Vitals Value Taken Time   /88 03/24/23 1917   Temp 36.8 °C (98.2 °F) 03/24/23 1917   Pulse 77 03/24/23 1917   Resp 12 03/24/23 2030   SpO2 92 % 03/24/23 1917         No case tracking events are documented in the log.      Pain/Mary Score: Pain Rating Prior to Med Admin: 10 (3/24/2023  4:52 AM)  Pain Rating Post Med Admin: 5 (3/23/2023  8:54 PM)

## 2023-03-25 NOTE — PROGRESS NOTES
[facility]                       [address] [address]    PATIENT NAME:       GODFREY MORLEY  YOB: 1975  CSN:                [id number]   MRN:                [id number]  ADMIT DATE:         03/11/2023 15:58:00  PHYSICIAN:          Emre Vazquez DPM                            PROGRESS NOTE    DATE:  03/25/2023 00:00:00    SUBJECTIVE:  The patient seen today.  Resting comfortably.  Arousable.  No major   issues reported.  No chest pain.  Breathing has been stable.    PHYSICAL EXAMINATION:  VITAL SIGNS:  Currently stable and he is afebrile.  EXTREMITIES:  The dressings are clean, dry, and intact.  No strike through.  No   bleeding.    LABORATORY DATA:  Labs reviewed.  Substantial increase in his blood sugars to   726 this morning.  White cell count 14.6.    Intraoperative cultures are currently pending.    ASSESSMENT:  Status post excisional debridement, right foot wound, currently   stable.    PLAN:    1. Continue with current care.    2. I will recheck him tomorrow.        ______________________________  Emre Vazquez DPM    GAS/AQS  DD:  03/25/2023  Time:  10:34AM  DT:  03/25/2023  Time:  11:22AM  Job #:  [id number]      PROGRESS NOTE

## 2023-03-25 NOTE — PLAN OF CARE
Problem: Infection  Goal: Absence of Infection Signs and Symptoms  3/25/2023 1231 by Cheyenne Curry RN  Outcome: Ongoing, Progressing  3/25/2023 1231 by Cheyenne Curry RN  Outcome: Ongoing, Progressing

## 2023-03-25 NOTE — OP NOTE
[facility]                       [address] [address]    PATIENT NAME:      GODFREY MORLEY  YOB: 1975  CSN:               [id number]  MRN:               [id number]  ADMIT DATE:        03/11/2023 15:58:00  PHYSICIAN:         Emre Vazquez DPM                          OPERATIVE REPORT      DATE OF SURGERY:    03/24/2023 00:00:00    SURGEON:  Emre Vazquez DPM    PREOPERATIVE DIAGNOSIS:  Chronic right foot wound with suspected underlying   osteomyelitis.    POSTOPERATIVE DIAGNOSIS:  Chronic right foot wound with suspected underlying   osteomyelitis.    PROCEDURE:  Excisional debridement, right foot wound.    ANESTHESIA:  Local with MAC.    HEMOSTASIS:  None.    ESTIMATED BLOOD LOSS:  Scant.    MATERIALS:  None.    INJECTABLES:  None.    PATHOLOGY:  Debridement products including bone sent for culture and   sensitivity.    COMPLICATIONS:  None.    DESCRIPTION OF PROCEDURE:  The patient was transferred from the floor to   holding, having been n.p.o. past midnight.  History and physical and preop   studies reviewed and there were no contraindications noted.  While in holding,   appropriate IVs were started.  The patient was taken to the OR, placed in supine   position, after which appropriate anesthetics were administered.  This was   supplemented with total of approximately 6 cc of 0.5% Marcaine plain infiltrated   in right 5th metatarsal block.  The extremity was then prepped and draped   aseptically.    Attention was directed to the lateral aspect of the right foot where there was   chronic looking wound overlying the 5th metatarsal head with definitely some   postdebridement cicatrix at the 5th toe.  I made a circumferential incision   around the lateral wound site extending out over the residual stump of the 5th   toe.  We ellipsed out all of this nonviable tissue down to the level of the    underlying bone.  We then proceeded with dissecting out the remaining phalanxes   of the 5th digit and also dissected out the 5th metatarsal head.  Once we had   adequate exposure, the bones were transected utilizing the sagittal saw, placed   on the back table for analysis.  Seemed to be good viability.  No evidence of   any retained abscess.  Hemostasis was obtained.  Wounds were irrigated once   again after which they were closed in a single layer utilizing 2-0 nylon in   simple interrupted-type sutures.  There was no tension on the skin margins, no   active bleeding.  Sterile dressings were then placed.    The patient tolerated procedure and anesthesia well, left the OR to recovery   room with vital signs stable and vascular status intact to the right lower   extremity.  The postdebridement measurements extended out through a length of   approximately 6 cm and about 1 cm and with again was all the way down to the   level of the bone with the bone being removed.        ______________________________  WHITNEY Moyer/AQS  DD:  03/24/2023  Time:  04:08PM  DT:  03/24/2023  Time:  08:29PM  Job #:  [id number]      OPERATIVE REPORT

## 2023-03-26 LAB
ABO + RH BLD: NORMAL
ANION GAP SERPL CALC-SCNC: 9 MEQ/L
BASOPHILS # BLD AUTO: 0.09 X10(3)/MCL (ref 0–0.2)
BASOPHILS NFR BLD AUTO: 0.6 %
BLD PROD TYP BPU: NORMAL
BLOOD UNIT EXPIRATION DATE: NORMAL
BLOOD UNIT TYPE CODE: 5100
BUN SERPL-MCNC: 63.3 MG/DL (ref 8.9–20.6)
CALCIUM SERPL-MCNC: 8.3 MG/DL (ref 8.4–10.2)
CHLORIDE SERPL-SCNC: 100 MMOL/L (ref 98–107)
CO2 SERPL-SCNC: 25 MMOL/L (ref 22–29)
CORRECTED TEMPERATURE (PCO2): 44 MMHG (ref 35–45)
CORRECTED TEMPERATURE (PH): 7.43 (ref 7.35–7.45)
CORRECTED TEMPERATURE (PO2): 60 MMHG (ref 80–100)
CREAT SERPL-MCNC: 3.05 MG/DL (ref 0.73–1.18)
CREAT/UREA NIT SERPL: 21
CROSSMATCH INTERPRETATION: NORMAL
DISPENSE STATUS: NORMAL
EOSINOPHIL # BLD AUTO: 0.47 X10(3)/MCL (ref 0–0.9)
EOSINOPHIL NFR BLD AUTO: 3 %
ERYTHROCYTE [DISTWIDTH] IN BLOOD BY AUTOMATED COUNT: 12.4 % (ref 11.5–17)
GFR SERPLBLD CREATININE-BSD FMLA CKD-EPI: 25 MLS/MIN/1.73/M2
GLUCOSE SERPL-MCNC: 372 MG/DL (ref 74–100)
HCO3 UR-SCNC: 29.2 MMOL/L (ref 22–26)
HCT VFR BLD AUTO: 21.2 % (ref 42–52)
HGB BLD-MCNC: 10.2 G/DL (ref 12–16)
HGB BLD-MCNC: 6.9 G/DL (ref 14–18)
IMM GRANULOCYTES # BLD AUTO: 0.06 X10(3)/MCL (ref 0–0.04)
IMM GRANULOCYTES NFR BLD AUTO: 0.4 %
LIPASE SERPL-CCNC: 4 U/L
LYMPHOCYTES # BLD AUTO: 2.79 X10(3)/MCL (ref 0.6–4.6)
LYMPHOCYTES NFR BLD AUTO: 17.8 %
MCH RBC QN AUTO: 30.3 PG (ref 27–31)
MCHC RBC AUTO-ENTMCNC: 32.5 G/DL (ref 33–36)
MCV RBC AUTO: 93 FL (ref 80–94)
MONOCYTES # BLD AUTO: 1.48 X10(3)/MCL (ref 0.1–1.3)
MONOCYTES NFR BLD AUTO: 9.4 %
NEUTROPHILS # BLD AUTO: 10.82 X10(3)/MCL (ref 2.1–9.2)
NEUTROPHILS NFR BLD AUTO: 68.8 %
NRBC BLD AUTO-RTO: 0 %
PCO2 BLDA: 44 MMHG (ref 35–45)
PH SMN: 7.43 [PH] (ref 7.35–7.45)
PLATELET # BLD AUTO: 269 X10(3)/MCL (ref 130–400)
PMV BLD AUTO: 11.8 FL (ref 7.4–10.4)
PO2 BLDA: 60 MMHG (ref 80–100)
POC BASE DEFICIT: 4.3 MMOL/L (ref -2–2)
POC COHB: 1.9 %
POC IONIZED CALCIUM: 1.13 MMOL/L (ref 1.12–1.23)
POC METHB: 0.9 % (ref 0.4–1.5)
POC O2HB: 90 % (ref 94–97)
POC SATURATED O2: 91.3 %
POC TEMPERATURE: 37 °C
POCT GLUCOSE: 249 MG/DL (ref 70–110)
POCT GLUCOSE: 279 MG/DL (ref 70–110)
POCT GLUCOSE: 367 MG/DL (ref 70–110)
POCT GLUCOSE: 379 MG/DL (ref 70–110)
POCT GLUCOSE: 388 MG/DL (ref 70–110)
POCT GLUCOSE: 397 MG/DL (ref 70–110)
POTASSIUM BLD-SCNC: 3.9 MMOL/L (ref 3.5–5)
POTASSIUM SERPL-SCNC: 4.3 MMOL/L (ref 3.5–5.1)
RBC # BLD AUTO: 2.28 X10(6)/MCL (ref 4.7–6.1)
SODIUM BLD-SCNC: 132 MMOL/L (ref 137–145)
SODIUM SERPL-SCNC: 134 MMOL/L (ref 136–145)
SPECIMEN SOURCE: ABNORMAL
UNIT NUMBER: NORMAL
VANCOMYCIN SERPL-MCNC: 17.2 UG/ML (ref 15–20)
WBC # SPEC AUTO: 15.7 X10(3)/MCL (ref 4.5–11.5)

## 2023-03-26 PROCEDURE — 63600175 PHARM REV CODE 636 W HCPCS: Mod: JZ,JG | Performed by: PODIATRIST

## 2023-03-26 PROCEDURE — 63600175 PHARM REV CODE 636 W HCPCS: Performed by: INTERNAL MEDICINE

## 2023-03-26 PROCEDURE — 21400001 HC TELEMETRY ROOM

## 2023-03-26 PROCEDURE — 99900035 HC TECH TIME PER 15 MIN (STAT)

## 2023-03-26 PROCEDURE — 80202 ASSAY OF VANCOMYCIN: CPT | Performed by: STUDENT IN AN ORGANIZED HEALTH CARE EDUCATION/TRAINING PROGRAM

## 2023-03-26 PROCEDURE — 63600175 PHARM REV CODE 636 W HCPCS: Performed by: PODIATRIST

## 2023-03-26 PROCEDURE — 25000003 PHARM REV CODE 250: Performed by: PODIATRIST

## 2023-03-26 PROCEDURE — 36600 WITHDRAWAL OF ARTERIAL BLOOD: CPT

## 2023-03-26 PROCEDURE — P9047 ALBUMIN (HUMAN), 25%, 50ML: HCPCS | Mod: JZ,JG | Performed by: PODIATRIST

## 2023-03-26 PROCEDURE — A4216 STERILE WATER/SALINE, 10 ML: HCPCS | Performed by: PODIATRIST

## 2023-03-26 PROCEDURE — 25000242 PHARM REV CODE 250 ALT 637 W/ HCPCS: Performed by: NURSE PRACTITIONER

## 2023-03-26 PROCEDURE — 94761 N-INVAS EAR/PLS OXIMETRY MLT: CPT

## 2023-03-26 PROCEDURE — 27000221 HC OXYGEN, UP TO 24 HOURS

## 2023-03-26 PROCEDURE — 80048 BASIC METABOLIC PNL TOTAL CA: CPT | Performed by: INTERNAL MEDICINE

## 2023-03-26 PROCEDURE — 82803 BLOOD GASES ANY COMBINATION: CPT

## 2023-03-26 PROCEDURE — 63600175 PHARM REV CODE 636 W HCPCS: Performed by: NURSE PRACTITIONER

## 2023-03-26 PROCEDURE — 85025 COMPLETE CBC W/AUTO DIFF WBC: CPT | Performed by: PODIATRIST

## 2023-03-26 PROCEDURE — 36430 TRANSFUSION BLD/BLD COMPNT: CPT

## 2023-03-26 PROCEDURE — 97168 OT RE-EVAL EST PLAN CARE: CPT

## 2023-03-26 PROCEDURE — P9040 RBC LEUKOREDUCED IRRADIATED: HCPCS | Performed by: INTERNAL MEDICINE

## 2023-03-26 PROCEDURE — 83690 ASSAY OF LIPASE: CPT | Performed by: INTERNAL MEDICINE

## 2023-03-26 RX ORDER — HYDROCODONE BITARTRATE AND ACETAMINOPHEN 500; 5 MG/1; MG/1
TABLET ORAL
Status: DISCONTINUED | OUTPATIENT
Start: 2023-03-26 | End: 2023-04-04 | Stop reason: HOSPADM

## 2023-03-26 RX ORDER — FUROSEMIDE 10 MG/ML
20 INJECTION INTRAMUSCULAR; INTRAVENOUS ONCE
Status: COMPLETED | OUTPATIENT
Start: 2023-03-26 | End: 2023-03-27

## 2023-03-26 RX ORDER — ALBUTEROL SULFATE 0.83 MG/ML
2.5 SOLUTION RESPIRATORY (INHALATION) EVERY 4 HOURS PRN
Status: DISCONTINUED | OUTPATIENT
Start: 2023-03-26 | End: 2023-04-04 | Stop reason: HOSPADM

## 2023-03-26 RX ORDER — IPRATROPIUM BROMIDE 0.5 MG/2.5ML
0.5 SOLUTION RESPIRATORY (INHALATION) EVERY 6 HOURS
Status: DISCONTINUED | OUTPATIENT
Start: 2023-03-26 | End: 2023-04-04 | Stop reason: HOSPADM

## 2023-03-26 RX ORDER — FUROSEMIDE 10 MG/ML
20 INJECTION INTRAMUSCULAR; INTRAVENOUS ONCE
Status: DISCONTINUED | OUTPATIENT
Start: 2023-03-26 | End: 2023-03-26

## 2023-03-26 RX ORDER — FUROSEMIDE 10 MG/ML
20 INJECTION INTRAMUSCULAR; INTRAVENOUS ONCE
Status: COMPLETED | OUTPATIENT
Start: 2023-03-26 | End: 2023-03-26

## 2023-03-26 RX ORDER — FUROSEMIDE 10 MG/ML
60 INJECTION INTRAMUSCULAR; INTRAVENOUS ONCE
Status: COMPLETED | OUTPATIENT
Start: 2023-03-26 | End: 2023-03-26

## 2023-03-26 RX ORDER — MORPHINE SULFATE 4 MG/ML
1 INJECTION, SOLUTION INTRAMUSCULAR; INTRAVENOUS ONCE
Status: DISCONTINUED | OUTPATIENT
Start: 2023-03-26 | End: 2023-03-28

## 2023-03-26 RX ADMIN — GABAPENTIN 400 MG: 100 CAPSULE ORAL at 08:03

## 2023-03-26 RX ADMIN — SODIUM CHLORIDE, PRESERVATIVE FREE 10 ML: 5 INJECTION INTRAVENOUS at 03:03

## 2023-03-26 RX ADMIN — NIFEDIPINE 90 MG: 90 TABLET, FILM COATED, EXTENDED RELEASE ORAL at 08:03

## 2023-03-26 RX ADMIN — SODIUM BICARBONATE 1300 MG: 650 TABLET ORAL at 08:03

## 2023-03-26 RX ADMIN — LISINOPRIL 20 MG: 10 TABLET ORAL at 08:03

## 2023-03-26 RX ADMIN — INSULIN DETEMIR 5 UNITS: 100 INJECTION, SOLUTION SUBCUTANEOUS at 11:03

## 2023-03-26 RX ADMIN — Medication 400 MG: at 08:03

## 2023-03-26 RX ADMIN — ENOXAPARIN SODIUM 40 MG: 40 INJECTION SUBCUTANEOUS at 08:03

## 2023-03-26 RX ADMIN — PIPERACILLIN AND TAZOBACTAM 4.5 G: 4; .5 INJECTION, POWDER, FOR SOLUTION INTRAVENOUS; PARENTERAL at 11:03

## 2023-03-26 RX ADMIN — INSULIN ASPART 14 UNITS: 100 INJECTION, SOLUTION INTRAVENOUS; SUBCUTANEOUS at 12:03

## 2023-03-26 RX ADMIN — INSULIN DETEMIR 20 UNITS: 100 INJECTION, SOLUTION SUBCUTANEOUS at 08:03

## 2023-03-26 RX ADMIN — MICONAZOLE NITRATE: 20 POWDER TOPICAL at 09:03

## 2023-03-26 RX ADMIN — ALBUMIN (HUMAN) 25 G: 5 SOLUTION INTRAVENOUS at 08:03

## 2023-03-26 RX ADMIN — TORSEMIDE 40 MG: 20 TABLET ORAL at 08:03

## 2023-03-26 RX ADMIN — SODIUM CHLORIDE, PRESERVATIVE FREE 10 ML: 5 INJECTION INTRAVENOUS at 05:03

## 2023-03-26 RX ADMIN — PANCRELIPASE 6 CAPSULE: 60000; 12000; 38000 CAPSULE, DELAYED RELEASE PELLETS ORAL at 08:03

## 2023-03-26 RX ADMIN — SODIUM CHLORIDE, PRESERVATIVE FREE 10 ML: 5 INJECTION INTRAVENOUS at 11:03

## 2023-03-26 RX ADMIN — LEVETIRACETAM 1000 MG: 500 TABLET, FILM COATED ORAL at 08:03

## 2023-03-26 RX ADMIN — Medication: at 09:03

## 2023-03-26 RX ADMIN — OXYCODONE HYDROCHLORIDE AND ACETAMINOPHEN 1 TABLET: 5; 325 TABLET ORAL at 08:03

## 2023-03-26 RX ADMIN — VANCOMYCIN HYDROCHLORIDE 1500 MG: 1.5 INJECTION, POWDER, LYOPHILIZED, FOR SOLUTION INTRAVENOUS at 11:03

## 2023-03-26 RX ADMIN — OXYCODONE HYDROCHLORIDE AND ACETAMINOPHEN 1 TABLET: 5; 325 TABLET ORAL at 05:03

## 2023-03-26 RX ADMIN — IPRATROPIUM BROMIDE 0.5 MG: 0.5 SOLUTION RESPIRATORY (INHALATION) at 08:03

## 2023-03-26 RX ADMIN — OXYCODONE HYDROCHLORIDE AND ACETAMINOPHEN 1 TABLET: 5; 325 TABLET ORAL at 11:03

## 2023-03-26 RX ADMIN — SODIUM BICARBONATE 1300 MG: 650 TABLET ORAL at 05:03

## 2023-03-26 RX ADMIN — FUROSEMIDE 60 MG: 10 INJECTION, SOLUTION INTRAMUSCULAR; INTRAVENOUS at 05:03

## 2023-03-26 RX ADMIN — CHLORTHALIDONE 25 MG: 25 TABLET ORAL at 08:03

## 2023-03-26 RX ADMIN — FUROSEMIDE 20 MG: 10 INJECTION, SOLUTION INTRAMUSCULAR; INTRAVENOUS at 02:03

## 2023-03-26 RX ADMIN — PANCRELIPASE 6 CAPSULE: 60000; 12000; 38000 CAPSULE, DELAYED RELEASE PELLETS ORAL at 05:03

## 2023-03-26 RX ADMIN — PIPERACILLIN AND TAZOBACTAM 4.5 G: 4; .5 INJECTION, POWDER, FOR SOLUTION INTRAVENOUS; PARENTERAL at 08:03

## 2023-03-26 RX ADMIN — METOPROLOL TARTRATE 25 MG: 25 TABLET, FILM COATED ORAL at 09:03

## 2023-03-26 RX ADMIN — NEPHROCAP 1 CAPSULE: 1 CAP ORAL at 08:03

## 2023-03-26 RX ADMIN — METOPROLOL TARTRATE 25 MG: 25 TABLET, FILM COATED ORAL at 08:03

## 2023-03-26 RX ADMIN — INSULIN ASPART 14 UNITS: 100 INJECTION, SOLUTION INTRAVENOUS; SUBCUTANEOUS at 08:03

## 2023-03-26 RX ADMIN — DOXAZOSIN 2 MG: 1 TABLET ORAL at 08:03

## 2023-03-26 NOTE — PT/OT/SLP RE-EVAL
"Occupational Therapy   Re-evaluation    Name: Kamran Huerta  MRN: [id number]  Admitting Diagnosis:  Sepsis  Recent Surgery: Procedure(s) (LRB):  AMPUTATION, TOE (Right) 2 Days Post-Op    Recommendations:     Discharge Recommendations: rehabilitation facility, nursing facility, skilled  Discharge Equipment Recommendations:  (TBD)  Barriers to discharge:   (severity of illness)    Assessment:     Kamran Huerta is a 48 y.o. male with a medical diagnosis of Sepsis.  Pt with recent R foot debridement and amputation and 5th metatarsal amputation on 3/24. Performance deficits affecting function are weakness, impaired functional mobility, decreased safety awareness, impaired endurance, impaired balance, impaired skin, impaired self care skills, decreased lower extremity function, orthopedic precautions.    Pt presented with increased lethargy this visit with difficulty maintaining eyes open throughout OT session. Pt now requiring supplemental O2 on 4L Oxymask with SpO2 at 94% at rest supine in bed. Pt c/o of increased pain in back and R foot this AM, so RN notified and administered pain meds and OT returned in PM. Pt con't to c/o increased pain and reports needing to have BM. Pt without BSC in room and unsafe to ambulate to bathroom at this time d/t lethargic, so pt assisted on bedpan and RN notified. Pt required mod A for rolling in bed. Per MD, pt to be FWB to RLE with darco shoe donned.    Rehab Prognosis:  Good; patient would benefit from acute skilled OT services to address these deficits and reach maximum level of function.       Plan:     Patient to be seen 5 x/week, daily to address the above listed problems via self-care/home management, therapeutic activities, therapeutic exercises  Plan of Care Expires: 04/10/23  Plan of Care Reviewed with: patient    Subjective     Chief Complaint: "I need to have a BM."  Patient/Family stated goals: Return to PLOF  Communicated with: RN prior to " session.  Pain/Comfort:  Pain Rating 1: 10/10  Location 1: back  Pain Addressed 1: Reposition, Distraction  Pain Rating 2: 10/10  Location - Side 2: Right  Location 2: foot  Pain Addressed 2: Reposition, Distraction    Objective:     Communicated with: RN prior to session.  Patient found supine with: telemetry, pulse ox (continuous), oxygen, peripheral IV, escobar catheter upon OT entry to room.    General Precautions: Standard, fall  Orthopedic Precautions: RLE weight bearing as tolerated (with darco shoe)  Braces:  (R LE darco shoe)  Respiratory Status:  Oxymask 4L    Occupational Performance:    Bed Mobility:    Patient completed Rolling/Turning to Right with moderate assistance    Functional Mobility/Transfers:  Functional Mobility: NT 2/2 pt lethargic     Activities of Daily Living:  Toileting: dependence pt assisted on bed pan for BM      Patient left supine with all lines intact, call button in reach, and on bed pan. RN notified that pt was on bedpan.    GOALS:   Multidisciplinary Problems       Occupational Therapy Goals          Problem: Occupational Therapy    Goal Priority Disciplines Outcome Interventions   Occupational Therapy Goal     OT, PT/OT Ongoing, Progressing    Description: Goals to be met by: 3/31/23     Patient will increase functional independence with ADLs by performing:    UE Dressing with Set-up Assistance.  LE Dressing with Stand-by Assistance and Assistive Devices as needed.  Grooming while standing at sink with Stand-by Assistance.  Toileting from toilet (with BSC placed over toilet if needed) with Stand-by Assistance for hygiene and clothing management.                          History:     Past Medical History:   Diagnosis Date    CHF (congestive heart failure)     Chronic back pain     CVA (cerebral vascular accident) 01/2023    DM (diabetes mellitus)     HTN (hypertension)     Seizures          Past Surgical History:   Procedure Laterality Date    BACK SURGERY      EGD, WITH CLOSED  BIOPSY  3/15/2023    Procedure: EGD, WITH CLOSED BIOPSY;  Surgeon: Tj Faith MD;  Location: [facility] ENDOSCOPY;  Service: Gastroenterology;;    ESOPHAGOGASTRODUODENOSCOPY N/A 3/15/2023    Procedure: EGD;  Surgeon: Tj Faith MD;  Location: [facility] ENDOSCOPY;  Service: Gastroenterology;  Laterality: N/A;       Time Tracking:     OT Date of Treatment: 03/26/23  OT Start Time: 1232  OT Stop Time: 1240  OT Total Time (min): 8 min    Billable Minutes:Re-eval high complexity    3/26/2023

## 2023-03-26 NOTE — PROGRESS NOTES
[facility]                       [address] [address]    PATIENT NAME:       GODFREY MORLEY  YOB: 1975  CSN:                [id number]   MRN:                [id number]  ADMIT DATE:         03/11/2023 15:58:00  PHYSICIAN:          Emre Vazquez DPM                            PROGRESS NOTE    DATE:  03/26/2023 00:00:00    SUBJECTIVE:  The patient lying in bed.  Complains of everything hurting.  No   reported fevers.    LABORATORY DATA:  Reveal white cell count 15.7, H and H down 6.9 and 21.2.  BUN   and creatinine worsening at 63 and 3.05.    Intraoperative cultures growing out moderate Staph aureus.    PHYSICAL EXAMINATION:  SKIN:  Surgical site looks great.  Well approximated.  No signs of compromise or   infection.    ASSESSMENT:  Right foot wound infection, osteomyelitis, status post excisional   debridement.    PLAN:    1. We will continue with current care.  2. Dressings were placed.  3. Darco shoe for mobilization purposes.  4. The patient is going to get blood today.        ______________________________  Emre Vazquez DPM    GAS/AQS  DD:  03/26/2023  Time:  10:28AM  DT:  03/26/2023  Time:  10:55AM  Job #:  [id number]      PROGRESS NOTE

## 2023-03-26 NOTE — PROGRESS NOTES
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 Presented to the ED with complaints of diffuse body swelling specially abdomen, groin, penis and testicles and bilateral lower extremities, dyspnea on minimal exertion and bilateral lower extremity pain and cramping.  Report subjective fever and chills.  Report he was just hospitalized for similar symptom at [facility] about 2 weeks ago and was discharged home after few days. On arrival to ED, he was tachycardic, hypertensive, saturating 100% on room air.  EKG appears sinus rhythm on my review without ischemic changes.  Labs notable for WBC 16.3, hemoglobin 11.4, platelets 346, sodium 139, potassium 5.0, chloride 116, CO2 16, creatinine 2.17, BUN 34, glucose 420, calcium 8.0, albumin 1.3, , negative troponin. CT A/P with Contrast showed left pleural effusion, hepatic cirrhotic morphology, splenomegaly and upper abdominal varices and ascites, chronic pancreatitis changes, extensive subcutaneous anasarca edema.  Kidneys unremarkable without hydronephrosis.     Per ED provider exam he was noted to have purulent penile discharge, sent for culture and Segovia catheter placed.  He was given albumin 12.5 g, Lasix 40 mg IV, Vancomycin and Zosyn and subsequently referred to hospital medicine service for further evaluation and management.  mL with IV Lasix 40. Urine protein creatinine ratio noted to be 10.9. Patient was continued on IV Lasix. Seen by Renal team; recommendations made for IV Lasix/Albumin drip and renal biopsy. He was also seen by GI team for evaluation of cirrhosis and EGD was done to r/o EV. EGD showed erosive gastropathy,  normal esophagus & no other evidence of cirrhosis. Nephrology changed IV Lasix/Albumin drip to IV Lasix 80 mg TID with tentative plan for hemodialysis given patient's diuretic resistance. IR to plan for renal biopsy which could not be performed 03/15 due to uncontrolled HTN. Continues to have significant UOP with IV Lasix.  Nephrology continuing IV Lasix 80 mg t.i.d. and Metolazone 10 mg given stable creatinine and holding off on HD for now. Patient on 1500 mL fluid restriction.  OT to provide brace for scrotal support given patient's discomfort. Counseled regarding compliance with low-sodium diet and fluid restriction in order to optimize volume status with diuresis. Underwent renal biopsy with IR 03/20    Continued on IV Lasix 80 mg t.i.d. and Metolazone 10 mg.    Noted to have improved edema in B/L LE edema, abdominal wall edema & scrotal edema as of 3/22/23    Podiatry ordered XR R Foot which showed erosion of 5th metatarsophalangeal joint with osteomyelitis not excluded. Podiatry performed excisional debridement of R foot on 3/24/23.  Cultures-+ve Staph     Spiked Fever 101 3/23/23,Patient is started on Vanc,Zosyn to cover Possible Respiratory and  Bone Infection        Interval Hx:     Patient was seen and examined at bedside, complains of abdominal pain to me. Hb dropped this morning to <7, plan to transfuse a unit, give lasix prior, reach out to Nephrology given risk of worsening hypoxia with a  concern for Urine Output not being up to the aaron.      Chart was reviewed, On Nasal Cannula,ABG-hypoxemia, Tmax 98.9, with  white count going up 14.6>15 ,cr worsening 1.8>1.7>2.8>3, UOP not impressive    Sugars running high, <500s, started Levemir in the morning as well      Objective/physical exam:  General: In no acute distress, obese   Chest: Clear to auscultation bilaterally+ve crackles  Heart: RRR, +S1, S2, no appreciable murmur  Abdomen: + distension, nontender, BS +  MSK: 2+ pitting edema and thickening of  skin from abdomen to foot; + penile and scrotal edema   Neurologic: Alert and oriented x4, Cranial nerve II-XII intact, Strength 5/5 in all 4 extremities  Inteegumentary: blister noted on sole of R foot          VITAL SIGNS: 24 HRS MIN & MAX LAST   Temp  Min: 97.9 °F (36.6 °C)  Max: 98.9 °F (37.2 °C) 98.6 °F (37 °C)   BP  Min: 126/80  Max: 177/92 (!) 152/81   Pulse  Min: 78  Max: 86  82   Resp  Min: 16  Max: 22 20     SpO2  Min: 93 %  Max: 98 % (!) 93 %       Recent Labs   Lab 03/24/23  0742 03/25/23  0829 03/26/23  0743   WBC 14.9* 14.6* 15.7*   RBC 2.44* 2.40* 2.28*   HGB 7.3* 7.3* 6.9*   HCT 22.8* 22.6* 21.2*   MCV 93.4 94.2* 93.0   MCH 29.9 30.4 30.3   MCHC 32.0* 32.3* 32.5*   RDW 12.5 12.4 12.4    273 269   MPV 12.0* 12.4* 11.8*       Recent Labs   Lab 03/21/23  0643 03/22/23  0916 03/23/23  0345 03/23/23  1305 03/23/23  1320 03/23/23  1731 03/24/23  0742 03/25/23  0850 03/25/23  2142 03/26/23  0743 03/26/23  1633    137 136  --    < >  --    < > 135* 134* 134*  --    K 3.7 4.2 4.6  --    < >  --    < > 4.7 4.3 4.3  --    CO2 25 24 22  --    < >  --    < > 28 26 25  --    BUN 39.1* 39.3* 43.4*  --    < >  --    < > 59.8* 62.6* 63.3*  --    CREATININE 1.66* 1.72* 1.80*  --    < >  --    < > 2.87* 2.95* 3.05*  --    CALCIUM 7.8* 8.3* 8.3*  --    < >  --    < > 8.1* 8.8 8.3*  --    PH  --   --   --  7.48*  --  7.48*  --   --   --   --  7.43   MG 1.60 1.50* 1.70  --   --   --   --   --   --   --   --    ALBUMIN 2.1* 2.2*  --   --   --   --   --  2.4*  --   --   --    ALKPHOS 78 79  --   --   --   --   --  107  --   --   --    ALT 26 22  --   --   --   --   --  37  --   --   --    AST 21 23  --   --   --   --   --  41*  --   --   --    BILITOT 0.5 0.6  --   --   --   --   --  0.4  --   --   --     < > = values in this interval not displayed.     Microbiology Results (last 7 days)       Procedure Component Value Units Date/Time    Blood Culture [[id number]]  (Normal) Collected: 03/25/23 0829    Order  Status: Completed Specimen: Blood from Hand, Right Updated: 03/26/23 1000     CULTURE, BLOOD ([facility]) No Growth At 24 Hours    Blood Culture [[id number]]  (Normal) Collected: 03/25/23 0834    Order Status: Completed Specimen: Blood from Hand, Left Updated: 03/26/23 1000     CULTURE, BLOOD ([facility]) No Growth At 24 Hours    Anaerobic Culture [[id number]] Collected: 03/24/23 1554    Order Status: Completed Specimen: Bone from Foot, Right Updated: 03/26/23 0934     Anaerobe Culture No Anaerobes Isolated    Tissue Culture - Aerobic [[id number]]  (Abnormal) Collected: 03/24/23 1554    Order Status: Completed Specimen: Bone from Foot, Right Updated: 03/26/23 0724     CULTURE, TISSUE- AEROBIC ([facility]) Moderate Staphylococcus aureus    Respiratory Culture [[id number]]     Order Status: Sent Specimen: Sputum, Expectorated            Scheduled Med:   albumin human 25%  25 g Intravenous Daily    chlorthalidone  25 mg Oral Daily    doxazosin  2 mg Oral QHS    enoxaparin  40 mg Subcutaneous Q12H    ergocalciferol  50,000 Units Oral Q3 Days    furosemide (LASIX) injection  20 mg Intravenous Once    furosemide (LASIX) injection  60 mg Intravenous Once    gabapentin  400 mg Oral BID    insulin aspart U-100  14 Units Subcutaneous TIDWM    insulin detemir U-100  20 Units Subcutaneous QHS    insulin detemir U-100  5 Units Subcutaneous Daily    ipratropium  0.5 mg Nebulization Q6H    levETIRAcetam  1,000 mg Oral BID    lipase-protease-amylase 12,000-38,000-60,000 units  6 capsule Oral TID    lisinopriL  20 mg Oral BID    magnesium oxide  400 mg Oral BID    metoprolol tartrate  25 mg Oral BID    miconazole NITRATE 2 %   Topical (Top) BID    morphine  1 mg Intravenous Once    NIFEdipine  90 mg Oral Daily    piperacillin-tazobactam (ZOSYN) IVPB  4.5 g Intravenous Q8H    sodium bicarbonate  1,300 mg Oral TID    sodium chloride 0.9%  10 mL Intravenous Q6H    torsemide  40 mg Oral BID loop    vancomycin (VANCOCIN) IVPB  1,500 mg Intravenous Q24H     vitamin renal formula (B-complex-vitamin c-folic acid)  1 capsule Oral Daily    zinc oxide-cod liver oil   Topical (Top) BID     PRN Meds:  sodium chloride, acetaminophen, acetaminophen, albuterol sulfate, aluminum-magnesium hydroxide-simethicone, cloNIDine, dextrose 10%, dextrose 10%, dextrose, dextrose, glucagon (human recombinant), hydrALAZINE, HYDROcodone-acetaminophen, insulin aspart U-100, labetalol, melatonin, ondansetron, oxyCODONE-acetaminophen, polyethylene glycol, prochlorperazine, senna-docusate 8.6-50 mg, simethicone, sodium chloride 0.9%, Flushing PICC Protocol **AND** sodium chloride 0.9% **AND** sodium chloride 0.9%, vancomycin - pharmacy to dose     Assessment/Plan:  Anasarca 2/2 possible Diabetic Nephropathy  FABIO superimposed on CKD2  Nephrotic Syndrome/Proteinuria 10.9 g 2/2 Diabetic Nephropathy  AHRF (normal EF)  Sepsis 2/2 UTI/Urethritis + Staph and Pluralibacter gergoviae  ,   Osteomyelitis R Foot 5th Metatarsalophalangeal Joint s/p Excisional Debridement 3/24/23  Suspected Aspiration Pneumonia  Abdominal Pain ? 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  T2DM  Hypertension  Vitamin-D deficiency  B/L LE Weakness   Seizure  Chronic Pancreatitis  Hypomagnesemia    Plan:  -Nephrology on-board; following recommendations ,Placed on 1500 mL fluid restriction.IR performed L sided renal biopsy 03/20;  F/U report.Continue diuretics,was on IV Lasix 80 mg TID & Metolazone 10 mg daily , now switched to torsemide 40mg BID, cont Chlorthalisone,Cont Lisinopril per renal recommendations   -Completed 5 days of IV Rocephin for Urethritis and UTI   -Podiatry on-board;  amputation for R foot osteomyelitis, will f/u Cultures, Staph aureus on Tissue culture, f/u sensitivities  -Monitor Leukocytosis and fever curve, started on Vanc and Zosyn to cover possible Osteomyelitis and Aspiration Pneumonia?, f/u Resp and Tissue cultures and adjust antibiotics  accordingly  -complaining of abdominal pain,f/u CT abdomen , Lipase  -Oxygen supplementation to continue to keep Sats >92%, will reach out to Nephrology about worsening Hypoxia if he needs to be dialyzed  -Will continue Long Acting Insulin, adjustments to be made on blood sugars,will continue Aspart 14 units TID and give long acting Insulin twice daily  -Continue Keppra 1000 mg BID, Metoprolol Tartrate 25 mg BID, Doxazosin 2 mg, Ergocalciferol 89493 u q3days, Gabapentin 400 mg BID, NG 0 400 mg b.i.d., nifedipine 90 mg daily, NaHCO3 1300 mg t.i.d.  - PT/OT on board; recommending rehab placement    Critical care Time Spent>35 min   AHRF,Sepsis    VTE prophylaxis: Lovenox     Patient condition:  critical    Anticipated discharge and Disposition:         All diagnosis and differential diagnosis have been reviewed; assessment and plan has been documented; I have personally reviewed the labs and test results that are presently available; I have reviewed the patients medication list; I have reviewed the consulting providers response and recommendations. I have reviewed or attempted to review medical records based upon their availability    All of the patient's questions have been  addressed and answered. Patient's is agreeable to the above stated plan. I will continue to monitor closely and make adjustments to medical management as needed.  _____________________________________________________________________    Nutrition Status: Diabetic    Radiology:  X-Ray Chest 1 View  Narrative: EXAMINATION:  XR CHEST 1 VIEW    CLINICAL HISTORY:  shortness of breath; .    COMPARISON:  Chest x-ray 03/23/2023    FINDINGS:  Frontal view of the chest was obtained.  The cardiac silhouette is borderline in size.  There are bilateral patchy airspace opacities, right greater than left.  The airspace opacities in the right lung appear greater than on the previous study.  No visible pneumothorax or large pleural effusion is seen.  External lead  artifact is present.  No acute displaced fracture or dislocation is present.  Impression: 1. Patchy airspace opacities in the bilateral lungs are seen, increased in the right lung compared to the prior study.    Electronically signed by: Christian Medina MD  Date:    03/26/2023  Time:    15:16      Michelle Camacho MD   03/26/2023

## 2023-03-26 NOTE — PROGRESS NOTES
Pharmacokinetic Assessment Follow Up: IV Vancomycin    Vancomycin serum concentration assessment(s):    The random level was drawn correctly and can be used to guide therapy at this time. The measurement is within the desired definitive target range of 10 to 20 mcg/mL.    Vancomycin Regimen Plan:    Change regimen to Vancomycin 1500 mg IV every 24 hours with next serum trough concentration measured at 60 mins prior to 1100 dose on 03/27    Drug levels (last 3 results):  Recent Labs   Lab Result Units 03/25/23  1425 03/26/23  0743   Vanc Lvl Random ug/ml  --  17.2   Vancomycin Trough ug/ml 23.2*  --        Pharmacy will continue to follow and monitor vancomycin.    Please contact pharmacy at extension [phone] for questions regarding this assessment.    Thank you for the consult,   Kendra Bullock       Patient brief summary:  Kamran Huerta is a 48 y.o. male initiated on antimicrobial therapy with IV Vancomycin for treatment of lower respiratory infection    The patient's current regimen is Vancomycin 1500 mg q24h    Drug Allergies:   Review of patient's allergies indicates:  No Known Allergies    Actual Body Weight:   113.3kg    Renal Function:   Estimated Creatinine Clearance: 35.2 mL/min (A) (based on SCr of 3.05 mg/dL (H)).,     Dialysis Method (if applicable):  N/A    CBC (last 72 hours):  Recent Labs   Lab Result Units 03/24/23  0742 03/25/23  0829 03/26/23  0743   WBC x10(3)/mcL 14.9* 14.6* 15.7*   Hgb g/dL 7.3* 7.3* 6.9*   Hct % 22.8* 22.6* 21.2*   Platelet x10(3)/mcL 244 273 269   Mono % % 10.9 7.7 9.4   Eos % % 2.5 0.1 3.0   Basophil % % 0.5 0.3 0.6       Metabolic Panel (last 72 hours):  Recent Labs   Lab Result Units 03/23/23  1320 03/24/23  0742 03/25/23  0850 03/25/23  2142 03/26/23  0743   Sodium Level mmol/L 136 141 135* 134* 134*   Potassium Level mmol/L 4.9 3.9 4.7 4.3 4.3   Chloride mmol/L 103 104 98 98 100   Carbon Dioxide mmol/L 24 28 28 26 25   Glucose Level mg/dL 214* 63* 726* 445* 372*    Blood Urea Nitrogen mg/dL 48.2* 53.2* 59.8* 62.6* 63.3*   Creatinine mg/dL 1.87* 1.79* 2.87* 2.95* 3.05*   Albumin Level g/dL  --   --  2.4*  --   --    Bilirubin Total mg/dL  --   --  0.4  --   --    Alkaline Phosphatase unit/L  --   --  107  --   --    Aspartate Aminotransferase unit/L  --   --  41*  --   --    Alanine Aminotransferase unit/L  --   --  37  --   --        Vancomycin Administrations:  vancomycin given in the last 96 hours                     vancomycin (VANCOCIN) 1,750 mg in dextrose 5 % (D5W) 500 mL IVPB (mg) 1,750 mg New Bag 03/24/23 1623    vancomycin (VANCOCIN) 2,000 mg in dextrose 5 % (D5W) 500 mL IVPB (mg) 2,000 mg New Bag 03/23/23 1549                    Microbiologic Results:  Microbiology Results (last 7 days)       Procedure Component Value Units Date/Time    Anaerobic Culture [[id number]] Collected: 03/24/23 1554    Order Status: Completed Specimen: Bone from Foot, Right Updated: 03/26/23 0934     Anaerobe Culture No Anaerobes Isolated    Tissue Culture - Aerobic [[id number]]  (Abnormal) Collected: 03/24/23 1554    Order Status: Completed Specimen: Bone from Foot, Right Updated: 03/26/23 0724     CULTURE, TISSUE- AEROBIC ([facility]) Moderate Staphylococcus aureus    Blood Culture [[id number]] Collected: 03/25/23 0834    Order Status: Resulted Specimen: Blood from Hand, Left Updated: 03/25/23 0855    Blood Culture [[id number]] Collected: 03/25/23 0829    Order Status: Resulted Specimen: Blood from Hand, Right Updated: 03/25/23 0855    Respiratory Culture [[id number]]     Order Status: Sent Specimen: Sputum, Expectorated

## 2023-03-27 LAB
ANION GAP SERPL CALC-SCNC: 9 MEQ/L
BACTERIA SPEC ANAEROBE CULT: NORMAL
BACTERIA SPEC CULT: ABNORMAL
BASOPHILS # BLD AUTO: 0.1 X10(3)/MCL (ref 0–0.2)
BASOPHILS NFR BLD AUTO: 0.6 %
BUN SERPL-MCNC: 62.9 MG/DL (ref 8.9–20.6)
CALCIUM SERPL-MCNC: 8.3 MG/DL (ref 8.4–10.2)
CHLORIDE SERPL-SCNC: 101 MMOL/L (ref 98–107)
CO2 SERPL-SCNC: 26 MMOL/L (ref 22–29)
CORRECTED TEMPERATURE (PCO2): 43 MMHG (ref 35–45)
CORRECTED TEMPERATURE (PH): 7.47 (ref 7.35–7.45)
CORRECTED TEMPERATURE (PO2): 66 MMHG (ref 80–100)
CREAT SERPL-MCNC: 2.6 MG/DL (ref 0.73–1.18)
CREAT/UREA NIT SERPL: 24
EOSINOPHIL # BLD AUTO: 0.68 X10(3)/MCL (ref 0–0.9)
EOSINOPHIL NFR BLD AUTO: 3.9 %
ERYTHROCYTE [DISTWIDTH] IN BLOOD BY AUTOMATED COUNT: 13.8 % (ref 11.5–17)
GFR SERPLBLD CREATININE-BSD FMLA CKD-EPI: 30 MLS/MIN/1.73/M2
GLUCOSE SERPL-MCNC: 259 MG/DL (ref 74–100)
HCO3 UR-SCNC: 31.3 MMOL/L (ref 22–26)
HCT VFR BLD AUTO: 23.9 % (ref 42–52)
HGB BLD-MCNC: 8 G/DL (ref 14–18)
HGB BLD-MCNC: 8.2 G/DL (ref 12–16)
IMM GRANULOCYTES # BLD AUTO: 0.11 X10(3)/MCL (ref 0–0.04)
IMM GRANULOCYTES NFR BLD AUTO: 0.6 %
LYMPHOCYTES # BLD AUTO: 2.71 X10(3)/MCL (ref 0.6–4.6)
LYMPHOCYTES NFR BLD AUTO: 15.5 %
MCH RBC QN AUTO: 30.2 PG (ref 27–31)
MCHC RBC AUTO-ENTMCNC: 33.5 G/DL (ref 33–36)
MCV RBC AUTO: 90.2 FL (ref 80–94)
MONOCYTES # BLD AUTO: 1.81 X10(3)/MCL (ref 0.1–1.3)
MONOCYTES NFR BLD AUTO: 10.4 %
NEUTROPHILS # BLD AUTO: 12.06 X10(3)/MCL (ref 2.1–9.2)
NEUTROPHILS NFR BLD AUTO: 69 %
NRBC BLD AUTO-RTO: 0 %
PCO2 BLDA: 43 MMHG (ref 35–45)
PH SMN: 7.47 [PH] (ref 7.35–7.45)
PLATELET # BLD AUTO: 293 X10(3)/MCL (ref 130–400)
PMV BLD AUTO: 11.7 FL (ref 7.4–10.4)
PO2 BLDA: 66 MMHG (ref 80–100)
POC BASE DEFICIT: 7 MMOL/L (ref -2–2)
POC COHB: 2 %
POC IONIZED CALCIUM: 1.12 MMOL/L (ref 1.12–1.23)
POC METHB: 1.2 % (ref 0.4–1.5)
POC O2HB: 93.2 % (ref 94–97)
POC SATURATED O2: 94 %
POC TEMPERATURE: 37 °C
POCT GLUCOSE: 182 MG/DL (ref 70–110)
POCT GLUCOSE: 255 MG/DL (ref 70–110)
POCT GLUCOSE: 260 MG/DL (ref 70–110)
POCT GLUCOSE: 264 MG/DL (ref 70–110)
POTASSIUM BLD-SCNC: 4 MMOL/L (ref 3.5–5)
POTASSIUM SERPL-SCNC: 4.4 MMOL/L (ref 3.5–5.1)
RBC # BLD AUTO: 2.65 X10(6)/MCL (ref 4.7–6.1)
SODIUM BLD-SCNC: 132 MMOL/L (ref 137–145)
SODIUM SERPL-SCNC: 136 MMOL/L (ref 136–145)
SPECIMEN SOURCE: ABNORMAL
VANCOMYCIN TROUGH SERPL-MCNC: 22.6 UG/ML (ref 15–20)
WBC # SPEC AUTO: 17.5 X10(3)/MCL (ref 4.5–11.5)

## 2023-03-27 PROCEDURE — 63600175 PHARM REV CODE 636 W HCPCS: Performed by: INTERNAL MEDICINE

## 2023-03-27 PROCEDURE — 25000003 PHARM REV CODE 250: Performed by: PODIATRIST

## 2023-03-27 PROCEDURE — 94640 AIRWAY INHALATION TREATMENT: CPT

## 2023-03-27 PROCEDURE — 25000003 PHARM REV CODE 250: Performed by: STUDENT IN AN ORGANIZED HEALTH CARE EDUCATION/TRAINING PROGRAM

## 2023-03-27 PROCEDURE — A4216 STERILE WATER/SALINE, 10 ML: HCPCS | Performed by: PODIATRIST

## 2023-03-27 PROCEDURE — 94761 N-INVAS EAR/PLS OXIMETRY MLT: CPT

## 2023-03-27 PROCEDURE — 63600175 PHARM REV CODE 636 W HCPCS: Performed by: STUDENT IN AN ORGANIZED HEALTH CARE EDUCATION/TRAINING PROGRAM

## 2023-03-27 PROCEDURE — 36600 WITHDRAWAL OF ARTERIAL BLOOD: CPT

## 2023-03-27 PROCEDURE — 63600175 PHARM REV CODE 636 W HCPCS: Performed by: PODIATRIST

## 2023-03-27 PROCEDURE — 21400001 HC TELEMETRY ROOM

## 2023-03-27 PROCEDURE — 25000003 PHARM REV CODE 250: Performed by: INTERNAL MEDICINE

## 2023-03-27 PROCEDURE — P9047 ALBUMIN (HUMAN), 25%, 50ML: HCPCS | Mod: JZ,JG | Performed by: PODIATRIST

## 2023-03-27 PROCEDURE — 80048 BASIC METABOLIC PNL TOTAL CA: CPT | Performed by: INTERNAL MEDICINE

## 2023-03-27 PROCEDURE — 80202 ASSAY OF VANCOMYCIN: CPT | Performed by: STUDENT IN AN ORGANIZED HEALTH CARE EDUCATION/TRAINING PROGRAM

## 2023-03-27 PROCEDURE — 82803 BLOOD GASES ANY COMBINATION: CPT

## 2023-03-27 PROCEDURE — 25000242 PHARM REV CODE 250 ALT 637 W/ HCPCS: Performed by: NURSE PRACTITIONER

## 2023-03-27 PROCEDURE — 99900035 HC TECH TIME PER 15 MIN (STAT)

## 2023-03-27 PROCEDURE — 27000221 HC OXYGEN, UP TO 24 HOURS

## 2023-03-27 PROCEDURE — 85025 COMPLETE CBC W/AUTO DIFF WBC: CPT | Performed by: PODIATRIST

## 2023-03-27 RX ORDER — FUROSEMIDE 10 MG/ML
80 INJECTION INTRAMUSCULAR; INTRAVENOUS 3 TIMES DAILY
Status: DISCONTINUED | OUTPATIENT
Start: 2023-03-27 | End: 2023-03-27

## 2023-03-27 RX ORDER — NALOXONE HCL 0.4 MG/ML
0.2 VIAL (ML) INJECTION ONCE
Status: DISCONTINUED | OUTPATIENT
Start: 2023-03-27 | End: 2023-03-28

## 2023-03-27 RX ORDER — TORSEMIDE 20 MG/1
40 TABLET ORAL DAILY
Status: DISCONTINUED | OUTPATIENT
Start: 2023-03-27 | End: 2023-04-04 | Stop reason: HOSPADM

## 2023-03-27 RX ORDER — GABAPENTIN 300 MG/1
300 CAPSULE ORAL NIGHTLY
Status: DISCONTINUED | OUTPATIENT
Start: 2023-03-28 | End: 2023-03-28

## 2023-03-27 RX ORDER — FUROSEMIDE 10 MG/ML
40 INJECTION INTRAMUSCULAR; INTRAVENOUS ONCE
Status: COMPLETED | OUTPATIENT
Start: 2023-03-27 | End: 2023-03-27

## 2023-03-27 RX ORDER — LEVETIRACETAM 500 MG/1
500 TABLET ORAL 2 TIMES DAILY
Status: DISCONTINUED | OUTPATIENT
Start: 2023-03-27 | End: 2023-04-04 | Stop reason: HOSPADM

## 2023-03-27 RX ORDER — LINEZOLID 2 MG/ML
600 INJECTION, SOLUTION INTRAVENOUS
Status: DISCONTINUED | OUTPATIENT
Start: 2023-03-27 | End: 2023-04-04 | Stop reason: HOSPADM

## 2023-03-27 RX ORDER — VANCOMYCIN HCL IN 5 % DEXTROSE 1G/250ML
1000 PLASTIC BAG, INJECTION (ML) INTRAVENOUS
Status: DISCONTINUED | OUTPATIENT
Start: 2023-03-27 | End: 2023-03-27

## 2023-03-27 RX ORDER — METOLAZONE 5 MG/1
10 TABLET ORAL DAILY
Status: DISCONTINUED | OUTPATIENT
Start: 2023-03-27 | End: 2023-03-27

## 2023-03-27 RX ADMIN — IPRATROPIUM BROMIDE 0.5 MG: 0.5 SOLUTION RESPIRATORY (INHALATION) at 07:03

## 2023-03-27 RX ADMIN — LEVETIRACETAM 1000 MG: 500 TABLET, FILM COATED ORAL at 08:03

## 2023-03-27 RX ADMIN — TORSEMIDE 40 MG: 20 TABLET ORAL at 01:03

## 2023-03-27 RX ADMIN — PANCRELIPASE 6 CAPSULE: 60000; 12000; 38000 CAPSULE, DELAYED RELEASE PELLETS ORAL at 08:03

## 2023-03-27 RX ADMIN — LEVETIRACETAM 500 MG: 500 TABLET, FILM COATED ORAL at 08:03

## 2023-03-27 RX ADMIN — INSULIN DETEMIR 20 UNITS: 100 INJECTION, SOLUTION SUBCUTANEOUS at 08:03

## 2023-03-27 RX ADMIN — FUROSEMIDE 20 MG: 10 INJECTION, SOLUTION INTRAMUSCULAR; INTRAVENOUS at 12:03

## 2023-03-27 RX ADMIN — IPRATROPIUM BROMIDE 0.5 MG: 0.5 SOLUTION RESPIRATORY (INHALATION) at 12:03

## 2023-03-27 RX ADMIN — FUROSEMIDE 40 MG: 10 INJECTION, SOLUTION INTRAMUSCULAR; INTRAVENOUS at 04:03

## 2023-03-27 RX ADMIN — ACETAMINOPHEN 1000 MG: 500 TABLET ORAL at 08:03

## 2023-03-27 RX ADMIN — FUROSEMIDE 80 MG: 10 INJECTION, SOLUTION INTRAMUSCULAR; INTRAVENOUS at 01:03

## 2023-03-27 RX ADMIN — GABAPENTIN 400 MG: 100 CAPSULE ORAL at 08:03

## 2023-03-27 RX ADMIN — ENOXAPARIN SODIUM 40 MG: 40 INJECTION SUBCUTANEOUS at 08:03

## 2023-03-27 RX ADMIN — MICONAZOLE NITRATE: 20 POWDER TOPICAL at 08:03

## 2023-03-27 RX ADMIN — METOLAZONE 10 MG: 5 TABLET ORAL at 01:03

## 2023-03-27 RX ADMIN — Medication 400 MG: at 08:03

## 2023-03-27 RX ADMIN — INSULIN ASPART 14 UNITS: 100 INJECTION, SOLUTION INTRAVENOUS; SUBCUTANEOUS at 05:03

## 2023-03-27 RX ADMIN — METOPROLOL TARTRATE 25 MG: 25 TABLET, FILM COATED ORAL at 08:03

## 2023-03-27 RX ADMIN — ERGOCALCIFEROL 50000 UNITS: 1.25 CAPSULE ORAL at 08:03

## 2023-03-27 RX ADMIN — NIFEDIPINE 90 MG: 90 TABLET, FILM COATED, EXTENDED RELEASE ORAL at 08:03

## 2023-03-27 RX ADMIN — SODIUM BICARBONATE 1300 MG: 650 TABLET ORAL at 08:03

## 2023-03-27 RX ADMIN — LINEZOLID 600 MG: 600 INJECTION, SOLUTION INTRAVENOUS at 11:03

## 2023-03-27 RX ADMIN — PIPERACILLIN AND TAZOBACTAM 4.5 G: 4; .5 INJECTION, POWDER, FOR SOLUTION INTRAVENOUS; PARENTERAL at 08:03

## 2023-03-27 RX ADMIN — Medication: at 08:03

## 2023-03-27 RX ADMIN — ALBUMIN (HUMAN) 25 G: 5 SOLUTION INTRAVENOUS at 08:03

## 2023-03-27 RX ADMIN — INSULIN DETEMIR 5 UNITS: 100 INJECTION, SOLUTION SUBCUTANEOUS at 09:03

## 2023-03-27 RX ADMIN — DOXAZOSIN 2 MG: 1 TABLET ORAL at 08:03

## 2023-03-27 RX ADMIN — FUROSEMIDE 80 MG: 10 INJECTION, SOLUTION INTRAMUSCULAR; INTRAVENOUS at 08:03

## 2023-03-27 RX ADMIN — PIPERACILLIN AND TAZOBACTAM 4.5 G: 4; .5 INJECTION, POWDER, FOR SOLUTION INTRAVENOUS; PARENTERAL at 04:03

## 2023-03-27 RX ADMIN — SODIUM CHLORIDE, PRESERVATIVE FREE 10 ML: 5 INJECTION INTRAVENOUS at 11:03

## 2023-03-27 RX ADMIN — CEFEPIME 1 G: 1 INJECTION, POWDER, FOR SOLUTION INTRAMUSCULAR; INTRAVENOUS at 09:03

## 2023-03-27 RX ADMIN — NEPHROCAP 1 CAPSULE: 1 CAP ORAL at 08:03

## 2023-03-27 NOTE — PLAN OF CARE
Problem: Infection  Goal: Absence of Infection Signs and Symptoms  Outcome: Ongoing, Progressing     Problem: Adult Inpatient Plan of Care  Goal: Plan of Care Review  Outcome: Ongoing, Progressing  Goal: Patient-Specific Goal (Individualized)  Outcome: Ongoing, Progressing     Problem: Adjustment to Illness (Sepsis/Septic Shock)  Goal: Optimal Coping  Outcome: Ongoing, Progressing     Problem: Bleeding (Sepsis/Septic Shock)  Goal: Absence of Bleeding  Outcome: Ongoing, Progressing     Problem: Glycemic Control Impaired (Sepsis/Septic Shock)  Goal: Blood Glucose Level Within Desired Range  Outcome: Ongoing, Progressing     Problem: Infection Progression (Sepsis/Septic Shock)  Goal: Absence of Infection Signs and Symptoms  Outcome: Ongoing, Progressing     Problem: Nutrition Impaired (Sepsis/Septic Shock)  Goal: Optimal Nutrition Intake  Outcome: Ongoing, Progressing     Problem: Oral Intake Inadequate (Acute Kidney Injury/Impairment)  Goal: Optimal Nutrition Intake  Outcome: Ongoing, Progressing

## 2023-03-27 NOTE — PROGRESS NOTES
Pharmacokinetic Assessment Follow Up: IV Vancomycin    Vancomycin serum concentration assessment(s):    The trough level was drawn correctly and can be used to guide therapy at this time. The measurement is above the desired definitive target range of 10 to 20 mcg/mL.    Vancomycin Regimen Plan:    Change regimen to Vancomycin 1000 mg IV every 24 hours with next serum trough concentration measured at 2000 prior to 3rd dose on 03/29    Drug levels (last 3 results):  Recent Labs   Lab Result Units 03/25/23  1425 03/26/23  0743 03/27/23  0943   Vanc Lvl Random ug/ml  --  17.2  --    Vancomycin Trough ug/ml 23.2*  --  22.6*       Pharmacy will continue to follow and monitor vancomycin.    Please contact pharmacy at extension [phone] for questions regarding this assessment.    Thank you for the consult,   Shira Reyez       Patient brief summary:  Kamran Huerta is a 48 y.o. male initiated on antimicrobial therapy with IV Vancomycin for treatment of lower respiratory infection    The patient's current regimen is vancomycin 1500mg q24h    Drug Allergies:   Review of patient's allergies indicates:  No Known Allergies    Actual Body Weight:   115.7kg    Renal Function:   Estimated Creatinine Clearance: 41.8 mL/min (A) (based on SCr of 2.6 mg/dL (H)).,     Dialysis Method (if applicable):  N/A    CBC (last 72 hours):  Recent Labs   Lab Result Units 03/25/23  0829 03/26/23  0743 03/27/23  0556   WBC x10(3)/mcL 14.6* 15.7* 17.5*   Hgb g/dL 7.3* 6.9* 8.0*   Hct % 22.6* 21.2* 23.9*   Platelet x10(3)/mcL 273 269 293   Mono % % 7.7 9.4 10.4   Eos % % 0.1 3.0 3.9   Basophil % % 0.3 0.6 0.6       Metabolic Panel (last 72 hours):  Recent Labs   Lab Result Units 03/25/23  0850 03/25/23  2142 03/26/23  0743 03/27/23  0556   Sodium Level mmol/L 135* 134* 134* 136   Potassium Level mmol/L 4.7 4.3 4.3 4.4   Chloride mmol/L 98 98 100 101   Carbon Dioxide mmol/L 28 26 25 26   Glucose Level mg/dL 726* 445* 372* 259*   Blood Urea Nitrogen  mg/dL 59.8* 62.6* 63.3* 62.9*   Creatinine mg/dL 2.87* 2.95* 3.05* 2.60*   Albumin Level g/dL 2.4*  --   --   --    Bilirubin Total mg/dL 0.4  --   --   --    Alkaline Phosphatase unit/L 107  --   --   --    Aspartate Aminotransferase unit/L 41*  --   --   --    Alanine Aminotransferase unit/L 37  --   --   --        Vancomycin Administrations:  vancomycin given in the last 96 hours                     vancomycin 1.5 g in dextrose 5 % 250 mL IVPB (ready to mix) (mg) 1,500 mg New Bag 03/26/23 1145    vancomycin (VANCOCIN) 1,750 mg in dextrose 5 % (D5W) 500 mL IVPB (mg) 1,750 mg New Bag 03/24/23 1623    vancomycin (VANCOCIN) 2,000 mg in dextrose 5 % (D5W) 500 mL IVPB (mg) 2,000 mg New Bag 03/23/23 1549                    Microbiologic Results:  Microbiology Results (last 7 days)       Procedure Component Value Units Date/Time    Tissue Culture - Aerobic [[id number]]  (Abnormal)  (Susceptibility) Collected: 03/24/23 1554    Order Status: Completed Specimen: Bone from Foot, Right Updated: 03/27/23 1012     CULTURE, TISSUE- AEROBIC ([facility]) Moderate Methicillin resistant Staphylococcus aureus    Anaerobic Culture [[id number]] Collected: 03/24/23 1554    Order Status: Completed Specimen: Bone from Foot, Right Updated: 03/27/23 1001     Anaerobe Culture No Anaerobes Isolated    Blood Culture [[id number]]  (Normal) Collected: 03/25/23 0829    Order Status: Completed Specimen: Blood from Hand, Right Updated: 03/27/23 1000     CULTURE, BLOOD ([facility]) No Growth At 48 Hours    Blood Culture [[id number]]  (Normal) Collected: 03/25/23 0834    Order Status: Completed Specimen: Blood from Hand, Left Updated: 03/27/23 1000     CULTURE, BLOOD ([facility]) No Growth At 48 Hours    Respiratory Culture [[id number]]     Order Status: Sent Specimen: Sputum, Expectorated

## 2023-03-27 NOTE — PT/OT/SLP PROGRESS
Occupational Therapy      Patient Name:  Kamran Huerta   MRN:  [id number]    Patient not seen today secondary to Patient unwilling to participate, patient stating that he is having difficulty breathing at this time and in 10/10 pain throughout body specifically in stomach and B feet. ARIANA in patient's room [id number] repositioning patient to off load heels and buttock 1 conference charged at this time. Will follow-up as able.    3/27/2023

## 2023-03-27 NOTE — PROGRESS NOTES
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 Presented to the ED with complaints of diffuse body swelling specially abdomen, groin, penis and testicles and bilateral lower extremities, dyspnea on minimal exertion and bilateral lower extremity pain and cramping.  Report subjective fever and chills.  Report he was just hospitalized for similar symptom at [facility] [place] [facility] about 2 weeks ago and was discharged home after few days. On arrival to ED, he was tachycardic, hypertensive, saturating 100% on room air.  EKG appears sinus rhythm on my review without ischemic changes.  Labs notable for WBC 16.3, hemoglobin 11.4, platelets 346, sodium 139, potassium 5.0, chloride 116, CO2 16, creatinine 2.17, BUN 34, glucose 420, calcium 8.0, albumin 1.3, , negative troponin. CT A/P with Contrast showed left pleural effusion, hepatic cirrhotic morphology, splenomegaly and upper abdominal varices and ascites, chronic pancreatitis changes, extensive subcutaneous anasarca edema.  Kidneys unremarkable without hydronephrosis.     Per ED provider exam he was noted to have purulent penile discharge, sent for culture and Segovia catheter placed.  He was given albumin 12.5 g, Lasix 40 mg IV, Vancomycin and Zosyn and subsequently referred to hospital medicine service for further evaluation and management.  mL with IV Lasix 40. Urine protein creatinine ratio noted to be 10.9. Patient was continued on IV Lasix. Seen by Renal team; recommendations made for IV Lasix/Albumin drip and renal biopsy. He was also seen by GI team for evaluation of cirrhosis and EGD was done to r/o EV. EGD showed erosive gastropathy,  normal esophagus & no other evidence of cirrhosis. Nephrology changed IV Lasix/Albumin drip to IV Lasix 80 mg TID with tentative plan for hemodialysis given patient's diuretic resistance. IR to plan for renal biopsy which could not be performed 03/15 due to uncontrolled HTN. Continues to have significant UOP with IV Lasix.  Nephrology continuing IV Lasix 80 mg t.i.d. and Metolazone 10 mg given stable creatinine and holding off on HD for now. Patient on 1500 mL fluid restriction.  OT to provide brace for scrotal support given patient's discomfort. Counseled regarding compliance with low-sodium diet and fluid restriction in order to optimize volume status with diuresis. Underwent renal biopsy with IR 03/20    Continued on IV Lasix 80 mg t.i.d. and Metolazone 10 mg.    Noted to have improved edema in B/L LE edema, abdominal wall edema & scrotal edema as of 3/22/23    Podiatry ordered XR R Foot which showed erosion of 5th metatarsophalangeal joint with osteomyelitis not excluded. Podiatry performed excisional debridement of R foot on 3/24/23.  Cultures-+ve Staph     Spiked Fever 101 3/23/23,Patient is started on Vanc,Zosyn to cover Possible Pneumonia and  Osteomyelitis    On 3/26/23 Hb dropped to <7 , we transfused a unit, Oxygen requirement going up with poor Urine Output and hence Nephrology was updated about the patient, got 80mg IV lasix    Interval Hx:     Patient was seen and examined at bedside, complains of pain all over his body.     Chart was reviewed,UOP 2.5 L, Tmax 100.2, with  white count going up 14.6>15>17 ,cr worsening 1.8>1.7>2.8>3        Objective/physical exam:  General: In no acute distress, obese , On MASK  Chest: Clear to auscultation bilaterally+ve crackles  Heart: RRR, +S1, S2, no appreciable murmur  Abdomen: + distension, nontender, BS +  MSK: 2+ pitting edema and thickening of skin from abdomen to foot; + penile and scrotal edema   Neurologic: Alert and oriented x4, Cranial nerve II-XII intact,  Strength 5/5 in all 4 extremities  Inteegumentary: blister noted on sole of R foot          VITAL SIGNS: 24 HRS MIN & MAX LAST   Temp  Min: 97.5 °F (36.4 °C)  Max: 100.2 °F (37.9 °C) 99.8 °F (37.7 °C)   BP  Min: 140/84  Max: 164/81 (!) 143/72   Pulse  Min: 82  Max: 96  96   Resp  Min: 16  Max: 28 16     SpO2  Min: 91 %  Max: 98 % (!) 94 %       Recent Labs   Lab 03/25/23  0829 03/26/23  0743 03/27/23  0556   WBC 14.6* 15.7* 17.5*   RBC 2.40* 2.28* 2.65*   HGB 7.3* 6.9* 8.0*   HCT 22.6* 21.2* 23.9*   MCV 94.2* 93.0 90.2   MCH 30.4 30.3 30.2   MCHC 32.3* 32.5* 33.5   RDW 12.4 12.4 13.8    269 293   MPV 12.4* 11.8* 11.7*       Recent Labs   Lab 03/21/23  0643 03/22/23  0916 03/23/23  0345 03/23/23  1305 03/23/23  1731 03/24/23  0742 03/25/23  0850 03/25/23  2142 03/26/23  0743 03/26/23  1633 03/27/23  0556 03/27/23  0619    137 136   < >  --    < > 135* 134* 134*  --  136  --    K 3.7 4.2 4.6   < >  --    < > 4.7 4.3 4.3  --  4.4  --    CO2 25 24 22   < >  --    < > 28 26 25  --  26  --    BUN 39.1* 39.3* 43.4*   < >  --    < > 59.8* 62.6* 63.3*  --  62.9*  --    CREATININE 1.66* 1.72* 1.80*   < >  --    < > 2.87* 2.95* 3.05*  --  2.60*  --    CALCIUM 7.8* 8.3* 8.3*   < >  --    < > 8.1* 8.8 8.3*  --  8.3*  --    PH  --   --   --    < > 7.48*  --   --   --   --  7.43  --  7.47*   MG 1.60 1.50* 1.70  --   --   --   --   --   --   --   --   --    ALBUMIN 2.1* 2.2*  --   --   --   --  2.4*  --   --   --   --   --    ALKPHOS 78 79  --   --   --   --  107  --   --   --   --   --    ALT 26 22  --   --   --   --  37  --   --   --   --   --    AST 21 23  --   --   --   --  41*  --   --   --   --   --    BILITOT 0.5 0.6  --   --   --   --  0.4  --   --   --   --   --     < > = values in this interval not displayed.     Microbiology Results (last 7 days)       Procedure Component Value Units Date/Time    Tissue Culture - Aerobic [[id number]]  (Abnormal)  (Susceptibility) Collected: 03/24/23 2836    Order Status:  Completed Specimen: Bone from Foot, Right Updated: 03/27/23 1012     CULTURE, TISSUE- AEROBIC ([facility]) Moderate Methicillin resistant Staphylococcus aureus    Anaerobic Culture [[id number]] Collected: 03/24/23 1554    Order Status: Completed Specimen: Bone from Foot, Right Updated: 03/27/23 1001     Anaerobe Culture No Anaerobes Isolated    Blood Culture [[id number]]  (Normal) Collected: 03/25/23 0829    Order Status: Completed Specimen: Blood from Hand, Right Updated: 03/27/23 1000     CULTURE, BLOOD ([facility]) No Growth At 48 Hours    Blood Culture [[id number]]  (Normal) Collected: 03/25/23 0834    Order Status: Completed Specimen: Blood from Hand, Left Updated: 03/27/23 1000     CULTURE, BLOOD ([facility]) No Growth At 48 Hours    Respiratory Culture [[id number]]     Order Status: Sent Specimen: Sputum, Expectorated            Scheduled Med:   albumin human 25%  25 g Intravenous Daily    doxazosin  2 mg Oral QHS    enoxaparin  40 mg Subcutaneous Q12H    ergocalciferol  50,000 Units Oral Q3 Days    [START ON 3/28/2023] gabapentin  300 mg Oral QHS    insulin aspart U-100  14 Units Subcutaneous TIDWM    insulin detemir U-100  20 Units Subcutaneous QHS    insulin detemir U-100  5 Units Subcutaneous Daily    ipratropium  0.5 mg Nebulization Q6H    levETIRAcetam  500 mg Oral BID    lipase-protease-amylase 12,000-38,000-60,000 units  6 capsule Oral TID    magnesium oxide  400 mg Oral BID    methylnaltrexone  6 mg Subcutaneous Once    metoprolol tartrate  25 mg Oral BID    miconazole NITRATE 2 %   Topical (Top) BID    morphine  1 mg Intravenous Once    NIFEdipine  90 mg Oral Daily    piperacillin-tazobactam (ZOSYN) IVPB  4.5 g Intravenous Q8H    sodium bicarbonate  1,300 mg Oral TID    sodium chloride 0.9%  10 mL Intravenous Q6H    torsemide  40 mg Oral Daily    vancomycin (VANCOCIN) IVPB  1,000 mg Intravenous Q24H    vitamin renal formula (B-complex-vitamin c-folic acid)  1 capsule Oral Daily    zinc oxide-cod liver oil   Topical (Top)  BID     PRN Meds:  sodium chloride, acetaminophen, acetaminophen, albuterol sulfate, aluminum-magnesium hydroxide-simethicone, cloNIDine, dextrose 10%, dextrose 10%, dextrose, dextrose, glucagon (human recombinant), hydrALAZINE, HYDROcodone-acetaminophen, insulin aspart U-100, labetalol, melatonin, ondansetron, oxyCODONE-acetaminophen, polyethylene glycol, prochlorperazine, senna-docusate 8.6-50 mg, simethicone, sodium chloride 0.9%, Flushing PICC Protocol **AND** sodium chloride 0.9% **AND** sodium chloride 0.9%, vancomycin - pharmacy to dose     Assessment/Plan:  Anasarca 2/2 possible Diabetic Nephropathy  FABIO superimposed on CKD2  Nephrotic Syndrome/Proteinuria 10.9 g 2/2 Diabetic Nephropathy  AHRF (normal EF)  UTI/Urethritis + Staph and Pluralibacter gergoviae  ,   Osteomyelitis R Foot 5th Metatarsalophalangeal Joint s/p Excisional Debridement 3/24/23 + MRSA Staph  Suspected Aspiration Pneumonia  Sepsis 2/2 Above  Abdominal Pain ? 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  T2DM  Hypertension  Vitamin-D deficiency  B/L LE Weakness   Seizure  Chronic Pancreatitis  Hypomagnesemia    Plan:  -Nephrology on-board; following recommendations ,Placed on 1500 mL fluid restriction.IR performed L sided renal biopsy 03/20;report: advanced diabetic nodular sclerosis.Continue diuretics per Nephro,was on IV Lasix 80 mg TID & Metolazone 10 mg daily , now switched to torsemide   -Oxygen supplementation to continue to keep Sats >92%, will reach out to Nephrology about worsening Hypoxia if he needs to be dialyzed  -Completed 5 days of IV Rocephin for Urethritis and UTI   -Monitor Leukocytosis and fever curve, started on Vanc and Zosyn to cover Osteomyelitis and possible Aspiration Pneumonia?, f/u Resp and Tissue cultures and adjust antibiotics accordingly  -Podiatry on-board for R foot osteomyelitis s/p excisional debridement, Staph aureus on Tissue  culture,Cont Vancomycin -Bowel Regimen for constipation, Lipase-normal  -Will continue Long Acting Insulin, adjustments to be made on blood sugars,will continue Aspart 14 units TID and give long acting Insulin twice daily  -Avoid Polypharmacy, decrease Gabapentin and Keppra, Encourage PT  -Continue Metoprolol Tartrate 25 mg BID, Doxazosin 2 mg, Ergocalciferol 93044 u q3days, NG 0 400 mg b.i.d., nifedipine 90 mg daily, NaHCO3 1300 mg t.i.d.  -PT/OT on board; recommending rehab placement    Critical care Time Spent>35 min   AHRF,Sepsis    VTE prophylaxis: Lovenox     Patient condition:  critical    Anticipated discharge and Disposition:         All diagnosis and differential diagnosis have been reviewed; assessment and plan has been documented; I have personally reviewed the labs and test results that are presently available; I have reviewed the patients medication list; I have reviewed the consulting providers response and recommendations. I have reviewed or attempted to review medical records based upon their availability    All of the patient's questions have been  addressed and answered. Patient's is agreeable to the above stated plan. I will continue to monitor closely and make adjustments to medical management as needed.  _____________________________________________________________________    Nutrition Status: Diabetic    Radiology:  CT Abdomen Pelvis  Without Contrast  Narrative: EXAMINATION:  CT ABDOMEN PELVIS WITHOUT CONTRAST    CLINICAL HISTORY:  Abdominal pain, acute, nonlocalized;    TECHNIQUE:  CT imaging of the abdomen and pelvis without intravenous contrast. Axial, coronal and sagittal reformatted images reviewed. Dose length product is 609 mGycm. Automatic exposure control, adjustment of mA/kV or iterative reconstruction technique used to limit radiation dose.    COMPARISON:  CT 03/11/2023    FINDINGS:  Assessment of the visceral organs and vasculature is limited by the lack of IV contrast.   Evaluation also mildly limited by motion.    No acute findings identified in the liver, spleen or pancreas.  No biliary dilatation, adrenal nodule or hydronephrosis.  Bladder decompressed with a catheter.  Large volume stool through the colon.  No small bowel dilatation.  No significant ascites.  Generalized subcutaneous edema with small bilateral pleural effusions.  Multifocal consolidation in the included lower lungs.  Similar reactive appearing bilateral inguinal lymph nodes.  Impression: 1. Large volume colonic stool.  2. Multifocal lower lung consolidation may be pneumonia or pulmonary edema.    Electronically signed by: Jose E Tracy  Date:    03/27/2023  Time:    13:32      Michelle Camacho MD   03/27/2023

## 2023-03-27 NOTE — PT/OT/SLP PROGRESS
"Physical Therapy      Patient Name:  Kamran Huetra   MRN:  [id number]    Patient not seen today for PT tx session. Pt refused twice today. The first time pt refused because he stated he had just ambulated to the bathroom and was tired. He then later refused again because "my foot hurts, I can't breath, and my balls hurt". Will follow-up.    "

## 2023-03-27 NOTE — CONSULTS
Infectious Diseases Consultation       Inpatient consult to Infectious Diseases  Consult performed by: Nanyc Valladares MD  Consult ordered by: Michelle Camacho MD      HPI:   48-year-old male with past medical history of HTN, seizure disorder, chronic pancreatitis, alcoholic liver disease, DM type 2, CKD, obesity, is admitted to [facility] on 03/11/2023, presenting through the ED with complaints of diffuse body swelling, dyspnea on exertion and bilateral lower extremity pain and cramping as well as reported some fevers and chills.  He also reported recent admission at [facility] about 2 weeks prior to presentation and just discharged few days before.  He was evaluated on presentation and noted to be tachycardic and hypertensive thought to be in fluid overload with concern for CHF and pulmonary edema.  He had been pretty much without fevers but on 03/24 fever spike of 101 and is noted to have low-grade fever of 100.2 in the last 24 hours.  He is also noted to have progressive worsening leukocytosis with WBC up to 17.5 today 3/27.  Renal function is abnormal with creatinine of 2.17 on presentation, anemic with low albumin.  Blood cultures have been negative from 03/25.  Multiple chest x-ray is noted with findings suggestive of pulmonary edema and now with chest x-ray of 3/26 suggesting increased patchy opacity in the right lung concerning for pneumonia.  CT scan of the abdomen and pelvis done today 03/27 is concerning for multifocal consolidations suggestive of pneumonia or pulmonary edema.  V/Q scan with low probability of pulmonary emboli.  2D echocardiogram noted with EF of 60%.  He was also noted to have right foot infection and seen by Podiatry, status post excisional debridement on 03/24 with bone cultures positive for moderate MRSA.  He was reported to have urethral drainage culture on 03/11 with many MRSA and Pluralibacter.  He is on antibiotic coverage with vancomycin and  Zosyn.    Past Medical and Surgical History  Allergies :   Patient has no known allergies.    Medical :   He has a past medical history of CHF (congestive heart failure), Chronic back pain, CVA (cerebral vascular accident) (01/2023), DM (diabetes mellitus), HTN (hypertension), and Seizures.    Surgical :   He has a past surgical history that includes Back surgery; Esophagogastroduodenoscopy (N/A, 3/15/2023); egd, with closed biopsy (3/15/2023); and Toe amputation (Right, 3/24/2023).     Family History  His family history is not on file.    Social History  He reports that he has never smoked. He has never used smokeless tobacco. He reports that he does not currently use alcohol. He reports that he does not currently use drugs.     Review of Systems   Constitutional:  Positive for malaise/fatigue.   HENT: Negative.     Respiratory:  Positive for shortness of breath.    Cardiovascular:  Positive for leg swelling.   Gastrointestinal: Negative.    Genitourinary: Negative.    Musculoskeletal: Negative.    Skin:         Right foot wound   Neurological:  Positive for weakness.   Endo/Heme/Allergies: Negative.    Psychiatric/Behavioral: Negative.     All other Systems review done and negative.    Objective   Physical Exam  Vitals reviewed.   Constitutional:       General: He is in acute distress.      Appearance: He is obese. He is ill-appearing.      Comments: Lying in bed.  Sister present   HENT:      Head: Normocephalic and atraumatic.   Eyes:      Pupils: Pupils are equal, round, and reactive to light.   Cardiovascular:      Rate and Rhythm: Normal rate and regular rhythm.      Heart sounds: Normal heart sounds.   Pulmonary:      Effort: Pulmonary effort is normal. No respiratory distress.      Breath sounds: Normal breath sounds.      Comments: On O2 via OxyMask  Abdominal:      General: Bowel sounds are normal. There is no distension.      Palpations: Abdomen is soft.      Tenderness: There is no abdominal tenderness.  "  Genitourinary:     Comments: Penile and scrotal edema noted  Musculoskeletal:      Cervical back: Neck supple.      Right lower leg: Edema present.      Left lower leg: Edema present.      Comments: Amputated right 5th toe   Skin:     Findings: No rash.      Comments: Right foot wound without purulence.   Neurological:      Mental Status: He is alert and oriented to person, place, and time.   Psychiatric:      Comments: Calm and cooperative     VITAL SIGNS: 24 HR MIN & MAX LAST    Temp  Min: 97.5 °F (36.4 °C)  Max: 100.2 °F (37.9 °C)  99.4 °F (37.4 °C)        BP  Min: 140/84  Max: 185/84  (!) 185/84     Pulse  Min: 89  Max: 96  93     Resp  Min: 16  Max: 28  (!) 21    SpO2  Min: 91 %  Max: 98 %  (!) 91 %      HT: 5' 5" (165.1 cm)  WT: 115.7 kg (255 lb 1.2 oz)  BMI: 42.4     Recent Results (from the past 24 hour(s))   POCT glucose    Collection Time: 03/26/23  7:47 PM   Result Value Ref Range    POCT Glucose 279 (H) 70 - 110 mg/dL   POCT glucose    Collection Time: 03/27/23  5:38 AM   Result Value Ref Range    POCT Glucose 260 (H) 70 - 110 mg/dL   Basic Metabolic Panel    Collection Time: 03/27/23  5:56 AM   Result Value Ref Range    Sodium Level 136 136 - 145 mmol/L    Potassium Level 4.4 3.5 - 5.1 mmol/L    Chloride 101 98 - 107 mmol/L    Carbon Dioxide 26 22 - 29 mmol/L    Glucose Level 259 (H) 74 - 100 mg/dL    Blood Urea Nitrogen 62.9 (H) 8.9 - 20.6 mg/dL    Creatinine 2.60 (H) 0.73 - 1.18 mg/dL    BUN/Creatinine Ratio 24     Calcium Level Total 8.3 (L) 8.4 - 10.2 mg/dL    Anion Gap 9.0 mEq/L    eGFR 30 mls/min/1.73/m2   CBC with Differential    Collection Time: 03/27/23  5:56 AM   Result Value Ref Range    WBC 17.5 (H) 4.5 - 11.5 x10(3)/mcL    RBC 2.65 (L) 4.70 - 6.10 x10(6)/mcL    Hgb 8.0 (L) 14.0 - 18.0 g/dL    Hct 23.9 (L) 42.0 - 52.0 %    MCV 90.2 80.0 - 94.0 fL    MCH 30.2 27.0 - 31.0 pg    MCHC 33.5 33.0 - 36.0 g/dL    RDW 13.8 11.5 - 17.0 %    Platelet 293 130 - 400 x10(3)/mcL    MPV 11.7 (H) 7.4 - " 10.4 fL    Neut % 69.0 %    Lymph % 15.5 %    Mono % 10.4 %    Eos % 3.9 %    Basophil % 0.6 %    Lymph # 2.71 0.6 - 4.6 x10(3)/mcL    Neut # 12.06 (H) 2.1 - 9.2 x10(3)/mcL    Mono # 1.81 (H) 0.1 - 1.3 x10(3)/mcL    Eos # 0.68 0 - 0.9 x10(3)/mcL    Baso # 0.10 0 - 0.2 x10(3)/mcL    IG# 0.11 (H) 0 - 0.04 x10(3)/mcL    IG% 0.6 %    NRBC% 0.0 %   POCT ARTERIAL BLOOD GAS    Collection Time: 03/27/23  6:19 AM   Result Value Ref Range    POC PH 7.47 (A) 7.35 - 7.45    POC PCO2 43 35 - 45 mmHg    POC PO2 66 (A) 80 - 100 mmHg    POC HEMOGLOBIN 8.2 (A) 12 - 16 g/dL    POC SATURATED O2 94.0 %    POC O2Hb 93.2 (A) 94.0 - 97.0 %    POC COHb 2.0 %    POC MetHb 1.2 0.40 - 1.5 %    POC Potassium 4.0 3.5 - 5.0 mmol/l    POC Sodium 132 (A) 137 - 145 mmol/l    POC Ionized Calcium 1.12 1.12 - 1.23 mmol/l    Correct Temperature (PH) 7.47 (A) 7.35 - 7.45    Corrected Temperature (pCO2) 43 35 - 45 mmHg    Corrected Temperature (pO2) 66 (A) 80 - 100 mmHg    POC HCO3 31.3 (A) 22.0 - 26.0 mmol/l    Base Deficit 7.0 (A) -2.0 - 2.0 mmol/l    POC Temp 37.0 °C    Specimen source Arterial sample    VANCOMYCIN, TROUGH    Collection Time: 03/27/23  9:43 AM   Result Value Ref Range    Vancomycin Trough 22.6 (H) 15.0 - 20.0 ug/ml   POCT glucose    Collection Time: 03/27/23 11:50 AM   Result Value Ref Range    POCT Glucose 264 (H) 70 - 110 mg/dL   POCT glucose    Collection Time: 03/27/23  4:54 PM   Result Value Ref Range    POCT Glucose 255 (H) 70 - 110 mg/dL       Imaging  Imaging Results              US Liver with Doppler (xpd) (Final result)  Result time 03/12/23 09:58:35      Final result by Dm Alberto MD (03/12/23 09:58:35)                   Impression:    Impression:    1. There is slight intrahepatic biliary duct dilatation.    2. Portal flow parameters are within normal limits with hepatopetal flow. The hepatic veins and arteries are patent. However, Doppler evaluation was not performed as the patient was uncooperative.    3. Evaluation  is limited as the patient was uncooperative due to severe pain and the examination was terminated.    4. Details and other findings as above.    No significant discrepancy with overnight report.      Electronically signed by: Dm Alberto  Date:    03/12/2023  Time:    09:58               Narrative:      Current report    Technique:Real time gray scale and color Doppler ultrasound was performed on the liver.    Comparison:None.    Clinical history:ER20. Severe pain. Cirrhosis evaluation.    Findings:Evaluation is limited as the patient was uncooperative due to severe pain and the examination was terminated.    Liver:Unremarkable appearing liver which measures 14.5 cm in greatest dimension. There is slight intrahepatic biliary duct dilatation.    Biliary ducts:The common bile duct is suboptimally visualized.    Gallbladder: Partially visualized gallbladder is un remark unremarkable.    Vascular:The main, right and left hepatic arteries are patent. The Doppler evaluation was not performed as the patient was uncooperative due to pain.    IVC:The IVC is within normal limits.    Portal vein:Portal flow parameters are within normal limits with hepatopetal flow. The velocity measures 29.6 cm/sec. The right and left main portal veins are patent and demonstrate hepatopetal flow. The main, right and left hepatic veins are patent. The peak systolic velocity of the right hepatic is 23.3 cm.                        Preliminary result by Dm Alberto MD (03/12/23 01:19:18)                   Narrative:    START OF REPORT:  Technique: Real time gray scale and color Doppler ultrasound was performed on the liver.    Comparison: None.    Clinical history: ER20. Severe pain. Cirrhosis evaluation.    Findings: Evaluation is limited as the patient was uncooperative due to severe pain and the examination was terminated.  Liver: Unremarkable appearing liver which measures 14.5 cmin greatest dimension. There is slight intrahepatic biliary  duct dilatation.  Biliary ducts: The common bile duct is suboptimally visualized.  Gallbladder: Unremarkable.  Pancreas: The pancreas is suboptimally visualized due to bowel gas.  Aorta: The aorta is within normal limits to the extent visualized.  Vascular: The main, right and left hepatic arteries are patent. The Doppler evaluation was not performed as the patient was uncooperative due to pain.  IVC: The IVC is within normal limits.  Portal vein: Portal flow parameters are within normal limits with hepatopetal flow. The velocity measures 29.6 cm/sec. The right and left main portal veins are patent and demonstrate hepatopetal flow. The main, right and left hepatic veins are patent. The peak systolic velocity of the right hepatic is 23.3 cm.      Impression:  1. There is slight intrahepatic biliary duct dilatation.  2. Portal flow parameters are within normal limits with hepatopetal flow. The hepatic veins and arteries are patent. However, Doppler evaluation was not performed as the patient was uncooperative.  3. Evaluation is limited as the patient was uncooperative due to severe pain and the examination was terminated.  4. Details and other findings as above.                          Preliminary result by Tj Rosales MD (03/12/23 01:19:18)                   Narrative:    START OF REPORT:  Technique: Real time gray scale and color Doppler ultrasound was performed on the liver.    Comparison: None.    Clinical history: ER20. Severe pain. Cirrhosis evaluation.    Findings: Evaluation is limited as the patient was uncooperative due to severe pain and the examination was terminated.  Liver: Unremarkable appearing liver which measures 14.5 cmin greatest dimension. There is slight intrahepatic biliary duct dilatation.  Biliary ducts: The common bile duct is suboptimally visualized.  Gallbladder: Unremarkable.  Pancreas: The pancreas is suboptimally visualized due to bowel gas.  Aorta: The aorta is within normal limits  to the extent visualized.  Vascular: The main, right and left hepatic arteries are patent. The Doppler evaluation was not performed as the patient was uncooperative due to pain.  IVC: The IVC is within normal limits.  Portal vein: Portal flow parameters are within normal limits with hepatopetal flow. The velocity measures 29.6 cm/sec. The right and left main portal veins are patent and demonstrate hepatopetal flow. The main, right and left hepatic veins are patent. The peak systolic velocity of the right hepatic is 23.3 cm.      Impression:  1. There is slight intrahepatic biliary duct dilatation.  2. Portal flow parameters are within normal limits with hepatopetal flow. The hepatic veins and arteries are patent. However, Doppler evaluation was not performed as the patient was uncooperative.  3. Evaluation is limited as the patient was uncooperative due to severe pain and the examination was terminated.  4. Details and other findings as above.                                         CT Abdomen Pelvis With Contrast (Final result)  Result time 03/11/23 20:39:42      Final result by Dm Alberto MD (03/11/23 20:39:42)                   Impression:      1. Left pleural effusion.    2. Hepatic cirrhotic morphology, splenomegaly, upper abdominal varices and ascites.  3.  Chronic pancreatitis changes.    4. Extensive subcutaneous anasarca edema.      Electronically signed by: Dm Alberto  Date:    03/11/2023  Time:    20:39               Narrative:    EXAMINATION:  CT ABDOMEN PELVIS WITH CONTRAST    CLINICAL HISTORY:  penile/testicular cellulitis/drainage;    TECHNIQUE:  Multidetector axial images were obtained of the abdomen and pelvis following the administration of IV contrast. Oral contrast was not administered.    Dose length product of 948 mGycm. Automated exposure control was utilized to minimize radiation dose.    COMPARISON:  None available    FINDINGS:  There is small left dependent pleural effusion and left  basilar atelectasis.    There is hepatic cirrhotic morphology and there is moderate intraperitoneal free fluid consistent with ascites. Gallbladder wall is not thickened and there is no intra luminal calcified calculus. No biliary dilation identified.  There are multiple pancreatic calcifications which is atrophic consistent with chronic pancreatitis.  There is also some dilatation pancreatic duct.  Spleen is mildly enlarged in size with maximum diameter of 13.5 cm without focal space occupying lesion.  Upper abdomen multiple varices..    The adrenal glands appear within normal limits. The kidneys are unremarkable in size and contour. No solid or cystic renal lesion identified. There is no hydronephrosis. No perinephric fluid strandings or collections identified.    Stomach is mostly decompressed.  No abnormal dilatation of the loops of small bowel.  There is also no abnormal dilatation of the colon which contains moderate fecal loading.  No suggestion of bowel obstruction.  No pneumoperitoneum.  Extensive abdomen and pelvic subcutaneous anasarca edema.    Urinary bladder is decompressed around the Segovia's catheter.  There are degenerative changes of lumbar spine which are more pronounced at the level of L5-S1.                                       X-Ray Chest AP Portable (Final result)  Result time 03/11/23 16:31:09      Final result by Chad Espinoza MD (03/11/23 16:31:09)                   Impression:      No acute cardiopulmonary process.      Electronically signed by: Chad Espinoza  Date:    03/11/2023  Time:    16:31               Narrative:    EXAMINATION:  XR CHEST AP PORTABLE    CLINICAL HISTORY:  shortness of breath;    TECHNIQUE:  Single view of the chest    COMPARISON:  No prior imaging available for comparison.    FINDINGS:  No focal opacification, pleural effusion, or pneumothorax.    The cardiomediastinal silhouette is within normal limits.    No acute osseous abnormality.                                        Impression  1. Sepsis with fevers and leukocytosis  2.  Pneumonia superimposed on pulmonary edema  3.  MRSA right foot infection with osteomyelitis  4.  CHF/ Anasarca  5. Acute kidney injury on chronic kidney disease  6. UTI/ Urethritis -MRSA, Pluralibacter  7. Diabetes type 2   8. History of alcoholic hepatitis with concern for cirrhosis  9.  Anemia   10.  Protein calorie malnutrition  11.  Morbid obesity    Recommendations  I agree with the management of this patient.  He presented with findings of fluid overload/anasarca, subsequently noted to have fevers and worsening leukocytosis with potential sources including pneumonia superimposed on pulmonary edema and findings of right foot infection.  He has been seen by Podiatry and is status post source control with excisional debridement and cultures positive for MRSA.  MRSA was also noted to have been cultured out of his urethra as well as Pluralibacter.  We will optimize antibiotic coverage with discontinuation of vancomycin and Zosyn, avoiding the regimens potential nephrotoxicity in the setting of acute kidney injury.  We will cover with cefepime and Zyvox and continue wound care per Podiatry.  It seems all the osteomyelitic bone tissue was dissected out by Podiatry at surgery and hence may need just about a couple of weeks of antibiotics postoperatively.  We will follow with labs in a.m.  Case is discussed at length with patient and sister at the bedside, questions solicited and answered.  I have also discussed the case with nursing staff.  I would like to thank the team very much for the opportunity to assist in the care of this patient.

## 2023-03-27 NOTE — PROGRESS NOTES
NEPHROLOGY: Progress   48-year-old a.m. with history of poorly controlled type 2 diabetes, obesity, creatinine of 1.47 in November of 2022 with a GFR at that time of approximately 60 however , it was also as low as 33 at that time.  He was being followed by Dr. Alonso in [facility].  He has a history of poorly controlled hypertension as well, seizure disorder and alcoholic liver disease with chronic pancreatitis.  Over the past several months the patient has had increasing volume retention, worsening anasarca and increased immobility due to the edema.  He has reportedly gained 65 lb over the past several months.    Patient has required aggressive diuresis but has lost a significant amount of weight.  He had high-grade proteinuria in excess of 10 g and biopsy has returned with advanced diabetic nodular sclerosis.  He seems to be over-sedated with Keppra and gabapentin.    Patient underwent 5th right metatarsophalangeal joint resection for suspected osteomyelitis.            Current Facility-Administered Medications:     0.9%  NaCl infusion (for blood administration), , Intravenous, Q24H PRN, Michelle Camacho MD    acetaminophen tablet 1,000 mg, 1,000 mg, Oral, Q6H PRN, Emre Vazquez DPM    acetaminophen tablet 650 mg, 650 mg, Oral, Q4H PRN, Emre Vazquez DPM    albumin human 25% bottle 25 g, 25 g, Intravenous, Daily, Emre Vazquez DPM, Stopped at 03/27/23 0955    albuterol nebulizer solution 2.5 mg, 2.5 mg, Nebulization, Q4H PRN, GITA Andrews    aluminum-magnesium hydroxide-simethicone 200-200-20 mg/5 mL suspension 30 mL, 30 mL, Oral, QID PRN, Emre Vazquez DPM    cloNIDine tablet 0.2 mg, 0.2 mg, Oral, TID PRN, Emre Vazquez DPM    dextrose 10% bolus 125 mL 125 mL, 12.5 g, Intravenous, PRN, Emre Vazquez DPM    dextrose 10% bolus 250 mL 250 mL, 25 g,  Intravenous, PRN, Emre Vazquez DPM    dextrose 40 % gel 15,000 mg, 15 g, Oral, PRN, Emre Vazquez, DPM    dextrose 40 % gel 30,000 mg, 30 g, Oral, PRN, Emre Vazquez, DPM    doxazosin tablet 2 mg, 2 mg, Oral, QHS, Emre Vazquez DPM, 2 mg at 03/26/23 2054    enoxaparin injection 40 mg, 40 mg, Subcutaneous, Q12H, Emre Vazquez DPM, 40 mg at 03/27/23 0856    ergocalciferol capsule 50,000 Units, 50,000 Units, Oral, Q3 Days, Emre Vazquez DPM, 50,000 Units at 03/27/23 0830    furosemide injection 80 mg, 80 mg, Intravenous, TID, Neno Moran MD, 80 mg at 03/27/23 0856    gabapentin capsule 400 mg, 400 mg, Oral, BID, Emre Vazquez DPM, 400 mg at 03/27/23 0856    glucagon (human recombinant) injection 1 mg, 1 mg, Intramuscular, PRN, Emre Vazquez DPM, 1 mg at 03/20/23 0545    hydrALAZINE injection 10 mg, 10 mg, Intravenous, Q4H PRN, Emre Vazquez DPM, 10 mg at 03/12/23 1224    HYDROcodone-acetaminophen 5-325 mg per tablet 1 tablet, 1 tablet, Oral, Q4H PRN, ELSA MoyerM, 1 tablet at 03/22/23 1305    insulin aspart U-100 injection 1-10 Units, 1-10 Units, Subcutaneous, QID (AC + HS) PRN, Emre Vazquez DPM, 10 Units at 03/25/23 0955    insulin aspart U-100 injection 14 Units, 14 Units, Subcutaneous, TIDWM, Emre Vazquez DPM, 14 Units at 03/26/23 1215    insulin detemir U-100 injection 20 Units, 20 Units, Subcutaneous, QHS, Michelle Camacho MD, 20 Units at 03/26/23 2056    insulin detemir U-100 injection 5 Units, 5 Units, Subcutaneous, Daily, Michelle Camacho MD, 5 Units at 03/27/23 0900    ipratropium 0.02 % nebulizer solution 0.5 mg, 0.5 mg, Nebulization, Q6H, Geena Nettles, ANP, 0.5 mg at 03/27/23 0753    labetaloL injection 10 mg, 10 mg, Intravenous, Q4H PRN, Emre Vzaquez DPM    levETIRAcetam tablet 1,000 mg, 1,000 mg, Oral, BID, Emre Vazquez DPM, 1,000 mg at 03/27/23 0856    lipase-protease-amylase 12,000-38,000-60,000 units per capsule 6 capsule, 6 capsule, Oral,  TID, Emre Vazquez DPM, 6 capsule at 03/27/23 0859    magnesium oxide tablet 400 mg, 400 mg, Oral, BID, Emre Vazquez DPM, 400 mg at 03/27/23 0856    melatonin tablet 6 mg, 6 mg, Oral, Nightly PRN, Emre Vazquez DPM, 6 mg at 03/12/23 0012    metOLazone tablet 10 mg, 10 mg, Oral, Daily, Neno Moran MD, 10 mg at 03/27/23 0144    metoprolol tartrate (LOPRESSOR) tablet 25 mg, 25 mg, Oral, BID, Emre Vazquez DPM, 25 mg at 03/27/23 0856    miconazole NITRATE 2 % top powder, , Topical (Top), BID, Emre Vazquez DPM, Given at 03/26/23 0914    morphine injection 1 mg, 1 mg, Intravenous, Once, Michelle Camacho MD    NIFEdipine 24 hr tablet 90 mg, 90 mg, Oral, Daily, Emre Vazquez DPM, 90 mg at 03/27/23 0856    ondansetron injection 4 mg, 4 mg, Intravenous, Q4H PRN, Emre Vazquez DPM, 4 mg at 03/12/23 0011    oxyCODONE-acetaminophen 5-325 mg per tablet 1 tablet, 1 tablet, Oral, Q6H PRN, Emre Vazquez DPM, 1 tablet at 03/26/23 2350    piperacillin-tazobactam (ZOSYN) 4.5 g in dextrose 5 % in water (D5W) 5 % 100 mL IVPB (MB+), 4.5 g, Intravenous, Q8H, Emre Vazquez DPM, Last Rate: 25 mL/hr at 03/27/23 0856, 4.5 g at 03/27/23 0856    polyethylene glycol packet 17 g, 17 g, Oral, BID PRN, Emre Vazquez DPM    prochlorperazine injection Soln 5 mg, 5 mg, Intravenous, Q6H PRN, Emre Vazquez DPM    senna-docusate 8.6-50 mg per tablet 2 tablet, 2 tablet, Oral, BID PRN, Emre A Sobiesk, DPM    simethicone chewable tablet 80 mg, 1 tablet, Oral, QID PRN, Emre Vazquez DPM    sodium bicarbonate tablet 1,300 mg, 1,300 mg, Oral, TID, Emre Vazquez DPM, 1,300 mg at 03/27/23 0856    sodium chloride 0.9% flush 10 mL, 10 mL, Intravenous, PRN, Emre Vazquez DPM    Flushing PICC Protocol, , , Until Discontinued **AND** sodium chloride 0.9% flush 10 mL, 10 mL, Intravenous, Q6H, 10 mL at 03/26/23 1705 **AND** sodium chloride 0.9% flush 10 mL, 10 mL, Intravenous, PRN, Emre Vazquez DPM     "vancomycin - pharmacy to dose, , Intravenous, pharmacy to manage frequency, Emre Vazquez DPM    vancomycin in dextrose 5 % 1 gram/250 mL IVPB 1,000 mg, 1,000 mg, Intravenous, Q24H, Marcos Saldana MD    vitamin renal formula (B-complex-vitamin c-folic acid) 1 mg per capsule 1 capsule, 1 capsule, Oral, Daily, Emre Vazquez DPM, 1 capsule at 03/27/23 0856    zinc oxide-cod liver oil 40 % paste, , Topical (Top), BID, Emre Vazquze DPM, Given at 03/26/23 0914        BP (!) 143/72   Pulse 96   Temp 99.8 °F (37.7 °C) (Oral)   Resp 16   Ht 5' 5" (1.651 m)   Wt 115.7 kg (255 lb 1.2 oz)   SpO2 (!) 94%   BMI 42.45 kg/m²     Physical Exam:    GEN:  Morbidly obese and chronically ill-appearing black male.  Patient is very lethargic.  Vague complaints of weakness and shortness of breath.  No distress.  Complaining of discomfort in the scrotum area.   HEENT: Atraumatic. EOMI, no icterus  NECK : No JVD  CARD : RRR s rub or gallop  LUNGS :  Decreased breath sounds at the bases  ABD : Soft,non-tender. BS active, obese.  :  Scrotum is much less swollen  EXT :  Patient with minimal pitting edema if any.  All edema essentially has resolved.           Intake/Output Summary (Last 24 hours) at 3/27/2023 1154  Last data filed at 3/27/2023 0600  Gross per 24 hour   Intake 1260 ml   Output 3300 ml   Net -2040 ml         Laboratory:  Recent Results (from the past 24 hour(s))   POCT glucose    Collection Time: 03/26/23  1:37 PM   Result Value Ref Range    POCT Glucose 367 (H) 70 - 110 mg/dL   POCT glucose    Collection Time: 03/26/23  4:20 PM   Result Value Ref Range    POCT Glucose 249 (H) 70 - 110 mg/dL   POCT ARTERIAL BLOOD GAS    Collection Time: 03/26/23  4:33 PM   Result Value Ref Range    POC PH 7.43 7.35 - 7.45    POC PCO2 44 35 - 45 mmHg    POC PO2 60 (A) 80 - 100 mmHg    POC HEMOGLOBIN 10.2 (A) 12.0 - 16.0 g/dL    POC SATURATED O2 91.3 %    POC O2Hb 90.0 (A) 94.0 - 97.0 %    POC COHb 1.9 %    POC MetHb 0.90 0.40 " - 1.5 %    POC Potassium 3.9 3.5 - 5.0 mmol/l    POC Sodium 132 (A) 137 - 145 mmol/l    POC Ionized Calcium 1.13 1.12 - 1.23 mmol/l    Correct Temperature (PH) 7.43 7.35 - 7.45    Corrected Temperature (pCO2) 44 35 - 45 mmHg    Corrected Temperature (pO2) 60 (A) 80 - 100 mmHg    POC HCO3 29.2 (A) 22.0 - 26.0 mmol/l    Base Deficit 4.3 (A) -2.0 - 2.0 mmol/l    POC Temp 37.0 °C    Specimen source Arterial sample    POCT glucose    Collection Time: 03/26/23  7:47 PM   Result Value Ref Range    POCT Glucose 279 (H) 70 - 110 mg/dL   POCT glucose    Collection Time: 03/27/23  5:38 AM   Result Value Ref Range    POCT Glucose 260 (H) 70 - 110 mg/dL   Basic Metabolic Panel    Collection Time: 03/27/23  5:56 AM   Result Value Ref Range    Sodium Level 136 136 - 145 mmol/L    Potassium Level 4.4 3.5 - 5.1 mmol/L    Chloride 101 98 - 107 mmol/L    Carbon Dioxide 26 22 - 29 mmol/L    Glucose Level 259 (H) 74 - 100 mg/dL    Blood Urea Nitrogen 62.9 (H) 8.9 - 20.6 mg/dL    Creatinine 2.60 (H) 0.73 - 1.18 mg/dL    BUN/Creatinine Ratio 24     Calcium Level Total 8.3 (L) 8.4 - 10.2 mg/dL    Anion Gap 9.0 mEq/L    eGFR 30 mls/min/1.73/m2   CBC with Differential    Collection Time: 03/27/23  5:56 AM   Result Value Ref Range    WBC 17.5 (H) 4.5 - 11.5 x10(3)/mcL    RBC 2.65 (L) 4.70 - 6.10 x10(6)/mcL    Hgb 8.0 (L) 14.0 - 18.0 g/dL    Hct 23.9 (L) 42.0 - 52.0 %    MCV 90.2 80.0 - 94.0 fL    MCH 30.2 27.0 - 31.0 pg    MCHC 33.5 33.0 - 36.0 g/dL    RDW 13.8 11.5 - 17.0 %    Platelet 293 130 - 400 x10(3)/mcL    MPV 11.7 (H) 7.4 - 10.4 fL    Neut % 69.0 %    Lymph % 15.5 %    Mono % 10.4 %    Eos % 3.9 %    Basophil % 0.6 %    Lymph # 2.71 0.6 - 4.6 x10(3)/mcL    Neut # 12.06 (H) 2.1 - 9.2 x10(3)/mcL    Mono # 1.81 (H) 0.1 - 1.3 x10(3)/mcL    Eos # 0.68 0 - 0.9 x10(3)/mcL    Baso # 0.10 0 - 0.2 x10(3)/mcL    IG# 0.11 (H) 0 - 0.04 x10(3)/mcL    IG% 0.6 %    NRBC% 0.0 %   POCT ARTERIAL BLOOD GAS    Collection Time: 03/27/23  6:19 AM   Result  Value Ref Range    POC PH 7.47 (A) 7.35 - 7.45    POC PCO2 43 35 - 45 mmHg    POC PO2 66 (A) 80 - 100 mmHg    POC HEMOGLOBIN 8.2 (A) 12 - 16 g/dL    POC SATURATED O2 94.0 %    POC O2Hb 93.2 (A) 94.0 - 97.0 %    POC COHb 2.0 %    POC MetHb 1.2 0.40 - 1.5 %    POC Potassium 4.0 3.5 - 5.0 mmol/l    POC Sodium 132 (A) 137 - 145 mmol/l    POC Ionized Calcium 1.12 1.12 - 1.23 mmol/l    Correct Temperature (PH) 7.47 (A) 7.35 - 7.45    Corrected Temperature (pCO2) 43 35 - 45 mmHg    Corrected Temperature (pO2) 66 (A) 80 - 100 mmHg    POC HCO3 31.3 (A) 22.0 - 26.0 mmol/l    Base Deficit 7.0 (A) -2.0 - 2.0 mmol/l    POC Temp 37.0 °C    Specimen source Arterial sample    VANCOMYCIN, TROUGH    Collection Time: 03/27/23  9:43 AM   Result Value Ref Range    Vancomycin Trough 22.6 (H) 15.0 - 20.0 ug/ml         Assessment/Plan:   Advanced stage III CKD-Biopsy-Advanced Diabetic Nodular sclerosis --  Hypertension-much better controlled  Qeuptcem-iapdgfxv-gmtcguotm well torsemide 40 b.i.d.   Hepatic cirrhosis   UTI   Peripheral arterial disease-suspected right 5th MPJ osteo  Profound vitamin-D deficiency-55724 units ergo q 72  Secondary hyperparathyroidism      Patient appears weak, lethargic and poorly motivated.  His weight continues to decline.  I believe we can back off on his torsemide to 40 mg once daily.  I think we should decrease his Keppra and also his gabapentin dosing as he seems to be extremely lethargic.  We will also hold metolazone for now.         Benjamín Sharp MD, LUKE

## 2023-03-28 LAB
ANION GAP SERPL CALC-SCNC: 10 MEQ/L
BASE EXCESS ARTERIAL: 7.2 MMOL/L (ref -2–2)
BASOPHILS # BLD AUTO: 0.07 X10(3)/MCL (ref 0–0.2)
BASOPHILS NFR BLD AUTO: 0.4 %
BUN SERPL-MCNC: 64 MG/DL (ref 8.9–20.6)
CALCIUM SERPL-MCNC: 8.4 MG/DL (ref 8.4–10.2)
CHLORIDE SERPL-SCNC: 100 MMOL/L (ref 98–107)
CO2 SERPL-SCNC: 28 MMOL/L (ref 22–29)
CORRECTED TEMPERATURE (PCO2): 46 MMHG (ref 35–45)
CORRECTED TEMPERATURE (PH): 7.45 (ref 7.35–7.45)
CORRECTED TEMPERATURE (PO2): 139 MMHG (ref 80–100)
CREAT SERPL-MCNC: 2.37 MG/DL (ref 0.73–1.18)
CREAT/UREA NIT SERPL: 27
EOSINOPHIL # BLD AUTO: 0.75 X10(3)/MCL (ref 0–0.9)
EOSINOPHIL NFR BLD AUTO: 4.7 %
ERYTHROCYTE [DISTWIDTH] IN BLOOD BY AUTOMATED COUNT: 13.4 % (ref 11.5–17)
GFR SERPLBLD CREATININE-BSD FMLA CKD-EPI: 33 MLS/MIN/1.73/M2
GLUCOSE SERPL-MCNC: 198 MG/DL (ref 74–100)
HCO3 ARTERIAL: 32 MMOL/L (ref 18–23)
HCO3 UR-SCNC: 32 MMOL/L (ref 22–26)
HCT VFR BLD AUTO: 23.7 % (ref 42–52)
HGB BLD-MCNC: 7.8 G/DL (ref 14–18)
HGB BLD-MCNC: 8.4 G/DL (ref 12–16)
HGB BLD-MCNC: ABNORMAL G/DL
IMM GRANULOCYTES # BLD AUTO: 0.11 X10(3)/MCL (ref 0–0.04)
IMM GRANULOCYTES NFR BLD AUTO: 0.7 %
LYMPHOCYTES # BLD AUTO: 2.54 X10(3)/MCL (ref 0.6–4.6)
LYMPHOCYTES NFR BLD AUTO: 15.8 %
MCH RBC QN AUTO: 30.2 PG (ref 27–31)
MCHC RBC AUTO-ENTMCNC: 32.9 G/DL (ref 33–36)
MCV RBC AUTO: 91.9 FL (ref 80–94)
MONOCYTES # BLD AUTO: 1.72 X10(3)/MCL (ref 0.1–1.3)
MONOCYTES NFR BLD AUTO: 10.7 %
NEUTROPHILS # BLD AUTO: 10.91 X10(3)/MCL (ref 2.1–9.2)
NEUTROPHILS NFR BLD AUTO: 67.7 %
NRBC BLD AUTO-RTO: 0 %
PCO2 BLDA: 46 MMHG (ref 35–45)
PCO2 BLDA: ABNORMAL MM[HG]
PCO2 BLDA: ABNORMAL MM[HG]
PH SMN: 7.45 [PH] (ref 7.35–7.45)
PH SMN: ABNORMAL [PH]
PLATELET # BLD AUTO: 320 X10(3)/MCL (ref 130–400)
PMV BLD AUTO: 11.6 FL (ref 7.4–10.4)
PO2 BLDA: 139 MMHG (ref 80–100)
PO2 BLDA: ABNORMAL MM[HG]
POC BASE DEFICIT: 7.2 MMOL/L (ref -2–2)
POC COHB: 2 %
POC COHB: ABNORMAL
POC FIO2: ABNORMAL
POC IONIZED CALCIUM: 1.09 MMOL/L (ref 1.12–1.23)
POC IONIZED CALCIUM: ABNORMAL
POC METHB: 0.7 % (ref 0.4–1.5)
POC METHB: ABNORMAL
POC O2HB: 97.2 % (ref 94–97)
POC O2HB: ABNORMAL
POC SATURATED O2: 99.2 %
POC TEMPERATURE: 37 °C
POCT GLUCOSE: 226 MG/DL (ref 70–110)
POTASSIUM BLD-SCNC: 3.7 MMOL/L (ref 3.5–5)
POTASSIUM BLD-SCNC: ABNORMAL MMOL/L
POTASSIUM SERPL-SCNC: 4 MMOL/L (ref 3.5–5.1)
RBC # BLD AUTO: 2.58 X10(6)/MCL (ref 4.7–6.1)
SATURATED O2 ARTERIAL, I-STAT: ABNORMAL
SODIUM BLD-SCNC: 133 MMOL/L (ref 137–145)
SODIUM BLD-SCNC: ABNORMAL MMOL/L
SODIUM SERPL-SCNC: 138 MMOL/L (ref 136–145)
SPECIMEN SOURCE: ABNORMAL
WBC # SPEC AUTO: 16.1 X10(3)/MCL (ref 4.5–11.5)

## 2023-03-28 PROCEDURE — 63600175 PHARM REV CODE 636 W HCPCS: Mod: JZ,JG | Performed by: PODIATRIST

## 2023-03-28 PROCEDURE — 27000190 HC CPAP FULL FACE MASK W/VALVE

## 2023-03-28 PROCEDURE — 80048 BASIC METABOLIC PNL TOTAL CA: CPT | Performed by: INTERNAL MEDICINE

## 2023-03-28 PROCEDURE — 99900031 HC PATIENT EDUCATION (STAT)

## 2023-03-28 PROCEDURE — 25000003 PHARM REV CODE 250: Performed by: PODIATRIST

## 2023-03-28 PROCEDURE — 94660 CPAP INITIATION&MGMT: CPT

## 2023-03-28 PROCEDURE — 25000242 PHARM REV CODE 250 ALT 637 W/ HCPCS: Performed by: NURSE PRACTITIONER

## 2023-03-28 PROCEDURE — A4216 STERILE WATER/SALINE, 10 ML: HCPCS | Performed by: PODIATRIST

## 2023-03-28 PROCEDURE — P9047 ALBUMIN (HUMAN), 25%, 50ML: HCPCS | Mod: JZ,JG | Performed by: PODIATRIST

## 2023-03-28 PROCEDURE — 94761 N-INVAS EAR/PLS OXIMETRY MLT: CPT

## 2023-03-28 PROCEDURE — 63600175 PHARM REV CODE 636 W HCPCS: Performed by: INTERNAL MEDICINE

## 2023-03-28 PROCEDURE — 63600175 PHARM REV CODE 636 W HCPCS: Performed by: STUDENT IN AN ORGANIZED HEALTH CARE EDUCATION/TRAINING PROGRAM

## 2023-03-28 PROCEDURE — 99900035 HC TECH TIME PER 15 MIN (STAT)

## 2023-03-28 PROCEDURE — 82803 BLOOD GASES ANY COMBINATION: CPT

## 2023-03-28 PROCEDURE — 63600175 PHARM REV CODE 636 W HCPCS: Performed by: PODIATRIST

## 2023-03-28 PROCEDURE — 25000242 PHARM REV CODE 250 ALT 637 W/ HCPCS: Performed by: INTERNAL MEDICINE

## 2023-03-28 PROCEDURE — 25000003 PHARM REV CODE 250: Performed by: STUDENT IN AN ORGANIZED HEALTH CARE EDUCATION/TRAINING PROGRAM

## 2023-03-28 PROCEDURE — 27000221 HC OXYGEN, UP TO 24 HOURS

## 2023-03-28 PROCEDURE — 94640 AIRWAY INHALATION TREATMENT: CPT

## 2023-03-28 PROCEDURE — 21400001 HC TELEMETRY ROOM

## 2023-03-28 PROCEDURE — 85025 COMPLETE CBC W/AUTO DIFF WBC: CPT | Performed by: PODIATRIST

## 2023-03-28 PROCEDURE — 36600 WITHDRAWAL OF ARTERIAL BLOOD: CPT

## 2023-03-28 PROCEDURE — 25000003 PHARM REV CODE 250: Performed by: INTERNAL MEDICINE

## 2023-03-28 RX ORDER — ALBUTEROL SULFATE 0.83 MG/ML
2.5 SOLUTION RESPIRATORY (INHALATION)
Status: DISCONTINUED | OUTPATIENT
Start: 2023-03-28 | End: 2023-03-31

## 2023-03-28 RX ORDER — FUROSEMIDE 10 MG/ML
80 INJECTION INTRAMUSCULAR; INTRAVENOUS ONCE
Status: COMPLETED | OUTPATIENT
Start: 2023-03-28 | End: 2023-03-28

## 2023-03-28 RX ORDER — GABAPENTIN 300 MG/1
300 CAPSULE ORAL 3 TIMES DAILY
Status: DISCONTINUED | OUTPATIENT
Start: 2023-03-28 | End: 2023-04-04 | Stop reason: HOSPADM

## 2023-03-28 RX ADMIN — LEVETIRACETAM 500 MG: 500 TABLET, FILM COATED ORAL at 09:03

## 2023-03-28 RX ADMIN — INSULIN DETEMIR 20 UNITS: 100 INJECTION, SOLUTION SUBCUTANEOUS at 09:03

## 2023-03-28 RX ADMIN — ENOXAPARIN SODIUM 40 MG: 40 INJECTION SUBCUTANEOUS at 09:03

## 2023-03-28 RX ADMIN — CEFEPIME 1 G: 1 INJECTION, POWDER, FOR SOLUTION INTRAMUSCULAR; INTRAVENOUS at 09:03

## 2023-03-28 RX ADMIN — IPRATROPIUM BROMIDE 0.5 MG: 0.5 SOLUTION RESPIRATORY (INHALATION) at 08:03

## 2023-03-28 RX ADMIN — SODIUM BICARBONATE 1300 MG: 650 TABLET ORAL at 08:03

## 2023-03-28 RX ADMIN — ALBUTEROL SULFATE 2.5 MG: 2.5 SOLUTION RESPIRATORY (INHALATION) at 12:03

## 2023-03-28 RX ADMIN — ALBUTEROL SULFATE 2.5 MG: 2.5 SOLUTION RESPIRATORY (INHALATION) at 01:03

## 2023-03-28 RX ADMIN — PANCRELIPASE 6 CAPSULE: 60000; 12000; 38000 CAPSULE, DELAYED RELEASE PELLETS ORAL at 09:03

## 2023-03-28 RX ADMIN — SODIUM BICARBONATE 1300 MG: 650 TABLET ORAL at 09:03

## 2023-03-28 RX ADMIN — METOPROLOL TARTRATE 25 MG: 25 TABLET, FILM COATED ORAL at 08:03

## 2023-03-28 RX ADMIN — NEPHROCAP 1 CAPSULE: 1 CAP ORAL at 09:03

## 2023-03-28 RX ADMIN — Medication 10 ML: at 05:03

## 2023-03-28 RX ADMIN — SODIUM CHLORIDE, PRESERVATIVE FREE 10 ML: 5 INJECTION INTRAVENOUS at 05:03

## 2023-03-28 RX ADMIN — ALBUMIN (HUMAN) 25 G: 5 SOLUTION INTRAVENOUS at 11:03

## 2023-03-28 RX ADMIN — Medication 400 MG: at 09:03

## 2023-03-28 RX ADMIN — DOXAZOSIN 2 MG: 1 TABLET ORAL at 08:03

## 2023-03-28 RX ADMIN — LINEZOLID 600 MG: 600 INJECTION, SOLUTION INTRAVENOUS at 11:03

## 2023-03-28 RX ADMIN — GABAPENTIN 300 MG: 300 CAPSULE ORAL at 08:03

## 2023-03-28 RX ADMIN — NIFEDIPINE 90 MG: 90 TABLET, FILM COATED, EXTENDED RELEASE ORAL at 09:03

## 2023-03-28 RX ADMIN — OXYCODONE HYDROCHLORIDE AND ACETAMINOPHEN 1 TABLET: 5; 325 TABLET ORAL at 12:03

## 2023-03-28 RX ADMIN — OXYCODONE HYDROCHLORIDE AND ACETAMINOPHEN 1 TABLET: 5; 325 TABLET ORAL at 09:03

## 2023-03-28 RX ADMIN — ALBUTEROL SULFATE 2.5 MG: 2.5 SOLUTION RESPIRATORY (INHALATION) at 08:03

## 2023-03-28 RX ADMIN — LINEZOLID 600 MG: 600 INJECTION, SOLUTION INTRAVENOUS at 09:03

## 2023-03-28 RX ADMIN — ENOXAPARIN SODIUM 40 MG: 40 INJECTION SUBCUTANEOUS at 08:03

## 2023-03-28 RX ADMIN — Medication 400 MG: at 08:03

## 2023-03-28 RX ADMIN — GABAPENTIN 300 MG: 300 CAPSULE ORAL at 12:03

## 2023-03-28 RX ADMIN — FUROSEMIDE 80 MG: 10 INJECTION, SOLUTION INTRAMUSCULAR; INTRAVENOUS at 01:03

## 2023-03-28 RX ADMIN — ALBUTEROL SULFATE 2.5 MG: 2.5 SOLUTION RESPIRATORY (INHALATION) at 04:03

## 2023-03-28 RX ADMIN — MICONAZOLE NITRATE: 20 POWDER TOPICAL at 08:03

## 2023-03-28 RX ADMIN — LEVETIRACETAM 500 MG: 500 TABLET, FILM COATED ORAL at 08:03

## 2023-03-28 RX ADMIN — TORSEMIDE 40 MG: 20 TABLET ORAL at 09:03

## 2023-03-28 RX ADMIN — METOPROLOL TARTRATE 25 MG: 25 TABLET, FILM COATED ORAL at 09:03

## 2023-03-28 RX ADMIN — Medication: at 08:03

## 2023-03-28 RX ADMIN — IPRATROPIUM BROMIDE 0.5 MG: 0.5 SOLUTION RESPIRATORY (INHALATION) at 01:03

## 2023-03-28 NOTE — CONSULTS
Mr Huerta is a 48-year-old gentleman with PMH of obesity, poorly controlled T2DM, CKD stage 2/3B with last creatinine 1.47 (11/2022), hypertension, seizure disorder, chronic pancreatitis and alcoholic liver disease, quit drinking about 1 year ago. Presented to the ED with complaints of diffuse body swelling specially abdomen, groin, penis and testicles and bilateral lower extremities, dyspnea on minimal exertion and bilateral lower extremity pain and cramping , and admitted on 03/11.  Renal has been following up for anasarca and volume overload and he did undergo an amputation of the 5th metatarsal on the right foot by Podiatry and he is being covered with antibiotics broadly by the primary team.  He has been on high oxygen requirement and I am asked to see him to assist in this matter.  He did get blood transfusion and he remains somewhat anemic and reportedly he is putting out almost 4 L a day but consideration for kidney biopsy or even initiating dialysis has been raised up.    Objective/physical exam:  General: In no acute distress, obese , On MASK  Chest: Clear to auscultation bilaterally+ve crackles  Heart: RRR, +S1, S2, no appreciable murmur  Abdomen: + distension, nontender, BS +  MSK: 2+ pitting edema and thickening of skin from abdomen to foot; + penile and scrotal edema   Neurologic: Alert and oriented x4, Cranial nerve II-XII intact, Strength 5/5 in all 4 extremities  Inteegumentary: blister noted on sole of R foot       Reviewed the majority of the input by the different physicians and providers but more importantly is his recent x-rays.  The last 1 of 3/26 is quite involved with a lot of pulmonary edema almost right side is pretty busy.  And that was definitely much worse than 3 days prior.  Actually the admission film from 03/11 was crystal clear almost!!!    Recent imaging is a CT of the abdomen pelvis from yesterday on 03/27 showing again the parenchymal involvement on the right side.    Impression:   Undoubtedly is a tough situation with his pattern of follow-up apparently from a different nephrologists in the community in [place].  His radiographs clearly correlate with his oxygen requirement and the differential is volume overload although he is diuresing pretty good I as I understand and no mention of immediate need for dialysis.  Pneumonia acquired during the hospital stay based on his x-ray of admission?  He is broadly covered with antibiotics.    Plan:  Until we conversed with renal as to their anticipated plan of care with this gentleman,  I will place him on BiPAP.  Repeat an x-ray in the morning.

## 2023-03-28 NOTE — PT/OT/SLP PROGRESS
Occupational Therapy      Patient Name:  Kamran Huerta   MRN:  [id number]    Patient not seen today secondary to Nurse/ ILANA hold, patient about to be placed on BiPAP. Will follow-up as appropriate.    3/28/2023

## 2023-03-28 NOTE — PROGRESS NOTES
[facility]                       [address] [id number]    PATIENT NAME:       GODFREY MORLEY  YOB: 1975  CSN:                [id number]   MRN:                [id number]  ADMIT DATE:         03/11/2023 15:58:00  PHYSICIAN:          Emre Vazquez DPM                            PROGRESS NOTE    DATE:  03/27/2023 00:00:00    SUBJECTIVE:  The patient seen today.  Overall, he is still complaining of some   generalized pains with fevers and increased white cell counts along with   worsening renal function.  Culture results grew out MRSA.  The patient has been   on vancomycin and Zosyn.  I was asked to consider deescalating based upon   cultures.  There were some concerns about potentially still having an aspiration   pneumonia.  Surgical site still is doing well.  No signs of substantial   compromise to the area.  His respiratory cultures are coming back negative.    Repeat blood cultures from the 25th also negative to date.    ASSESSMENT:  Status post excisional debridement right foot wound and underlying   osteomyelitis, currently doing better.    PLAN:  We will continue with the vancomycin for the foot and as per primary.    The Zosyn for upper respiratory related issues.  We will reassess him tomorrow.        ______________________________  Emre Vazquez DPM    GAS/AQS  DD:  03/27/2023  Time:  06:05PM  DT:  03/27/2023  Time:  06:58PM  Job #:  [id number]      PROGRESS NOTE

## 2023-03-28 NOTE — PROGRESS NOTES
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 Presented to the ED with complaints of diffuse body swelling specially abdomen, groin, penis and testicles and bilateral lower extremities, dyspnea on minimal exertion and bilateral lower extremity pain and cramping.  Report subjective fever and chills.  Report he was just hospitalized for similar symptom at [facility] [place] about 2 weeks ago and was discharged home after few days. On arrival to ED, he was tachycardic, hypertensive, saturating 100% on room air.  EKG appears sinus rhythm on my review without ischemic changes.  Labs notable for WBC 16.3, hemoglobin 11.4, platelets 346, sodium 139, potassium 5.0, chloride 116, CO2 16, creatinine 2.17, BUN 34, glucose 420, calcium 8.0, albumin 1.3, , negative troponin. CT A/P with Contrast showed left pleural effusion, hepatic cirrhotic morphology, splenomegaly and upper abdominal varices and ascites, chronic pancreatitis changes, extensive subcutaneous anasarca edema.  Kidneys unremarkable without hydronephrosis.     Per ED provider exam he was noted to have purulent penile discharge, sent for culture and Segovia catheter placed.  He was given albumin 12.5 g, Lasix 40 mg IV, Vancomycin and Zosyn and subsequently referred to hospital medicine service for further evaluation and management.  mL with IV Lasix 40. Urine protein creatinine ratio noted to be 10.9. Patient was continued on IV Lasix. Seen by Renal team; recommendations made for IV Lasix/Albumin drip and renal biopsy. He was also seen by GI team for evaluation of cirrhosis and EGD was done to r/o EV. EGD showed erosive gastropathy,  normal esophagus & no other evidence of cirrhosis. Nephrology changed IV Lasix/Albumin drip to IV Lasix 80 mg TID with tentative plan for hemodialysis given patient's diuretic resistance. IR to plan for renal biopsy which could not be performed 03/15 due to uncontrolled HTN. Continues to have significant UOP with IV Lasix.  Nephrology continuing IV Lasix 80 mg t.i.d. and Metolazone 10 mg given stable creatinine and holding off on HD for now. Patient on 1500 mL fluid restriction.  OT to provide brace for scrotal support given patient's discomfort. Counseled regarding compliance with low-sodium diet and fluid restriction in order to optimize volume status with diuresis. Underwent renal biopsy with IR 03/20    Continued on IV Lasix 80 mg t.i.d. and Metolazone 10 mg.    Noted to have improved edema in B/L LE edema, abdominal wall edema & scrotal edema as of 3/22/23    Podiatry ordered XR R Foot which showed erosion of 5th metatarsophalangeal joint with osteomyelitis not excluded. Podiatry performed excisional debridement of R foot on 3/24/23.  Cultures-+ve Staph MRSA     Spiked Fever 101 3/23/23,Patient is started on Vanc,Zosyn to cover Possible Pneumonia and  Osteomyelitis    On 3/26/23 Hb dropped to <7 , we transfused a unit, Oxygen requirement going up with poor Urine Output and hence Nephrology was updated about the patient, got 80mg IV lasix, continued on Torsemide    Interval Hx:     Patient was seen and examined at bedside, complains of pain all over his body, thinks he is withdrawing??. Ct scan abdomen yesterday was positive for constipation, had a bowel movement after Relistor. Oxygen requirement been high per staff and patient looks volume overloaded, Pulmonology was consulted to assist with management given increased work of breathing and Hypoxemia on ABGs    Chart was reviewed,UOP 2.6  L, Tmax 99.9, white count going up 14.6>15>17 >16,c Cr 1.8>1.7>2.8>3>2        Objective/physical exam:  General: In no acute  distress, obese , On MASK  Chest: Clear to auscultation bilaterally+ve crackles  Heart: RRR, +S1, S2, no appreciable murmur  Abdomen: + distension, nontender, BS +  MSK: 2+ pitting edema and thickening of skin from abdomen to foot; + penile and scrotal edema   Neurologic: Alert and oriented x4, Cranial nerve II-XII intact, Strength 5/5 in all 4 extremities  Inteegumentary: blister noted on sole of R foot          VITAL SIGNS: 24 HRS MIN & MAX LAST   Temp  Min: 98.1 °F (36.7 °C)  Max: 99.9 °F (37.7 °C) 98.1 °F (36.7 °C)   BP  Min: 131/73  Max: 168/92 (!) 145/80   Pulse  Min: 79  Max: 93  86   Resp  Min: 18  Max: 20 18     SpO2  Min: 88 %  Max: 100 % 100 %       Recent Labs   Lab 03/26/23  0743 03/27/23  0556 03/28/23  0433   WBC 15.7* 17.5* 16.1*   RBC 2.28* 2.65* 2.58*   HGB 6.9* 8.0* 7.8*   HCT 21.2* 23.9* 23.7*   MCV 93.0 90.2 91.9   MCH 30.3 30.2 30.2   MCHC 32.5* 33.5 32.9*   RDW 12.4 13.8 13.4    293 320   MPV 11.8* 11.7* 11.6*       Recent Labs   Lab 03/22/23  0916 03/23/23  0345 03/23/23  1305 03/23/23  1731 03/24/23  0742 03/25/23  0850 03/25/23  2142 03/26/23  0743 03/26/23  1633 03/27/23  0556 03/27/23  0619 03/28/23  0433    136   < >  --    < > 135*   < > 134*  --  136  --  138   K 4.2 4.6   < >  --    < > 4.7   < > 4.3  --  4.4  --  4.0   CO2 24 22   < >  --    < > 28   < > 25  --  26  --  28   BUN 39.3* 43.4*   < >  --    < > 59.8*   < > 63.3*  --  62.9*  --  64.0*   CREATININE 1.72* 1.80*   < >  --    < > 2.87*   < > 3.05*  --  2.60*  --  2.37*   CALCIUM 8.3* 8.3*   < >  --    < > 8.1*   < > 8.3*  --  8.3*  --  8.4   PH  --   --    < > 7.48*  --   --   --   --  7.43  --  7.47*  --    MG 1.50* 1.70  --   --   --   --   --   --   --   --   --   --    ALBUMIN 2.2*  --   --   --   --  2.4*  --   --   --   --   --   --    ALKPHOS 79  --   --   --   --  107  --   --   --   --   --   --    ALT 22  --   --   --   --  37  --   --   --   --   --   --    AST 23  --   --   --   --  41*  --   --   --    --   --   --    BILITOT 0.6  --   --   --   --  0.4  --   --   --   --   --   --     < > = values in this interval not displayed.     Microbiology Results (last 7 days)       Procedure Component Value Units Date/Time    Blood Culture [[id number]]  (Normal) Collected: 03/25/23 0829    Order Status: Completed Specimen: Blood from Hand, Right Updated: 03/28/23 1000     CULTURE, BLOOD ([facility]) No Growth At 72 Hours    Blood Culture [[id number]]  (Normal) Collected: 03/25/23 0834    Order Status: Completed Specimen: Blood from Hand, Left Updated: 03/28/23 1000     CULTURE, BLOOD ([facility]) No Growth At 72 Hours    Tissue Culture - Aerobic [[id number]]  (Abnormal)  (Susceptibility) Collected: 03/24/23 1554    Order Status: Completed Specimen: Bone from Foot, Right Updated: 03/27/23 1012     CULTURE, TISSUE- AEROBIC ([facility]) Moderate Methicillin resistant Staphylococcus aureus    Anaerobic Culture [[id number]] Collected: 03/24/23 1554    Order Status: Completed Specimen: Bone from Foot, Right Updated: 03/27/23 1001     Anaerobe Culture No Anaerobes Isolated    Respiratory Culture [[id number]]     Order Status: Sent Specimen: Sputum, Expectorated            Scheduled Med:   albumin human 25%  25 g Intravenous Daily    albuterol sulfate  2.5 mg Nebulization Q4H WAKE    ceFEPime (MAXIPIME) IVPB  1 g Intravenous Q12H    doxazosin  2 mg Oral QHS    enoxaparin  40 mg Subcutaneous Q12H    ergocalciferol  50,000 Units Oral Q3 Days    gabapentin  300 mg Oral TID    insulin aspart U-100  14 Units Subcutaneous TIDWM    insulin detemir U-100  20 Units Subcutaneous QHS    insulin detemir U-100  5 Units Subcutaneous Daily    ipratropium  0.5 mg Nebulization Q6H    levETIRAcetam  500 mg Oral BID    linezolid  600 mg Intravenous Q12H    lipase-protease-amylase 12,000-38,000-60,000 units  6 capsule Oral TID    magnesium oxide  400 mg Oral BID    methylnaltrexone  6 mg Subcutaneous Once    metoprolol tartrate  25 mg Oral BID    miconazole NITRATE 2 %    Topical (Top) BID    morphine  1 mg Intravenous Once    naloxone  0.2 mg Intravenous Once    NIFEdipine  90 mg Oral Daily    sodium bicarbonate  1,300 mg Oral TID    sodium chloride 0.9%  10 mL Intravenous Q6H    torsemide  40 mg Oral Daily    vitamin renal formula (B-complex-vitamin c-folic acid)  1 capsule Oral Daily    zinc oxide-cod liver oil   Topical (Top) BID     PRN Meds:  sodium chloride, acetaminophen, acetaminophen, albuterol sulfate, aluminum-magnesium hydroxide-simethicone, cloNIDine, dextrose 10%, dextrose 10%, dextrose, dextrose, glucagon (human recombinant), hydrALAZINE, HYDROcodone-acetaminophen, insulin aspart U-100, labetalol, melatonin, ondansetron, oxyCODONE-acetaminophen, polyethylene glycol, prochlorperazine, senna-docusate 8.6-50 mg, simethicone, sodium chloride 0.9%, Flushing PICC Protocol **AND** sodium chloride 0.9% **AND** sodium chloride 0.9%     Assessment/Plan:  Anasarca 2/2 possible Diabetic Nephropathy  FABIO superimposed on CKD2  Nephrotic Syndrome/Proteinuria 10.9 g 2/2 Diabetic Nephropathy  AHRF (normal EF)  UTI/Urethritis + Staph and Pluralibacter gergoviae  ,   Osteomyelitis R Foot 5th Metatarsalophalangeal Joint s/p Excisional Debridement 3/24/23 + MRSA Staph  Suspected Aspiration Pneumonia  Sepsis 2/2 Above  Abdominal Pain ? 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  T2DM  Hypertension  Vitamin-D deficiency  B/L LE Weakness   Seizure  Chronic Pancreatitis  Hypomagnesemia    Plan:  -Nephrology on-board; following recommendations ,Placed on 1500 mL fluid restriction.IR performed L sided renal biopsy 03/20;report: advanced diabetic nodular sclerosis.Continue diuretics per Nephro,was on IV Lasix 80 mg TID & Metolazone 10 mg daily , now switched to torsemide , f/u Urine Output  -Oxygen supplementation to continue to keep Sats >92%, Pulmonology requested to assist in the management  -Completed 5 days of  IV Rocephin for Urethritis and UTI   -Podiatry on-board for R foot osteomyelitis s/p excisional debridement, Staph aureus on Tissue culture  -Monitoring Leukocytosis and fever curve, started on Vanc and Zosyn to cover Osteomyelitis and possible Aspiration Pneumonia, ID was consulted, antibiotics switched to Cefepime and Linezolid  -Bowel Regimen for constipation, Lipase-normal  -Will continue Long Acting Insulin, adjustments to be made on blood sugars,will continue Aspart 14 units TID and give long acting Insulin twice daily  -Avoid Polypharmacy, decrease Gabapentin and Keppra, Encourage PT. Its tricky to understand if he is oversedated or withdrawing, will continue to monitor. Head CT and Ammonia ok  -Continue Metoprolol Tartrate 25 mg BID, Doxazosin 2 mg, Ergocalciferol 31028 u q3days, NG 0 400 mg b.i.d., nifedipine 90 mg daily, NaHCO3 1300 mg t.i.d.  -PT/OT on board; recommending rehab placement    Critical care Time Spent>35 min   AHRF,Sepsis    VTE prophylaxis: Lovenox     Patient condition:  critical    Anticipated discharge and Disposition:         All diagnosis and differential diagnosis have been reviewed; assessment and plan has been documented; I have personally reviewed the labs and test results that are presently available; I have reviewed the patients medication list; I have reviewed the consulting providers response and recommendations. I have reviewed or attempted to review medical records based upon their availability    All of the patient's questions have been  addressed and answered. Patient's is agreeable to the above stated plan. I will continue to monitor closely and make adjustments to medical management as needed.  _____________________________________________________________________    Nutrition Status: Diabetic    Radiology:  CV Ultrasound doppler venous arm left  Left upper extremity negative for deep thrombus at time of exam.      Michelle Camacho MD   03/28/2023

## 2023-03-28 NOTE — PROGRESS NOTES
[facility]                       [address] [address]    PATIENT NAME:       GODFREY MORLEY  YOB: 1975  CSN:                [id number]   MRN:                [id number]  ADMIT DATE:         03/11/2023 15:58:00  PHYSICIAN:          Emre Vazquez DPM                            PROGRESS NOTE    DATE:  03/28/2023 00:00:00    SUBJECTIVE:  The patient was seen today.  No family members were present.  He is   resting, arousable, still requiring O2.  Was seen by Infectious Disease last   night.  Antibiotics switched to Zyvox and Maxipime.  No other issues reported.    PHYSICAL EXAMINATION:  Current vital signs are stable.    LABORATORY DATA:  Labs reviewed.  White cell count 16.  BUN and creatinine 64   and 2.37.    Right foot wounds doing well, everything is drying out.  No fluctuance.  No   crepitation.  No bogginess.  No signs of active bleeding or drainage from the   incisions line.    ASSESSMENT:  Right foot wound osteomyelitis, status post excisional debridement.    PLAN:  Continue with current care.  Dressings were placed.  Continue   antibiotics.        ______________________________  Emre Vazquez DPM    GAS/AQS  DD:  03/28/2023  Time:  02:43PM  DT:  03/28/2023  Time:  05:02PM  Job #:  [id number]      PROGRESS NOTE

## 2023-03-28 NOTE — PROGRESS NOTES
NEPHROLOGY: Progress   48-year-old a.m. with history of poorly controlled type 2 diabetes, obesity, creatinine of 1.47 in November of 2022 with a GFR at that time of approximately 60 however , it was also as low as 33 at that time.  He was being followed by Dr. Alonso in [facility].  He has a history of poorly controlled hypertension as well, seizure disorder and alcoholic liver disease with chronic pancreatitis.  Over the past several months the patient has had increasing volume retention, worsening anasarca and increased immobility due to the edema.  He has reportedly gained 65 lb over the past several months.    Patient has required aggressive diuresis but has lost a significant amount of weight.  He had high-grade proteinuria in excess of 10 g and biopsy has returned with advanced diabetic nodular sclerosis.  He seems more alert and appropriate this morning but states he feels jittery and feels like he does when he is going to catch seizure.  His Keppra and gabapentin dose were both decreased.  I will resume his previous gabapentin dosing again.    Patient underwent 5th right metatarsophalangeal joint resection for suspected osteomyelitis.            Current Facility-Administered Medications:     0.9%  NaCl infusion (for blood administration), , Intravenous, Q24H PRN, Michelle Camacho MD    acetaminophen tablet 1,000 mg, 1,000 mg, Oral, Q6H PRN, Emre Vazquez DPM, 1,000 mg at 03/27/23 2051    acetaminophen tablet 650 mg, 650 mg, Oral, Q4H PRN, Emre Vazquez DPM    albumin human 25% bottle 25 g, 25 g, Intravenous, Daily, Emre Vazquez DPM, Stopped at 03/27/23 0955    albuterol nebulizer solution 2.5 mg, 2.5 mg, Nebulization, Q4H PRN, Geena Nettles, GITA    albuterol nebulizer solution 2.5 mg, 2.5 mg, Nebulization, Q4H WAKE, Neno Moran MD, 2.5 mg at 03/28/23 0836     aluminum-magnesium hydroxide-simethicone 200-200-20 mg/5 mL suspension 30 mL, 30 mL, Oral, QID PRN, Emre Vazquez DPM    ceFEPIme (MAXIPIME) 1 g in dextrose 5 % in water (D5W) 5 % 50 mL IVPB (MB+), 1 g, Intravenous, Q12H, Marcos Saldana MD, Stopped at 03/28/23 0945    cloNIDine tablet 0.2 mg, 0.2 mg, Oral, TID PRN, Emre Vazquez, DPM    dextrose 10% bolus 125 mL 125 mL, 12.5 g, Intravenous, PRN, Emre Abdallaiesk, DPM    dextrose 10% bolus 250 mL 250 mL, 25 g, Intravenous, PRN, Emre Vazquez, DPM    dextrose 40 % gel 15,000 mg, 15 g, Oral, PRN, Emre Qiuk, DPM    dextrose 40 % gel 30,000 mg, 30 g, Oral, PRN, Emre Vazquez, DPM    doxazosin tablet 2 mg, 2 mg, Oral, QHS, Emre Vazquez, DPM, 2 mg at 03/27/23 2050    enoxaparin injection 40 mg, 40 mg, Subcutaneous, Q12H, Emre Vazquez DPM, 40 mg at 03/28/23 0927    ergocalciferol capsule 50,000 Units, 50,000 Units, Oral, Q3 Days, Emre Vazquez, DPM, 50,000 Units at 03/27/23 0830    gabapentin capsule 300 mg, 300 mg, Oral, QHS, Benjamín Sharp MD    glucagon (human recombinant) injection 1 mg, 1 mg, Intramuscular, PRN, Emre Vazquez, DPM, 1 mg at 03/20/23 0545    hydrALAZINE injection 10 mg, 10 mg, Intravenous, Q4H PRN, Emre Vazquez DPM, 10 mg at 03/12/23 1224    HYDROcodone-acetaminophen 5-325 mg per tablet 1 tablet, 1 tablet, Oral, Q4H PRN, Emre Vazquez DPM, 1 tablet at 03/22/23 1305    insulin aspart U-100 injection 1-10 Units, 1-10 Units, Subcutaneous, QID (AC + HS) PRN, Emre Vazquez DPM, 10 Units at 03/25/23 0955    insulin aspart U-100 injection 14 Units, 14 Units, Subcutaneous, TIDWM, Emre Vazquez DPM, 14 Units at 03/27/23 1738    insulin detemir U-100 injection 20 Units, 20 Units, Subcutaneous, QHS, Michelle Camacho MD, 20 Units at 03/27/23 2052    insulin detemir U-100 injection 5 Units, 5 Units, Subcutaneous, Daily, Michelle Camacho MD, 5 Units at 03/27/23 0900    ipratropium 0.02 % nebulizer solution 0.5 mg, 0.5 mg,  Nebulization, Q6H, Geena Nettles, GITA, 0.5 mg at 03/28/23 0836    labetaloL injection 10 mg, 10 mg, Intravenous, Q4H PRN, Emre Vazquez DPM    levETIRAcetam tablet 500 mg, 500 mg, Oral, BID, Benjamín Sharp MD, 500 mg at 03/28/23 0930    linezolid 600 mg/300 mL IVPB 600 mg, 600 mg, Intravenous, Q12H, Nancy Valladares MD, Last Rate: 300 mL/hr at 03/28/23 0951, 600 mg at 03/28/23 0951    lipase-protease-amylase 12,000-38,000-60,000 units per capsule 6 capsule, 6 capsule, Oral, TID, Emre Vazquez DPM, 6 capsule at 03/28/23 0928    magnesium oxide tablet 400 mg, 400 mg, Oral, BID, Emre Vazquez DPM, 400 mg at 03/28/23 0929    melatonin tablet 6 mg, 6 mg, Oral, Nightly PRN, Emre Vazquez DPM, 6 mg at 03/12/23 0012    methylnaltrexone 12 mg/0.6 mL subcutaneous injection 6 mg, 6 mg, Subcutaneous, Once, Michelle Camacho MD    metoprolol tartrate (LOPRESSOR) tablet 25 mg, 25 mg, Oral, BID, Emre Vazquez DPM, 25 mg at 03/28/23 0929    miconazole NITRATE 2 % top powder, , Topical (Top), BID, Emre Vazquez DPM, Given at 03/27/23 2052    morphine injection 1 mg, 1 mg, Intravenous, Once, Michelle Camacho MD    naloxone 0.4 mg/mL injection 0.2 mg, 0.2 mg, Intravenous, Once, Michelle Camacho MD    NIFEdipine 24 hr tablet 90 mg, 90 mg, Oral, Daily, Emre Vazquez DPM, 90 mg at 03/28/23 0929    ondansetron injection 4 mg, 4 mg, Intravenous, Q4H PRN, Emre Vazquez DPM, 4 mg at 03/12/23 0011    oxyCODONE-acetaminophen 5-325 mg per tablet 1 tablet, 1 tablet, Oral, Q6H PRN, Emre Vazquez DPM, 1 tablet at 03/26/23 2350    polyethylene glycol packet 17 g, 17 g, Oral, BID PRN, Emre Vazquez DPM    prochlorperazine injection Soln 5 mg, 5 mg, Intravenous, Q6H PRN, Emre Vazquez DPM    senna-docusate 8.6-50 mg per tablet 2 tablet, 2 tablet, Oral, BID PRN, Emre Vazquez DPM    simethicone chewable tablet 80 mg, 1 tablet, Oral, QID PRN, Emre Vazquez DPM    sodium bicarbonate tablet 1,300 mg, 1,300  "mg, Oral, TID, Emre Vazquez DPM, 1,300 mg at 03/28/23 0930    sodium chloride 0.9% flush 10 mL, 10 mL, Intravenous, PRN, Emre Vazquez DPM    Flushing PICC Protocol, , , Until Discontinued **AND** sodium chloride 0.9% flush 10 mL, 10 mL, Intravenous, Q6H, 10 mL at 03/28/23 0527 **AND** sodium chloride 0.9% flush 10 mL, 10 mL, Intravenous, PRN, Emre Vazquez, DPM, 10 mL at 03/28/23 0526    torsemide tablet 40 mg, 40 mg, Oral, Daily, Benjamín Sharp MD, 40 mg at 03/28/23 0929    vitamin renal formula (B-complex-vitamin c-folic acid) 1 mg per capsule 1 capsule, 1 capsule, Oral, Daily, ELSA MoyerM, 1 capsule at 03/28/23 0928    zinc oxide-cod liver oil 40 % paste, , Topical (Top), BID, ELSA MoyerM, Given at 03/27/23 2052        BP (!) 145/77   Pulse 88   Temp 99 °F (37.2 °C) (Oral)   Resp 18   Ht 5' 5" (1.651 m)   Wt 110.4 kg (243 lb 6.2 oz)   SpO2 96%   BMI 40.50 kg/m²     Physical Exam:    GEN:  Obese and chronically ill-appearing black male.  Patient is more awake and appropriate.  No distress.  Complaining of feeling jittery  HEENT: Atraumatic. EOMI, no icterus  NECK : No JVD  CARD : RRR s rub or gallop  LUNGS :  Decreased breath sounds at the bases  ABD : Soft,non-tender. BS active, obese.  :  Scrotum is much less swollen  EXT :  Patient with minimal pitting edema if any.  Most of the edema essentially has resolved.           Intake/Output Summary (Last 24 hours) at 3/28/2023 1018  Last data filed at 3/28/2023 0521  Gross per 24 hour   Intake 120 ml   Output 5075 ml   Net -4955 ml         Laboratory:  Recent Results (from the past 24 hour(s))   POCT glucose    Collection Time: 03/27/23 11:50 AM   Result Value Ref Range    POCT Glucose 264 (H) 70 - 110 mg/dL   POCT glucose    Collection Time: 03/27/23  4:54 PM   Result Value Ref Range    POCT Glucose 255 (H) 70 - 110 mg/dL   POCT glucose    Collection Time: 03/27/23  8:47 PM   Result Value Ref Range    POCT Glucose 182 (H) 70 " - 110 mg/dL   Basic Metabolic Panel    Collection Time: 03/28/23  4:33 AM   Result Value Ref Range    Sodium Level 138 136 - 145 mmol/L    Potassium Level 4.0 3.5 - 5.1 mmol/L    Chloride 100 98 - 107 mmol/L    Carbon Dioxide 28 22 - 29 mmol/L    Glucose Level 198 (H) 74 - 100 mg/dL    Blood Urea Nitrogen 64.0 (H) 8.9 - 20.6 mg/dL    Creatinine 2.37 (H) 0.73 - 1.18 mg/dL    BUN/Creatinine Ratio 27     Calcium Level Total 8.4 8.4 - 10.2 mg/dL    Anion Gap 10.0 mEq/L    eGFR 33 mls/min/1.73/m2   CBC with Differential    Collection Time: 03/28/23  4:33 AM   Result Value Ref Range    WBC 16.1 (H) 4.5 - 11.5 x10(3)/mcL    RBC 2.58 (L) 4.70 - 6.10 x10(6)/mcL    Hgb 7.8 (L) 14.0 - 18.0 g/dL    Hct 23.7 (L) 42.0 - 52.0 %    MCV 91.9 80.0 - 94.0 fL    MCH 30.2 27.0 - 31.0 pg    MCHC 32.9 (L) 33.0 - 36.0 g/dL    RDW 13.4 11.5 - 17.0 %    Platelet 320 130 - 400 x10(3)/mcL    MPV 11.6 (H) 7.4 - 10.4 fL    Neut % 67.7 %    Lymph % 15.8 %    Mono % 10.7 %    Eos % 4.7 %    Basophil % 0.4 %    Lymph # 2.54 0.6 - 4.6 x10(3)/mcL    Neut # 10.91 (H) 2.1 - 9.2 x10(3)/mcL    Mono # 1.72 (H) 0.1 - 1.3 x10(3)/mcL    Eos # 0.75 0 - 0.9 x10(3)/mcL    Baso # 0.07 0 - 0.2 x10(3)/mcL    IG# 0.11 (H) 0 - 0.04 x10(3)/mcL    IG% 0.7 %    NRBC% 0.0 %         Assessment/Plan:   Advanced stage III CKD-Biopsy-Advanced Diabetic Nodular sclerosis --  Hypertension-much better controlled  Qexgezpe-rtbpadrp-yngrqdjil well torsemide 40 b.i.d.   Hepatic cirrhosis   UTI   Peripheral arterial disease-suspected right 5th MPJ osteo  Profound vitamin-D deficiency-31079 units ergo q 72  Secondary hyperparathyroidism      Patient is having an excellent response to torsemide 40 mg daily.  He seems to be somewhat jittery this morning it could be because of reduction in dosage although it has not been that long of a time.  I will increase his gabapentin again.         Benjamín Sharp MD, FASN

## 2023-03-28 NOTE — PT/OT/SLP PROGRESS
Physical Therapy      Patient Name:  Kamran Huerta   MRN:  [id number]    Patient not seen today for PT tx session. Pt attempted in AM however pt refused 2/2 pain. PT explained the risks of not working with therapy considering his diagnoses however pt continued to decline participation. PT followed up in PM and RN reported to hold 2/2 MD recently calling to say pt will need to be placed on bipap.  Will follow-up as appropriate. .     Recommended excision/biopsy due to patient discomfort/suspicious appearance.

## 2023-03-29 LAB
ANION GAP SERPL CALC-SCNC: 8 MEQ/L
BASOPHILS # BLD AUTO: 0.12 X10(3)/MCL (ref 0–0.2)
BASOPHILS NFR BLD AUTO: 0.9 %
BUN SERPL-MCNC: 57.6 MG/DL (ref 8.9–20.6)
CALCIUM SERPL-MCNC: 8.4 MG/DL (ref 8.4–10.2)
CHLORIDE SERPL-SCNC: 99 MMOL/L (ref 98–107)
CO2 SERPL-SCNC: 30 MMOL/L (ref 22–29)
CREAT SERPL-MCNC: 2.16 MG/DL (ref 0.73–1.18)
CREAT/UREA NIT SERPL: 27
EOSINOPHIL # BLD AUTO: 0.96 X10(3)/MCL (ref 0–0.9)
EOSINOPHIL NFR BLD AUTO: 7.2 %
ERYTHROCYTE [DISTWIDTH] IN BLOOD BY AUTOMATED COUNT: 13.1 % (ref 11.5–17)
GFR SERPLBLD CREATININE-BSD FMLA CKD-EPI: 37 MLS/MIN/1.73/M2
GLUCOSE SERPL-MCNC: 254 MG/DL (ref 74–100)
HCT VFR BLD AUTO: 24.3 % (ref 42–52)
HGB BLD-MCNC: 8.3 G/DL (ref 14–18)
IMM GRANULOCYTES # BLD AUTO: 0.03 X10(3)/MCL (ref 0–0.04)
IMM GRANULOCYTES NFR BLD AUTO: 0.2 %
LYMPHOCYTES # BLD AUTO: 1.99 X10(3)/MCL (ref 0.6–4.6)
LYMPHOCYTES NFR BLD AUTO: 14.8 %
MCH RBC QN AUTO: 31.7 PG (ref 27–31)
MCHC RBC AUTO-ENTMCNC: 34.2 G/DL (ref 33–36)
MCV RBC AUTO: 92.7 FL (ref 80–94)
MONOCYTES # BLD AUTO: 1.35 X10(3)/MCL (ref 0.1–1.3)
MONOCYTES NFR BLD AUTO: 10.1 %
NEUTROPHILS # BLD AUTO: 8.97 X10(3)/MCL (ref 2.1–9.2)
NEUTROPHILS NFR BLD AUTO: 66.8 %
NRBC BLD AUTO-RTO: 0 %
PLATELET # BLD AUTO: 376 X10(3)/MCL (ref 130–400)
PMV BLD AUTO: 11.8 FL (ref 7.4–10.4)
POCT GLUCOSE: 228 MG/DL (ref 70–110)
POCT GLUCOSE: 269 MG/DL (ref 70–110)
POCT GLUCOSE: 309 MG/DL (ref 70–110)
POCT GLUCOSE: 357 MG/DL (ref 70–110)
POTASSIUM SERPL-SCNC: 4.1 MMOL/L (ref 3.5–5.1)
RBC # BLD AUTO: 2.62 X10(6)/MCL (ref 4.7–6.1)
SODIUM SERPL-SCNC: 137 MMOL/L (ref 136–145)
WBC # SPEC AUTO: 13.4 X10(3)/MCL (ref 4.5–11.5)

## 2023-03-29 PROCEDURE — 21400001 HC TELEMETRY ROOM

## 2023-03-29 PROCEDURE — 97535 SELF CARE MNGMENT TRAINING: CPT

## 2023-03-29 PROCEDURE — A4216 STERILE WATER/SALINE, 10 ML: HCPCS | Performed by: PODIATRIST

## 2023-03-29 PROCEDURE — 25000242 PHARM REV CODE 250 ALT 637 W/ HCPCS: Performed by: INTERNAL MEDICINE

## 2023-03-29 PROCEDURE — 25000003 PHARM REV CODE 250: Performed by: PODIATRIST

## 2023-03-29 PROCEDURE — 25000003 PHARM REV CODE 250: Performed by: STUDENT IN AN ORGANIZED HEALTH CARE EDUCATION/TRAINING PROGRAM

## 2023-03-29 PROCEDURE — 27000221 HC OXYGEN, UP TO 24 HOURS

## 2023-03-29 PROCEDURE — 63600175 PHARM REV CODE 636 W HCPCS: Performed by: STUDENT IN AN ORGANIZED HEALTH CARE EDUCATION/TRAINING PROGRAM

## 2023-03-29 PROCEDURE — 97164 PT RE-EVAL EST PLAN CARE: CPT

## 2023-03-29 PROCEDURE — 94640 AIRWAY INHALATION TREATMENT: CPT

## 2023-03-29 PROCEDURE — 25000003 PHARM REV CODE 250: Performed by: INTERNAL MEDICINE

## 2023-03-29 PROCEDURE — 63600175 PHARM REV CODE 636 W HCPCS: Performed by: INTERNAL MEDICINE

## 2023-03-29 PROCEDURE — 63600175 PHARM REV CODE 636 W HCPCS: Performed by: PODIATRIST

## 2023-03-29 PROCEDURE — 85025 COMPLETE CBC W/AUTO DIFF WBC: CPT | Performed by: STUDENT IN AN ORGANIZED HEALTH CARE EDUCATION/TRAINING PROGRAM

## 2023-03-29 PROCEDURE — 25000242 PHARM REV CODE 250 ALT 637 W/ HCPCS: Performed by: NURSE PRACTITIONER

## 2023-03-29 PROCEDURE — 99900031 HC PATIENT EDUCATION (STAT)

## 2023-03-29 PROCEDURE — 80048 BASIC METABOLIC PNL TOTAL CA: CPT | Performed by: INTERNAL MEDICINE

## 2023-03-29 PROCEDURE — 94761 N-INVAS EAR/PLS OXIMETRY MLT: CPT

## 2023-03-29 RX ADMIN — OXYCODONE HYDROCHLORIDE AND ACETAMINOPHEN 1 TABLET: 5; 325 TABLET ORAL at 06:03

## 2023-03-29 RX ADMIN — CEFEPIME 1 G: 1 INJECTION, POWDER, FOR SOLUTION INTRAMUSCULAR; INTRAVENOUS at 08:03

## 2023-03-29 RX ADMIN — PANCRELIPASE 6 CAPSULE: 60000; 12000; 38000 CAPSULE, DELAYED RELEASE PELLETS ORAL at 08:03

## 2023-03-29 RX ADMIN — ENOXAPARIN SODIUM 40 MG: 40 INJECTION SUBCUTANEOUS at 08:03

## 2023-03-29 RX ADMIN — OXYCODONE HYDROCHLORIDE AND ACETAMINOPHEN 1 TABLET: 5; 325 TABLET ORAL at 01:03

## 2023-03-29 RX ADMIN — GABAPENTIN 300 MG: 300 CAPSULE ORAL at 08:03

## 2023-03-29 RX ADMIN — LINEZOLID 600 MG: 600 INJECTION, SOLUTION INTRAVENOUS at 08:03

## 2023-03-29 RX ADMIN — SODIUM CHLORIDE, PRESERVATIVE FREE 10 ML: 5 INJECTION INTRAVENOUS at 06:03

## 2023-03-29 RX ADMIN — INSULIN DETEMIR 20 UNITS: 100 INJECTION, SOLUTION SUBCUTANEOUS at 08:03

## 2023-03-29 RX ADMIN — Medication 400 MG: at 08:03

## 2023-03-29 RX ADMIN — SODIUM BICARBONATE 1300 MG: 650 TABLET ORAL at 04:03

## 2023-03-29 RX ADMIN — METOPROLOL TARTRATE 25 MG: 25 TABLET, FILM COATED ORAL at 08:03

## 2023-03-29 RX ADMIN — Medication 10 ML: at 05:03

## 2023-03-29 RX ADMIN — IPRATROPIUM BROMIDE 0.5 MG: 0.5 SOLUTION RESPIRATORY (INHALATION) at 08:03

## 2023-03-29 RX ADMIN — ALBUTEROL SULFATE 2.5 MG: 2.5 SOLUTION RESPIRATORY (INHALATION) at 08:03

## 2023-03-29 RX ADMIN — PANCRELIPASE 6 CAPSULE: 60000; 12000; 38000 CAPSULE, DELAYED RELEASE PELLETS ORAL at 04:03

## 2023-03-29 RX ADMIN — IPRATROPIUM BROMIDE 0.5 MG: 0.5 SOLUTION RESPIRATORY (INHALATION) at 01:03

## 2023-03-29 RX ADMIN — GABAPENTIN 300 MG: 300 CAPSULE ORAL at 04:03

## 2023-03-29 RX ADMIN — INSULIN ASPART 14 UNITS: 100 INJECTION, SOLUTION INTRAVENOUS; SUBCUTANEOUS at 04:03

## 2023-03-29 RX ADMIN — DOXAZOSIN 2 MG: 1 TABLET ORAL at 08:03

## 2023-03-29 RX ADMIN — NIFEDIPINE 90 MG: 90 TABLET, FILM COATED, EXTENDED RELEASE ORAL at 08:03

## 2023-03-29 RX ADMIN — LEVETIRACETAM 500 MG: 500 TABLET, FILM COATED ORAL at 08:03

## 2023-03-29 RX ADMIN — OXYCODONE HYDROCHLORIDE AND ACETAMINOPHEN 1 TABLET: 5; 325 TABLET ORAL at 07:03

## 2023-03-29 RX ADMIN — SODIUM BICARBONATE 1300 MG: 650 TABLET ORAL at 08:03

## 2023-03-29 RX ADMIN — TORSEMIDE 40 MG: 20 TABLET ORAL at 08:03

## 2023-03-29 RX ADMIN — NEPHROCAP 1 CAPSULE: 1 CAP ORAL at 08:03

## 2023-03-29 RX ADMIN — LINEZOLID 600 MG: 600 INJECTION, SOLUTION INTRAVENOUS at 09:03

## 2023-03-29 RX ADMIN — INSULIN ASPART 14 UNITS: 100 INJECTION, SOLUTION INTRAVENOUS; SUBCUTANEOUS at 07:03

## 2023-03-29 RX ADMIN — SODIUM CHLORIDE, PRESERVATIVE FREE 10 ML: 5 INJECTION INTRAVENOUS at 11:03

## 2023-03-29 RX ADMIN — INSULIN ASPART 14 UNITS: 100 INJECTION, SOLUTION INTRAVENOUS; SUBCUTANEOUS at 11:03

## 2023-03-29 RX ADMIN — INSULIN DETEMIR 5 UNITS: 100 INJECTION, SOLUTION SUBCUTANEOUS at 08:03

## 2023-03-29 NOTE — PT/OT/SLP PROGRESS
Occupational Therapy   Treatment    Name: Kamran Huerta  MRN: [id number]  Admitting Diagnosis:  Sepsis  5 Days Post-Op    Recommendations:     Discharge Recommendations: rehabilitation facility vs nursing facility, skilled  Discharge Equipment Recommendations: TTB, possibly O2 (TBD, pending progress)  Barriers to discharge: medical dx    Assessment:     Kamran Huerta is a 48 y.o. male with a medical diagnosis of Sepsis. He presents with c/o pain on LLQ of abdomen and R foot. Performance deficits affecting function are weakness, impaired functional mobility, decreased safety awareness, impaired endurance, impaired balance, impaired skin, impaired self care skills, decreased lower extremity function, orthopedic precautions.     Rehab Prognosis:  Good; patient would benefit from acute skilled OT services to address these deficits and reach maximum level of function.       Plan:     Patient to be seen 5 x/week, daily to address the above listed problems via self-care/home management, therapeutic activities, therapeutic exercises  Plan of Care Expires: 04/10/23  Plan of Care Reviewed with: patient    Subjective     Chief Complaint: Pt c/o pain on LLQ of abdomen and R foot, rating pain 10/10. RN notified.    Objective:     Communicated with: RN prior to session. Patient found up in chair with telemetry, pulse ox (continuous), oxygen, and R Darco shoe donned upon OT entry to room.  Note: Leakage noted from pt's esocbar catheter upon arrival. CNA was notified, and emptied/secured pt's escobar catheter.    General Precautions: Standard, fall    Orthopedic Precautions: RLE weight bearing as tolerated with darco shoe  Braces: R LE darco shoe  Respiratory Status: 4 L via Oxymask     Occupational Performance:     Bed Mobility:    Patient completed Sit to Supine with moderate assistance, requiring assist to lift BLEs.    Functional Mobility/Transfers:  Patient completed Sit <> Stand Transfer with mod A x2 and vcs provided  with  rolling walker   Patient completed Chair > Bed Transfer using Step Transfer technique with min A x2 provided for safety with rolling walker    Activities of Daily Living:  Lower Body Dressing: Pt able to doff R Darco shoe with CGA provided while seated EOB.    Patient left HOB elevated with all lines intact, call button in reach, RN notified, and pillow positioned underneath BLEs in order to float B heels and facilitate pt's skin integrity.    GOALS:   Multidisciplinary Problems       Occupational Therapy Goals          Problem: Occupational Therapy    Goal Priority Disciplines Outcome Interventions   Occupational Therapy Goal     OT, PT/OT Ongoing, Progressing    Description: Goals to be met by: 3/31/23     Patient will increase functional independence with ADLs by performing:    UE Dressing with Set-up Assistance.  LE Dressing with Stand-by Assistance and Assistive Devices as needed.  Grooming while standing at sink with Stand-by Assistance.  Toileting from toilet (with BSC placed over toilet if needed) with Stand-by Assistance for hygiene and clothing management.                          Time Tracking:     OT Date of Treatment: 3/29/23  OT Start Time: 1406  OT Stop Time: 1427  OT Total Time (min): 21 min    Billable Minutes: Self Care/Home Management 21 mins    OT/ARIANA: OT     Number of ARIANA visits since last OT visit: 1    3/29/2023

## 2023-03-29 NOTE — PT/OT/SLP RE-EVAL
Physical Therapy Re-evaluation    Patient Name:  Kamran Huerta   MRN:  [id number]    Recommendations:     Discharge Recommendations: rehabilitation facility  Discharge Equipment Recommendations: other (see comments) (TBD)   Barriers to discharge:  medical dx, decreased functional independence, decreased endurance     Assessment:     Kamran Huerta is a 48 y.o. male admitted with a medical diagnosis of Sepsis, SOB, UTI, cellulitis of scrotum, peripheral edema, R foot wound s/p debridement. He presents with the following impairments/functional limitations: weakness, gait instability, impaired endurance, impaired balance, impaired cardiopulmonary response to activity, decreased lower extremity function, impaired self care skills, impaired functional mobility.    Rehab Prognosis:  good; patient would benefit from acute skilled PT services to address these deficits and reach maximum level of function.      Recent Surgery: Procedure(s) (LRB):  AMPUTATION, TOE (Right) 5 Days Post-Op    Plan:     During this hospitalization, patient to be seen 6 x/week to address the above listed problems via gait training, therapeutic activities, therapeutic exercises  Plan of Care Expires:  04/16/23  Plan of Care Reviewed with: patient    Subjective     Communicated with NSG prior to session.  Patient found HOB elevated with oxygen, pulse ox (continuous), telemetry, peripheral IV, escobar catheter upon PT entry to room, agreeable to evaluation.      Chief Complaint: increased pain to R foot   Patient comments/goals: to get stronger   Pain/Comfort:  Pain Rating 1: 10/10  Location - Side 1: Right  Location 1: foot  Pain Addressed 1: Pre-medicate for activity    Patients cultural, spiritual, Restoration conflicts given the current situation: no      Objective:     Patient found with: oxygen, pulse ox (continuous), telemetry, peripheral IV, escobar catheter     General Precautions: Standard, fall  Orthopedic Precautions: RLE weight  bearing as tolerated  Braces:  (darco shoe on R foot)  Respiratory Status:  4L oxymask , SpO2: 95% at rest and after ax     Exams:  RLE Strength: grossly 4+/5  LLE Strength: 3/5    Functional Mobility:  Bed Mobility:     Supine to Sit: moderate assistance  Transfers:     Sit to Stand:  moderate assistance with rolling walker  Gait: pt was able to ambulate approx 5ft with use of RW and and Narinder; slowed pace, decreased step length, limited by pain and decreased tolerance to ax    Patient left up in chair with all lines intact, call button in reach, and RN notified.    GOALS:   Multidisciplinary Problems       Physical Therapy Goals          Problem: Physical Therapy    Goal Priority Disciplines Outcome Goal Variances Interventions   Physical Therapy Goal     PT, PT/OT Ongoing, Progressing     Description: Goals to be met by: 23     Patient will increase functional independence with mobility by performin. Supine to sit with Stand-by Assistance  2. Sit to stand transfer with Supervision  3. Bed to chair transfer with Supervision using Rolling Walker  4. Gait  x 50 feet with Min A using Rolling Walker.   5. Ascend/descend 4 stair with right Handrails Minimal Assistance using Rolling Walker.                          History:     Past Medical History:   Diagnosis Date    CHF (congestive heart failure)     Chronic back pain     CVA (cerebral vascular accident) 2023    DM (diabetes mellitus)     HTN (hypertension)     Seizures        Past Surgical History:   Procedure Laterality Date    BACK SURGERY      EGD, WITH CLOSED BIOPSY  3/15/2023    Procedure: EGD, WITH CLOSED BIOPSY;  Surgeon: Tj Faith MD;  Location: [facility] ENDOSCOPY;  Service: Gastroenterology;;    ESOPHAGOGASTRODUODENOSCOPY N/A 3/15/2023    Procedure: EGD;  Surgeon: Tj Faith MD;  Location: [facility] ENDOSCOPY;  Service: Gastroenterology;  Laterality: N/A;    TOE AMPUTATION Right 3/24/2023    Procedure: AMPUTATION, TOE;  Surgeon:  Emre Vazquez DPM;  Location: [facility];  Service: Podiatry;  Laterality: Right;       Time Tracking:     PT Received On: 03/29/23  PT Start Time: 1006     PT Stop Time: 1024  PT Total Time (min): 18 min     Billable Minutes: Re-eval 1      03/29/2023

## 2023-03-29 NOTE — PROGRESS NOTES
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 Presented to the ED with complaints of diffuse body swelling specially abdomen, groin, penis and testicles and bilateral lower extremities, dyspnea on minimal exertion and bilateral lower extremity pain and cramping.  Report subjective fever and chills.  Report he was just hospitalized for similar symptom at [facility] about 2 weeks ago and was discharged home after few days. On arrival to ED, he was tachycardic, hypertensive, saturating 100% on room air.  EKG appears sinus rhythm on my review without ischemic changes.  Labs notable for WBC 16.3, hemoglobin 11.4, platelets 346, sodium 139, potassium 5.0, chloride 116, CO2 16, creatinine 2.17, BUN 34, glucose 420, calcium 8.0, albumin 1.3, , negative troponin. CT A/P with Contrast showed left pleural effusion, hepatic cirrhotic morphology, splenomegaly and upper abdominal varices and ascites, chronic pancreatitis changes, extensive subcutaneous anasarca edema.  Kidneys unremarkable without hydronephrosis.     Per ED provider exam he was noted to have purulent penile discharge, sent for culture and Segovia catheter placed.  He was given albumin 12.5 g, Lasix 40 mg IV, Vancomycin and Zosyn and subsequently referred to hospital medicine service for further evaluation and management.  mL with IV Lasix 40. Urine protein creatinine ratio noted to be 10.9. Patient was continued on IV Lasix. Seen by Renal team; recommendations made for IV Lasix/Albumin drip and renal biopsy. He was also seen by GI team for evaluation of cirrhosis and EGD was done to r/o EV. EGD showed erosive gastropathy,  normal esophagus & no other evidence of cirrhosis. Nephrology changed IV Lasix/Albumin drip to IV Lasix 80 mg TID with tentative plan for hemodialysis given patient's diuretic resistance. IR to plan for renal biopsy which could not be performed 03/15 due to uncontrolled HTN. Continues to have significant UOP with IV Lasix.  Nephrology continuing IV Lasix 80 mg t.i.d. and Metolazone 10 mg given stable creatinine and holding off on HD for now. Patient on 1500 mL fluid restriction.  OT to provide brace for scrotal support given patient's discomfort. Counseled regarding compliance with low-sodium diet and fluid restriction in order to optimize volume status with diuresis. Underwent renal biopsy with IR 03/20    Continued on IV Lasix 80 mg t.i.d. and Metolazone 10 mg.    Noted to have improved edema in B/L LE edema, abdominal wall edema & scrotal edema as of 3/22/23 . Eventually Diuretic regimen was changed to Torsemide.    Podiatry ordered XR R Foot which showed erosion of 5th metatarsophalangeal joint with osteomyelitis not excluded. Podiatry performed excisional debridement of R foot on 3/24/23.  Cultures-+ve Staph MRSA. ID was consulted     Spiked Fever 101 3/23/23,Patient is started on Vanc,Zosyn to cover Possible Pneumonia and  Osteomyelitis    On 3/26/23 Hb dropped to <7 , we transfused a unit, Oxygen requirement going up with poor Urine Output and hence Nephrology was updated about the patient, got 80mg IV lasix, continued on Torsemide      Ct scan abdomen obtained when he complained of abdominal pain  was showing constipation, had a bowel movement after Relistor.    Oxygen requirement been high per staff and patient looks volume overloaded, Pulmonology was consulted to assist with management given increased work of breathing and Hypoxemia on ABGs, placed on BIPAP, waiting to discuss with Nephrology      Interval Hx:     Patient was seen and examined at bedside,looks better,working with PT,continue to complain of  pain all over. No fever.          Chart was reviewed,UOP 4  L, Tmax 98.8, white count coming down 16>13, Cr staying at 2        Objective/physical exam:  General: In no acute distress, obese , On MASK  Chest: Clear to auscultation bilaterally+ve crackles  Heart: RRR, +S1, S2, no appreciable murmur  Abdomen: + distension, nontender, BS +  MSK: 2+ pitting edema and thickening of skin from abdomen to foot; + penile and scrotal edema   Neurologic: Alert and oriented x4, Cranial nerve II-XII intact, Strength 5/5 in all 4 extremities  Inteegumentary: blister noted on sole of R foot          VITAL SIGNS: 24 HRS MIN & MAX LAST   Temp  Min: 98 °F (36.7 °C)  Max: 98.8 °F (37.1 °C) 98 °F (36.7 °C)   BP  Min: 143/94  Max: 158/91 (!) 156/81   Pulse  Min: 77  Max: 84  79   Resp  Min: 16  Max: 20 20     SpO2  Min: 92 %  Max: 100 % 98 %       Recent Labs   Lab 03/27/23  0556 03/28/23  0433 03/29/23  0719   WBC 17.5* 16.1* 13.4*   RBC 2.65* 2.58* 2.62*   HGB 8.0* 7.8* 8.3*   HCT 23.9* 23.7* 24.3*   MCV 90.2 91.9 92.7   MCH 30.2 30.2 31.7*   MCHC 33.5 32.9* 34.2   RDW 13.8 13.4 13.1    320 376   MPV 11.7* 11.6* 11.8*       Recent Labs   Lab 03/23/23  0345 03/23/23  1305 03/25/23  0850 03/25/23  2142 03/26/23  1633 03/27/23  0556 03/27/23  0619 03/28/23  0433 03/28/23  1748 03/29/23  0536      < > 135*   < >  --  136  --  138  --  137   K 4.6   < > 4.7   < >  --  4.4  --  4.0  --  4.1   CO2 22   < > 28   < >  --  26  --  28  --  30*   BUN 43.4*   < > 59.8*   < >  --  62.9*  --  64.0*  --  57.6*   CREATININE 1.80*   < > 2.87*   < >  --  2.60*  --  2.37*  --  2.16*   CALCIUM 8.3*   < > 8.1*   < >  --  8.3*  --  8.4  --  8.4   PH  --    < >  --   --  7.43  --  7.47*  --  7.45  --    MG 1.70  --   --   --   --   --   --   --   --   --    ALBUMIN  --   --  2.4*  --   --   --   --   --   --   --    ALKPHOS  --   --  107  --   --   --   --   --   --   --    ALT  --   --  37  --   --   --   --   --   --   --    AST  --   --  41*   --   --   --   --   --   --   --    BILITOT  --   --  0.4  --   --   --   --   --   --   --     < > = values in this interval not displayed.     Microbiology Results (last 7 days)       Procedure Component Value Units Date/Time    Blood Culture [[id number]]  (Normal) Collected: 03/25/23 0834    Order Status: Completed Specimen: Blood from Hand, Left Updated: 03/29/23 1000     CULTURE, BLOOD ([facility]) No Growth At 96 Hours    Blood Culture [[id number]]  (Normal) Collected: 03/25/23 0829    Order Status: Completed Specimen: Blood from Hand, Right Updated: 03/29/23 1000     CULTURE, BLOOD ([facility]) No Growth At 96 Hours    Tissue Culture - Aerobic [[id number]]  (Abnormal)  (Susceptibility) Collected: 03/24/23 1554    Order Status: Completed Specimen: Bone from Foot, Right Updated: 03/27/23 1012     CULTURE, TISSUE- AEROBIC ([facility]) Moderate Methicillin resistant Staphylococcus aureus    Anaerobic Culture [[id number]] Collected: 03/24/23 1554    Order Status: Completed Specimen: Bone from Foot, Right Updated: 03/27/23 1001     Anaerobe Culture No Anaerobes Isolated    Respiratory Culture [[id number]]     Order Status: Sent Specimen: Sputum, Expectorated            Scheduled Med:   albuterol sulfate  2.5 mg Nebulization Q4H WAKE    ceFEPime (MAXIPIME) IVPB  1 g Intravenous Q12H    doxazosin  2 mg Oral QHS    enoxaparin  40 mg Subcutaneous Q12H    ergocalciferol  50,000 Units Oral Q3 Days    gabapentin  300 mg Oral TID    insulin aspart U-100  14 Units Subcutaneous TIDWM    insulin detemir U-100  20 Units Subcutaneous QHS    insulin detemir U-100  5 Units Subcutaneous Daily    ipratropium  0.5 mg Nebulization Q6H    levETIRAcetam  500 mg Oral BID    linezolid  600 mg Intravenous Q12H    lipase-protease-amylase 12,000-38,000-60,000 units  6 capsule Oral TID    magnesium oxide  400 mg Oral BID    metoprolol tartrate  25 mg Oral BID    miconazole NITRATE 2 %   Topical (Top) BID    NIFEdipine  90 mg Oral Daily    sodium bicarbonate  1,300 mg  Oral TID    sodium chloride 0.9%  10 mL Intravenous Q6H    torsemide  40 mg Oral Daily    vitamin renal formula (B-complex-vitamin c-folic acid)  1 capsule Oral Daily    zinc oxide-cod liver oil   Topical (Top) BID     PRN Meds:  sodium chloride, acetaminophen, acetaminophen, albuterol sulfate, aluminum-magnesium hydroxide-simethicone, cloNIDine, dextrose 10%, dextrose 10%, dextrose, dextrose, glucagon (human recombinant), hydrALAZINE, HYDROcodone-acetaminophen, insulin aspart U-100, labetalol, melatonin, ondansetron, oxyCODONE-acetaminophen, polyethylene glycol, prochlorperazine, senna-docusate 8.6-50 mg, simethicone, sodium chloride 0.9%, Flushing PICC Protocol **AND** sodium chloride 0.9% **AND** sodium chloride 0.9%     Assessment/Plan:  Anasarca 2/2 possible Diabetic Nephropathy  FABIO superimposed on CKD2  Nephrotic Syndrome/Proteinuria 10.9 g 2/2 Diabetic Nephropathy  AHRF (normal EF) 2/2 Pulm edema and possible PNA  UTI/Urethritis + Staph and Pluralibacter gergoviae  ,   Osteomyelitis R Foot 5th Metatarsalophalangeal Joint s/p Excisional Debridement 3/24/23 + MRSA Staph  Suspected Aspiration Pneumonia  Sepsis 2/2 Above  Constipation  Leukocytosis  Chronic Anemia   Brittle Diabetic  Lethargy  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  T2DM  Hypertension  Vitamin-D deficiency  B/L LE Weakness   Seizure  Chronic Pancreatitis  Hypomagnesemia    Plan:  -Nephrology on-board; following recommendations ,Placed on 1500 mL fluid restriction.IR performed L sided renal biopsy 03/20;report: advanced diabetic nodular sclerosis.Continue diuretics per Nephro,was on IV Lasix 80 mg TID & Metolazone 10 mg daily , now switched to torsemide , f/u Urine Output  -Oxygen supplementation to continue to keep Sats >92%, Pulmonology requested to assist in the management, was on BIPAP at night for increased work of breathing , Pulmonology plan to discuss with Nephrology on the plan in optimizing volume status, may need BIPAP up until then  -Completed  5 days of IV Rocephin for Urethritis and UTI   -Podiatry on-board for R foot osteomyelitis s/p excisional debridement, Staph aureus on Tissue culture, ID following (recommend a couple of weeks of antibiotics from the note), on Cefepime and Linezolid to cover Osteomyelitis and Pneumonia.Monitoring Leukocytosis and fever curve  -Bowel Regimen for constipation  -Will continue Long Acting Insulin, adjustments to be made on blood sugars,will continue Aspart 14 units TID and give long acting Insulin twice daily  -Head CT and Ammonia ok.Avoid Polypharmacy, decrease Gabapentin and Keppra, Continue other Home Medications as needed  -PT/OT on board; recommending rehab placement    Critical care Time Spent>35 min   AHRF,Sepsis    VTE prophylaxis: Lovenox     Patient condition:  critical    Anticipated discharge and Disposition:     TBD, pending Volume status optimization, Needs IV antibiotics    All diagnosis and differential diagnosis have been reviewed; assessment and plan has been documented; I have personally reviewed the labs and test results that are presently available; I have reviewed the patients medication list; I have reviewed the consulting providers response and recommendations. I have reviewed or attempted to review medical records based upon their availability    All of the patient's questions have been  addressed and answered. Patient's is agreeable to the above stated plan. I will continue to monitor closely and make adjustments to medical management as needed.  _____________________________________________________________________    Nutrition Status: Diabetic    Radiology:  X-Ray Chest 1 View  Narrative: EXAMINATION:  XR CHEST 1 VIEW    CLINICAL HISTORY:  Fluid in Chest;    TECHNIQUE:  Single frontal view of the chest was performed.    COMPARISON:  03/26/2023    FINDINGS:  LINES AND TUBES: EKG/telemetry leads overlie the chest.    MEDIASTINUM AND PELON: Cardiac silhouette is enlarged.    LUNGS: Unchanged  bilateral alveolar opacities.    PLEURA:No pleural effusion. No pneumothorax.    OTHER: No acute osseous abnormality.  Impression: Unchanged bilateral alveolar opacities.    Electronically signed by: Nisa Daley  Date:    03/29/2023  Time:    10:01      Michelle Camacho MD   03/29/2023

## 2023-03-29 NOTE — PROGRESS NOTES
NEPHROLOGY: Progress   48-year-old a.m. with history of poorly controlled type 2 diabetes, obesity, creatinine of 1.47 in November of 2022 with a GFR at that time of approximately 60 however , it was also as low as 33 at that time.  He was being followed by Dr. Alonso in [facility].  He has a history of poorly controlled hypertension as well, seizure disorder and alcoholic liver disease with chronic pancreatitis.  Over the past several months the patient has had increasing volume retention, worsening anasarca and increased immobility due to the edema.  He has reportedly gained 65 lb over the past several months.    Patient has required aggressive diuresis but has lost a significant amount of weight.  He had high-grade proteinuria in excess of 10 g and biopsy has returned with advanced diabetic nodular sclerosis.      Patient underwent 5th right metatarsophalangeal joint resection for suspected osteomyelitis. He remains grossly overloaded with high oxygen requirements.       Current Facility-Administered Medications:     0.9%  NaCl infusion (for blood administration), , Intravenous, Q24H PRN, Michelle Camacho MD    acetaminophen tablet 1,000 mg, 1,000 mg, Oral, Q6H PRN, Emre Vazquez DPM, 1,000 mg at 03/27/23 2051    acetaminophen tablet 650 mg, 650 mg, Oral, Q4H PRN, Emre Vazquez DPM    albuterol nebulizer solution 2.5 mg, 2.5 mg, Nebulization, Q4H PRN, GITA Andrews    albuterol nebulizer solution 2.5 mg, 2.5 mg, Nebulization, Q4H WAKE, Neno Moran MD, 2.5 mg at 03/29/23 0843    aluminum-magnesium hydroxide-simethicone 200-200-20 mg/5 mL suspension 30 mL, 30 mL, Oral, QID PRN, Emre Vazquez DPM    ceFEPIme (MAXIPIME) 1 g in dextrose 5 % in water (D5W) 5 % 50 mL IVPB (MB+), 1 g, Intravenous, Q12H, Marcos Saldana MD, Stopped at 03/29/23 0837    cloNIDine tablet 0.2 mg, 0.2  mg, Oral, TID PRN, Emre Vazquez, DPM    dextrose 10% bolus 125 mL 125 mL, 12.5 g, Intravenous, PRN, Emre Qiuk, DPM    dextrose 10% bolus 250 mL 250 mL, 25 g, Intravenous, PRN, Emre Abdallaiesk, DPM    dextrose 40 % gel 15,000 mg, 15 g, Oral, PRN, Emre Abdallaiesk, DPM    dextrose 40 % gel 30,000 mg, 30 g, Oral, PRN, Emre Vazquez, DPM    doxazosin tablet 2 mg, 2 mg, Oral, QHS, Emre Vazquez, DPM, 2 mg at 03/28/23 2040    enoxaparin injection 40 mg, 40 mg, Subcutaneous, Q12H, Emre Vazquez DPM, 40 mg at 03/29/23 0809    ergocalciferol capsule 50,000 Units, 50,000 Units, Oral, Q3 Days, Emre Vazquez, DPM, 50,000 Units at 03/27/23 0830    gabapentin capsule 300 mg, 300 mg, Oral, TID, Benjamín Sharp MD, 300 mg at 03/29/23 0805    glucagon (human recombinant) injection 1 mg, 1 mg, Intramuscular, PRN, Emre Vazquez DPM, 1 mg at 03/20/23 0545    hydrALAZINE injection 10 mg, 10 mg, Intravenous, Q4H PRN, Emre Vazquez, DPM, 10 mg at 03/12/23 1224    HYDROcodone-acetaminophen 5-325 mg per tablet 1 tablet, 1 tablet, Oral, Q4H PRN, Emre Vazquez DPM, 1 tablet at 03/22/23 1305    insulin aspart U-100 injection 1-10 Units, 1-10 Units, Subcutaneous, QID (AC + HS) PRN, Emre Vazquez DPM, 10 Units at 03/25/23 0955    insulin aspart U-100 injection 14 Units, 14 Units, Subcutaneous, TIDWM, Emre Vazquez, DPM, 14 Units at 03/29/23 1146    insulin detemir U-100 injection 20 Units, 20 Units, Subcutaneous, QHS, Michelle Camacho MD, 20 Units at 03/28/23 2126    insulin detemir U-100 injection 5 Units, 5 Units, Subcutaneous, Daily, Michelle Camacho MD, 5 Units at 03/29/23 0801    ipratropium 0.02 % nebulizer solution 0.5 mg, 0.5 mg, Nebulization, Q6H, Geena Nettles, GITA, 0.5 mg at 03/29/23 0843    labetaloL injection 10 mg, 10 mg, Intravenous, Q4H PRN, Emre Vazquez DPM    levETIRAcetam tablet 500 mg, 500 mg, Oral, BID, Benjamín Sharp MD, 500 mg at 03/29/23 0807    linezolid 600 mg/300 mL IVPB 600  mg, 600 mg, Intravenous, Q12H, Nancy Valladares MD, Stopped at 03/29/23 1026    lipase-protease-amylase 12,000-38,000-60,000 units per capsule 6 capsule, 6 capsule, Oral, TID, Emre Vazquez, DPM, 6 capsule at 03/29/23 0803    magnesium oxide tablet 400 mg, 400 mg, Oral, BID, Emre Vazquez, DPM, 400 mg at 03/29/23 0805    melatonin tablet 6 mg, 6 mg, Oral, Nightly PRN, Emre Abdallaiesk, DPM, 6 mg at 03/12/23 0012    metoprolol tartrate (LOPRESSOR) tablet 25 mg, 25 mg, Oral, BID, Emre Vazquez, DPM, 25 mg at 03/29/23 0806    miconazole NITRATE 2 % top powder, , Topical (Top), BID, Emre Vazquez, DPM, Given at 03/28/23 2041    NIFEdipine 24 hr tablet 90 mg, 90 mg, Oral, Daily, Emre Vazquez, DPM, 90 mg at 03/29/23 0806    ondansetron injection 4 mg, 4 mg, Intravenous, Q4H PRN, Emre Vazquez, DPM, 4 mg at 03/12/23 0011    oxyCODONE-acetaminophen 5-325 mg per tablet 1 tablet, 1 tablet, Oral, Q6H PRN, Emre Vazquez, DPM, 1 tablet at 03/29/23 0737    polyethylene glycol packet 17 g, 17 g, Oral, BID PRN, Emre Vazquez, DPM    prochlorperazine injection Soln 5 mg, 5 mg, Intravenous, Q6H PRN, Emre Vazquez, DPM    senna-docusate 8.6-50 mg per tablet 2 tablet, 2 tablet, Oral, BID PRN, Emre Vazquez, DPM    simethicone chewable tablet 80 mg, 1 tablet, Oral, QID PRN, Emre Vazquez, DPM    sodium bicarbonate tablet 1,300 mg, 1,300 mg, Oral, TID, Emre Vazquez, DPM, 1,300 mg at 03/29/23 0802    sodium chloride 0.9% flush 10 mL, 10 mL, Intravenous, PRN, Emre Vazquez DPM    Flushing PICC Protocol, , , Until Discontinued **AND** sodium chloride 0.9% flush 10 mL, 10 mL, Intravenous, Q6H, 10 mL at 03/29/23 1147 **AND** sodium chloride 0.9% flush 10 mL, 10 mL, Intravenous, PRN, Emre Vazquez DPM, 10 mL at 03/29/23 0526    torsemide tablet 40 mg, 40 mg, Oral, Daily, Benjamín Sharp MD, 40 mg at 03/29/23 0804    vitamin renal formula (B-complex-vitamin c-folic acid) 1 mg per capsule 1 capsule,  "1 capsule, Oral, Daily, Emre Vazquez DPM, 1 capsule at 03/29/23 0803    zinc oxide-cod liver oil 40 % paste, , Topical (Top), BID, Emre Vazquez DPM, Given at 03/28/23 2041      BP (!) 157/79   Pulse 80   Temp 98.6 °F (37 °C) (Axillary)   Resp 20   Ht 5' 5" (1.651 m)   Wt 106.2 kg (234 lb 2.1 oz)   SpO2 95%   BMI 38.96 kg/m²     Physical Exam:    GEN:  Obese and chronically ill-appearing black male.  HEENT: Atraumatic. EOMI, no icterus  NECK : No JVD  CARD : RRR s rub or gallop  LUNGS :  Decreased breath sounds, oxymask at 4L   ABD : Soft,non-tender. BS active, obese.  EXT : pitting edema to entirety of BLE        Intake/Output Summary (Last 24 hours) at 3/29/2023 1210  Last data filed at 3/29/2023 0619  Gross per 24 hour   Intake 942 ml   Output 3150 ml   Net -2208 ml           Laboratory:  Recent Results (from the past 24 hour(s))   POCT ARTERIAL BLOOD GAS Blood Gas    Collection Time: 03/28/23  5:47 PM   Result Value Ref Range    POC PH      POC PCO2      POC PO2      POC Sodium      POC Potassium      POC Ionized Calcium      POC HEMOGLOBIN      POC O2Hb      POC COHb      POC MetHb      POC PCO2      Base Excess, Arterial 7.2 (A) -2.0 - 2.0 mmol/L    O2 Sat, Art      HCO3, Arterial 32.0 (A) 18.0 - 23.0 MMOL/L    POC FIO2     POCT ARTERIAL BLOOD GAS    Collection Time: 03/28/23  5:48 PM   Result Value Ref Range    POC PH 7.45 7.35 - 7.45    POC PCO2 46 (A) 35 - 45 mmHg    POC PO2 139 (A) 80.0 - 100 mmHg    POC HEMOGLOBIN 8.4 (A) 12 - 16 g/dL    POC SATURATED O2 99.2 %    POC O2Hb 97.2 (A) 94.0 - 97.0 %    POC COHb 2.0 %    POC MetHb 0.70 0.40 - 1.5 %    POC Potassium 3.7 3.5 - 5.0 mmol/l    POC Sodium 133 (A) 137 - 145 mmol/l    POC Ionized Calcium 1.09 (A) 1.12 - 1.23 mmol/l    Correct Temperature (PH) 7.45 7.35 - 7.45    Corrected Temperature (pCO2) 46 (A) 35 - 45 mmHg    Corrected Temperature (pO2) 139 (A) 80.0 - 100 mmHg    POC HCO3 32.0 (A) 22.0 - 26.0 mmol/l    Base Deficit 7.2 (A) -2.0 - " 2.0 mmol/l    POC Temp 37.0 °C    Specimen source Arterial sample    POCT glucose    Collection Time: 03/28/23  9:28 PM   Result Value Ref Range    POCT Glucose 357 (H) 70 - 110 mg/dL   Basic Metabolic Panel    Collection Time: 03/29/23  5:36 AM   Result Value Ref Range    Sodium Level 137 136 - 145 mmol/L    Potassium Level 4.1 3.5 - 5.1 mmol/L    Chloride 99 98 - 107 mmol/L    Carbon Dioxide 30 (H) 22 - 29 mmol/L    Glucose Level 254 (H) 74 - 100 mg/dL    Blood Urea Nitrogen 57.6 (H) 8.9 - 20.6 mg/dL    Creatinine 2.16 (H) 0.73 - 1.18 mg/dL    BUN/Creatinine Ratio 27     Calcium Level Total 8.4 8.4 - 10.2 mg/dL    Anion Gap 8.0 mEq/L    eGFR 37 mls/min/1.73/m2   CBC with Differential    Collection Time: 03/29/23  7:19 AM   Result Value Ref Range    WBC 13.4 (H) 4.5 - 11.5 x10(3)/mcL    RBC 2.62 (L) 4.70 - 6.10 x10(6)/mcL    Hgb 8.3 (L) 14.0 - 18.0 g/dL    Hct 24.3 (L) 42.0 - 52.0 %    MCV 92.7 80.0 - 94.0 fL    MCH 31.7 (H) 27.0 - 31.0 pg    MCHC 34.2 33.0 - 36.0 g/dL    RDW 13.1 11.5 - 17.0 %    Platelet 376 130 - 400 x10(3)/mcL    MPV 11.8 (H) 7.4 - 10.4 fL    Neut % 66.8 %    Lymph % 14.8 %    Mono % 10.1 %    Eos % 7.2 %    Basophil % 0.9 %    Lymph # 1.99 0.6 - 4.6 x10(3)/mcL    Neut # 8.97 2.1 - 9.2 x10(3)/mcL    Mono # 1.35 (H) 0.1 - 1.3 x10(3)/mcL    Eos # 0.96 (H) 0 - 0.9 x10(3)/mcL    Baso # 0.12 0 - 0.2 x10(3)/mcL    IG# 0.03 0 - 0.04 x10(3)/mcL    IG% 0.2 %    NRBC% 0.0 %   POCT glucose    Collection Time: 03/29/23 10:45 AM   Result Value Ref Range    POCT Glucose 228 (H) 70 - 110 mg/dL         Assessment/Plan:  Advanced stage III CKD-Biopsy-Advanced Diabetic Nodular sclerosis   Hypertension-much better controlled  Pedxbnrw-qpvktxfx-djgscqlvp well torsemide 40 b.i.d.   Hepatic cirrhosis   UTI   Peripheral arterial disease-suspected right 5th MPJ osteo  Profound vitamin-D deficiency-90879 units ergo q 72  Secondary hyperparathyroidism    From 3/13 - 3/16 he was treated with continuous Lasix Infusion He  was then transitioned to IV 80 mg q8 hours until 3/24.   On 3/24 patient was transitioned to oral Torsemide 40 mg BID.   Since 3/26 patient has required 360 mg IV Lasix in divided doses to keep out of respiratory distress. Patient has continuously consumed inpatient diet as well as fast food/drinks/snacks brought in by visitors. None the less he has lost 22 kg in weight since 3/17. He remains significantly overloaded with increased oxygen needs.     I had a lex discussion with patient regarding his own responsibility in his success. Even if on dialysis he has to comply with diet and food restrictions to be successful.     Will discuss with Dr Olsen regarding next steps.

## 2023-03-29 NOTE — PROGRESS NOTES
[facility]                       [address] [place] [address]    PATIENT NAME:       GODFREY MORLEY  YOB: 1975  CSN:                [id number]   MRN:                [id number]  ADMIT DATE:         03/11/2023 15:58:00  PHYSICIAN:          Emre Vazquez DPM                            PROGRESS NOTE    DATE:  03/29/2023 00:00:00    SUBJECTIVE:  The patient is seen today, again is overall status remains about   the same.  No other issues reported.  Remains on antibiotics.    PHYSICAL EXAMINATION:  VITAL SIGNS:  Stable.  Currently afebrile.    LABORATORY DATA:  Labs reviewed.  White cell count is trending downwards is 13.4   from 16.1.    Dressings are intact, are currently stable.    ASSESSMENT:  Right foot wound infection, osteomyelitis, status post debridement.    PLAN:  Continue with current care.  We will re-evaluate the wound tomorrow.        ______________________________  Emre Vazquez DPM    GAS/AQS  DD:  03/29/2023  Time:  03:08PM  DT:  03/29/2023  Time:  06:02PM  Job #:  [id number]      PROGRESS NOTE

## 2023-03-30 LAB
ANION GAP SERPL CALC-SCNC: 9 MEQ/L
BACTERIA BLD CULT: NORMAL
BACTERIA BLD CULT: NORMAL
BASOPHILS # BLD AUTO: 0.08 X10(3)/MCL (ref 0–0.2)
BASOPHILS NFR BLD AUTO: 0.5 %
BUN SERPL-MCNC: 52.8 MG/DL (ref 8.9–20.6)
CALCIUM SERPL-MCNC: 8.6 MG/DL (ref 8.4–10.2)
CHLORIDE SERPL-SCNC: 101 MMOL/L (ref 98–107)
CO2 SERPL-SCNC: 30 MMOL/L (ref 22–29)
CREAT SERPL-MCNC: 1.97 MG/DL (ref 0.73–1.18)
CREAT/UREA NIT SERPL: 27
EOSINOPHIL # BLD AUTO: 0.91 X10(3)/MCL (ref 0–0.9)
EOSINOPHIL NFR BLD AUTO: 6.1 %
ERYTHROCYTE [DISTWIDTH] IN BLOOD BY AUTOMATED COUNT: 12.7 % (ref 11.5–17)
GFR SERPLBLD CREATININE-BSD FMLA CKD-EPI: 42 MLS/MIN/1.73/M2
GLUCOSE SERPL-MCNC: 294 MG/DL (ref 74–100)
HCT VFR BLD AUTO: 24.7 % (ref 42–52)
HGB BLD-MCNC: 8.3 G/DL (ref 14–18)
IMM GRANULOCYTES # BLD AUTO: 0.06 X10(3)/MCL (ref 0–0.04)
IMM GRANULOCYTES NFR BLD AUTO: 0.4 %
LYMPHOCYTES # BLD AUTO: 2.27 X10(3)/MCL (ref 0.6–4.6)
LYMPHOCYTES NFR BLD AUTO: 15.3 %
MCH RBC QN AUTO: 31.2 PG (ref 27–31)
MCHC RBC AUTO-ENTMCNC: 33.6 G/DL (ref 33–36)
MCV RBC AUTO: 92.9 FL (ref 80–94)
MONOCYTES # BLD AUTO: 1.36 X10(3)/MCL (ref 0.1–1.3)
MONOCYTES NFR BLD AUTO: 9.2 %
NEUTROPHILS # BLD AUTO: 10.15 X10(3)/MCL (ref 2.1–9.2)
NEUTROPHILS NFR BLD AUTO: 68.5 %
NRBC BLD AUTO-RTO: 0 %
PLATELET # BLD AUTO: 375 X10(3)/MCL (ref 130–400)
PMV BLD AUTO: 11.4 FL (ref 7.4–10.4)
POCT GLUCOSE: 259 MG/DL (ref 70–110)
POCT GLUCOSE: 266 MG/DL (ref 70–110)
POCT GLUCOSE: 276 MG/DL (ref 70–110)
POCT GLUCOSE: 326 MG/DL (ref 70–110)
POCT GLUCOSE: 344 MG/DL (ref 70–110)
POTASSIUM SERPL-SCNC: 4.1 MMOL/L (ref 3.5–5.1)
RBC # BLD AUTO: 2.66 X10(6)/MCL (ref 4.7–6.1)
SODIUM SERPL-SCNC: 140 MMOL/L (ref 136–145)
WBC # SPEC AUTO: 14.8 X10(3)/MCL (ref 4.5–11.5)

## 2023-03-30 PROCEDURE — 25000003 PHARM REV CODE 250: Performed by: PODIATRIST

## 2023-03-30 PROCEDURE — 25000242 PHARM REV CODE 250 ALT 637 W/ HCPCS: Performed by: NURSE PRACTITIONER

## 2023-03-30 PROCEDURE — 21400001 HC TELEMETRY ROOM

## 2023-03-30 PROCEDURE — 63600175 PHARM REV CODE 636 W HCPCS: Performed by: PODIATRIST

## 2023-03-30 PROCEDURE — 25000242 PHARM REV CODE 250 ALT 637 W/ HCPCS: Performed by: INTERNAL MEDICINE

## 2023-03-30 PROCEDURE — 99900035 HC TECH TIME PER 15 MIN (STAT)

## 2023-03-30 PROCEDURE — 63600175 PHARM REV CODE 636 W HCPCS: Performed by: STUDENT IN AN ORGANIZED HEALTH CARE EDUCATION/TRAINING PROGRAM

## 2023-03-30 PROCEDURE — 63600175 PHARM REV CODE 636 W HCPCS: Performed by: INTERNAL MEDICINE

## 2023-03-30 PROCEDURE — 25000003 PHARM REV CODE 250: Performed by: INTERNAL MEDICINE

## 2023-03-30 PROCEDURE — 27000221 HC OXYGEN, UP TO 24 HOURS

## 2023-03-30 PROCEDURE — 97530 THERAPEUTIC ACTIVITIES: CPT

## 2023-03-30 PROCEDURE — 94660 CPAP INITIATION&MGMT: CPT

## 2023-03-30 PROCEDURE — 99900031 HC PATIENT EDUCATION (STAT)

## 2023-03-30 PROCEDURE — 25000003 PHARM REV CODE 250: Performed by: STUDENT IN AN ORGANIZED HEALTH CARE EDUCATION/TRAINING PROGRAM

## 2023-03-30 PROCEDURE — 94761 N-INVAS EAR/PLS OXIMETRY MLT: CPT

## 2023-03-30 PROCEDURE — 97530 THERAPEUTIC ACTIVITIES: CPT | Mod: CO

## 2023-03-30 PROCEDURE — A4216 STERILE WATER/SALINE, 10 ML: HCPCS | Performed by: PODIATRIST

## 2023-03-30 PROCEDURE — 80048 BASIC METABOLIC PNL TOTAL CA: CPT | Performed by: INTERNAL MEDICINE

## 2023-03-30 PROCEDURE — 97535 SELF CARE MNGMENT TRAINING: CPT | Mod: CO

## 2023-03-30 PROCEDURE — 85025 COMPLETE CBC W/AUTO DIFF WBC: CPT | Performed by: INTERNAL MEDICINE

## 2023-03-30 PROCEDURE — 94640 AIRWAY INHALATION TREATMENT: CPT

## 2023-03-30 RX ORDER — POLYETHYLENE GLYCOL 3350 17 G/17G
17 POWDER, FOR SOLUTION ORAL DAILY
Status: DISCONTINUED | OUTPATIENT
Start: 2023-03-30 | End: 2023-04-04 | Stop reason: HOSPADM

## 2023-03-30 RX ORDER — CETIRIZINE HYDROCHLORIDE 10 MG/1
10 TABLET ORAL DAILY
Status: DISPENSED | OUTPATIENT
Start: 2023-03-30 | End: 2023-04-02

## 2023-03-30 RX ADMIN — LINEZOLID 600 MG: 600 INJECTION, SOLUTION INTRAVENOUS at 08:03

## 2023-03-30 RX ADMIN — CEFEPIME 1 G: 1 INJECTION, POWDER, FOR SOLUTION INTRAMUSCULAR; INTRAVENOUS at 08:03

## 2023-03-30 RX ADMIN — Medication: at 08:03

## 2023-03-30 RX ADMIN — OXYCODONE HYDROCHLORIDE AND ACETAMINOPHEN 1 TABLET: 5; 325 TABLET ORAL at 07:03

## 2023-03-30 RX ADMIN — IPRATROPIUM BROMIDE 0.5 MG: 0.5 SOLUTION RESPIRATORY (INHALATION) at 12:03

## 2023-03-30 RX ADMIN — GABAPENTIN 300 MG: 300 CAPSULE ORAL at 08:03

## 2023-03-30 RX ADMIN — SODIUM BICARBONATE 1300 MG: 650 TABLET ORAL at 08:03

## 2023-03-30 RX ADMIN — INSULIN ASPART 14 UNITS: 100 INJECTION, SOLUTION INTRAVENOUS; SUBCUTANEOUS at 11:03

## 2023-03-30 RX ADMIN — INSULIN ASPART 14 UNITS: 100 INJECTION, SOLUTION INTRAVENOUS; SUBCUTANEOUS at 08:03

## 2023-03-30 RX ADMIN — ENOXAPARIN SODIUM 40 MG: 40 INJECTION SUBCUTANEOUS at 08:03

## 2023-03-30 RX ADMIN — PANCRELIPASE 6 CAPSULE: 60000; 12000; 38000 CAPSULE, DELAYED RELEASE PELLETS ORAL at 04:03

## 2023-03-30 RX ADMIN — OXYCODONE HYDROCHLORIDE AND ACETAMINOPHEN 1 TABLET: 5; 325 TABLET ORAL at 01:03

## 2023-03-30 RX ADMIN — SODIUM CHLORIDE, PRESERVATIVE FREE 10 ML: 5 INJECTION INTRAVENOUS at 06:03

## 2023-03-30 RX ADMIN — SODIUM BICARBONATE 1300 MG: 650 TABLET ORAL at 04:03

## 2023-03-30 RX ADMIN — PANCRELIPASE 6 CAPSULE: 60000; 12000; 38000 CAPSULE, DELAYED RELEASE PELLETS ORAL at 08:03

## 2023-03-30 RX ADMIN — INSULIN ASPART 14 UNITS: 100 INJECTION, SOLUTION INTRAVENOUS; SUBCUTANEOUS at 04:03

## 2023-03-30 RX ADMIN — METOPROLOL TARTRATE 25 MG: 25 TABLET, FILM COATED ORAL at 08:03

## 2023-03-30 RX ADMIN — IPRATROPIUM BROMIDE 0.5 MG: 0.5 SOLUTION RESPIRATORY (INHALATION) at 07:03

## 2023-03-30 RX ADMIN — ALBUTEROL SULFATE 2.5 MG: 2.5 SOLUTION RESPIRATORY (INHALATION) at 07:03

## 2023-03-30 RX ADMIN — INSULIN DETEMIR 20 UNITS: 100 INJECTION, SOLUTION SUBCUTANEOUS at 08:03

## 2023-03-30 RX ADMIN — NEPHROCAP 1 CAPSULE: 1 CAP ORAL at 08:03

## 2023-03-30 RX ADMIN — OXYCODONE HYDROCHLORIDE AND ACETAMINOPHEN 1 TABLET: 5; 325 TABLET ORAL at 12:03

## 2023-03-30 RX ADMIN — NIFEDIPINE 90 MG: 90 TABLET, FILM COATED, EXTENDED RELEASE ORAL at 08:03

## 2023-03-30 RX ADMIN — Medication 400 MG: at 08:03

## 2023-03-30 RX ADMIN — MICONAZOLE NITRATE: 20 POWDER TOPICAL at 08:03

## 2023-03-30 RX ADMIN — INSULIN DETEMIR 5 UNITS: 100 INJECTION, SOLUTION SUBCUTANEOUS at 08:03

## 2023-03-30 RX ADMIN — GABAPENTIN 300 MG: 300 CAPSULE ORAL at 04:03

## 2023-03-30 RX ADMIN — TORSEMIDE 40 MG: 20 TABLET ORAL at 08:03

## 2023-03-30 RX ADMIN — OXYCODONE HYDROCHLORIDE AND ACETAMINOPHEN 1 TABLET: 5; 325 TABLET ORAL at 09:03

## 2023-03-30 RX ADMIN — LEVETIRACETAM 500 MG: 500 TABLET, FILM COATED ORAL at 08:03

## 2023-03-30 RX ADMIN — ALBUTEROL SULFATE 2.5 MG: 2.5 SOLUTION RESPIRATORY (INHALATION) at 03:03

## 2023-03-30 RX ADMIN — IPRATROPIUM BROMIDE 0.5 MG: 0.5 SOLUTION RESPIRATORY (INHALATION) at 08:03

## 2023-03-30 RX ADMIN — SODIUM CHLORIDE, PRESERVATIVE FREE 10 ML: 5 INJECTION INTRAVENOUS at 12:03

## 2023-03-30 RX ADMIN — ERGOCALCIFEROL 50000 UNITS: 1.25 CAPSULE ORAL at 08:03

## 2023-03-30 RX ADMIN — ALBUTEROL SULFATE 2.5 MG: 2.5 SOLUTION RESPIRATORY (INHALATION) at 08:03

## 2023-03-30 RX ADMIN — DOXAZOSIN 2 MG: 1 TABLET ORAL at 08:03

## 2023-03-30 NOTE — PROGRESS NOTES
NEPHROLOGY: Progress   48-year-old a.m. with history of poorly controlled type 2 diabetes, obesity, creatinine of 1.47 in November of 2022 with a GFR at that time of approximately 60 however , it was also as low as 33 at that time.  He was being followed by Dr. Alonso in [facility].  He has a history of poorly controlled hypertension as well, seizure disorder and alcoholic liver disease with chronic pancreatitis.  Over the past several months the patient has had increasing volume retention, worsening anasarca and increased immobility due to the edema.  He has reportedly gained 65 lb over the past several months.    Patient has required aggressive diuresis but has lost a significant amount of weight.  He had high-grade proteinuria in excess of 10 g and biopsy has returned with advanced diabetic nodular sclerosis.  He seems more alert and appropriate this morning but states he feels jittery and feels like he does when he is going to catch seizure.  His Keppra and gabapentin dose were both decreased.  I will resume his previous gabapentin dosing again.    Patient underwent 5th right metatarsophalangeal joint resection for suspected osteomyelitis.      Patient had tolerating oral torsemide 40 mg daily.            Current Facility-Administered Medications:     0.9%  NaCl infusion (for blood administration), , Intravenous, Q24H PRN, Michelle Camacho MD    acetaminophen tablet 1,000 mg, 1,000 mg, Oral, Q6H PRN, Emre Vazquez DPM, 1,000 mg at 03/27/23 2051    acetaminophen tablet 650 mg, 650 mg, Oral, Q4H PRN, Emre Vazquez DPM    albuterol nebulizer solution 2.5 mg, 2.5 mg, Nebulization, Q4H PRN, GITA Andrews    albuterol nebulizer solution 2.5 mg, 2.5 mg, Nebulization, Q4H WAKE, Neno Moran MD, 2.5 mg at 03/30/23 0743    aluminum-magnesium hydroxide-simethicone 200-200-20 mg/5 mL  suspension 30 mL, 30 mL, Oral, QID PRN, Emre Vazquez, DPM    ceFEPIme (MAXIPIME) 1 g in dextrose 5 % in water (D5W) 5 % 50 mL IVPB (MB+), 1 g, Intravenous, Q12H, Marcos Saldana MD, Stopped at 03/30/23 0920    cloNIDine tablet 0.2 mg, 0.2 mg, Oral, TID PRN, Emre Abdallaiesk, DPM    dextrose 10% bolus 125 mL 125 mL, 12.5 g, Intravenous, PRN, Emre SCOTTIE Sobiesk, DPM    dextrose 10% bolus 250 mL 250 mL, 25 g, Intravenous, PRN, Emre RUBIN Sobiesk, DPM    dextrose 40 % gel 15,000 mg, 15 g, Oral, PRN, Emre RUBIN Sobiesk, DPM    dextrose 40 % gel 30,000 mg, 30 g, Oral, PRN, Emre Abdallaiesk, DPM    doxazosin tablet 2 mg, 2 mg, Oral, QHS, Emre Vazquez, DPM, 2 mg at 03/29/23 2035    enoxaparin injection 40 mg, 40 mg, Subcutaneous, Q12H, Emre Abdallaiesrena, DPM, 40 mg at 03/30/23 0850    ergocalciferol capsule 50,000 Units, 50,000 Units, Oral, Q3 Days, Emre Abdallaiesrena, DPM, 50,000 Units at 03/30/23 0855    gabapentin capsule 300 mg, 300 mg, Oral, TID, Benjamín Sharp MD, 300 mg at 03/30/23 0851    glucagon (human recombinant) injection 1 mg, 1 mg, Intramuscular, PRN, Emre Vazquez, DPM, 1 mg at 03/20/23 0545    hydrALAZINE injection 10 mg, 10 mg, Intravenous, Q4H PRN, Emre Vazquez, DPM, 10 mg at 03/12/23 1224    HYDROcodone-acetaminophen 5-325 mg per tablet 1 tablet, 1 tablet, Oral, Q4H PRN, Emre Vazquez, DPM, 1 tablet at 03/22/23 1305    insulin aspart U-100 injection 1-10 Units, 1-10 Units, Subcutaneous, QID (AC + HS) PRN, Emre Vazquez, DPM, 10 Units at 03/25/23 0955    insulin aspart U-100 injection 14 Units, 14 Units, Subcutaneous, TIDWM, Emre Vazquez DPM, 14 Units at 03/30/23 0852    insulin detemir U-100 injection 20 Units, 20 Units, Subcutaneous, QHS, Michelle Camacho MD, 20 Units at 03/29/23 2036    insulin detemir U-100 injection 5 Units, 5 Units, Subcutaneous, Daily, Michelle Camacho MD, 5 Units at 03/30/23 0853    ipratropium 0.02 % nebulizer solution 0.5 mg, 0.5 mg, Nebulization, Q6H, Geena CHEN  Tho, GITA, 0.5 mg at 03/30/23 0743    labetaloL injection 10 mg, 10 mg, Intravenous, Q4H PRN, Emre Vazquez DPM    levETIRAcetam tablet 500 mg, 500 mg, Oral, BID, Benjamín Sharp MD, 500 mg at 03/30/23 0851    linezolid 600 mg/300 mL IVPB 600 mg, 600 mg, Intravenous, Q12H, Nancy Valladares MD, Last Rate: 300 mL/hr at 03/30/23 0850, 600 mg at 03/30/23 0850    lipase-protease-amylase 12,000-38,000-60,000 units per capsule 6 capsule, 6 capsule, Oral, TID, Emre aVzquez DPM, 6 capsule at 03/30/23 0851    magnesium oxide tablet 400 mg, 400 mg, Oral, BID, Emre Vazquez DPM, 400 mg at 03/30/23 0851    melatonin tablet 6 mg, 6 mg, Oral, Nightly PRN, Emre Vazquez DPM, 6 mg at 03/12/23 0012    metoprolol tartrate (LOPRESSOR) tablet 25 mg, 25 mg, Oral, BID, Emre Vazquez DPM, 25 mg at 03/30/23 0851    miconazole NITRATE 2 % top powder, , Topical (Top), BID, Emre Vazquez DPM, Given at 03/30/23 0852    NIFEdipine 24 hr tablet 90 mg, 90 mg, Oral, Daily, Emre Vazquez DPM, 90 mg at 03/30/23 0851    ondansetron injection 4 mg, 4 mg, Intravenous, Q4H PRN, Emre Vazquez DPM, 4 mg at 03/12/23 0011    oxyCODONE-acetaminophen 5-325 mg per tablet 1 tablet, 1 tablet, Oral, Q6H PRN, Emre Vazquez DPM, 1 tablet at 03/30/23 0706    polyethylene glycol packet 17 g, 17 g, Oral, BID PRN, Emre Vazquez DPM    prochlorperazine injection Soln 5 mg, 5 mg, Intravenous, Q6H PRN, Emre Vazquez DPM    senna-docusate 8.6-50 mg per tablet 2 tablet, 2 tablet, Oral, BID PRN, Emre Vazquez DPM    simethicone chewable tablet 80 mg, 1 tablet, Oral, QID PRN, Emre Vazquez DPM    sodium bicarbonate tablet 1,300 mg, 1,300 mg, Oral, TID, Emre Vazquez DPM, 1,300 mg at 03/30/23 0851    sodium chloride 0.9% flush 10 mL, 10 mL, Intravenous, PRN, Emre Vazquez DPM    Flushing PICC Protocol, , , Until Discontinued **AND** sodium chloride 0.9% flush 10 mL, 10 mL, Intravenous, Q6H, 10 mL at 03/30/23 0622  "**AND** sodium chloride 0.9% flush 10 mL, 10 mL, Intravenous, PRN, ELSA MoyerM, 10 mL at 03/29/23 0526    torsemide tablet 40 mg, 40 mg, Oral, Daily, Benjamín Sharp MD, 40 mg at 03/30/23 0851    vitamin renal formula (B-complex-vitamin c-folic acid) 1 mg per capsule 1 capsule, 1 capsule, Oral, Daily, Emre Vazquez DPM, 1 capsule at 03/30/23 0851    zinc oxide-cod liver oil 40 % paste, , Topical (Top), BID, Emre Vazquez DPM, Given at 03/30/23 0852        BP (!) 165/86   Pulse 83   Temp 98.4 °F (36.9 °C)   Resp 16   Ht 5' 5" (1.651 m)   Wt 106.2 kg (234 lb 2.1 oz)   SpO2 97%   BMI 38.96 kg/m²     Physical Exam:    GEN:  Obese and chronically ill-appearing black male.  Patient is more awake and appropriate.  No distress.  Complaining of feeling jittery  HEENT: Atraumatic. EOMI, no icterus  NECK : No JVD  CARD : RRR s rub or gallop  LUNGS :  Decreased breath sounds at the bases  ABD : Soft,non-tender. BS active, obese.  :  Scrotum is much less swollen  EXT :  Patient with minimal pitting edema if any.  Most of the edema essentially has resolved.           Intake/Output Summary (Last 24 hours) at 3/30/2023 1102  Last data filed at 3/30/2023 0600  Gross per 24 hour   Intake 840 ml   Output 2900 ml   Net -2060 ml         Laboratory:  Recent Results (from the past 24 hour(s))   POCT glucose    Collection Time: 03/29/23  4:01 PM   Result Value Ref Range    POCT Glucose 269 (H) 70 - 110 mg/dL   POCT glucose    Collection Time: 03/29/23  8:24 PM   Result Value Ref Range    POCT Glucose 309 (H) 70 - 110 mg/dL   Basic Metabolic Panel    Collection Time: 03/30/23  5:48 AM   Result Value Ref Range    Sodium Level 140 136 - 145 mmol/L    Potassium Level 4.1 3.5 - 5.1 mmol/L    Chloride 101 98 - 107 mmol/L    Carbon Dioxide 30 (H) 22 - 29 mmol/L    Glucose Level 294 (H) 74 - 100 mg/dL    Blood Urea Nitrogen 52.8 (H) 8.9 - 20.6 mg/dL    Creatinine 1.97 (H) 0.73 - 1.18 mg/dL    BUN/Creatinine Ratio 27     " Calcium Level Total 8.6 8.4 - 10.2 mg/dL    Anion Gap 9.0 mEq/L    eGFR 42 mls/min/1.73/m2   CBC with Differential    Collection Time: 03/30/23  5:48 AM   Result Value Ref Range    WBC 14.8 (H) 4.5 - 11.5 x10(3)/mcL    RBC 2.66 (L) 4.70 - 6.10 x10(6)/mcL    Hgb 8.3 (L) 14.0 - 18.0 g/dL    Hct 24.7 (L) 42.0 - 52.0 %    MCV 92.9 80.0 - 94.0 fL    MCH 31.2 (H) 27.0 - 31.0 pg    MCHC 33.6 33.0 - 36.0 g/dL    RDW 12.7 11.5 - 17.0 %    Platelet 375 130 - 400 x10(3)/mcL    MPV 11.4 (H) 7.4 - 10.4 fL    Neut % 68.5 %    Lymph % 15.3 %    Mono % 9.2 %    Eos % 6.1 %    Basophil % 0.5 %    Lymph # 2.27 0.6 - 4.6 x10(3)/mcL    Neut # 10.15 (H) 2.1 - 9.2 x10(3)/mcL    Mono # 1.36 (H) 0.1 - 1.3 x10(3)/mcL    Eos # 0.91 (H) 0 - 0.9 x10(3)/mcL    Baso # 0.08 0 - 0.2 x10(3)/mcL    IG# 0.06 (H) 0 - 0.04 x10(3)/mcL    IG% 0.4 %    NRBC% 0.0 %   POCT glucose    Collection Time: 03/30/23  8:42 AM   Result Value Ref Range    POCT Glucose 326 (H) 70 - 110 mg/dL         Assessment/Plan:   Advanced stage III CKD-Biopsy-Advanced Diabetic Nodular sclerosis --  Hypertension-much better controlled  Orosffdx-pmqzrxzk-ivjshefqd well torsemide 40 b.i.d.   Hepatic cirrhosis   UTI   Peripheral arterial disease-suspected right 5th MPJ osteo  Profound vitamin-D deficiency-79630 units ergo q 72  Secondary hyperparathyroidism      Patient is having an excellent response to torsemide 40 mg daily. He is awake and appropriate.  We will discontinue Segovia catheter today.  Physical therapy   Is continuing to work with the patient.         Benjamín Sharp MD, LUKE

## 2023-03-30 NOTE — PROGRESS NOTES
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 Presented to the ED with complaints of diffuse body swelling specially abdomen, groin, penis and testicles and bilateral lower extremities, dyspnea on minimal exertion and bilateral lower extremity pain and cramping.  Report subjective fever and chills.  Report he was just hospitalized for similar symptom [place] [facility] [place] 2 weeks ago and was discharged home after few days. On arrival to ED, he was tachycardic, hypertensive, saturating 100% on room air.  EKG appears sinus rhythm on my review without ischemic changes.  Labs notable for WBC 16.3, hemoglobin 11.4, platelets 346, sodium 139, potassium 5.0, chloride 116, CO2 16, creatinine 2.17, BUN 34, glucose 420, calcium 8.0, albumin 1.3, , negative troponin. CT A/P with Contrast showed left pleural effusion, hepatic cirrhotic morphology, splenomegaly and upper abdominal varices and ascites, chronic pancreatitis changes, extensive subcutaneous anasarca edema.  Kidneys unremarkable without hydronephrosis.     Per ED provider exam he was noted to have purulent penile discharge, sent for culture and Segovia catheter placed.  He was given albumin 12.5 g, Lasix 40 mg IV, Vancomycin and Zosyn and subsequently referred to hospital medicine service for further evaluation and management.  mL with IV Lasix 40. Urine protein creatinine ratio noted to be 10.9. Patient was continued on IV Lasix. Seen by Renal team; recommendations made for IV Lasix/Albumin drip and renal biopsy. He was also seen by GI team for evaluation of cirrhosis and EGD was done to r/o EV. EGD showed erosive gastropathy,  normal esophagus & no other evidence of cirrhosis. Nephrology changed IV Lasix/Albumin drip to IV Lasix 80 mg TID with tentative plan for hemodialysis given patient's diuretic resistance. IR to plan for renal biopsy which could not be performed 03/15 due to uncontrolled HTN. Continues to have significant UOP with IV Lasix.  Nephrology continuing IV Lasix 80 mg t.i.d. and Metolazone 10 mg given stable creatinine and holding off on HD for now. Patient on 1500 mL fluid restriction.  OT to provide brace for scrotal support given patient's discomfort. Counseled regarding compliance with low-sodium diet and fluid restriction in order to optimize volume status with diuresis. Underwent renal biopsy with IR 03/20     Continued on IV Lasix 80 mg t.i.d. and Metolazone 10 mg.    Noted to have improved edema in B/L LE edema, abdominal wall edema & scrotal edema as of 3/22/23 . Eventually Diuretic regimen was changed to Torsemide.     Podiatry ordered XR R Foot which showed erosion of 5th metatarsophalangeal joint with osteomyelitis not excluded. Podiatry performed excisional debridement of R foot on 3/24/23.  Cultures-+ve Staph MRSA. ID was consulted     Spiked Fever 101 3/23/23,Patient is started on Vanc,Zosyn to cover Possible Pneumonia and  Osteomyelitis     On 3/26/23 Hb dropped to <7 , we transfused a unit, Oxygen requirement going up with poor Urine Output and hence Nephrology was updated about the patient, got 80mg IV lasix, continued on Torsemide        Ct scan abdomen obtained when he complained of abdominal pain  was showing constipation, had a bowel movement after Relistor.     Oxygen requirement been high per staff and patient looks volume overloaded, Pulmonology was consulted to assist with management given increased work of breathing and Hypoxemia on ABGs, placed on BIPAP, waiting to discuss with Nephrology    Interval Hx:   Consider bedside he was alert, lethargic, resting in the bed.  Complains of right lower  extremity pain.  Constipated.  No family at bedside.  Currently saturating about 90% on 4 L oxygen mask ventilation.  Blood pressure minimally elevated.  Responding well with diuresis. Labs from this morning showing persistent leukocytosis, stable hemoglobin and stable platelets, improving BUN and serum creatinine, hyperglycemia.    Objective/physical exam:  Vitals:    03/30/23 0800 03/30/23 0851 03/30/23 1200 03/30/23 1246   BP: (!) 165/86 (!) 165/86 (!) 150/77    Pulse: 83  80 84   Resp: 16  16 13   Temp: 98.4 °F (36.9 °C)  98.1 °F (36.7 °C)    TempSrc:       SpO2: 97%  97% 98%   Weight:       Height:         General: not in distress, afebrile  Respiratory:  Basal crepitations, nonlabored breathing   Cardiovascular: S1, S2, no appreciable murmur  Abdomen: Soft, nontender, BS +  MSK: Warm, lower extremity edema, no clubbing or cyanosis  Neurologic: Alert and oriented x4, moving all extremities with good strength     Lab Results   Component Value Date     03/30/2023    K 4.1 03/30/2023    CO2 30 (H) 03/30/2023    BUN 52.8 (H) 03/30/2023    CREATININE 1.97 (H) 03/30/2023    CALCIUM 8.6 03/30/2023    EGFRNONAA >60 01/20/2021      Lab Results   Component Value Date    ALT 37 03/25/2023    AST 41 (H) 03/25/2023    ALKPHOS 107 03/25/2023    BILITOT 0.4 03/25/2023      Lab Results   Component Value Date    WBC 14.8 (H) 03/30/2023    HGB 8.3 (L) 03/30/2023    HCT 24.7 (L) 03/30/2023    MCV 92.9 03/30/2023     03/30/2023           Medications:   albuterol sulfate  2.5 mg Nebulization Q4H WAKE    ceFEPime (MAXIPIME) IVPB  1 g Intravenous Q12H    doxazosin  2 mg Oral QHS    enoxaparin  40 mg Subcutaneous Q12H    ergocalciferol  50,000 Units Oral Q3 Days    gabapentin  300 mg Oral TID    insulin aspart U-100  14 Units Subcutaneous TIDWM    insulin detemir U-100  20 Units Subcutaneous QHS    insulin detemir U-100  5 Units Subcutaneous Daily    ipratropium  0.5 mg Nebulization Q6H    levETIRAcetam  500 mg Oral BID     linezolid  600 mg Intravenous Q12H    lipase-protease-amylase 12,000-38,000-60,000 units  6 capsule Oral TID    magnesium oxide  400 mg Oral BID    metoprolol tartrate  25 mg Oral BID    miconazole NITRATE 2 %   Topical (Top) BID    NIFEdipine  90 mg Oral Daily    polyethylene glycol  17 g Oral Daily    sodium bicarbonate  1,300 mg Oral TID    sodium chloride 0.9%  10 mL Intravenous Q6H    torsemide  40 mg Oral Daily    vitamin renal formula (B-complex-vitamin c-folic acid)  1 capsule Oral Daily    zinc oxide-cod liver oil   Topical (Top) BID      sodium chloride, acetaminophen, acetaminophen, albuterol sulfate, aluminum-magnesium hydroxide-simethicone, cloNIDine, dextrose 10%, dextrose 10%, dextrose, dextrose, glucagon (human recombinant), hydrALAZINE, HYDROcodone-acetaminophen, insulin aspart U-100, labetalol, melatonin, ondansetron, oxyCODONE-acetaminophen, prochlorperazine, senna-docusate 8.6-50 mg, simethicone, sodium chloride 0.9%, Flushing PICC Protocol **AND** sodium chloride 0.9% **AND** sodium chloride 0.9%     Assessment/Plan:    Anasarca 2/2 Diabetic Nephropathy /diabetic nodular sclerosi- improving  FABIO superimposed on CKD2 - improving  Nephrotic Syndrome/Proteinuria 10.9 g 2/2 Diabetic Nephropathy  AHRF (normal EF) 2/2 Pulm edema and possible PNA  UTI/Urethritis + Staph and Pluralibacter gergoviae    Osteomyelitis R Foot 5th Metatarsalophalangeal Joint s/p Excisional Debridement 3/24/23 + MRSA Staph  Suspected Aspiration Pneumonia  Sepsis 2/2 Above  Constipation  Deconditioning, generalized weakness       Hx:  Obesity, chronic anemia, T2 dm, alcoholic versus GARCIA cirrhosis, HTN, low vitamin-D, chronic pancreatitis, history of seizures      Plan:  -Nephrology following. continue torsemide , f/u Urine Output.  Try to avoid nephrotoxic medications  -hoping a continuous improvement of respiratory function with diuresis   -Completed 5 days of IV Rocephin for Urethritis and UTI   -continue Cefepime and  Linezolid to cover Osteomyelitis and Pneumonia.Monitoring Leukocytosis and fever curve  -scheduled Bowel Regimen for constipation  -PT/OT on board; recommending rehab placement         Dieudonne Montemayor MD

## 2023-03-30 NOTE — PT/OT/SLP PROGRESS
Occupational Therapy   Treatment    Name: Kamran Huerta  MRN: [id number]  Admitting Diagnosis:  Sepsis  6 Days Post-Op    Recommendations:     Discharge Recommendations: rehabilitation facility  Discharge Equipment Recommendations:   (TBD)  Barriers to discharge:       Assessment:     Kamran Huerta is a 48 y.o. male with a medical diagnosis of Sepsis.  He presents with SOB however vital signs stable. Patient awake at this time compared to previous session. Performance deficits affecting function are weakness, impaired functional mobility, decreased safety awareness, impaired endurance, impaired balance, impaired skin, impaired self care skills, decreased lower extremity function, orthopedic precautions.     Rehab Prognosis:  Fair; patient would benefit from acute skilled OT services to address these deficits and reach maximum level of function.       Plan:     Patient to be seen 5 x/week, daily to address the above listed problems via self-care/home management, therapeutic activities, therapeutic exercises  Plan of Care Expires: 04/10/23  Plan of Care Reviewed with: patient    Subjective     Pain/Comfort:       Objective:     Communicated with: RN prior to session.  Patient found HOB elevated with   upon OT entry to room.    General Precautions: Standard, fall    Orthopedic Precautions:RLE weight bearing as tolerated (with darco shoe)  Braces:  (R LE darco shoe)  Respiratory Status:  oxymask 4 L  Vital Signs: HR: 99  Sp02: 98     Occupational Performance:     Bed Mobility:    Patient completed Rolling/Turning to Left with  moderate assistance  Patient completed Supine to Sit with minimum assistance     Functional Mobility/Transfers:  Patient completed Sit <> Stand Transfer with minimum assistance  with  rolling walker   Patient completed Bed <> Chair Transfer using Step Transfer technique with minimum assistance with rolling walker  Functional Mobility: no LOB noted during transfer from EOB to bedside  chair    Activities of Daily Living:  Lower Body Dressing: total assistance donning darco shoe  Toileting: maximal assistance patient on bedpan upon entry requiring total A for pericare    Therapeutic Positioning  Skin integrity: Visible skin intact         Patient left up in chair with all lines intact and call button in reach    GOALS:   Multidisciplinary Problems       Occupational Therapy Goals          Problem: Occupational Therapy    Goal Priority Disciplines Outcome Interventions   Occupational Therapy Goal     OT, PT/OT Ongoing, Progressing    Description: Goals to be met by: 3/31/23     Patient will increase functional independence with ADLs by performing:    UE Dressing with Set-up Assistance.  LE Dressing with Stand-by Assistance and Assistive Devices as needed.  Grooming while standing at sink with Stand-by Assistance.  Toileting from toilet (with BSC placed over toilet if needed) with Stand-by Assistance for hygiene and clothing management.                          Time Tracking:     OT Date of Treatment: 03/30/23  OT Start Time: 1014  OT Stop Time: 1037  OT Total Time (min): 23 min    Billable Minutes:Self Care/Home Management 1  Therapeutic Activity 1    OT/ARIANA: ARIANA     Number of ARIANA visits since last OT visit: 2    3/30/2023

## 2023-03-30 NOTE — PROGRESS NOTES
Inpatient Nutrition Evaluation    Admit Date: 3/11/2023   Total duration of encounter: 19 days    Nutrition Recommendation/Prescription     Continue on current diet.  Continue NICOLAS BID (provides 90 kcal and 2.5 g protein per serving)  Monitor I&Os, appetite, PO intake, and labs.    Nutrition Assessment     Chart Review    Reason Seen: continuous nutrition monitoring and follow-up dx-Uncontrolled Hyperglycemia    Malnutrition Screening Tool Results   Have you recently lost weight without trying?: No  Have you been eating poorly because of a decreased appetite?: No   MST Score: 0     Diagnosis:  Advanced stage III CKD likely diabetic nephropathy   Poorly controlled hypertension and diabetes   Hepatic cirrhosis   UTI   Profound vitamin-D deficiency-03428 units ergo q 72  Secondary hyperparathyroidism    Relevant Medical History:   Poorly controlled T2DM  Diabetic neuropathy  CKD stage 2-3b ( Cr 1.47 in 11/2022)  Hypertension  Seizure disorder  Chronic back pain/DDD/lumbar radiculopathy  Obesity  History of alcoholic liver disease  Pancreatic insufficiency/chronic pancreatitis    Nutrition-Related Medications: Ergocalciferol, insulin aspart 14 Units, insulin detemir 25 Units, Lipase-protease-amylase, Magnesium oxide, Polyethylene glycol, Sodium bicarbonate, torsemide, Vitamin Renal formula, zinc oxide-cod liver oil    Nutrition-Related Labs:  3/16/2023: H/H 8.1/25.5, Cl 111, BUN 32.4, Crea 1.79, Ca 7.2, Alb 1.7, AST 51, Gluc 237  3/23/2023: WBC 14.9, H/H 7.6/23.3, BUN 43.4, Crea 1.80, Ca 8.3, Gluc 172  3/30/2023: WBC 14.8, H/H 8.3/24.7, BUN 52.8, Crea 1.97, Gluc 326    Diet Order: DIET RENAL NON-DIALYSIS Diabetic; Fluid - 1500mL  Oral Supplement Order: Nicolas  Appetite/Oral Intake: good/% of meals  Factors Affecting Nutritional Intake: none identified  Food/Spiritism/Cultural Preferences: none reported  Food Allergies: none reported    Skin Integrity: wound, cracked  Wound(s):      Altered Skin Integrity 03/21/23  "1030 Right lateral Foot Non pressure chronic ulcer Partial thickness tissue loss. Shallow open ulcer with a red or pink wound bed, without slough. Intact or Open/Ruptured Serum-filled blister.-Tissue loss description: Full thickness       Altered Skin Integrity 23 1031 Right Plantar Blister(s)-Tissue loss description: Partial thickness     Comments    3/16/2023: Pt reports good appetite/ PO intake prior to admit. Pt reports good appetite/PO intake currently. No reported N/V/D/C and chewing/swallowing difficulties. Pt reports UBW as 95.45 kg. Pt reports gaining 50+ lbs over the past several months due to fluid. Encouraged continuation of Diabetic diet. Will monitor.    3/23/2023: Pt reports >75% PO intake and a good appetite. Pt denies N/V/D/C. Last BM documented as 3/21/2023. Pt receives MARIANA BID for wound healing. No new complaints. Per MD note, podiatry planning for amputation/debridement of right foot on Friday. Will monitor.    3/30/2023: Pt received excisional debridement on right foot wound 3/24/2023. Pt reports good appetite/PO intake. Spoke with pt about dietary restrictions, pt reported that he understood. No reported N/V/D/C. Pt has been receiving Lasix. Last BM documented as 3/28/2023.MARIANA BID ordered. Will monitor.    Anthropometrics    Height: 5' 5" (165.1 cm) Height Method: Stated  Last Weight: 106.2 kg (234 lb 2.1 oz) (23 0600) Weight Method: Bed Scale  BMI (Calculated): 39  BMI Classification: obese grade II (BMI 35-39.9)        Ideal Body Weight (IBW), Male: 136 lb     % Ideal Body Weight, Male (lb): 204.41 %                 Usual Body Weight (UBW), k.45 kg  % Usual Body Weight: 132.39     Usual Weight Provided By: patient    Wt Readings from Last 3 Encounters:   23 0600 106.2 kg (234 lb 2.1 oz)   23 0609 106.2 kg (234 lb 2.1 oz)   23 0500 110.4 kg (243 lb 6.2 oz)   23 0600 115.7 kg (255 lb 1.2 oz)   23 0551 113.3 kg (249 lb 12.5 oz)   23 0600 " 115.5 kg (254 lb 10.1 oz)   03/24/23 0340 111.3 kg (245 lb 6 oz)   03/23/23 0933 113.3 kg (249 lb 12.5 oz)   03/22/23 0509 115.2 kg (253 lb 15.5 oz)   03/21/23 0930 115.6 kg (254 lb 12.8 oz)   03/21/23 0452 117.3 kg (258 lb 9.6 oz)   03/20/23 0400 123 kg (271 lb 2.7 oz)   03/19/23 0500 123.3 kg (271 lb 13.2 oz)   03/18/23 0516 124.2 kg (273 lb 13 oz)   03/17/23 1230 128 kg (282 lb 3 oz)   03/17/23 1228 125.5 kg (276 lb 10.8 oz)   03/15/23 0945 126.1 kg (278 lb)   03/14/23 0700 126.1 kg (278 lb)   03/13/23 0426 121.9 kg (268 lb 11.9 oz)   03/11/23 1552 117.5 kg (259 lb)   11/13/22 1400 87 kg (191 lb 12.8 oz)   11/13/22 1345 92.1 kg (203 lb)   09/22/21 1414 93.8 kg (206 lb 12.7 oz)   01/14/21 1234 77.4 kg (170 lb 10.2 oz)      Weight Change(s) Since Admission:  Admit Weight: 117.5 kg (259 lb) (03/11/23 1552)  3/15/2023: 126.1 kg  3/23/2023: 113.3 kg pt on IV Lasix  3/30/2023: 106.2 kg    Patient Education    Not applicable.    Monitoring & Evaluation     Dietitian will monitor food and beverage intake, weight, electrolyte/renal panel, glucose/endocrine profile, and gastrointestinal profile.  Nutrition Risk/Follow-Up: low (follow-up in 5-7 days)  Patients assigned 'low nutrition risk' status do not qualify for a full nutritional assessment but will be monitored and re-evaluated in a 5-7 day time period. Please consult if re-evaluation needed sooner.

## 2023-03-30 NOTE — PT/OT/SLP PROGRESS
Physical Therapy Treatment    Patient Name:  Kamran Huerta   MRN:  [id number]    Recommendations:     Discharge Recommendations: rehabilitation facility  Discharge Equipment Recommendations: other (see comments) (TBD)  Barriers to discharge:  deconditioned from baseline    Assessment:     Kamran Huerta is a 48 y.o. male admitted with a medical diagnosis of Sepsis.  He presents with the following impairments/functional limitations: weakness, gait instability, impaired endurance, impaired balance, impaired cardiopulmonary response to activity, decreased lower extremity function, impaired self care skills, impaired functional mobility .    Improving slowly with functional mobility. Currently modA for STS transfers and able to ambulate 2 trials x3ft ea with RW/Narinder. Most limiting factor is swelling, SOB despite supplemental O2, and pain in R foot.    Rehab Prognosis: Good; patient would benefit from acute skilled PT services to address these deficits and reach maximum level of function.    Recent Surgery: Procedure(s) (LRB):  AMPUTATION, TOE (Right) 6 Days Post-Op    Plan:     During this hospitalization, patient to be seen 6 x/week to address the identified rehab impairments via gait training, therapeutic activities, therapeutic exercises and progress toward the following goals:    Plan of Care Expires:  04/16/23    Subjective     Chief Complaint: right foot pain  Patient/Family Comments/goals: get sronger  Pain/Comfort:  Pain Rating 1: other (see comments) (pain in R foot, did not state rating)  Location - Side 1: Right  Location 1: foot  Pain Addressed 1: Cessation of Activity      Objective:     Communicated with RN prior to session.  Patient found up in chair with pulse ox (continuous), oxygen, telemetry upon PT entry to room.     General Precautions: Standard, fall  Orthopedic Precautions: RLE weight bearing as tolerated  Braces:  (DARCO shoe)  Respiratory Status:  oxymask x4L; spO2 98%     Functional  Mobility:  Transfers:     Sit to Stand:  moderate assistance with rolling walker  Gait: 3ft x2 trials with Narinder; cues to offload through BUEs during R stance to alleviate pain complaints.      AM-PAC 6 CLICK MOBILITY  Turning over in bed (including adjusting bedclothes, sheets and blankets)?: 3  Sitting down on and standing up from a chair with arms (e.g., wheelchair, bedside commode, etc.): 2  Moving from lying on back to sitting on the side of the bed?: 2  Moving to and from a bed to a chair (including a wheelchair)?: 2  Need to walk in hospital room?: 2  Climbing 3-5 steps with a railing?: 1  Basic Mobility Total Score: 12       Treatment & Education:  - Edu on safety precautions, call light use    Patient left up in chair with all lines intact, call button in reach, and RN notified..    GOALS:   Multidisciplinary Problems       Physical Therapy Goals          Problem: Physical Therapy    Goal Priority Disciplines Outcome Goal Variances Interventions   Physical Therapy Goal     PT, PT/OT Ongoing, Progressing     Description: Goals to be met by: 23     Patient will increase functional independence with mobility by performin. Supine to sit with Stand-by Assistance  2. Sit to stand transfer with Supervision  3. Bed to chair transfer with Supervision using Rolling Walker  4. Gait  x 50 feet with Min A using Rolling Walker.   5. Ascend/descend 4 stair with right Handrails Minimal Assistance using Rolling Walker.                          Time Tracking:     PT Received On: 23  PT Start Time: 1101     PT Stop Time: 1124  PT Total Time (min): 23 min     Billable Minutes: Therapeutic Activity 23    Treatment Type: Treatment  PT/PTA: PT     Number of PTA visits since last PT visit: 2023

## 2023-03-31 LAB
ALBUMIN SERPL-MCNC: 2.1 G/DL (ref 3.5–5)
ALBUMIN/GLOB SERPL: 0.6 RATIO (ref 1.1–2)
ALP SERPL-CCNC: 74 UNIT/L (ref 40–150)
ALT SERPL-CCNC: 27 UNIT/L (ref 0–55)
AST SERPL-CCNC: 51 UNIT/L (ref 5–34)
BASOPHILS # BLD AUTO: 0.1 X10(3)/MCL (ref 0–0.2)
BASOPHILS NFR BLD AUTO: 0.7 %
BILIRUBIN DIRECT+TOT PNL SERPL-MCNC: 0.3 MG/DL
BUN SERPL-MCNC: 48.9 MG/DL (ref 8.9–20.6)
CALCIUM SERPL-MCNC: 8.2 MG/DL (ref 8.4–10.2)
CHLORIDE SERPL-SCNC: 103 MMOL/L (ref 98–107)
CO2 SERPL-SCNC: 28 MMOL/L (ref 22–29)
CREAT SERPL-MCNC: 1.7 MG/DL (ref 0.73–1.18)
EOSINOPHIL # BLD AUTO: 1.15 X10(3)/MCL (ref 0–0.9)
EOSINOPHIL NFR BLD AUTO: 8.1 %
ERYTHROCYTE [DISTWIDTH] IN BLOOD BY AUTOMATED COUNT: 12.5 % (ref 11.5–17)
GFR SERPLBLD CREATININE-BSD FMLA CKD-EPI: 50 MLS/MIN/1.73/M2
GLOBULIN SER-MCNC: 3.4 GM/DL (ref 2.4–3.5)
GLUCOSE SERPL-MCNC: 241 MG/DL (ref 74–100)
HCT VFR BLD AUTO: 24 % (ref 42–52)
HGB BLD-MCNC: 7.8 G/DL (ref 14–18)
IMM GRANULOCYTES # BLD AUTO: 0.06 X10(3)/MCL (ref 0–0.04)
IMM GRANULOCYTES NFR BLD AUTO: 0.4 %
LYMPHOCYTES # BLD AUTO: 2.08 X10(3)/MCL (ref 0.6–4.6)
LYMPHOCYTES NFR BLD AUTO: 14.7 %
MAGNESIUM SERPL-MCNC: 1.6 MG/DL (ref 1.6–2.6)
MCH RBC QN AUTO: 30.2 PG (ref 27–31)
MCHC RBC AUTO-ENTMCNC: 32.5 G/DL (ref 33–36)
MCV RBC AUTO: 93 FL (ref 80–94)
MONOCYTES # BLD AUTO: 1.18 X10(3)/MCL (ref 0.1–1.3)
MONOCYTES NFR BLD AUTO: 8.4 %
NEUTROPHILS # BLD AUTO: 9.55 X10(3)/MCL (ref 2.1–9.2)
NEUTROPHILS NFR BLD AUTO: 67.7 %
NRBC BLD AUTO-RTO: 0 %
PHOSPHATE SERPL-MCNC: 3.7 MG/DL (ref 2.3–4.7)
PLATELET # BLD AUTO: 352 X10(3)/MCL (ref 130–400)
PMV BLD AUTO: 11.1 FL (ref 7.4–10.4)
POCT GLUCOSE: 282 MG/DL (ref 70–110)
POTASSIUM SERPL-SCNC: 4.6 MMOL/L (ref 3.5–5.1)
PROT SERPL-MCNC: 5.5 GM/DL (ref 6.4–8.3)
RBC # BLD AUTO: 2.58 X10(6)/MCL (ref 4.7–6.1)
SODIUM SERPL-SCNC: 138 MMOL/L (ref 136–145)
WBC # SPEC AUTO: 14.1 X10(3)/MCL (ref 4.5–11.5)

## 2023-03-31 PROCEDURE — 25000003 PHARM REV CODE 250: Performed by: INTERNAL MEDICINE

## 2023-03-31 PROCEDURE — 25000003 PHARM REV CODE 250: Performed by: PODIATRIST

## 2023-03-31 PROCEDURE — A4216 STERILE WATER/SALINE, 10 ML: HCPCS | Performed by: PODIATRIST

## 2023-03-31 PROCEDURE — 97530 THERAPEUTIC ACTIVITIES: CPT | Mod: CQ

## 2023-03-31 PROCEDURE — 63600175 PHARM REV CODE 636 W HCPCS: Performed by: INTERNAL MEDICINE

## 2023-03-31 PROCEDURE — 85025 COMPLETE CBC W/AUTO DIFF WBC: CPT | Performed by: INTERNAL MEDICINE

## 2023-03-31 PROCEDURE — 63600175 PHARM REV CODE 636 W HCPCS: Performed by: PODIATRIST

## 2023-03-31 PROCEDURE — 94640 AIRWAY INHALATION TREATMENT: CPT

## 2023-03-31 PROCEDURE — 80053 COMPREHEN METABOLIC PANEL: CPT | Performed by: INTERNAL MEDICINE

## 2023-03-31 PROCEDURE — 25000003 PHARM REV CODE 250: Performed by: STUDENT IN AN ORGANIZED HEALTH CARE EDUCATION/TRAINING PROGRAM

## 2023-03-31 PROCEDURE — 63600175 PHARM REV CODE 636 W HCPCS: Performed by: STUDENT IN AN ORGANIZED HEALTH CARE EDUCATION/TRAINING PROGRAM

## 2023-03-31 PROCEDURE — 25000242 PHARM REV CODE 250 ALT 637 W/ HCPCS: Performed by: INTERNAL MEDICINE

## 2023-03-31 PROCEDURE — 99900035 HC TECH TIME PER 15 MIN (STAT)

## 2023-03-31 PROCEDURE — 84100 ASSAY OF PHOSPHORUS: CPT | Performed by: INTERNAL MEDICINE

## 2023-03-31 PROCEDURE — 83735 ASSAY OF MAGNESIUM: CPT | Performed by: INTERNAL MEDICINE

## 2023-03-31 PROCEDURE — 25000242 PHARM REV CODE 250 ALT 637 W/ HCPCS: Performed by: STUDENT IN AN ORGANIZED HEALTH CARE EDUCATION/TRAINING PROGRAM

## 2023-03-31 PROCEDURE — 25000242 PHARM REV CODE 250 ALT 637 W/ HCPCS: Performed by: NURSE PRACTITIONER

## 2023-03-31 PROCEDURE — 94761 N-INVAS EAR/PLS OXIMETRY MLT: CPT

## 2023-03-31 PROCEDURE — 21400001 HC TELEMETRY ROOM

## 2023-03-31 PROCEDURE — 27000221 HC OXYGEN, UP TO 24 HOURS

## 2023-03-31 RX ORDER — ALBUTEROL SULFATE 0.83 MG/ML
2.5 SOLUTION RESPIRATORY (INHALATION)
Status: DISCONTINUED | OUTPATIENT
Start: 2023-03-31 | End: 2023-04-04 | Stop reason: HOSPADM

## 2023-03-31 RX ORDER — ALBUTEROL SULFATE 2.5 MG/.5ML
2.5 SOLUTION RESPIRATORY (INHALATION)
Status: DISCONTINUED | OUTPATIENT
Start: 2023-03-31 | End: 2023-03-31

## 2023-03-31 RX ADMIN — SODIUM BICARBONATE 1300 MG: 650 TABLET ORAL at 02:03

## 2023-03-31 RX ADMIN — HYDROCODONE BITARTRATE AND ACETAMINOPHEN 1 TABLET: 5; 325 TABLET ORAL at 11:03

## 2023-03-31 RX ADMIN — Medication: at 10:03

## 2023-03-31 RX ADMIN — IPRATROPIUM BROMIDE 0.5 MG: 0.5 SOLUTION RESPIRATORY (INHALATION) at 08:03

## 2023-03-31 RX ADMIN — SODIUM CHLORIDE, PRESERVATIVE FREE 10 ML: 5 INJECTION INTRAVENOUS at 07:03

## 2023-03-31 RX ADMIN — INSULIN ASPART 10 UNITS: 100 INJECTION, SOLUTION INTRAVENOUS; SUBCUTANEOUS at 09:03

## 2023-03-31 RX ADMIN — ALBUTEROL SULFATE 2.5 MG: 2.5 SOLUTION RESPIRATORY (INHALATION) at 12:03

## 2023-03-31 RX ADMIN — DOXAZOSIN 2 MG: 1 TABLET ORAL at 09:03

## 2023-03-31 RX ADMIN — SODIUM CHLORIDE, PRESERVATIVE FREE 10 ML: 5 INJECTION INTRAVENOUS at 12:03

## 2023-03-31 RX ADMIN — TORSEMIDE 40 MG: 20 TABLET ORAL at 10:03

## 2023-03-31 RX ADMIN — INSULIN ASPART 10 UNITS: 100 INJECTION, SOLUTION INTRAVENOUS; SUBCUTANEOUS at 04:03

## 2023-03-31 RX ADMIN — LINEZOLID 600 MG: 600 INJECTION, SOLUTION INTRAVENOUS at 10:03

## 2023-03-31 RX ADMIN — METOPROLOL TARTRATE 25 MG: 25 TABLET, FILM COATED ORAL at 10:03

## 2023-03-31 RX ADMIN — METOPROLOL TARTRATE 25 MG: 25 TABLET, FILM COATED ORAL at 09:03

## 2023-03-31 RX ADMIN — SODIUM BICARBONATE 1300 MG: 650 TABLET ORAL at 10:03

## 2023-03-31 RX ADMIN — HYDROCODONE BITARTRATE AND ACETAMINOPHEN 1 TABLET: 5; 325 TABLET ORAL at 04:03

## 2023-03-31 RX ADMIN — GABAPENTIN 300 MG: 300 CAPSULE ORAL at 10:03

## 2023-03-31 RX ADMIN — SODIUM CHLORIDE, PRESERVATIVE FREE 10 ML: 5 INJECTION INTRAVENOUS at 06:03

## 2023-03-31 RX ADMIN — SODIUM CHLORIDE, PRESERVATIVE FREE 10 ML: 5 INJECTION INTRAVENOUS at 02:03

## 2023-03-31 RX ADMIN — Medication 400 MG: at 09:03

## 2023-03-31 RX ADMIN — ALBUTEROL SULFATE 2.5 MG: 2.5 SOLUTION RESPIRATORY (INHALATION) at 08:03

## 2023-03-31 RX ADMIN — GABAPENTIN 300 MG: 300 CAPSULE ORAL at 02:03

## 2023-03-31 RX ADMIN — CETIRIZINE HYDROCHLORIDE 10 MG: 10 TABLET, FILM COATED ORAL at 10:03

## 2023-03-31 RX ADMIN — INSULIN DETEMIR 20 UNITS: 100 INJECTION, SOLUTION SUBCUTANEOUS at 09:03

## 2023-03-31 RX ADMIN — GABAPENTIN 300 MG: 300 CAPSULE ORAL at 09:03

## 2023-03-31 RX ADMIN — OXYCODONE HYDROCHLORIDE AND ACETAMINOPHEN 1 TABLET: 5; 325 TABLET ORAL at 03:03

## 2023-03-31 RX ADMIN — OXYCODONE HYDROCHLORIDE AND ACETAMINOPHEN 1 TABLET: 5; 325 TABLET ORAL at 09:03

## 2023-03-31 RX ADMIN — LINEZOLID 600 MG: 600 INJECTION, SOLUTION INTRAVENOUS at 09:03

## 2023-03-31 RX ADMIN — CEFEPIME 1 G: 1 INJECTION, POWDER, FOR SOLUTION INTRAMUSCULAR; INTRAVENOUS at 09:03

## 2023-03-31 RX ADMIN — PANCRELIPASE 6 CAPSULE: 60000; 12000; 38000 CAPSULE, DELAYED RELEASE PELLETS ORAL at 02:03

## 2023-03-31 RX ADMIN — NEPHROCAP 1 CAPSULE: 1 CAP ORAL at 10:03

## 2023-03-31 RX ADMIN — PANCRELIPASE 6 CAPSULE: 60000; 12000; 38000 CAPSULE, DELAYED RELEASE PELLETS ORAL at 10:03

## 2023-03-31 RX ADMIN — LEVETIRACETAM 500 MG: 500 TABLET, FILM COATED ORAL at 09:03

## 2023-03-31 RX ADMIN — IPRATROPIUM BROMIDE 0.5 MG: 0.5 SOLUTION RESPIRATORY (INHALATION) at 02:03

## 2023-03-31 RX ADMIN — SODIUM BICARBONATE 1300 MG: 650 TABLET ORAL at 09:03

## 2023-03-31 RX ADMIN — NIFEDIPINE 90 MG: 90 TABLET, FILM COATED, EXTENDED RELEASE ORAL at 10:03

## 2023-03-31 RX ADMIN — PANCRELIPASE 6 CAPSULE: 60000; 12000; 38000 CAPSULE, DELAYED RELEASE PELLETS ORAL at 09:03

## 2023-03-31 RX ADMIN — Medication 400 MG: at 10:03

## 2023-03-31 RX ADMIN — ENOXAPARIN SODIUM 40 MG: 40 INJECTION SUBCUTANEOUS at 10:03

## 2023-03-31 RX ADMIN — ENOXAPARIN SODIUM 40 MG: 40 INJECTION SUBCUTANEOUS at 09:03

## 2023-03-31 RX ADMIN — MICONAZOLE NITRATE: 20 POWDER TOPICAL at 02:03

## 2023-03-31 RX ADMIN — LEVETIRACETAM 500 MG: 500 TABLET, FILM COATED ORAL at 10:03

## 2023-03-31 NOTE — PROGRESS NOTES
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 Presented to the ED with complaints of diffuse body swelling specially abdomen, groin, penis and testicles and bilateral lower extremities, dyspnea on minimal exertion and bilateral lower extremity pain and cramping.  Report subjective fever and chills.  Report he was just hospitalized for similar symptom at [facility] about 2 weeks ago and was discharged home after few days. On arrival to ED, he was tachycardic, hypertensive, saturating 100% on room air.  EKG appears sinus rhythm on my review without ischemic changes.  Labs notable for WBC 16.3, hemoglobin 11.4, platelets 346, sodium 139, potassium 5.0, chloride 116, CO2 16, creatinine 2.17, BUN 34, glucose 420, calcium 8.0, albumin 1.3, , negative troponin. CT A/P with Contrast showed left pleural effusion, hepatic cirrhotic morphology, splenomegaly and upper abdominal varices and ascites, chronic pancreatitis changes, extensive subcutaneous anasarca edema.  Kidneys unremarkable without hydronephrosis.     Per ED provider exam he was noted to have purulent penile discharge, sent for culture and Segovia catheter placed.  He was given albumin 12.5 g, Lasix 40 mg IV, Vancomycin and Zosyn and subsequently referred to hospital medicine service for further evaluation and management.  mL with IV Lasix 40. Urine protein creatinine ratio noted to be 10.9. Patient was continued on IV Lasix. Seen by Renal team; recommendations made for IV Lasix/Albumin drip and renal biopsy. He was also seen by GI team for evaluation of cirrhosis and EGD was done to r/o EV. EGD showed erosive gastropathy,  normal esophagus & no other evidence of cirrhosis. Nephrology changed IV Lasix/Albumin drip to IV Lasix 80 mg TID with tentative plan for hemodialysis given patient's diuretic resistance. IR to plan for renal biopsy which could not be performed 03/15 due to uncontrolled HTN. Continues to have significant UOP with IV Lasix.  Nephrology continuing IV Lasix 80 mg t.i.d. and Metolazone 10 mg given stable creatinine and holding off on HD for now. Patient on 1500 mL fluid restriction.  OT to provide brace for scrotal support given patient's discomfort. Counseled regarding compliance with low-sodium diet and fluid restriction in order to optimize volume status with diuresis. Underwent renal biopsy with IR 03/20     Continued on IV Lasix 80 mg t.i.d. and Metolazone 10 mg.    Noted to have improved edema in B/L LE edema, abdominal wall edema & scrotal edema as of 3/22/23 . Eventually Diuretic regimen was changed to Torsemide.     Podiatry ordered XR R Foot which showed erosion of 5th metatarsophalangeal joint with osteomyelitis not excluded. Podiatry performed excisional debridement of R foot on 3/24/23.  Cultures-+ve Staph MRSA. ID was consulted     Spiked Fever 101 3/23/23,Patient is started on Vanc,Zosyn to cover Possible Pneumonia and  Osteomyelitis     On 3/26/23 Hb dropped to <7 , we transfused a unit, Oxygen requirement going up with poor Urine Output and hence Nephrology was updated about the patient, got 80mg IV lasix, continued on Torsemide        Ct scan abdomen obtained when he complained of abdominal pain  was showing constipation, had a bowel movement after Relistor.     Oxygen requirement been high per staff and patient looks volume overloaded, Pulmonology was consulted to assist with management given increased work of breathing and Hypoxemia on ABGs, placed on BIPAP, waiting to discuss with Nephrology    Interval Hx:     Afebrile.  Hypertensive.  Remains on 4 L oxygen mask ventilation.  Refused to use BiPAP  at nighttime.  When seen at bedside he was alert, oriented, comfortably resting.  Complains of pain in lower extremities.  Otherwise denies any shortness of breath.  Tolerating p.o..  Moving bowels.  Persistent leukocytosis with hemoglobin 7.8 today.  Stable platelets.  Renal function improving.  Hyperglycemia continues.  Segovia discontinued yesterday    Objective/physical exam:  Vitals:    03/30/23 2330 03/31/23 0236 03/31/23 0314 03/31/23 0601   BP: (!) 153/90  (!) 168/84    Pulse: 73 85 82    Resp:  20 20    Temp: 98 °F (36.7 °C)  97.6 °F (36.4 °C)    TempSrc: Oral  Oral    SpO2: (!) 92%  100%    Weight:    108 kg (238 lb 1.6 oz)   Height:         General: not in distress, afebrile  Respiratory:  Basal crepitations, nonlabored breathing   Cardiovascular: S1, S2, no appreciable murmur  Abdomen: Soft, nontender, BS +  MSK: Warm, lower extremity edema, no clubbing or cyanosis  Neurologic: Alert and oriented x4, moving all extremities with good strength     Lab Results   Component Value Date     03/30/2023    K 4.1 03/30/2023    CO2 30 (H) 03/30/2023    BUN 52.8 (H) 03/30/2023    CREATININE 1.97 (H) 03/30/2023    CALCIUM 8.6 03/30/2023    EGFRNONAA >60 01/20/2021      Lab Results   Component Value Date    ALT 37 03/25/2023    AST 41 (H) 03/25/2023    ALKPHOS 107 03/25/2023    BILITOT 0.4 03/25/2023      Lab Results   Component Value Date    WBC 14.8 (H) 03/30/2023    HGB 8.3 (L) 03/30/2023    HCT 24.7 (L) 03/30/2023    MCV 92.9 03/30/2023     03/30/2023           Medications:   albuterol sulfate  2.5 mg Nebulization Q4H WAKE    ceFEPime (MAXIPIME) IVPB  1 g Intravenous Q12H    cetirizine  10 mg Oral Daily    doxazosin  2 mg Oral QHS    enoxaparin  40 mg Subcutaneous Q12H    ergocalciferol  50,000 Units Oral Q3 Days    gabapentin  300 mg Oral TID    insulin aspart U-100  14 Units Subcutaneous TIDWM    insulin detemir U-100  20 Units Subcutaneous QHS    insulin detemir U-100  5 Units Subcutaneous Daily     ipratropium  0.5 mg Nebulization Q6H    levETIRAcetam  500 mg Oral BID    linezolid  600 mg Intravenous Q12H    lipase-protease-amylase 12,000-38,000-60,000 units  6 capsule Oral TID    magnesium oxide  400 mg Oral BID    metoprolol tartrate  25 mg Oral BID    miconazole NITRATE 2 %   Topical (Top) BID    NIFEdipine  90 mg Oral Daily    polyethylene glycol  17 g Oral Daily    sodium bicarbonate  1,300 mg Oral TID    sodium chloride 0.9%  10 mL Intravenous Q6H    torsemide  40 mg Oral Daily    vitamin renal formula (B-complex-vitamin c-folic acid)  1 capsule Oral Daily    zinc oxide-cod liver oil   Topical (Top) BID      sodium chloride, acetaminophen, acetaminophen, albuterol sulfate, aluminum-magnesium hydroxide-simethicone, cloNIDine, dextrose 10%, dextrose 10%, dextrose, dextrose, glucagon (human recombinant), hydrALAZINE, HYDROcodone-acetaminophen, insulin aspart U-100, labetalol, melatonin, ondansetron, oxyCODONE-acetaminophen, prochlorperazine, senna-docusate 8.6-50 mg, simethicone, sodium chloride 0.9%, Flushing PICC Protocol **AND** sodium chloride 0.9% **AND** sodium chloride 0.9%     Assessment/Plan:    Anasarca 2/2 Diabetic Nephropathy /diabetic nodular sclerosi- improving  FABIO superimposed on CKD2 - improving  Nephrotic Syndrome/Proteinuria 10.9 g 2/2 Diabetic Nephropathy  AHRF (normal EF) 2/2 Pulm edema and possible PNA  UTI/Urethritis + Staph and Pluralibacter gergoviae    Osteomyelitis R Foot 5th Metatarsalophalangeal Joint s/p Excisional Debridement 3/24/23 + MRSA Staph  Suspected Aspiration Pneumonia  Sepsis 2/2 Above  Constipation  Deconditioning, generalized weakness       Hx:  Obesity, chronic anemia, T2 dm, alcoholic versus GARCIA cirrhosis, HTN, low vitamin-D, chronic pancreatitis, history of seizures      Plan:  -continue diuresis, monitor I's and O's and electrolytes.  Nephrology following, appreciate assistance.  -counseled on compliance with BiPAP.  Pulmonary following.  Appreciate  assistance.  hoping a continuous improvement of respiratory function with diuresis   -Completed 5 days of IV Rocephin for UTI   -continue Cefepime and Linezolid to cover Osteomyelitis and Pneumonia.  -scheduled Bowel Regimen for constipation  -PT/OT on board; recommending rehab placement             Dieudonne Montemayor MD

## 2023-03-31 NOTE — PROGRESS NOTES
[facility]                       [address] [address]    PATIENT NAME:       GODFREY MORLEY  YOB: 1975  CSN:                [id number]   MRN:                [id number]  ADMIT DATE:         03/11/2023 15:58:00  PHYSICIAN:          Emre Vazquez DPM                            PROGRESS NOTE    DATE:  03/31/2023 00:00:00    SUBJECTIVE:  Patient was seen today, doing a little bit better.  Seems to be   more comfortable.  Lying in bed, watching TV and talking on the phone.    PHYSICAL EXAMINATION:  VITAL SIGNS:  Stable.  He is afebrile.    Dressings are clean, dry, intact right extremity.  Wounds are healing.  Plantar   blister is pretty much desiccated and dried out.  No fluctuance, crepitation, or   bogginess to any area.  Remainder of integument is intact.  Trophic skin   changes.  Decreased sensorium.    ASSESSMENT:  Right foot wound infection osteomyelitis, status post excisional   debridement.    PLAN:  We will continue with current care.  Dressings were placed.  ID note   reviewed.  Plans for several weeks of antibiotics and then discontinue.    Currently plans on possible transfer to LTAC next week.        ______________________________  Emre Vazquez DPM    GAS/AQS  DD:  03/31/2023  Time:  04:49PM  DT:  03/31/2023  Time:  06:39PM  Job #:  [id number]      PROGRESS NOTE

## 2023-03-31 NOTE — PLAN OF CARE
Order for LTAC noted. List of providers given. FOC obtained for [facility]. Referral sent via [facility].

## 2023-03-31 NOTE — PROGRESS NOTES
"                [facility]                       [address] [address]    PATIENT NAME:       GODFREY MORLEY  YOB: 1975  CSN:                [id number]   MRN:                [id number]  ADMIT DATE:         03/11/2023 15:58:00  PHYSICIAN:          Emre Vazquez DPM                            PROGRESS NOTE    DATE:  03/30/2023 00:00:00    SUBJECTIVE:  The patient is seen today, sitting up in his chair.  His 1st   question to me is "I need more pain medication.  I remains on O2."  He had a   previous spine surgery about 6 months ago by Dr. Emre Schwartz.  Was supposed to   have some other procedure done by someone in either [place] or [place].    Continues to have other issues at this point.    PHYSICAL EXAMINATION:  VITAL SIGNS:  Reviewed.    LABORATORY DATA:  Reviewed.  White cell counts are 14.8.    BUN and creatinine 52 and 1.97.    Dressings are intact to the extremity.  No major changes in the wound.  Scant   amount of exudate plantarly where the previous blister was.    ASSESSMENT:  Right foot wound infection osteomyelitis, status post debridement.    PLAN:  Continue with current care.        ______________________________  Emre Vazquez DPM    GAS/AQS  DD:  03/30/2023  Time:  04:30PM  DT:  03/30/2023  Time:  07:21PM  Job #:  [id number]      PROGRESS NOTE      "

## 2023-03-31 NOTE — PROGRESS NOTES
NEPHROLOGY: Progress   48-year-old a.m. with history of poorly controlled type 2 diabetes, obesity, creatinine of 1.47 in November of 2022 with a GFR at that time of approximately 60 however , it was also as low as 33 at that time.  He was being followed by Dr. Alonso in [facility].  He has a history of poorly controlled hypertension as well, seizure disorder and alcoholic liver disease with chronic pancreatitis.  Over the past several months the patient has had increasing volume retention, worsening anasarca and increased immobility due to the edema.  He has reportedly gained 65 lb over the past several months.    Patient has required aggressive diuresis but has lost a significant amount of weight.  He had high-grade proteinuria in excess of 10 g and biopsy has returned with advanced diabetic nodular sclerosis.  He seems more alert and appropriate this morning but states he feels jittery and feels like he does when he is going to catch seizure.  His Keppra and gabapentin dose were both decreased.  I will resume his previous gabapentin dosing again.    Patient underwent 5th right metatarsophalangeal joint resection for suspected osteomyelitis.    Patient is tolerating oral torsemide 40 mg daily.             Current Facility-Administered Medications:     0.9%  NaCl infusion (for blood administration), , Intravenous, Q24H PRN, Michelle Camacho MD    acetaminophen tablet 1,000 mg, 1,000 mg, Oral, Q6H PRN, Emre Vazquez DPM, 1,000 mg at 03/27/23 2051    acetaminophen tablet 650 mg, 650 mg, Oral, Q4H PRN, Emre Vazquez DPM    albuterol nebulizer solution 2.5 mg, 2.5 mg, Nebulization, Q4H PRN, GITA Andrews    albuterol sulfate nebulizer solution 2.5 mg, 2.5 mg, Nebulization, Q4H WAKE, Marcos Saldana MD, 2.5 mg at 03/31/23 0865    aluminum-magnesium hydroxide-simethicone 200-200-20  mg/5 mL suspension 30 mL, 30 mL, Oral, QID PRN, Emre Vazquez, DPM    ceFEPIme (MAXIPIME) 1 g in dextrose 5 % in water (D5W) 5 % 50 mL IVPB (MB+), 1 g, Intravenous, Q12H, Marcos Saldana MD, Stopped at 03/30/23 2125    cetirizine tablet 10 mg, 10 mg, Oral, Daily, Dieudonne Montemayor MD, 10 mg at 03/31/23 1003    cloNIDine tablet 0.2 mg, 0.2 mg, Oral, TID PRN, Emre Qiuk, DPM    dextrose 10% bolus 125 mL 125 mL, 12.5 g, Intravenous, PRN, Emre Abdallaiesk, DPM    dextrose 10% bolus 250 mL 250 mL, 25 g, Intravenous, PRN, Emre Abdallaiesk, DPM    dextrose 40 % gel 15,000 mg, 15 g, Oral, PRN, Emre RUBIN Sobiesk, DPM    dextrose 40 % gel 30,000 mg, 30 g, Oral, PRN, Emre Vazquez, DPM    doxazosin tablet 2 mg, 2 mg, Oral, QHS, Emre Vazquez, DPM, 2 mg at 03/30/23 2054    enoxaparin injection 40 mg, 40 mg, Subcutaneous, Q12H, Emre Vazquez DPM, 40 mg at 03/31/23 1003    ergocalciferol capsule 50,000 Units, 50,000 Units, Oral, Q3 Days, Emre Vazquez, DPM, 50,000 Units at 03/30/23 0855    gabapentin capsule 300 mg, 300 mg, Oral, TID, Benjamín Sharp MD, 300 mg at 03/31/23 1003    glucagon (human recombinant) injection 1 mg, 1 mg, Intramuscular, PRN, Emre Vazquez, DPM, 1 mg at 03/20/23 0545    hydrALAZINE injection 10 mg, 10 mg, Intravenous, Q4H PRN, Emre Vazquez, DPM, 10 mg at 03/12/23 1224    HYDROcodone-acetaminophen 5-325 mg per tablet 1 tablet, 1 tablet, Oral, Q4H PRN, Emre Vazquez DPM, 1 tablet at 03/31/23 1124    insulin aspart U-100 injection 1-10 Units, 1-10 Units, Subcutaneous, QID (AC + HS) PRN, Emre Vazquez DPM, 10 Units at 03/25/23 0955    insulin aspart U-100 injection 14 Units, 14 Units, Subcutaneous, TIDWM, Emre Vazquez DPM, 14 Units at 03/30/23 1646    insulin detemir U-100 injection 20 Units, 20 Units, Subcutaneous, QHS, Michelle Camacho MD, 20 Units at 03/30/23 2053    insulin detemir U-100 injection 5 Units, 5 Units, Subcutaneous, Daily, Michelle Camacho MD, 5 Units at  03/30/23 0853    ipratropium 0.02 % nebulizer solution 0.5 mg, 0.5 mg, Nebulization, Q6H, Geena Nettles, GITA, 0.5 mg at 03/31/23 0859    labetaloL injection 10 mg, 10 mg, Intravenous, Q4H PRN, Emre Vazquez DPM    levETIRAcetam tablet 500 mg, 500 mg, Oral, BID, Benjamín Sharp MD, 500 mg at 03/31/23 1003    linezolid 600 mg/300 mL IVPB 600 mg, 600 mg, Intravenous, Q12H, Nancy Valladares MD, Last Rate: 300 mL/hr at 03/31/23 1003, 600 mg at 03/31/23 1003    lipase-protease-amylase 12,000-38,000-60,000 units per capsule 6 capsule, 6 capsule, Oral, TID, Emre Vazquez DPM, 6 capsule at 03/31/23 1004    magnesium oxide tablet 400 mg, 400 mg, Oral, BID, Emre Vazquez DPM, 400 mg at 03/31/23 1003    melatonin tablet 6 mg, 6 mg, Oral, Nightly PRN, Emre Vazquez DPM, 6 mg at 03/12/23 0012    metoprolol tartrate (LOPRESSOR) tablet 25 mg, 25 mg, Oral, BID, Emre Vazquez DPM, 25 mg at 03/31/23 1003    miconazole NITRATE 2 % top powder, , Topical (Top), BID, Emre Vazquez DPM, Given at 03/30/23 2054    NIFEdipine 24 hr tablet 90 mg, 90 mg, Oral, Daily, Emre Vazquez DPM, 90 mg at 03/31/23 1004    ondansetron injection 4 mg, 4 mg, Intravenous, Q4H PRN, Emre Vazquez DPAPRIL, 4 mg at 03/12/23 0011    oxyCODONE-acetaminophen 5-325 mg per tablet 1 tablet, 1 tablet, Oral, Q6H PRN, Emre Vazquez DPM, 1 tablet at 03/31/23 0314    polyethylene glycol packet 17 g, 17 g, Oral, Daily, Dieudonne Montemayor MD    prochlorperazine injection Soln 5 mg, 5 mg, Intravenous, Q6H PRN, Emre Vazquez DPM    senna-docusate 8.6-50 mg per tablet 2 tablet, 2 tablet, Oral, BID PRN, Emre Vazquez DPM    simethicone chewable tablet 80 mg, 1 tablet, Oral, QID PRN, Emre Vazquez DPM    sodium bicarbonate tablet 1,300 mg, 1,300 mg, Oral, TID, Emre Vazquez DPM, 1,300 mg at 03/31/23 1003    sodium chloride 0.9% flush 10 mL, 10 mL, Intravenous, PRN, Emre Vazquez DPM    Flushing PICC Protocol, , , Until  "Discontinued **AND** sodium chloride 0.9% flush 10 mL, 10 mL, Intravenous, Q6H, 10 mL at 03/31/23 0638 **AND** sodium chloride 0.9% flush 10 mL, 10 mL, Intravenous, PRN, Emre Vazquez, DPM, 10 mL at 03/29/23 0526    torsemide tablet 40 mg, 40 mg, Oral, Daily, Benjamín Sharp MD, 40 mg at 03/31/23 1003    vitamin renal formula (B-complex-vitamin c-folic acid) 1 mg per capsule 1 capsule, 1 capsule, Oral, Daily, Emre Vazquez, DPM, 1 capsule at 03/31/23 1003    zinc oxide-cod liver oil 40 % paste, , Topical (Top), BID, Emre Vazquez, DPM, Given at 03/31/23 1006        BP (!) 183/94   Pulse 87   Temp 98.1 °F (36.7 °C) (Oral)   Resp 14   Ht 5' 5" (1.651 m)   Wt 108 kg (238 lb 1.6 oz)   SpO2 100%   BMI 39.62 kg/m²     Physical Exam:    GEN:  Obese and chronically ill-appearing black male.    HEENT: Atraumatic. EOMI, no icterus  NECK : No JVD  CARD : RRR s rub or gallop  LUNGS :  Decreased breath sounds at the bases  ABD : Soft,non-tender. BS active, obese.  :  Scrotum is much less swollen  EXT :  Patient with minimal pitting edema if any.  Most of the edema essentially has resolved.         Intake/Output Summary (Last 24 hours) at 3/31/2023 1135  Last data filed at 3/31/2023 0601  Gross per 24 hour   Intake 840 ml   Output 3050 ml   Net -2210 ml           Laboratory:  Recent Results (from the past 24 hour(s))   POCT glucose    Collection Time: 03/30/23  3:57 PM   Result Value Ref Range    POCT Glucose 276 (H) 70 - 110 mg/dL   POCT glucose    Collection Time: 03/30/23  8:27 PM   Result Value Ref Range    POCT Glucose 259 (H) 70 - 110 mg/dL   Comprehensive Metabolic Panel    Collection Time: 03/31/23  9:02 AM   Result Value Ref Range    Sodium Level 138 136 - 145 mmol/L    Potassium Level 4.6 3.5 - 5.1 mmol/L    Chloride 103 98 - 107 mmol/L    Carbon Dioxide 28 22 - 29 mmol/L    Glucose Level 241 (H) 74 - 100 mg/dL    Blood Urea Nitrogen 48.9 (H) 8.9 - 20.6 mg/dL    Creatinine 1.70 (H) 0.73 - 1.18 mg/dL    " Calcium Level Total 8.2 (L) 8.4 - 10.2 mg/dL    Protein Total 5.5 (L) 6.4 - 8.3 gm/dL    Albumin Level 2.1 (L) 3.5 - 5.0 g/dL    Globulin 3.4 2.4 - 3.5 gm/dL    Albumin/Globulin Ratio 0.6 (L) 1.1 - 2.0 ratio    Bilirubin Total 0.3 <=1.5 mg/dL    Alkaline Phosphatase 74 40 - 150 unit/L    Alanine Aminotransferase 27 0 - 55 unit/L    Aspartate Aminotransferase 51 (H) 5 - 34 unit/L    eGFR 50 mls/min/1.73/m2   CBC with Differential    Collection Time: 03/31/23  9:02 AM   Result Value Ref Range    WBC 14.1 (H) 4.5 - 11.5 x10(3)/mcL    RBC 2.58 (L) 4.70 - 6.10 x10(6)/mcL    Hgb 7.8 (L) 14.0 - 18.0 g/dL    Hct 24.0 (L) 42.0 - 52.0 %    MCV 93.0 80.0 - 94.0 fL    MCH 30.2 27.0 - 31.0 pg    MCHC 32.5 (L) 33.0 - 36.0 g/dL    RDW 12.5 11.5 - 17.0 %    Platelet 352 130 - 400 x10(3)/mcL    MPV 11.1 (H) 7.4 - 10.4 fL    Neut % 67.7 %    Lymph % 14.7 %    Mono % 8.4 %    Eos % 8.1 %    Basophil % 0.7 %    Lymph # 2.08 0.6 - 4.6 x10(3)/mcL    Neut # 9.55 (H) 2.1 - 9.2 x10(3)/mcL    Mono # 1.18 0.1 - 1.3 x10(3)/mcL    Eos # 1.15 (H) 0 - 0.9 x10(3)/mcL    Baso # 0.10 0 - 0.2 x10(3)/mcL    IG# 0.06 (H) 0 - 0.04 x10(3)/mcL    IG% 0.4 %    NRBC% 0.0 %   Magnesium    Collection Time: 03/31/23  9:02 AM   Result Value Ref Range    Magnesium Level 1.60 1.60 - 2.60 mg/dL   Phosphorus    Collection Time: 03/31/23  9:02 AM   Result Value Ref Range    Phosphorus Level 3.7 2.3 - 4.7 mg/dL   POCT glucose    Collection Time: 03/31/23 10:50 AM   Result Value Ref Range    POCT Glucose 282 (H) 70 - 110 mg/dL         Assessment/Plan:   Advanced stage III CKD-Biopsy-Advanced Diabetic Nodular sclerosis --  Hypertension-much better controlled  Kekwtrux-bjnoqqck-bpzhilulk well torsemide 40 b.i.d.   Hepatic cirrhosis   UTI   Peripheral arterial disease-suspected right 5th MPJ osteo  Profound vitamin-D deficiency-30873 units ergo q 72  Secondary hyperparathyroidism      Continue Torsemide as ordered   Again stressed importance of dietary compliance.

## 2023-03-31 NOTE — PT/OT/SLP PROGRESS
Physical Therapy Treatment    Patient Name:  Kamran Huerta   MRN:  [id number]    Recommendations:     Discharge Recommendations: LTAC  Discharge Equipment Recommendations: other (see comments) (TBD)  Barriers to discharge:       Assessment:     Kamran Huerta is a 48 y.o. male admitted with a medical diagnosis of Sepsis.  He presents with the following impairments/functional limitations: weakness, gait instability, impaired endurance, impaired self care skills, impaired functional mobility.    Rehab Prognosis: Good; patient would benefit from acute skilled PT services to address these deficits and reach maximum level of function.    Recent Surgery: Procedure(s) (LRB):  AMPUTATION, TOE (Right) 7 Days Post-Op    Plan:     During this hospitalization, patient to be seen 6 x/week to address the identified rehab impairments via gait training, therapeutic activities, therapeutic exercises, neuromuscular re-education and progress toward the following goals:    Plan of Care Expires:  04/16/23    Subjective     Chief Complaint: Pt c/o pain in abdomen and L foot. Pt stated that he had already received pain meds. Pt reports he did not sleep much last night.  Patient/Family Comments/goals:   Pain/Comfort:         Objective:     Communicated with NSG prior to session.  Patient found HOB elevated with pulse ox (continuous), telemetry, oxygen upon PTA entry to room.     General Precautions: Standard, fall  Orthopedic Precautions: RLE weight bearing as tolerated  Braces: N/A  Respiratory Status: oxymask on 4 L, SPO2 98%    Pt appeared drowsy when PTA arrived. Pt fell asleep a few times while responding to PTA. Pt became more alert once transition to sit EOB. However pt refused to continue with PT session after sitting EOB. Pt c.o abdominal discomfort and L foot pain. Nsg notified.      Functional Mobility:  Bed: Ray supine <->  sit  Scooting: Ray to scoot to HOB  Pt sat EOB for several minutes, declined to sit T/F into  chair or perform any other activity. Pt requested to return to bed and declined to continue with session.     Patient left HOB elevated with all lines intact, call button in reach, and lunch tray set up for pt to eat ..    GOALS:   Multidisciplinary Problems       Physical Therapy Goals          Problem: Physical Therapy    Goal Priority Disciplines Outcome Goal Variances Interventions   Physical Therapy Goal     PT, PT/OT Ongoing, Progressing     Description: Goals to be met by: 23     Patient will increase functional independence with mobility by performin. Supine to sit with Stand-by Assistance  2. Sit to stand transfer with Supervision  3. Bed to chair transfer with Supervision using Rolling Walker  4. Gait  x 50 feet with Min A using Rolling Walker.   5. Ascend/descend 4 stair with right Handrails Minimal Assistance using Rolling Walker.                          Time Tracking:     PT Received On:    PT Start Time: 1149     PT Stop Time: 1201  PT Total Time (min): 12 min     Billable Minutes: Therapeutic Activity 12    Treatment Type: Treatment  PT/PTA: PTA     Number of PTA visits since last PT visit: 2     2023

## 2023-03-31 NOTE — PROGRESS NOTES
He is on 4 L and refusing the BiPAP according to the nurses.  Multiple notes in the chart and there is no need to repeated detail some of his behavioral from a medical standpoint as to his healthcare and maintenance even when he is in the hospital.  The problems volume overload and anasarca and renal notes that he is responding quite well with the oral torsemide.  He is on broad antibiotics as is.  General: not in distress, afebrile somewhat jittery and muffled in his answers complaining of some pain now nonspecific.  Respiratory: nonlabored breathing   Cardiovascular: S1, S2, no appreciable murmur  Abdomen: Soft, nontender, BS +  MSK: Warm, lower extremity edema, no clubbing or cyanosis  Neurologic: Alert and oriented x4, moving all extremities with good strength     Volume overload mostly from diabetic nephropathy  Pneumonia with volume overload possibly  Status post right foot 5th metatarsophalangeal joint excisional debridement  Obesity with chronic anemia and GARCIA    If the urine output is reportedly that improved and the kidney function is holding up, can repeat a chest x-ray then for interval comparison.   Latest Reference Range & Units 03/30/23 05:48   Sodium 136 - 145 mmol/L 140   Potassium 3.5 - 5.1 mmol/L 4.1   Chloride 98 - 107 mmol/L 101   CO2 22 - 29 mmol/L 30 (H)   Anion Gap mEq/L 9.0   BUN 8.9 - 20.6 mg/dL 52.8 (H)   Creatinine 0.73 - 1.18 mg/dL 1.97 (H)   BUN/CREAT RATIO  27   eGFR mls/min/1.73/m2 42   Glucose 74 - 100 mg/dL 294 (H)   Calcium 8.4 - 10.2 mg/dL 8.6

## 2023-03-31 NOTE — PT/OT/SLP PROGRESS
Occupational Therapy      Patient Name:  Kamran Huerta   MRN:  [id number]    Attempted to see patient x3. Patient not seen today secondary to Patient unwilling to participate. Will follow-up as schedule permits.    3/31/2023

## 2023-04-01 LAB
POCT GLUCOSE: 196 MG/DL (ref 70–110)
POCT GLUCOSE: 261 MG/DL (ref 70–110)
POCT GLUCOSE: 281 MG/DL (ref 70–110)
POCT GLUCOSE: 306 MG/DL (ref 70–110)
POCT GLUCOSE: 395 MG/DL (ref 70–110)
POCT GLUCOSE: 456 MG/DL (ref 70–110)

## 2023-04-01 PROCEDURE — 25000003 PHARM REV CODE 250: Performed by: PODIATRIST

## 2023-04-01 PROCEDURE — 25000003 PHARM REV CODE 250: Performed by: STUDENT IN AN ORGANIZED HEALTH CARE EDUCATION/TRAINING PROGRAM

## 2023-04-01 PROCEDURE — 63600175 PHARM REV CODE 636 W HCPCS: Performed by: STUDENT IN AN ORGANIZED HEALTH CARE EDUCATION/TRAINING PROGRAM

## 2023-04-01 PROCEDURE — 87070 CULTURE OTHR SPECIMN AEROBIC: CPT | Performed by: PODIATRIST

## 2023-04-01 PROCEDURE — 25000242 PHARM REV CODE 250 ALT 637 W/ HCPCS: Performed by: INTERNAL MEDICINE

## 2023-04-01 PROCEDURE — 25000242 PHARM REV CODE 250 ALT 637 W/ HCPCS: Performed by: NURSE PRACTITIONER

## 2023-04-01 PROCEDURE — 63600175 PHARM REV CODE 636 W HCPCS: Performed by: PODIATRIST

## 2023-04-01 PROCEDURE — A4216 STERILE WATER/SALINE, 10 ML: HCPCS | Performed by: PODIATRIST

## 2023-04-01 PROCEDURE — 99900031 HC PATIENT EDUCATION (STAT)

## 2023-04-01 PROCEDURE — 63600175 PHARM REV CODE 636 W HCPCS: Performed by: INTERNAL MEDICINE

## 2023-04-01 PROCEDURE — 25000003 PHARM REV CODE 250: Performed by: INTERNAL MEDICINE

## 2023-04-01 PROCEDURE — 94640 AIRWAY INHALATION TREATMENT: CPT

## 2023-04-01 PROCEDURE — 94761 N-INVAS EAR/PLS OXIMETRY MLT: CPT

## 2023-04-01 PROCEDURE — 21400001 HC TELEMETRY ROOM

## 2023-04-01 PROCEDURE — 63600175 PHARM REV CODE 636 W HCPCS: Mod: JZ,JG | Performed by: NURSE PRACTITIONER

## 2023-04-01 RX ORDER — NIFEDIPINE 60 MG/1
120 TABLET, EXTENDED RELEASE ORAL DAILY
Status: DISCONTINUED | OUTPATIENT
Start: 2023-04-02 | End: 2023-04-04 | Stop reason: HOSPADM

## 2023-04-01 RX ORDER — SODIUM BICARBONATE 650 MG/1
1300 TABLET ORAL DAILY
Status: DISCONTINUED | OUTPATIENT
Start: 2023-04-02 | End: 2023-04-04 | Stop reason: HOSPADM

## 2023-04-01 RX ADMIN — CEFEPIME 1 G: 1 INJECTION, POWDER, FOR SOLUTION INTRAMUSCULAR; INTRAVENOUS at 10:04

## 2023-04-01 RX ADMIN — IPRATROPIUM BROMIDE 0.5 MG: 0.5 SOLUTION RESPIRATORY (INHALATION) at 08:04

## 2023-04-01 RX ADMIN — CETIRIZINE HYDROCHLORIDE 10 MG: 10 TABLET, FILM COATED ORAL at 09:04

## 2023-04-01 RX ADMIN — CEFEPIME 1 G: 1 INJECTION, POWDER, FOR SOLUTION INTRAMUSCULAR; INTRAVENOUS at 08:04

## 2023-04-01 RX ADMIN — GABAPENTIN 300 MG: 300 CAPSULE ORAL at 09:04

## 2023-04-01 RX ADMIN — INSULIN ASPART 14 UNITS: 100 INJECTION, SOLUTION INTRAVENOUS; SUBCUTANEOUS at 05:04

## 2023-04-01 RX ADMIN — PANCRELIPASE 6 CAPSULE: 60000; 12000; 38000 CAPSULE, DELAYED RELEASE PELLETS ORAL at 10:04

## 2023-04-01 RX ADMIN — NIFEDIPINE 90 MG: 90 TABLET, FILM COATED, EXTENDED RELEASE ORAL at 09:04

## 2023-04-01 RX ADMIN — NEPHROCAP 1 CAPSULE: 1 CAP ORAL at 09:04

## 2023-04-01 RX ADMIN — IPRATROPIUM BROMIDE 0.5 MG: 0.5 SOLUTION RESPIRATORY (INHALATION) at 07:04

## 2023-04-01 RX ADMIN — METOPROLOL TARTRATE 25 MG: 25 TABLET, FILM COATED ORAL at 09:04

## 2023-04-01 RX ADMIN — INSULIN DETEMIR 20 UNITS: 100 INJECTION, SOLUTION SUBCUTANEOUS at 10:04

## 2023-04-01 RX ADMIN — SODIUM CHLORIDE, PRESERVATIVE FREE 10 ML: 5 INJECTION INTRAVENOUS at 06:04

## 2023-04-01 RX ADMIN — ALBUTEROL SULFATE 2.5 MG: 2.5 SOLUTION RESPIRATORY (INHALATION) at 08:04

## 2023-04-01 RX ADMIN — SODIUM BICARBONATE 1300 MG: 650 TABLET ORAL at 09:04

## 2023-04-01 RX ADMIN — DOXAZOSIN 2 MG: 1 TABLET ORAL at 10:04

## 2023-04-01 RX ADMIN — Medication 400 MG: at 09:04

## 2023-04-01 RX ADMIN — MICONAZOLE NITRATE: 20 POWDER TOPICAL at 10:04

## 2023-04-01 RX ADMIN — MICONAZOLE NITRATE: 20 POWDER TOPICAL at 09:04

## 2023-04-01 RX ADMIN — INSULIN ASPART 4 UNITS: 100 INJECTION, SOLUTION INTRAVENOUS; SUBCUTANEOUS at 03:04

## 2023-04-01 RX ADMIN — OXYCODONE HYDROCHLORIDE AND ACETAMINOPHEN 1 TABLET: 5; 325 TABLET ORAL at 05:04

## 2023-04-01 RX ADMIN — Medication: at 09:04

## 2023-04-01 RX ADMIN — LEVETIRACETAM 500 MG: 500 TABLET, FILM COATED ORAL at 09:04

## 2023-04-01 RX ADMIN — PANCRELIPASE 6 CAPSULE: 60000; 12000; 38000 CAPSULE, DELAYED RELEASE PELLETS ORAL at 09:04

## 2023-04-01 RX ADMIN — SODIUM CHLORIDE, PRESERVATIVE FREE 10 ML: 5 INJECTION INTRAVENOUS at 12:04

## 2023-04-01 RX ADMIN — OXYCODONE HYDROCHLORIDE AND ACETAMINOPHEN 1 TABLET: 5; 325 TABLET ORAL at 03:04

## 2023-04-01 RX ADMIN — INSULIN ASPART 14 UNITS: 100 INJECTION, SOLUTION INTRAVENOUS; SUBCUTANEOUS at 11:04

## 2023-04-01 RX ADMIN — TORSEMIDE 40 MG: 20 TABLET ORAL at 09:04

## 2023-04-01 RX ADMIN — Medication: at 10:04

## 2023-04-01 RX ADMIN — ALBUTEROL SULFATE 2.5 MG: 2.5 SOLUTION RESPIRATORY (INHALATION) at 01:04

## 2023-04-01 RX ADMIN — LINEZOLID 600 MG: 600 INJECTION, SOLUTION INTRAVENOUS at 09:04

## 2023-04-01 RX ADMIN — GABAPENTIN 300 MG: 300 CAPSULE ORAL at 10:04

## 2023-04-01 RX ADMIN — ENOXAPARIN SODIUM 40 MG: 40 INJECTION SUBCUTANEOUS at 09:04

## 2023-04-01 RX ADMIN — INSULIN DETEMIR 5 UNITS: 100 INJECTION, SOLUTION SUBCUTANEOUS at 09:04

## 2023-04-01 RX ADMIN — HYDROCODONE BITARTRATE AND ACETAMINOPHEN 1 TABLET: 5; 325 TABLET ORAL at 11:04

## 2023-04-01 RX ADMIN — EPOETIN ALFA-EPBX 10000 UNITS: 10000 INJECTION, SOLUTION INTRAVENOUS; SUBCUTANEOUS at 10:04

## 2023-04-01 RX ADMIN — METOPROLOL TARTRATE 25 MG: 25 TABLET, FILM COATED ORAL at 10:04

## 2023-04-01 RX ADMIN — IPRATROPIUM BROMIDE 0.5 MG: 0.5 SOLUTION RESPIRATORY (INHALATION) at 01:04

## 2023-04-01 RX ADMIN — Medication 400 MG: at 10:04

## 2023-04-01 RX ADMIN — ENOXAPARIN SODIUM 40 MG: 40 INJECTION SUBCUTANEOUS at 10:04

## 2023-04-01 RX ADMIN — LEVETIRACETAM 500 MG: 500 TABLET, FILM COATED ORAL at 10:04

## 2023-04-01 RX ADMIN — ALBUTEROL SULFATE 2.5 MG: 2.5 SOLUTION RESPIRATORY (INHALATION) at 07:04

## 2023-04-01 RX ADMIN — HYDRALAZINE HYDROCHLORIDE 10 MG: 20 INJECTION INTRAMUSCULAR; INTRAVENOUS at 05:04

## 2023-04-01 RX ADMIN — LINEZOLID 600 MG: 600 INJECTION, SOLUTION INTRAVENOUS at 10:04

## 2023-04-01 RX ADMIN — SODIUM CHLORIDE, PRESERVATIVE FREE 10 ML: 5 INJECTION INTRAVENOUS at 11:04

## 2023-04-01 NOTE — PROGRESS NOTES
"[facility]    Nephrology Progress Note  Patient Name: Kamran Huerta  Age: 48 y.o.  : 1975  MRN: [id number]  Admission Date: 3/11/2023    Chief complaint: Chest Pain, Shortness of Breath, Leg Swelling, and Groin Swelling (Pt presents c/o bilateral lower extremity swelling/ testicular swelling, CP and SOB.  Onset x 2 weeks/ admit to H/ CHF/ dr [email or web address].  )      Hospital course  Kamran Huerta is a 48 y.o. Black or  male with past medical history of poorly controlled type 2 diabetes, obesity, and CKD.  He was being followed by Dr. Alonso in [facility].  He has a history of poorly controlled hypertension as well, seizure disorder and alcoholic liver disease with chronic pancreatitis.  Over the past several months the patient has had increasing volume retention, worsening anasarca and increased immobility due to the edema.  He has reportedly gained 65 lb over the past several months.     Patient has required aggressive diuresis but has lost a significant amount of weight.  He had high-grade proteinuria in excess of 10 g and biopsy has returned with advanced diabetic nodular sclerosis.       Patient underwent 5th right metatarsophalangeal joint resection for suspected osteomyelitis.     Subjective  Appears in no acute distress.      Review of Systems  Cardiovascular:  Negative for chest pain   Respiratory: Negative for shortness of breath     Objective  BP (!) 165/81   Pulse 82   Temp 98.3 °F (36.8 °C) (Oral)   Resp 18   Ht 5' 5" (1.651 m)   Wt 104.9 kg (231 lb 4.2 oz)   SpO2 (!) 93%   BMI 38.48 kg/m²     Intake/Output Summary (Last 24 hours) at 2023 1040  Last data filed at 2023 0600  Gross per 24 hour   Intake 2040 ml   Output 350 ml   Net 1690 ml       Physical Exam  General appearance: Patient is in no acute distress.  HEENT: PERRLA, EOMI, no scleral icterus, no JVD. Neck is supple.  Chest: Respirations are unlabored. Lungs sounds are " clear.   Heart: S1, S2.   Abdomen: Benign.  : Deferred.  Extremities: Trace BLE edema, peripheral pulses are palpable.   Neuro: No focal deficits.     Medications    Current Facility-Administered Medications:     0.9%  NaCl infusion (for blood administration), , Intravenous, Q24H PRN, Michelle Camacho MD    acetaminophen tablet 1,000 mg, 1,000 mg, Oral, Q6H PRN, Emre RUBIN Sobiesk, DPM, 1,000 mg at 03/27/23 2051    acetaminophen tablet 650 mg, 650 mg, Oral, Q4H PRN, Emre Abdallaiesk, DPM    albuterol nebulizer solution 2.5 mg, 2.5 mg, Nebulization, Q4H PRN, GITA Andrews    albuterol nebulizer solution 2.5 mg, 2.5 mg, Nebulization, Q6H WAKE, Dieudonne Montemayor MD, 2.5 mg at 04/01/23 0748    aluminum-magnesium hydroxide-simethicone 200-200-20 mg/5 mL suspension 30 mL, 30 mL, Oral, QID PRN, Emre Vazquez, DPM    ceFEPIme (MAXIPIME) 1 g in dextrose 5 % in water (D5W) 5 % 50 mL IVPB (MB+), 1 g, Intravenous, Q12H, Marcos Saldana MD, Stopped at 04/01/23 0926    cetirizine tablet 10 mg, 10 mg, Oral, Daily, Dieudonne Montemayor MD, 10 mg at 04/01/23 0905    cloNIDine tablet 0.2 mg, 0.2 mg, Oral, TID PRN, Emre Abdallaiesk, DPM    dextrose 10% bolus 125 mL 125 mL, 12.5 g, Intravenous, PRN, Emre RUBIN Sobiesk, DPM    dextrose 10% bolus 250 mL 250 mL, 25 g, Intravenous, PRN, Emre Abdallaiesk, DPM    dextrose 40 % gel 15,000 mg, 15 g, Oral, PRN, Emre RUBIN Sobiesk, DPM    dextrose 40 % gel 30,000 mg, 30 g, Oral, PRN, Emre Abdallaiesk, DPM    doxazosin tablet 2 mg, 2 mg, Oral, QHS, Emre Abdallaiesrena, DPM, 2 mg at 03/31/23 2102    enoxaparin injection 40 mg, 40 mg, Subcutaneous, Q12H, Emre Vazquez DPM, 40 mg at 04/01/23 0905    ergocalciferol capsule 50,000 Units, 50,000 Units, Oral, Q3 Days, Emre Vazquez DPM, 50,000 Units at 03/30/23 0855    gabapentin capsule 300 mg, 300 mg, Oral, TID, Benjamín Sharp MD, 300 mg at 04/01/23 0905    glucagon (human recombinant) injection 1 mg, 1 mg, Intramuscular, PRN, Emre Vazquez,  DPM, 1 mg at 03/20/23 0545    hydrALAZINE injection 10 mg, 10 mg, Intravenous, Q4H PRN, Emre Vazquez DPM, 10 mg at 03/12/23 1224    HYDROcodone-acetaminophen 5-325 mg per tablet 1 tablet, 1 tablet, Oral, Q4H PRN, Emre Vazquez DPM, 1 tablet at 03/31/23 1604    insulin aspart U-100 injection 1-10 Units, 1-10 Units, Subcutaneous, QID (AC + HS) PRN, Emre Vazquez DPM, 4 Units at 04/01/23 0311    insulin aspart U-100 injection 14 Units, 14 Units, Subcutaneous, TIDWM, Emre Vazquez DPM, 14 Units at 03/30/23 1646    insulin detemir U-100 injection 20 Units, 20 Units, Subcutaneous, QHS, Michelle Camacho MD, 20 Units at 03/31/23 2102    insulin detemir U-100 injection 5 Units, 5 Units, Subcutaneous, Daily, Michelle Camacho MD, 5 Units at 04/01/23 0907    ipratropium 0.02 % nebulizer solution 0.5 mg, 0.5 mg, Nebulization, Q6H, Geena Nettles, GITA, 0.5 mg at 04/01/23 0748    labetaloL injection 10 mg, 10 mg, Intravenous, Q4H PRN, Emre Vazquez DPM    levETIRAcetam tablet 500 mg, 500 mg, Oral, BID, Benjamín Sharp MD, 500 mg at 04/01/23 0905    linezolid 600 mg/300 mL IVPB 600 mg, 600 mg, Intravenous, Q12H, Nancy Valladares MD, Last Rate: 300 mL/hr at 04/01/23 0900, 600 mg at 04/01/23 0900    lipase-protease-amylase 12,000-38,000-60,000 units per capsule 6 capsule, 6 capsule, Oral, TID, Emre Vazquez DPM, 6 capsule at 04/01/23 0906    magnesium oxide tablet 400 mg, 400 mg, Oral, BID, Emre Vazquez DPM, 400 mg at 04/01/23 0905    melatonin tablet 6 mg, 6 mg, Oral, Nightly PRN, Emre Vazquez DPM, 6 mg at 03/12/23 0012    metoprolol tartrate (LOPRESSOR) tablet 25 mg, 25 mg, Oral, BID, Emre Vazquez DPM, 25 mg at 04/01/23 0905    miconazole NITRATE 2 % top powder, , Topical (Top), BID, Emre Vazquez DPM, Given at 04/01/23 0907    NIFEdipine 24 hr tablet 90 mg, 90 mg, Oral, Daily, Emre Vazquez DPM, 90 mg at 04/01/23 0905    ondansetron injection 4 mg, 4 mg, Intravenous, Q4H PRN, Emre RUBIN  WHITNEY Vazquez, 4 mg at 03/12/23 0011    oxyCODONE-acetaminophen 5-325 mg per tablet 1 tablet, 1 tablet, Oral, Q6H PRN, Emre Vazquez DPM, 1 tablet at 04/01/23 0306    polyethylene glycol packet 17 g, 17 g, Oral, Daily, Dieudonne Montemayor MD    prochlorperazine injection Soln 5 mg, 5 mg, Intravenous, Q6H PRN, Emre Vazquez DPM    senna-docusate 8.6-50 mg per tablet 2 tablet, 2 tablet, Oral, BID PRN, Emre Vazquez DPM    simethicone chewable tablet 80 mg, 1 tablet, Oral, QID PRN, Emre Vazquez DPM    sodium bicarbonate tablet 1,300 mg, 1,300 mg, Oral, TID, Emre Vazquez DPM, 1,300 mg at 04/01/23 0905    sodium chloride 0.9% flush 10 mL, 10 mL, Intravenous, PRN, Emre Vazquez DPM    Flushing PICC Protocol, , , Until Discontinued **AND** sodium chloride 0.9% flush 10 mL, 10 mL, Intravenous, Q6H, 10 mL at 04/01/23 0633 **AND** sodium chloride 0.9% flush 10 mL, 10 mL, Intravenous, PRN, Emre Vazquez DPM, 10 mL at 03/29/23 0526    torsemide tablet 40 mg, 40 mg, Oral, Daily, Benjamín Sharp MD, 40 mg at 04/01/23 0905    vitamin renal formula (B-complex-vitamin c-folic acid) 1 mg per capsule 1 capsule, 1 capsule, Oral, Daily, Emre Vazquez DPM, 1 capsule at 04/01/23 0905    zinc oxide-cod liver oil 40 % paste, , Topical (Top), BID, Emre Vazquez DPM, Given at 04/01/23 0907     Imaging:    Reviewed    Laboratory Data:  Lab Results   Component Value Date    WBC 14.1 (H) 03/31/2023    HGB 7.8 (L) 03/31/2023    HCT 24.0 (L) 03/31/2023     03/31/2023     Lab Results   Component Value Date     03/31/2023    K 4.6 03/31/2023    CHLORIDE 103 03/31/2023    CO2 28 03/31/2023    BUN 48.9 (H) 03/31/2023    CREATININE 1.70 (H) 03/31/2023    EGFRNORACEVR 50 03/31/2023    GLUCOSE 241 (H) 03/31/2023    CALCIUM 8.2 (L) 03/31/2023    ALKPHOS 74 03/31/2023    LABPROT 5.5 (L) 03/31/2023    ALBUMIN 2.1 (L) 03/31/2023    BILIDIR 0.2 01/17/2021    IBILI 0.20 01/17/2021    AST 51 (H) 03/31/2023    ALT  27 03/31/2023    MG 1.60 03/31/2023    PHOS 3.7 03/31/2023      Lab Results   Component Value Date    HGBA1C 8.3 (H) 03/12/2023    GLUCOSE 241 (H) 03/31/2023     Lab Results   Component Value Date    .2 (H) 03/12/2023    BDPHSBDR32LH <3.5 (L) 03/12/2023       Lab Results   Component Value Date    IRON 35 (L) 03/12/2023    TIBC 157 (L) 03/12/2023    TRANS 131 (L) 03/12/2023    LABIRON 22 03/12/2023    FERRITIN 304.82 (H) 03/12/2023    DZWDSGAO09 925 (H) 03/12/2023       Lab Results   Component Value Date    HEPBSURFAG Nonreactive 03/12/2023         Impression  Chronic kidney disease stage 3 secondary to biopsy-proven diabetic nephropathy  Hypertension   Anasarca, improved   Cirrhosis of the liver  UTI   Peripheral arterial disease-suspected right 5th MPJ osteo  Severe hypovitaminosis D  Secondary hyperparathyroidism    Plan  Renal indices are stable, patient appears to be euvolemic on exam.  Continue torsemide at current dose.  Patient is mildly alkalemic, will decrease sodium bicarbonate dose to 1300 mg daily.  Hypertension is poorly controlled, will increase nifedipine to 120 mg daily.  Will give 30529 units of Retacrit today for treatment of anemia of chronic disease (patient is iron replete).    Irina Correa NP  [facility] Nephrology  4/1/2023

## 2023-04-01 NOTE — PROGRESS NOTES
Infectious Diseases Progress Note  48-year-old male with past medical history of HTN, seizure disorder, chronic pancreatitis, alcoholic liver disease, DM type 2, CKD, obesity, is admitted to [facility] on 03/11/2023, presenting through the ED with complaints of diffuse body swelling, dyspnea on exertion and bilateral lower extremity pain and cramping as well as reported some fevers and chills.  He also reported recent admission at [facility] about 2 weeks prior to presentation and just discharged few days before.  He was evaluated on presentation and noted to be tachycardic and hypertensive thought to be in fluid overload with concern for CHF and pulmonary edema.  He had been pretty much without fevers but on 03/24 fever spike of 101 and is noted to have low-grade fever of 100.2 in the last 24 hours.  He is also noted to have progressive worsening leukocytosis with WBC up to 17.5 today 3/27.  Renal function is abnormal with creatinine of 2.17 on presentation, anemic with low albumin.  Blood cultures have been negative from 03/25.  Multiple chest x-ray is noted with findings suggestive of pulmonary edema and now with chest x-ray of 3/26 suggesting increased patchy opacity in the right lung concerning for pneumonia.  CT scan of the abdomen and pelvis done today 03/27 is concerning for multifocal consolidations suggestive of pneumonia or pulmonary edema.  V/Q scan with low probability of pulmonary emboli.  2D echocardiogram noted with EF of 60%.  He was also noted to have right foot infection and seen by Podiatry, status post excisional debridement on 03/24 with bone cultures positive for moderate MRSA.  He was reported to have urethral drainage culture on 03/11 with many MRSA and Pluralibacter.  He is on antibiotic coverage with Zyvox and Cefepime    Subjective:  Lying in bed in no acute distress. No new complaints voiced. Afebrile.     ROS  Constitutional:  Positive for malaise/fatigue.   HENT:  Negative.     Respiratory:  Positive for shortness of breath.    Cardiovascular:  Positive for leg swelling.   Gastrointestinal: Negative.    Genitourinary: Negative.    Musculoskeletal: Negative.    Skin:         Right foot wound   Neurological:  Positive for weakness.   Endo/Heme/Allergies: Negative.    Psychiatric/Behavioral: Negative.     All other Systems review done and negative.    Review of patient's allergies indicates:  No Known Allergies    Past Medical History:   Diagnosis Date    CHF (congestive heart failure)     Chronic back pain     CVA (cerebral vascular accident) 01/2023    DM (diabetes mellitus)     HTN (hypertension)     Seizures        Past Surgical History:   Procedure Laterality Date    BACK SURGERY      EGD, WITH CLOSED BIOPSY  3/15/2023    Procedure: EGD, WITH CLOSED BIOPSY;  Surgeon: Tj Faith MD;  Location: [facility] ENDOSCOPY;  Service: Gastroenterology;;    ESOPHAGOGASTRODUODENOSCOPY N/A 3/15/2023    Procedure: EGD;  Surgeon: Tj Faith MD;  Location: [facility] ENDOSCOPY;  Service: Gastroenterology;  Laterality: N/A;    TOE AMPUTATION Right 3/24/2023    Procedure: AMPUTATION, TOE;  Surgeon: Emre Vazquez DPM;  Location: [facility];  Service: Podiatry;  Laterality: Right;       Social History     Socioeconomic History    Marital status: Single   Tobacco Use    Smoking status: Never    Smokeless tobacco: Never   Substance and Sexual Activity    Alcohol use: Not Currently    Drug use: Not Currently    Sexual activity: Not Currently     Social Determinants of Health     Social Connections: Unknown    Marital Status:          Scheduled Meds:   albuterol sulfate  2.5 mg Nebulization Q6H WAKE    ceFEPime (MAXIPIME) IVPB  1 g Intravenous Q12H    cetirizine  10 mg Oral Daily    doxazosin  2 mg Oral QHS    enoxaparin  40 mg Subcutaneous Q12H    epoetin jw-epbx  10,000 Units Subcutaneous Once    ergocalciferol  50,000 Units Oral Q3 Days    gabapentin  300 mg Oral TID     "insulin aspart U-100  14 Units Subcutaneous TIDWM    insulin detemir U-100  20 Units Subcutaneous QHS    insulin detemir U-100  5 Units Subcutaneous Daily    ipratropium  0.5 mg Nebulization Q6H    levETIRAcetam  500 mg Oral BID    linezolid  600 mg Intravenous Q12H    lipase-protease-amylase 12,000-38,000-60,000 units  6 capsule Oral TID    magnesium oxide  400 mg Oral BID    metoprolol tartrate  25 mg Oral BID    miconazole NITRATE 2 %   Topical (Top) BID    [START ON 4/2/2023] NIFEdipine  120 mg Oral Daily    polyethylene glycol  17 g Oral Daily    [START ON 4/2/2023] sodium bicarbonate  1,300 mg Oral Daily    sodium chloride 0.9%  10 mL Intravenous Q6H    torsemide  40 mg Oral Daily    vitamin renal formula (B-complex-vitamin c-folic acid)  1 capsule Oral Daily    zinc oxide-cod liver oil   Topical (Top) BID     Continuous Infusions:  PRN Meds:sodium chloride, acetaminophen, acetaminophen, albuterol sulfate, aluminum-magnesium hydroxide-simethicone, cloNIDine, dextrose 10%, dextrose 10%, dextrose, dextrose, glucagon (human recombinant), hydrALAZINE, HYDROcodone-acetaminophen, insulin aspart U-100, labetalol, melatonin, ondansetron, oxyCODONE-acetaminophen, prochlorperazine, senna-docusate 8.6-50 mg, simethicone, sodium chloride 0.9%, Flushing PICC Protocol **AND** sodium chloride 0.9% **AND** sodium chloride 0.9%    Objective:  BP (!) 182/97   Pulse 76   Temp 98.3 °F (36.8 °C) (Oral)   Resp 18   Ht 5' 5" (1.651 m)   Wt 104.9 kg (231 lb 4.2 oz)   SpO2 95%   BMI 38.48 kg/m²     Physical Exam:   Physical Exam  Vitals reviewed.   Constitutional:       General: He is in acute distress.      Appearance: He is obese. He is ill-appearing.      Comments: Lying in bed.    HENT:      Head: Normocephalic and atraumatic.   Eyes:      Pupils: Pupils are equal, round, and reactive to light.   Cardiovascular:      Rate and Rhythm: Normal rate and regular rhythm.      Heart sounds: Normal heart sounds.   Pulmonary:      " Effort: Pulmonary effort is normal. No respiratory distress.      Breath sounds: Normal breath sounds.      Comments: On 2L NC  Abdominal:      General: Bowel sounds are normal. There is no distension.      Palpations: Abdomen is soft.      Tenderness: There is no abdominal tenderness.   Genitourinary:     Comments: Penile and scrotal edema noted  Musculoskeletal:      Cervical back: Neck supple.      Right lower leg: Edema present.      Left lower leg: Edema present.      Comments: Amputated right 5th toe   Skin:     Findings: No rash.      Comments: Right foot wound without purulence.   Neurological:      Mental Status: He is alert and oriented to person, place, and time.   Psychiatric:      Comments: Calm and cooperative     Imaging      Lab Review   Recent Results (from the past 24 hour(s))   POCT glucose    Collection Time: 03/31/23  4:07 PM   Result Value Ref Range    POCT Glucose 395 (H) 70 - 110 mg/dL   POCT glucose    Collection Time: 03/31/23  9:00 PM   Result Value Ref Range    POCT Glucose 456 (HH) 70 - 110 mg/dL   POCT glucose    Collection Time: 04/01/23  3:05 AM   Result Value Ref Range    POCT Glucose 306 (H) 70 - 110 mg/dL   POCT glucose    Collection Time: 04/01/23 11:03 AM   Result Value Ref Range    POCT Glucose 281 (H) 70 - 110 mg/dL       Assessment/Plan:  1.  Sepsis with fevers and leukocytosis  2.  Pneumonia superimposed on pulmonary edema  3.  MRSA right foot infection with osteomyelitis  4.  CHF/ Anasarca  5.  Acute kidney injury on chronic kidney disease  6. UTI/ Urethritis - MRSA, Pluralibacter  7. Diabetes type 2   8. History of alcoholic hepatitis with concern for cirrhosis  9.  Anemia   10.  Protein calorie malnutrition  11.  Morbid obesity    -Continue Cefepime #6 and Zyvox #6  -He will need a 14 day course with option of transitioning to oral antibiotics on discharge when ready  -No fever and leukocytosis about the same, follow  -Continue wound care  -Podiatry on board, inputs  noted  -Seen by Nephrology. S/P renal biopsy on 3/20, follow pathology  -Discussed with patient and nursing

## 2023-04-01 NOTE — PROGRESS NOTES
[facility]                       [address] [id number]    PATIENT NAME:       GODFREY MORLEY  YOB: 1975  CSN:                [id number]   MRN:                [id number]  ADMIT DATE:         03/11/2023 15:58:00  PHYSICIAN:          Emre Vazquez DPM                            PROGRESS NOTE    DATE:  04/01/2023 00:00:00    SUBJECTIVE:  The patient is seen this morning.  Resting.  No major issues   reported other than still complaining of pain all over.  No other complaints.    PHYSICAL EXAMINATION:  VITAL SIGNS:  Stable and afebrile.  SKIN:  Dressings are intact on the right lower extremity.  No strikethrough.    Stable appearance of the wound site.  Left side stable.    ASSESSMENT:  Right foot wound infection osteomyelitis, status post debridement.    PLAN:  Continue with current care.        ______________________________  Emre Vazquez DPM    GAS/AQS  DD:  04/01/2023  Time:  09:40AM  DT:  04/01/2023  Time:  09:47AM  Job #:  [id number]      PROGRESS NOTE

## 2023-04-01 NOTE — PROGRESS NOTES
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 Presented to the ED with complaints of diffuse body swelling specially abdomen, groin, penis and testicles and bilateral lower extremities, dyspnea on minimal exertion and bilateral lower extremity pain and cramping.  Report subjective fever and chills.  Report he was just hospitalized for similar symptom at [facility] about 2 weeks ago and was discharged home after few days. On arrival to ED, he was tachycardic, hypertensive, saturating 100% on room air.  EKG appears sinus rhythm on my review without ischemic changes.  Labs notable for WBC 16.3, hemoglobin 11.4, platelets 346, sodium 139, potassium 5.0, chloride 116, CO2 16, creatinine 2.17, BUN 34, glucose 420, calcium 8.0, albumin 1.3, , negative troponin. CT A/P with Contrast showed left pleural effusion, hepatic cirrhotic morphology, splenomegaly and upper abdominal varices and ascites, chronic pancreatitis changes, extensive subcutaneous anasarca edema.  Kidneys unremarkable without hydronephrosis.     Per ED provider exam he was noted to have purulent penile discharge, sent for culture and Segovia catheter placed.  He was given albumin 12.5 g, Lasix 40 mg IV, Vancomycin and Zosyn and subsequently referred to hospital medicine service for further evaluation and management.  mL with IV Lasix 40. Urine protein creatinine ratio noted to be 10.9. Patient was continued on IV Lasix. Seen by Renal team; recommendations made for IV Lasix/Albumin drip and renal biopsy. He was also seen by GI team for evaluation of cirrhosis and EGD was done to r/o EV. EGD showed erosive gastropathy,  normal esophagus & no other evidence of cirrhosis. Nephrology changed IV Lasix/Albumin drip to IV Lasix 80 mg TID with tentative plan for hemodialysis given patient's diuretic resistance. IR to plan for renal biopsy which could not be performed 03/15 due to uncontrolled HTN. Continues to have significant UOP with IV Lasix.  Nephrology continuing IV Lasix 80 mg t.i.d. and Metolazone 10 mg given stable creatinine and holding off on HD for now. Patient on 1500 mL fluid restriction.  OT to provide brace for scrotal support given patient's discomfort. Counseled regarding compliance with low-sodium diet and fluid restriction in order to optimize volume status with diuresis. Underwent renal biopsy with IR 03/20     Continued on IV Lasix 80 mg t.i.d. and Metolazone 10 mg.    Noted to have improved edema in B/L LE edema, abdominal wall edema & scrotal edema as of 3/22/23 . Eventually Diuretic regimen was changed to Torsemide.     Podiatry ordered XR R Foot which showed erosion of 5th metatarsophalangeal joint with osteomyelitis not excluded. Podiatry performed excisional debridement of R foot on 3/24/23.  Cultures-+ve Staph MRSA. ID was consulted     Spiked Fever 101 3/23/23,Patient is started on Vanc,Zosyn to cover Possible Pneumonia and  Osteomyelitis     On 3/26/23 Hb dropped to <7 , we transfused a unit, Oxygen requirement going up with poor Urine Output and hence Nephrology was updated about the patient, got 80mg IV lasix, continued on Torsemide        Ct scan abdomen obtained when he complained of abdominal pain  was showing constipation, had a bowel movement after Relistor.     Oxygen requirement been high per staff and patient looks volume overloaded, Pulmonology was consulted to assist with management given increased work of breathing and Hypoxemia on ABGs, placed on BIPAP.  Patient was using BiPAP at bedtime but noncompliant.  Diuresis was continued on switch to p.o. torsemide and he did well.  Antibiotics were  continued and currently awaiting LTAC placement to complete antibiotic therapy .  Segovia was discontinued.     Interval Hx:   Afebrile.  Hypertensive.  Doing well on 4 L oxygen mask overlying.  This morning he was weaned off to room air and saturating about 90%.  When seen at bedside he was alert, comfortably resting.  Complains of pain otherwise no new complaints or concerns.  Tolerating diet.            Objective/physical exam:  Vitals:    04/01/23 0306 04/01/23 0500 04/01/23 0633 04/01/23 0735   BP:    (!) 165/81   Pulse:    78   Resp: 18  (P) 18    Temp:    98.3 °F (36.8 °C)   TempSrc:    Oral   SpO2:    (!) 92%   Weight:  104.9 kg (231 lb 4.2 oz)     Height:         General: not in distress, afebrile  Respiratory:  Basal crepitations, nonlabored breathing   Cardiovascular: S1, S2, no appreciable murmur  Abdomen: Soft, nontender, BS +  MSK: Warm, lower extremity edema, no clubbing or cyanosis  Neurologic: Alert and oriented x4, moving all extremities with good strength     Lab Results   Component Value Date     03/31/2023    K 4.6 03/31/2023    CO2 28 03/31/2023    BUN 48.9 (H) 03/31/2023    CREATININE 1.70 (H) 03/31/2023    CALCIUM 8.2 (L) 03/31/2023    EGFRNONAA >60 01/20/2021      Lab Results   Component Value Date    ALT 27 03/31/2023    AST 51 (H) 03/31/2023    ALKPHOS 74 03/31/2023    BILITOT 0.3 03/31/2023      Lab Results   Component Value Date    WBC 14.1 (H) 03/31/2023    HGB 7.8 (L) 03/31/2023    HCT 24.0 (L) 03/31/2023    MCV 93.0 03/31/2023     03/31/2023           Medications:   albuterol sulfate  2.5 mg Nebulization Q6H WAKE    ceFEPime (MAXIPIME) IVPB  1 g Intravenous Q12H    cetirizine  10 mg Oral Daily    doxazosin  2 mg Oral QHS    enoxaparin  40 mg Subcutaneous Q12H    ergocalciferol  50,000 Units Oral Q3 Days    gabapentin  300 mg Oral TID    insulin aspart U-100  14 Units Subcutaneous TIDWM    insulin detemir U-100  20 Units Subcutaneous QHS    insulin detemir U-100  5 Units  Subcutaneous Daily    ipratropium  0.5 mg Nebulization Q6H    levETIRAcetam  500 mg Oral BID    linezolid  600 mg Intravenous Q12H    lipase-protease-amylase 12,000-38,000-60,000 units  6 capsule Oral TID    magnesium oxide  400 mg Oral BID    metoprolol tartrate  25 mg Oral BID    miconazole NITRATE 2 %   Topical (Top) BID    NIFEdipine  90 mg Oral Daily    polyethylene glycol  17 g Oral Daily    sodium bicarbonate  1,300 mg Oral TID    sodium chloride 0.9%  10 mL Intravenous Q6H    torsemide  40 mg Oral Daily    vitamin renal formula (B-complex-vitamin c-folic acid)  1 capsule Oral Daily    zinc oxide-cod liver oil   Topical (Top) BID      sodium chloride, acetaminophen, acetaminophen, albuterol sulfate, aluminum-magnesium hydroxide-simethicone, cloNIDine, dextrose 10%, dextrose 10%, dextrose, dextrose, glucagon (human recombinant), hydrALAZINE, HYDROcodone-acetaminophen, insulin aspart U-100, labetalol, melatonin, ondansetron, oxyCODONE-acetaminophen, prochlorperazine, senna-docusate 8.6-50 mg, simethicone, sodium chloride 0.9%, Flushing PICC Protocol **AND** sodium chloride 0.9% **AND** sodium chloride 0.9%     Assessment/Plan:    Anasarca 2/2 Diabetic Nephropathy /diabetic nodular sclerosi- improving  FABIO superimposed on CKD2 - improving  Nephrotic Syndrome/Proteinuria 10.9 g 2/2 Diabetic Nephropathy  AHRF (normal EF) 2/2 Pulm edema and possible PNA  UTI/Urethritis + Staph and Pluralibacter gergoviae    Osteomyelitis R Foot 5th Metatarsalophalangeal Joint s/p Excisional Debridement 3/24/23 + MRSA Staph  Suspected Aspiration Pneumonia  Sepsis 2/2 Above  Constipation  Deconditioning, generalized weakness       Hx:  Obesity, chronic anemia, T2 dm, 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  Nephrology following, appreciate assistance.  -counseled on compliance with BiPAP.   Pulmonary following.  Appreciate assistance.  hoping a continuous improvement of respiratory function with diuresis   -Completed 5 days of IV Rocephin for UTI   -continue Cefepime and Linezolid to cover Osteomyelitis and Pneumonia.  Id following.  We will need LTAC for antibiotics.  -continue Norco and Percocet as needed for pain control.  Encouraged to limit narcotic use.  Continue scheduled bowel regimen.  -PT/OT on board             Dieudonne Montemayor MD

## 2023-04-02 LAB
ALBUMIN SERPL-MCNC: 1.9 G/DL (ref 3.5–5)
ALBUMIN/GLOB SERPL: 0.6 RATIO (ref 1.1–2)
ALP SERPL-CCNC: 87 UNIT/L (ref 40–150)
ALT SERPL-CCNC: 21 UNIT/L (ref 0–55)
AST SERPL-CCNC: 20 UNIT/L (ref 5–34)
BASOPHILS # BLD AUTO: 0.1 X10(3)/MCL (ref 0–0.2)
BASOPHILS NFR BLD AUTO: 0.7 %
BILIRUBIN DIRECT+TOT PNL SERPL-MCNC: 0.2 MG/DL
BUN SERPL-MCNC: 43.3 MG/DL (ref 8.9–20.6)
CALCIUM SERPL-MCNC: 8.3 MG/DL (ref 8.4–10.2)
CHLORIDE SERPL-SCNC: 103 MMOL/L (ref 98–107)
CO2 SERPL-SCNC: 27 MMOL/L (ref 22–29)
CREAT SERPL-MCNC: 1.71 MG/DL (ref 0.73–1.18)
EOSINOPHIL # BLD AUTO: 1 X10(3)/MCL (ref 0–0.9)
EOSINOPHIL NFR BLD AUTO: 6.9 %
ERYTHROCYTE [DISTWIDTH] IN BLOOD BY AUTOMATED COUNT: 12.2 % (ref 11.5–17)
GFR SERPLBLD CREATININE-BSD FMLA CKD-EPI: 49 MLS/MIN/1.73/M2
GLOBULIN SER-MCNC: 3.4 GM/DL (ref 2.4–3.5)
GLUCOSE SERPL-MCNC: 360 MG/DL (ref 74–100)
HCT VFR BLD AUTO: 24.5 % (ref 42–52)
HGB BLD-MCNC: 8 G/DL (ref 14–18)
IMM GRANULOCYTES # BLD AUTO: 0.05 X10(3)/MCL (ref 0–0.04)
IMM GRANULOCYTES NFR BLD AUTO: 0.3 %
LYMPHOCYTES # BLD AUTO: 2.76 X10(3)/MCL (ref 0.6–4.6)
LYMPHOCYTES NFR BLD AUTO: 19.2 %
MAGNESIUM SERPL-MCNC: 1.4 MG/DL (ref 1.6–2.6)
MCH RBC QN AUTO: 30.2 PG (ref 27–31)
MCHC RBC AUTO-ENTMCNC: 32.7 G/DL (ref 33–36)
MCV RBC AUTO: 92.5 FL (ref 80–94)
MONOCYTES # BLD AUTO: 1.07 X10(3)/MCL (ref 0.1–1.3)
MONOCYTES NFR BLD AUTO: 7.4 %
NEUTROPHILS # BLD AUTO: 9.42 X10(3)/MCL (ref 2.1–9.2)
NEUTROPHILS NFR BLD AUTO: 65.5 %
NRBC BLD AUTO-RTO: 0 %
PLATELET # BLD AUTO: 387 X10(3)/MCL (ref 130–400)
PMV BLD AUTO: 10.7 FL (ref 7.4–10.4)
POCT GLUCOSE: 168 MG/DL (ref 70–110)
POCT GLUCOSE: 327 MG/DL (ref 70–110)
POTASSIUM SERPL-SCNC: 4 MMOL/L (ref 3.5–5.1)
PROT SERPL-MCNC: 5.3 GM/DL (ref 6.4–8.3)
RBC # BLD AUTO: 2.65 X10(6)/MCL (ref 4.7–6.1)
SODIUM SERPL-SCNC: 137 MMOL/L (ref 136–145)
WBC # SPEC AUTO: 14.4 X10(3)/MCL (ref 4.5–11.5)

## 2023-04-02 PROCEDURE — 25000003 PHARM REV CODE 250: Performed by: PODIATRIST

## 2023-04-02 PROCEDURE — 63600175 PHARM REV CODE 636 W HCPCS: Performed by: PODIATRIST

## 2023-04-02 PROCEDURE — 80053 COMPREHEN METABOLIC PANEL: CPT | Performed by: INTERNAL MEDICINE

## 2023-04-02 PROCEDURE — 21400001 HC TELEMETRY ROOM

## 2023-04-02 PROCEDURE — 25000003 PHARM REV CODE 250: Performed by: NURSE PRACTITIONER

## 2023-04-02 PROCEDURE — 99900031 HC PATIENT EDUCATION (STAT)

## 2023-04-02 PROCEDURE — A4216 STERILE WATER/SALINE, 10 ML: HCPCS | Performed by: PODIATRIST

## 2023-04-02 PROCEDURE — 25000003 PHARM REV CODE 250: Performed by: INTERNAL MEDICINE

## 2023-04-02 PROCEDURE — 63600175 PHARM REV CODE 636 W HCPCS: Performed by: STUDENT IN AN ORGANIZED HEALTH CARE EDUCATION/TRAINING PROGRAM

## 2023-04-02 PROCEDURE — 63600175 PHARM REV CODE 636 W HCPCS: Performed by: INTERNAL MEDICINE

## 2023-04-02 PROCEDURE — 25000003 PHARM REV CODE 250: Performed by: STUDENT IN AN ORGANIZED HEALTH CARE EDUCATION/TRAINING PROGRAM

## 2023-04-02 PROCEDURE — 94761 N-INVAS EAR/PLS OXIMETRY MLT: CPT

## 2023-04-02 PROCEDURE — 25000242 PHARM REV CODE 250 ALT 637 W/ HCPCS: Performed by: INTERNAL MEDICINE

## 2023-04-02 PROCEDURE — 83735 ASSAY OF MAGNESIUM: CPT | Performed by: INTERNAL MEDICINE

## 2023-04-02 PROCEDURE — 85025 COMPLETE CBC W/AUTO DIFF WBC: CPT | Performed by: INTERNAL MEDICINE

## 2023-04-02 PROCEDURE — 94640 AIRWAY INHALATION TREATMENT: CPT

## 2023-04-02 PROCEDURE — 27000221 HC OXYGEN, UP TO 24 HOURS

## 2023-04-02 PROCEDURE — 25000242 PHARM REV CODE 250 ALT 637 W/ HCPCS: Performed by: NURSE PRACTITIONER

## 2023-04-02 RX ORDER — LANOLIN ALCOHOL/MO/W.PET/CERES
800 CREAM (GRAM) TOPICAL 2 TIMES DAILY
Status: DISCONTINUED | OUTPATIENT
Start: 2023-04-02 | End: 2023-04-04 | Stop reason: HOSPADM

## 2023-04-02 RX ORDER — LOSARTAN POTASSIUM 50 MG/1
100 TABLET ORAL DAILY
Status: DISCONTINUED | OUTPATIENT
Start: 2023-04-02 | End: 2023-04-04 | Stop reason: HOSPADM

## 2023-04-02 RX ADMIN — PANCRELIPASE 6 CAPSULE: 60000; 12000; 38000 CAPSULE, DELAYED RELEASE PELLETS ORAL at 07:04

## 2023-04-02 RX ADMIN — IPRATROPIUM BROMIDE 0.5 MG: 0.5 SOLUTION RESPIRATORY (INHALATION) at 01:04

## 2023-04-02 RX ADMIN — LEVETIRACETAM 500 MG: 500 TABLET, FILM COATED ORAL at 08:04

## 2023-04-02 RX ADMIN — Medication: at 08:04

## 2023-04-02 RX ADMIN — ALBUTEROL SULFATE 2.5 MG: 2.5 SOLUTION RESPIRATORY (INHALATION) at 07:04

## 2023-04-02 RX ADMIN — Medication: at 10:04

## 2023-04-02 RX ADMIN — ENOXAPARIN SODIUM 40 MG: 40 INJECTION SUBCUTANEOUS at 07:04

## 2023-04-02 RX ADMIN — OXYCODONE HYDROCHLORIDE AND ACETAMINOPHEN 1 TABLET: 5; 325 TABLET ORAL at 12:04

## 2023-04-02 RX ADMIN — MICONAZOLE NITRATE: 20 POWDER TOPICAL at 10:04

## 2023-04-02 RX ADMIN — SODIUM BICARBONATE 650 MG TABLET 1300 MG: at 08:04

## 2023-04-02 RX ADMIN — GABAPENTIN 300 MG: 300 CAPSULE ORAL at 07:04

## 2023-04-02 RX ADMIN — CEFEPIME 1 G: 1 INJECTION, POWDER, FOR SOLUTION INTRAMUSCULAR; INTRAVENOUS at 07:04

## 2023-04-02 RX ADMIN — PANCRELIPASE 6 CAPSULE: 60000; 12000; 38000 CAPSULE, DELAYED RELEASE PELLETS ORAL at 08:04

## 2023-04-02 RX ADMIN — ALBUTEROL SULFATE 2.5 MG: 2.5 SOLUTION RESPIRATORY (INHALATION) at 01:04

## 2023-04-02 RX ADMIN — LEVETIRACETAM 500 MG: 500 TABLET, FILM COATED ORAL at 07:04

## 2023-04-02 RX ADMIN — LINEZOLID 600 MG: 600 INJECTION, SOLUTION INTRAVENOUS at 08:04

## 2023-04-02 RX ADMIN — INSULIN ASPART 14 UNITS: 100 INJECTION, SOLUTION INTRAVENOUS; SUBCUTANEOUS at 12:04

## 2023-04-02 RX ADMIN — INSULIN DETEMIR 5 UNITS: 100 INJECTION, SOLUTION SUBCUTANEOUS at 08:04

## 2023-04-02 RX ADMIN — LOSARTAN POTASSIUM 100 MG: 50 TABLET, FILM COATED ORAL at 11:04

## 2023-04-02 RX ADMIN — Medication 800 MG: at 10:04

## 2023-04-02 RX ADMIN — OXYCODONE HYDROCHLORIDE AND ACETAMINOPHEN 1 TABLET: 5; 325 TABLET ORAL at 07:04

## 2023-04-02 RX ADMIN — IPRATROPIUM BROMIDE 0.5 MG: 0.5 SOLUTION RESPIRATORY (INHALATION) at 07:04

## 2023-04-02 RX ADMIN — TORSEMIDE 40 MG: 20 TABLET ORAL at 08:04

## 2023-04-02 RX ADMIN — NEPHROCAP 1 CAPSULE: 1 CAP ORAL at 08:04

## 2023-04-02 RX ADMIN — METOPROLOL TARTRATE 25 MG: 25 TABLET, FILM COATED ORAL at 07:04

## 2023-04-02 RX ADMIN — ERGOCALCIFEROL 50000 UNITS: 1.25 CAPSULE ORAL at 10:04

## 2023-04-02 RX ADMIN — OXYCODONE HYDROCHLORIDE AND ACETAMINOPHEN 1 TABLET: 5; 325 TABLET ORAL at 05:04

## 2023-04-02 RX ADMIN — ENOXAPARIN SODIUM 40 MG: 40 INJECTION SUBCUTANEOUS at 08:04

## 2023-04-02 RX ADMIN — GABAPENTIN 300 MG: 300 CAPSULE ORAL at 08:04

## 2023-04-02 RX ADMIN — INSULIN ASPART 4 UNITS: 100 INJECTION, SOLUTION INTRAVENOUS; SUBCUTANEOUS at 05:04

## 2023-04-02 RX ADMIN — GABAPENTIN 300 MG: 300 CAPSULE ORAL at 04:04

## 2023-04-02 RX ADMIN — MICONAZOLE NITRATE: 20 POWDER TOPICAL at 08:04

## 2023-04-02 RX ADMIN — PANCRELIPASE 6 CAPSULE: 60000; 12000; 38000 CAPSULE, DELAYED RELEASE PELLETS ORAL at 04:04

## 2023-04-02 RX ADMIN — METOPROLOL TARTRATE 25 MG: 25 TABLET, FILM COATED ORAL at 08:04

## 2023-04-02 RX ADMIN — ALBUTEROL SULFATE 2.5 MG: 2.5 SOLUTION RESPIRATORY (INHALATION) at 08:04

## 2023-04-02 RX ADMIN — CEFEPIME 1 G: 1 INJECTION, POWDER, FOR SOLUTION INTRAMUSCULAR; INTRAVENOUS at 08:04

## 2023-04-02 RX ADMIN — IPRATROPIUM BROMIDE 0.5 MG: 0.5 SOLUTION RESPIRATORY (INHALATION) at 08:04

## 2023-04-02 RX ADMIN — DOXAZOSIN 2 MG: 1 TABLET ORAL at 07:04

## 2023-04-02 RX ADMIN — INSULIN ASPART 14 UNITS: 100 INJECTION, SOLUTION INTRAVENOUS; SUBCUTANEOUS at 08:04

## 2023-04-02 RX ADMIN — LINEZOLID 600 MG: 600 INJECTION, SOLUTION INTRAVENOUS at 07:04

## 2023-04-02 RX ADMIN — Medication 400 MG: at 08:04

## 2023-04-02 RX ADMIN — INSULIN ASPART 14 UNITS: 100 INJECTION, SOLUTION INTRAVENOUS; SUBCUTANEOUS at 04:04

## 2023-04-02 RX ADMIN — NIFEDIPINE 120 MG: 60 TABLET, FILM COATED, EXTENDED RELEASE ORAL at 08:04

## 2023-04-02 RX ADMIN — SODIUM CHLORIDE, PRESERVATIVE FREE 10 ML: 5 INJECTION INTRAVENOUS at 05:04

## 2023-04-02 NOTE — PT/OT/SLP PROGRESS
Physical Therapy      Patient Name:  Kamran Huerta   MRN:  [id number]    Patient not seen today secondary to Patient unwilling to participate. Will follow-up tomorrow.

## 2023-04-02 NOTE — PROGRESS NOTES
[facility]                       [address] [address]    PATIENT NAME:       GODFREY MORLEY  YOB: 1975  CSN:                [id number]   MRN:                [id number]  ADMIT DATE:         03/11/2023 15:58:00  PHYSICIAN:          Emre Vazquez DPM                            PROGRESS NOTE    DATE:  04/02/2023 00:00:00    SUBJECTIVE:  The patient was seen today, status remains about the same.  He is   actually breathing better today.  He has several family members present.  They   brought in tacos for him to eat for lunch.    PHYSICAL EXAMINATION:  VITAL SIGNS:  His current vital signs again are stable and he is afebrile.    LABS:  Reviewed.  White cells 14.4, H and H 8 and 24.5.  BUN and creatinine 43   and 1.7.  Blood sugar has been running high again.    Overall edema is much improved.  Some scaling skin.  The debridement site is   doing well as well approximated.  No signs of compromise or infection.  No   drainage.  The plantar blister site is actually desiccated out and dried out.    No fluctuance crepitation.  Left side is stable.    ASSESSMENT:  Right foot wound infection osteomyelitis, status post debridement,   doing well.    PLAN:  Dressings were placed.  The family had asked if they could apply   moisturizer to the skin, I stated that is fine as long as they do not do not put   on his amputation for actually his surgical site.  Continue with all other   care.        ______________________________  Emre Vazquez DPM    GAS/AQS  DD:  04/02/2023  Time:  12:52PM  DT:  04/02/2023  Time:  02:08PM  Job #:  [id number]      PROGRESS NOTE

## 2023-04-02 NOTE — PROGRESS NOTES
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 Presented to the ED with complaints of diffuse body swelling specially abdomen, groin, penis and testicles and bilateral lower extremities, dyspnea on minimal exertion and bilateral lower extremity pain and cramping.  Report subjective fever and chills.  Report he was just hospitalized for [facility] about 2 weeks ago and was discharged home after few days. On arrival to ED, he was tachycardic, hypertensive, saturating 100% on room air.  EKG appears sinus rhythm on my review without ischemic changes.  Labs notable for WBC 16.3, hemoglobin 11.4, platelets 346, sodium 139, potassium 5.0, chloride 116, CO2 16, creatinine 2.17, BUN 34, glucose 420, calcium 8.0, albumin 1.3, , negative troponin. CT A/P with Contrast showed left pleural effusion, hepatic cirrhotic morphology, splenomegaly and upper abdominal varices and ascites, chronic pancreatitis changes, extensive subcutaneous anasarca edema.  Kidneys unremarkable without hydronephrosis.     Per ED provider exam he was noted to have purulent penile discharge, sent for culture and Segovia catheter placed.  He was given albumin 12.5 g, Lasix 40 mg IV, Vancomycin and Zosyn and subsequently referred to hospital medicine service for further evaluation and management.  mL with IV Lasix 40. Urine protein creatinine ratio noted to be 10.9. Patient was continued on IV Lasix. Seen by Renal team; recommendations made for IV Lasix/Albumin drip and renal biopsy. He was also seen by GI team for evaluation of cirrhosis and EGD was done to r/o EV. EGD showed erosive gastropathy,  normal esophagus & no other evidence of cirrhosis. Nephrology changed IV Lasix/Albumin drip to IV Lasix 80 mg TID with tentative plan for hemodialysis given patient's diuretic resistance. IR to plan for renal biopsy which could not be performed 03/15 due to uncontrolled HTN. Continues to have significant UOP with IV Lasix.  Nephrology continuing IV Lasix 80 mg t.i.d. and Metolazone 10 mg given stable creatinine and holding off on HD for now. Patient on 1500 mL fluid restriction.  OT to provide brace for scrotal support given patient's discomfort. Counseled regarding compliance with low-sodium diet and fluid restriction in order to optimize volume status with diuresis. Underwent renal biopsy with IR 03/20     Continued on IV Lasix 80 mg t.i.d. and Metolazone 10 mg.    Noted to have improved edema in B/L LE edema, abdominal wall edema & scrotal edema as of 3/22/23 . Eventually Diuretic regimen was changed to Torsemide.     Podiatry ordered XR R Foot which showed erosion of 5th metatarsophalangeal joint with osteomyelitis not excluded. Podiatry performed excisional debridement of R foot on 3/24/23.  Cultures-+ve Staph MRSA. ID was consulted     Spiked Fever 101 3/23/23,Patient is started on Vanc,Zosyn to cover Possible Pneumonia and  Osteomyelitis     On 3/26/23 Hb dropped to <7 , we transfused a unit, Oxygen requirement going up with poor Urine Output and hence Nephrology was updated about the patient, got 80mg IV lasix, continued on Torsemide        Ct scan abdomen obtained when he complained of abdominal pain  was showing constipation, had a bowel movement after Relistor.     Oxygen requirement been high per staff and patient looks volume overloaded, Pulmonology was consulted to assist with management given increased work of breathing and Hypoxemia on ABGs, placed on BIPAP.  Patient was using BiPAP at bedtime but noncompliant.  Diuresis was continued on switch to p.o. torsemide and he did well.  Antibiotics were  continued and currently awaiting LTAC placement to complete antibiotic therapy .  Segovia was discontinued.     Interval Hx:   Hypotensive.  Saturating well on 2 L oxygen mask ventilation.  Seen and examined at bedside he was alert, lethargic, resting in the bed.  No Family at bedside.  Labs from this morning showing persistent leukocytosis, stable hemoglobin, stable renal function, hyperglycemia, mild hypomagnesemia.  Hypoalbuminemia persisting.  Continues to respond well with torsemide.    Objective/physical exam:  Vitals:    04/02/23 0036 04/02/23 0150 04/02/23 0425 04/02/23 0538   BP: (!) 150/79  (!) 170/89    Pulse: 88 88 88    Resp: 18 17 18 18   Temp: 98.3 °F (36.8 °C)  98.2 °F (36.8 °C)    TempSrc: Oral  Oral    SpO2: 97% 99% 100%    Weight:       Height:         General: not in distress, afebrile  Respiratory:  Basal crepitations, nonlabored breathing   Cardiovascular: S1, S2, no appreciable murmur  Abdomen: Soft, nontender, BS +  Extremities:  Swelling improving.  Scrotal swelling and upper thigh swelling continues.  Neurologic: Alert and oriented x4, moving all extremities with good strength     Lab Results   Component Value Date     04/02/2023    K 4.0 04/02/2023    CO2 27 04/02/2023    BUN 43.3 (H) 04/02/2023    CREATININE 1.71 (H) 04/02/2023    CALCIUM 8.3 (L) 04/02/2023    EGFRNONAA >60 01/20/2021      Lab Results   Component Value Date    ALT 21 04/02/2023    AST 20 04/02/2023    ALKPHOS 87 04/02/2023    BILITOT 0.2 04/02/2023      Lab Results   Component Value Date    WBC 14.4 (H) 04/02/2023    HGB 8.0 (L) 04/02/2023    HCT 24.5 (L) 04/02/2023    MCV 92.5 04/02/2023     04/02/2023           Medications:   albuterol sulfate  2.5 mg Nebulization Q6H WAKE    ceFEPime (MAXIPIME) IVPB  1 g Intravenous Q12H    cetirizine  10 mg Oral Daily    doxazosin  2 mg Oral QHS    enoxaparin  40 mg Subcutaneous Q12H    ergocalciferol  50,000 Units Oral Q3 Days    gabapentin  300 mg Oral TID    insulin  aspart U-100  14 Units Subcutaneous TIDWM    insulin detemir U-100  20 Units Subcutaneous QHS    insulin detemir U-100  5 Units Subcutaneous Daily    ipratropium  0.5 mg Nebulization Q6H    levETIRAcetam  500 mg Oral BID    linezolid  600 mg Intravenous Q12H    lipase-protease-amylase 12,000-38,000-60,000 units  6 capsule Oral TID    magnesium oxide  400 mg Oral BID    metoprolol tartrate  25 mg Oral BID    miconazole NITRATE 2 %   Topical (Top) BID    NIFEdipine  120 mg Oral Daily    polyethylene glycol  17 g Oral Daily    sodium bicarbonate  1,300 mg Oral Daily    sodium chloride 0.9%  10 mL Intravenous Q6H    torsemide  40 mg Oral Daily    vitamin renal formula (B-complex-vitamin c-folic acid)  1 capsule Oral Daily    zinc oxide-cod liver oil   Topical (Top) BID      sodium chloride, acetaminophen, acetaminophen, albuterol sulfate, aluminum-magnesium hydroxide-simethicone, cloNIDine, dextrose 10%, dextrose 10%, dextrose, dextrose, glucagon (human recombinant), hydrALAZINE, HYDROcodone-acetaminophen, insulin aspart U-100, labetalol, melatonin, ondansetron, oxyCODONE-acetaminophen, prochlorperazine, senna-docusate 8.6-50 mg, simethicone, sodium chloride 0.9%, Flushing PICC Protocol **AND** sodium chloride 0.9% **AND** sodium chloride 0.9%     Assessment/Plan:    Anasarca 2/2 Diabetic Nephropathy /diabetic nodular sclerosi- improving  FABIO superimposed on CKD2 - improving  Nephrotic Syndrome/Proteinuria 10.9 g 2/2 Diabetic Nephropathy  AHRF (normal EF) 2/2 Pulm edema and possible PNA  UTI/Urethritis + Staph and Pluralibacter gergoviae    Osteomyelitis R Foot 5th Metatarsalophalangeal Joint s/p Excisional Debridement 3/24/23 + MRSA Staph  Suspected Aspiration Pneumonia  Sepsis 2/2 Above  Constipation  Deconditioning, generalized weakness       Hx:  Obesity, chronic anemia, T2 dm, 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  Id following.    -continue diuresis with torsemide, monitor I's and O's and electrolytes.  Nephrology following, appreciate assistance.  -counseled on compliance with BiPAP.   Appreciate assistance.  hoping a continuous improvement of respiratory function with diuresis   -Completed 5 days of IV Rocephin for UTI   -continue Norco and Percocet as needed for pain control.  Encouraged to limit narcotic use.  Continue scheduled bowel regimen.  -PT/OT on board             Dieudonne Montemayor MD

## 2023-04-02 NOTE — PROGRESS NOTES
"[facility]    Nephrology Progress Note  Patient Name: Kamran Huerta  Age: 48 y.o.  : 1975  MRN: [id number]  Admission Date: 3/11/2023    Chief complaint: Chest Pain, Shortness of Breath, Leg Swelling, and Groin Swelling (Pt presents c/o bilateral lower extremity swelling/ testicular swelling, CP and SOB.  Onset x 2 weeks/ [place] [facility] [place]/ dr [email or web address]  )      Hospital course  Kamran Huerta is a 48 y.o. Black or  male with past medical history of poorly controlled type 2 diabetes, obesity, and CKD.  He was being followed by Dr. Alonso in [facility].  He has a history of poorly controlled hypertension as well, seizure disorder and alcoholic liver disease with chronic pancreatitis.  Over the past several months the patient has had increasing volume retention, worsening anasarca and increased immobility due to the edema.  He has reportedly gained 65 lb over the past several months.     Patient has required aggressive diuresis but has lost a significant amount of weight.  He had high-grade proteinuria in excess of 10 g and biopsy has returned with advanced diabetic nodular sclerosis.       Patient underwent 5th right metatarsophalangeal joint resection for suspected osteomyelitis.     Subjective  Appears in no acute distress.      Review of Systems  Cardiovascular:  Negative for chest pain   Respiratory: Negative for shortness of breath     Objective  BP (!) 184/89   Pulse 84   Temp 98.6 °F (37 °C) (Oral)   Resp 18   Ht 5' 5" (1.651 m)   Wt 104.9 kg (231 lb 4.2 oz)   SpO2 95%   BMI 38.48 kg/m²     Intake/Output Summary (Last 24 hours) at 2023 0933  Last data filed at 2023 0420  Gross per 24 hour   Intake 240 ml   Output 1900 ml   Net -1660 ml         Physical Exam  General appearance: Patient is in no acute distress.  HEENT: PERRLA, EOMI, no scleral icterus, no JVD. Neck is supple.  Chest: Respirations are unlabored. Lungs sounds are " clear.   Heart: S1, S2.   Abdomen: Benign.  : Deferred.  Extremities:  No edema, peripheral pulses are palpable.   Neuro: No focal deficits.     Medications    Current Facility-Administered Medications:     0.9%  NaCl infusion (for blood administration), , Intravenous, Q24H PRN, Michelle Camacho MD    acetaminophen tablet 1,000 mg, 1,000 mg, Oral, Q6H PRN, Emre A Sobiesk, DPM, 1,000 mg at 03/27/23 2051    acetaminophen tablet 650 mg, 650 mg, Oral, Q4H PRN, Emre RUBIN Sobiesk, DPM    albuterol nebulizer solution 2.5 mg, 2.5 mg, Nebulization, Q4H PRN, GITA Andrews    albuterol nebulizer solution 2.5 mg, 2.5 mg, Nebulization, Q6H WAKE, Dieudonne Montemayor MD, 2.5 mg at 04/02/23 0807    aluminum-magnesium hydroxide-simethicone 200-200-20 mg/5 mL suspension 30 mL, 30 mL, Oral, QID PRN, Emre Abdallaiesk, DPM    ceFEPIme (MAXIPIME) 1 g in dextrose 5 % in water (D5W) 5 % 50 mL IVPB (MB+), 1 g, Intravenous, Q12H, Marcos Saldana MD, Last Rate: 100 mL/hr at 04/02/23 0845, 1 g at 04/02/23 0845    cloNIDine tablet 0.2 mg, 0.2 mg, Oral, TID PRN, Emre Abdallaiesk, DPM    dextrose 10% bolus 125 mL 125 mL, 12.5 g, Intravenous, PRN, Emre A Sobiesk, DPM    dextrose 10% bolus 250 mL 250 mL, 25 g, Intravenous, PRN, Emre RUBIN Sobiesk, DPM    dextrose 40 % gel 15,000 mg, 15 g, Oral, PRN, Emre RUBIN Sobiesk, DPM    dextrose 40 % gel 30,000 mg, 30 g, Oral, PRN, Emre RUBIN Sobiesk, DPM    doxazosin tablet 2 mg, 2 mg, Oral, QHS, Emre RUBIN Sobiesk, DPM, 2 mg at 04/01/23 2225    enoxaparin injection 40 mg, 40 mg, Subcutaneous, Q12H, Emre Vazquez DPM, 40 mg at 04/02/23 0852    ergocalciferol capsule 50,000 Units, 50,000 Units, Oral, Q3 Days, Emre Vazquez DPM, 50,000 Units at 03/30/23 0855    gabapentin capsule 300 mg, 300 mg, Oral, TID, Benjamín Sharp MD, 300 mg at 04/02/23 0851    glucagon (human recombinant) injection 1 mg, 1 mg, Intramuscular, PRN, Emre Vazquez DPM, 1 mg at 03/20/23 0545    hydrALAZINE injection 10 mg, 10 mg,  Intravenous, Q4H PRN, Emre Vazquez DPM, 10 mg at 04/01/23 1712    HYDROcodone-acetaminophen 5-325 mg per tablet 1 tablet, 1 tablet, Oral, Q4H PRN, Emre Vazquez DPM, 1 tablet at 04/01/23 1133    insulin aspart U-100 injection 1-10 Units, 1-10 Units, Subcutaneous, QID (AC + HS) PRN, Emre Vazquez DPM, 4 Units at 04/02/23 0539    insulin aspart U-100 injection 14 Units, 14 Units, Subcutaneous, TIDWM, Emre Vazquez DPM, 14 Units at 04/02/23 0853    insulin detemir U-100 injection 20 Units, 20 Units, Subcutaneous, QHS, Michelle Camacho MD, 20 Units at 04/01/23 2225    insulin detemir U-100 injection 5 Units, 5 Units, Subcutaneous, Daily, Michelle Camacho MD, 5 Units at 04/02/23 0855    ipratropium 0.02 % nebulizer solution 0.5 mg, 0.5 mg, Nebulization, Q6H, Geena Nettles, GITA, 0.5 mg at 04/02/23 0807    labetaloL injection 10 mg, 10 mg, Intravenous, Q4H PRN, Emre Vazquez DPM    levETIRAcetam tablet 500 mg, 500 mg, Oral, BID, Benjamín Sharp MD, 500 mg at 04/02/23 0851    linezolid 600 mg/300 mL IVPB 600 mg, 600 mg, Intravenous, Q12H, Nancy Valladares MD, Last Rate: 300 mL/hr at 04/02/23 0845, 600 mg at 04/02/23 0845    lipase-protease-amylase 12,000-38,000-60,000 units per capsule 6 capsule, 6 capsule, Oral, TID, Emre Vazquez DPM, 6 capsule at 04/02/23 0848    magnesium oxide tablet 400 mg, 400 mg, Oral, BID, Emre Vazquez DPM, 400 mg at 04/02/23 0852    melatonin tablet 6 mg, 6 mg, Oral, Nightly PRN, Emre Vazquez DPM, 6 mg at 03/12/23 0012    metoprolol tartrate (LOPRESSOR) tablet 25 mg, 25 mg, Oral, BID, Emre Vazquez DPM, 25 mg at 04/02/23 0851    miconazole NITRATE 2 % top powder, , Topical (Top), BID, Emre Vazquez DPM, Given at 04/02/23 0853    NIFEdipine 24 hr tablet 120 mg, 120 mg, Oral, Daily, JAMAL Banerjee, 120 mg at 04/02/23 0851    ondansetron injection 4 mg, 4 mg, Intravenous, Q4H PRN, Emre Vazquez DPM, 4 mg at 03/12/23 0011    oxyCODONE-acetaminophen  5-325 mg per tablet 1 tablet, 1 tablet, Oral, Q6H PRN, Emre Vazquez DPM, 1 tablet at 04/02/23 0538    polyethylene glycol packet 17 g, 17 g, Oral, Daily, Dieudonne Montemayor MD    prochlorperazine injection Soln 5 mg, 5 mg, Intravenous, Q6H PRN, Emre Vazquez DPM    senna-docusate 8.6-50 mg per tablet 2 tablet, 2 tablet, Oral, BID PRN, Emre Vazquez DPM    simethicone chewable tablet 80 mg, 1 tablet, Oral, QID PRN, Emre Vazquez DPM    sodium bicarbonate tablet 1,300 mg, 1,300 mg, Oral, Daily, Irina Correa, FNP, 1,300 mg at 04/02/23 0851    sodium chloride 0.9% flush 10 mL, 10 mL, Intravenous, PRN, Emre Vazquez DPM    Flushing PICC Protocol, , , Until Discontinued **AND** sodium chloride 0.9% flush 10 mL, 10 mL, Intravenous, Q6H, 10 mL at 04/02/23 0502 **AND** sodium chloride 0.9% flush 10 mL, 10 mL, Intravenous, PRN, Emre Vazquez DPM, 10 mL at 03/29/23 0526    torsemide tablet 40 mg, 40 mg, Oral, Daily, Benjamín Sharp MD, 40 mg at 04/02/23 0851    vitamin renal formula (B-complex-vitamin c-folic acid) 1 mg per capsule 1 capsule, 1 capsule, Oral, Daily, Emre Vazquez DPM, 1 capsule at 04/02/23 0852    zinc oxide-cod liver oil 40 % paste, , Topical (Top), BID, Emre Vazquez DPM, Given at 04/02/23 0853     Imaging:    Reviewed    Laboratory Data:  Lab Results   Component Value Date    WBC 14.4 (H) 04/02/2023    HGB 8.0 (L) 04/02/2023    HCT 24.5 (L) 04/02/2023     04/02/2023     Lab Results   Component Value Date     04/02/2023    K 4.0 04/02/2023    CHLORIDE 103 04/02/2023    CO2 27 04/02/2023    BUN 43.3 (H) 04/02/2023    CREATININE 1.71 (H) 04/02/2023    EGFRNORACEVR 49 04/02/2023    GLUCOSE 360 (H) 04/02/2023    CALCIUM 8.3 (L) 04/02/2023    ALKPHOS 87 04/02/2023    LABPROT 5.3 (L) 04/02/2023    ALBUMIN 1.9 (L) 04/02/2023    BILIDIR 0.2 01/17/2021    IBILI 0.20 01/17/2021    AST 20 04/02/2023    ALT 21 04/02/2023    MG 1.40 (L) 04/02/2023    PHOS 3.7 03/31/2023       Lab Results   Component Value Date    HGBA1C 8.3 (H) 03/12/2023    GLUCOSE 360 (H) 04/02/2023     Lab Results   Component Value Date    .2 (H) 03/12/2023    ARVIRXOW18DB <3.5 (L) 03/12/2023       Lab Results   Component Value Date    IRON 35 (L) 03/12/2023    TIBC 157 (L) 03/12/2023    TRANS 131 (L) 03/12/2023    LABIRON 22 03/12/2023    FERRITIN 304.82 (H) 03/12/2023    AJXWTSRJ19 925 (H) 03/12/2023       Lab Results   Component Value Date    HEPBSURFAG Nonreactive 03/12/2023         Impression  Chronic kidney disease stage 3 secondary to biopsy-proven diabetic nephropathy  Hypertension   Hypomagnesemia  Anasarca, improved   Cirrhosis of the liver  UTI   Peripheral arterial disease-suspected right 5th MPJ osteo  Severe hypovitaminosis D  Secondary hyperparathyroidism  Anemia of chronic disease (received 58529 units of Retacrit on 4/1/23)    Plan  Renal indices are stable, patient appears to be euvolemic on exam.  Continue torsemide at current dose. Hypertension remains poorly controlled, will start ARB, monitor for development of FABIO and hyperkalemia closely.     Irina Correa NP  [facility]  4/2/2023

## 2023-04-03 LAB
ALBUMIN SERPL-MCNC: 1.9 G/DL (ref 3.5–5)
ALBUMIN/GLOB SERPL: 0.6 RATIO (ref 1.1–2)
ALP SERPL-CCNC: 86 UNIT/L (ref 40–150)
ALT SERPL-CCNC: 22 UNIT/L (ref 0–55)
AST SERPL-CCNC: 23 UNIT/L (ref 5–34)
BACTERIA SPEC CULT: ABNORMAL
BASOPHILS # BLD AUTO: 0.09 X10(3)/MCL (ref 0–0.2)
BASOPHILS NFR BLD AUTO: 0.7 %
BILIRUBIN DIRECT+TOT PNL SERPL-MCNC: 0.3 MG/DL
BSA FOR ECHO PROCEDURE: 2.4 M2
BUN SERPL-MCNC: 45.2 MG/DL (ref 8.9–20.6)
CALCIUM SERPL-MCNC: 8.2 MG/DL (ref 8.4–10.2)
CHLORIDE SERPL-SCNC: 104 MMOL/L (ref 98–107)
CO2 SERPL-SCNC: 24 MMOL/L (ref 22–29)
CREAT SERPL-MCNC: 1.88 MG/DL (ref 0.73–1.18)
CV ECHO LV RWT: 0.57 CM
D DIMER PPP IA.FEU-MCNC: 1.82 UG/ML FEU (ref 0–0.5)
ECHO LV POSTERIOR WALL: 1.29 CM (ref 0.6–1.1)
EJECTION FRACTION: 65 %
EOSINOPHIL # BLD AUTO: 0.75 X10(3)/MCL (ref 0–0.9)
EOSINOPHIL NFR BLD AUTO: 5.5 %
ERYTHROCYTE [DISTWIDTH] IN BLOOD BY AUTOMATED COUNT: 12.4 % (ref 11.5–17)
FRACTIONAL SHORTENING: 36 % (ref 28–44)
GFR SERPLBLD CREATININE-BSD FMLA CKD-EPI: 44 MLS/MIN/1.73/M2
GLOBULIN SER-MCNC: 3.4 GM/DL (ref 2.4–3.5)
GLUCOSE SERPL-MCNC: 275 MG/DL (ref 74–100)
GRAM STN SPEC: ABNORMAL
GRAM STN SPEC: ABNORMAL
HCT VFR BLD AUTO: 24.4 % (ref 42–52)
HGB BLD-MCNC: 8 G/DL (ref 14–18)
IMM GRANULOCYTES # BLD AUTO: 0.05 X10(3)/MCL (ref 0–0.04)
IMM GRANULOCYTES NFR BLD AUTO: 0.4 %
INTERVENTRICULAR SEPTUM: 1.24 CM (ref 0.6–1.1)
LEFT INTERNAL DIMENSION IN SYSTOLE: 2.88 CM (ref 2.1–4)
LEFT VENTRICLE DIASTOLIC VOLUME INDEX: 44.24 ML/M2
LEFT VENTRICLE DIASTOLIC VOLUME: 92.9 ML
LEFT VENTRICLE MASS INDEX: 102 G/M2
LEFT VENTRICLE SYSTOLIC VOLUME INDEX: 15.1 ML/M2
LEFT VENTRICLE SYSTOLIC VOLUME: 31.7 ML
LEFT VENTRICULAR INTERNAL DIMENSION IN DIASTOLE: 4.51 CM (ref 3.5–6)
LEFT VENTRICULAR MASS: 214.57 G
LYMPHOCYTES # BLD AUTO: 3.21 X10(3)/MCL (ref 0.6–4.6)
LYMPHOCYTES NFR BLD AUTO: 23.4 %
MAGNESIUM SERPL-MCNC: 1.2 MG/DL (ref 1.6–2.6)
MCH RBC QN AUTO: 30.3 PG (ref 27–31)
MCHC RBC AUTO-ENTMCNC: 32.8 G/DL (ref 33–36)
MCV RBC AUTO: 92.4 FL (ref 80–94)
MONOCYTES # BLD AUTO: 1.07 X10(3)/MCL (ref 0.1–1.3)
MONOCYTES NFR BLD AUTO: 7.8 %
NEUTROPHILS # BLD AUTO: 8.54 X10(3)/MCL (ref 2.1–9.2)
NEUTROPHILS NFR BLD AUTO: 62.2 %
NRBC BLD AUTO-RTO: 0 %
PHOSPHATE SERPL-MCNC: 3.7 MG/DL (ref 2.3–4.7)
PLATELET # BLD AUTO: 395 X10(3)/MCL (ref 130–400)
PMV BLD AUTO: 10.5 FL (ref 7.4–10.4)
POCT GLUCOSE: 159 MG/DL (ref 70–110)
POCT GLUCOSE: 219 MG/DL (ref 70–110)
POCT GLUCOSE: 310 MG/DL (ref 70–110)
POCT GLUCOSE: 324 MG/DL (ref 70–110)
POCT GLUCOSE: 72 MG/DL (ref 70–110)
POCT GLUCOSE: 76 MG/DL (ref 70–110)
POCT GLUCOSE: 82 MG/DL (ref 70–110)
POCT GLUCOSE: 94 MG/DL (ref 70–110)
POTASSIUM SERPL-SCNC: 4 MMOL/L (ref 3.5–5.1)
PROT SERPL-MCNC: 5.3 GM/DL (ref 6.4–8.3)
RA PRESSURE: 3 MMHG
RBC # BLD AUTO: 2.64 X10(6)/MCL (ref 4.7–6.1)
SODIUM SERPL-SCNC: 136 MMOL/L (ref 136–145)
TRICUSPID ANNULAR PLANE SYSTOLIC EXCURSION: 2.7 CM
TROPONIN I SERPL-MCNC: 0.02 NG/ML (ref 0–0.04)
WBC # SPEC AUTO: 13.7 X10(3)/MCL (ref 4.5–11.5)

## 2023-04-03 PROCEDURE — 93005 ELECTROCARDIOGRAM TRACING: CPT

## 2023-04-03 PROCEDURE — 25000003 PHARM REV CODE 250: Performed by: INTERNAL MEDICINE

## 2023-04-03 PROCEDURE — 94660 CPAP INITIATION&MGMT: CPT

## 2023-04-03 PROCEDURE — 83735 ASSAY OF MAGNESIUM: CPT | Performed by: NURSE PRACTITIONER

## 2023-04-03 PROCEDURE — 84484 ASSAY OF TROPONIN QUANT: CPT | Performed by: INTERNAL MEDICINE

## 2023-04-03 PROCEDURE — 94761 N-INVAS EAR/PLS OXIMETRY MLT: CPT

## 2023-04-03 PROCEDURE — 84100 ASSAY OF PHOSPHORUS: CPT | Performed by: NURSE PRACTITIONER

## 2023-04-03 PROCEDURE — 25000003 PHARM REV CODE 250: Performed by: PODIATRIST

## 2023-04-03 PROCEDURE — 93010 EKG 12-LEAD: ICD-10-PCS | Mod: ,,, | Performed by: INTERNAL MEDICINE

## 2023-04-03 PROCEDURE — 85379 FIBRIN DEGRADATION QUANT: CPT | Performed by: INTERNAL MEDICINE

## 2023-04-03 PROCEDURE — 93010 ELECTROCARDIOGRAM REPORT: CPT | Mod: ,,, | Performed by: INTERNAL MEDICINE

## 2023-04-03 PROCEDURE — 63600175 PHARM REV CODE 636 W HCPCS: Performed by: PODIATRIST

## 2023-04-03 PROCEDURE — 63600175 PHARM REV CODE 636 W HCPCS: Performed by: INTERNAL MEDICINE

## 2023-04-03 PROCEDURE — 25000003 PHARM REV CODE 250: Performed by: NURSE PRACTITIONER

## 2023-04-03 PROCEDURE — 99900035 HC TECH TIME PER 15 MIN (STAT)

## 2023-04-03 PROCEDURE — 25000242 PHARM REV CODE 250 ALT 637 W/ HCPCS: Performed by: INTERNAL MEDICINE

## 2023-04-03 PROCEDURE — 27000221 HC OXYGEN, UP TO 24 HOURS

## 2023-04-03 PROCEDURE — 25500020 PHARM REV CODE 255: Performed by: INTERNAL MEDICINE

## 2023-04-03 PROCEDURE — A4216 STERILE WATER/SALINE, 10 ML: HCPCS | Performed by: PODIATRIST

## 2023-04-03 PROCEDURE — 25000003 PHARM REV CODE 250: Performed by: STUDENT IN AN ORGANIZED HEALTH CARE EDUCATION/TRAINING PROGRAM

## 2023-04-03 PROCEDURE — 21400001 HC TELEMETRY ROOM

## 2023-04-03 PROCEDURE — 63600175 PHARM REV CODE 636 W HCPCS: Performed by: STUDENT IN AN ORGANIZED HEALTH CARE EDUCATION/TRAINING PROGRAM

## 2023-04-03 PROCEDURE — 25000242 PHARM REV CODE 250 ALT 637 W/ HCPCS: Performed by: NURSE PRACTITIONER

## 2023-04-03 PROCEDURE — 85025 COMPLETE CBC W/AUTO DIFF WBC: CPT | Performed by: INTERNAL MEDICINE

## 2023-04-03 PROCEDURE — 80053 COMPREHEN METABOLIC PANEL: CPT | Performed by: NURSE PRACTITIONER

## 2023-04-03 PROCEDURE — 94640 AIRWAY INHALATION TREATMENT: CPT

## 2023-04-03 RX ORDER — LABETALOL HYDROCHLORIDE 5 MG/ML
10 INJECTION, SOLUTION INTRAVENOUS ONCE
Status: COMPLETED | OUTPATIENT
Start: 2023-04-03 | End: 2023-04-03

## 2023-04-03 RX ORDER — SODIUM CHLORIDE 9 MG/ML
500 INJECTION, SOLUTION INTRAVENOUS ONCE
Status: COMPLETED | OUTPATIENT
Start: 2023-04-03 | End: 2023-04-03

## 2023-04-03 RX ADMIN — PANCRELIPASE 6 CAPSULE: 60000; 12000; 38000 CAPSULE, DELAYED RELEASE PELLETS ORAL at 09:04

## 2023-04-03 RX ADMIN — NEPHROCAP 1 CAPSULE: 1 CAP ORAL at 09:04

## 2023-04-03 RX ADMIN — LEVETIRACETAM 500 MG: 500 TABLET, FILM COATED ORAL at 08:04

## 2023-04-03 RX ADMIN — Medication 800 MG: at 09:04

## 2023-04-03 RX ADMIN — SODIUM CHLORIDE, PRESERVATIVE FREE 10 ML: 5 INJECTION INTRAVENOUS at 06:04

## 2023-04-03 RX ADMIN — NIFEDIPINE 120 MG: 60 TABLET, FILM COATED, EXTENDED RELEASE ORAL at 09:04

## 2023-04-03 RX ADMIN — GABAPENTIN 300 MG: 300 CAPSULE ORAL at 03:04

## 2023-04-03 RX ADMIN — INSULIN DETEMIR 5 UNITS: 100 INJECTION, SOLUTION SUBCUTANEOUS at 09:04

## 2023-04-03 RX ADMIN — HYDROCODONE BITARTRATE AND ACETAMINOPHEN 1 TABLET: 5; 325 TABLET ORAL at 11:04

## 2023-04-03 RX ADMIN — ALBUTEROL SULFATE 2.5 MG: 2.5 SOLUTION RESPIRATORY (INHALATION) at 07:04

## 2023-04-03 RX ADMIN — LOSARTAN POTASSIUM 100 MG: 50 TABLET, FILM COATED ORAL at 09:04

## 2023-04-03 RX ADMIN — TORSEMIDE 40 MG: 20 TABLET ORAL at 09:04

## 2023-04-03 RX ADMIN — LINEZOLID 600 MG: 600 INJECTION, SOLUTION INTRAVENOUS at 08:04

## 2023-04-03 RX ADMIN — MICONAZOLE NITRATE: 20 POWDER TOPICAL at 08:04

## 2023-04-03 RX ADMIN — ENOXAPARIN SODIUM 40 MG: 40 INJECTION SUBCUTANEOUS at 08:04

## 2023-04-03 RX ADMIN — CEFEPIME 1 G: 1 INJECTION, POWDER, FOR SOLUTION INTRAMUSCULAR; INTRAVENOUS at 08:04

## 2023-04-03 RX ADMIN — IPRATROPIUM BROMIDE 0.5 MG: 0.5 SOLUTION RESPIRATORY (INHALATION) at 01:04

## 2023-04-03 RX ADMIN — LABETALOL HYDROCHLORIDE 10 MG: 5 INJECTION, SOLUTION INTRAVENOUS at 07:04

## 2023-04-03 RX ADMIN — DOXAZOSIN 2 MG: 1 TABLET ORAL at 08:04

## 2023-04-03 RX ADMIN — INSULIN ASPART 14 UNITS: 100 INJECTION, SOLUTION INTRAVENOUS; SUBCUTANEOUS at 01:04

## 2023-04-03 RX ADMIN — Medication 800 MG: at 08:04

## 2023-04-03 RX ADMIN — LABETALOL HYDROCHLORIDE 10 MG: 5 INJECTION, SOLUTION INTRAVENOUS at 06:04

## 2023-04-03 RX ADMIN — CLONIDINE HYDROCHLORIDE 0.2 MG: 0.2 TABLET ORAL at 06:04

## 2023-04-03 RX ADMIN — GABAPENTIN 300 MG: 300 CAPSULE ORAL at 08:04

## 2023-04-03 RX ADMIN — Medication: at 08:04

## 2023-04-03 RX ADMIN — GABAPENTIN 300 MG: 300 CAPSULE ORAL at 09:04

## 2023-04-03 RX ADMIN — SODIUM CHLORIDE, PRESERVATIVE FREE 10 ML: 5 INJECTION INTRAVENOUS at 12:04

## 2023-04-03 RX ADMIN — INSULIN ASPART 14 UNITS: 100 INJECTION, SOLUTION INTRAVENOUS; SUBCUTANEOUS at 06:04

## 2023-04-03 RX ADMIN — LEVETIRACETAM 500 MG: 500 TABLET, FILM COATED ORAL at 09:04

## 2023-04-03 RX ADMIN — METOPROLOL TARTRATE 25 MG: 25 TABLET, FILM COATED ORAL at 09:04

## 2023-04-03 RX ADMIN — ALBUTEROL SULFATE 2.5 MG: 2.5 SOLUTION RESPIRATORY (INHALATION) at 01:04

## 2023-04-03 RX ADMIN — CEFEPIME 1 G: 1 INJECTION, POWDER, FOR SOLUTION INTRAMUSCULAR; INTRAVENOUS at 09:04

## 2023-04-03 RX ADMIN — OXYCODONE HYDROCHLORIDE AND ACETAMINOPHEN 1 TABLET: 5; 325 TABLET ORAL at 01:04

## 2023-04-03 RX ADMIN — ACETAMINOPHEN 1000 MG: 500 TABLET ORAL at 04:04

## 2023-04-03 RX ADMIN — INSULIN ASPART 14 UNITS: 100 INJECTION, SOLUTION INTRAVENOUS; SUBCUTANEOUS at 09:04

## 2023-04-03 RX ADMIN — IOPAMIDOL 100 ML: 755 INJECTION, SOLUTION INTRAVENOUS at 11:04

## 2023-04-03 RX ADMIN — OXYCODONE HYDROCHLORIDE AND ACETAMINOPHEN 1 TABLET: 5; 325 TABLET ORAL at 09:04

## 2023-04-03 RX ADMIN — HYDRALAZINE HYDROCHLORIDE 10 MG: 20 INJECTION INTRAMUSCULAR; INTRAVENOUS at 04:04

## 2023-04-03 RX ADMIN — LINEZOLID 600 MG: 600 INJECTION, SOLUTION INTRAVENOUS at 09:04

## 2023-04-03 RX ADMIN — METOPROLOL TARTRATE 25 MG: 25 TABLET, FILM COATED ORAL at 08:04

## 2023-04-03 RX ADMIN — OXYCODONE HYDROCHLORIDE AND ACETAMINOPHEN 1 TABLET: 5; 325 TABLET ORAL at 06:04

## 2023-04-03 RX ADMIN — PANCRELIPASE 6 CAPSULE: 60000; 12000; 38000 CAPSULE, DELAYED RELEASE PELLETS ORAL at 03:04

## 2023-04-03 RX ADMIN — SODIUM CHLORIDE 500 ML: 9 INJECTION, SOLUTION INTRAVENOUS at 11:04

## 2023-04-03 RX ADMIN — SODIUM BICARBONATE 650 MG TABLET 1300 MG: at 09:04

## 2023-04-03 NOTE — PROGRESS NOTES
[facility] Hospital Medicine Progress Note        Chief Complaint: Inpatient Follow-up for     HPI:   Mr Huerta is a 48-year-old gentleman with PMH of obesity, poorly controlled T2DM, CKD stage 2/3B with last creatinine 1.47 (11/2022), hypertension, seizure disorder, chronic pancreatitis and alcoholic liver disease, quit drinking about 1 year ago. Presented to the ED with complaints of diffuse body swelling specially abdomen, groin, penis and testicles and bilateral lower extremities, dyspnea on minimal exertion and bilateral lower extremity pain and cramping.  Report subjective fever and chills.  Report he was just hospitalized for similar symptom at [facility] about 2 weeks ago and was discharged home after few days. On arrival to ED, he was tachycardic, hypertensive, saturating 100% on room air.  EKG appears sinus rhythm on my review without ischemic changes.  Labs notable for WBC 16.3, hemoglobin 11.4, platelets 346, sodium 139, potassium 5.0, chloride 116, CO2 16, creatinine 2.17, BUN 34, glucose 420, calcium 8.0, albumin 1.3, , negative troponin. CT A/P with Contrast showed left pleural effusion, hepatic cirrhotic morphology, splenomegaly and upper abdominal varices and ascites, chronic pancreatitis changes, extensive subcutaneous anasarca edema.  Kidneys unremarkable without hydronephrosis.     Per ED provider exam he was noted to have purulent penile discharge, sent for culture and Segovia catheter placed.  He was given albumin 12.5 g, Lasix 40 mg IV, Vancomycin and Zosyn and subsequently referred to hospital medicine service for further evaluation and management.  mL with IV Lasix 40. Urine protein creatinine ratio noted to be 10.9. Patient was continued on IV Lasix. Seen by Renal team; recommendations made for IV Lasix/Albumin drip and renal biopsy. He was also seen by GI team for evaluation of cirrhosis and EGD was done to r/o EV. EGD showed erosive gastropathy, normal  esophagus & no other evidence of cirrhosis. Nephrology changed IV Lasix/Albumin drip to IV Lasix 80 mg TID with tentative plan for hemodialysis given patient's diuretic resistance. IR to plan for renal biopsy which could not be performed 03/15 due to uncontrolled HTN. Continues to have significant UOP with IV Lasix.  Nephrology continuing IV Lasix 80 mg t.i.d. and Metolazone 10 mg given stable creatinine and holding off on HD for now. Patient on 1500 mL fluid restriction.  OT to provide brace for scrotal support given patient's discomfort. Counseled regarding compliance with low-sodium diet and fluid restriction in order to optimize volume status with diuresis. Underwent renal biopsy with IR 03/20     Continued on IV Lasix 80 mg t.i.d. and Metolazone 10 mg.    Noted to have improved edema in B/L LE edema, abdominal wall edema & scrotal edema as of 3/22/23 . Eventually Diuretic regimen was changed to Torsemide.     Podiatry ordered XR R Foot which showed erosion of 5th metatarsophalangeal joint with osteomyelitis not excluded. Podiatry performed excisional debridement of R foot on 3/24/23.  Cultures-+ve Staph MRSA. ID was consulted     Spiked Fever 101 3/23/23,Patient is started on Vanc,Zosyn to cover Possible Pneumonia and  Osteomyelitis     On 3/26/23 Hb dropped to <7 , we transfused a unit, Oxygen requirement going up with poor Urine Output and hence Nephrology was updated about the patient, got 80mg IV lasix, continued on Torsemide        Ct scan abdomen obtained when he complained of abdominal pain  was showing constipation, had a bowel movement after Relistor.     Oxygen requirement been high per staff and patient looks volume overloaded, Pulmonology was consulted to assist with management given increased work of breathing and Hypoxemia on ABGs, placed on BIPAP.  Patient was using BiPAP at bedtime but noncompliant.  Diuresis was continued on switch to p.o. torsemide and he did well.  Antibiotics were continued  and currently awaiting LTAC placement to complete antibiotic therapy .  Segovia was discontinued.      Interval Hx:     Had an episode of chest pain , elevated BP requiring PRN antihypertensives. He was lethargic, also complained of left sided weakness.     Objective/physical exam:  Vitals:    04/03/23 0747 04/03/23 0757 04/03/23 0900 04/03/23 0945   BP: (!) 195/105 (!) 184/97 (!) 184/97    Pulse:  87     Resp:  18  18   Temp:  98.7 °F (37.1 °C)     TempSrc:  Oral     SpO2:  99% 100%    Weight:       Height:           General: not in distress, afebrile  Respiratory:  Basal crepitations, nonlabored breathing   Cardiovascular: S1, S2, no appreciable murmur  Abdomen: Soft, nontender, BS +  Extremities:  Swelling improving.  Scrotal swelling and upper thigh swelling continues.  Neurologic: Alert and oriented x4, moving all extremities with good strength       Lab Results   Component Value Date     04/03/2023    K 4.0 04/03/2023    CO2 24 04/03/2023    BUN 45.2 (H) 04/03/2023    CREATININE 1.88 (H) 04/03/2023    CALCIUM 8.2 (L) 04/03/2023    EGFRNONAA >60 01/20/2021      Lab Results   Component Value Date    ALT 22 04/03/2023    AST 23 04/03/2023    ALKPHOS 86 04/03/2023    BILITOT 0.3 04/03/2023      Lab Results   Component Value Date    WBC 13.7 (H) 04/03/2023    HGB 8.0 (L) 04/03/2023    HCT 24.4 (L) 04/03/2023    MCV 92.4 04/03/2023     04/03/2023           Medications:   albuterol sulfate  2.5 mg Nebulization Q6H WAKE    ceFEPime (MAXIPIME) IVPB  1 g Intravenous Q12H    doxazosin  2 mg Oral QHS    enoxaparin  40 mg Subcutaneous Q12H    ergocalciferol  50,000 Units Oral Q3 Days    gabapentin  300 mg Oral TID    insulin aspart U-100  14 Units Subcutaneous TIDWM    insulin detemir U-100  20 Units Subcutaneous QHS    insulin detemir U-100  5 Units Subcutaneous Daily    ipratropium  0.5 mg Nebulization Q6H    levETIRAcetam  500 mg Oral BID    linezolid  600 mg Intravenous Q12H    lipase-protease-amylase  12,000-38,000-60,000 units  6 capsule Oral TID    losartan  100 mg Oral Daily    magnesium oxide  800 mg Oral BID    metoprolol tartrate  25 mg Oral BID    miconazole NITRATE 2 %   Topical (Top) BID    NIFEdipine  120 mg Oral Daily    polyethylene glycol  17 g Oral Daily    sodium bicarbonate  1,300 mg Oral Daily    sodium chloride 0.9%  10 mL Intravenous Q6H    torsemide  40 mg Oral Daily    vitamin renal formula (B-complex-vitamin c-folic acid)  1 capsule Oral Daily    zinc oxide-cod liver oil   Topical (Top) BID      sodium chloride, acetaminophen, acetaminophen, albuterol sulfate, aluminum-magnesium hydroxide-simethicone, cloNIDine, dextrose 10%, dextrose 10%, dextrose, dextrose, glucagon (human recombinant), hydrALAZINE, HYDROcodone-acetaminophen, insulin aspart U-100, labetalol, melatonin, ondansetron, oxyCODONE-acetaminophen, prochlorperazine, senna-docusate 8.6-50 mg, simethicone, sodium chloride 0.9%, Flushing PICC Protocol **AND** sodium chloride 0.9% **AND** sodium chloride 0.9%     Assessment/Plan:    Anasarca 2/2 Diabetic Nephropathy /diabetic nodular sclerosi- improving  FABIO superimposed on CKD2 - improving  Nephrotic Syndrome/Proteinuria 10.9 g 2/2 Diabetic Nephropathy  AHRF (normal EF) 2/2 Pulm edema and possible PNA  UTI/Urethritis + Staph and Pluralibacter gergoviae    Osteomyelitis R Foot 5th Metatarsalophalangeal Joint s/p Excisional Debridement 3/24/23 + MRSA Staph  Suspected Aspiration Pneumonia  Sepsis 2/2 Above  Constipation  Deconditioning, generalized weakness        Hx:  Obesity, chronic anemia, T2 dm, alcoholic versus GARCIA cirrhosis, HTN, low vitamin-D, chronic pancreatitis, history of seizures    Plan:  - Will get CT head to r/o acute stroke, CTA to look for PE. Ddimer was mildly elevated  -continue Cefepime and Linezolid to cover Osteomyelitis and Pneumonia.  Id following.    -continue diuresis with torsemide, monitor I's and O's and electrolytes.  Nephrology following, appreciate  assistance.  -counseled on compliance with BiPAP.   Appreciate assistance.  hoping a continuous improvement of respiratory function with diuresis   -Completed 5 days of IV Rocephin for UTI   -continue Norco and Percocet as needed for pain control.  Encouraged to limit narcotic use.  Continue scheduled bowel regimen.  -PT/OT on board  - Encouraged to use CPAP       Dieudonne Montemayor MD

## 2023-04-03 NOTE — PT/OT/SLP PROGRESS
Occupational Therapy      Patient Name:  Kamran Huerta   MRN:  [id number]    Nurse held tx d/t elevated BP, difficult to lower and reports of chest pain. EKG and D dimer ordered.     4/3/2023

## 2023-04-03 NOTE — PT/OT/SLP PROGRESS
Physical Therapy      Patient Name:  Kamran Huerta   MRN:  [id number]    Nurse held tx d/t elevated BP, difficult to lower and reports of chest pain. EKG and D dimer ordered.

## 2023-04-03 NOTE — PROGRESS NOTES
Infectious Diseases Progress Note  48-year-old male with past medical history of HTN, seizure disorder, chronic pancreatitis, alcoholic liver disease, DM type 2, CKD, obesity, is admitted to [facility] on 03/11/2023, presenting through the ED with complaints of diffuse body swelling, dyspnea on exertion and bilateral lower extremity pain and cramping as well as reported some fevers and chills.  He also reported recent admission at [facility] about 2 weeks prior to presentation and just discharged few days before.  He was evaluated on presentation and noted to be tachycardic and hypertensive thought to be in fluid overload with concern for CHF and pulmonary edema.  He had been pretty much without fevers but on 03/24 fever spike of 101 and is noted to have low-grade fever of 100.2 in the last 24 hours.  He is also noted to have progressive worsening leukocytosis with WBC up to 17.5 today 3/27.  Renal function is abnormal with creatinine of 2.17 on presentation, anemic with low albumin.  Blood cultures have been negative from 03/25.  Multiple chest x-ray is noted with findings suggestive of pulmonary edema and now with chest x-ray of 3/26 suggesting increased patchy opacity in the right lung concerning for pneumonia.  CT scan of the abdomen and pelvis done today 03/27 is concerning for multifocal consolidations suggestive of pneumonia or pulmonary edema.  V/Q scan with low probability of pulmonary emboli.  2D echocardiogram noted with EF of 60%.  He was also noted to have right foot infection and seen by Podiatry, status post excisional debridement on 03/24 with bone cultures positive for moderate MRSA.  He was reported to have urethral drainage culture on 03/11 with many MRSA and Pluralibacter.  He is on antibiotic coverage with Zyvox and Cefepime    Subjective:  No new complaints, no fevers, doing about the same.  Lying in bed in no acute distress      Past Medical History:   Diagnosis Date     CHF (congestive heart failure)     Chronic back pain     CVA (cerebral vascular accident) 01/2023    DM (diabetes mellitus)     HTN (hypertension)     Seizures      Past Surgical History:   Procedure Laterality Date    BACK SURGERY      EGD, WITH CLOSED BIOPSY  3/15/2023    Procedure: EGD, WITH CLOSED BIOPSY;  Surgeon: Tj Faith MD;  Location: [facility] ENDOSCOPY;  Service: Gastroenterology;;    ESOPHAGOGASTRODUODENOSCOPY N/A 3/15/2023    Procedure: EGD;  Surgeon: Tj Faith MD;  Location: [facility] ENDOSCOPY;  Service: Gastroenterology;  Laterality: N/A;    TOE AMPUTATION Right 3/24/2023    Procedure: AMPUTATION, TOE;  Surgeon: Emre Vazquez DPM;  Location: [facility];  Service: Podiatry;  Laterality: Right;     Social History     Socioeconomic History    Marital status: Single   Tobacco Use    Smoking status: Never    Smokeless tobacco: Never   Substance and Sexual Activity    Alcohol use: Not Currently    Drug use: Not Currently    Sexual activity: Not Currently     Social Determinants of Health     Social Connections: Unknown    Marital Status:        ROS  Constitutional:  Positive for malaise/fatigue.   HENT: Negative.     Respiratory:  Positive for shortness of breath.    Cardiovascular:  Positive for leg swelling.   Gastrointestinal: Negative.    Genitourinary: Negative.    Musculoskeletal: Negative.    Skin:         Right foot wound   Neurological:  Positive for weakness.   Endo/Heme/Allergies: Negative.    Psychiatric/Behavioral: Negative.     All other Systems review done and negative.    Review of patient's allergies indicates:  No Known Allergies      Scheduled Meds:   albuterol sulfate  2.5 mg Nebulization Q6H WAKE    ceFEPime (MAXIPIME) IVPB  1 g Intravenous Q12H    doxazosin  2 mg Oral QHS    enoxaparin  40 mg Subcutaneous Q12H    ergocalciferol  50,000 Units Oral Q3 Days    gabapentin  300 mg Oral TID    insulin aspart U-100  14 Units Subcutaneous TIDWM    insulin detemir U-100   "20 Units Subcutaneous QHS    insulin detemir U-100  5 Units Subcutaneous Daily    ipratropium  0.5 mg Nebulization Q6H    levETIRAcetam  500 mg Oral BID    linezolid  600 mg Intravenous Q12H    lipase-protease-amylase 12,000-38,000-60,000 units  6 capsule Oral TID    losartan  100 mg Oral Daily    magnesium oxide  800 mg Oral BID    metoprolol tartrate  25 mg Oral BID    miconazole NITRATE 2 %   Topical (Top) BID    NIFEdipine  120 mg Oral Daily    polyethylene glycol  17 g Oral Daily    sodium bicarbonate  1,300 mg Oral Daily    sodium chloride 0.9%  10 mL Intravenous Q6H    torsemide  40 mg Oral Daily    vitamin renal formula (B-complex-vitamin c-folic acid)  1 capsule Oral Daily    zinc oxide-cod liver oil   Topical (Top) BID     Continuous Infusions:  PRN Meds:sodium chloride, acetaminophen, acetaminophen, albuterol sulfate, aluminum-magnesium hydroxide-simethicone, cloNIDine, dextrose 10%, dextrose 10%, dextrose, dextrose, glucagon (human recombinant), hydrALAZINE, HYDROcodone-acetaminophen, insulin aspart U-100, labetalol, melatonin, ondansetron, oxyCODONE-acetaminophen, prochlorperazine, senna-docusate 8.6-50 mg, simethicone, sodium chloride 0.9%, Flushing PICC Protocol **AND** sodium chloride 0.9% **AND** sodium chloride 0.9%    Objective:  BP (!) 154/84   Pulse 84   Temp 98.2 °F (36.8 °C) (Oral)   Resp 18   Ht 5' 5" (1.651 m)   Wt 104.9 kg (231 lb 4.2 oz)   SpO2 96%   BMI 38.48 kg/m²     Physical Exam:   Physical Exam  Vitals reviewed.   Constitutional:       General: He is in acute distress.   HENT:      Head: Normocephalic and atraumatic.   Eyes:      Pupils: Pupils are equal, round, and reactive to light.   Cardiovascular:      Rate and Rhythm: Normal rate and regular rhythm.      Heart sounds: Normal heart sounds.   Pulmonary:      Effort: Pulmonary effort is normal. No respiratory distress.      Breath sounds: Normal breath sounds.   Abdominal:      General: Bowel sounds are normal. There is no " distension.      Palpations: Abdomen is soft.      Tenderness: There is no abdominal tenderness.   Musculoskeletal:      Cervical back: Neck supple.      Right lower leg: Edema present.      Left lower leg: Edema present.      Comments: Amputated right 5th toe   Skin:     Findings: No rash.      Comments: Right foot wound without purulence.   Neurological:      Mental Status: He is alert and oriented to person, place, and time.   Psychiatric:      Comments: Calm and cooperative     Imaging  Imaging Results              US Liver with Doppler (xpd) (Final result)  Result time 03/12/23 09:58:35      Final result by Dm Alberto MD (03/12/23 09:58:35)                   Impression:    Impression:    1. There is slight intrahepatic biliary duct dilatation.    2. Portal flow parameters are within normal limits with hepatopetal flow. The hepatic veins and arteries are patent. However, Doppler evaluation was not performed as the patient was uncooperative.    3. Evaluation is limited as the patient was uncooperative due to severe pain and the examination was terminated.    4. Details and other findings as above.    No significant discrepancy with overnight report.      Electronically signed by: Dm Alberto  Date:    03/12/2023  Time:    09:58               Narrative:      Current report    Technique:Real time gray scale and color Doppler ultrasound was performed on the liver.    Comparison:None.    Clinical history:ER20. Severe pain. Cirrhosis evaluation.    Findings:Evaluation is limited as the patient was uncooperative due to severe pain and the examination was terminated.    Liver:Unremarkable appearing liver which measures 14.5 cm in greatest dimension. There is slight intrahepatic biliary duct dilatation.    Biliary ducts:The common bile duct is suboptimally visualized.    Gallbladder: Partially visualized gallbladder is un remark unremarkable.    Vascular:The main, right and left hepatic arteries are patent. The  Doppler evaluation was not performed as the patient was uncooperative due to pain.    IVC:The IVC is within normal limits.    Portal vein:Portal flow parameters are within normal limits with hepatopetal flow. The velocity measures 29.6 cm/sec. The right and left main portal veins are patent and demonstrate hepatopetal flow. The main, right and left hepatic veins are patent. The peak systolic velocity of the right hepatic is 23.3 cm.                        Preliminary result by Dm Alberto MD (03/12/23 01:19:18)                   Narrative:    START OF REPORT:  Technique: Real time gray scale and color Doppler ultrasound was performed on the liver.    Comparison: None.    Clinical history: ER20. Severe pain. Cirrhosis evaluation.    Findings: Evaluation is limited as the patient was uncooperative due to severe pain and the examination was terminated.  Liver: Unremarkable appearing liver which measures 14.5 cmin greatest dimension. There is slight intrahepatic biliary duct dilatation.  Biliary ducts: The common bile duct is suboptimally visualized.  Gallbladder: Unremarkable.  Pancreas: The pancreas is suboptimally visualized due to bowel gas.  Aorta: The aorta is within normal limits to the extent visualized.  Vascular: The main, right and left hepatic arteries are patent. The Doppler evaluation was not performed as the patient was uncooperative due to pain.  IVC: The IVC is within normal limits.  Portal vein: Portal flow parameters are within normal limits with hepatopetal flow. The velocity measures 29.6 cm/sec. The right and left main portal veins are patent and demonstrate hepatopetal flow. The main, right and left hepatic veins are patent. The peak systolic velocity of the right hepatic is 23.3 cm.      Impression:  1. There is slight intrahepatic biliary duct dilatation.  2. Portal flow parameters are within normal limits with hepatopetal flow. The hepatic veins and arteries are patent. However, Doppler  evaluation was not performed as the patient was uncooperative.  3. Evaluation is limited as the patient was uncooperative due to severe pain and the examination was terminated.  4. Details and other findings as above.                          Preliminary result by Tj Rosales MD (03/12/23 01:19:18)                   Narrative:    START OF REPORT:  Technique: Real time gray scale and color Doppler ultrasound was performed on the liver.    Comparison: None.    Clinical history: ER20. Severe pain. Cirrhosis evaluation.    Findings: Evaluation is limited as the patient was uncooperative due to severe pain and the examination was terminated.  Liver: Unremarkable appearing liver which measures 14.5 cmin greatest dimension. There is slight intrahepatic biliary duct dilatation.  Biliary ducts: The common bile duct is suboptimally visualized.  Gallbladder: Unremarkable.  Pancreas: The pancreas is suboptimally visualized due to bowel gas.  Aorta: The aorta is within normal limits to the extent visualized.  Vascular: The main, right and left hepatic arteries are patent. The Doppler evaluation was not performed as the patient was uncooperative due to pain.  IVC: The IVC is within normal limits.  Portal vein: Portal flow parameters are within normal limits with hepatopetal flow. The velocity measures 29.6 cm/sec. The right and left main portal veins are patent and demonstrate hepatopetal flow. The main, right and left hepatic veins are patent. The peak systolic velocity of the right hepatic is 23.3 cm.      Impression:  1. There is slight intrahepatic biliary duct dilatation.  2. Portal flow parameters are within normal limits with hepatopetal flow. The hepatic veins and arteries are patent. However, Doppler evaluation was not performed as the patient was uncooperative.  3. Evaluation is limited as the patient was uncooperative due to severe pain and the examination was terminated.  4. Details and other findings as  above.                                         CT Abdomen Pelvis With Contrast (Final result)  Result time 03/11/23 20:39:42      Final result by Dm Alberto MD (03/11/23 20:39:42)                   Impression:      1. Left pleural effusion.    2. Hepatic cirrhotic morphology, splenomegaly, upper abdominal varices and ascites.  3.  Chronic pancreatitis changes.    4. Extensive subcutaneous anasarca edema.      Electronically signed by: Dm Alberto  Date:    03/11/2023  Time:    20:39               Narrative:    EXAMINATION:  CT ABDOMEN PELVIS WITH CONTRAST    CLINICAL HISTORY:  penile/testicular cellulitis/drainage;    TECHNIQUE:  Multidetector axial images were obtained of the abdomen and pelvis following the administration of IV contrast. Oral contrast was not administered.    Dose length product of 948 mGycm. Automated exposure control was utilized to minimize radiation dose.    COMPARISON:  None available    FINDINGS:  There is small left dependent pleural effusion and left basilar atelectasis.    There is hepatic cirrhotic morphology and there is moderate intraperitoneal free fluid consistent with ascites. Gallbladder wall is not thickened and there is no intra luminal calcified calculus. No biliary dilation identified.  There are multiple pancreatic calcifications which is atrophic consistent with chronic pancreatitis.  There is also some dilatation pancreatic duct.  Spleen is mildly enlarged in size with maximum diameter of 13.5 cm without focal space occupying lesion.  Upper abdomen multiple varices..    The adrenal glands appear within normal limits. The kidneys are unremarkable in size and contour. No solid or cystic renal lesion identified. There is no hydronephrosis. No perinephric fluid strandings or collections identified.    Stomach is mostly decompressed.  No abnormal dilatation of the loops of small bowel.  There is also no abnormal dilatation of the colon which contains moderate fecal loading.   No suggestion of bowel obstruction.  No pneumoperitoneum.  Extensive abdomen and pelvic subcutaneous anasarca edema.    Urinary bladder is decompressed around the Segovia's catheter.  There are degenerative changes of lumbar spine which are more pronounced at the level of L5-S1.                                       X-Ray Chest AP Portable (Final result)  Result time 03/11/23 16:31:09      Final result by Chad Espinoza MD (03/11/23 16:31:09)                   Impression:      No acute cardiopulmonary process.      Electronically signed by: Chad Espinoza  Date:    03/11/2023  Time:    16:31               Narrative:    EXAMINATION:  XR CHEST AP PORTABLE    CLINICAL HISTORY:  shortness of breath;    TECHNIQUE:  Single view of the chest    COMPARISON:  No prior imaging available for comparison.    FINDINGS:  No focal opacification, pleural effusion, or pneumothorax.    The cardiomediastinal silhouette is within normal limits.    No acute osseous abnormality.                                       Lab Review   Recent Results (from the past 24 hour(s))   Comprehensive Metabolic Panel    Collection Time: 04/02/23  4:58 AM   Result Value Ref Range    Sodium Level 137 136 - 145 mmol/L    Potassium Level 4.0 3.5 - 5.1 mmol/L    Chloride 103 98 - 107 mmol/L    Carbon Dioxide 27 22 - 29 mmol/L    Glucose Level 360 (H) 74 - 100 mg/dL    Blood Urea Nitrogen 43.3 (H) 8.9 - 20.6 mg/dL    Creatinine 1.71 (H) 0.73 - 1.18 mg/dL    Calcium Level Total 8.3 (L) 8.4 - 10.2 mg/dL    Protein Total 5.3 (L) 6.4 - 8.3 gm/dL    Albumin Level 1.9 (L) 3.5 - 5.0 g/dL    Globulin 3.4 2.4 - 3.5 gm/dL    Albumin/Globulin Ratio 0.6 (L) 1.1 - 2.0 ratio    Bilirubin Total 0.2 <=1.5 mg/dL    Alkaline Phosphatase 87 40 - 150 unit/L    Alanine Aminotransferase 21 0 - 55 unit/L    Aspartate Aminotransferase 20 5 - 34 unit/L    eGFR 49 mls/min/1.73/m2   CBC with Differential    Collection Time: 04/02/23  4:58 AM   Result Value Ref Range    WBC 14.4 (H)  4.5 - 11.5 x10(3)/mcL    RBC 2.65 (L) 4.70 - 6.10 x10(6)/mcL    Hgb 8.0 (L) 14.0 - 18.0 g/dL    Hct 24.5 (L) 42.0 - 52.0 %    MCV 92.5 80.0 - 94.0 fL    MCH 30.2 27.0 - 31.0 pg    MCHC 32.7 (L) 33.0 - 36.0 g/dL    RDW 12.2 11.5 - 17.0 %    Platelet 387 130 - 400 x10(3)/mcL    MPV 10.7 (H) 7.4 - 10.4 fL    Neut % 65.5 %    Lymph % 19.2 %    Mono % 7.4 %    Eos % 6.9 %    Basophil % 0.7 %    Lymph # 2.76 0.6 - 4.6 x10(3)/mcL    Neut # 9.42 (H) 2.1 - 9.2 x10(3)/mcL    Mono # 1.07 0.1 - 1.3 x10(3)/mcL    Eos # 1.00 (H) 0 - 0.9 x10(3)/mcL    Baso # 0.10 0 - 0.2 x10(3)/mcL    IG# 0.05 (H) 0 - 0.04 x10(3)/mcL    IG% 0.3 %    NRBC% 0.0 %   Magnesium    Collection Time: 04/02/23  4:58 AM   Result Value Ref Range    Magnesium Level 1.40 (L) 1.60 - 2.60 mg/dL   POCT glucose    Collection Time: 04/02/23  5:35 AM   Result Value Ref Range    POCT Glucose 327 (H) 70 - 110 mg/dL   POCT glucose    Collection Time: 04/02/23 11:08 AM   Result Value Ref Range    POCT Glucose 168 (H) 70 - 110 mg/dL             Assessment/Plan:  1.  Sepsis with fevers and leukocytosis  2.  Pneumonia superimposed on pulmonary edema  3.  MRSA right foot infection with osteomyelitis  4.  CHF/ Anasarca  5.  Acute kidney injury on chronic kidney disease  6. UTI/ Urethritis - MRSA, Pluralibacter  7. Diabetes type 2   8. History of alcoholic hepatitis with concern for cirrhosis  9.  Anemia   10.  Protein calorie malnutrition  11.  Morbid obesity     -Continue Cefepime #7/8 and Zyvox #7 to continue with end date of 04/07 and option of transitioning to oral formulation on discharge when ready  -No fever and leukocytosis noted, follow with labs in a.m.  -Continue wound care  -Podiatry on board, inputs noted  -Seen by Nephrology. S/P renal biopsy on 3/20, follow pathology  -Discussed with patient and nursing

## 2023-04-03 NOTE — NURSING
At 0400 pt's BP was 171/86. As RN went to pull prn hydralazine at 0430, pt ambulated to bathroom and began having sudden 10/10 chest pain, shortness of breath, restless and anxious and slight diaphoresis. IV Hydralazine was given at 0438 with 4L oxygen oxymask for comfort with no improvement to chest pain and BP increase to 180/100's. EKG performed and compared to 3/11 EKG, ST depression now present in aVR as well as V1-3. Gave IV labetalol 10mg at 0600, again with no improvement and BP increase to 190/100's so clonidine 0.2mg PO was given at 0650 and MD paged. Pt began experiencing chest pain relief but BP remained 190/100's; MD arrived to floor and ordered STAT troponin & Ddimer, as well as additional dose of IV labetalol which was given. Oncoming day rn made aware and updated.

## 2023-04-03 NOTE — PROGRESS NOTES
NEPHROLOGY: Progress   48-year-old a.m. with history of poorly controlled type 2 diabetes, obesity, creatinine of 1.47 in November of 2022 with a GFR at that time of approximately 60 however , it was also as low as 33 at that time.  He was being followed by Dr. Alonso in [facility].  He has a history of poorly controlled hypertension as well, seizure disorder and alcoholic liver disease with chronic pancreatitis.  Over the past several months the patient has had increasing volume retention, worsening anasarca and increased immobility due to the edema.  He has reportedly gained 65 lb over the past several months.    Patient has required aggressive diuresis but has lost a significant amount of weight.  He had high-grade proteinuria in excess of 10 g and biopsy has returned with advanced diabetic nodular sclerosis.      Patient underwent 5th right metatarsophalangeal joint resection for suspected osteomyelitis. He remains grossly overloaded despite aggressive diuresis. Currently seems to be tolerating Torsemide in the sense he is making excellent urine. He is lethargic today and refusing BiPAP during sleep. Weight increased this morning.       Current Facility-Administered Medications:     0.9%  NaCl infusion (for blood administration), , Intravenous, Q24H PRN, Michelle Camacho MD    acetaminophen tablet 1,000 mg, 1,000 mg, Oral, Q6H PRN, Emre Vazquez DPM, 1,000 mg at 04/03/23 0439    acetaminophen tablet 650 mg, 650 mg, Oral, Q4H PRN, Emre Vazquez DPM    albuterol nebulizer solution 2.5 mg, 2.5 mg, Nebulization, Q4H PRN, GITA Andrews, 2.5 mg at 04/03/23 0110    albuterol nebulizer solution 2.5 mg, 2.5 mg, Nebulization, Q6H WAKE, Dieudonne Montemayor MD, 2.5 mg at 04/03/23 0737    aluminum-magnesium hydroxide-simethicone 200-200-20 mg/5 mL suspension 30 mL, 30 mL, Oral, QID PRN,  Emre Vazquez DPM    ceFEPIme (MAXIPIME) 1 g in dextrose 5 % in water (D5W) 5 % 50 mL IVPB (MB+), 1 g, Intravenous, Q12H, Marcos Saldana MD, Stopped at 04/03/23 0930    cloNIDine tablet 0.2 mg, 0.2 mg, Oral, TID PRN, Emre Vazquez DPM, 0.2 mg at 04/03/23 0654    dextrose 10% bolus 125 mL 125 mL, 12.5 g, Intravenous, PRN, Emre Qiuk, DPM    dextrose 10% bolus 250 mL 250 mL, 25 g, Intravenous, PRN, Emre Qiuk, DPM    dextrose 40 % gel 15,000 mg, 15 g, Oral, PRN, Emre Vazquez, DPM    dextrose 40 % gel 30,000 mg, 30 g, Oral, PRN, Emre Vazquez, DPM    doxazosin tablet 2 mg, 2 mg, Oral, QHS, Emre Vazquez DPM, 2 mg at 04/02/23 1958    enoxaparin injection 40 mg, 40 mg, Subcutaneous, Q12H, Emre Vazquez DPM, 40 mg at 04/03/23 0859    ergocalciferol capsule 50,000 Units, 50,000 Units, Oral, Q3 Days, Emre Vazquez DPM, 50,000 Units at 04/02/23 2252    gabapentin capsule 300 mg, 300 mg, Oral, TID, Benjamín Sharp MD, 300 mg at 04/03/23 0900    glucagon (human recombinant) injection 1 mg, 1 mg, Intramuscular, PRN, Emre aVzquez DPM, 1 mg at 03/20/23 0545    hydrALAZINE injection 10 mg, 10 mg, Intravenous, Q4H PRN, Emre Vazquez DPM, 10 mg at 04/03/23 0438    HYDROcodone-acetaminophen 5-325 mg per tablet 1 tablet, 1 tablet, Oral, Q4H PRN, Emre Vazquez DPM, 1 tablet at 04/01/23 1133    insulin aspart U-100 injection 1-10 Units, 1-10 Units, Subcutaneous, QID (AC + HS) PRN, Emre Vazquez DPM, 4 Units at 04/02/23 0539    insulin aspart U-100 injection 14 Units, 14 Units, Subcutaneous, TIDWM, Emre Vazquez DPM, 14 Units at 04/03/23 0902    insulin detemir U-100 injection 20 Units, 20 Units, Subcutaneous, QHS, Michelle Camacho MD, 20 Units at 04/01/23 2225    insulin detemir U-100 injection 5 Units, 5 Units, Subcutaneous, Daily, Michelle Camacho MD, 5 Units at 04/03/23 0902    ipratropium 0.02 % nebulizer solution 0.5 mg, 0.5 mg, Nebulization, Q6H, GITA Andrews, 0.5 mg at  04/03/23 0108    labetaloL injection 10 mg, 10 mg, Intravenous, Q4H PRN, Emre Vazquez DPM, 10 mg at 04/03/23 0600    levETIRAcetam tablet 500 mg, 500 mg, Oral, BID, Benjamín Sharp MD, 500 mg at 04/03/23 0900    linezolid 600 mg/300 mL IVPB 600 mg, 600 mg, Intravenous, Q12H, Nancy Valladares MD, Stopped at 04/03/23 1000    lipase-protease-amylase 12,000-38,000-60,000 units per capsule 6 capsule, 6 capsule, Oral, TID, Emre Vazquez DPM, 6 capsule at 04/03/23 0903    losartan tablet 100 mg, 100 mg, Oral, Daily, Irina Correa FNP, 100 mg at 04/03/23 0900    magnesium oxide tablet 800 mg, 800 mg, Oral, BID, SHELL BanerjeeP, 800 mg at 04/03/23 0900    melatonin tablet 6 mg, 6 mg, Oral, Nightly PRN, Emre Vazquez DPM, 6 mg at 03/12/23 0012    metoprolol tartrate (LOPRESSOR) tablet 25 mg, 25 mg, Oral, BID, Emre Vazquez DPM, 25 mg at 04/03/23 0900    miconazole NITRATE 2 % top powder, , Topical (Top), BID, Emre Vazquez DPM, Given at 04/03/23 0859    NIFEdipine 24 hr tablet 120 mg, 120 mg, Oral, Daily, SHELL BanerjeeP, 120 mg at 04/03/23 0900    ondansetron injection 4 mg, 4 mg, Intravenous, Q4H PRN, Emre Vazquez DPM, 4 mg at 03/12/23 0011    oxyCODONE-acetaminophen 5-325 mg per tablet 1 tablet, 1 tablet, Oral, Q6H PRN, Emre Vazquez DPM, 1 tablet at 04/03/23 0945    polyethylene glycol packet 17 g, 17 g, Oral, Daily, Dieudonne Montemayor MD    prochlorperazine injection Soln 5 mg, 5 mg, Intravenous, Q6H PRN, Emre Vazquez DPM    senna-docusate 8.6-50 mg per tablet 2 tablet, 2 tablet, Oral, BID PRN, Emre Vazquez DPM    simethicone chewable tablet 80 mg, 1 tablet, Oral, QID PRN, Emre Vazquez DPM    sodium bicarbonate tablet 1,300 mg, 1,300 mg, Oral, Daily, Irina Correa, SHELLP, 1,300 mg at 04/03/23 0900    sodium chloride 0.9% flush 10 mL, 10 mL, Intravenous, PRN, Emre Vazquez DPM    Flushing PICC Protocol, , , Until Discontinued **AND** sodium chloride 0.9% flush 10  "mL, 10 mL, Intravenous, Q6H, 10 mL at 04/03/23 0650 **AND** sodium chloride 0.9% flush 10 mL, 10 mL, Intravenous, PRN, Emre Vazquez, DPM, 10 mL at 03/29/23 0526    torsemide tablet 40 mg, 40 mg, Oral, Daily, Benjamín Sharp MD, 40 mg at 04/03/23 0900    vitamin renal formula (B-complex-vitamin c-folic acid) 1 mg per capsule 1 capsule, 1 capsule, Oral, Daily, Emre Vazquez DPM, 1 capsule at 04/03/23 0900    zinc oxide-cod liver oil 40 % paste, , Topical (Top), BID, Emre Vazquez DPM, Given at 04/03/23 0859      BP (!) 184/97   Pulse 87   Temp 98.7 °F (37.1 °C) (Oral)   Resp 18   Ht 5' 5" (1.651 m)   Wt 104.9 kg (231 lb 4.2 oz)   SpO2 100%   BMI 38.48 kg/m²     Physical Exam:    GEN:  Obese and chronically ill-appearing black male.  HEENT: Atraumatic. EOMI, no icterus  NECK : No JVD  CARD : RRR s rub or gallop  LUNGS :  Decreased breath sounds, oxymask at 5L  ABD : Soft,non-tender. BS active, obese.  EXT : pitting edema to entirety of BLE        Intake/Output Summary (Last 24 hours) at 4/3/2023 1115  Last data filed at 4/2/2023 2213  Gross per 24 hour   Intake 1040 ml   Output 1600 ml   Net -560 ml           Laboratory:  Recent Results (from the past 24 hour(s))   POCT glucose    Collection Time: 04/02/23  4:21 PM   Result Value Ref Range    POCT Glucose 159 (H) 70 - 110 mg/dL   POCT glucose    Collection Time: 04/02/23  7:53 PM   Result Value Ref Range    POCT Glucose 72 70 - 110 mg/dL   POCT glucose    Collection Time: 04/02/23  7:54 PM   Result Value Ref Range    POCT Glucose 82 70 - 110 mg/dL   POCT glucose    Collection Time: 04/02/23  9:11 PM   Result Value Ref Range    POCT Glucose 94 70 - 110 mg/dL   Comprehensive Metabolic Panel    Collection Time: 04/03/23  3:59 AM   Result Value Ref Range    Sodium Level 136 136 - 145 mmol/L    Potassium Level 4.0 3.5 - 5.1 mmol/L    Chloride 104 98 - 107 mmol/L    Carbon Dioxide 24 22 - 29 mmol/L    Glucose Level 275 (H) 74 - 100 mg/dL    Blood Urea " Nitrogen 45.2 (H) 8.9 - 20.6 mg/dL    Creatinine 1.88 (H) 0.73 - 1.18 mg/dL    Calcium Level Total 8.2 (L) 8.4 - 10.2 mg/dL    Protein Total 5.3 (L) 6.4 - 8.3 gm/dL    Albumin Level 1.9 (L) 3.5 - 5.0 g/dL    Globulin 3.4 2.4 - 3.5 gm/dL    Albumin/Globulin Ratio 0.6 (L) 1.1 - 2.0 ratio    Bilirubin Total 0.3 <=1.5 mg/dL    Alkaline Phosphatase 86 40 - 150 unit/L    Alanine Aminotransferase 22 0 - 55 unit/L    Aspartate Aminotransferase 23 5 - 34 unit/L    eGFR 44 mls/min/1.73/m2   Magnesium    Collection Time: 04/03/23  3:59 AM   Result Value Ref Range    Magnesium Level 1.20 (L) 1.60 - 2.60 mg/dL   Phosphorus    Collection Time: 04/03/23  3:59 AM   Result Value Ref Range    Phosphorus Level 3.7 2.3 - 4.7 mg/dL   CBC with Differential    Collection Time: 04/03/23  3:59 AM   Result Value Ref Range    WBC 13.7 (H) 4.5 - 11.5 x10(3)/mcL    RBC 2.64 (L) 4.70 - 6.10 x10(6)/mcL    Hgb 8.0 (L) 14.0 - 18.0 g/dL    Hct 24.4 (L) 42.0 - 52.0 %    MCV 92.4 80.0 - 94.0 fL    MCH 30.3 27.0 - 31.0 pg    MCHC 32.8 (L) 33.0 - 36.0 g/dL    RDW 12.4 11.5 - 17.0 %    Platelet 395 130 - 400 x10(3)/mcL    MPV 10.5 (H) 7.4 - 10.4 fL    Neut % 62.2 %    Lymph % 23.4 %    Mono % 7.8 %    Eos % 5.5 %    Basophil % 0.7 %    Lymph # 3.21 0.6 - 4.6 x10(3)/mcL    Neut # 8.54 2.1 - 9.2 x10(3)/mcL    Mono # 1.07 0.1 - 1.3 x10(3)/mcL    Eos # 0.75 0 - 0.9 x10(3)/mcL    Baso # 0.09 0 - 0.2 x10(3)/mcL    IG# 0.05 (H) 0 - 0.04 x10(3)/mcL    IG% 0.4 %    NRBC% 0.0 %   Troponin I    Collection Time: 04/03/23  3:59 AM   Result Value Ref Range    Troponin-I 0.023 0.000 - 0.045 ng/mL   POCT glucose    Collection Time: 04/03/23  4:35 AM   Result Value Ref Range    POCT Glucose 310 (H) 70 - 110 mg/dL   Troponin I    Collection Time: 04/03/23  8:06 AM   Result Value Ref Range    Troponin-I 0.022 0.000 - 0.045 ng/mL   D-Dimer, Quantitative    Collection Time: 04/03/23  8:06 AM   Result Value Ref Range    D-Dimer 1.82 (H) 0.00 - 0.50 ug/mL FEU          Assessment/Plan:  Advanced stage III CKD-Biopsy-Advanced Diabetic Nodular sclerosis   Hypertension-much better controlled  Njasxpkd-mrgkbemh-mvijicbcs well torsemide 40 b.i.d.   Hepatic cirrhosis   UTI   Peripheral arterial disease-suspected right 5th MPJ osteo  Profound vitamin-D deficiency-69233 units ergo q 72  Secondary hyperparathyroidism    Continue Torsemide daily.   No change from renal standpoint. OK to send to [facility] from renal standpoint.

## 2023-04-03 NOTE — PROGRESS NOTES
"[facility]    Nephrology Progress Note  Patient Name: Kamran Huerta  Age: 48 y.o.  : 1975  MRN: [id number]  Admission Date: 3/11/2023    Chief complaint: Chest Pain, Shortness of Breath, Leg Swelling, and Groin Swelling (Pt presents c/o bilateral lower extremity swelling/ testicular swelling, CP and SOB.  Onset x 2 weeks/ admit to H/ CHF/ dr [email or web address].  )      Hospital course  Kamran Huerta is a 48 y.o. Black or  male with past medical history of poorly controlled type 2 diabetes, obesity, and CKD.  He was being followed by Dr. Alonso in [facility].  He has a history of poorly controlled hypertension as well, seizure disorder and alcoholic liver disease with chronic pancreatitis.  Over the past several months the patient has had increasing volume retention, worsening anasarca and increased immobility due to the edema.  He has reportedly gained 65 lb over the past several months.     Patient has required aggressive diuresis but has lost a significant amount of weight.  He had high-grade proteinuria in excess of 10 g and biopsy has returned with advanced diabetic nodular sclerosis.       Patient underwent 5th right metatarsophalangeal joint resection for suspected osteomyelitis.     4/3/23: Patient had acute episode of chest pain which he reported as 10/10 along with SOB and diaphoresis today. He is now on oxymask and able to converse. STAT imaging was done. Results pending.         Objective  BP (!) 184/97   Pulse 87   Temp 98.7 °F (37.1 °C) (Oral)   Resp 18   Ht 5' 5" (1.651 m)   Wt 104.9 kg (231 lb 4.2 oz)   SpO2 100%   BMI 38.48 kg/m²     Intake/Output Summary (Last 24 hours) at 4/3/2023 1151  Last data filed at 2023 2213  Gross per 24 hour   Intake 1040 ml   Output 1600 ml   Net -560 ml         Physical Exam  Physical Exam  Constitutional:       General: He is not in acute distress.     Appearance: He is obese.   HENT:      Head: " Atraumatic.      Nose: Nose normal.      Mouth/Throat:      Mouth: Mucous membranes are moist.   Eyes:      Extraocular Movements: Extraocular movements intact.      Conjunctiva/sclera: Conjunctivae normal.   Cardiovascular:      Rate and Rhythm: Normal rate and regular rhythm.      Pulses: Normal pulses.      Heart sounds: Normal heart sounds.   Pulmonary:      Effort: Pulmonary effort is normal.      Comments: Bilaterally diminished, oxymask at 4LPM, SpO2 100%  Abdominal:      General: There is no distension.      Palpations: Abdomen is soft.   Genitourinary:     Comments: Light yellow clear urine at bedside in urinal   Musculoskeletal:         General: Swelling present.      Cervical back: Neck supple. No rigidity.   Neurological:      General: No focal deficit present.      Mental Status: He is alert and oriented to person, place, and time.   Psychiatric:         Mood and Affect: Mood normal.         Behavior: Behavior normal.         Medications    Current Facility-Administered Medications:     0.9%  NaCl infusion (for blood administration), , Intravenous, Q24H PRN, Michelle Camacho MD    0.9%  NaCl infusion, , Intravenous, Once, Jaqueline Avina, SUSAN    acetaminophen tablet 1,000 mg, 1,000 mg, Oral, Q6H PRN, Emre Vazquez DPM, 1,000 mg at 04/03/23 0439    acetaminophen tablet 650 mg, 650 mg, Oral, Q4H PRN, Emre Vazquez DPM    albuterol nebulizer solution 2.5 mg, 2.5 mg, Nebulization, Q4H PRN, GITA Andrews, 2.5 mg at 04/03/23 0110    albuterol nebulizer solution 2.5 mg, 2.5 mg, Nebulization, Q6H WAKE, Dieudonne Montemayor MD, 2.5 mg at 04/03/23 0737    aluminum-magnesium hydroxide-simethicone 200-200-20 mg/5 mL suspension 30 mL, 30 mL, Oral, QID PRN, Emre Vazquez DPM    ceFEPIme (MAXIPIME) 1 g in dextrose 5 % in water (D5W) 5 % 50 mL IVPB (MB+), 1 g, Intravenous, Q12H, Marcos Saldana MD, Stopped at 04/03/23 0930    cloNIDine tablet 0.2 mg, 0.2 mg, Oral, TID PRN, Emre Vazquez DPM, 0.2 mg  at 04/03/23 0654    dextrose 10% bolus 125 mL 125 mL, 12.5 g, Intravenous, PRN, Emre Vazquez, DPM    dextrose 10% bolus 250 mL 250 mL, 25 g, Intravenous, PRN, Emre Vazquez, DPM    dextrose 40 % gel 15,000 mg, 15 g, Oral, PRN, Emre Qiuk, DPM    dextrose 40 % gel 30,000 mg, 30 g, Oral, PRN, Emre Vazquez, DPM    doxazosin tablet 2 mg, 2 mg, Oral, QHS, Emre Vazquez, DPM, 2 mg at 04/02/23 1958    enoxaparin injection 40 mg, 40 mg, Subcutaneous, Q12H, Emre Vazquez DPM, 40 mg at 04/03/23 0859    ergocalciferol capsule 50,000 Units, 50,000 Units, Oral, Q3 Days, Emre Vazquez DPM, 50,000 Units at 04/02/23 2252    gabapentin capsule 300 mg, 300 mg, Oral, TID, Benjamín Sharp MD, 300 mg at 04/03/23 0900    glucagon (human recombinant) injection 1 mg, 1 mg, Intramuscular, PRN, Emre Vazquez DPM, 1 mg at 03/20/23 0545    hydrALAZINE injection 10 mg, 10 mg, Intravenous, Q4H PRN, Emre Vazquez DPM, 10 mg at 04/03/23 0438    HYDROcodone-acetaminophen 5-325 mg per tablet 1 tablet, 1 tablet, Oral, Q4H PRN, Emre Vazquez DPM, 1 tablet at 04/01/23 1133    insulin aspart U-100 injection 1-10 Units, 1-10 Units, Subcutaneous, QID (AC + HS) PRN, Emre Vazquez DPM, 4 Units at 04/02/23 0539    insulin aspart U-100 injection 14 Units, 14 Units, Subcutaneous, TIDWM, Emre Vazquez DPM, 14 Units at 04/03/23 0902    insulin detemir U-100 injection 20 Units, 20 Units, Subcutaneous, QHS, Michelle Camacho MD, 20 Units at 04/01/23 2225    insulin detemir U-100 injection 5 Units, 5 Units, Subcutaneous, Daily, Michelle Camacho MD, 5 Units at 04/03/23 0902    ipratropium 0.02 % nebulizer solution 0.5 mg, 0.5 mg, Nebulization, Q6H, GITA Andrews, 0.5 mg at 04/03/23 0108    labetaloL injection 10 mg, 10 mg, Intravenous, Q4H PRN, mEre Vazquez DPM, 10 mg at 04/03/23 0600    levETIRAcetam tablet 500 mg, 500 mg, Oral, BID, Benjamín Sharp MD, 500 mg at 04/03/23 0900    linezolid 600 mg/300 mL IVPB 600  mg, 600 mg, Intravenous, Q12H, Nancy Valladares MD, Stopped at 04/03/23 1000    lipase-protease-amylase 12,000-38,000-60,000 units per capsule 6 capsule, 6 capsule, Oral, TID, Emre Vazquez DPM, 6 capsule at 04/03/23 0903    losartan tablet 100 mg, 100 mg, Oral, Daily, Irnia Correa, FNP, 100 mg at 04/03/23 0900    magnesium oxide tablet 800 mg, 800 mg, Oral, BID, Irina Correa, FNP, 800 mg at 04/03/23 0900    melatonin tablet 6 mg, 6 mg, Oral, Nightly PRN, Emre Vazquez DPM, 6 mg at 03/12/23 0012    metoprolol tartrate (LOPRESSOR) tablet 25 mg, 25 mg, Oral, BID, ELSA MoyerM, 25 mg at 04/03/23 0900    miconazole NITRATE 2 % top powder, , Topical (Top), BID, Emre Vazquez DPM, Given at 04/03/23 0859    NIFEdipine 24 hr tablet 120 mg, 120 mg, Oral, Daily, Irina Correa, FNP, 120 mg at 04/03/23 0900    ondansetron injection 4 mg, 4 mg, Intravenous, Q4H PRN, Emre Vazquez DPM, 4 mg at 03/12/23 0011    oxyCODONE-acetaminophen 5-325 mg per tablet 1 tablet, 1 tablet, Oral, Q6H PRN, Emre Vazquez DPM, 1 tablet at 04/03/23 0945    polyethylene glycol packet 17 g, 17 g, Oral, Daily, Dieudonne Montemayor MD    prochlorperazine injection Soln 5 mg, 5 mg, Intravenous, Q6H PRN, Emre Vazquez DPM    senna-docusate 8.6-50 mg per tablet 2 tablet, 2 tablet, Oral, BID PRN, Emre Vazquez DPM    simethicone chewable tablet 80 mg, 1 tablet, Oral, QID PRN, Emre Vazquez DPM    sodium bicarbonate tablet 1,300 mg, 1,300 mg, Oral, Daily, IrinaJAMAL Rojas, 1,300 mg at 04/03/23 0900    sodium chloride 0.9% flush 10 mL, 10 mL, Intravenous, PRN, Emre Vazquez DPM    Flushing PICC Protocol, , , Until Discontinued **AND** sodium chloride 0.9% flush 10 mL, 10 mL, Intravenous, Q6H, 10 mL at 04/03/23 0650 **AND** sodium chloride 0.9% flush 10 mL, 10 mL, Intravenous, PRN, Emre Vazquez DPM, 10 mL at 03/29/23 0526    torsemide tablet 40 mg, 40 mg, Oral, Daily, Benjamín Sharp MD, 40 mg at 04/03/23  0900    vitamin renal formula (B-complex-vitamin c-folic acid) 1 mg per capsule 1 capsule, 1 capsule, Oral, Daily, Emre Vazquez DPM, 1 capsule at 04/03/23 0900    zinc oxide-cod liver oil 40 % paste, , Topical (Top), BID, Emre Vazquez DPM, Given at 04/03/23 0859     Imaging:    Reviewed    Laboratory Data:  Lab Results   Component Value Date    WBC 13.7 (H) 04/03/2023    HGB 8.0 (L) 04/03/2023    HCT 24.4 (L) 04/03/2023     04/03/2023     Lab Results   Component Value Date     04/03/2023    K 4.0 04/03/2023    CHLORIDE 104 04/03/2023    CO2 24 04/03/2023    BUN 45.2 (H) 04/03/2023    CREATININE 1.88 (H) 04/03/2023    EGFRNORACEVR 44 04/03/2023    GLUCOSE 275 (H) 04/03/2023    CALCIUM 8.2 (L) 04/03/2023    ALKPHOS 86 04/03/2023    LABPROT 5.3 (L) 04/03/2023    ALBUMIN 1.9 (L) 04/03/2023    BILIDIR 0.2 01/17/2021    IBILI 0.20 01/17/2021    AST 23 04/03/2023    ALT 22 04/03/2023    MG 1.20 (L) 04/03/2023    PHOS 3.7 04/03/2023      Lab Results   Component Value Date    HGBA1C 8.3 (H) 03/12/2023    GLUCOSE 275 (H) 04/03/2023     Lab Results   Component Value Date    .2 (H) 03/12/2023    [id number] <3.5 (L) 03/12/2023       Lab Results   Component Value Date    IRON 35 (L) 03/12/2023    TIBC 157 (L) 03/12/2023    TRANS 131 (L) 03/12/2023    LABIRON 22 03/12/2023    FERRITIN 304.82 (H) 03/12/2023    [id number] 925 (H) 03/12/2023       Lab Results   Component Value Date    HEPBSURFAG Nonreactive 03/12/2023         Impression  Chronic kidney disease stage 3 secondary to biopsy-proven diabetic nephropathy  Hypertension   Hypomagnesemia  Anasarca, improved   Cirrhosis of the liver  UTI   Peripheral arterial disease-suspected right 5th MPJ osteo  Severe hypovitaminosis D  Secondary hyperparathyroidism  Anemia of chronic disease (received 60270 units of Retacrit on 4/1/23)    Plan  Continue Torsemide 40mg once daily   ARB was initiated over the weekend for which we are monitoring for FABIO and  hyperkalemia.

## 2023-04-04 VITALS
TEMPERATURE: 98 F | HEART RATE: 82 BPM | DIASTOLIC BLOOD PRESSURE: 88 MMHG | WEIGHT: 223.31 LBS | BODY MASS INDEX: 37.2 KG/M2 | RESPIRATION RATE: 16 BRPM | SYSTOLIC BLOOD PRESSURE: 155 MMHG | HEIGHT: 65 IN | OXYGEN SATURATION: 97 %

## 2023-04-04 PROBLEM — N17.9 AKI (ACUTE KIDNEY INJURY): Status: RESOLVED | Noted: 2022-11-13 | Resolved: 2023-04-04

## 2023-04-04 PROBLEM — R60.1 ANASARCA: Status: RESOLVED | Noted: 2023-03-11 | Resolved: 2023-04-04

## 2023-04-04 PROBLEM — N39.0 UTI (URINARY TRACT INFECTION): Status: RESOLVED | Noted: 2023-03-11 | Resolved: 2023-04-04

## 2023-04-04 LAB — POCT GLUCOSE: 207 MG/DL (ref 70–110)

## 2023-04-04 PROCEDURE — A4216 STERILE WATER/SALINE, 10 ML: HCPCS | Performed by: PODIATRIST

## 2023-04-04 PROCEDURE — 99900035 HC TECH TIME PER 15 MIN (STAT)

## 2023-04-04 PROCEDURE — 63600175 PHARM REV CODE 636 W HCPCS: Performed by: INTERNAL MEDICINE

## 2023-04-04 PROCEDURE — 97116 GAIT TRAINING THERAPY: CPT | Mod: CQ

## 2023-04-04 PROCEDURE — 63600175 PHARM REV CODE 636 W HCPCS: Performed by: PODIATRIST

## 2023-04-04 PROCEDURE — 25000003 PHARM REV CODE 250: Performed by: PODIATRIST

## 2023-04-04 PROCEDURE — 25000242 PHARM REV CODE 250 ALT 637 W/ HCPCS: Performed by: NURSE PRACTITIONER

## 2023-04-04 PROCEDURE — 97535 SELF CARE MNGMENT TRAINING: CPT | Mod: CO

## 2023-04-04 PROCEDURE — 25000003 PHARM REV CODE 250: Performed by: NURSE PRACTITIONER

## 2023-04-04 PROCEDURE — 94761 N-INVAS EAR/PLS OXIMETRY MLT: CPT

## 2023-04-04 PROCEDURE — 94640 AIRWAY INHALATION TREATMENT: CPT

## 2023-04-04 PROCEDURE — 25000242 PHARM REV CODE 250 ALT 637 W/ HCPCS: Performed by: INTERNAL MEDICINE

## 2023-04-04 PROCEDURE — 25000003 PHARM REV CODE 250: Performed by: INTERNAL MEDICINE

## 2023-04-04 RX ORDER — LINEZOLID 600 MG/1
600 TABLET, FILM COATED ORAL EVERY 12 HOURS
Qty: 6 TABLET | Refills: 0 | Status: SHIPPED | OUTPATIENT
Start: 2023-04-04 | End: 2023-04-07

## 2023-04-04 RX ORDER — LOSARTAN POTASSIUM 100 MG/1
100 TABLET ORAL DAILY
Qty: 60 TABLET | Refills: 0 | Status: ON HOLD | OUTPATIENT
Start: 2023-04-04 | End: 2023-12-22 | Stop reason: HOSPADM

## 2023-04-04 RX ORDER — MICONAZOLE NITRATE 2 %
POWDER (GRAM) TOPICAL 2 TIMES DAILY
Qty: 43 G | Refills: 0 | Status: SHIPPED | OUTPATIENT
Start: 2023-04-04 | End: 2023-11-25

## 2023-04-04 RX ORDER — SODIUM BICARBONATE 650 MG/1
650 TABLET ORAL 2 TIMES DAILY
Qty: 60 TABLET | Refills: 0 | Status: ON HOLD | OUTPATIENT
Start: 2023-04-04 | End: 2023-12-22 | Stop reason: HOSPADM

## 2023-04-04 RX ORDER — LANOLIN ALCOHOL/MO/W.PET/CERES
800 CREAM (GRAM) TOPICAL DAILY
Qty: 14 TABLET | Refills: 0 | Status: SHIPPED | OUTPATIENT
Start: 2023-04-04 | End: 2023-04-11

## 2023-04-04 RX ORDER — DOXAZOSIN 2 MG/1
2 TABLET ORAL NIGHTLY
Qty: 30 TABLET | Refills: 1 | Status: SHIPPED | OUTPATIENT
Start: 2023-04-04 | End: 2023-06-03

## 2023-04-04 RX ORDER — ERGOCALCIFEROL 1.25 MG/1
50000 CAPSULE ORAL
Qty: 4 CAPSULE | Refills: 0 | Status: SHIPPED | OUTPATIENT
Start: 2023-04-04 | End: 2023-04-26

## 2023-04-04 RX ORDER — LEVETIRACETAM 500 MG/1
500 TABLET ORAL 2 TIMES DAILY
Qty: 60 TABLET | Refills: 1 | Status: SHIPPED | OUTPATIENT
Start: 2023-04-04 | End: 2023-11-25

## 2023-04-04 RX ORDER — NIFEDIPINE 60 MG/1
120 TABLET, EXTENDED RELEASE ORAL DAILY
Qty: 60 TABLET | Refills: 1 | Status: SHIPPED | OUTPATIENT
Start: 2023-04-04 | End: 2023-11-25

## 2023-04-04 RX ORDER — POLYETHYLENE GLYCOL 3350 17 G/17G
17 POWDER, FOR SOLUTION ORAL DAILY PRN
Qty: 14 PACKET | Refills: 0 | Status: SHIPPED | OUTPATIENT
Start: 2023-04-04 | End: 2023-04-18

## 2023-04-04 RX ORDER — AMOXICILLIN 250 MG
2 CAPSULE ORAL DAILY
Qty: 60 TABLET | Refills: 1 | Status: SHIPPED | OUTPATIENT
Start: 2023-04-04 | End: 2023-06-03

## 2023-04-04 RX ORDER — INSULIN ASPART 100 [IU]/ML
1-10 INJECTION, SOLUTION INTRAVENOUS; SUBCUTANEOUS
Qty: 20 ML | Refills: 0 | Status: SHIPPED | OUTPATIENT
Start: 2023-04-04 | End: 2024-04-03

## 2023-04-04 RX ADMIN — OXYCODONE HYDROCHLORIDE AND ACETAMINOPHEN 1 TABLET: 5; 325 TABLET ORAL at 01:04

## 2023-04-04 RX ADMIN — NEPHROCAP 1 CAPSULE: 1 CAP ORAL at 09:04

## 2023-04-04 RX ADMIN — INSULIN ASPART 14 UNITS: 100 INJECTION, SOLUTION INTRAVENOUS; SUBCUTANEOUS at 11:04

## 2023-04-04 RX ADMIN — LINEZOLID 600 MG: 600 INJECTION, SOLUTION INTRAVENOUS at 09:04

## 2023-04-04 RX ADMIN — TORSEMIDE 40 MG: 20 TABLET ORAL at 09:04

## 2023-04-04 RX ADMIN — ALBUTEROL SULFATE 2.5 MG: 2.5 SOLUTION RESPIRATORY (INHALATION) at 08:04

## 2023-04-04 RX ADMIN — METOPROLOL TARTRATE 25 MG: 25 TABLET, FILM COATED ORAL at 09:04

## 2023-04-04 RX ADMIN — INSULIN ASPART 14 UNITS: 100 INJECTION, SOLUTION INTRAVENOUS; SUBCUTANEOUS at 07:04

## 2023-04-04 RX ADMIN — IPRATROPIUM BROMIDE 0.5 MG: 0.5 SOLUTION RESPIRATORY (INHALATION) at 08:04

## 2023-04-04 RX ADMIN — GABAPENTIN 300 MG: 300 CAPSULE ORAL at 09:04

## 2023-04-04 RX ADMIN — SODIUM BICARBONATE 650 MG TABLET 1300 MG: at 09:04

## 2023-04-04 RX ADMIN — NIFEDIPINE 120 MG: 60 TABLET, FILM COATED, EXTENDED RELEASE ORAL at 09:04

## 2023-04-04 RX ADMIN — OXYCODONE HYDROCHLORIDE AND ACETAMINOPHEN 1 TABLET: 5; 325 TABLET ORAL at 09:04

## 2023-04-04 RX ADMIN — HYDRALAZINE HYDROCHLORIDE 10 MG: 20 INJECTION INTRAMUSCULAR; INTRAVENOUS at 04:04

## 2023-04-04 RX ADMIN — PANCRELIPASE 6 CAPSULE: 60000; 12000; 38000 CAPSULE, DELAYED RELEASE PELLETS ORAL at 09:04

## 2023-04-04 RX ADMIN — SODIUM CHLORIDE, PRESERVATIVE FREE 10 ML: 5 INJECTION INTRAVENOUS at 12:04

## 2023-04-04 RX ADMIN — LOSARTAN POTASSIUM 100 MG: 50 TABLET, FILM COATED ORAL at 09:04

## 2023-04-04 RX ADMIN — LEVETIRACETAM 500 MG: 500 TABLET, FILM COATED ORAL at 09:04

## 2023-04-04 RX ADMIN — ENOXAPARIN SODIUM 40 MG: 40 INJECTION SUBCUTANEOUS at 09:04

## 2023-04-04 RX ADMIN — Medication 800 MG: at 09:04

## 2023-04-04 RX ADMIN — INSULIN DETEMIR 5 UNITS: 100 INJECTION, SOLUTION SUBCUTANEOUS at 09:04

## 2023-04-04 NOTE — PROGRESS NOTES
[facility]                       [address] [address]    PATIENT NAME:       GODFREY MORLEY  YOB: 1975  CSN:                [id number]   MRN:                [id number]  ADMIT DATE:         03/11/2023 15:58:00  PHYSICIAN:          Emre Vazquez DPM                            PROGRESS NOTE    DATE:  04/03/2023 00:00:00    SUBJECTIVE:  Mr. Morley seen today.  No major changes other than still being   found to be lethargic earlier today.  Family continues to bring in food.    PHYSICAL EXAMINATION:  VITAL SIGNS:  Stable and he is afebrile.    LABORATORY DATA:  Labs reviewed.  White cell counts 13.7.  H and H stable.    Foot is unchanged.  Lateral wound area is dry.  Really no drainage on any of the   dressings.  Left foot is stable.  Atrophic skin changes.    ASSESSMENT:  Right foot wound infection, osteomyelitis, status post debridement.    PLAN:  Continue with current care.        ______________________________  Emre Vazquez DPM    GAS/AQS  DD:  04/03/2023  Time:  04:26PM  DT:  04/03/2023  Time:  05:30PM  Job #:  [id number]      PROGRESS NOTE

## 2023-04-04 NOTE — PT/OT/SLP PROGRESS
Occupational Therapy   Treatment    Name: Kamran Huerta  MRN: [id number]  Admitting Diagnosis:  Sepsis  11 Days Post-Op    Recommendations:     Discharge Recommendations: home with home health  Discharge Equipment Recommendations:   (TBD)  Barriers to discharge:       Assessment:     Kamran Huerta is a 48 y.o. male with a medical diagnosis of Sepsis.  He presents with increased motivation and affect happy with patient conversing and making jokes throughout session. Noted patient with decreased endurance and increase fatigue throughout session as evident by patient applying more pressure through RW toward end of session and while at sink patient using forearms on counter to hold himself up. Performance deficits affecting function are weakness, impaired functional mobility, decreased safety awareness, impaired endurance, impaired balance, impaired skin, impaired self care skills, decreased lower extremity function, orthopedic precautions.     Rehab Prognosis:  Good; patient would benefit from acute skilled OT services to address these deficits and reach maximum level of function.       Plan:     Patient to be seen 5 x/week, daily to address the above listed problems via self-care/home management  Plan of Care Expires: 04/10/23  Plan of Care Reviewed with: patient    Subjective     Pain/Comfort:       Objective:     Communicated with: RN prior to session.  Patient found up in chair upon OT entry to room.    General Precautions: Standard, fall    Orthopedic Precautions:RLE weight bearing as tolerated (with darco shoe)  Braces:  (R LE darco shoe)  Respiratory Status: Room air     Occupational Performance:     Functional Mobility/Transfers:  Patient completed Sit <> Stand Transfer with independence  with  rolling walker   Patient completed Toilet Transfer Step Transfer technique with independence with  rolling walker  Functional Mobility: patient ambulating around room with independence using RW and darco shoe  on with no LOB noted.    Activities of Daily Living:  Grooming: independence standing at sink washing hands  Lower Body Dressing: independence donning/doffing darco shoe sitting in chair  Toileting: independence pericare performed through simulation of task.    Therapeutic Positioning  Skin integrity: Dry     The following interventions were performed in an effort to prevent and/or reduce acquired pressure ulcers: skin assessment was performed, all visible skin was assessed during the course of treatment, and collaboration with nursing staff on therapeutic positioning recommendations         Patient left up in chair with all lines intact and call button in reach    GOALS:   Multidisciplinary Problems       Occupational Therapy Goals          Problem: Occupational Therapy    Goal Priority Disciplines Outcome Interventions   Occupational Therapy Goal     OT, PT/OT Ongoing, Progressing    Description: Goals to be met by: 3/31/23     Patient will increase functional independence with ADLs by performing:    UE Dressing with Set-up Assistance.  LE Dressing with Stand-by Assistance and Assistive Devices as needed.  Grooming while standing at sink with Stand-by Assistance.  Toileting from toilet (with BSC placed over toilet if needed) with Stand-by Assistance for hygiene and clothing management.                          Time Tracking:     OT Date of Treatment: 04/04/23  OT Start Time: 0900  OT Stop Time: 0923  OT Total Time (min): 23 min    Billable Minutes:Self Care/Home Management 2    OT/ARIANA: ARIANA     Number of ARIANA visits since last OT visit: 3    4/4/2023

## 2023-04-04 NOTE — CARE UPDATE
[id number] pt's nurse reports pt will go home with home health. Pt does not need ltac. Informed Blanca with [facility].

## 2023-04-04 NOTE — PT/OT/SLP PROGRESS
Physical Therapy Treatment    Patient Name:  Kamran Huerta   MRN:  [id number]    Recommendations:     Discharge Recommendations: home with home health and family assistance  Discharge Equipment Recommendations: none  Barriers to discharge:  medical complexity    Assessment:     Kamran Huerta is a 48 y.o. male admitted with a medical diagnosis of Sepsis.  He presents with the following impairments/functional limitations: weakness, gait instability, impaired endurance, impaired balance, impaired functional mobility.    Rehab Prognosis: Good; patient would benefit from acute skilled PT services to address these deficits and reach maximum level of function.    Recent Surgery: Procedure(s) (LRB):  AMPUTATION, TOE (Right) 11 Days Post-Op    Plan:     During this hospitalization, patient to be seen 6 x/week to address the identified rehab impairments via gait training, therapeutic activities, therapeutic exercises and progress toward the following goals:    Plan of Care Expires:  04/16/23    Subjective     Chief Complaint: no complaints  Patient/Family Comments/goals: to go home  Pain/Comfort:  Pain Rating 1: 0/10      Objective:     Communicated with NSG prior to session.  Patient found HOB elevated with pulse ox (continuous), telemetry upon PTA entry to room.     General Precautions: Standard, fall  Orthopedic Precautions: RLE weight bearing as tolerated with Darco on RLE 2/2 wound  Braces: N/A  Respiratory Status: Room air       Functional Mobility:  Transfers:   Sit<->stand; mod I with RW and Darco  Gait with RW:   200' with steady but slow amarilis, mild gait deviations d/t Darco, no LOB. CGA progressing to SBA.     Patient left HOB elevated with all lines intact and call button in reach.    GOALS:   Multidisciplinary Problems       Physical Therapy Goals          Problem: Physical Therapy    Goal Priority Disciplines Outcome Goal Variances Interventions   Physical Therapy Goal     PT, PT/OT Ongoing,  Progressing     Description: Goals to be met by: 23     Patient will increase functional independence with mobility by performin. Supine to sit with Stand-by Assistance  2. Sit to stand transfer with Supervision  3. Bed to chair transfer with Supervision using Rolling Walker  4. Gait  x 50 feet with Min A using Rolling Walker.   5. Ascend/descend 4 stair with right Handrails Minimal Assistance using Rolling Walker.                          Time Tracking:     PT Received On: 23  PT Start Time: 1009     PT Stop Time: 1038  PT Total Time (min): 29 min     Billable Minutes: Gait Training 2 units    Treatment Type: Treatment  PT/PTA: PTA     Number of PTA visits since last PT visit: 3     2023

## 2023-04-04 NOTE — PROGRESS NOTES
"[facility]    Nephrology Progress Note  Patient Name: Kamran Huerta  Age: 48 y.o.  : 1975  MRN: [id number]  Admission Date: 3/11/2023    Chief complaint: Chest Pain, Shortness of Breath, Leg Swelling, and Groin Swelling (Pt presents c/o bilateral lower extremity swelling/ testicular swelling, CP and SOB.  Onset x 2 weeks/ admit to H/ CHF/ dr [email or web address].  )      Hospital course  Kamran Huerta is a 48 y.o. Black or  male with past medical history of poorly controlled type 2 diabetes, obesity, and CKD.  He was being followed by Dr. Alonso in [facility].  He has a history of poorly controlled hypertension as well, seizure disorder and alcoholic liver disease with chronic pancreatitis.  Over the past several months the patient has had increasing volume retention, worsening anasarca and increased immobility due to the edema.  He has reportedly gained 65 lb over the past several months.     Patient has required aggressive diuresis but has lost a significant amount of weight.  He had high-grade proteinuria in excess of 10 g and biopsy has returned with advanced diabetic nodular sclerosis.       Patient underwent 5th right metatarsophalangeal joint resection for suspected osteomyelitis.     4/3/23: Patient had acute episode of chest pain which he reported as 10/10 along with SOB and diaphoresis today. He is now on oxymask and able to converse. STAT imaging was done.     2023 ambulating in the hallway.  Feeling better.  No chest pains.  He says his ready to go home whenever it is time and he is not complaining of any shortness of breath either.  Tolerating oral nutrition.        Objective  BP (!) 149/81   Pulse 88   Temp 98.1 °F (36.7 °C) (Oral)   Resp 20   Ht 5' 5" (1.651 m)   Wt 101.3 kg (223 lb 5.2 oz)   SpO2 97%   BMI 37.16 kg/m²     Intake/Output Summary (Last 24 hours) at 2023 1048  Last data filed at 2023 0619  Gross per 24 hour "   Intake 840 ml   Output 2450 ml   Net -1610 ml       Awake alert oriented to time person place.  No acute distress noted.  Physical Exam  Physical Exam  Constitutional:       General: He is not in acute distress.     Appearance: He is obese.   HENT:      Head: Atraumatic.      Nose: Nose normal.      Mouth/Throat:      Mouth: Mucous membranes are moist.   Eyes:      Extraocular Movements: Extraocular movements intact.      Conjunctiva/sclera: Conjunctivae normal.   Cardiovascular:      Rate and Rhythm: Normal rate and regular rhythm.      Pulses: Normal pulses.      Heart sounds: Normal heart sounds.   Pulmonary:      Effort: Pulmonary effort is normal.      Comments: Bilaterally diminished, oxymask at 4LPM, SpO2 100%  Abdominal:      General: There is no distension.      Palpations: Abdomen is soft.   Genitourinary:     Comments: Light yellow clear urine at bedside in urinal   Musculoskeletal:         General: Swelling present.      Cervical back: Neck supple. No rigidity.   Neurological:      General: No focal deficit present.      Mental Status: He is alert and oriented to person, place, and time.   Psychiatric:         Mood and Affect: Mood normal.         Behavior: Behavior normal.         Medications    Current Facility-Administered Medications:     0.9%  NaCl infusion (for blood administration), , Intravenous, Q24H PRN, Michelle Camacho MD    acetaminophen tablet 1,000 mg, 1,000 mg, Oral, Q6H PRN, Emre Vazquez DPM, 1,000 mg at 04/03/23 0439    acetaminophen tablet 650 mg, 650 mg, Oral, Q4H PRN, Emre Vazquez DPM    albuterol nebulizer solution 2.5 mg, 2.5 mg, Nebulization, Q4H PRN, GITA Andrews, 2.5 mg at 04/03/23 0110    albuterol nebulizer solution 2.5 mg, 2.5 mg, Nebulization, Q6H WAKE, Dieudonne Montemayor MD, 2.5 mg at 04/04/23 0809    aluminum-magnesium hydroxide-simethicone 200-200-20 mg/5 mL suspension 30 mL, 30 mL, Oral, QID PRN, Emre Vazquez DPM    cloNIDine tablet 0.2 mg, 0.2 mg,  Oral, TID PRN, Emre Vazquez DPM, 0.2 mg at 04/03/23 0654    dextrose 10% bolus 125 mL 125 mL, 12.5 g, Intravenous, PRN, Emre Qiuk, DPM    dextrose 10% bolus 250 mL 250 mL, 25 g, Intravenous, PRN, Emre Qiuk, DPM    dextrose 40 % gel 15,000 mg, 15 g, Oral, PRN, Emre Qiuk, DPM    dextrose 40 % gel 30,000 mg, 30 g, Oral, PRN, Emre Vazquez, DPM    doxazosin tablet 2 mg, 2 mg, Oral, QHS, Emre Vazquez, DPM, 2 mg at 04/03/23 2058    enoxaparin injection 40 mg, 40 mg, Subcutaneous, Q12H, Emre Vazquez DPM, 40 mg at 04/04/23 0922    ergocalciferol capsule 50,000 Units, 50,000 Units, Oral, Q3 Days, Emre Vazquez DPM, 50,000 Units at 04/02/23 2252    gabapentin capsule 300 mg, 300 mg, Oral, TID, Benjamín Sharp MD, 300 mg at 04/04/23 0922    glucagon (human recombinant) injection 1 mg, 1 mg, Intramuscular, PRN, Emre Vazquez DPM, 1 mg at 03/20/23 0545    hydrALAZINE injection 10 mg, 10 mg, Intravenous, Q4H PRN, Emre Vazquez DPM, 10 mg at 04/04/23 0452    HYDROcodone-acetaminophen 5-325 mg per tablet 1 tablet, 1 tablet, Oral, Q4H PRN, Emre Vazquez DPM, 1 tablet at 04/03/23 2319    insulin aspart U-100 injection 1-10 Units, 1-10 Units, Subcutaneous, QID (AC + HS) PRN, Emre Vazquez DPM, 4 Units at 04/02/23 0539    insulin aspart U-100 injection 14 Units, 14 Units, Subcutaneous, TIDWM, Emre Vazquez DPM, 14 Units at 04/04/23 0701    insulin detemir U-100 injection 20 Units, 20 Units, Subcutaneous, QHS, Michelle Camacho MD, 20 Units at 04/01/23 2225    insulin detemir U-100 injection 5 Units, 5 Units, Subcutaneous, Daily, Michelle Camacho MD, 5 Units at 04/04/23 0900    ipratropium 0.02 % nebulizer solution 0.5 mg, 0.5 mg, Nebulization, Q6H, Geena Nettles, GITA, 0.5 mg at 04/04/23 0808    labetaloL injection 10 mg, 10 mg, Intravenous, Q4H PRN, Emre Vazquez DPM, 10 mg at 04/03/23 0600    levETIRAcetam tablet 500 mg, 500 mg, Oral, BID, Benjamín Sharp MD, 500 mg at  04/04/23 0922    linezolid 600 mg/300 mL IVPB 600 mg, 600 mg, Intravenous, Q12H, Nancy Valladares MD, Last Rate: 300 mL/hr at 04/04/23 0922, 600 mg at 04/04/23 0922    lipase-protease-amylase 12,000-38,000-60,000 units per capsule 6 capsule, 6 capsule, Oral, TID, Emre Vazquez DPM, 6 capsule at 04/04/23 0917    losartan tablet 100 mg, 100 mg, Oral, Daily, Irina Correa FNP, 100 mg at 04/04/23 0922    magnesium oxide tablet 800 mg, 800 mg, Oral, BID, JAMAL Banerjee, 800 mg at 04/04/23 0922    melatonin tablet 6 mg, 6 mg, Oral, Nightly PRN, Emre Vazquez DPM, 6 mg at 03/12/23 0012    metoprolol tartrate (LOPRESSOR) tablet 25 mg, 25 mg, Oral, BID, Emre Vazquez DPM, 25 mg at 04/04/23 0922    miconazole NITRATE 2 % top powder, , Topical (Top), BID, Emre Vazquez DPM, Given at 04/03/23 2058    NIFEdipine 24 hr tablet 120 mg, 120 mg, Oral, Daily, JAMAL Banerjee, 120 mg at 04/04/23 0919    ondansetron injection 4 mg, 4 mg, Intravenous, Q4H PRN, Emre Vazquez DPM, 4 mg at 03/12/23 0011    oxyCODONE-acetaminophen 5-325 mg per tablet 1 tablet, 1 tablet, Oral, Q6H PRN, Emre Vazquez DPM, 1 tablet at 04/04/23 0917    polyethylene glycol packet 17 g, 17 g, Oral, Daily, Dieudonne Montemayor MD    prochlorperazine injection Soln 5 mg, 5 mg, Intravenous, Q6H PRN, Emre Vazquez DPM    senna-docusate 8.6-50 mg per tablet 2 tablet, 2 tablet, Oral, BID PRN, Emre Vazquez DPM    simethicone chewable tablet 80 mg, 1 tablet, Oral, QID PRN, Emre Vazquez DPM    sodium bicarbonate tablet 1,300 mg, 1,300 mg, Oral, Daily, JAMAL Banerjee, 1,300 mg at 04/04/23 0922    sodium chloride 0.9% flush 10 mL, 10 mL, Intravenous, PRN, Emre Vazquez DPM    Flushing PICC Protocol, , , Until Discontinued **AND** sodium chloride 0.9% flush 10 mL, 10 mL, Intravenous, Q6H, 10 mL at 04/03/23 1800 **AND** sodium chloride 0.9% flush 10 mL, 10 mL, Intravenous, PRN, Emre Vazquez DPM, 10 mL at 03/29/23  0526    torsemide tablet 40 mg, 40 mg, Oral, Daily, Benjamín Sharp MD, 40 mg at 04/04/23 0922    vitamin renal formula (B-complex-vitamin c-folic acid) 1 mg per capsule 1 capsule, 1 capsule, Oral, Daily, Emre Vazquez DPM, 1 capsule at 04/04/23 0922    zinc oxide-cod liver oil 40 % paste, , Topical (Top), BID, Emre Vazquez DPM, Given at 04/03/23 2058     Imaging:    Reviewed    Laboratory Data:  Lab Results   Component Value Date    WBC 13.7 (H) 04/03/2023    HGB 8.0 (L) 04/03/2023    HCT 24.4 (L) 04/03/2023     04/03/2023     Lab Results   Component Value Date     04/03/2023    K 4.0 04/03/2023    CHLORIDE 104 04/03/2023    CO2 24 04/03/2023    BUN 45.2 (H) 04/03/2023    CREATININE 1.88 (H) 04/03/2023    EGFRNORACEVR 44 04/03/2023    GLUCOSE 275 (H) 04/03/2023    CALCIUM 8.2 (L) 04/03/2023    ALKPHOS 86 04/03/2023    LABPROT 5.3 (L) 04/03/2023    ALBUMIN 1.9 (L) 04/03/2023    BILIDIR 0.2 01/17/2021    IBILI 0.20 01/17/2021    AST 23 04/03/2023    ALT 22 04/03/2023    MG 1.20 (L) 04/03/2023    PHOS 3.7 04/03/2023      Lab Results   Component Value Date    HGBA1C 8.3 (H) 03/12/2023    GLUCOSE 275 (H) 04/03/2023     Lab Results   Component Value Date    .2 (H) 03/12/2023    HFQTACBM40EA <3.5 (L) 03/12/2023       Lab Results   Component Value Date    IRON 35 (L) 03/12/2023    TIBC 157 (L) 03/12/2023    TRANS 131 (L) 03/12/2023    LABIRON 22 03/12/2023    FERRITIN 304.82 (H) 03/12/2023    EBOGHLBO96 925 (H) 03/12/2023       Lab Results   Component Value Date    HEPBSURFAG Nonreactive 03/12/2023         Impression  Chronic kidney disease stage 3 secondary to biopsy-proven diabetic nephropathy  Hypertension   Hypomagnesemia  Anasarca, improved   Cirrhosis of the liver  UTI   Peripheral arterial disease-suspected right 5th MPJ osteo  Severe hypovitaminosis D  Secondary hyperparathyroidism  Anemia of chronic disease (received 53416 units of Retacrit on 4/1/23)    Plan  Discussed with the patient  about the need to comply with diet of low salt and low carb diet.  He needs to get his diabetes under control.  It looks like he will be going home with a home health.

## 2023-04-04 NOTE — PROGRESS NOTES
[facility]                       [address] [id number]    PATIENT NAME:       GODFREY MORLEY  YOB: 1975  CSN:                [id number]   MRN:                [id number]  ADMIT DATE:         03/11/2023 15:58:00  PHYSICIAN:          Emre Vazquez DPM                            PROGRESS NOTE    DATE:  04/04/2023 00:00:00    SUBJECTIVE:  The patient was seen earlier today.  Plans for him to be discharged   home.  No major issues reported.  Plans for home health and local care there   rather than LTAC.    PHYSICAL EXAMINATION:  VITAL SIGNS:  Stable, afebrile.    Dressings intact.  Surgical site stable.  Blister plantarly is unchanged.    Drying out.  No signs of overt compromise to any sites.    ASSESSMENT:  Right foot wound, osteomyelitis, status post excisional   debridement.    PLAN:  Dry dressings.  Follow up with me in 1-2 weeks.        ______________________________  Emre Vazquez DPM    GAS/AQS  DD:  04/04/2023  Time:  03:05PM  DT:  04/04/2023  Time:  03:55PM  Job #:  [id number]      PROGRESS NOTE

## 2023-04-04 NOTE — DISCHARGE SUMMARY
[facility] - 6th Floor [facility] Medicine  Discharge Summary      Patient Name: Kamran Huerta  MRN: [id number]  [facility]: [id number]  Patient Class: IP- Inpatient  Admission Date: 3/11/2023  Hospital Length of Stay: 24 days  Discharge Date and Time:  04/04/2023 9:08 AM  Attending Physician: Dieudonne Montemayor MD   Discharging Provider: Dieudonne Montemayor MD  Primary Care Provider: Ailyn Reynolds MD    Primary Care Team: Networked reference to record PCT         Mr Huerta is a 48-year-old gentleman with PMH of obesity, poorly controlled T2DM, CKD stage 2/3B with last creatinine 1.47 (11/2022), hypertension, seizure disorder, chronic pancreatitis and alcoholic liver disease, quit drinking about 1 year ago. Presented to the ED with complaints of diffuse body swelling specially abdomen, groin, penis and testicles and bilateral lower extremities, dyspnea on minimal exertion and bilateral lower extremity pain and cramping. with subjective fever and chills. he was just hospitalized for similar symptom at [facility] about 2 weeks ago and was discharged home after few days.     On arrival to ED, he was tachycardic, hypertensive, saturating 100% on room air.  EKG showed sinus rhythm without ischemic changes.  Labs notable for WBC 16.3, hemoglobin 11.4, platelets 346, sodium 139, potassium 5.0, chloride 116, CO2 16, creatinine 2.17, BUN 34, glucose 420, calcium 8.0, albumin 1.3, , negative troponin. CT A/P with Contrast showed left pleural effusion, hepatic cirrhotic morphology, splenomegaly and upper abdominal varices and ascites, chronic pancreatitis changes, extensive subcutaneous anasarca edema.  Kidneys were unremarkable without hydronephrosis. Per ED provider exam he was noted to have purulent penile discharge, sent for culture and Segovia catheter placed.  He was given albumin 12.5 g, Lasix 40 mg IV, Vancomycin and Zosyn and subsequently referred to hospital medicine service for further  evaluation and management.      mL with IV Lasix 40. Urine protein creatinine ratio noted to be 10.9. Patient was continued on IV Lasix. Seen by Renal team; recommendations made for IV Lasix/Albumin drip and renal biopsy. He was also seen by GI team for evaluation of cirrhosis and EGD was done to r/o EV. EGD showed erosive gastropathy, normal esophagus & no other evidence of cirrhosis. Nephrology changed IV Lasix/Albumin drip to IV Lasix 80 mg TID with tentative plan for hemodialysis given patient's diuretic resistance. IR to plan for renal biopsy which could not be performed 03/15 due to uncontrolled HTN. Continues to have significant UOP with IV Lasix.  Nephrology continuing IV Lasix 80 mg t.i.d. and Metolazone 10 mg given stable creatinine and holding off on HD. Patient on 1500 mL fluid restriction.  OT to provide brace for scrotal support given patient's discomfort. Counseled regarding compliance with low-sodium diet and fluid restriction in order to optimize volume status with diuresis. Underwent renal biopsy with IR 03/20.  Biopsies confirmed as nodular sclerosis later. Continued on IV Lasix 80 mg t.i.d. and Metolazone 10 mg.   oted to have improved edema in B/L LE edema, abdominal wall edema & scrotal edema as of 3/22/23 . Eventually Diuretic regimen was changed to Torsemide.     Podiatry ordered XR R Foot which showed erosion of 5th metatarsophalangeal joint with osteomyelitis not excluded. Podiatry performed excisional debridement of R foot on 3/24/23.  Cultures-+ve Staph MRSA. ID was consulted. Spiked Fever 101 3/23/23,Patient is started on Vanc,Zosyn to cover Possible Pneumonia and  Osteomyelitis.  Antibiotic therapy was continued.  He completed cefepime and will continue linezolid for few more days after discharge.  On 3/26/23 Hb dropped to <7 , we transfused a unit, Oxygen requirement going up with poor Urine Output and hence Nephrology was updated about the patient, got 80mg IV lasix, continued  on Torsemide. Ct scan abdomen obtained when he complained of abdominal pain  was showing constipation, had a bowel movement after Relistor. Pulmonology was consulted to assist with management given increased work of breathing and Hypoxemia on ABGs, placed on BIPAP.  Patient was using BiPAP at bedtime but noncompliant.  Diuresis was continued on switch to p.o. torsemide and he did well.  Antibiotics were continued and currently awaiting LTAC placement to complete antibiotic therapy .  Segovia was discontinued.  He had an episode of chest pain, possible change in mental status for which imaging was obtained.  CTA ruled out PE and CT head was negative for stroke.  Lasix from a physical therapy was continued as per schedule.  He will be discharged to home today with home health.  He was counseled extensively regarding medication adherence, illicit drug abstinence, CPAP compliance.  He verbalized understanding of all the recommendations.  Needs close follow-up with PCP.      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  Scrotal swelling and upper thigh swelling continues.  Neurologic: Alert and oriented x4, moving all extremities with good strength       Consults:   Consults (From admission, onward)          Status Ordering Provider     Inpatient consult to Social Work/Case Management  Once        Provider:  (Not yet assigned)    Acknowledged DARLINE HERNANDEZ     Inpatient consult to Pulmonology  Once        Provider:  Morro Barcenas MD    Completed NARGIS STINSON     Inpatient consult to Infectious Diseases  Once        Provider:  Nancy Valladares MD    Completed NARGIS STINSON     Inpatient consult to Podiatry  Once        Provider:  Emre Vazquez DPM    Acknowledged EMRE VAZQUEZ     Inpatient consult to Registered Dietitian/Nutritionist  Once        Provider:  (Not yet assigned)    Completed JESÚS HERNANDEZ     Inpatient consult to Midline team  Once        Provider:  (Not yet assigned)    Acknowledged EMRE VAZQUEZ     Inpatient consult to Gastroenterology  Once        Provider:  Tj Faith MD    Completed BONY WEINER     Inpatient consult to Interventional Radiology  Once        Provider:  Chad Espinoza MD    Completed LOLA AMARO     Inpatient consult to Interventional Radiology  Once        Provider:  Chad Espinoza MD    Completed BENJAMÍN SHARP     Inpatient consult to Nephrology  Once        Provider:  Benjamín Sharp MD    Acknowledged EMRE VAZQUEZ            No new Assessment & Plan notes have been filed under this hospital service since the last note was generated.  Service: Hospital Medicine    Final Active Diagnoses:    Diagnosis Date Noted POA    PRINCIPAL PROBLEM:  Sepsis [A41.9] 03/11/2023 Yes    Cirrhosis [K74.60] 03/11/2023 Yes      Problems Resolved During this Admission:    Diagnosis Date Noted Date Resolved POA    UTI (urinary tract infection) [N39.0] 03/11/2023 04/04/2023 Yes     Galindo [R60.1] 03/11/2023 04/04/2023 Yes    FABIO (acute kidney injury) [N17.9] 11/13/2022 04/04/2023 Yes       Discharged Condition: fair    Disposition: Home-Health Care [facility]    Follow Up:   Follow-up Information       Ailyn Reynolds MD Follow up in 1 week(s).    Specialty: Family Medicine  Contact information:  [address] [facility] [address]  [phone]                           Patient Instructions:   No discharge procedures on file.    Significant Diagnostic Studies: Labs: CMP   Recent Labs   Lab 04/03/23  0359      K 4.0   CO2 24   BUN 45.2*   CREATININE 1.88*   CALCIUM 8.2*   ALBUMIN 1.9*   BILITOT 0.3   ALKPHOS 86   AST 23   ALT 22       Pending Diagnostic Studies:       Procedure Component Value Units Date/Time    CT Biopsy Kidney (xpd) [[id number]] Resulted: 03/15/23 2242    Order Status: Sent Lab Status: In process Updated: 03/15/23 2243    Occult Blood, Stool, Diagnostic (1-3) [[id number]]     Order Status: Sent Lab Status: No result     Specimen: Stool     Narrative:      The following orders were created for panel order Occult Blood, Stool, Diagnostic (1-3).  Procedure                               Abnormality         Status                     ---------                               -----------         ------                     Occult blood x 3, stool[[id number]]                                                       Please view results for these tests on the individual orders.    Occult blood x 3, stool [[id number]]     Order Status: Sent Lab Status: No result     Specimen: Stool     Specimen to Pathology Other (kidney core biopsy ) [[id number]] Collected: 03/20/23 0945    Order Status: Sent Lab Status: In process Updated: 03/20/23 1035    Specimen: Tissue            Medications:  Reconciled Home Medications:      Medication List        START taking these medications      doxazosin 2 MG tablet  Commonly known as: CARDURA  Take 1 tablet (2 mg total) by mouth every evening.    "  ergocalciferol 50,000 unit Cap  Commonly known as: ERGOCALCIFEROL  Take 1 capsule (50,000 Units total) by mouth every 7 days. for 4 doses     insulin aspart U-100 100 unit/mL injection  Commonly known as: NovoLOG  Inject 1-10 Units into the skin before meals and at bedtime as needed for High Blood Sugar.     linezolid 600 mg Tab  Commonly known as: ZYVOX  Take 1 tablet (600 mg total) by mouth every 12 (twelve) hours. for 3 days     lipase-protease-amylase 12,000-38,000-60,000 units Cpdr  Commonly known as: CREON  Take 6 capsules by mouth 3 (three) times daily.  Replaces: CREON 24,000-76,000 -120,000 unit capsule     losartan 100 MG tablet  Commonly known as: COZAAR  Take 1 tablet (100 mg total) by mouth once daily.     magnesium oxide 400 mg (241.3 mg magnesium) tablet  Commonly known as: MAG-OX  Take 2 tablets (800 mg total) by mouth once daily. for 7 days     miconazole NITRATE 2 % 2 % top powder  Commonly known as: MICOTIN  Apply topically 2 (two) times daily. for 7 days     NIFEdipine 60 MG (OSM) 24 hr tablet  Commonly known as: PROCARDIA-XL  Take 2 tablets (120 mg total) by mouth once daily.     polyethylene glycol 17 gram Pwpk  Commonly known as: GLYCOLAX  Take 17 g by mouth daily as needed (constipation).     senna-docusate 8.6-50 mg 8.6-50 mg per tablet  Commonly known as: PERICOLACE  Take 2 tablets by mouth once daily.     sodium bicarbonate 650 MG tablet  Take 1 tablet (650 mg total) by mouth 2 (two) times daily.     torsemide 40 mg Tab  Take 40 mg by mouth once daily.     vitamin renal formula (B-complex-vitamin c-folic acid) 1 mg Cap  Commonly known as: NEPHROCAP  Take 1 capsule by mouth once daily.            CHANGE how you take these medications      levETIRAcetam 500 MG Tab  Commonly known as: KEPPRA  Take 1 tablet (500 mg total) by mouth 2 (two) times daily.  What changed: how much to take            CONTINUE taking these medications      BD VEO INSULIN SYR (HALF UNIT) 0.3 mL 31 gauge x 15/64" " Syrg  Generic drug: insulin syr/ndl U100 half aaron  use as directed     gabapentin 400 MG capsule  Commonly known as: NEURONTIN  Take 400 mg by mouth 2 (two) times daily.     HYDROcodone-acetaminophen  mg per tablet  Commonly known as: NORCO  Take 1 tablet by mouth every 8 (eight) hours as needed.     insulin glargine 100 units/mL SubQ pen  Inject 30 Units into the skin.     loperamide 2 mg capsule  Commonly known as: IMODIUM  Take by mouth.     metoprolol tartrate 25 MG tablet  Commonly known as: LOPRESSOR  Take 25 mg by mouth 2 (two) times daily.     PRENATAL VITAMIN PLUS LOW IRON 27 mg iron- 1 mg Tab  Generic drug: PNV,calcium 72-iron-folic acid  Take 1 tablet by mouth once daily.     tiZANidine 4 MG tablet  Commonly known as: ZANAFLEX  Take by mouth.            STOP taking these medications      amLODIPine 10 MG tablet  Commonly known as: NORVASC     CREON 24,000-76,000 -120,000 unit capsule  Generic drug: lipase-protease-amylase 24,000-76,000-120,000 units  Replaced by: lipase-protease-amylase 12,000-38,000-60,000 units Cpdr     furosemide 40 MG tablet  Commonly known as: LASIX     lisinopriL-hydrochlorothiazide 20-25 mg Tab  Commonly known as: PRINZIDE,ZESTORETIC     potassium chloride SA 20 MEQ tablet  Commonly known as: K-DUR,KLOR-CON     tamsulosin 0.4 mg Cap  Commonly known as: FLOMAX              Indwelling Lines/Drains at time of discharge:   Lines/Drains/Airways       None                   Time spent on the discharge of patient: 35 minutes         Dieudonne Montemayor MD  Department of Hospital Medicine  [facility] - 6th Floor Medical Telemetry

## 2023-04-05 ENCOUNTER — PATIENT OUTREACH (OUTPATIENT)
Dept: ADMINISTRATIVE | Facility: CLINIC | Age: 48
End: 2023-04-05
Payer: MEDICAID

## 2023-04-05 NOTE — PROGRESS NOTES
"  C3 nurse attempted to contact Kamran Juan Manuel Bradley for a TCC post hospital discharge follow up call. No answer on pts phone. Pts father states the [facility] because he is feeling bad again.     The patient has a scheduled follow up with Ailyn Reynolds MD (Family Medicine) on 4/12/2023 @1300, a follow up with Emre Vazquez DPM (Podiatry) on 4/14/2023 @1145, and still needs a follow up with Tami Candelaria MD (Nephrology) in 4 weeks (5/2/2023). Unable to route to PCP.        "

## 2023-04-06 NOTE — PROGRESS NOTES
2nd attempt by C3 nurse to contact Kamran Huerta for a TCC post hospital discharge follow up call. No answer on pts phone. Pts mother states the pt is currently at [facility] because.      The patient has a scheduled follow up with Ailyn Reynolds MD (Family Medicine) on 4/12/2023 @1300, a follow up with Emre Vazquez DPM (Podiatry) on 4/14/2023 @1145, and still needs a follow up with Tami Candelaria MD (Nephrology) in 4 weeks (5/2/2023). Unable to route to PCP.

## 2023-04-12 LAB — PSYCHE PATHOLOGY RESULT: NORMAL

## 2023-07-10 PROBLEM — A41.9 SEPSIS: Status: RESOLVED | Noted: 2023-03-11 | Resolved: 2023-07-10

## 2023-11-25 ENCOUNTER — HOSPITAL ENCOUNTER (INPATIENT)
Facility: HOSPITAL | Age: 48
LOS: 27 days | Discharge: NURSING, REHAB OR LONG-TERM CARE | DRG: 683 | End: 2023-12-22
Attending: STUDENT IN AN ORGANIZED HEALTH CARE EDUCATION/TRAINING PROGRAM | Admitting: INTERNAL MEDICINE
Payer: MEDICARE

## 2023-11-25 DIAGNOSIS — D62 ACUTE BLOOD LOSS ANEMIA: ICD-10-CM

## 2023-11-25 DIAGNOSIS — R07.9 CHEST PAIN: ICD-10-CM

## 2023-11-25 DIAGNOSIS — N17.9 AKI (ACUTE KIDNEY INJURY): Primary | ICD-10-CM

## 2023-11-25 DIAGNOSIS — E87.70 HYPERVOLEMIA, UNSPECIFIED HYPERVOLEMIA TYPE: ICD-10-CM

## 2023-11-25 DIAGNOSIS — N18.9 ANEMIA ASSOCIATED WITH CHRONIC RENAL FAILURE: ICD-10-CM

## 2023-11-25 DIAGNOSIS — R53.81 PHYSICAL DECONDITIONING: ICD-10-CM

## 2023-11-25 DIAGNOSIS — E83.42 HYPOMAGNESEMIA: ICD-10-CM

## 2023-11-25 DIAGNOSIS — D63.1 ANEMIA ASSOCIATED WITH CHRONIC RENAL FAILURE: ICD-10-CM

## 2023-11-25 DIAGNOSIS — R60.1 ANASARCA: ICD-10-CM
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
## 2023-11-25 LAB
ALBUMIN SERPL-MCNC: 1.4 G/DL (ref 3.5–5)
ALBUMIN/GLOB SERPL: 0.3 RATIO (ref 1.1–2)
ALP SERPL-CCNC: 189 UNIT/L (ref 40–150)
ALT SERPL-CCNC: 41 UNIT/L (ref 0–55)
AST SERPL-CCNC: 36 UNIT/L (ref 5–34)
BASOPHILS # BLD AUTO: 0.07 X10(3)/MCL
BASOPHILS NFR BLD AUTO: 0.5 %
BILIRUB SERPL-MCNC: 0.2 MG/DL
BNP BLD-MCNC: 109.7 PG/ML
BUN SERPL-MCNC: 25.7 MG/DL (ref 8.9–20.6)
CALCIUM SERPL-MCNC: 7.2 MG/DL (ref 8.4–10.2)
CHLORIDE SERPL-SCNC: 117 MMOL/L (ref 98–107)
CO2 SERPL-SCNC: 18 MMOL/L (ref 22–29)
CREAT SERPL-MCNC: 2.31 MG/DL (ref 0.73–1.18)
CRP SERPL-MCNC: 5.5 MG/L
EOSINOPHIL # BLD AUTO: 0.37 X10(3)/MCL (ref 0–0.9)
EOSINOPHIL NFR BLD AUTO: 2.6 %
ERYTHROCYTE [DISTWIDTH] IN BLOOD BY AUTOMATED COUNT: 13.8 % (ref 11.5–17)
ERYTHROCYTE [SEDIMENTATION RATE] IN BLOOD: 93 MM/HR (ref 0–15)
GFR SERPLBLD CREATININE-BSD FMLA CKD-EPI: 34 MLS/MIN/1.73/M2
GLOBULIN SER-MCNC: 4.7 GM/DL (ref 2.4–3.5)
GLUCOSE SERPL-MCNC: 52 MG/DL (ref 74–100)
HCT VFR BLD AUTO: 37.2 % (ref 42–52)
HGB BLD-MCNC: 12.2 G/DL (ref 14–18)
IMM GRANULOCYTES # BLD AUTO: 0.04 X10(3)/MCL (ref 0–0.04)
IMM GRANULOCYTES NFR BLD AUTO: 0.3 %
LYMPHOCYTES # BLD AUTO: 3.9 X10(3)/MCL (ref 0.6–4.6)
LYMPHOCYTES NFR BLD AUTO: 27 %
MAGNESIUM SERPL-MCNC: 1 MG/DL (ref 1.6–2.6)
MCH RBC QN AUTO: 29.2 PG (ref 27–31)
MCHC RBC AUTO-ENTMCNC: 32.8 G/DL (ref 33–36)
MCV RBC AUTO: 89 FL (ref 80–94)
MONOCYTES # BLD AUTO: 1 X10(3)/MCL (ref 0.1–1.3)
MONOCYTES NFR BLD AUTO: 6.9 %
NEUTROPHILS # BLD AUTO: 9.04 X10(3)/MCL (ref 2.1–9.2)
NEUTROPHILS NFR BLD AUTO: 62.7 %
NRBC BLD AUTO-RTO: 0 %
PLATELET # BLD AUTO: 332 X10(3)/MCL (ref 130–400)
PMV BLD AUTO: 11.6 FL (ref 7.4–10.4)
POCT GLUCOSE: 102 MG/DL (ref 70–110)
POCT GLUCOSE: 164 MG/DL (ref 70–110)
POCT GLUCOSE: 192 MG/DL (ref 70–110)
POCT GLUCOSE: 33 MG/DL (ref 70–110)
POCT GLUCOSE: 36 MG/DL (ref 70–110)
POCT GLUCOSE: 42 MG/DL (ref 70–110)
POCT GLUCOSE: 65 MG/DL (ref 70–110)
POCT GLUCOSE: 71 MG/DL (ref 70–110)
POCT GLUCOSE: 96 MG/DL (ref 70–110)
POTASSIUM SERPL-SCNC: 4.7 MMOL/L (ref 3.5–5.1)
PROT SERPL-MCNC: 6.1 GM/DL (ref 6.4–8.3)
RBC # BLD AUTO: 4.18 X10(6)/MCL (ref 4.7–6.1)
SODIUM SERPL-SCNC: 143 MMOL/L (ref 136–145)
TROPONIN I SERPL-MCNC: <0.01 NG/ML (ref 0–0.04)
WBC # SPEC AUTO: 14.42 X10(3)/MCL (ref 4.5–11.5)

## 2023-11-25 PROCEDURE — 87040 BLOOD CULTURE FOR BACTERIA: CPT | Performed by: NURSE PRACTITIONER

## 2023-11-25 PROCEDURE — 63600175 PHARM REV CODE 636 W HCPCS: Performed by: STUDENT IN AN ORGANIZED HEALTH CARE EDUCATION/TRAINING PROGRAM

## 2023-11-25 PROCEDURE — 80053 COMPREHEN METABOLIC PANEL: CPT | Performed by: STUDENT IN AN ORGANIZED HEALTH CARE EDUCATION/TRAINING PROGRAM

## 2023-11-25 PROCEDURE — 96374 THER/PROPH/DIAG INJ IV PUSH: CPT

## 2023-11-25 PROCEDURE — 83735 ASSAY OF MAGNESIUM: CPT | Performed by: STUDENT IN AN ORGANIZED HEALTH CARE EDUCATION/TRAINING PROGRAM

## 2023-11-25 PROCEDURE — 25000003 PHARM REV CODE 250: Performed by: STUDENT IN AN ORGANIZED HEALTH CARE EDUCATION/TRAINING PROGRAM

## 2023-11-25 PROCEDURE — 25000003 PHARM REV CODE 250: Performed by: NURSE PRACTITIONER

## 2023-11-25 PROCEDURE — 63600175 PHARM REV CODE 636 W HCPCS: Performed by: NURSE PRACTITIONER

## 2023-11-25 PROCEDURE — 84484 ASSAY OF TROPONIN QUANT: CPT | Performed by: STUDENT IN AN ORGANIZED HEALTH CARE EDUCATION/TRAINING PROGRAM

## 2023-11-25 PROCEDURE — S5010 5% DEXTROSE AND 0.45% SALINE: HCPCS | Performed by: NURSE PRACTITIONER

## 2023-11-25 PROCEDURE — 85652 RBC SED RATE AUTOMATED: CPT | Performed by: STUDENT IN AN ORGANIZED HEALTH CARE EDUCATION/TRAINING PROGRAM

## 2023-11-25 PROCEDURE — 99285 EMERGENCY DEPT VISIT HI MDM: CPT | Mod: 25

## 2023-11-25 PROCEDURE — 99223 PR INITIAL HOSPITAL CARE,LEVL III: ICD-10-PCS | Mod: ,,, | Performed by: STUDENT IN AN ORGANIZED HEALTH CARE EDUCATION/TRAINING PROGRAM

## 2023-11-25 PROCEDURE — 86140 C-REACTIVE PROTEIN: CPT | Performed by: STUDENT IN AN ORGANIZED HEALTH CARE EDUCATION/TRAINING PROGRAM

## 2023-11-25 PROCEDURE — 85025 COMPLETE CBC W/AUTO DIFF WBC: CPT | Performed by: STUDENT IN AN ORGANIZED HEALTH CARE EDUCATION/TRAINING PROGRAM

## 2023-11-25 PROCEDURE — 82962 GLUCOSE BLOOD TEST: CPT

## 2023-11-25 PROCEDURE — 99223 1ST HOSP IP/OBS HIGH 75: CPT | Mod: ,,, | Performed by: STUDENT IN AN ORGANIZED HEALTH CARE EDUCATION/TRAINING PROGRAM

## 2023-11-25 PROCEDURE — 83880 ASSAY OF NATRIURETIC PEPTIDE: CPT | Performed by: STUDENT IN AN ORGANIZED HEALTH CARE EDUCATION/TRAINING PROGRAM

## 2023-11-25 PROCEDURE — 21400001 HC TELEMETRY ROOM

## 2023-11-25 PROCEDURE — 11000001 HC ACUTE MED/SURG PRIVATE ROOM

## 2023-11-25 RX ORDER — TALC
6 POWDER (GRAM) TOPICAL NIGHTLY PRN
Status: DISCONTINUED | OUTPATIENT
Start: 2023-11-25 | End: 2023-12-15

## 2023-11-25 RX ORDER — PROCHLORPERAZINE EDISYLATE 5 MG/ML
5 INJECTION INTRAMUSCULAR; INTRAVENOUS EVERY 6 HOURS PRN
Status: DISCONTINUED | OUTPATIENT
Start: 2023-11-25 | End: 2023-12-12

## 2023-11-25 RX ORDER — OXYCODONE HYDROCHLORIDE 5 MG/1
10 TABLET ORAL EVERY 4 HOURS PRN
Status: DISCONTINUED | OUTPATIENT
Start: 2023-11-25 | End: 2023-12-22

## 2023-11-25 RX ORDER — ACETAMINOPHEN 325 MG/1
650 TABLET ORAL EVERY 6 HOURS PRN
Status: DISCONTINUED | OUTPATIENT
Start: 2023-11-25 | End: 2023-12-22 | Stop reason: HOSPADM

## 2023-11-25 RX ORDER — IBUPROFEN 200 MG
16 TABLET ORAL
Status: DISCONTINUED | OUTPATIENT
Start: 2023-11-25 | End: 2023-11-26 | Stop reason: SDUPTHER

## 2023-11-25 RX ORDER — HYDRALAZINE HYDROCHLORIDE 20 MG/ML
10 INJECTION INTRAMUSCULAR; INTRAVENOUS EVERY 4 HOURS PRN
Status: DISCONTINUED | OUTPATIENT
Start: 2023-11-25 | End: 2023-12-22 | Stop reason: HOSPADM

## 2023-11-25 RX ORDER — HYDROCODONE BITARTRATE AND ACETAMINOPHEN 5; 325 MG/1; MG/1
1 TABLET ORAL
Status: COMPLETED | OUTPATIENT
Start: 2023-11-25 | End: 2023-11-25

## 2023-11-25 RX ORDER — TIZANIDINE 4 MG/1
4 TABLET ORAL EVERY 8 HOURS
Status: COMPLETED | OUTPATIENT
Start: 2023-11-25 | End: 2023-11-25

## 2023-11-25 RX ORDER — ATENOLOL 50 MG/1
50 TABLET ORAL 2 TIMES DAILY
COMMUNITY

## 2023-11-25 RX ORDER — PREGABALIN 25 MG/1
25 CAPSULE ORAL 3 TIMES DAILY
COMMUNITY

## 2023-11-25 RX ORDER — NIFEDIPINE 30 MG/1
90 TABLET, EXTENDED RELEASE ORAL DAILY
Status: DISCONTINUED | OUTPATIENT
Start: 2023-11-25 | End: 2023-11-27

## 2023-11-25 RX ORDER — BUMETANIDE 1 MG/1
1 TABLET ORAL DAILY
Status: ON HOLD | COMMUNITY
End: 2023-12-22 | Stop reason: HOSPADM

## 2023-11-25 RX ORDER — OXYCODONE AND ACETAMINOPHEN 10; 325 MG/1; MG/1
1 TABLET ORAL EVERY 8 HOURS PRN
COMMUNITY

## 2023-11-25 RX ORDER — LEVETIRACETAM 500 MG/1
500 TABLET ORAL 3 TIMES DAILY
Status: DISCONTINUED | OUTPATIENT
Start: 2023-11-25 | End: 2023-12-22 | Stop reason: HOSPADM

## 2023-11-25 RX ORDER — FUROSEMIDE 10 MG/ML
40 INJECTION INTRAMUSCULAR; INTRAVENOUS
Status: DISCONTINUED | OUTPATIENT
Start: 2023-11-25 | End: 2023-11-25

## 2023-11-25 RX ORDER — LOSARTAN POTASSIUM AND HYDROCHLOROTHIAZIDE 25; 100 MG/1; MG/1
1 TABLET ORAL DAILY
Status: ON HOLD | COMMUNITY
End: 2023-12-22 | Stop reason: HOSPADM

## 2023-11-25 RX ORDER — ATENOLOL 50 MG/1
50 TABLET ORAL 2 TIMES DAILY
Status: DISCONTINUED | OUTPATIENT
Start: 2023-11-25 | End: 2023-12-22 | Stop reason: HOSPADM

## 2023-11-25 RX ORDER — ONDANSETRON 2 MG/ML
4 INJECTION INTRAMUSCULAR; INTRAVENOUS EVERY 4 HOURS PRN
Status: DISCONTINUED | OUTPATIENT
Start: 2023-11-25 | End: 2023-12-22 | Stop reason: HOSPADM

## 2023-11-25 RX ORDER — IBUPROFEN 200 MG
24 TABLET ORAL
Status: DISCONTINUED | OUTPATIENT
Start: 2023-11-25 | End: 2023-11-26 | Stop reason: SDUPTHER

## 2023-11-25 RX ORDER — ENOXAPARIN SODIUM 100 MG/ML
40 INJECTION SUBCUTANEOUS EVERY 24 HOURS
Status: DISCONTINUED | OUTPATIENT
Start: 2023-11-25 | End: 2023-12-22 | Stop reason: HOSPADM

## 2023-11-25 RX ORDER — DULOXETIN HYDROCHLORIDE 20 MG/1
20 CAPSULE, DELAYED RELEASE ORAL 2 TIMES DAILY
COMMUNITY

## 2023-11-25 RX ORDER — MORPHINE SULFATE 4 MG/ML
4 INJECTION, SOLUTION INTRAMUSCULAR; INTRAVENOUS EVERY 4 HOURS PRN
Status: DISCONTINUED | OUTPATIENT
Start: 2023-11-25 | End: 2023-11-26

## 2023-11-25 RX ORDER — HYDROMORPHONE HYDROCHLORIDE 2 MG/ML
2 INJECTION, SOLUTION INTRAMUSCULAR; INTRAVENOUS; SUBCUTANEOUS
Status: DISCONTINUED | OUTPATIENT
Start: 2023-11-25 | End: 2023-11-26

## 2023-11-25 RX ORDER — MAG HYDROX/ALUMINUM HYD/SIMETH 200-200-20
30 SUSPENSION, ORAL (FINAL DOSE FORM) ORAL 4 TIMES DAILY PRN
Status: DISCONTINUED | OUTPATIENT
Start: 2023-11-25 | End: 2023-12-22 | Stop reason: HOSPADM

## 2023-11-25 RX ORDER — MAGNESIUM SULFATE HEPTAHYDRATE 40 MG/ML
2 INJECTION, SOLUTION INTRAVENOUS
Status: COMPLETED | OUTPATIENT
Start: 2023-11-25 | End: 2023-11-25

## 2023-11-25 RX ORDER — SIMETHICONE 80 MG
1 TABLET,CHEWABLE ORAL 4 TIMES DAILY PRN
Status: DISCONTINUED | OUTPATIENT
Start: 2023-11-25 | End: 2023-12-22 | Stop reason: HOSPADM

## 2023-11-25 RX ORDER — TIZANIDINE 4 MG/1
4 TABLET ORAL EVERY 8 HOURS PRN
Status: ACTIVE | OUTPATIENT
Start: 2023-11-26 | End: 2023-11-27

## 2023-11-25 RX ORDER — POLYETHYLENE GLYCOL 3350 17 G/17G
17 POWDER, FOR SOLUTION ORAL 2 TIMES DAILY PRN
Status: DISCONTINUED | OUTPATIENT
Start: 2023-11-25 | End: 2023-12-17

## 2023-11-25 RX ORDER — BENAZEPRIL HYDROCHLORIDE 40 MG/1
40 TABLET ORAL DAILY
Status: ON HOLD | COMMUNITY
End: 2023-12-22 | Stop reason: HOSPADM

## 2023-11-25 RX ORDER — GLUCAGON 1 MG
1 KIT INJECTION
Status: DISCONTINUED | OUTPATIENT
Start: 2023-11-25 | End: 2023-11-26 | Stop reason: SDUPTHER

## 2023-11-25 RX ORDER — NALOXONE HCL 0.4 MG/ML
0.02 VIAL (ML) INJECTION
Status: DISCONTINUED | OUTPATIENT
Start: 2023-11-25 | End: 2023-12-22 | Stop reason: HOSPADM

## 2023-11-25 RX ORDER — LABETALOL HYDROCHLORIDE 5 MG/ML
10 INJECTION, SOLUTION INTRAVENOUS
Status: DISCONTINUED | OUTPATIENT
Start: 2023-11-25 | End: 2023-12-22 | Stop reason: HOSPADM

## 2023-11-25 RX ORDER — PREGABALIN 75 MG/1
75 CAPSULE ORAL 3 TIMES DAILY
Status: ON HOLD | COMMUNITY
End: 2023-12-22 | Stop reason: HOSPADM

## 2023-11-25 RX ORDER — DEXTROSE MONOHYDRATE AND SODIUM CHLORIDE 5; .45 G/100ML; G/100ML
INJECTION, SOLUTION INTRAVENOUS CONTINUOUS
Status: DISCONTINUED | OUTPATIENT
Start: 2023-11-25 | End: 2023-11-27

## 2023-11-25 RX ADMIN — DEXTROSE AND SODIUM CHLORIDE: 5; 450 INJECTION, SOLUTION INTRAVENOUS at 06:11

## 2023-11-25 RX ADMIN — HYDROCODONE BITARTRATE AND ACETAMINOPHEN 1 TABLET: 5; 325 TABLET ORAL at 05:11

## 2023-11-25 RX ADMIN — ONDANSETRON 4 MG: 2 INJECTION INTRAMUSCULAR; INTRAVENOUS at 08:11

## 2023-11-25 RX ADMIN — ENOXAPARIN SODIUM 40 MG: 40 INJECTION SUBCUTANEOUS at 04:11

## 2023-11-25 RX ADMIN — Medication 24 G: at 08:11

## 2023-11-25 RX ADMIN — TIZANIDINE 4 MG: 4 TABLET ORAL at 09:11

## 2023-11-25 RX ADMIN — LEVETIRACETAM 500 MG: 500 TABLET, FILM COATED ORAL at 09:11

## 2023-11-25 RX ADMIN — LEVETIRACETAM 500 MG: 500 TABLET, FILM COATED ORAL at 08:11

## 2023-11-25 RX ADMIN — MAGNESIUM SULFATE HEPTAHYDRATE 2 G: 40 INJECTION, SOLUTION INTRAVENOUS at 04:11

## 2023-11-25 RX ADMIN — DEXTROSE MONOHYDRATE 250 ML: 100 INJECTION, SOLUTION INTRAVENOUS at 04:11

## 2023-11-25 RX ADMIN — TIZANIDINE 4 MG: 4 TABLET ORAL at 02:11

## 2023-11-25 RX ADMIN — ATENOLOL 50 MG: 50 TABLET ORAL at 08:11

## 2023-11-25 RX ADMIN — DEXTROSE MONOHYDRATE 250 ML: 100 INJECTION, SOLUTION INTRAVENOUS at 08:11

## 2023-11-25 RX ADMIN — ATENOLOL 50 MG: 50 TABLET ORAL at 09:11

## 2023-11-25 RX ADMIN — DEXTROSE MONOHYDRATE 250 ML: 100 INJECTION, SOLUTION INTRAVENOUS at 01:11

## 2023-11-25 RX ADMIN — MORPHINE SULFATE 4 MG: 4 INJECTION, SOLUTION INTRAMUSCULAR; INTRAVENOUS at 02:11

## 2023-11-25 RX ADMIN — OXYCODONE HYDROCHLORIDE 10 MG: 5 TABLET ORAL at 04:11

## 2023-11-25 RX ADMIN — HYDROMORPHONE HYDROCHLORIDE 2 MG: 2 INJECTION INTRAMUSCULAR; INTRAVENOUS; SUBCUTANEOUS at 10:11

## 2023-11-25 RX ADMIN — DEXTROSE MONOHYDRATE 250 ML: 100 INJECTION, SOLUTION INTRAVENOUS at 05:11

## 2023-11-25 RX ADMIN — TIZANIDINE 4 MG: 4 TABLET ORAL at 08:11

## 2023-11-25 RX ADMIN — MORPHINE SULFATE 4 MG: 4 INJECTION, SOLUTION INTRAMUSCULAR; INTRAVENOUS at 08:11

## 2023-11-25 RX ADMIN — HYDRALAZINE HYDROCHLORIDE 10 MG: 20 INJECTION, SOLUTION INTRAMUSCULAR; INTRAVENOUS at 06:11

## 2023-11-25 RX ADMIN — LEVETIRACETAM 500 MG: 500 TABLET, FILM COATED ORAL at 02:11

## 2023-11-25 RX ADMIN — NIFEDIPINE 90 MG: 30 TABLET, FILM COATED, EXTENDED RELEASE ORAL at 08:11

## 2023-11-25 RX ADMIN — MORPHINE SULFATE 4 MG: 4 INJECTION, SOLUTION INTRAMUSCULAR; INTRAVENOUS at 09:11

## 2023-11-25 NOTE — CONSULTS
"[facility] Neurosurgery  [facility] Consultation    Reason for Consultation: L5-S1 degenerative changes. Endplate sclerosis, disc space narrowing and osseous erosions.  Consulted by: ED  Date of Consultation: 11/25/23     SUBJECTIVE:     Chief Complaint   Low back pain. Fell out of wheelchair    History of Present Illness:   Patient is a 48 yea-old male with several co morbidities incluiding CHF, HTN, ESRD, poorly controlled DM. S/p BKA LLE. Wheelchair bound and deconditioned. Presented to the ED after fall from wheelchair due to dizziness/lightheadedness. Complained of some back pain. Imaging obtained which demonstrated "Severe degenerative change at L5-S1, progressed from previous MRI.  There is endplate sclerosis, disc space narrowing and osseous erosions". Neurosurgery consulted.    Patient Active Problem List    Diagnosis Date Noted    Cirrhosis 03/11/2023    Seizure disorder 11/14/2022     Past Medical History:   Diagnosis Date    CHF (congestive heart failure)     Chronic back pain     CVA (cerebral vascular accident) 01/2023    DM (diabetes mellitus)     HTN (hypertension)     Seizures      Past Surgical History:   Procedure Laterality Date    BACK SURGERY      EGD, WITH CLOSED BIOPSY  3/15/2023    Procedure: EGD, WITH CLOSED BIOPSY;  Surgeon: Tj Faith MD;  Location: [facility] ENDOSCOPY;  Service: Gastroenterology;;    ESOPHAGOGASTRODUODENOSCOPY N/A 3/15/2023    Procedure: EGD;  Surgeon: Tj Faith MD;  Location: [facility] ENDOSCOPY;  Service: Gastroenterology;  Laterality: N/A;    TOE AMPUTATION Right 3/24/2023    Procedure: AMPUTATION, TOE;  Surgeon: Emre Vazquez DPM;  Location: [facility] OR;  Service: Podiatry;  Laterality: Right;     (Not in a hospital admission)    Review of patient's allergies indicates:  No Known Allergies  Social History     Tobacco Use    Smoking status: Never    Smokeless tobacco: Never   Substance Use Topics    Alcohol use: Not Currently     No family " history on file.    Vital Signs  Temp: 98.9 °F (37.2 °C)  Temp Source: Oral  Pulse: 81  Resp: 14  SpO2: 100 %  Device (Oxygen Therapy): room air  BP: (!) 146/99  Height and Weight  Weight: 127 kg (280 lb)  Weight Method: Stated]    ROS:  Review of Systems    OBJECTIVE:     Vitals:    11/25/23 0831   BP: (!) 146/99   Pulse: 81   Resp: 14   Temp:       Ill-appearing, obese male  L BKA  Diffusely weak BLLE but antigravity  Sensation appears to be intact    Data Review: CBC:   Lab Results   Component Value Date    WBC 14.42 (H) 11/25/2023    RBC 4.18 (L) 11/25/2023    HGB 12.2 (L) 11/25/2023    HCT 37.2 (L) 11/25/2023    HCT 43 11/13/2022     11/25/2023     BMP:   Lab Results   Component Value Date     11/25/2023     02/22/2021    K 4.7 11/25/2023    K 3.6 02/22/2021    CO2 18 (L) 11/25/2023    CO2 26 02/22/2021    BUN 25.7 (H) 11/25/2023    BUN 15.0 02/22/2021    CREATININE 2.31 (H) 11/25/2023    CREATININE 0.98 02/22/2021    CALCIUM 7.2 (L) 11/25/2023    CALCIUM 8.5 02/22/2021     Coagulation:   Lab Results   Component Value Date    INR 1.05 03/14/2023    INR 1.3 (H) 11/13/2022     Radiology review:    CT C spine  1. No appreciable acute osseous abnormality by CT evaluation  2. Degenerative change at the cervical spine    CT T spine  No acute osseous abnormality     CT L spine  1. No appreciable fracture or acute osseous abnormality by CT evaluation  2. Severe degenerative change at L5-S1, progressed from previous MRI.  There is endplate sclerosis, disc space narrowing and osseous erosions which may be degenerative or infectious/inflammatory.  Correlate with clinical history.      ASSESSMENT/PLAN:     48 year-old male with several co morbidities and chronic deconditioning presents after fall from wheelchair. Imaging with no acute fracture. Findings likely chronic. Awaiting MRI.  - No acute neurosurgical intervention. OK to eat. No activity restrictions. Further recommendations after MRI is  completed.    Justo Ramsay MD  Neurosurgery

## 2023-11-25 NOTE — Clinical Note
Diagnosis: FABIO (acute kidney injury) [009184]   Future Attending Provider: BERENICE COLVIN [[id number]]   Admitting Provider:: BERENICE COLVIN [[id number]]   Admit to which facility:: [facility] [[id number]]   Reason for IP Medical Treatment  (Clinical interventions that can only be accomplished in the IP setting? ) :: mri, diuresis, electrolyte repletion   I certify that Inpatient services for greater than or equal to 2 midnights are medically necessary:: Yes   Plans for Post-Acute care--if anticipated (pick the single best option):: A. No post acute care anticipated at this time   Special Needs:: No Special Needs [1]

## 2023-11-25 NOTE — ED PROVIDER NOTES
"Encounter Date: 11/25/2023    SCRIBE #1 NOTE: I, Greer Navas, am scribing for, and in the presence of,  Bhavin Ayala IV, MD. I have scribed the following portions of the note - Other sections scribed: HPI, ROS, PE.       History     Chief Complaint   Patient presents with    Fall     Fall out of wheelchair. Struck mouth, (-) loc, (-) bt. [id number]     A 48 year old male with a history of CHF, DM, HTN, and ESRD is presenting to the ED via EMS following a fall. Per EMS report, the pt became dizzy and lightheaded, causing him to fall out of his wheelchair. He denied hitting his head and states he hit his jaw in the fall. He notes that he had a recent spinal surgery and is reporting back pain. He was given 100 micrograms of fentanyl en route by EMS.     Patient reports that he had a "botched back surgery" several years ago in [place] and has been nonambulatory since then wheelchair bound.     The history is provided by the patient. No  was used.     Review of patient's allergies indicates:  No Known Allergies  Past Medical History:   Diagnosis Date    CHF (congestive heart failure)     Chronic back pain     CVA (cerebral vascular accident) 01/2023    DM (diabetes mellitus)     HTN (hypertension)     Seizures      Past Surgical History:   Procedure Laterality Date    BACK SURGERY      EGD, WITH CLOSED BIOPSY  3/15/2023    Procedure: EGD, WITH CLOSED BIOPSY;  Surgeon: Tj Faith MD;  Location: [facility] ENDOSCOPY;  Service: Gastroenterology;;    ESOPHAGOGASTRODUODENOSCOPY N/A 3/15/2023    Procedure: EGD;  Surgeon: Tj Faith MD;  Location: [facility] ENDOSCOPY;  Service: Gastroenterology;  Laterality: N/A;    TOE AMPUTATION Right 3/24/2023    Procedure: AMPUTATION, TOE;  Surgeon: Emre Vazquez DPM;  Location: [facility] OR;  Service: Podiatry;  Laterality: Right;     No family history on file.  Social History     Tobacco Use    Smoking status: Never    Smokeless tobacco: Never   Substance Use " Topics    Alcohol use: Not Currently    Drug use: Not Currently     Review of Systems   Constitutional:  Negative for chills and fever.   HENT:  Negative for congestion, rhinorrhea and sore throat.    Eyes:  Negative for visual disturbance.   Respiratory:  Negative for cough and shortness of breath.    Cardiovascular:  Negative for chest pain.   Gastrointestinal:  Negative for abdominal pain, nausea and vomiting.   Genitourinary:  Negative for dysuria and hematuria.   Musculoskeletal:  Positive for back pain. Negative for joint swelling.   Skin:  Negative for rash.   Neurological:  Positive for dizziness and light-headedness. Negative for weakness.   Psychiatric/Behavioral:  Negative for confusion.    All other systems reviewed and are negative.      Physical Exam     Initial Vitals [11/25/23 0201]   BP Pulse Resp Temp SpO2   (!) 155/105 75 12 98.7 °F (37.1 °C) 98 %      MAP       --         Physical Exam    Nursing note and vitals reviewed.  Constitutional: He is not diaphoretic. No distress.   Cardiovascular:  Normal rate and regular rhythm.           Pulmonary/Chest: No respiratory distress.   Abdominal: Abdomen is soft. He exhibits distension. There is no abdominal tenderness.   Musculoskeletal:      Right lower leg: Pitting Edema present.      Left lower leg: Pitting Edema present.      Comments: Incision is c/d/I.   Full spinal tenderness.       Left Lower Extremity: Left leg is amputated below knee.     Neurological: He is alert and oriented to person, place, and time.   Psychiatric: He has a normal mood and affect.         ED Course   Procedures  Labs Reviewed   COMPREHENSIVE METABOLIC PANEL - Abnormal; Notable for the following components:       Result Value    Chloride 117 (*)     Carbon Dioxide 18 (*)     Glucose Level 52 (*)     Blood Urea Nitrogen 25.7 (*)     Creatinine 2.31 (*)     Calcium Level Total 7.2 (*)     Protein Total 6.1 (*)     Albumin Level 1.4 (*)     Globulin 4.7 (*)     Albumin/Globulin  Ratio 0.3 (*)     Alkaline Phosphatase 189 (*)     Aspartate Aminotransferase 36 (*)     All other components within normal limits   MAGNESIUM - Abnormal; Notable for the following components:    Magnesium Level 1.00 (*)     All other components within normal limits   B-TYPE NATRIURETIC PEPTIDE - Abnormal; Notable for the following components:    Natriuretic Peptide 109.7 (*)     All other components within normal limits   CBC WITH DIFFERENTIAL - Abnormal; Notable for the following components:    WBC 14.42 (*)     RBC 4.18 (*)     Hgb 12.2 (*)     Hct 37.2 (*)     MCHC 32.8 (*)     MPV 11.6 (*)     All other components within normal limits   SEDIMENTATION RATE - Abnormal; Notable for the following components:    Sed Rate 93 (*)     All other components within normal limits   C-REACTIVE PROTEIN - Abnormal; Notable for the following components:    C-Reactive Protein 5.50 (*)     All other components within normal limits   POCT GLUCOSE - Abnormal; Notable for the following components:    POCT Glucose 33 (*)     All other components within normal limits   TROPONIN I - Normal   CBC W/ AUTO DIFFERENTIAL    Narrative:     The following orders were created for panel order CBC auto differential.  Procedure                               Abnormality         Status                     ---------                               -----------         ------                     CBC with Differential[[id number]]       Abnormal            Final result                 Please view results for these tests on the individual orders.   POCT GLUCOSE          Imaging Results              CT Lumbar Spine Without Contrast (Preliminary result)  Result time 11/25/23 03:19:37      Preliminary result by Monroe Lozoya Jr., MD (11/25/23 03:19:37)                   Narrative:    START OF REPORT:  Technique: CT of the lumbar spine was performed without intravenous contrast with direct axial as well as sagittal and coronal reconstruction  images.    Comparison: Comparison is with prior CT study dated 2020-12-16 16:40:48.    Clinical history: Fall from wheelchair, pain all over.    Findings:  Anatomy: Unremarkable.  Mineralization: The bony mineralization is within normal limits.  Congenital: None.  Bone alignment: There is interval development of grade I retrolisthesis of L5 over S1.  Curvature: The lumbar lordosis is maintained.  Bone and bone marrow: The vertebral body heights are maintained.  Intervertebral disc spaces: There is interval moderate decrease disc height at L5-S1. There is associated cortical erosions along the opposing endplates with diffuse surrounding sclerosis at this level with development of a large anterior marginal osteophytes. These findings are new since the prior examination and may be related to intervertebral osteochondrosis versus discitis with osteomyelitis.  Spinal canal: Again noted is broad-based central disc protrusion at L5-S1. The herniated disc appears to be increased in size since the prior examination with interval severe canal stenosis. Mild facet arthropathy is also seen at this level with widening of the left facet joint space. There is moderate-to-severe bilateral neuroforaminal narrowing at L5-S1.  Fractures: No acute fracture dislocation or subluxation is seen.  Visualized sacrum: Normal.      Impression:  1. No acute fracture dislocation or subluxation is seen.  2. There is interval moderate decrease disc height at L5-S1. There is associated cortical erosions along the opposing endplates with diffuse surrounding sclerosis at this level with development of a large anterior marginal osteophytes. These findings are new since the prior examination and may be related to intervertebral osteochondrosis versus discitis with osteomyelitis.  3. Again noted is broad-based central disc protrusion at L5-S1. The herniated disc appears to be increased in size since the prior examination with interval severe canal  stenosis. Mild facet arthropathy is also seen at this level with widening of the left facet joint space. There is moderate-to-severe bilateral neuroforaminal narrowing at L5-S1.  4. Details and other findings as above.                                         CT Thoracic Spine Without Contrast (Preliminary result)  Result time 11/25/23 03:16:54      Preliminary result by Monroe Lozoya Jr., MD (11/25/23 03:16:54)                   Narrative:    START OF REPORT:  Technique: CT of the thoracic spine without contrast with direct axial as well as sagittal and coronal reconstruction images.    Comparison: None.    Dosage Information: Automated Exposure Control was utilized.    Clinical History: Fall from wheelchair, pain all over.    Findings:  Mineralization of the bony structures: Within normal limits.  Bone marrow:  Vertebral Fusion: None.  Curvature: Normal thoracic kyphosis.  Fractures: No fracture dislocation or subluxation is identified.  Degenerative changes:  Intervertebral disc spaces: Preserved throughout.  Osteophytes: Mild multilevel osteophytes are seen.  Endplates: No significant endplate sclerosis.  Spinal canal: Unremarkable.    Miscellaneous: Large left pleural effusion is seen.      Impression:  1. No fracture dislocation or subluxation is identified.  2. Large left pleural effusion is seen.  3. Details and other findings as above.                                         CT Cervical Spine Without Contrast (Preliminary result)  Result time 11/25/23 03:12:22      Preliminary result by Monroe Lozoya Jr., MD (11/25/23 03:12:22)                   Narrative:    START OF REPORT:  Technique: CT of the cervical spine was performed without intravenous contrast with axial as well as sagittal and coronal images.    Comparison: None.    Dosage Information: Automated exposure control was utilized.    Clinical history: Fall from wheelchair, pain all over.    Findings:  Lung apices: The visualized lung  apices appear unremarkable.  Spine:  Spinal canal: There is mild canal stenosis from C3-C4 through C6-C7 secondary to disc osteophyte complexes.  Mineralization: Within normal limits.  Scoliosis: Dextroconvex cervical scoliosisis seen. The apex of the scoliosis is centered on C5-C6.  Vertebral Fusion: No vertebral fusion is identified.  Listhesis: No significant listhesis is identified.  Lordosis: Reversal of the normal cervical lordosis is seen. The reversal is centered on C4-C5.  Intervertebral disc spaces: Severely decreased disc height is seen at C5-C6 and C6-C7.  Osteophytes: Anterior and posterior marginal multilevel endplate osteophytes are seen.  Endplate Sclerosis: Moderate inferior endplate sclerosis is seen of C5-C6 and C6-C7.  Uncovertebral degenerative changes: Mild multilevel uncovertebral joint arthrosis is seen.  Fractures: No acute cervical spine fracture dislocation or subluxation is seen.  Congenital anomalies: Congenital nonfusion of the midline posterior arch of C1 is seen.      Impression:  1. No acute cervical spine fracture dislocation or subluxation is seen.  2. Degenerative changes and other details as above.                                         CT Maxillofacial Without Contrast (Preliminary result)  Result time 11/25/23 03:06:47   Procedure changed from CT Maxillofacial With Contrast     Preliminary result by Monroe Lozoya Jr., MD (11/25/23 03:06:47)                   Narrative:    START OF REPORT:  Technique: Noncontrast maxillofacial CT was performed with axial as well as sagittal and coronal images being submitted for interpretation.    Comparison: None.    Clinical history: Fall from wheelchair, pain all over.    Findings:  Facial soft tissues: There is mild soft tissue swelling over the left zygomatic arch.  Orbital soft tissues: The orbital soft tissues appear unremarkable.  Bones:  Orbital bony structures: The bilateral orbital bony structures are intact with no orbital  fracture identified.  Mandible: The mandible appears unremarkable.  Maxilla: The maxilla appears unremarkable.  Pterygoid plates: No fracture identified of the right or left pterygoid plates.  Zygoma: The zygomatic arches are intact.  TMJ: The mandibular condyles appear normally placed with respect to the mandibular fossa.  Nasal Bones: The nasal septum is midline. No displaced nasal bone fracture is seen.  Skull: No acute linear or depressed fracture is identified in the visualized skull.  Paranasal sinuses: The visualized paranasal sinuses appear clear with no mucoperiosteal thickening or air fluid levels identified.  Mastoid air cells: There is fluid opacification of bilateral mastoid air cells. This may be related to effusion or mastoiditis.  Brain: Intracranial findings discussed separately.      Impression:  1. There is fluid opacification of bilateral mastoid air cells. This may be related to effusion or mastoiditis.  2. There is mild soft tissue swelling over the left zygomatic arch.  3. No acute maxillofacial fracture identified. Details and other findings as noted above.                                         CT Head Without Contrast (Preliminary result)  Result time 11/25/23 03:02:50      Preliminary result by Monroe Lozoya Jr., MD (11/25/23 03:02:50)                   Narrative:    START OF REPORT:  Technique: CT of the head was performed without intravenous contrast with axial as well as coronal and sagittal images.    Comparison: Comparison is with study dated 2023-04-03 11:18:57.    Dosage Information: Automated exposure control was utilized.    Clinical history: Fall from wheelchair, pain all over.    Findings: No significant interval change as compared with the prior examination.  Hemorrhage: No acute intracranial hemorrhage is seen.  CSF spaces: The ventricles, sulci and basal cisterns all appear mildly prominent consistent with global cerebral atrophy.  Brain parenchyma: There is  preservation of the grey white junction throughout. Mild microvascular change is seen in portions of the periventricular and deep white matter tracts.  Cerebellum: Unremarkable.  Sella and skull base: The sella appears to be within normal limits for age.  Herniation: None.  Intracranial calcifications: Incidental note is made of some pineal region calcification.  Calvarium: No acute linear or depressed skull fracture is seen.    Maxillofacial Structures: Maxillofacial findings discussed separately in the maxillofacial CT report.      Impression:  1. No acute intracranial traumatic injury identified. Details and other findings as noted above.                                         X-Ray Chest AP Portable (In process)                      Medications   magnesium sulfate 2g in water 50mL IVPB (premix) (2 g Intravenous New Bag 11/25/23 0975)   dextrose 10% bolus 250 mL 250 mL (has no administration in time range)   dextrose 10% bolus 250 mL 250 mL (0 mLs Intravenous Stopped 11/25/23 3504)   HYDROcodone-acetaminophen 5-325 mg per tablet 1 tablet (1 tablet Oral Given 11/25/23 0504)     Medical Decision Making  This is a 48-year-old who is chronically wheelchair bound nonambulatory after a back surgery several years ago, presenting for fall out of wheelchair onto his back reports his pain is near his baseline for chronic back pain  Volume overloaded states he is supposed to start dialysis at some point  Labs with hypomagnesemia FABIO, has a large pleural effusion on CT  No acute fractures on CTs no intracranial hemorrhage but possible spinal osteomyelitis versus osteo chondrosis on L-spine?  Patient denies any worsening of his baseline lower extremity weakness or any other new neuro deficits will have Neurosurgery consult in a.m. will order MRI with and without this plan was made in consultation with hospitalist who will admit the patient    Differential diagnosis include but are not limited to:   Fracture, contusion, chf,  fabio, electrolyte derangement, infection, intracranial hemorrhage      Problems Addressed:  FABIO (acute kidney injury): acute illness or injury that poses a threat to life or bodily functions  Fall: acute illness or injury that poses a threat to life or bodily functions  Hypervolemia, unspecified hypervolemia type: acute illness or injury that poses a threat to life or bodily functions  Hypomagnesemia: acute illness or injury that poses a threat to life or bodily functions  Physical deconditioning: acute illness or injury that poses a threat to life or bodily functions    Amount and/or Complexity of Data Reviewed  Independent Historian: EMS     Details: Per EMS report, the pt became dizzy and lightheaded, causing him to fall out of his wheelchair. He denied hitting his head and states he hit his jaw in the fall. He notes that he had a recent spinal surgery and is reporting back pain. He was given 100 micrograms of fentanyl en route by EMS.     External Data Reviewed: labs, radiology and notes.  Labs: ordered.  Radiology: ordered and independent interpretation performed.  Discussion of management or test interpretation with external provider(s): Hospitalist will admit     Risk  Prescription drug management.  Decision regarding hospitalization.            Scribe Attestation:   Scribe #1: I performed the above scribed service and the documentation accurately describes the services I performed. I attest to the accuracy of the note.    Attending Attestation:           Physician Attestation for Scribe:  Physician Attestation Statement for Scribe #1: I, Bhavin Ayala IV, MD, reviewed documentation, as scribed by Greer Navas in my presence, and it is both accurate and complete.             ED Course as of 11/25/23 0518   Sat Nov 25, 2023   0510 Discussed with the hospitalist given patient's baseline nonambulatory status will put a.m. consult for spinal findings will obtain MRI with and without, they have accepted admission  [AC]      ED Course User Index  [AC] Bhavin Ayala IV, MD                        Clinical Impression:  Final diagnoses:  [W19.XXXA] Fall  [N17.9] FABIO (acute kidney injury) (Primary)  [E87.70] Hypervolemia, unspecified hypervolemia type  [E83.42] Hypomagnesemia  [R53.81] Physical deconditioning          ED Disposition Condition    Admit                 Bhavin Ayala IV, MD  11/25/23 0518

## 2023-11-25 NOTE — NURSING
Nurses Note -- 4 Eyes      11/25/2023   3:45 PM      Skin assessed during: Admit      [] No Altered Skin Integrity Present    []Prevention Measures Documented      [x] Yes- Altered Skin Integrity Present or Discovered   [] LDA Added if Not in Epic (Describe Wound)   [x] New Altered Skin Integrity was Present on Admit and Documented in LDA   [x] Wound Image Taken    Wound Care Consulted? Yes    Attending Nurse:  Anastacia Ramirez RN/Staff Member:  Rosario MCCONNELL CNA

## 2023-11-25 NOTE — H&P
[facility] Medicine History & Physical Examination       Patient Name: Kamran Huerta  MRN: [id number]  Patient Class: IP- Inpatient   Admission Date: 11/25/2023   Admitting Physician: HALEY Service   Length of Stay: 0  Attending Physician: Dr. Deja Pittman  Primary Care Provider: Ailyn Reynolds MD  Face-to-Face encounter date: 11/25/2023  Code Status: full code  Chief Complaint: Fall (Fall out of wheelchair. Struck mouth, (-) loc, (-) bt. [id number])        Patient information was obtained from patient, patient's family, past medical records and ER records.     HISTORY OF PRESENT ILLNESS:   Kamran Huerta is a 48 y.o. male who PMH includes CHF, chronic back pain wheelchair-bound, DM type 2, HTN, seizures, CVA, CKD stage 3, alcoholic liver disease with chronic pancreatitis, presents to the ED at [facility] on 11/25/2023 with a primary complaint of weakness, dizziness lightheadedness and fall out if his wheelchair striking his mouth; denies any LOC.  PT had back surgery years ago and reports has been wheelchair bound since then. No reports of seizures.  No CP, SOB, N/V/D, fever, chills cough congestion or any sick contacts. Labs reviewed demonstrated WBC 14.42, HH 12.2/37.2, sed rate 93 , CO2 18 Bun 25.7, Creat 2.31, glucose 52 Mg+ 1.0, , AST 36 reports of chest pain, CRP shortness a breath, nausea, vomiting, diarrhea5, fever., [id number], cough, congestion, or any sick, contacts. B no reportsP of chest pain, 109.7; other indicis unremarkable. CXR impression reviewed demonstrated  no acute cardiopulmonary abnormality. CT of head without contrast impression reviewed demonstrated no appreciable acute intracranial abnormality. Ct maxillofacial without contrast impression reviewed demonstrated no appreciate acute traumatic osseous abnormality, bilateral mastoid effusions. CT cervical spine without contrast impression reviewed demonstrated no appreciable acute osseous  abnormality by CT evaluation, degenerative changes at the cervical spine. CT thoracic spine without contrast impression reviewed demonstrated no acute osseous abnormality. CT of lumbar spine without contrast impression reviewed demonstrated no appreciable fracture or acute osseous abnormality, severe degenerative change at L5-S1 progressed, endplate sclerosis disc space narrowing and osseous erosions which may be degenerative or infectious/inflammatory, anasarca.   Initial VS /105 P 75 R 13 T 98.7F O2 saturation 98% on room air. Pt received multiple doses of pain medication , D50 for hypoglycemia, 40 mg lasix,  and magnesium rider in the ED. Neurosurgery services consulted. Pt awaiting MRI. Pt is admitted to hospital medicine services for further management.      PAST MEDICAL HISTORY:   DM Type II  Diabetic neuropathy  CKD stage 3  Hypertension  Seizure disorder  Chronic back pain/DDD/lumbar radiculopathy  Obesity  History of alcoholic liver disease  Pancreatic insufficiency/chronic pancreatitis  CHF  CVA    PAST SURGICAL HISTORY:     Past Surgical History:   Procedure Laterality Date    BACK SURGERY      EGD, WITH CLOSED BIOPSY  3/15/2023    Procedure: EGD, WITH CLOSED BIOPSY;  Surgeon: Tj Faith MD;  Location: [facility] ENDOSCOPY;  Service: Gastroenterology;;    ESOPHAGOGASTRODUODENOSCOPY N/A 3/15/2023    Procedure: EGD;  Surgeon: Tj Faith MD;  Location: [facility] ENDOSCOPY;  Service: Gastroenterology;  Laterality: N/A;    TOE AMPUTATION Right 3/24/2023    Procedure: AMPUTATION, TOE;  Surgeon: Emre Vazquez DPM;  Location: [facility] OR;  Service: Podiatry;  Laterality: Right;       ALLERGIES:   Patient has no known allergies.    FAMILY HISTORY:   Reviewed and negative    SOCIAL HISTORY:   No reports of tobacco use  No reports of illicit drug use  Former daily alcohol use/beer quit beginning of 2022  Lives with family     HOME MEDICATIONS:   As documented  Prior to Admission medications     Medication Sig Start Date End Date Taking? Authorizing Provider   atenoloL (TENORMIN) 50 MG tablet Take 50 mg by mouth 2 (two) times a day.   Yes Provider, Historical   benazepriL (LOTENSIN) 40 MG tablet Take 40 mg by mouth once daily.   Yes Provider, Historical   bumetanide (BUMEX) 1 MG tablet Take 1 mg by mouth once daily.   Yes Provider, Historical   DULoxetine (CYMBALTA) 20 MG capsule Take 20 mg by mouth 2 (two) times daily.   Yes Provider, Historical   insulin glargine 100 units/mL SubQ pen Inject 20 Units into the skin every evening.   Yes Provider, Historical   insulin NPH-insulin regular, 70/30, 100 unit/mL (70-30) injection Inject into the skin 2 (two) times daily. 20U every morning & 10U every evening   Yes Provider, Historical   insulin regular 100 unit/mL Inj injection Inject into the skin.   Yes Provider, Historical   levETIRAcetam (KEPPRA) 500 MG Tab Take 1 tablet (500 mg total) by mouth 2 (two) times daily.  Patient taking differently: Take 500 mg by mouth 3 (three) times daily. 4/4/23 11/25/23 Yes Dieudonne Montemayor MD   losartan-hydrochlorothiazide 100-25 mg (HYZAAR) 100-25 mg per tablet Take 1 tablet by mouth once daily.   Yes Provider, Historical   NIFEdipine (PROCARDIA-XL) 60 MG (OSM) 24 hr tablet Take 2 tablets (120 mg total) by mouth once daily.  Patient taking differently: Take 90 mg by mouth once daily. 4/4/23 11/25/23 Yes Dieudonne Montemayor MD   oxyCODONE-acetaminophen (PERCOCET)  mg per tablet Take 1 tablet by mouth every 8 (eight) hours as needed for Pain.   Yes Provider, Historical   pregabalin (LYRICA) 25 MG capsule Take 25 mg by mouth 3 (three) times daily.   Yes Provider, Historical   pregabalin (LYRICA) 75 MG capsule Take 75 mg by mouth 3 (three) times daily.   Yes Provider, Historical   HYDROcodone-acetaminophen (NORCO)  mg per tablet Take 1 tablet by mouth every 8 (eight) hours as needed. 10/20/22 11/25/23 Yes Provider, Historical   BD VEO INSULIN SYR, HALF UNIT, 0.3  "mL 31 gauge x 15/64" Syrg use as directed 10/10/22   Provider, Historical   doxazosin (CARDURA) 2 MG tablet Take 1 tablet (2 mg total) by mouth every evening. 4/4/23 6/3/23  Dieudonne Montemayor MD   gabapentin (NEURONTIN) 400 MG capsule Take 400 mg by mouth 2 (two) times daily. 9/22/21   Provider, Historical   insulin aspart U-100 (NOVOLOG) 100 unit/mL injection Inject 1-10 Units into the skin before meals and at bedtime as needed for High Blood Sugar. 4/4/23 4/3/24  Dieudonne Montemayor MD   lipase-protease-amylase 12,000-38,000-60,000 units (CREON) CpDR Take 6 capsules by mouth 3 (three) times daily. 4/4/23 4/3/24  Dieudonne Montemayor MD   loperamide (IMODIUM) 2 mg capsule Take by mouth. 10/10/22   Provider, Historical   losartan (COZAAR) 100 MG tablet Take 1 tablet (100 mg total) by mouth once daily. 4/4/23 6/3/23  Dieudonne Montemayor MD   metoprolol tartrate (LOPRESSOR) 25 MG tablet Take 25 mg by mouth 2 (two) times daily. 10/10/22   Provider, Historical   miconazole NITRATE 2 % (MICOTIN) 2 % top powder Apply topically 2 (two) times daily. for 7 days 4/4/23 4/11/23  Dieudonne Montemayor MD   PRENATAL VITAMIN PLUS LOW IRON 27 mg iron- 1 mg Tab Take 1 tablet by mouth once daily. 10/10/22   Provider, Historical   sodium bicarbonate 650 MG tablet Take 1 tablet (650 mg total) by mouth 2 (two) times daily. 4/4/23 5/4/23  Dieudonne Montemayor MD   tiZANidine (ZANAFLEX) 4 MG tablet Take by mouth. 6/7/22   Provider, Historical   torsemide 40 mg Tab Take 40 mg by mouth once daily. 4/4/23 6/3/23  Dieudonne Montemayor MD       REVIEW OF SYSTEMS:   Except as documented, all other systems reviewed and negative     PHYSICAL EXAM:     VITAL SIGNS: 24 HRS MIN & MAX LAST   Temp  Min: 98.7 °F (37.1 °C)  Max: 98.7 °F (37.1 °C) 98.7 °F (37.1 °C)   BP  Min: 145/86  Max: 197/101 (!) 147/96   Pulse  Min: 71  Max: 75  75   Resp  Min: 10  Max: 16 12   SpO2  Min: 98 %  Max: 100 % 100 %       General appearance:  Chronically ill-appearing " looks older than stated age; nontoxic, fatigued.  HENT: Atraumatic head. Moist mucous membranes of oral cavity.  Eyes: PERRL  Lungs: Clear to auscultation bilaterally. No wheezing present.   Heart: Regular rate and rhythm. S1 and S2 present cap refill brisk, left BKA, Pitting edema lower ext, left arm edema  Abdomen: Soft, obese, non-distended, non-tender. No rebound tenderness/guarding. Bowel sounds are normal.   Extremities: No cyanosis, clubbing, generalized weakness, back pain with TTP  Skin: warm and dry, dry flaking scaling skin  Neuro: oriented x 3, no focal deficits  Psych/mental status: flat affect. cooperative    LABS AND IMAGING:     Recent Labs   Lab 11/25/23  0316   WBC 14.42*   RBC 4.18*   HGB 12.2*   HCT 37.2*   MCV 89.0   MCH 29.2   MCHC 32.8*   RDW 13.8      MPV 11.6*       Recent Labs   Lab 11/25/23 0316      K 4.7   CO2 18*   BUN 25.7*   CREATININE 2.31*   CALCIUM 7.2*   MG 1.00*   ALBUMIN 1.4*   ALKPHOS 189*   ALT 41   AST 36*   BILITOT 0.2       Microbiology Results (last 7 days)       ** No results found for the last 168 hours. **             US Retroperitoneal Limited  Narrative: EXAMINATION:  US RETROPERITONEAL LIMITED    CLINICAL HISTORY:  Elevated creatinine;    TECHNIQUE:  Retroperitoneal ultrasound.  Limited.    COMPARISON:  CT abdomen pelvis 03/27/2023    FINDINGS:  RIGHT KIDNEY: The right kidney measures 12.7 cm.  No hydronephrosis. The kidney is echogenic.    LEFT KIDNEY: The left kidney measures 13.5 cm.  Moderately obscured by shadowing.  No lex hydronephrosis.    BLADDER: Debris layer in the bladder.    OTHER: Ascites.  Impression: The right kidney appears echogenic suggesting chronic medical renal disease.  No hydronephrosis    The left kidney is moderately obscured by shadowing.  No lex hydronephrosis.    Debris layering the bladder.  Correlate with urinalysis.    Ascites    Electronically signed by: Nisa Daley  Date:    11/25/2023  Time:    07:57  CT  Lumbar Spine Without Contrast  START OF REPORT:  Technique: CT of the lumbar spine was performed without intravenous contrast with direct axial as well as sagittal and coronal reconstruction images.    Comparison: Comparison is with prior CT study dated 2020-12-16 16:40:48.    Clinical history: Fall from wheelchair, pain all over.    Findings:  Anatomy: Unremarkable.  Mineralization: The bony mineralization is within normal limits.  Congenital: None.  Bone alignment: There is interval development of grade I retrolisthesis of L5 over S1.  Curvature: The lumbar lordosis is maintained.  Bone and bone marrow: The vertebral body heights are maintained.  Intervertebral disc spaces: There is interval moderate decrease disc height at L5-S1. There is associated cortical erosions along the opposing endplates with diffuse surrounding sclerosis at this level with development of a large anterior marginal osteophytes. These findings are new since the prior examination and may be related to intervertebral osteochondrosis versus discitis with osteomyelitis.  Spinal canal: Again noted is broad-based central disc protrusion at L5-S1. The herniated disc appears to be increased in size since the prior examination with interval severe canal stenosis. Mild facet arthropathy is also seen at this level with widening of the left facet joint space. There is moderate-to-severe bilateral neuroforaminal narrowing at L5-S1.  Fractures: No acute fracture dislocation or subluxation is seen.  Visualized sacrum: Normal.    Impression:  1. No acute fracture dislocation or subluxation is seen.  2. There is interval moderate decrease disc height at L5-S1. There is associated cortical erosions along the opposing endplates with diffuse surrounding sclerosis at this level with development of a large anterior marginal osteophytes. These findings are new since the prior examination and may be related to intervertebral osteochondrosis versus discitis with  osteomyelitis.  3. Again noted is broad-based central disc protrusion at L5-S1. The herniated disc appears to be increased in size since the prior examination with interval severe canal stenosis. Mild facet arthropathy is also seen at this level with widening of the left facet joint space. There is moderate-to-severe bilateral neuroforaminal narrowing at L5-S1.  4. Details and other findings as above.  CT Thoracic Spine Without Contrast  START OF REPORT:  Technique: CT of the thoracic spine without contrast with direct axial as well as sagittal and coronal reconstruction images.    Comparison: None.    Dosage Information: Automated Exposure Control was utilized.    Clinical History: Fall from wheelchair, pain all over.    Findings:  Mineralization of the bony structures: Within normal limits.  Bone marrow:  Vertebral Fusion: None.  Curvature: Normal thoracic kyphosis.  Fractures: No fracture dislocation or subluxation is identified.  Degenerative changes:  Intervertebral disc spaces: Preserved throughout.  Osteophytes: Mild multilevel osteophytes are seen.  Endplates: No significant endplate sclerosis.  Spinal canal: Unremarkable.    Miscellaneous: Large left pleural effusion is seen.    Impression:  1. No fracture dislocation or subluxation is identified.  2. Large left pleural effusion is seen.  3. Details and other findings as above.  CT Cervical Spine Without Contrast  START OF REPORT:  Technique: CT of the cervical spine was performed without intravenous contrast with axial as well as sagittal and coronal images.    Comparison: None.    Dosage Information: Automated exposure control was utilized.    Clinical history: Fall from wheelchair, pain all over.    Findings:  Lung apices: The visualized lung apices appear unremarkable.  Spine:  Spinal canal: There is mild canal stenosis from C3-C4 through C6-C7 secondary to disc osteophyte complexes.  Mineralization: Within normal limits.  Scoliosis: Dextroconvex  cervical scoliosisis seen. The apex of the scoliosis is centered on C5-C6.  Vertebral Fusion: No vertebral fusion is identified.  Listhesis: No significant listhesis is identified.  Lordosis: Reversal of the normal cervical lordosis is seen. The reversal is centered on C4-C5.  Intervertebral disc spaces: Severely decreased disc height is seen at C5-C6 and C6-C7.  Osteophytes: Anterior and posterior marginal multilevel endplate osteophytes are seen.  Endplate Sclerosis: Moderate inferior endplate sclerosis is seen of C5-C6 and C6-C7.  Uncovertebral degenerative changes: Mild multilevel uncovertebral joint arthrosis is seen.  Fractures: No acute cervical spine fracture dislocation or subluxation is seen.  Congenital anomalies: Congenital nonfusion of the midline posterior arch of C1 is seen.    Impression:  1. No acute cervical spine fracture dislocation or subluxation is seen.  2. Degenerative changes and other details as above.  CT Maxillofacial Without Contrast  START OF REPORT:  Technique: Noncontrast maxillofacial CT was performed with axial as well as sagittal and coronal images being submitted for interpretation.    Comparison: None.    Clinical history: Fall from wheelchair, pain all over.    Findings:  Facial soft tissues: There is mild soft tissue swelling over the left zygomatic arch.  Orbital soft tissues: The orbital soft tissues appear unremarkable.  Bones:  Orbital bony structures: The bilateral orbital bony structures are intact with no orbital fracture identified.  Mandible: The mandible appears unremarkable.  Maxilla: The maxilla appears unremarkable.  Pterygoid plates: No fracture identified of the right or left pterygoid plates.  Zygoma: The zygomatic arches are intact.  TMJ: The mandibular condyles appear normally placed with respect to the mandibular fossa.  Nasal Bones: The nasal septum is midline. No displaced nasal bone fracture is seen.  Skull: No acute linear or depressed fracture is  identified in the visualized skull.  Paranasal sinuses: The visualized paranasal sinuses appear clear with no mucoperiosteal thickening or air fluid levels identified.  Mastoid air cells: There is fluid opacification of bilateral mastoid air cells. This may be related to effusion or mastoiditis.  Brain: Intracranial findings discussed separately.    Impression:  1. There is fluid opacification of bilateral mastoid air cells. This may be related to effusion or mastoiditis.  2. There is mild soft tissue swelling over the left zygomatic arch.  3. No acute maxillofacial fracture identified. Details and other findings as noted above.  CT Head Without Contrast  START OF REPORT:  Technique: CT of the head was performed without intravenous contrast with axial as well as coronal and sagittal images.    Comparison: Comparison is with study dated 2023-04-03 11:18:57.    Dosage Information: Automated exposure control was utilized.    Clinical history: Fall from wheelchair, pain all over.    Findings: No significant interval change as compared with the prior examination.  Hemorrhage: No acute intracranial hemorrhage is seen.  CSF spaces: The ventricles, sulci and basal cisterns all appear mildly prominent consistent with global cerebral atrophy.  Brain parenchyma: There is preservation of the grey white junction throughout. Mild microvascular change is seen in portions of the periventricular and deep white matter tracts.  Cerebellum: Unremarkable.  Sella and skull base: The sella appears to be within normal limits for age.  Herniation: None.  Intracranial calcifications: Incidental note is made of some pineal region calcification.  Calvarium: No acute linear or depressed skull fracture is seen.    Maxillofacial Structures: Maxillofacial findings discussed separately in the maxillofacial CT report.    Impression:  1. No acute intracranial traumatic injury identified. Details and other findings as noted above.        ASSESSMENT &  PLAN:   ASSESSMENT:  Acute on chronic lower back pain- intractable- suspected osteomyelitis- POA  Hypoglycemia- recurrent- POA  Acute on chronic kidney disease stage III- POA  Dizziness- POA  Fall- from wheel chair- POA  Anasarca with volume overload  with peripheral edema- POA  HTN- urgency- POA  Hx of alcoholic liver disease- no reports of recent ETOH use- POA  Seizures- denies any recent activity- POA  Left BKA- chronic- POA  Weakness- POA  Hx of CVA with residual left side deficits    PLAN:  Consult Neurosurgery Services appreciate assistance and recommendations   MRI is pending at this time   Blood cultures x2 sets  Accu-Cheks with sliding scale coverage as needed   Hold home insulin therapy for now with hyperglycemia   Fall precautions  PT OT eval and treat   Neurochecks q.4 hours times 24 hours   Resume home medication as deemed necessary   Repeat lab work in a.m.       VTE Prophylaxis: Lovenox for DVT prophylaxis and will be advised to be as mobile as possible and sit in a chair as tolerated    Patient condition:  Stable    __________________________________________________________________________  INPATIENT LIST OF MEDICATIONS     Scheduled Meds:   atenoloL  50 mg Oral BID    enoxparin  40 mg Subcutaneous Daily    levETIRAcetam  500 mg Oral TID    NIFEdipine  90 mg Oral Daily     Continuous Infusions:   dextrose 5 % and 0.45 % NaCl 75 mL/hr at 11/25/23 0625     PRN Meds:.acetaminophen, aluminum-magnesium hydroxide-simethicone, dextrose 10%, dextrose 10%, glucagon (human recombinant), glucose, glucose, hydrALAZINE, labetaloL, melatonin, naloxone, ondansetron, polyethylene glycol, prochlorperazine, simethicone      ISiomara FNP have reviewed and discussed the case with Dr. Deja Pittman.  Please see the following addendum for further assessment and plan from there attending MD.  JAMAL Enriquez   11/25/2023    MD Addendum:  I, Dr. Pittman assumed care of this patient today.  For the  patient encounter, I performed the substantive portion of the visit, I reviewed the NP/PA documentation, treatment plan, and medical decision making.  I had face to face time with this patient   All diagnosis and differential diagnosis have been reviewed; assessment and plan has been documented; I have personally reviewed the labs and test results that are presently available; I have reviewed the patients medication list; I have reviewed the consulting providers response and recommendations. I have reviewed or attempted to review medical records based upon their availability.    All of the patient and family questions have been addressed and answered. Patient's is agreeable to the above stated plan. I will continue to monitor closely and make adjustments to medical management as needed.     Dr. Deja Pittman DO    I have spent >30 minutes on the day of the visit; time spent includes face to face time and non-face to face time preparing to see the patient (eg, review of tests), independently reviewing and interpreting medical records, both past and current; documenting clinical information in the electronic or other health record, and communicating results to the patient/family/caregiver and care coordinator and nursing team.

## 2023-11-26 LAB
ALBUMIN SERPL-MCNC: 1.2 G/DL (ref 3.5–5)
ALBUMIN/GLOB SERPL: 0.3 RATIO (ref 1.1–2)
ALP SERPL-CCNC: 157 UNIT/L (ref 40–150)
ALT SERPL-CCNC: 30 UNIT/L (ref 0–55)
AST SERPL-CCNC: 30 UNIT/L (ref 5–34)
AV INDEX (PROSTH): 0.87
AV MEAN GRADIENT: 4 MMHG
AV PEAK GRADIENT: 8 MMHG
AV VALVE AREA BY VELOCITY RATIO: 2.73 CM²
AV VALVE AREA: 2.75 CM²
AV VELOCITY RATIO: 0.87
BASOPHILS # BLD AUTO: 0.06 X10(3)/MCL
BASOPHILS NFR BLD AUTO: 0.5 %
BILIRUB SERPL-MCNC: 0.3 MG/DL
BSA FOR ECHO PROCEDURE: 2.46 M2
BUN SERPL-MCNC: 28.2 MG/DL (ref 8.9–20.6)
CALCIUM SERPL-MCNC: 6.6 MG/DL (ref 8.4–10.2)
CHLORIDE SERPL-SCNC: 114 MMOL/L (ref 98–107)
CHLORIDE UR-SCNC: 59 MMOL/L
CO2 SERPL-SCNC: 21 MMOL/L (ref 22–29)
CREAT SERPL-MCNC: 2.6 MG/DL (ref 0.73–1.18)
CREAT UR-MCNC: 90.9 MG/DL (ref 63–166)
CREAT UR-MCNC: 91.4 MG/DL (ref 63–166)
CV ECHO LV RWT: 0.57 CM
DOP CALC AO PEAK VEL: 1.37 M/S
DOP CALC AO VTI: 25.5 CM
DOP CALC LVOT AREA: 3.1 CM2
DOP CALC LVOT DIAMETER: 2 CM
DOP CALC LVOT PEAK VEL: 1.19 M/S
DOP CALC LVOT STROKE VOLUME: 70.02 CM3
DOP CALC MV VTI: 27.2 CM
DOP CALCLVOT PEAK VEL VTI: 22.3 CM
E WAVE DECELERATION TIME: 195 MSEC
E/A RATIO: 1.1
E/E' RATIO: 7.05 M/S
ECHO LV POSTERIOR WALL: 1.3 CM (ref 0.6–1.1)
EOSINOPHIL # BLD AUTO: 0.36 X10(3)/MCL (ref 0–0.9)
EOSINOPHIL NFR BLD AUTO: 3.2 %
ERYTHROCYTE [DISTWIDTH] IN BLOOD BY AUTOMATED COUNT: 14 % (ref 11.5–17)
FRACTIONAL SHORTENING: 35 % (ref 28–44)
GFR SERPLBLD CREATININE-BSD FMLA CKD-EPI: 30 MLS/MIN/1.73/M2
GLOBULIN SER-MCNC: 3.7 GM/DL (ref 2.4–3.5)
GLUCOSE SERPL-MCNC: 109 MG/DL (ref 74–100)
HCT VFR BLD AUTO: 32.7 % (ref 42–52)
HGB BLD-MCNC: 10.4 G/DL (ref 14–18)
IMM GRANULOCYTES # BLD AUTO: 0.04 X10(3)/MCL (ref 0–0.04)
IMM GRANULOCYTES NFR BLD AUTO: 0.4 %
INTERVENTRICULAR SEPTUM: 1.2 CM (ref 0.6–1.1)
LEFT ATRIUM SIZE: 4.1 CM
LEFT INTERNAL DIMENSION IN SYSTOLE: 3 CM (ref 2.1–4)
LEFT VENTRICLE DIASTOLIC VOLUME INDEX: 41.4 ML/M2
LEFT VENTRICLE DIASTOLIC VOLUME: 97.3 ML
LEFT VENTRICLE MASS INDEX: 93 G/M2
LEFT VENTRICLE SYSTOLIC VOLUME INDEX: 14.9 ML/M2
LEFT VENTRICLE SYSTOLIC VOLUME: 35 ML
LEFT VENTRICULAR INTERNAL DIMENSION IN DIASTOLE: 4.6 CM (ref 3.5–6)
LEFT VENTRICULAR MASS: 217.4 G
LV LATERAL E/E' RATIO: 5.69 M/S
LV SEPTAL E/E' RATIO: 9.25 M/S
LVOT MG: 3 MMHG
LVOT MV: 0.76 CM/S
LYMPHOCYTES # BLD AUTO: 3.95 X10(3)/MCL (ref 0.6–4.6)
LYMPHOCYTES NFR BLD AUTO: 35.1 %
MAGNESIUM SERPL-MCNC: 1 MG/DL (ref 1.6–2.6)
MCH RBC QN AUTO: 29 PG (ref 27–31)
MCHC RBC AUTO-ENTMCNC: 31.8 G/DL (ref 33–36)
MCV RBC AUTO: 91.1 FL (ref 80–94)
MONOCYTES # BLD AUTO: 0.67 X10(3)/MCL (ref 0.1–1.3)
MONOCYTES NFR BLD AUTO: 6 %
MV MEAN GRADIENT: 3 MMHG
MV PEAK A VEL: 0.67 M/S
MV PEAK E VEL: 0.74 M/S
MV PEAK GRADIENT: 5 MMHG
MV VALVE AREA BY CONTINUITY EQUATION: 2.57 CM2
NEUTROPHILS # BLD AUTO: 6.17 X10(3)/MCL (ref 2.1–9.2)
NEUTROPHILS NFR BLD AUTO: 54.8 %
NRBC BLD AUTO-RTO: 0 %
PHOSPHATE SERPL-MCNC: 5.2 MG/DL (ref 2.3–4.7)
PLATELET # BLD AUTO: 261 X10(3)/MCL (ref 130–400)
PMV BLD AUTO: 12.1 FL (ref 7.4–10.4)
POCT GLUCOSE: 136 MG/DL (ref 70–110)
POCT GLUCOSE: 150 MG/DL (ref 70–110)
POCT GLUCOSE: 171 MG/DL (ref 70–110)
POCT GLUCOSE: 211 MG/DL (ref 70–110)
POTASSIUM SERPL-SCNC: 4.6 MMOL/L (ref 3.5–5.1)
POTASSIUM UR-SCNC: 28 MMOL/L
PROT SERPL-MCNC: 4.9 GM/DL (ref 6.4–8.3)
PROT UR STRIP-MCNC: 532.4 MG/DL
RBC # BLD AUTO: 3.59 X10(6)/MCL (ref 4.7–6.1)
RIGHT VENTRICULAR END-DIASTOLIC DIMENSION: 3.4 CM
SODIUM SERPL-SCNC: 143 MMOL/L (ref 136–145)
SODIUM UR-SCNC: 77 MMOL/L
TDI LATERAL: 0.13 M/S
TDI SEPTAL: 0.08 M/S
TDI: 0.11 M/S
TRICUSPID ANNULAR PLANE SYSTOLIC EXCURSION: 1.83 CM
URINE PROTEIN/CREATININE RATIO (OHS): 5.9
UUN UR-MCNC: 179 MG/DL
WBC # SPEC AUTO: 11.25 X10(3)/MCL (ref 4.5–11.5)
Z-SCORE OF LEFT VENTRICULAR DIMENSION IN END DIASTOLE: -7.59
Z-SCORE OF LEFT VENTRICULAR DIMENSION IN END SYSTOLE: -5.36

## 2023-11-26 PROCEDURE — 82570 ASSAY OF URINE CREATININE: CPT | Performed by: INTERNAL MEDICINE

## 2023-11-26 PROCEDURE — 25000003 PHARM REV CODE 250: Performed by: NURSE PRACTITIONER

## 2023-11-26 PROCEDURE — 84100 ASSAY OF PHOSPHORUS: CPT | Performed by: NURSE PRACTITIONER

## 2023-11-26 PROCEDURE — 84520 ASSAY OF UREA NITROGEN: CPT | Performed by: INTERNAL MEDICINE

## 2023-11-26 PROCEDURE — 80053 COMPREHEN METABOLIC PANEL: CPT | Performed by: NURSE PRACTITIONER

## 2023-11-26 PROCEDURE — 63600175 PHARM REV CODE 636 W HCPCS: Performed by: NURSE PRACTITIONER

## 2023-11-26 PROCEDURE — 83735 ASSAY OF MAGNESIUM: CPT | Performed by: NURSE PRACTITIONER

## 2023-11-26 PROCEDURE — 84300 ASSAY OF URINE SODIUM: CPT | Performed by: INTERNAL MEDICINE

## 2023-11-26 PROCEDURE — 84133 ASSAY OF URINE POTASSIUM: CPT | Performed by: INTERNAL MEDICINE

## 2023-11-26 PROCEDURE — 63600175 PHARM REV CODE 636 W HCPCS: Performed by: INTERNAL MEDICINE

## 2023-11-26 PROCEDURE — 85025 COMPLETE CBC W/AUTO DIFF WBC: CPT | Performed by: NURSE PRACTITIONER

## 2023-11-26 PROCEDURE — 82436 ASSAY OF URINE CHLORIDE: CPT | Performed by: INTERNAL MEDICINE

## 2023-11-26 PROCEDURE — 21400001 HC TELEMETRY ROOM

## 2023-11-26 PROCEDURE — 25000003 PHARM REV CODE 250: Performed by: INTERNAL MEDICINE

## 2023-11-26 PROCEDURE — S5010 5% DEXTROSE AND 0.45% SALINE: HCPCS | Performed by: NURSE PRACTITIONER

## 2023-11-26 RX ORDER — IBUPROFEN 200 MG
24 TABLET ORAL
Status: DISCONTINUED | OUTPATIENT
Start: 2023-11-26 | End: 2023-12-22 | Stop reason: HOSPADM

## 2023-11-26 RX ORDER — INSULIN ASPART 100 [IU]/ML
0-5 INJECTION, SOLUTION INTRAVENOUS; SUBCUTANEOUS
Status: DISCONTINUED | OUTPATIENT
Start: 2023-11-26 | End: 2023-12-22 | Stop reason: HOSPADM

## 2023-11-26 RX ORDER — MAGNESIUM SULFATE HEPTAHYDRATE 40 MG/ML
2 INJECTION, SOLUTION INTRAVENOUS ONCE
Status: COMPLETED | OUTPATIENT
Start: 2023-11-26 | End: 2023-11-26

## 2023-11-26 RX ORDER — DULOXETIN HYDROCHLORIDE 20 MG/1
20 CAPSULE, DELAYED RELEASE ORAL 2 TIMES DAILY
Status: DISCONTINUED | OUTPATIENT
Start: 2023-11-26 | End: 2023-12-22 | Stop reason: HOSPADM

## 2023-11-26 RX ORDER — GLUCAGON 1 MG
1 KIT INJECTION
Status: DISCONTINUED | OUTPATIENT
Start: 2023-11-26 | End: 2023-12-22 | Stop reason: HOSPADM

## 2023-11-26 RX ORDER — MORPHINE SULFATE 4 MG/ML
2 INJECTION, SOLUTION INTRAMUSCULAR; INTRAVENOUS EVERY 4 HOURS PRN
Status: DISCONTINUED | OUTPATIENT
Start: 2023-11-26 | End: 2023-12-08

## 2023-11-26 RX ORDER — SODIUM BICARBONATE 650 MG/1
650 TABLET ORAL 2 TIMES DAILY
Status: DISCONTINUED | OUTPATIENT
Start: 2023-11-26 | End: 2023-11-27

## 2023-11-26 RX ORDER — BUMETANIDE 1 MG/1
1 TABLET ORAL DAILY
Status: DISCONTINUED | OUTPATIENT
Start: 2023-11-26 | End: 2023-11-27

## 2023-11-26 RX ORDER — IBUPROFEN 200 MG
16 TABLET ORAL
Status: DISCONTINUED | OUTPATIENT
Start: 2023-11-26 | End: 2023-12-22 | Stop reason: HOSPADM

## 2023-11-26 RX ADMIN — INSULIN DETEMIR 16 UNITS: 100 INJECTION, SOLUTION SUBCUTANEOUS at 08:11

## 2023-11-26 RX ADMIN — DEXTROSE AND SODIUM CHLORIDE: 5; 450 INJECTION, SOLUTION INTRAVENOUS at 03:11

## 2023-11-26 RX ADMIN — PANCRELIPASE 6 CAPSULE: 60000; 12000; 38000 CAPSULE, DELAYED RELEASE PELLETS ORAL at 03:11

## 2023-11-26 RX ADMIN — MORPHINE SULFATE 2 MG: 4 INJECTION, SOLUTION INTRAMUSCULAR; INTRAVENOUS at 08:11

## 2023-11-26 RX ADMIN — BUMETANIDE 1 MG: 1 TABLET ORAL at 11:11

## 2023-11-26 RX ADMIN — ATENOLOL 50 MG: 50 TABLET ORAL at 08:11

## 2023-11-26 RX ADMIN — DULOXETINE HYDROCHLORIDE 20 MG: 20 CAPSULE, DELAYED RELEASE ORAL at 09:11

## 2023-11-26 RX ADMIN — SODIUM BICARBONATE 650 MG TABLET 650 MG: at 08:11

## 2023-11-26 RX ADMIN — NIFEDIPINE 90 MG: 30 TABLET, FILM COATED, EXTENDED RELEASE ORAL at 09:11

## 2023-11-26 RX ADMIN — SODIUM BICARBONATE 650 MG TABLET 650 MG: at 09:11

## 2023-11-26 RX ADMIN — OXYCODONE HYDROCHLORIDE 10 MG: 5 TABLET ORAL at 04:11

## 2023-11-26 RX ADMIN — MAGNESIUM SULFATE HEPTAHYDRATE 2 G: 40 INJECTION, SOLUTION INTRAVENOUS at 09:11

## 2023-11-26 RX ADMIN — INSULIN ASPART 1 UNITS: 100 INJECTION, SOLUTION INTRAVENOUS; SUBCUTANEOUS at 08:11

## 2023-11-26 RX ADMIN — PANCRELIPASE 6 CAPSULE: 60000; 12000; 38000 CAPSULE, DELAYED RELEASE PELLETS ORAL at 08:11

## 2023-11-26 RX ADMIN — MORPHINE SULFATE 2 MG: 4 INJECTION, SOLUTION INTRAMUSCULAR; INTRAVENOUS at 11:11

## 2023-11-26 RX ADMIN — ATENOLOL 50 MG: 50 TABLET ORAL at 09:11

## 2023-11-26 RX ADMIN — DEXTROSE AND SODIUM CHLORIDE: 5; 450 INJECTION, SOLUTION INTRAVENOUS at 01:11

## 2023-11-26 RX ADMIN — LEVETIRACETAM 500 MG: 500 TABLET, FILM COATED ORAL at 03:11

## 2023-11-26 RX ADMIN — PANCRELIPASE 6 CAPSULE: 60000; 12000; 38000 CAPSULE, DELAYED RELEASE PELLETS ORAL at 09:11

## 2023-11-26 RX ADMIN — LEVETIRACETAM 500 MG: 500 TABLET, FILM COATED ORAL at 09:11

## 2023-11-26 RX ADMIN — LEVETIRACETAM 500 MG: 500 TABLET, FILM COATED ORAL at 08:11

## 2023-11-26 RX ADMIN — ENOXAPARIN SODIUM 40 MG: 40 INJECTION SUBCUTANEOUS at 04:11

## 2023-11-26 RX ADMIN — MORPHINE SULFATE 4 MG: 4 INJECTION, SOLUTION INTRAMUSCULAR; INTRAVENOUS at 05:11

## 2023-11-26 NOTE — PROGRESS NOTES
[facility] Hospital Medicine Progress Note        Chief Complaint: Inpatient Follow-up for     HPI:   Kamran Huerta is a 48 y.o. male who PMH includes CHF, chronic back pain wheelchair-bound, DM type 2, HTN, seizures, CVA, CKD stage 3, alcoholic liver disease with chronic pancreatitis, presents to the ED at [facility] on 11/25/2023 with a primary complaint of weakness, dizziness lightheadedness and fall out if his wheelchair striking his mouth; denies any LOC.  PT had back surgery years ago and reports has been wheelchair bound since then. No reports of seizures.  No CP, SOB, N/V/D, fever, chills cough congestion or any sick contacts. Labs reviewed demonstrated WBC 14.42, HH 12.2/37.2, sed rate 93 , CO2 18 Bun 25.7, Creat 2.31, glucose 52 Mg+ 1.0, , AST 36 reports of chest pain, CRP shortness a breath, nausea, vomiting, diarrhea5, fever., [id number], cough, congestion, or any sick, contacts. B no reportsP of chest pain, 109.7; other indicis unremarkable. CXR impression reviewed demonstrated  no acute cardiopulmonary abnormality. CT of head without contrast impression reviewed demonstrated no appreciable acute intracranial abnormality. Ct maxillofacial without contrast impression reviewed demonstrated no appreciate acute traumatic osseous abnormality, bilateral mastoid effusions. CT cervical spine without contrast impression reviewed demonstrated no appreciable acute osseous abnormality by CT evaluation, degenerative changes at the cervical spine. CT thoracic spine without contrast impression reviewed demonstrated no acute osseous abnormality. CT of lumbar spine without contrast impression reviewed demonstrated no appreciable fracture or acute osseous abnormality, severe degenerative change at L5-S1 progressed, endplate sclerosis disc space narrowing and osseous erosions which may be degenerative or infectious/inflammatory, anasarca.   Initial VS /105 P 75 R 13 T 98.7F  O2 saturation 98% on room air. Pt received multiple doses of pain medication , D50 for hypoglycemia, 40 mg lasix,  and magnesium rider in the ED. Neurosurgery services consulted. Pt awaiting MRI. Pt is admitted to hospital medicine services for further management.     Interval Hx:   Afebrile, blood pressure looking good.  Doing well on room air.  Complains of back pain.  MRI pending.  Denies any medication nonadherence at baseline.    Labs for this morning showing acidosis, hypomagnesemia, chronic low albumin, elevated BUN and serum creatinine, hyperphosphatemia    Objective/physical exam:  Vitals:    11/25/23 2347 11/26/23 0437 11/26/23 0500 11/26/23 0734   BP: 122/76 126/80  138/86   Pulse: 61 65  71   Resp: 18 16 20 17   Temp: 97.5 °F (36.4 °C) 97.4 °F (36.3 °C)  98 °F (36.7 °C)   TempSrc: Oral Oral  Oral   SpO2: 99% 98%  98%   Weight:       Height:         General: In no acute distress, afebrile  Respiratory: Clear to auscultation bilaterally  Cardiovascular: S1, S2, no appreciable murmur  Abdomen: Soft, nontender, BS +  MSK: Warm,  lower extremity edema, no clubbing or cyanosis  Neurologic: Alert and oriented x4    Lab Results   Component Value Date     11/26/2023    K 4.6 11/26/2023    CO2 21 (L) 11/26/2023    BUN 28.2 (H) 11/26/2023    CREATININE 2.60 (H) 11/26/2023    CALCIUM 6.6 (LL) 11/26/2023    ESTGFRAFRICA >105 02/22/2021    EGFRNONAA >60 01/20/2021      Lab Results   Component Value Date    ALT 30 11/26/2023    AST 30 11/26/2023    ALKPHOS 157 (H) 11/26/2023    BILITOT 0.3 11/26/2023      Lab Results   Component Value Date    WBC 11.25 11/26/2023    HGB 10.4 (L) 11/26/2023    HCT 32.7 (L) 11/26/2023    MCV 91.1 11/26/2023     11/26/2023           Medications:   atenoloL  50 mg Oral BID    bumetanide  1 mg Oral Daily    DULoxetine  20 mg Oral BID    enoxparin  40 mg Subcutaneous Daily    levETIRAcetam  500 mg Oral TID    lipase-protease-amylase 12,000-38,000-60,000 units  6 capsule Oral TID     magnesium sulfate IVPB  2 g Intravenous Once    NIFEdipine  90 mg Oral Daily    [START ON 11/27/2023] pregabalin  75 mg Oral QHS    sodium bicarbonate  650 mg Oral BID      acetaminophen, aluminum-magnesium hydroxide-simethicone, dextrose 10%, dextrose 10%, glucagon (human recombinant), glucose, glucose, hydrALAZINE, HYDROmorphone, labetaloL, melatonin, morphine, naloxone, ondansetron, oxyCODONE, polyethylene glycol, prochlorperazine, simethicone, [COMPLETED] tiZANidine **FOLLOWED BY** tiZANidine, white petrolatum     Assessment/Plan:    Acute on chronic lower back pain- intractable- suspected osteomyelitis  Dizziness, Fall- from wheel chair  Hypoglycemia- recurrent-resolved  Acute on chronic kidney disease stage III  Anasarca with volume overload  with peripheral edema  Hypertensive urgency at admission-resolving    HX: Wheelchair-bound, history of CVA with residual left-sided deficits, left BKA, chronic alcoholic liver disease, chronic seizures    Plan:  -neurosurgery recommendations noted.  Continue analgesics as needed.  Pending MRI   -follow up blood cultures.  Currently not on antibiotics.  -resume Bumex.  Monitor response .  We will consult Nephrology tomorrow if indicated.  Get TTE, urine protein. USG kidney suggesting CKD  -replace magnesium.  -will review the home medications.  Continue current antihypertensive regimen  -resume long-acting insulin at lower dose.  Continue correction aspart.  Check HGB A1c for tomorrow.  Encourage p.o. intake    Lovenox      Dieudonne Montemayor MD

## 2023-11-26 NOTE — PROGRESS NOTES
Consult day 2 L5-S1 degenerative changes. Endplate sclerosis, disc space narrowing and osseous erosions.   Awaiting MRI  Exam unchanged  Afebrile, WBC 11. Very low concern for infection. Likely chronic changes.  Pain control/medical management

## 2023-11-27 LAB
ALBUMIN SERPL-MCNC: 1.3 G/DL (ref 3.5–5)
ALBUMIN/GLOB SERPL: 0.4 RATIO (ref 1.1–2)
ALP SERPL-CCNC: 167 UNIT/L (ref 40–150)
ALT SERPL-CCNC: 31 UNIT/L (ref 0–55)
APPEARANCE UR: ABNORMAL
AST SERPL-CCNC: 26 UNIT/L (ref 5–34)
BACTERIA #/AREA URNS AUTO: ABNORMAL /HPF
BASOPHILS # BLD AUTO: 0.06 X10(3)/MCL
BASOPHILS NFR BLD AUTO: 0.5 %
BILIRUB SERPL-MCNC: 0.2 MG/DL
BILIRUB UR QL STRIP.AUTO: NEGATIVE
BUN SERPL-MCNC: 32.1 MG/DL (ref 8.9–20.6)
CALCIUM SERPL-MCNC: 6.3 MG/DL (ref 8.4–10.2)
CHLORIDE SERPL-SCNC: 111 MMOL/L (ref 98–107)
CHLORIDE UR-SCNC: 66 MMOL/L
CO2 SERPL-SCNC: 19 MMOL/L (ref 22–29)
COLOR UR AUTO: YELLOW
CREAT SERPL-MCNC: 2.8 MG/DL (ref 0.73–1.18)
CREAT UR-MCNC: 58 MG/DL (ref 63–166)
DEPRECATED CALCIDIOL+CALCIFEROL SERPL-MC: <3.5 NG/ML (ref 30–80)
EOSINOPHIL # BLD AUTO: 0.31 X10(3)/MCL (ref 0–0.9)
EOSINOPHIL NFR BLD AUTO: 2.6 %
ERYTHROCYTE [DISTWIDTH] IN BLOOD BY AUTOMATED COUNT: 13.7 % (ref 11.5–17)
EST. AVERAGE GLUCOSE BLD GHB EST-MCNC: 111.2 MG/DL
FERRITIN SERPL-MCNC: 359.65 NG/ML (ref 21.81–274.66)
GFR SERPLBLD CREATININE-BSD FMLA CKD-EPI: 27 MLS/MIN/1.73/M2
GLOBULIN SER-MCNC: 3.7 GM/DL (ref 2.4–3.5)
GLUCOSE SERPL-MCNC: 191 MG/DL (ref 74–100)
GLUCOSE UR QL STRIP.AUTO: ABNORMAL
GRAN CASTS #/AREA URNS LPF: ABNORMAL /LPF
HBA1C MFR BLD: 5.5 %
HCT VFR BLD AUTO: 32.9 % (ref 42–52)
HGB BLD-MCNC: 10.7 G/DL (ref 14–18)
IMM GRANULOCYTES # BLD AUTO: 0.03 X10(3)/MCL (ref 0–0.04)
IMM GRANULOCYTES NFR BLD AUTO: 0.3 %
IRON SATN MFR SERPL: 23 % (ref 20–50)
IRON SERPL-MCNC: 28 UG/DL (ref 65–175)
KETONES UR QL STRIP.AUTO: NEGATIVE
LEUKOCYTE ESTERASE UR QL STRIP.AUTO: ABNORMAL
LYMPHOCYTES # BLD AUTO: 2.49 X10(3)/MCL (ref 0.6–4.6)
LYMPHOCYTES NFR BLD AUTO: 20.8 %
MAGNESIUM SERPL-MCNC: 1.3 MG/DL (ref 1.6–2.6)
MCH RBC QN AUTO: 29.2 PG (ref 27–31)
MCHC RBC AUTO-ENTMCNC: 32.5 G/DL (ref 33–36)
MCV RBC AUTO: 89.9 FL (ref 80–94)
MONOCYTES # BLD AUTO: 0.7 X10(3)/MCL (ref 0.1–1.3)
MONOCYTES NFR BLD AUTO: 5.9 %
NEUTROPHILS # BLD AUTO: 8.36 X10(3)/MCL (ref 2.1–9.2)
NEUTROPHILS NFR BLD AUTO: 69.9 %
NITRITE UR QL STRIP.AUTO: POSITIVE
NRBC BLD AUTO-RTO: 0 %
PH UR STRIP.AUTO: 6 [PH]
PHOSPHATE SERPL-MCNC: 5.3 MG/DL (ref 2.3–4.7)
PLATELET # BLD AUTO: 281 X10(3)/MCL (ref 130–400)
PMV BLD AUTO: 11.9 FL (ref 7.4–10.4)
POCT GLUCOSE: 121 MG/DL (ref 70–110)
POCT GLUCOSE: 137 MG/DL (ref 70–110)
POCT GLUCOSE: 156 MG/DL (ref 70–110)
POCT GLUCOSE: 210 MG/DL (ref 70–110)
POTASSIUM SERPL-SCNC: 4.7 MMOL/L (ref 3.5–5.1)
POTASSIUM UR-SCNC: 20.6 MMOL/L
PROT SERPL-MCNC: 5 GM/DL (ref 6.4–8.3)
PROT UR QL STRIP.AUTO: ABNORMAL
PTH-INTACT SERPL-MCNC: 362 PG/ML (ref 8.7–77)
RBC # BLD AUTO: 3.66 X10(6)/MCL (ref 4.7–6.1)
RBC #/AREA URNS AUTO: ABNORMAL /HPF
RBC UR QL AUTO: ABNORMAL
SODIUM SERPL-SCNC: 139 MMOL/L (ref 136–145)
SODIUM UR-SCNC: 71 MMOL/L
SP GR UR STRIP.AUTO: >1.03 (ref 1–1.03)
SQUAMOUS #/AREA URNS LPF: ABNORMAL /HPF
TIBC SERPL-MCNC: 123 UG/DL (ref 250–450)
TIBC SERPL-MCNC: 95 UG/DL (ref 69–240)
TRANSFERRIN SERPL-MCNC: 112 MG/DL (ref 174–364)
UROBILINOGEN UR STRIP-ACNC: 0.2
UUN UR-MCNC: 185 MG/DL
WBC # SPEC AUTO: 11.95 X10(3)/MCL (ref 4.5–11.5)
WBC #/AREA URNS AUTO: >100 /HPF

## 2023-11-27 PROCEDURE — 63600175 PHARM REV CODE 636 W HCPCS: Performed by: NURSE PRACTITIONER

## 2023-11-27 PROCEDURE — 82306 VITAMIN D 25 HYDROXY: CPT | Performed by: NURSE PRACTITIONER

## 2023-11-27 PROCEDURE — 81001 URINALYSIS AUTO W/SCOPE: CPT | Performed by: NURSE PRACTITIONER

## 2023-11-27 PROCEDURE — 84520 ASSAY OF UREA NITROGEN: CPT | Performed by: INTERNAL MEDICINE

## 2023-11-27 PROCEDURE — 83735 ASSAY OF MAGNESIUM: CPT | Performed by: INTERNAL MEDICINE

## 2023-11-27 PROCEDURE — 83970 ASSAY OF PARATHORMONE: CPT | Performed by: NURSE PRACTITIONER

## 2023-11-27 PROCEDURE — 85025 COMPLETE CBC W/AUTO DIFF WBC: CPT | Performed by: INTERNAL MEDICINE

## 2023-11-27 PROCEDURE — 25000003 PHARM REV CODE 250: Performed by: INTERNAL MEDICINE

## 2023-11-27 PROCEDURE — 80053 COMPREHEN METABOLIC PANEL: CPT | Performed by: INTERNAL MEDICINE

## 2023-11-27 PROCEDURE — 21400001 HC TELEMETRY ROOM

## 2023-11-27 PROCEDURE — 82570 ASSAY OF URINE CREATININE: CPT | Performed by: INTERNAL MEDICINE

## 2023-11-27 PROCEDURE — 84300 ASSAY OF URINE SODIUM: CPT | Performed by: INTERNAL MEDICINE

## 2023-11-27 PROCEDURE — 82436 ASSAY OF URINE CHLORIDE: CPT | Performed by: INTERNAL MEDICINE

## 2023-11-27 PROCEDURE — 87186 SC STD MICRODIL/AGAR DIL: CPT | Performed by: NURSE PRACTITIONER

## 2023-11-27 PROCEDURE — 82728 ASSAY OF FERRITIN: CPT | Performed by: NURSE PRACTITIONER

## 2023-11-27 PROCEDURE — P9047 ALBUMIN (HUMAN), 25%, 50ML: HCPCS | Mod: JZ,JG | Performed by: NURSE PRACTITIONER

## 2023-11-27 PROCEDURE — 83036 HEMOGLOBIN GLYCOSYLATED A1C: CPT | Performed by: INTERNAL MEDICINE

## 2023-11-27 PROCEDURE — 87086 URINE CULTURE/COLONY COUNT: CPT | Performed by: NURSE PRACTITIONER

## 2023-11-27 PROCEDURE — 84100 ASSAY OF PHOSPHORUS: CPT | Performed by: INTERNAL MEDICINE

## 2023-11-27 PROCEDURE — 97167 OT EVAL HIGH COMPLEX 60 MIN: CPT

## 2023-11-27 PROCEDURE — 63600175 PHARM REV CODE 636 W HCPCS: Performed by: INTERNAL MEDICINE

## 2023-11-27 PROCEDURE — 83540 ASSAY OF IRON: CPT | Performed by: NURSE PRACTITIONER

## 2023-11-27 PROCEDURE — 97162 PT EVAL MOD COMPLEX 30 MIN: CPT

## 2023-11-27 PROCEDURE — 84133 ASSAY OF URINE POTASSIUM: CPT | Performed by: INTERNAL MEDICINE

## 2023-11-27 PROCEDURE — 25000003 PHARM REV CODE 250: Performed by: NURSE PRACTITIONER

## 2023-11-27 RX ORDER — LOSARTAN POTASSIUM 50 MG/1
100 TABLET ORAL DAILY
Status: DISCONTINUED | OUTPATIENT
Start: 2023-11-27 | End: 2023-11-28

## 2023-11-27 RX ORDER — MAGNESIUM SULFATE HEPTAHYDRATE 40 MG/ML
2 INJECTION, SOLUTION INTRAVENOUS ONCE
Status: COMPLETED | OUTPATIENT
Start: 2023-11-27 | End: 2023-11-27

## 2023-11-27 RX ORDER — MICONAZOLE NITRATE 2 %
POWDER (GRAM) TOPICAL 2 TIMES DAILY
Status: DISCONTINUED | OUTPATIENT
Start: 2023-11-27 | End: 2023-12-22 | Stop reason: HOSPADM

## 2023-11-27 RX ORDER — ALBUMIN HUMAN 250 G/1000ML
12.5 SOLUTION INTRAVENOUS EVERY 8 HOURS
Status: COMPLETED | OUTPATIENT
Start: 2023-11-27 | End: 2023-11-28

## 2023-11-27 RX ORDER — DOXAZOSIN 1 MG/1
2 TABLET ORAL NIGHTLY
Status: DISCONTINUED | OUTPATIENT
Start: 2023-11-27 | End: 2023-12-22 | Stop reason: HOSPADM

## 2023-11-27 RX ORDER — SODIUM BICARBONATE 650 MG/1
1300 TABLET ORAL 2 TIMES DAILY
Status: DISCONTINUED | OUTPATIENT
Start: 2023-11-27 | End: 2023-12-02

## 2023-11-27 RX ORDER — FUROSEMIDE 10 MG/ML
60 INJECTION INTRAMUSCULAR; INTRAVENOUS EVERY 8 HOURS
Status: DISCONTINUED | OUTPATIENT
Start: 2023-11-27 | End: 2023-11-30

## 2023-11-27 RX ADMIN — FUROSEMIDE 60 MG: 10 INJECTION, SOLUTION INTRAMUSCULAR; INTRAVENOUS at 11:11

## 2023-11-27 RX ADMIN — MORPHINE SULFATE 2 MG: 4 INJECTION, SOLUTION INTRAMUSCULAR; INTRAVENOUS at 11:11

## 2023-11-27 RX ADMIN — MICONAZOLE NITRATE: 20 POWDER TOPICAL at 11:11

## 2023-11-27 RX ADMIN — NIFEDIPINE 90 MG: 30 TABLET, FILM COATED, EXTENDED RELEASE ORAL at 09:11

## 2023-11-27 RX ADMIN — PREGABALIN 75 MG: 50 CAPSULE ORAL at 09:11

## 2023-11-27 RX ADMIN — DOXAZOSIN 2 MG: 1 TABLET ORAL at 09:11

## 2023-11-27 RX ADMIN — PANCRELIPASE 6 CAPSULE: 60000; 12000; 38000 CAPSULE, DELAYED RELEASE PELLETS ORAL at 03:11

## 2023-11-27 RX ADMIN — ALBUMIN (HUMAN) 12.5 G: 12.5 SOLUTION INTRAVENOUS at 03:11

## 2023-11-27 RX ADMIN — ALBUMIN (HUMAN) 12.5 G: 12.5 SOLUTION INTRAVENOUS at 10:11

## 2023-11-27 RX ADMIN — LEVETIRACETAM 500 MG: 500 TABLET, FILM COATED ORAL at 03:11

## 2023-11-27 RX ADMIN — ENOXAPARIN SODIUM 40 MG: 40 INJECTION SUBCUTANEOUS at 04:11

## 2023-11-27 RX ADMIN — ATENOLOL 50 MG: 50 TABLET ORAL at 09:11

## 2023-11-27 RX ADMIN — PANCRELIPASE 6 CAPSULE: 60000; 12000; 38000 CAPSULE, DELAYED RELEASE PELLETS ORAL at 09:11

## 2023-11-27 RX ADMIN — ALBUMIN (HUMAN) 12.5 G: 12.5 SOLUTION INTRAVENOUS at 11:11

## 2023-11-27 RX ADMIN — LEVETIRACETAM 500 MG: 500 TABLET, FILM COATED ORAL at 09:11

## 2023-11-27 RX ADMIN — MORPHINE SULFATE 2 MG: 4 INJECTION, SOLUTION INTRAMUSCULAR; INTRAVENOUS at 04:11

## 2023-11-27 RX ADMIN — DULOXETINE HYDROCHLORIDE 20 MG: 20 CAPSULE, DELAYED RELEASE ORAL at 10:11

## 2023-11-27 RX ADMIN — FUROSEMIDE 60 MG: 10 INJECTION, SOLUTION INTRAMUSCULAR; INTRAVENOUS at 04:11

## 2023-11-27 RX ADMIN — MORPHINE SULFATE 2 MG: 4 INJECTION, SOLUTION INTRAMUSCULAR; INTRAVENOUS at 09:11

## 2023-11-27 RX ADMIN — WHITE PETROLATUM: 1.75 OINTMENT TOPICAL at 11:11

## 2023-11-27 RX ADMIN — SODIUM BICARBONATE 650 MG TABLET 1300 MG: at 09:11

## 2023-11-27 RX ADMIN — MORPHINE SULFATE 2 MG: 4 INJECTION, SOLUTION INTRAMUSCULAR; INTRAVENOUS at 06:11

## 2023-11-27 RX ADMIN — MAGNESIUM SULFATE HEPTAHYDRATE 2 G: 40 INJECTION, SOLUTION INTRAVENOUS at 09:11

## 2023-11-27 RX ADMIN — MORPHINE SULFATE 2 MG: 4 INJECTION, SOLUTION INTRAMUSCULAR; INTRAVENOUS at 12:11

## 2023-11-27 RX ADMIN — LOSARTAN POTASSIUM 100 MG: 50 TABLET, FILM COATED ORAL at 12:11

## 2023-11-27 RX ADMIN — INSULIN DETEMIR 16 UNITS: 100 INJECTION, SOLUTION SUBCUTANEOUS at 11:11

## 2023-11-27 RX ADMIN — FUROSEMIDE 60 MG: 10 INJECTION, SOLUTION INTRAMUSCULAR; INTRAVENOUS at 09:11

## 2023-11-27 NOTE — CONSULTS
[facility]  Nephrology  Consult Note    Patient Name: Kamran Huerta  MRN: [id number]  Admission Date: 11/25/2023  Hospital Length of Stay: 2 days  Attending Provider: Jose Curry MD   Primary Care Physician: Ailyn Reynolds MD  Principal Problem:<principal problem not specified>    Consults  Subjective:     HPI: This is a 48-year-old AAM seen by our service in April for nephrotic syndrome and FABIO. At that time he underwent renal biopsy revealing advanced diabetic nodular sclerosis. He was diuresed aggressively and discharged with Cr 1.8 and ability to ambulate in the trotter without oxygen or distress. Since that time he has undergone L BKA s/t gangrenous wound. He presented to the ED 11/25 s/p fall from wheelchair with resulting back pain. Neurosurgery undergoing further workup.   He was noted to have FABIO and marked hypervolemia for which nephrology has been consulted. He endorses straining to urinate which he attributes to penile and scrotal edema. Renal US without evidence of hydronephrosis but does reveal layering debris in the bladder. Unsure of dry weight.     Past Medical History:   Diagnosis Date    CHF (congestive heart failure)     Chronic back pain     CVA (cerebral vascular accident) 01/2023    DM (diabetes mellitus)     HTN (hypertension)     Seizures        Past Surgical History:   Procedure Laterality Date    BACK SURGERY      EGD, WITH CLOSED BIOPSY  3/15/2023    Procedure: EGD, WITH CLOSED BIOPSY;  Surgeon: Tj Faith MD;  Location: [facility] ENDOSCOPY;  Service: Gastroenterology;;    ESOPHAGOGASTRODUODENOSCOPY N/A 3/15/2023    Procedure: EGD;  Surgeon: Tj Faith MD;  Location: [facility] ENDOSCOPY;  Service: Gastroenterology;  Laterality: N/A;    TOE AMPUTATION Right 3/24/2023    Procedure: AMPUTATION, TOE;  Surgeon: Emre Vazquez DPM;  Location: [facility] OR;  Service: Podiatry;  Laterality: Right;       Review of patient's allergies  indicates:  No Known Allergies  Current Facility-Administered Medications   Medication Frequency    acetaminophen tablet 650 mg Q6H PRN    albumin human 25% bottle 12.5 g Q8H    aluminum-magnesium hydroxide-simethicone 200-200-20 mg/5 mL suspension 30 mL QID PRN    atenoloL tablet 50 mg BID    bumetanide tablet 1 mg Daily    dextrose 10% bolus 125 mL 125 mL PRN    dextrose 10% bolus 125 mL 125 mL PRN    dextrose 10% bolus 250 mL 250 mL PRN    dextrose 10% bolus 250 mL 250 mL PRN    DULoxetine DR capsule 20 mg BID    enoxaparin injection 40 mg Daily    furosemide injection 60 mg Q8H    glucagon (human recombinant) injection 1 mg PRN    glucose chewable tablet 16 g PRN    glucose chewable tablet 24 g PRN    hydrALAZINE injection 10 mg Q4H PRN    insulin aspart U-100 injection 0-5 Units QID (AC + HS) PRN    insulin detemir U-100 injection 16 Units QHS    labetaloL injection 10 mg Q2H PRN    levETIRAcetam tablet 500 mg TID    lipase-protease-amylase 12,000-38,000-60,000 units per capsule 6 capsule TID    magnesium sulfate 2g in water 50mL IVPB (premix) Once    melatonin tablet 6 mg Nightly PRN    morphine injection 2 mg Q4H PRN    naloxone 0.4 mg/mL injection 0.02 mg PRN    NIFEdipine 24 hr tablet 90 mg Daily    ondansetron injection 4 mg Q4H PRN    oxyCODONE immediate release tablet 10 mg Q4H PRN    perflutren lipid microspheres injection 1.4 mL Once    polyethylene glycol packet 17 g BID PRN    pregabalin capsule 75 mg QHS    prochlorperazine injection Soln 5 mg Q6H PRN    simethicone chewable tablet 80 mg QID PRN    sodium bicarbonate tablet 1,300 mg BID    white petrolatum 41 % ointment PRN     Family History    None       Tobacco Use    Smoking status: Never    Smokeless tobacco: Never   Substance and Sexual Activity    Alcohol use: Not Currently    Drug use: Not Currently    Sexual activity: Not Currently     Review of Systems   Constitutional: Negative.    Eyes: Negative.    Respiratory: Negative.      Gastrointestinal: Negative.    Endocrine: Negative.    Musculoskeletal:  Positive for back pain.   Allergic/Immunologic: Negative.    Neurological:  Positive for weakness.     Objective:     Vital Signs (Most Recent):  Temp: 98 °F (36.7 °C) (11/27/23 0740)  Pulse: 64 (11/27/23 0740)  Resp: 19 (11/27/23 0740)  BP: 128/87 (11/27/23 0740)  SpO2: 99 % (11/27/23 0740) Vital Signs (24h Range):  Temp:  [97.5 °F (36.4 °C)-98.1 °F (36.7 °C)] 98 °F (36.7 °C)  Pulse:  [64-75] 64  Resp:  [15-21] 19  SpO2:  [97 %-99 %] 99 %  BP: (110-136)/(74-92) 128/87     Weight: 127 kg (280 lb) (11/26/23 1128)  Body mass index is 42.93 kg/m².  Body surface area is 2.46 meters squared.    I/O last 3 completed shifts:  In: 3240.1 [P.O.:480; I.V.:2760.1]  Out: 400 [Urine:400]    Physical Exam  Constitutional:       General: He is not in acute distress.     Appearance: He is obese. He is ill-appearing.   HENT:      Head: Atraumatic.      Nose: Nose normal.      Mouth/Throat:      Mouth: Mucous membranes are moist.   Eyes:      Extraocular Movements: Extraocular movements intact.      Conjunctiva/sclera: Conjunctivae normal.   Cardiovascular:      Rate and Rhythm: Normal rate and regular rhythm.      Pulses: Normal pulses.      Heart sounds: Normal heart sounds.   Pulmonary:      Effort: Pulmonary effort is normal.      Breath sounds: Normal breath sounds.   Abdominal:      General: There is distension.      Palpations: Abdomen is soft.      Comments: Distended with abdominal wall edema and ascites    Genitourinary:     Comments: Significant edema   Musculoskeletal:         General: Swelling present.      Comments: 4+ pitting anasarca, L BKA stump wrapped in dressing    Skin:     General: Skin is warm.   Neurological:      Mental Status: He is alert and oriented to person, place, and time.         Significant Labs:  BMP:   Recent Labs   Lab 11/27/23  0401      K 4.7   CO2 19*   BUN 32.1*   CREATININE 2.80*   CALCIUM 6.3*   MG 1.30*  "    CBC:   Recent Labs   Lab 11/27/23  0401   WBC 11.95*   RBC 3.66*   HGB 10.7*   HCT 32.9*      MCV 89.9   MCH 29.2   MCHC 32.5*     Microbiology Results (last 7 days)       Procedure Component Value Units Date/Time    Blood Culture [[id number]]  (Normal) Collected: 11/25/23 1344    Order Status: Completed Specimen: Blood Updated: 11/26/23 1016     CULTURE, BLOOD ([facility]) No Growth At 24 Hours    Blood Culture [[id number]]  (Normal) Collected: 11/25/23 1344    Order Status: Completed Specimen: Blood Updated: 11/26/23 1016     CULTURE, BLOOD ([facility]) No Growth At 24 Hours          Specimen (24h ago, onward)      None          No results for input(s): "COLORU", "CLARITYU", "SPECGRAV", "PHUR", "PROTEINUA", "GLUCOSEU", "BILIRUBINCON", "BLOODU", "WBCU", "RBCU", "BACTERIA", "MUCUS", "NITRITE", "LEUKOCYTESUR", "UROBILINOGEN", "HYALINECASTS" in the last 168 hours.    Significant Imaging:  US Retroperitoneal Limited [[id number]] Resulted: 11/25/23 0757   Order Status: Completed Updated: 11/25/23 0800   Narrative:     EXAMINATION:  US RETROPERITONEAL LIMITED    CLINICAL HISTORY:  Elevated creatinine;    TECHNIQUE:  Retroperitoneal ultrasound.  Limited.    COMPARISON:  CT abdomen pelvis 03/27/2023    FINDINGS:  RIGHT KIDNEY: The right kidney measures 12.7 cm.  No hydronephrosis. The kidney is echogenic.    LEFT KIDNEY: The left kidney measures 13.5 cm.  Moderately obscured by shadowing.  No lex hydronephrosis.    BLADDER: Debris layer in the bladder.    OTHER: Ascites.   Impression:       The right kidney appears echogenic suggesting chronic medical renal disease.  No hydronephrosis    The left kidney is moderately obscured by shadowing.  No lex hydronephrosis.    Debris layering the bladder.  Correlate with urinalysis.    Ascites      Electronically signed by: Nisa Daley  Date: 11/25/2023  Time: 07:57       Assessment/Plan:     FABIO   CKD IIIa   -biopsy proven in April 2023 diabetic nodular sclerosis   -Nephrotic " range proteinuria         -March 2023 - - 10.9 g        -Now 5.9 g   Anasarca   S/p Fall with resulting back pain. Remote back surgery   Cirrhosis of the liver  DM I onset age 14 with long standing history of noncompliance. Hgb A1c improving.     -Patient is markedly hypervolemia. Stop IVF. Start Lasix 60 mg IVP q8 hours in conjunction with IV Albumin.   -Place urinary catheter and obtain UA as recommended by Renal US  -Monitor UOP in mL only. Document oral intake   -Daily weights to be done   -Check iron, ferritin, PTH, Vit D, Phos       Jaqueline Avina, SUSAN  Nephrology  [facility]

## 2023-11-27 NOTE — PT/OT/SLP EVAL
"Physical Therapy Evaluation    Patient Name:  Kamran Huerta   MRN:  [id number]    Recommendations:     Discharge therapy intensity: Moderate Intensity Therapy   Discharge Equipment Recommendations: lift device   Barriers to discharge: Inaccessible home, Impaired mobility, and Ongoing medical needs    Assessment:     Kamran Huerta is a 48 y.o. male admitted with a medical diagnosis of fall, deconditioning, volume overload. Past medical history of CVA with residual life-sided deficits, left BKA, chronic alcoholic liver disease, chronic seizures, DM, HTN, and chronic back pain. He presents with the following impairments/functional limitations: weakness, impaired endurance, impaired self care skills, impaired functional mobility, impaired balance, decreased upper extremity function, decreased lower extremity function, pain. Pt reports that he has been wheelchair bound for the past 2 years since a back surgery and also had CVA 2 years ago which affected his L side. Pt had L BKA ~2 months ago, and had brief stay in inpatient rehab following amputation. He reports that in inpatient rehab, he was limited in what he could do because his R leg is his "bad leg" and wouldn't support him with attempts to stand. He also reported that slideboard transfers were attempted during his stay, but he could not assist much with transfer due to his LUE deficits. Pt reports that he has been staying in nursing home for the past month since d/c from inpatient rehab, where he had only been out of bed twice during this time. He was recently d/c'd from nursing home into an apartment with his mom & brother, and required assist from 3 people to transfer him into his wheelchair.     Rehab Prognosis: Fair; patient would benefit from acute skilled PT services to address these deficits and reach maximum level of function.    Recent Surgery: * No surgery found *      Plan:     During this hospitalization, patient to be seen 3 x/week to " "address the identified rehab impairments via gait training, therapeutic activities, therapeutic exercises, neuromuscular re-education and progress toward the following goals:    Plan of Care Expires:  12/04/23    Subjective     Chief Complaint: complaining of "too much fluid"   Patient/Family Comments/goals: to get stronger  Pain/Comfort:  Pain Rating 1: other (see comments) (unrated)  Location - Side 1: Left  Location 1: leg  Pain Addressed 1: Reposition, Distraction, Pre-medicate for activity  Pain Rating 2:  (unrated)  Location - Side 2: Bilateral  Location - Orientation 2: lower  Location 2: back  Pain Addressed 2: Reposition, Distraction, Pre-medicate for activity    Patients cultural, spiritual, Mormonism conflicts given the current situation: no    Living Environment:  The pt recently moved into 1st floor apartment with his mom & brother. The apartment has ramp to enter, but the pt reports that it is otherwise not handicap accessible.   Prior to admission, patients level of function was total assist. Pt was residing in nursing home for past month, where he reports only getting out of bed twice during this time. He reports that his RLE was unable to hold him up during transfers. Equipment used at home: cane, straight, wheelchair, rollator.  DME owned (not currently used): none.  Upon discharge, patient will have assistance from brother.    Objective:     Communicated with RN and OTRandi, prior to session.  Patient found supine with peripheral IV, escobar catheter  upon PT entry to room.    General Precautions: Standard, fall  Orthopedic Precautions:N/A   Braces: N/A  Respiratory Status: Room air  Blood Pressure: 145/85 mmHg    Exams:  Cognitive Exam:  Patient is oriented to Person, Place, and Time  RLE ROM: limitations noted in hip flexion, knee flexion, and ankle PF  RLE Strength: knee ext 3/5, knee flex 2/5, ankle DF 3/5, ankle PF 2/5  LLE ROM: gross limitations noted throughout  LLE Strength: grossly " 2-/5      Functional Mobility:  Bed Mobility:     Rolling Left:  total assistance  Rolling Right: total assistance  Scooting: total assistance and of 2 persons  Supine to Sit: total assistance and of 2 persons  Sit to Supine: total assistance and of 2 persons  Static seated balance: Poor; pt with significant posterior and left lean when seated EOB. Pt initially requiring max A x 2 to maintain upright, however, slightly improved with time and able to maintain static posture with SBA for ~5 seconds. Max verbal & tactile cuing to lean forwards & to the right.    Treatment & Education:    Patient provided with verbal education education regarding PT plan of care, safety with mobility, proper body mechanics.  Understanding was verbalized.     Patient left supine with all lines intact and call button in reach.    GOALS:   Multidisciplinary Problems       Physical Therapy Goals          Problem: Physical Therapy    Goal Priority Disciplines Outcome Goal Variances Interventions   Physical Therapy Goal     PT, PT/OT Ongoing, Progressing     Description: Pt will improve functional independence by performing:    Bed mobility: max A  Sit to stand: max A with rolling walker  Bed to chair t/f: max A with Stand pivot with rolling walker                       History:     Past Medical History:   Diagnosis Date    CHF (congestive heart failure)     Chronic back pain     CVA (cerebral vascular accident) 01/2023    DM (diabetes mellitus)     HTN (hypertension)     Seizures        Past Surgical History:   Procedure Laterality Date    BACK SURGERY      EGD, WITH CLOSED BIOPSY  3/15/2023    Procedure: EGD, WITH CLOSED BIOPSY;  Surgeon: Tj Faith MD;  Location: [facility] ENDOSCOPY;  Service: Gastroenterology;;    ESOPHAGOGASTRODUODENOSCOPY N/A 3/15/2023    Procedure: EGD;  Surgeon: Tj Faith MD;  Location: [facility] ENDOSCOPY;  Service: Gastroenterology;  Laterality: N/A;    TOE AMPUTATION Right 3/24/2023    Procedure:  AMPUTATION, TOE;  Surgeon: Emre Vazquez DPM;  Location: [facility];  Service: Podiatry;  Laterality: Right;       Time Tracking:     PT Received On:    PT Start Time: 1145     PT Stop Time: 1215  PT Total Time (min): 30 min     Billable Minutes: Evaluation 30 11/27/2023

## 2023-11-27 NOTE — PLAN OF CARE
Problem: Occupational Therapy  Goal: Occupational Therapy Goal  Description: Goals to be met by: 12/25/23     Patient will increase functional independence with ADLs by performing:  LTG: Pt will perform basic ADLs and ADL transfers with Modified independence using LRAD by discharge.    STG: to be met by 12/25/23    Pt will complete grooming sitting with  with SBA.  Pt will complete UB dressing with SBA.  Pt will complete LB dressing with mod assist using LRAD.  Pt will complete toileting with mod assist using LRAD.  Pt will complete functional mobility to/from bedside commode and toilet transfer with max assist using LRAD.         Outcome: Ongoing, Progressing

## 2023-11-27 NOTE — PROGRESS NOTES
[facility] Hospital Medicine Progress Note        Chief Complaint: Inpatient Follow-up for     HPI:   Kamran Huerta is a 48 y.o. male who PMH includes CHF, chronic back pain wheelchair-bound, DM type 2, HTN, seizures, CVA, CKD stage 3, alcoholic liver disease with chronic pancreatitis, presents to the ED at [facility] on 11/25/2023 with a primary complaint of weakness, dizziness lightheadedness and fall out if his wheelchair striking his mouth; denies any LOC.  PT had back surgery years ago and reports has been wheelchair bound since then. No reports of seizures.  No CP, SOB, N/V/D, fever, chills cough congestion or any sick contacts. Labs reviewed demonstrated WBC 14.42, HH 12.2/37.2, sed rate 93 , CO2 18 Bun 25.7, Creat 2.31, glucose 52 Mg+ 1.0, , AST 36 reports of chest pain, CRP shortness a breath, nausea, vomiting, diarrhea5, fever., [id number], cough, congestion, or any sick, contacts. B no reportsP of chest pain, 109.7; other indicis unremarkable. CXR impression reviewed demonstrated  no acute cardiopulmonary abnormality. CT of head without contrast impression reviewed demonstrated no appreciable acute intracranial abnormality. Ct maxillofacial without contrast impression reviewed demonstrated no appreciate acute traumatic osseous abnormality, bilateral mastoid effusions. CT cervical spine without contrast impression reviewed demonstrated no appreciable acute osseous abnormality by CT evaluation, degenerative changes at the cervical spine. CT thoracic spine without contrast impression reviewed demonstrated no acute osseous abnormality. CT of lumbar spine without contrast impression reviewed demonstrated no appreciable fracture or acute osseous abnormality, severe degenerative change at L5-S1 progressed, endplate sclerosis disc space narrowing and osseous erosions which may be degenerative or infectious/inflammatory, anasarca.   Initial VS /105 P 75 R 13 T 98.7F  O2 saturation 98% on room air. Pt received multiple doses of pain medication , D50 for hypoglycemia, 40 mg lasix,  and magnesium rider in the ED. Neurosurgery services consulted. Pt awaiting MRI. Pt is admitted to hospital medicine services for further management.     Interval Hx:     Afebrile.  Doing well on room air.  No significant response with oral Bumex.  Remains significantly volume overloaded.  Also complains of back pain for which MRI lumbar spine will be ordered today.      Labs this morning showed low Mag, high falls, acidosis, worsening of BUN and serum creatinine, chronic anemia with mild leukocytosis.  Stable platelets.    TTE showed ventricular wall hypertrophy, EF 60-65% with normal diastolic function, trace MR, trace TR    Objective/physical exam:  Vitals:    11/27/23 0006 11/27/23 0033 11/27/23 0319 11/27/23 0613   BP: 123/80  (!) 136/92    Pulse: 68  68    Resp: 18 18 15 20   Temp: 97.8 °F (36.6 °C)  97.5 °F (36.4 °C)    TempSrc: Oral  Oral    SpO2: 98%  97%    Weight:       Height:         General: In no acute distress, afebrile  Respiratory: Clear to auscultation bilaterally  Cardiovascular: S1, S2, no appreciable murmur  Abdomen: Soft, nontender, BS +  MSK: Warm, pitting bilateral lower extremity edema, no clubbing or cyanosis  Neurologic: Alert and oriented x4    Lab Results   Component Value Date     11/27/2023    K 4.7 11/27/2023    CO2 19 (L) 11/27/2023    BUN 32.1 (H) 11/27/2023    CREATININE 2.80 (H) 11/27/2023    CALCIUM 6.3 (LL) 11/27/2023    ESTGFRAFRICA >105 02/22/2021    EGFRNONAA >60 01/20/2021      Lab Results   Component Value Date    ALT 31 11/27/2023    AST 26 11/27/2023    ALKPHOS 167 (H) 11/27/2023    BILITOT 0.2 11/27/2023      Lab Results   Component Value Date    WBC 11.95 (H) 11/27/2023    HGB 10.7 (L) 11/27/2023    HCT 32.9 (L) 11/27/2023    MCV 89.9 11/27/2023     11/27/2023           Medications:   atenoloL  50 mg Oral BID    bumetanide  1 mg Oral Daily     DULoxetine  20 mg Oral BID    enoxparin  40 mg Subcutaneous Daily    insulin detemir U-100  16 Units Subcutaneous QHS    levETIRAcetam  500 mg Oral TID    lipase-protease-amylase 12,000-38,000-60,000 units  6 capsule Oral TID    magnesium sulfate IVPB  2 g Intravenous Once    NIFEdipine  90 mg Oral Daily    perflutren lipid microspheres  1.4 mL Intravenous Once    pregabalin  75 mg Oral QHS    sodium bicarbonate  1,300 mg Oral BID      acetaminophen, aluminum-magnesium hydroxide-simethicone, dextrose 10%, dextrose 10%, dextrose 10%, dextrose 10%, glucagon (human recombinant), glucose, glucose, hydrALAZINE, insulin aspart U-100, labetaloL, melatonin, morphine, naloxone, ondansetron, oxyCODONE, polyethylene glycol, prochlorperazine, simethicone, white petrolatum     Assessment/Plan:    Acute on chronic lower back pain- intractable- suspected osteomyelitis  Dizziness, Fall- from wheel chair  Hypoglycemia- recurrent-resolved  Acute on chronic kidney disease stage III  Anasarca with volume overload  with peripheral edema  Hypertensive urgency at admission-resolving    HX: Wheelchair-bound, history of CVA with residual left-sided deficits, left BKA, chronic alcoholic liver disease, chronic seizures, T2 dm A1c 5.5    Plan:  -MRI lumbar spine ordered.  Continue analgesics as needed.  Follow cultures which are negative thus far  -consult Nephrology, urine workup.  Continue diuresis.  TTE reviewed  -other home medications will be reviewed and renewed.  Continue current insulin regimen    Florencio Montemayor MD

## 2023-11-27 NOTE — PT/OT/SLP EVAL
"Occupational Therapy  Evaluation    Name: Kamran Huerta  MRN: [id number]  Admitting Diagnosis: fall  Recent Surgery: * No surgery found *      Recommendations:     Discharge therapy intensity: Moderate Intensity Therapy   Discharge Equipment Recommendations:  lift device  Barriers to discharge:       Assessment:   Pt is a 49 y/o male who presents with significant generalize weakness requiring total assist x2 sup to sit and any attempts to scoot EOB. Pt also reports that he has been wheelchair bound x ~2 years since a back surgery and reports that he also had a stroke 2 years ago and has been limited with LUE use then. Pt also s/p LLE BKA ~2 months ago then went to rehab briefly and attempted to stand during rehab stay but reports that his "bad" R leg would not hold him, he also reports that they tried a SB for transfers and he could not help much with his LUE, he had been in a NH x ~1- 1 1/2 months after rehab and reports he had only been out of bed 2x/ during his stay. He was recently d/c'd from the NH and was setup to move into any apartment with his son and wife and reports that it took 3 large people to get him to the chair "and it was bad" and then he called the ambulance from the apartment. He presents with the following performance deficits affecting function: weakness, impaired endurance, impaired self care skills, impaired functional mobility, impaired balance, decreased upper extremity function, decreased lower extremity function.     Rehab Prognosis: fair; patient would benefit from acute skilled OT services to address these deficits and reach maximum level of function.       Plan:     Patient to be seen 3 x/week to address the above listed problems via self-care/home management, therapeutic activities, therapeutic exercises  Plan of Care Expires: 12/25/23  Plan of Care Reviewed with: patient    Subjective     Chief Complaint: c/o having "too much fluid" and being weak  Patient/Family Comments/goals: " "get stronger    Occupational Profile:  Living situation: was moving into a new apartment that he reports he "probably" can't get his w/c into, with wife and son  Previous level of function: pt was at a rehab then sent to "old folks home" and reports not getting OOB more than twice while he was in NH and said he tried to stand previously but RLE was unable to hold him and he "wanted to fall" onto the L leg.   Roles and Routines:  and dad  Equipment Used at Home: walker, rolling, bedside commode, cane, straight, shower chair  Assistance upon Discharge: yes, from his son and some from his wife    Pain/Comfort:  Pain Rating 1: 0/10    Patients cultural, spiritual, Christianity conflicts given the current situation:      Objective:     OT communicated with nsg and PT prior to session.      Patient was found supine with peripheral IV upon OT entry to room.    General Precautions: Standard, fall  Orthopedic Precautions:    Braces:      Vital Signs:     Bed Mobility:    Total assist x 2    Functional Mobility/Transfers:  U/a to scoot at EOB, weakness RLE and pt reports this was his "bad" leg  Functional Mobility:     Activities of Daily Living:  Total assist to isabela sock RLE    AMPAC 6 Click ADL:  AMPAC Total Score:      Functional Cognition:  intact    Visual Perceptual Skills:  intact    Upper Extremity Function:  Right Upper Extremity:   wfl    Left Upper Extremity:  Reports decreased sensation throughout  Shoulder to ~45', elbow grossly wfl ArOM and 3+/5 against resistance, wrist and hand 3+/5  Significant edema throughout LUE    Balance:   Poor: pt with significant L and posterior lean throughout, able to hold static sitting x ~5 sec a few time once positioned and provided with total assist to wt. Shift to R and forward    Therapeutic Positioning  Risk for acquired pressure injuries is increased due to impaired mobility.    OT interventions performed during the course of today's session:   Education was provided " on benefits of and recommendations for therapeutic positioning    OT recommendations for therapeutic positioning throughout hospitalization:   Follow [facility] Pressure Injury Prevention Protocol    Additional Treatment:      Patient Education:  Patient provided with verbal education education regarding OT role/goals/POC, safety awareness, Discharge/DME recommendations, pressure ulcer prevention, and home exercise program .  Understanding was verbalized.     Patient left supine with all lines intact and call button in reach    GOALS:   Multidisciplinary Problems       Occupational Therapy Goals          Problem: Occupational Therapy    Goal Priority Disciplines Outcome Interventions   Occupational Therapy Goal     OT, PT/OT Ongoing, Progressing    Description: Goals to be met by: 12/25/23     Patient will increase functional independence with ADLs by performing:  LTG: Pt will perform basic ADLs and ADL transfers with Modified independence using LRAD by discharge.    STG: to be met by 12/25/23    Pt will complete grooming sitting with  with SBA.  Pt will complete UB dressing with SBA.  Pt will complete LB dressing with mod assist using LRAD.  Pt will complete toileting with mod assist using LRAD.  Pt will complete functional mobility to/from bedside commode and toilet transfer with max assist using LRAD.                              History:     Past Medical History:   Diagnosis Date    CHF (congestive heart failure)     Chronic back pain     CVA (cerebral vascular accident) 01/2023    DM (diabetes mellitus)     HTN (hypertension)     Seizures          Past Surgical History:   Procedure Laterality Date    BACK SURGERY      EGD, WITH CLOSED BIOPSY  3/15/2023    Procedure: EGD, WITH CLOSED BIOPSY;  Surgeon: Tj Faith MD;  Location: [facility] ENDOSCOPY;  Service: Gastroenterology;;    ESOPHAGOGASTRODUODENOSCOPY N/A 3/15/2023    Procedure: EGD;  Surgeon: Tj Faith MD;  Location: [facility] ENDOSCOPY;  Service:  Gastroenterology;  Laterality: N/A;    TOE AMPUTATION Right 3/24/2023    Procedure: AMPUTATION, TOE;  Surgeon: Emre Vazquez DPM;  Location: [facility];  Service: Podiatry;  Laterality: Right;       Time Tracking:     OT Date of Treatment: 11/27/23  OT Start Time: 1145  OT Stop Time: 1215  OT Total Time (min): 30 min    Billable Minutes:Evaluation high complexity 30 min    11/27/2023

## 2023-11-27 NOTE — PLAN OF CARE
Problem: Adult Inpatient Plan of Care  Goal: Plan of Care Review  Outcome: Ongoing, Progressing  Goal: Patient-Specific Goal (Individualized)  Outcome: Ongoing, Progressing  Goal: Absence of Hospital-Acquired Illness or Injury  Outcome: Ongoing, Progressing  Goal: Optimal Comfort and Wellbeing  Outcome: Ongoing, Progressing  Goal: Readiness for Transition of Care  Outcome: Ongoing, Progressing     Problem: Bariatric Environmental Safety  Goal: Safety Maintained with Care  Outcome: Ongoing, Progressing     Problem: Skin Injury Risk Increased  Goal: Skin Health and Integrity  Outcome: Ongoing, Progressing     Problem: Fall Injury Risk  Goal: Absence of Fall and Fall-Related Injury  Outcome: Ongoing, Progressing     Problem: Impaired Wound Healing  Goal: Optimal Wound Healing  Outcome: Ongoing, Progressing     Problem: Infection  Goal: Absence of Infection Signs and Symptoms  Outcome: Ongoing, Progressing

## 2023-11-28 LAB
ANION GAP SERPL CALC-SCNC: 6 MEQ/L
BASOPHILS # BLD AUTO: 0.05 X10(3)/MCL
BASOPHILS NFR BLD AUTO: 0.5 %
BUN SERPL-MCNC: 32 MG/DL (ref 8.9–20.6)
CALCIUM SERPL-MCNC: 6.6 MG/DL (ref 8.4–10.2)
CHLORIDE SERPL-SCNC: 115 MMOL/L (ref 98–107)
CO2 SERPL-SCNC: 20 MMOL/L (ref 22–29)
CREAT SERPL-MCNC: 2.73 MG/DL (ref 0.73–1.18)
CREAT/UREA NIT SERPL: 12
EOSINOPHIL # BLD AUTO: 0.24 X10(3)/MCL (ref 0–0.9)
EOSINOPHIL NFR BLD AUTO: 2.4 %
ERYTHROCYTE [DISTWIDTH] IN BLOOD BY AUTOMATED COUNT: 13.9 % (ref 11.5–17)
GFR SERPLBLD CREATININE-BSD FMLA CKD-EPI: 28 MLS/MIN/1.73/M2
GLUCOSE SERPL-MCNC: 97 MG/DL (ref 74–100)
HCT VFR BLD AUTO: 27.5 % (ref 42–52)
HGB BLD-MCNC: 8.7 G/DL (ref 14–18)
IMM GRANULOCYTES # BLD AUTO: 0.04 X10(3)/MCL (ref 0–0.04)
IMM GRANULOCYTES NFR BLD AUTO: 0.4 %
LYMPHOCYTES # BLD AUTO: 2.43 X10(3)/MCL (ref 0.6–4.6)
LYMPHOCYTES NFR BLD AUTO: 24.6 %
MCH RBC QN AUTO: 28.6 PG (ref 27–31)
MCHC RBC AUTO-ENTMCNC: 31.6 G/DL (ref 33–36)
MCV RBC AUTO: 90.5 FL (ref 80–94)
MONOCYTES # BLD AUTO: 0.78 X10(3)/MCL (ref 0.1–1.3)
MONOCYTES NFR BLD AUTO: 7.9 %
NEUTROPHILS # BLD AUTO: 6.35 X10(3)/MCL (ref 2.1–9.2)
NEUTROPHILS NFR BLD AUTO: 64.2 %
NRBC BLD AUTO-RTO: 0 %
PLATELET # BLD AUTO: 224 X10(3)/MCL (ref 130–400)
PMV BLD AUTO: 12.1 FL (ref 7.4–10.4)
POCT GLUCOSE: 102 MG/DL (ref 70–110)
POCT GLUCOSE: 206 MG/DL (ref 70–110)
POCT GLUCOSE: 48 MG/DL (ref 70–110)
POCT GLUCOSE: 64 MG/DL (ref 70–110)
POCT GLUCOSE: 94 MG/DL (ref 70–110)
POTASSIUM SERPL-SCNC: 4.5 MMOL/L (ref 3.5–5.1)
RBC # BLD AUTO: 3.04 X10(6)/MCL (ref 4.7–6.1)
SODIUM SERPL-SCNC: 141 MMOL/L (ref 136–145)
WBC # SPEC AUTO: 9.89 X10(3)/MCL (ref 4.5–11.5)

## 2023-11-28 PROCEDURE — 63600175 PHARM REV CODE 636 W HCPCS: Mod: JZ,JG | Performed by: NURSE PRACTITIONER

## 2023-11-28 PROCEDURE — 63600175 PHARM REV CODE 636 W HCPCS: Performed by: NURSE PRACTITIONER

## 2023-11-28 PROCEDURE — 85025 COMPLETE CBC W/AUTO DIFF WBC: CPT | Performed by: NURSE PRACTITIONER

## 2023-11-28 PROCEDURE — 25000003 PHARM REV CODE 250: Performed by: NURSE PRACTITIONER

## 2023-11-28 PROCEDURE — 25000003 PHARM REV CODE 250: Performed by: INTERNAL MEDICINE

## 2023-11-28 PROCEDURE — P9047 ALBUMIN (HUMAN), 25%, 50ML: HCPCS | Mod: JZ,JG | Performed by: NURSE PRACTITIONER

## 2023-11-28 PROCEDURE — 80048 BASIC METABOLIC PNL TOTAL CA: CPT | Performed by: NURSE PRACTITIONER

## 2023-11-28 PROCEDURE — 21400001 HC TELEMETRY ROOM

## 2023-11-28 PROCEDURE — 63600175 PHARM REV CODE 636 W HCPCS: Performed by: INTERNAL MEDICINE

## 2023-11-28 RX ORDER — ALBUMIN HUMAN 250 G/1000ML
12.5 SOLUTION INTRAVENOUS EVERY 8 HOURS
Status: COMPLETED | OUTPATIENT
Start: 2023-11-28 | End: 2023-11-29

## 2023-11-28 RX ORDER — METOLAZONE 2.5 MG/1
5 TABLET ORAL DAILY
Status: DISCONTINUED | OUTPATIENT
Start: 2023-11-28 | End: 2023-11-29

## 2023-11-28 RX ORDER — ERGOCALCIFEROL 1.25 MG/1
50000 CAPSULE ORAL
Status: DISCONTINUED | OUTPATIENT
Start: 2023-11-28 | End: 2023-12-22 | Stop reason: HOSPADM

## 2023-11-28 RX ADMIN — OXYCODONE HYDROCHLORIDE 10 MG: 5 TABLET ORAL at 12:11

## 2023-11-28 RX ADMIN — DOXAZOSIN 2 MG: 1 TABLET ORAL at 10:11

## 2023-11-28 RX ADMIN — LEVETIRACETAM 500 MG: 500 TABLET, FILM COATED ORAL at 10:11

## 2023-11-28 RX ADMIN — MORPHINE SULFATE 2 MG: 4 INJECTION, SOLUTION INTRAMUSCULAR; INTRAVENOUS at 10:11

## 2023-11-28 RX ADMIN — FUROSEMIDE 60 MG: 10 INJECTION, SOLUTION INTRAMUSCULAR; INTRAVENOUS at 03:11

## 2023-11-28 RX ADMIN — ATENOLOL 50 MG: 50 TABLET ORAL at 09:11

## 2023-11-28 RX ADMIN — MORPHINE SULFATE 2 MG: 4 INJECTION, SOLUTION INTRAMUSCULAR; INTRAVENOUS at 11:11

## 2023-11-28 RX ADMIN — ALBUMIN (HUMAN) 12.5 G: 12.5 SOLUTION INTRAVENOUS at 03:11

## 2023-11-28 RX ADMIN — SODIUM BICARBONATE 650 MG TABLET 1300 MG: at 10:11

## 2023-11-28 RX ADMIN — Medication: at 09:11

## 2023-11-28 RX ADMIN — INSULIN ASPART 2 UNITS: 100 INJECTION, SOLUTION INTRAVENOUS; SUBCUTANEOUS at 04:11

## 2023-11-28 RX ADMIN — LOSARTAN POTASSIUM 100 MG: 50 TABLET, FILM COATED ORAL at 10:11

## 2023-11-28 RX ADMIN — Medication 24 G: at 10:11

## 2023-11-28 RX ADMIN — ENOXAPARIN SODIUM 40 MG: 40 INJECTION SUBCUTANEOUS at 04:11

## 2023-11-28 RX ADMIN — LEVETIRACETAM 500 MG: 500 TABLET, FILM COATED ORAL at 09:11

## 2023-11-28 RX ADMIN — Medication: at 10:11

## 2023-11-28 RX ADMIN — SODIUM BICARBONATE 650 MG TABLET 1300 MG: at 09:11

## 2023-11-28 RX ADMIN — MICONAZOLE NITRATE: 20 POWDER TOPICAL at 10:11

## 2023-11-28 RX ADMIN — FUROSEMIDE 60 MG: 10 INJECTION, SOLUTION INTRAMUSCULAR; INTRAVENOUS at 07:11

## 2023-11-28 RX ADMIN — MORPHINE SULFATE 2 MG: 4 INJECTION, SOLUTION INTRAMUSCULAR; INTRAVENOUS at 02:11

## 2023-11-28 RX ADMIN — PREGABALIN 75 MG: 50 CAPSULE ORAL at 10:11

## 2023-11-28 RX ADMIN — DULOXETINE HYDROCHLORIDE 20 MG: 20 CAPSULE, DELAYED RELEASE ORAL at 09:11

## 2023-11-28 RX ADMIN — PANCRELIPASE 6 CAPSULE: 60000; 12000; 38000 CAPSULE, DELAYED RELEASE PELLETS ORAL at 10:11

## 2023-11-28 RX ADMIN — WHITE PETROLATUM: 1.75 OINTMENT TOPICAL at 10:11

## 2023-11-28 RX ADMIN — OXYCODONE HYDROCHLORIDE 10 MG: 5 TABLET ORAL at 09:11

## 2023-11-28 RX ADMIN — ALBUMIN (HUMAN) 12.5 G: 12.5 SOLUTION INTRAVENOUS at 10:11

## 2023-11-28 RX ADMIN — METOLAZONE 5 MG: 2.5 TABLET ORAL at 09:11

## 2023-11-28 RX ADMIN — ERGOCALCIFEROL 50000 UNITS: 1.25 CAPSULE ORAL at 07:11

## 2023-11-28 RX ADMIN — ATENOLOL 50 MG: 50 TABLET ORAL at 10:11

## 2023-11-28 RX ADMIN — INSULIN DETEMIR 16 UNITS: 100 INJECTION, SOLUTION SUBCUTANEOUS at 10:11

## 2023-11-28 RX ADMIN — MICONAZOLE NITRATE: 20 POWDER TOPICAL at 09:11

## 2023-11-28 RX ADMIN — WHITE PETROLATUM: 1.75 OINTMENT TOPICAL at 09:11

## 2023-11-28 RX ADMIN — OXYCODONE HYDROCHLORIDE 10 MG: 5 TABLET ORAL at 04:11

## 2023-11-28 RX ADMIN — FUROSEMIDE 60 MG: 10 INJECTION, SOLUTION INTRAMUSCULAR; INTRAVENOUS at 10:11

## 2023-11-28 RX ADMIN — LEVETIRACETAM 500 MG: 500 TABLET, FILM COATED ORAL at 02:11

## 2023-11-28 NOTE — PLAN OF CARE
Disch plan to ret. Home when stable.  With HH.  Therapy to rec. if higher level of therapy is needed than HH.  Following info. Comes from H[place],mom.  He was in nsg home [facility] in [facility] 6 months after surgery at [facility] resulting in LLE amputation.  Hiral states they were  not satisfied with nsg home, so they disch him home.  Current address is [address] (aunt) [phone].Address is [address] [place] [address]. He has quad cane, w/c and RW at home.   Pt. Has Medicare and Medicaid.

## 2023-11-28 NOTE — PLAN OF CARE
Problem: Adult Inpatient Plan of Care  Goal: Plan of Care Review  Outcome: Ongoing, Progressing  Goal: Patient-Specific Goal (Individualized)  Outcome: Ongoing, Progressing  Goal: Absence of Hospital-Acquired Illness or Injury  Outcome: Ongoing, Progressing  Goal: Optimal Comfort and Wellbeing  Outcome: Ongoing, Progressing  Goal: Readiness for Transition of Care  Outcome: Ongoing, Progressing     Problem: Skin Injury Risk Increased  Goal: Skin Health and Integrity  Outcome: Ongoing, Progressing     Problem: Fall Injury Risk  Goal: Absence of Fall and Fall-Related Injury  Outcome: Ongoing, Progressing     Problem: Impaired Wound Healing  Goal: Optimal Wound Healing  Outcome: Ongoing, Progressing     Problem: Infection  Goal: Absence of Infection Signs and Symptoms  Outcome: Ongoing, Progressing     Problem: Hyperglycemia  Goal: Blood Glucose Level Within Targeted Range  Outcome: Ongoing, Progressing

## 2023-11-28 NOTE — NURSING
[facility] - Observation Unit  Wound Care    Patient Name:  Kamran Huerta   MRN:  [id number]  Date: 11/28/2023  Diagnosis: <principal problem not specified>    History:     Past Medical History:   Diagnosis Date    CHF (congestive heart failure)     Chronic back pain     CVA (cerebral vascular accident) 01/2023    DM (diabetes mellitus)     HTN (hypertension)     Seizures        Social History     Socioeconomic History    Marital status: Single   Tobacco Use    Smoking status: Never    Smokeless tobacco: Never   Substance and Sexual Activity    Alcohol use: Not Currently    Drug use: Not Currently    Sexual activity: Not Currently     Social Determinants of Health     Social Connections: Unknown (11/16/2022)    Social Connection and Isolation Panel [NHANES]     Marital Status:        Precautions:     Allergies as of 11/25/2023    (No Known Allergies)       WOC Assessment Details/Treatment      11/27/23 1200   [REMOVED]      Peripheral IV - Single Lumen 22 G Anterior;Left Hand   Removal Date: 11/27/23  No placement date or time found.   Present Prior to Hospital Arrival?: Yes  Inserted by: EMS  Size/Length: 22 G  Orientation: Anterior;Left  Location: Hand   Site Assessment Clean;Dry;Intact   Line Status Capped;Saline locked   Dressing Status Dry;Clean;Intact   Dressing Intervention Integrity maintained   [REMOVED]      Peripheral IV - Single Lumen 11/25/23 0901 20 G Anterior;Left Forearm   Removal Date: 11/27/23  Placement Date/Time: 11/25/23 0901   Inserted by: RN  Size/Length: 20 G  Orientation: Anterior;Left  Location: Forearm  Placement directed by: Ultrasound   Site Assessment Clean;Dry;Intact   Line Status Capped;Saline locked   Dressing Status Dry;Intact;Clean   Dressing Intervention Integrity maintained        Incision/Site 11/25/23 1531 Left Knee anterior   Date First Assessed/Time First Assessed: 11/25/23 1531   Side: Left  Location: (c) Knee  Orientation: anterior   Wound Image     Dressing Appearance Intact;Dried drainage   Drainage Amount Scant   Drainage Characteristics/Odor Serosanguineous;No odor   Appearance Pink;Red;Moist   Red (%), Wound Tissue Color 100 %   Periwound Area Dry;Intact   Wound Edges Irregular   Wound Length (cm) 10 cm   Wound Width (cm) 1.5 cm   Wound Surface Area (cm^2) 15 cm^2   Care Cleansed with:;Sterile normal saline   Dressing Calcium alginate;Gauze;Rolled gauze        Incision/Site 03/24/23 1547 Right Foot anterior   Date First Assessed/Time First Assessed: 03/24/23 1547   Present Prior to Hospital Arrival?: Yes  Side: Right  Location: Foot  Orientation: anterior   Wound Image   (dry scaly foot and leg)   Care Cleansed with:;Wound cleanser;Applied:;Moisturizing agent        Altered Skin Integrity 11/26/23 1639 Left posterior Buttocks Other (comment) Intact skin with non-blanchable redness of localized area   Date First Assessed/Time First Assessed: 11/26/23 1639   Altered Skin Integrity Present on Admission - Did Patient arrive to the hospital with altered skin?: yes  Side: Left  Orientation: posterior  Location: Buttocks  Is this injury device related?: ...   Wound Image    Description of Altered Skin Integrity Purple or maroon localized area of discolored intact skin or non-intact skin or a blood-filled blister.   Dressing Appearance Open to air   Drainage Amount None   Appearance   (dark in color to pts skin)   Tissue loss description Not applicable   Periwound Area Dry;Intact   Wound Edges Irregular   Wound Length (cm) 4 cm   Wound Width (cm) 2 cm   Wound Surface Area (cm^2) 8 cm^2   Care Cleansed with:;Wound cleanser;Applied:;Skin Barrier   Dressing Absorptive Pad     47 y/o male in with hypomagnesia and hypervolemia.   Physical deconditioning.   Hx of CHF CVA DM HTN.    He is awake alert oriented and weak but can assist with turning self in bed.  He has a longterm left BKA that has a thin denuded area along the old surgical site.   Cleaned and  redressed-see photo.  He also has a dark moist area to his inner buttock which was also cleaned and dressed.  He has dry scaly skin extreme-moisturizer added to care.   Low airloss support added.   Care instructions to  nurse mak.  He is to be offloaded and turned regularly by staff q shift.    Will recheck him weekly while in hospital.     11/28/2023

## 2023-11-28 NOTE — PROGRESS NOTES
Care update:    L5-S1 degenerative changes. Endplate sclerosis, disc space narrowing and osseous erosions.        Afebrile.  Vital signs stable   Patient and volume overload.    Continues with back pain   WBCs normal.  Urine suspicious for infectious process.      Medical management/optimization  No new recommendations at this time.      Awaiting MRI lumbar spine:

## 2023-11-28 NOTE — PROGRESS NOTES
[facility] Hospital Medicine Progress Note        Chief Complaint: Inpatient Follow-up for     HPI: 48 y.o. male who PMH includes CHF, chronic back pain wheelchair-bound, DM type 2, HTN, seizures, CVA, CKD stage 3, alcoholic liver disease with chronic pancreatitis, presents to the ED at [facility] on 11/25/2023 with a primary complaint of weakness, dizziness lightheadedness and fall out if his wheelchair striking his mouth; denies any LOC.  PT had back surgery years ago and reports has been wheelchair bound since then. No reports of seizures.  No CP, SOB, N/V/D, fever, chills cough congestion or any sick contacts. Labs reviewed demonstrated WBC 14.42, HH 12.2/37.2, sed rate 93 , CO2 18 Bun 25.7, Creat 2.31, glucose 52 Mg+ 1.0, , AST 36 reports of chest pain, CRP shortness a breath, nausea, vomiting, diarrhea5, fever., [id number], cough, congestion, or any sick, contacts. B no reportsP of chest pain, 109.7; other indicis unremarkable. CXR impression reviewed demonstrated  no acute cardiopulmonary abnormality. CT of head without contrast impression reviewed demonstrated no appreciable acute intracranial abnormality. Ct maxillofacial without contrast impression reviewed demonstrated no appreciate acute traumatic osseous abnormality, bilateral mastoid effusions. CT cervical spine without contrast impression reviewed demonstrated no appreciable acute osseous abnormality by CT evaluation, degenerative changes at the cervical spine. CT thoracic spine without contrast impression reviewed demonstrated no acute osseous abnormality. CT of lumbar spine without contrast impression reviewed demonstrated no appreciable fracture or acute osseous abnormality, severe degenerative change at L5-S1 progressed, endplate sclerosis disc space narrowing and osseous erosions which may be degenerative or infectious/inflammatory, anasarca.   Initial VS /105 P 75 R 13 T 98.7F O2 saturation 98% on room  air. Pt received multiple doses of pain medication , D50 for hypoglycemia, 40 mg lasix,  and magnesium rider in the ED. Neurosurgery services consulted. Pt awaiting MRI. Pt is admitted to hospital medicine services for further managemen    Interval Hx:    Patient seen and examined on Lasix and albumin reports he is peeing  Case was discussed with patient's nurse and  on the floor.    Objective/physical exam:  General: In no acute distress, afebrile  Chest: Clear to auscultation bilaterally  Heart: RRR, +S1, S2, no appreciable murmur  Abdomen: Soft, nontender, BS +  MSK: Warm,  3+ edema in bilateral lower extremity  Neurologic: Alert and oriented   VITAL SIGNS: 24 HRS MIN & MAX LAST   Temp  Min: 96.5 °F (35.8 °C)  Max: 98 °F (36.7 °C) 97.6 °F (36.4 °C)   BP  Min: 112/72  Max: 145/85 119/76   Pulse  Min: 65  Max: 75  66   Resp  Min: 17  Max: 19 18   SpO2  Min: 95 %  Max: 100 % 98 %     I have reviewed the following labs:  Recent Labs   Lab 11/26/23  0555 11/27/23  0401 11/28/23  0420   WBC 11.25 11.95* 9.89   RBC 3.59* 3.66* 3.04*   HGB 10.4* 10.7* 8.7*   HCT 32.7* 32.9* 27.5*   MCV 91.1 89.9 90.5   MCH 29.0 29.2 28.6   MCHC 31.8* 32.5* 31.6*   RDW 14.0 13.7 13.9    281 224   MPV 12.1* 11.9* 12.1*     Recent Labs   Lab 11/25/23  0316 11/26/23  0555 11/27/23  0401 11/28/23  0420    143 139 141   K 4.7 4.6 4.7 4.5   CO2 18* 21* 19* 20*   BUN 25.7* 28.2* 32.1* 32.0*   CREATININE 2.31* 2.60* 2.80* 2.73*   CALCIUM 7.2* 6.6* 6.3* 6.6*   MG 1.00* 1.00* 1.30*  --    ALBUMIN 1.4* 1.2* 1.3*  --    ALKPHOS 189* 157* 167*  --    ALT 41 30 31  --    AST 36* 30 26  --    BILITOT 0.2 0.3 0.2  --      Microbiology Results (last 7 days)       Procedure Component Value Units Date/Time    Blood Culture [[id number]]  (Normal) Collected: 11/25/23 1344    Order Status: Completed Specimen: Blood Updated: 11/28/23 0859     CULTURE, BLOOD ([facility]) No Growth At 72 Hours    Blood Culture [[id number]]  (Normal) Collected:  11/25/23 1344    Order Status: Completed Specimen: Blood Updated: 11/28/23 0859     CULTURE, BLOOD ([facility]) No Growth At 72 Hours    Urine culture [[id number]]  (Abnormal) Collected: 11/27/23 1047    Order Status: Completed Specimen: Urine Updated: 11/28/23 0748     Urine Culture >/= 100,000 colonies/ml Gram-negative Rods             See below for Radiology    Scheduled Med:   albumin human 25%  12.5 g Intravenous Q8H    atenoloL  50 mg Oral BID    doxazosin  2 mg Oral QHS    DULoxetine  20 mg Oral BID    enoxparin  40 mg Subcutaneous Daily    ergocalciferol  50,000 Units Oral Q72H    furosemide (LASIX) injection  60 mg Intravenous Q8H    insulin detemir U-100  16 Units Subcutaneous QHS    levETIRAcetam  500 mg Oral TID    lipase-protease-amylase 12,000-38,000-60,000 units  6 capsule Oral TID    losartan  100 mg Oral Daily    metOLazone  5 mg Oral Daily    miconazole NITRATE 2 %   Topical (Top) BID    perflutren lipid microspheres  1.4 mL Intravenous Once    pregabalin  75 mg Oral QHS    sodium bicarbonate  1,300 mg Oral BID    white petrolatum   Topical (Top) BID    zinc oxide-cod liver oil   Topical (Top) BID      Continuous Infusions:     PRN Meds:  acetaminophen, aluminum-magnesium hydroxide-simethicone, dextrose 10%, dextrose 10%, dextrose 10%, dextrose 10%, glucagon (human recombinant), glucose, glucose, hydrALAZINE, insulin aspart U-100, labetaloL, melatonin, morphine, naloxone, ondansetron, oxyCODONE, polyethylene glycol, prochlorperazine, simethicone, white petrolatum     Assessment/Plan:  Acute on chronic lower back pain- intractable- suspected osteomyelitis  Dizziness, Fall- from wheel chair  Hypoglycemia- recurrent-resolved  Acute on chronic kidney disease stage III  Anasarca with volume overload  with peripheral edema  Hypertensive urgency at admission-resolving     HX: Wheelchair-bound, history of CVA with residual left-sided deficits, left BKA, chronic alcoholic liver disease, chronic seizures, T2 dm A1c  5.5     Plan:   Awaiting MRI of the L-spine neurosurgery consulted continue with pain control   Continue with IV Lasix with IV albumin and IV metal lays on as recommended by Nephrology they are following  Continue with other home medications that includes atenolol, doxazosin, duloxetine, Levemir 16 units, Keppra, losartan, metolazone, pregabalin  VTE prophylaxis: lovenox    Patient condition:  Stable/Fair/Guarded/ Serious/ Critical    Anticipated discharge and Disposition:         All diagnosis and differential diagnosis have been reviewed; assessment and plan has been documented; I have personally reviewed the labs and test results that are presently available; I have reviewed the patients medication list; I have reviewed the consulting providers response and recommendations. I have reviewed or attempted to review medical records based upon their availability    All of the patient's questions have been  addressed and answered. Patient's is agreeable to the above stated plan. I will continue to monitor closely and make adjustments to medical management as needed.  _____________________________________________________________________    Nutrition Status:    Radiology:  I have personally reviewed the following imaging and agree with the radiologist.     Echo    Left Ventricle: The left ventricle is normal in size. Increased wall   thickness. There is moderate concentric hypertrophy. Normal wall motion.   There is normal systolic function with a visually estimated ejection   fraction of 60 - 65%. There is normal diastolic function.    Right Ventricle: Normal right ventricular cavity size. Systolic   function is normal.    Left Atrium: Left atrium is mildly dilated.    Mitral Valve: There is trace regurgitation.    Tricuspid Valve: There is trace regurgitation.      Asia Hodge MD   11/28/2023

## 2023-11-28 NOTE — PLAN OF CARE
11/28/23 1111   Discharge Assessment   Assessment Type Discharge Planning Assessment   Confirmed/corrected address, phone number and insurance Yes   Source of Information family   If unable to respond/provide information was family/caregiver contacted? Yes   Contact Name/Number Hiral Shearer [phone]   Does patient/caregiver understand observation status   (inpatient)   Communicated VLADISLAV with patient/caregiver Date not available/Unable to determine   Reason For Admission fall, physical conditioning, FABIO   People in Home parent(s);sibling(s)   Facility Arrived From: home,   Do you expect to return to your current living situation? Yes   Do you have help at home or someone to help you manage your care at home? Yes   Who are your caregiver(s) and their phone number(s)? mom Hiral Shearer, brother Barb Shearer [phone]   Prior to hospitilization cognitive status: Unable to Assess   Current cognitive status: Alert/Oriented   Walking or Climbing Stairs ambulation difficulty, requires equipment   Dressing/Bathing bathing difficulty, assistance 1 person   Home Accessibility wheelchair accessible   Equipment Currently Used at Home wheelchair;walker, rolling;cane, quad   Readmission within 30 days? No   Patient currently being followed by outpatient case management? No   Do you currently have service(s) that help you manage your care at home? No   Do you take prescription medications? Yes   Do you have prescription coverage? Yes   Coverage medicare and medicaid   Do you have any problems affording any of your prescribed medications? No   Who is going to help you get home at discharge? brother Barb Shearer    How do you get to doctors appointments? family or friend will provide   DME Needed Upon Discharge  none   Discharge Plan discussed with: Patient   Transition of Care Barriers None   Discharge Plan A Home with family;Home Health   Discharge Plan B Home with family;Home Health   OTHER   Name(s) of People in Home mom  Hiral, aunt Diana, brother Elizabeth     Disch plan to ret. Home when stable.  With HH.  Therapy to rec. if higher level of therapy is needed than HH.  Following info. Comes from Hiral,mom.  [facility] resulting in LLE amputation.  Hiral states they were  not satisfied with nsg home, so they disch him home.  Current address is Diana [address] (aunt) [phone].Address is [address] [place] [id number]

## 2023-11-28 NOTE — PLAN OF CARE
Problem: Adult Inpatient Plan of Care  Goal: Plan of Care Review  Outcome: Ongoing, Progressing  Goal: Patient-Specific Goal (Individualized)  Outcome: Ongoing, Progressing  Goal: Absence of Hospital-Acquired Illness or Injury  Outcome: Ongoing, Progressing  Goal: Optimal Comfort and Wellbeing  Outcome: Ongoing, Progressing  Goal: Readiness for Transition of Care  Outcome: Ongoing, Progressing     Problem: Bariatric Environmental Safety  Goal: Safety Maintained with Care  Outcome: Ongoing, Progressing     Problem: Skin Injury Risk Increased  Goal: Skin Health and Integrity  Outcome: Ongoing, Progressing     Problem: Fall Injury Risk  Goal: Absence of Fall and Fall-Related Injury  Outcome: Ongoing, Progressing     Problem: Impaired Wound Healing  Goal: Optimal Wound Healing  Outcome: Ongoing, Progressing     Problem: Infection  Goal: Absence of Infection Signs and Symptoms  Outcome: Ongoing, Progressing     Problem: Hyperglycemia  Goal: Blood Glucose Level Within Targeted Range  Outcome: Ongoing, Progressing

## 2023-11-28 NOTE — PROGRESS NOTES
[facility]  Nephrology  Progress Note    Patient Name: Kamran Huerta  MRN: [id number]  Admission Date: 11/25/2023  Hospital Length of Stay: 3 days  Attending Provider: Asia Hodge MD   Primary Care Physician: Ailyn Reynolds MD  Principal Problem:<principal problem not specified>      Subjective:   HPI: This is a 48-year-old AAM seen by our service in April for nephrotic syndrome and FABIO. At that time he underwent renal biopsy revealing advanced diabetic nodular sclerosis. He was diuresed aggressively and discharged with Cr 1.8 and ability to ambulate in the trotter without oxygen or distress. Since that time he has undergone L BKA s/t gangrenous wound. He presented to the ED 11/25 s/p fall from wheelchair with resulting back pain. Neurosurgery undergoing further workup.   He was noted to have FABIO and marked hypervolemia for which nephrology has been consulted. He endorses straining to urinate which he attributes to penile and scrotal edema. Renal US without evidence of hydronephrosis but does reveal layering debris in the bladder. Unsure of dry weight    Interval History:   11/28 - urine output excellent via escobar catheter with TID IV lasix. Profoundly vitamin D deficient. Also iron deficient. UA suggestive of active infection with culture pending. He feels swelling in left arm and abdomen is somewhat improved     Review of patient's allergies indicates:  No Known Allergies  Current Facility-Administered Medications   Medication Frequency    acetaminophen tablet 650 mg Q6H PRN    aluminum-magnesium hydroxide-simethicone 200-200-20 mg/5 mL suspension 30 mL QID PRN    atenoloL tablet 50 mg BID    dextrose 10% bolus 125 mL 125 mL PRN    dextrose 10% bolus 125 mL 125 mL PRN    dextrose 10% bolus 250 mL 250 mL PRN    dextrose 10% bolus 250 mL 250 mL PRN    doxazosin tablet 2 mg QHS    DULoxetine DR capsule 20 mg BID    enoxaparin injection 40 mg Daily    ergocalciferol capsule  50,000 Units Q72H    furosemide injection 60 mg Q8H    glucagon (human recombinant) injection 1 mg PRN    glucose chewable tablet 16 g PRN    glucose chewable tablet 24 g PRN    hydrALAZINE injection 10 mg Q4H PRN    insulin aspart U-100 injection 0-5 Units QID (AC + HS) PRN    insulin detemir U-100 injection 16 Units QHS    labetaloL injection 10 mg Q2H PRN    levETIRAcetam tablet 500 mg TID    lipase-protease-amylase 12,000-38,000-60,000 units per capsule 6 capsule TID    losartan tablet 100 mg Daily    melatonin tablet 6 mg Nightly PRN    miconazole NITRATE 2 % top powder BID    morphine injection 2 mg Q4H PRN    naloxone 0.4 mg/mL injection 0.02 mg PRN    ondansetron injection 4 mg Q4H PRN    oxyCODONE immediate release tablet 10 mg Q4H PRN    perflutren lipid microspheres injection 1.4 mL Once    polyethylene glycol packet 17 g BID PRN    pregabalin capsule 75 mg QHS    prochlorperazine injection Soln 5 mg Q6H PRN    simethicone chewable tablet 80 mg QID PRN    sodium bicarbonate tablet 1,300 mg BID    white petrolatum 41 % ointment PRN    white petrolatum 41 % ointment BID    zinc oxide-cod liver oil 40 % paste BID       Objective:     Vital Signs (Most Recent):  Temp: 96.8 °F (36 °C) (11/27/23 2325)  Pulse: 65 (11/28/23 0452)  Resp: 18 (11/28/23 0452)  BP: 112/72 (11/28/23 0452)  SpO2: 100 % (11/28/23 0452) Vital Signs (24h Range):  Temp:  [96.5 °F (35.8 °C)-98 °F (36.7 °C)] 96.8 °F (36 °C)  Pulse:  [64-75] 65  Resp:  [17-19] 18  SpO2:  [95 %-100 %] 100 %  BP: (112-145)/(66-87) 112/72     Weight: 127 kg (280 lb) (11/26/23 1128)  Body mass index is 42.93 kg/m².  Body surface area is 2.46 meters squared.    I/O last 3 completed shifts:  In: 3029 [P.O.:1200; I.V.:1829]  Out: 1550 [Urine:1550]    Physical Exam  Constitutional:       General: He is not in acute distress.     Appearance: He is ill-appearing.   HENT:      Head: Atraumatic.      Nose: Nose normal.      Mouth/Throat:      Mouth: Mucous membranes are  moist.   Eyes:      Extraocular Movements: Extraocular movements intact.      Conjunctiva/sclera: Conjunctivae normal.   Cardiovascular:      Rate and Rhythm: Normal rate and regular rhythm.      Pulses: Normal pulses.   Pulmonary:      Effort: Pulmonary effort is normal.      Breath sounds: Normal breath sounds.   Abdominal:      General: There is distension.      Palpations: Abdomen is soft.      Comments: Distended with ascites   Genitourinary:     Comments: Urinary catheter with light yellow clear urine   Musculoskeletal:         General: Swelling present.      Cervical back: Normal range of motion and neck supple.      Comments: 4+ pitting anasarca, L BKA stump covered in dressing    Skin:     General: Skin is warm and dry.   Neurological:      Mental Status: He is alert and oriented to person, place, and time.         Significant Labs:sureBMP:   Recent Labs   Lab 11/27/23  0401 11/28/23  0420    141   K 4.7 4.5   CO2 19* 20*   BUN 32.1* 32.0*   CREATININE 2.80* 2.73*   CALCIUM 6.3* 6.6*   MG 1.30*  --      CBC:   Recent Labs   Lab 11/28/23  0420   WBC 9.89   RBC 3.04*   HGB 8.7*   HCT 27.5*      MCV 90.5   MCH 28.6   MCHC 31.6*     Microbiology Results (last 7 days)       Procedure Component Value Units Date/Time    Blood Culture [[id number]]  (Normal) Collected: 11/25/23 1344    Order Status: Completed Specimen: Blood Updated: 11/27/23 1135     CULTURE, BLOOD ([facility]) No Growth At 48 Hours    Blood Culture [[id number]]  (Normal) Collected: 11/25/23 1344    Order Status: Completed Specimen: Blood Updated: 11/27/23 1135     CULTURE, BLOOD ([facility]) No Growth At 48 Hours    Urine culture [[id number]] Collected: 11/27/23 1047    Order Status: Sent Specimen: Urine Updated: 11/27/23 1120          Specimen (24h ago, onward)      None          Recent Labs   Lab 11/27/23  1047   PROTEINUA 2+*   BACTERIA Many*   LEUKOCYTESUR 2+*   UROBILINOGEN 0.2       Significant Imaging:  No new imaging    Assessment/Plan:    FABIO   CKD IIIa   -biopsy proven in April 2023 diabetic nodular sclerosis   -Nephrotic range proteinuria         -March 2023 - - 10.9 g        -Now 5.9 g   Anasarca   S/p Fall with resulting back pain. Remote back surgery   Cirrhosis of the liver  DM I onset age 14 with long standing history of noncompliance. Hgb A1c improving.      -Continue Lasix 60 mg IVP q8 hours in conjunction with IV Albumin.   Metolazone x1 today   -Follow results of urine culture. Defer treatment course to primary   -Defer iron replacement until results of urine culture return  -Start ergocalciferol 50,000 units q72 hours   -Monitor UOP in mL only. Document oral intake   -Daily weights to be done         SUSAN Zabala  Nephrology  [facility]

## 2023-11-29 LAB
ANION GAP SERPL CALC-SCNC: 9 MEQ/L
BUN SERPL-MCNC: 33.2 MG/DL (ref 8.9–20.6)
CALCIUM SERPL-MCNC: 6.7 MG/DL (ref 8.4–10.2)
CHLORIDE SERPL-SCNC: 114 MMOL/L (ref 98–107)
CO2 SERPL-SCNC: 20 MMOL/L (ref 22–29)
CREAT SERPL-MCNC: 2.64 MG/DL (ref 0.73–1.18)
CREAT/UREA NIT SERPL: 13
GFR SERPLBLD CREATININE-BSD FMLA CKD-EPI: 29 MLS/MIN/1.73/M2
GLUCOSE SERPL-MCNC: 68 MG/DL (ref 74–100)
POCT GLUCOSE: 115 MG/DL (ref 70–110)
POCT GLUCOSE: 135 MG/DL (ref 70–110)
POCT GLUCOSE: 37 MG/DL (ref 70–110)
POCT GLUCOSE: 53 MG/DL (ref 70–110)
POCT GLUCOSE: 65 MG/DL (ref 70–110)
POTASSIUM SERPL-SCNC: 4.4 MMOL/L (ref 3.5–5.1)
SODIUM SERPL-SCNC: 143 MMOL/L (ref 136–145)

## 2023-11-29 PROCEDURE — 63600175 PHARM REV CODE 636 W HCPCS: Performed by: INTERNAL MEDICINE

## 2023-11-29 PROCEDURE — 25000003 PHARM REV CODE 250: Performed by: NURSE PRACTITIONER

## 2023-11-29 PROCEDURE — 21400001 HC TELEMETRY ROOM

## 2023-11-29 PROCEDURE — 25000003 PHARM REV CODE 250: Performed by: INTERNAL MEDICINE

## 2023-11-29 PROCEDURE — P9047 ALBUMIN (HUMAN), 25%, 50ML: HCPCS | Mod: JZ,JG | Performed by: NURSE PRACTITIONER

## 2023-11-29 PROCEDURE — 80048 BASIC METABOLIC PNL TOTAL CA: CPT | Performed by: NURSE PRACTITIONER

## 2023-11-29 PROCEDURE — 63600175 PHARM REV CODE 636 W HCPCS: Mod: JZ,JG | Performed by: NURSE PRACTITIONER

## 2023-11-29 PROCEDURE — 63600175 PHARM REV CODE 636 W HCPCS: Performed by: NURSE PRACTITIONER

## 2023-11-29 RX ORDER — METOLAZONE 2.5 MG/1
10 TABLET ORAL ONCE
Status: COMPLETED | OUTPATIENT
Start: 2023-11-30 | End: 2023-11-30

## 2023-11-29 RX ORDER — ALBUMIN HUMAN 250 G/1000ML
12.5 SOLUTION INTRAVENOUS ONCE
Status: COMPLETED | OUTPATIENT
Start: 2023-11-29 | End: 2023-11-29

## 2023-11-29 RX ADMIN — MORPHINE SULFATE 2 MG: 4 INJECTION, SOLUTION INTRAMUSCULAR; INTRAVENOUS at 05:11

## 2023-11-29 RX ADMIN — MORPHINE SULFATE 2 MG: 4 INJECTION, SOLUTION INTRAMUSCULAR; INTRAVENOUS at 08:11

## 2023-11-29 RX ADMIN — METOLAZONE 5 MG: 2.5 TABLET ORAL at 08:11

## 2023-11-29 RX ADMIN — FUROSEMIDE 60 MG: 10 INJECTION, SOLUTION INTRAMUSCULAR; INTRAVENOUS at 09:11

## 2023-11-29 RX ADMIN — ALBUMIN (HUMAN) 12.5 G: 12.5 SOLUTION INTRAVENOUS at 06:11

## 2023-11-29 RX ADMIN — MICONAZOLE NITRATE: 20 POWDER TOPICAL at 09:11

## 2023-11-29 RX ADMIN — FUROSEMIDE 60 MG: 10 INJECTION, SOLUTION INTRAMUSCULAR; INTRAVENOUS at 01:11

## 2023-11-29 RX ADMIN — PANCRELIPASE 6 CAPSULE: 60000; 12000; 38000 CAPSULE, DELAYED RELEASE PELLETS ORAL at 03:11

## 2023-11-29 RX ADMIN — FUROSEMIDE 60 MG: 10 INJECTION, SOLUTION INTRAMUSCULAR; INTRAVENOUS at 06:11

## 2023-11-29 RX ADMIN — PANCRELIPASE 6 CAPSULE: 60000; 12000; 38000 CAPSULE, DELAYED RELEASE PELLETS ORAL at 09:11

## 2023-11-29 RX ADMIN — Medication: at 08:11

## 2023-11-29 RX ADMIN — LEVETIRACETAM 500 MG: 500 TABLET, FILM COATED ORAL at 03:11

## 2023-11-29 RX ADMIN — OXYCODONE HYDROCHLORIDE 10 MG: 5 TABLET ORAL at 07:11

## 2023-11-29 RX ADMIN — PANCRELIPASE 6 CAPSULE: 60000; 12000; 38000 CAPSULE, DELAYED RELEASE PELLETS ORAL at 08:11

## 2023-11-29 RX ADMIN — PREGABALIN 75 MG: 50 CAPSULE ORAL at 09:11

## 2023-11-29 RX ADMIN — SODIUM BICARBONATE 650 MG TABLET 1300 MG: at 08:11

## 2023-11-29 RX ADMIN — ALBUMIN (HUMAN) 12.5 G: 12.5 SOLUTION INTRAVENOUS at 10:11

## 2023-11-29 RX ADMIN — MORPHINE SULFATE 2 MG: 4 INJECTION, SOLUTION INTRAMUSCULAR; INTRAVENOUS at 03:11

## 2023-11-29 RX ADMIN — ATENOLOL 50 MG: 50 TABLET ORAL at 09:11

## 2023-11-29 RX ADMIN — INSULIN DETEMIR 16 UNITS: 100 INJECTION, SOLUTION SUBCUTANEOUS at 09:11

## 2023-11-29 RX ADMIN — SODIUM BICARBONATE 650 MG TABLET 1300 MG: at 09:11

## 2023-11-29 RX ADMIN — ATENOLOL 50 MG: 50 TABLET ORAL at 08:11

## 2023-11-29 RX ADMIN — WHITE PETROLATUM: 1.75 OINTMENT TOPICAL at 08:11

## 2023-11-29 RX ADMIN — ENOXAPARIN SODIUM 40 MG: 40 INJECTION SUBCUTANEOUS at 04:11

## 2023-11-29 RX ADMIN — Medication: at 09:11

## 2023-11-29 RX ADMIN — LEVETIRACETAM 500 MG: 500 TABLET, FILM COATED ORAL at 08:11

## 2023-11-29 RX ADMIN — DOXAZOSIN 2 MG: 1 TABLET ORAL at 09:11

## 2023-11-29 RX ADMIN — MORPHINE SULFATE 2 MG: 4 INJECTION, SOLUTION INTRAMUSCULAR; INTRAVENOUS at 11:11

## 2023-11-29 RX ADMIN — WHITE PETROLATUM: 1.75 OINTMENT TOPICAL at 09:11

## 2023-11-29 RX ADMIN — DULOXETINE HYDROCHLORIDE 20 MG: 20 CAPSULE, DELAYED RELEASE ORAL at 09:11

## 2023-11-29 RX ADMIN — LEVETIRACETAM 500 MG: 500 TABLET, FILM COATED ORAL at 09:11

## 2023-11-29 RX ADMIN — DULOXETINE HYDROCHLORIDE 20 MG: 20 CAPSULE, DELAYED RELEASE ORAL at 08:11

## 2023-11-29 RX ADMIN — OXYCODONE HYDROCHLORIDE 10 MG: 5 TABLET ORAL at 01:11

## 2023-11-29 RX ADMIN — MICONAZOLE NITRATE: 20 POWDER TOPICAL at 08:11

## 2023-11-29 RX ADMIN — OXYCODONE HYDROCHLORIDE 10 MG: 5 TABLET ORAL at 09:11

## 2023-11-29 RX ADMIN — CEFTRIAXONE SODIUM 1 G: 1 INJECTION, POWDER, FOR SOLUTION INTRAMUSCULAR; INTRAVENOUS at 08:11

## 2023-11-29 NOTE — PROGRESS NOTES
[facility] - Observation Unit  Nephrology  Progress Note    Patient Name: Kamran Huerta  MRN: [id number]  Admission Date: 11/25/2023  Hospital Length of Stay: 4 days  Attending Provider: Asia Hodge MD   Primary Care Physician: Ailyn Reynolds MD  Principal Problem:<principal problem not specified>      Subjective:   HPI: This is a 48-year-old AAM seen by our service in April for nephrotic syndrome and FABIO. At that time he underwent renal biopsy revealing advanced diabetic nodular sclerosis. He was diuresed aggressively and discharged with Cr 1.8 and ability to ambulate in the trotter without oxygen or distress. Since that time he has undergone L BKA s/t gangrenous wound. He presented to the ED 11/25 s/p fall from wheelchair with resulting back pain. Neurosurgery undergoing further workup.   He was noted to have FABIO and marked hypervolemia for which nephrology has been consulted. He endorses straining to urinate which he attributes to penile and scrotal edema. Renal US without evidence of hydronephrosis but does reveal layering debris in the bladder. Unsure of dry weight    Interval History:   11/28 - urine output excellent via escobar catheter with TID IV lasix. Profoundly vitamin D deficient. Also iron deficient. UA suggestive of active infection with culture pending. He feels swelling in left arm and abdomen is somewhat improved     11/29 - UOP 2700 mL in the past 24 hours. Admit weight 127 kg. Current bed weight 122 kg.     Review of patient's allergies indicates:  No Known Allergies  Current Facility-Administered Medications   Medication Frequency    acetaminophen tablet 650 mg Q6H PRN    albumin human 25% bottle 12.5 g Once    aluminum-magnesium hydroxide-simethicone 200-200-20 mg/5 mL suspension 30 mL QID PRN    atenoloL tablet 50 mg BID    cefTRIAXone (ROCEPHIN) 1 g in dextrose 5 % in water (D5W) 100 mL IVPB (MB+) Q24H    dextrose 10% bolus 125 mL 125 mL PRN    dextrose 10% bolus 125  mL 125 mL PRN    dextrose 10% bolus 250 mL 250 mL PRN    dextrose 10% bolus 250 mL 250 mL PRN    doxazosin tablet 2 mg QHS    DULoxetine DR capsule 20 mg BID    enoxaparin injection 40 mg Daily    ergocalciferol capsule 50,000 Units Q72H    furosemide injection 60 mg Q8H    glucagon (human recombinant) injection 1 mg PRN    glucose chewable tablet 16 g PRN    glucose chewable tablet 24 g PRN    hydrALAZINE injection 10 mg Q4H PRN    insulin aspart U-100 injection 0-5 Units QID (AC + HS) PRN    insulin detemir U-100 injection 16 Units QHS    labetaloL injection 10 mg Q2H PRN    levETIRAcetam tablet 500 mg TID    lipase-protease-amylase 12,000-38,000-60,000 units per capsule 6 capsule TID    melatonin tablet 6 mg Nightly PRN    [START ON 11/30/2023] metOLazone tablet 10 mg Once    miconazole NITRATE 2 % top powder BID    morphine injection 2 mg Q4H PRN    naloxone 0.4 mg/mL injection 0.02 mg PRN    ondansetron injection 4 mg Q4H PRN    oxyCODONE immediate release tablet 10 mg Q4H PRN    perflutren lipid microspheres injection 1.4 mL Once    polyethylene glycol packet 17 g BID PRN    pregabalin capsule 75 mg QHS    prochlorperazine injection Soln 5 mg Q6H PRN    simethicone chewable tablet 80 mg QID PRN    sodium bicarbonate tablet 1,300 mg BID    white petrolatum 41 % ointment PRN    white petrolatum 41 % ointment BID    zinc oxide-cod liver oil 40 % paste BID       Objective:     Vital Signs (Most Recent):  Temp: 98.2 °F (36.8 °C) (11/29/23 1433)  Pulse: 76 (11/29/23 1433)  Resp: 18 (11/29/23 1108)  BP: (!) 185/92 (11/29/23 1433)  SpO2: 100 % (11/29/23 1433) Vital Signs (24h Range):  Temp:  [97.6 °F (36.4 °C)-98.4 °F (36.9 °C)] 98.2 °F (36.8 °C)  Pulse:  [68-77] 76  Resp:  [18] 18  SpO2:  [98 %-100 %] 100 %  BP: (135-185)/(55-92) 185/92     Weight: 127 kg (280 lb) (11/26/23 1128)  Body mass index is 42.93 kg/m².  Body surface area is 2.46 meters squared.    I/O last 3 completed shifts:  In: 239.2 [P.O.:150;  I.V.:89.2]  Out: 4700 [Urine:4700]    Physical Exam  Constitutional:       General: He is not in acute distress.     Appearance: He is ill-appearing.   HENT:      Head: Atraumatic.      Nose: Nose normal.      Mouth/Throat:      Mouth: Mucous membranes are moist.   Eyes:      Extraocular Movements: Extraocular movements intact.      Conjunctiva/sclera: Conjunctivae normal.   Cardiovascular:      Rate and Rhythm: Normal rate and regular rhythm.      Pulses: Normal pulses.   Pulmonary:      Effort: Pulmonary effort is normal.      Breath sounds: Normal breath sounds.   Abdominal:      General: There is distension.      Palpations: Abdomen is soft.      Comments: Distended with ascites   Genitourinary:     Comments: Urinary catheter with light yellow clear urine   Musculoskeletal:         General: Swelling present.      Cervical back: Normal range of motion and neck supple.      Comments: 4+ pitting anasarca, L BKA stump covered in dressing, some softening of extremities noted    Skin:     General: Skin is warm and dry.   Neurological:      Mental Status: He is alert and oriented to person, place, and time.         Significant Labs:sureBMP:   Recent Labs   Lab 11/27/23  0401 11/28/23  0420 11/29/23  0819      < > 143   K 4.7   < > 4.4   CO2 19*   < > 20*   BUN 32.1*   < > 33.2*   CREATININE 2.80*   < > 2.64*   CALCIUM 6.3*   < > 6.7*   MG 1.30*  --   --     < > = values in this interval not displayed.       CBC:   Recent Labs   Lab 11/28/23  0420   WBC 9.89   RBC 3.04*   HGB 8.7*   HCT 27.5*      MCV 90.5   MCH 28.6   MCHC 31.6*       Microbiology Results (last 7 days)       Procedure Component Value Units Date/Time    Blood Culture [[id number]]  (Normal) Collected: 11/25/23 1344    Order Status: Completed Specimen: Blood Updated: 11/29/23 1400     CULTURE, BLOOD ([facility]) No Growth At 24 Hours    Blood Culture [[id number]]  (Normal) Collected: 11/25/23 1344    Order Status: Completed Specimen: Blood Updated:  11/29/23 1400     CULTURE, BLOOD ([facility]) No Growth At 24 Hours    Urine culture [[id number]]  (Abnormal) Collected: 11/27/23 1047    Order Status: Completed Specimen: Urine Updated: 11/29/23 0847     Urine Culture >/= 100,000 colonies/ml Gram-negative Rods          Specimen (24h ago, onward)      None          Recent Labs   Lab 11/27/23  1047   PROTEINUA 2+*   BACTERIA Many*   LEUKOCYTESUR 2+*   UROBILINOGEN 0.2         Significant Imaging:  No new imaging    Assessment/Plan:   FABIO s/t acute infection and hypervolemia   CKD IIIa   -biopsy proven in April 2023 diabetic nodular sclerosis   -Nephrotic range proteinuria         -March 2023 - - 10.9 g        -Now 5.9 g   Anasarca   S/p Fall with resulting back pain. Remote back surgery   Cirrhosis of the liver  DM I onset age 14 with long standing history of noncompliance. Hgb A1c improving.      -Continue Lasix 60 mg IVP q8 hours in conjunction with IV Albumin.   -Metolazone 10mg once tomorrow morning   -Follow results of urine culture. Rocephin Day #1  -Defer iron replacement until UTI treatment completed.   -Continue ergocalciferol 50,000 units q72 hours           SUSAN Zabala  Nephrology  [facility] - [facility]ation [facility]

## 2023-11-29 NOTE — PROGRESS NOTES
[facility] Hospital Medicine Progress Note        Chief Complaint: Inpatient Follow-up for     HPI: 48 y.o. male who PMH includes CHF, chronic back pain wheelchair-bound, DM type 2, HTN, seizures, CVA, CKD stage 3, alcoholic liver disease with chronic pancreatitis, presents to the ED at [facility] on 11/25/2023 with a primary complaint of weakness, dizziness lightheadedness and fall out if his wheelchair striking his mouth; denies any LOC.  PT had back surgery years ago and reports has been wheelchair bound since then. No reports of seizures.  No CP, SOB, N/V/D, fever, chills cough congestion or any sick contacts. Labs reviewed demonstrated WBC 14.42, HH 12.2/37.2, sed rate 93 , CO2 18 Bun 25.7, Creat 2.31, glucose 52 Mg+ 1.0, , AST 36 reports of chest pain, CRP shortness a breath, nausea, vomiting, diarrhea5, fever., [id number], cough, congestion, or any sick, contacts. B no reportsP of chest pain, 109.7; other indicis unremarkable. CXR impression reviewed demonstrated  no acute cardiopulmonary abnormality. CT of head without contrast impression reviewed demonstrated no appreciable acute intracranial abnormality. Ct maxillofacial without contrast impression reviewed demonstrated no appreciate acute traumatic osseous abnormality, bilateral mastoid effusions. CT cervical spine without contrast impression reviewed demonstrated no appreciable acute osseous abnormality by CT evaluation, degenerative changes at the cervical spine. CT thoracic spine without contrast impression reviewed demonstrated no acute osseous abnormality. CT of lumbar spine without contrast impression reviewed demonstrated no appreciable fracture or acute osseous abnormality, severe degenerative change at L5-S1 progressed, endplate sclerosis disc space narrowing and osseous erosions which may be degenerative or infectious/inflammatory, anasarca.   Initial VS /105 P 75 R 13 T 98.7F O2 saturation 98% on room  air. Pt received multiple doses of pain medication , D50 for hypoglycemia, 40 mg lasix,  and magnesium rider in the ED. Neurosurgery services consulted. Pt awaiting MRI. Pt is admitted to hospital medicine services for further managemen    Interval Hx:    Patient seen and examined on Lasix and albumin Case was discussed with patient's nurse and  on the floor.    Objective/physical exam:  General: In no acute distress, afebrile  Chest: Clear to auscultation bilaterally  Heart: RRR, +S1, S2, no appreciable murmur  Abdomen: Soft, nontender, BS +  MSK: Warm,  3+ edema in bilateral lower extremity  Neurologic: Alert and oriented   VITAL SIGNS: 24 HRS MIN & MAX LAST   Temp  Min: 97.6 °F (36.4 °C)  Max: 98.4 °F (36.9 °C) 98.4 °F (36.9 °C)   BP  Min: 135/69  Max: 158/89 135/69   Pulse  Min: 68  Max: 77  68   Resp  Min: 18  Max: 18 18   SpO2  Min: 98 %  Max: 100 % 99 %     I have reviewed the following labs:  Recent Labs   Lab 11/26/23  0555 11/27/23  0401 11/28/23  0420   WBC 11.25 11.95* 9.89   RBC 3.59* 3.66* 3.04*   HGB 10.4* 10.7* 8.7*   HCT 32.7* 32.9* 27.5*   MCV 91.1 89.9 90.5   MCH 29.0 29.2 28.6   MCHC 31.8* 32.5* 31.6*   RDW 14.0 13.7 13.9    281 224   MPV 12.1* 11.9* 12.1*       Recent Labs   Lab 11/25/23  0316 11/26/23  0555 11/27/23  0401 11/28/23  0420 11/29/23  0819    143 139 141 143   K 4.7 4.6 4.7 4.5 4.4   CO2 18* 21* 19* 20* 20*   BUN 25.7* 28.2* 32.1* 32.0* 33.2*   CREATININE 2.31* 2.60* 2.80* 2.73* 2.64*   CALCIUM 7.2* 6.6* 6.3* 6.6* 6.7*   MG 1.00* 1.00* 1.30*  --   --    ALBUMIN 1.4* 1.2* 1.3*  --   --    ALKPHOS 189* 157* 167*  --   --    ALT 41 30 31  --   --    AST 36* 30 26  --   --    BILITOT 0.2 0.3 0.2  --   --        Microbiology Results (last 7 days)       Procedure Component Value Units Date/Time    Urine culture [[id number]]  (Abnormal) Collected: 11/27/23 1047    Order Status: Completed Specimen: Urine Updated: 11/29/23 0827     Urine Culture >/= 100,000  colonies/ml Gram-negative Rods    Blood Culture [[id number]]  (Normal) Collected: 11/25/23 1344    Order Status: Completed Specimen: Blood Updated: 11/28/23 1345     CULTURE, BLOOD ([facility]) No Growth At 72 Hours    Blood Culture [[id number]]  (Normal) Collected: 11/25/23 1344    Order Status: Completed Specimen: Blood Updated: 11/28/23 1344     CULTURE, BLOOD ([facility]) No Growth At 72 Hours             See below for Radiology    Scheduled Med:   atenoloL  50 mg Oral BID    cefTRIAXone (ROCEPHIN) IVPB  1 g Intravenous Q24H    doxazosin  2 mg Oral QHS    DULoxetine  20 mg Oral BID    enoxparin  40 mg Subcutaneous Daily    ergocalciferol  50,000 Units Oral Q72H    furosemide (LASIX) injection  60 mg Intravenous Q8H    insulin detemir U-100  16 Units Subcutaneous QHS    levETIRAcetam  500 mg Oral TID    lipase-protease-amylase 12,000-38,000-60,000 units  6 capsule Oral TID    metOLazone  5 mg Oral Daily    miconazole NITRATE 2 %   Topical (Top) BID    perflutren lipid microspheres  1.4 mL Intravenous Once    pregabalin  75 mg Oral QHS    sodium bicarbonate  1,300 mg Oral BID    white petrolatum   Topical (Top) BID    zinc oxide-cod liver oil   Topical (Top) BID      Continuous Infusions:     PRN Meds:  acetaminophen, aluminum-magnesium hydroxide-simethicone, dextrose 10%, dextrose 10%, dextrose 10%, dextrose 10%, glucagon (human recombinant), glucose, glucose, hydrALAZINE, insulin aspart U-100, labetaloL, melatonin, morphine, naloxone, ondansetron, oxyCODONE, polyethylene glycol, prochlorperazine, simethicone, white petrolatum     Assessment/Plan:  Acute on chronic lower back pain- intractable- suspected osteomyelitis  Dizziness, Fall- from wheel chair  Hypoglycemia- recurrent-resolved  Acute on chronic kidney disease stage III  Anasarca with volume overload  with peripheral edema  Hypertensive urgency at admission-resolving     HX: Wheelchair-bound, history of CVA with residual left-sided deficits, left BKA, chronic alcoholic  liver disease, chronic seizures, T2 dm A1c 5.5     Plan:  Creatinine continues to improve continue with IV Lasix and albumin  Diuresis as per Nephrology   Awaiting MRI of the L-spine neurosurgery consulted continue with pain control   Continue with other home medications that includes atenolol, doxazosin, duloxetine, Levemir 16 units, Keppra, losartan, metolazone, pregabalin  Started on Rocephin for possible UTI awaiting culture results  VTE prophylaxis: lovenox    Patient condition:  Stable/Fair/Guarded/ Serious/ Critical    Anticipated discharge and Disposition:         All diagnosis and differential diagnosis have been reviewed; assessment and plan has been documented; I have personally reviewed the labs and test results that are presently available; I have reviewed the patients medication list; I have reviewed the consulting providers response and recommendations. I have reviewed or attempted to review medical records based upon their availability    All of the patient's questions have been  addressed and answered. Patient's is agreeable to the above stated plan. I will continue to monitor closely and make adjustments to medical management as needed.  _____________________________________________________________________    Nutrition Status:    Radiology:  I have personally reviewed the following imaging and agree with the radiologist.     Echo    Left Ventricle: The left ventricle is normal in size. Increased wall   thickness. There is moderate concentric hypertrophy. Normal wall motion.   There is normal systolic function with a visually estimated ejection   fraction of 60 - 65%. There is normal diastolic function.    Right Ventricle: Normal right ventricular cavity size. Systolic   function is normal.    Left Atrium: Left atrium is mildly dilated.    Mitral Valve: There is trace regurgitation.    Tricuspid Valve: There is trace regurgitation.      Asia Hodge MD   11/29/2023

## 2023-11-30 LAB
ALBUMIN SERPL-MCNC: 1.7 G/DL (ref 3.5–5)
ALBUMIN/GLOB SERPL: 0.6 RATIO (ref 1.1–2)
ALP SERPL-CCNC: 108 UNIT/L (ref 40–150)
ALT SERPL-CCNC: 15 UNIT/L (ref 0–55)
AST SERPL-CCNC: 16 UNIT/L (ref 5–34)
BILIRUB SERPL-MCNC: 0.3 MG/DL
BUN SERPL-MCNC: 36.3 MG/DL (ref 8.9–20.6)
CALCIUM SERPL-MCNC: 7.2 MG/DL (ref 8.4–10.2)
CHLORIDE SERPL-SCNC: 115 MMOL/L (ref 98–107)
CO2 SERPL-SCNC: 20 MMOL/L (ref 22–29)
CREAT SERPL-MCNC: 2.53 MG/DL (ref 0.73–1.18)
GFR SERPLBLD CREATININE-BSD FMLA CKD-EPI: 31 MLS/MIN/1.73/M2
GLOBULIN SER-MCNC: 3 GM/DL (ref 2.4–3.5)
GLUCOSE SERPL-MCNC: 126 MG/DL (ref 74–100)
POCT GLUCOSE: 131 MG/DL (ref 70–110)
POCT GLUCOSE: 180 MG/DL (ref 70–110)
POCT GLUCOSE: 235 MG/DL (ref 70–110)
POCT GLUCOSE: 251 MG/DL (ref 70–110)
POTASSIUM SERPL-SCNC: 4.6 MMOL/L (ref 3.5–5.1)
PROT SERPL-MCNC: 4.7 GM/DL (ref 6.4–8.3)
SODIUM SERPL-SCNC: 142 MMOL/L (ref 136–145)

## 2023-11-30 PROCEDURE — 63600175 PHARM REV CODE 636 W HCPCS: Performed by: NURSE PRACTITIONER

## 2023-11-30 PROCEDURE — 21400001 HC TELEMETRY ROOM

## 2023-11-30 PROCEDURE — 97110 THERAPEUTIC EXERCISES: CPT | Mod: CO

## 2023-11-30 PROCEDURE — 63600175 PHARM REV CODE 636 W HCPCS: Performed by: INTERNAL MEDICINE

## 2023-11-30 PROCEDURE — 25000003 PHARM REV CODE 250: Performed by: INTERNAL MEDICINE

## 2023-11-30 PROCEDURE — 25000003 PHARM REV CODE 250: Performed by: NURSE PRACTITIONER

## 2023-11-30 PROCEDURE — 80053 COMPREHEN METABOLIC PANEL: CPT | Performed by: NURSE PRACTITIONER

## 2023-11-30 PROCEDURE — 97530 THERAPEUTIC ACTIVITIES: CPT | Mod: CQ

## 2023-11-30 RX ORDER — METOLAZONE 2.5 MG/1
5 TABLET ORAL DAILY
Status: DISCONTINUED | OUTPATIENT
Start: 2023-12-01 | End: 2023-12-04

## 2023-11-30 RX ORDER — TORSEMIDE 20 MG/1
20 TABLET ORAL DAILY
Status: DISCONTINUED | OUTPATIENT
Start: 2023-12-01 | End: 2023-12-01

## 2023-11-30 RX ORDER — TORSEMIDE 20 MG/1
40 TABLET ORAL 2 TIMES DAILY
Status: DISCONTINUED | OUTPATIENT
Start: 2023-11-30 | End: 2023-11-30

## 2023-11-30 RX ORDER — METOPROLOL TARTRATE 25 MG/1
25 TABLET, FILM COATED ORAL 2 TIMES DAILY
Status: DISCONTINUED | OUTPATIENT
Start: 2023-11-30 | End: 2023-11-30

## 2023-11-30 RX ORDER — TORSEMIDE 20 MG/1
40 TABLET ORAL DAILY
Status: DISCONTINUED | OUTPATIENT
Start: 2023-12-01 | End: 2023-12-01

## 2023-11-30 RX ORDER — NIFEDIPINE 30 MG/1
90 TABLET, EXTENDED RELEASE ORAL DAILY
Status: DISCONTINUED | OUTPATIENT
Start: 2023-11-30 | End: 2023-12-22 | Stop reason: HOSPADM

## 2023-11-30 RX ORDER — CEFDINIR 300 MG/1
300 CAPSULE ORAL EVERY 12 HOURS
Status: DISCONTINUED | OUTPATIENT
Start: 2023-11-30 | End: 2023-12-04

## 2023-11-30 RX ORDER — TORSEMIDE 20 MG/1
20 TABLET ORAL 2 TIMES DAILY
Status: DISCONTINUED | OUTPATIENT
Start: 2023-11-30 | End: 2023-11-30

## 2023-11-30 RX ADMIN — PANCRELIPASE 6 CAPSULE: 60000; 12000; 38000 CAPSULE, DELAYED RELEASE PELLETS ORAL at 08:11

## 2023-11-30 RX ADMIN — SODIUM BICARBONATE 650 MG TABLET 1300 MG: at 08:11

## 2023-11-30 RX ADMIN — MORPHINE SULFATE 2 MG: 4 INJECTION, SOLUTION INTRAMUSCULAR; INTRAVENOUS at 09:11

## 2023-11-30 RX ADMIN — Medication: at 08:11

## 2023-11-30 RX ADMIN — DULOXETINE HYDROCHLORIDE 20 MG: 20 CAPSULE, DELAYED RELEASE ORAL at 08:11

## 2023-11-30 RX ADMIN — ATENOLOL 50 MG: 50 TABLET ORAL at 08:11

## 2023-11-30 RX ADMIN — OXYCODONE HYDROCHLORIDE 10 MG: 5 TABLET ORAL at 07:11

## 2023-11-30 RX ADMIN — LEVETIRACETAM 500 MG: 500 TABLET, FILM COATED ORAL at 02:11

## 2023-11-30 RX ADMIN — Medication: at 09:11

## 2023-11-30 RX ADMIN — NIFEDIPINE 90 MG: 30 TABLET, FILM COATED, EXTENDED RELEASE ORAL at 02:11

## 2023-11-30 RX ADMIN — WHITE PETROLATUM: 1.75 OINTMENT TOPICAL at 09:11

## 2023-11-30 RX ADMIN — INSULIN ASPART 2 UNITS: 100 INJECTION, SOLUTION INTRAVENOUS; SUBCUTANEOUS at 04:11

## 2023-11-30 RX ADMIN — DOXAZOSIN 2 MG: 1 TABLET ORAL at 08:11

## 2023-11-30 RX ADMIN — FUROSEMIDE 60 MG: 10 INJECTION, SOLUTION INTRAMUSCULAR; INTRAVENOUS at 02:11

## 2023-11-30 RX ADMIN — MORPHINE SULFATE 2 MG: 4 INJECTION, SOLUTION INTRAMUSCULAR; INTRAVENOUS at 12:11

## 2023-11-30 RX ADMIN — MICONAZOLE NITRATE: 20 POWDER TOPICAL at 09:11

## 2023-11-30 RX ADMIN — LEVETIRACETAM 500 MG: 500 TABLET, FILM COATED ORAL at 08:11

## 2023-11-30 RX ADMIN — PREGABALIN 75 MG: 50 CAPSULE ORAL at 08:11

## 2023-11-30 RX ADMIN — CEFDINIR 300 MG: 300 CAPSULE ORAL at 08:11

## 2023-11-30 RX ADMIN — OXYCODONE HYDROCHLORIDE 10 MG: 5 TABLET ORAL at 05:11

## 2023-11-30 RX ADMIN — PANCRELIPASE 6 CAPSULE: 60000; 12000; 38000 CAPSULE, DELAYED RELEASE PELLETS ORAL at 02:11

## 2023-11-30 RX ADMIN — ENOXAPARIN SODIUM 40 MG: 40 INJECTION SUBCUTANEOUS at 04:11

## 2023-11-30 RX ADMIN — MICONAZOLE NITRATE: 20 POWDER TOPICAL at 08:11

## 2023-11-30 RX ADMIN — MORPHINE SULFATE 2 MG: 4 INJECTION, SOLUTION INTRAMUSCULAR; INTRAVENOUS at 08:11

## 2023-11-30 RX ADMIN — MORPHINE SULFATE 2 MG: 4 INJECTION, SOLUTION INTRAMUSCULAR; INTRAVENOUS at 02:11

## 2023-11-30 RX ADMIN — INSULIN ASPART 1 UNITS: 100 INJECTION, SOLUTION INTRAVENOUS; SUBCUTANEOUS at 09:11

## 2023-11-30 RX ADMIN — METOLAZONE 10 MG: 2.5 TABLET ORAL at 07:11

## 2023-11-30 RX ADMIN — Medication 6 MG: at 08:11

## 2023-11-30 RX ADMIN — FUROSEMIDE 60 MG: 10 INJECTION, SOLUTION INTRAMUSCULAR; INTRAVENOUS at 05:11

## 2023-11-30 RX ADMIN — INSULIN DETEMIR 16 UNITS: 100 INJECTION, SOLUTION SUBCUTANEOUS at 09:11

## 2023-11-30 RX ADMIN — WHITE PETROLATUM: 1.75 OINTMENT TOPICAL at 08:11

## 2023-11-30 RX ADMIN — CEFTRIAXONE SODIUM 1 G: 1 INJECTION, POWDER, FOR SOLUTION INTRAMUSCULAR; INTRAVENOUS at 08:11

## 2023-11-30 RX ADMIN — MORPHINE SULFATE 2 MG: 4 INJECTION, SOLUTION INTRAMUSCULAR; INTRAVENOUS at 04:11

## 2023-11-30 NOTE — PT/OT/SLP PROGRESS
Physical Therapy Treatment    Patient Name:  Kamran Huerta   MRN:  [id number]    Recommendations:     Discharge therapy intensity: Moderate Intensity Therapy   Discharge Equipment Recommendations: lift device  Barriers to discharge: Decreased caregiver support and Impaired mobility    Assessment:     Kamran Huerta is a 48 y.o. male admitted with a medical diagnosis of <principal problem not specified>.  He presents with the following impairments/functional limitations: weakness, impaired endurance, impaired self care skills, impaired functional mobility, impaired balance, decreased upper extremity function, decreased lower extremity function, pain.    Rehab Prognosis: Fair; patient would benefit from acute skilled PT services to address these deficits and reach maximum level of function.    Recent Surgery: * No surgery found *      Plan:     During this hospitalization, patient to be seen 3 x/week to address the identified rehab impairments via gait training, therapeutic activities, therapeutic exercises, neuromuscular re-education and progress toward the following goals:    Plan of Care Expires:  12/04/23    Subjective     Chief Complaint: I'm weak.    Objective:     Communicated with nurse prior to session.  Patient found supine with   upon PT entry to room.     General Precautions: Standard, fall  Orthopedic Precautions: N/A  Braces: N/A  Respiratory Status: Room air  Blood Pressure: 81/42,,216/120, 222/118, not sure if accurate and I used 2 different machines. Ava SKY aware.   Skin Integrity:         Functional Mobility:  Mod assist bed mobility improve from max x 2. SBA sit balance and max assist x 2 SB tranfer to bedside chair. Pt on gurjit pad and instructed nursing staff to assist BTB via gurjit lift.     Education Provided:  Role and goals of PT, transfer training, bed mobility, gait training, balance training, safety awareness, assistive device, strengthening exercises, and importance of  participating in PT to return to PLOF.      GOALS:   Multidisciplinary Problems       Physical Therapy Goals          Problem: Physical Therapy    Goal Priority Disciplines Outcome Goal Variances Interventions   Physical Therapy Goal     PT, PT/OT Ongoing, Progressing     Description: Pt will improve functional independence by performing:    Bed mobility: max A  Sit to stand: max A with rolling walker  Bed to chair t/f: max A with Stand pivot with rolling walker                       Time Tracking:     Billable Minutes: Therapeutic Activity 30    Treatment Type: Treatment  PT/PTA: PTA     Number of PTA visits since last PT visit: 1 11/30/2023

## 2023-11-30 NOTE — PT/OT/SLP PROGRESS
Occupational Therapy   Treatment    Name: Kamran Huerta  MRN: [id number]    Recommendations:     Recommended therapy intensity at discharge: Moderate Intensity Therapy   Discharge Equipment Recommendations:  lift device  Barriers to discharge:       Assessment:     Kamran Huerta is a 48 y.o. male with a medical diagnosis of fall, physical deconditioning, FABIO.  Performance deficits affecting function are weakness, impaired endurance, impaired self care skills, impaired functional mobility, decreased lower extremity function, decreased upper extremity function. Session limited 2/2 elevated BP. Agreeable to seated therex. Pt on gurjit pad in chair, stated he wants to stay up longer. RN aware to gurjit pt back when ready.      Rehab Prognosis:  Fair; patient would benefit from acute skilled OT services to address these deficits and reach maximum level of function.       Plan:     Patient to be seen 3 x/week to address the above listed problems via self-care/home management, therapeutic activities, therapeutic exercises  Plan of Care Expires: 12/25/23  Plan of Care Reviewed with: patient    Subjective     Pain/Comfort:  Pain Rating 1: 0/10    Objective:     Communicated with: RN prior to session.  Patient found up in chair with peripheral IV upon OT entry to room.    General Precautions: Standard, fall    Orthopedic Precautions:N/A  Braces: N/A  Respiratory Status: Room air  Vital Signs: Blood Pressure: 192/125 on L arm, 195/101 on R arm. RN aware and present in room to give meds      Occupational Performance:     Therapeutic Exercise:  Completed UE therex seated in chair. 1x10 reps of shoulder flex/ext, elbow flex/ext, chest pulls, horizontal abd/add. Rest breaks needed 2/2 fatigue.     Therapeutic Positioning    OT interventions performed during the course of today's session in an effort to prevent and/or reduce acquired pressure injuries:   Education was provided on benefits of and recommendations for  therapeutic positioning      Patient Education:  Patient provided with verbal education education regarding OT role/goals/POC.  Understanding was verbalized.      Patient left up in chair with all lines intact, call button in reach, and gurjit pad under pt    GOALS:   Multidisciplinary Problems       Occupational Therapy Goals          Problem: Occupational Therapy    Goal Priority Disciplines Outcome Interventions   Occupational Therapy Goal     OT, PT/OT Ongoing, Progressing    Description: Goals to be met by: 12/25/23     Patient will increase functional independence with ADLs by performing:  LTG: Pt will perform basic ADLs and ADL transfers with Modified independence using LRAD by discharge.    STG: to be met by 12/25/23    Pt will complete grooming sitting with  with SBA.  Pt will complete UB dressing with SBA.  Pt will complete LB dressing with mod assist using LRAD.  Pt will complete toileting with mod assist using LRAD.  Pt will complete functional mobility to/from bedside commode and toilet transfer with max assist using LRAD.                              Time Tracking:     OT Date of Treatment: 11/30/23  OT Start Time: 1414  OT Stop Time: 1437  OT Total Time (min): 23 min    Billable Minutes:Therapeutic Exercise 23    OT/ARIANA: ARIANA     Number of ARIANA visits since last OT visit: 1    11/30/2023

## 2023-11-30 NOTE — PROGRESS NOTES
[facility] Hospital Medicine Progress Note        Chief Complaint: Inpatient Follow-up for     HPI: 48 y.o. male who PMH includes CHF, chronic back pain wheelchair-bound, DM type 2, HTN, seizures, CVA, CKD stage 3, alcoholic liver disease with chronic pancreatitis, presents to the ED at [facility] on 11/25/2023 with a primary complaint of weakness, dizziness lightheadedness and fall out if his wheelchair striking his mouth; denies any LOC.  PT had back surgery years ago and reports has been wheelchair bound since then. No reports of seizures.  No CP, SOB, N/V/D, fever, chills cough congestion or any sick contacts. Labs reviewed demonstrated WBC 14.42, HH 12.2/37.2, sed rate 93 , CO2 18 Bun 25.7, Creat 2.31, glucose 52 Mg+ 1.0, , AST 36 reports of chest pain, CRP shortness a breath, nausea, vomiting, diarrhea5, fever., [id number], cough, congestion, or any sick, contacts. B no reportsP of chest pain, 109.7; other indicis unremarkable. CXR impression reviewed demonstrated  no acute cardiopulmonary abnormality. CT of head without contrast impression reviewed demonstrated no appreciable acute intracranial abnormality. Ct maxillofacial without contrast impression reviewed demonstrated no appreciate acute traumatic osseous abnormality, bilateral mastoid effusions. CT cervical spine without contrast impression reviewed demonstrated no appreciable acute osseous abnormality by CT evaluation, degenerative changes at the cervical spine. CT thoracic spine without contrast impression reviewed demonstrated no acute osseous abnormality. CT of lumbar spine without contrast impression reviewed demonstrated no appreciable fracture or acute osseous abnormality, severe degenerative change at L5-S1 progressed, endplate sclerosis disc space narrowing and osseous erosions which may be degenerative or infectious/inflammatory, anasarca.   Initial VS /105 P 75 R 13 T 98.7F O2 saturation 98% on room  air. Pt received multiple doses of pain medication , D50 for hypoglycemia, 40 mg lasix,  and magnesium rider in the ED. Neurosurgery services consulted. Pt awaiting MRI. Pt is admitted to hospital medicine services for further managemen    Interval Hx:    Patient seen and examined on Lasix and albumin blood pressure was significantly elevated  Objective/physical exam:  General: In no acute distress, afebrile  Chest: Clear to auscultation bilaterally  Heart: RRR, +S1, S2, no appreciable murmur  Abdomen: Soft, nontender, BS +  MSK: Warm,  3+ edema in bilateral lower extremity  Neurologic: Alert and oriented   VITAL SIGNS: 24 HRS MIN & MAX LAST   Temp  Min: 97.5 °F (36.4 °C)  Max: 98.4 °F (36.9 °C) 97.7 °F (36.5 °C)   BP  Min: 135/69  Max: 197/100 (!) 197/100   Pulse  Min: 66  Max: 82  66   Resp  Min: 16  Max: 20 18   SpO2  Min: 97 %  Max: 100 % 98 %     I have reviewed the following labs:  Recent Labs   Lab 11/26/23  0555 11/27/23  0401 11/28/23  0420   WBC 11.25 11.95* 9.89   RBC 3.59* 3.66* 3.04*   HGB 10.4* 10.7* 8.7*   HCT 32.7* 32.9* 27.5*   MCV 91.1 89.9 90.5   MCH 29.0 29.2 28.6   MCHC 31.8* 32.5* 31.6*   RDW 14.0 13.7 13.9    281 224   MPV 12.1* 11.9* 12.1*       Recent Labs   Lab 11/25/23  0316 11/26/23  0555 11/27/23  0401 11/28/23  0420 11/29/23  0819 11/30/23  0408    143 139 141 143 142   K 4.7 4.6 4.7 4.5 4.4 4.6   CO2 18* 21* 19* 20* 20* 20*   BUN 25.7* 28.2* 32.1* 32.0* 33.2* 36.3*   CREATININE 2.31* 2.60* 2.80* 2.73* 2.64* 2.53*   CALCIUM 7.2* 6.6* 6.3* 6.6* 6.7* 7.2*   MG 1.00* 1.00* 1.30*  --   --   --    ALBUMIN 1.4* 1.2* 1.3*  --   --  1.7*   ALKPHOS 189* 157* 167*  --   --  108   ALT 41 30 31  --   --  15   AST 36* 30 26  --   --  16   BILITOT 0.2 0.3 0.2  --   --  0.3       Microbiology Results (last 7 days)       Procedure Component Value Units Date/Time    Urine culture [[id number]]  (Abnormal)  (Susceptibility) Collected: 11/27/23 1047    Order Status: Completed Specimen: Urine  Updated: 11/30/23 0726     Urine Culture >/= 100,000 colonies/ml Escherichia coli ESBL    Blood Culture [[id number]]  (Normal) Collected: 11/25/23 1344    Order Status: Completed Specimen: Blood Updated: 11/29/23 1400     CULTURE, BLOOD ([facility]) No Growth At 24 Hours    Blood Culture [[id number]]  (Normal) Collected: 11/25/23 1344    Order Status: Completed Specimen: Blood Updated: 11/29/23 1400     CULTURE, BLOOD ([facility]) No Growth At 24 Hours             See below for Radiology    Scheduled Med:   atenoloL  50 mg Oral BID    cefTRIAXone (ROCEPHIN) IVPB  1 g Intravenous Q24H    doxazosin  2 mg Oral QHS    DULoxetine  20 mg Oral BID    enoxparin  40 mg Subcutaneous Daily    ergocalciferol  50,000 Units Oral Q72H    furosemide (LASIX) injection  60 mg Intravenous Q8H    insulin detemir U-100  16 Units Subcutaneous QHS    levETIRAcetam  500 mg Oral TID    lipase-protease-amylase 12,000-38,000-60,000 units  6 capsule Oral TID    miconazole NITRATE 2 %   Topical (Top) BID    perflutren lipid microspheres  1.4 mL Intravenous Once    pregabalin  75 mg Oral QHS    sodium bicarbonate  1,300 mg Oral BID    white petrolatum   Topical (Top) BID    zinc oxide-cod liver oil   Topical (Top) BID      Continuous Infusions:     PRN Meds:  acetaminophen, aluminum-magnesium hydroxide-simethicone, dextrose 10%, dextrose 10%, dextrose 10%, dextrose 10%, glucagon (human recombinant), glucose, glucose, hydrALAZINE, insulin aspart U-100, labetaloL, melatonin, morphine, naloxone, ondansetron, oxyCODONE, polyethylene glycol, prochlorperazine, simethicone, white petrolatum     Assessment/Plan:  Acute on chronic lower back pain- intractable- suspected osteomyelitis  Dizziness, Fall- from wheel chair  Hypoglycemia- recurrent-resolved  Acute on chronic kidney disease stage III  Anasarca with volume overload  with peripheral edema  Hypertensive urgency at admission-resolving     HX: Wheelchair-bound, history of CVA with residual left-sided deficits,  left BKA, chronic alcoholic liver disease, chronic seizures, T2 dm A1c 5.5     Plan:  Blood pressure is significantly elevated on atenolol can not give ACE inhibitor or hydrochlorothiazide that was a home medication because of acute kidney injury so I will switch him to Norvasc  Creatinine continues to improve continue with IV Lasix and albumin  Diuresis as per Nephrology  Creatinine continues to improve this morning it is 2.53   Awaiting MRI of the L-spine neurosurgery consulted continue with pain control   Continue with other home medications that includes atenolol, doxazosin, duloxetine, Levemir 16 units, Keppra, losartan, metolazone, pregabalin  Started on Rocephin f urine cultures grew E coli and that is sensitive to Rocephin but I will switch to p.o. Omnicef     VTE prophylaxis: lovenox    Patient condition:  Stable/Fair/Guarded/ Serious/ Critical    Anticipated discharge and Disposition:         All diagnosis and differential diagnosis have been reviewed; assessment and plan has been documented; I have personally reviewed the labs and test results that are presently available; I have reviewed the patients medication list; I have reviewed the consulting providers response and recommendations. I have reviewed or attempted to review medical records based upon their availability    All of the patient's questions have been  addressed and answered. Patient's is agreeable to the above stated plan. I will continue to monitor closely and make adjustments to medical management as needed.  _____________________________________________________________________    Nutrition Status:    Radiology:  I have personally reviewed the following imaging and agree with the radiologist.     Echo    Left Ventricle: The left ventricle is normal in size. Increased wall   thickness. There is moderate concentric hypertrophy. Normal wall motion.   There is normal systolic function with a visually estimated ejection   fraction of 60 - 65%.  There is normal diastolic function.    Right Ventricle: Normal right ventricular cavity size. Systolic   function is normal.    Left Atrium: Left atrium is mildly dilated.    Mitral Valve: There is trace regurgitation.    Tricuspid Valve: There is trace regurgitation.      Asia Hodge MD   11/30/2023

## 2023-11-30 NOTE — PROGRESS NOTES
[facility]  Nephrology  Progress Note    Patient Name: Kamran Huerta  MRN: [id number]  Admission Date: 11/25/2023  Hospital Length of Stay: 5 days  Attending Provider: Asia Hodge MD   Primary Care Physician: Ailyn Reynolds MD  Principal Problem:<principal problem not specified>      Subjective:   HPI: This is a 48-year-old AAM seen by our service in April for nephrotic syndrome and FABIO. At that time he underwent renal biopsy revealing advanced diabetic nodular sclerosis. He was diuresed aggressively and discharged with Cr 1.8 and ability to ambulate in the trotter without oxygen or distress. Since that time he has undergone L BKA s/t gangrenous wound. He presented to the ED 11/25 s/p fall from wheelchair with resulting back pain. Neurosurgery undergoing further workup.   He was noted to have FABIO and marked hypervolemia for which nephrology has been consulted. He endorses straining to urinate which he attributes to penile and scrotal edema. Renal US without evidence of hydronephrosis but does reveal layering debris in the bladder. Unsure of dry weight    Interval History:   11/28 - urine output excellent via escobar catheter with TID IV lasix. Profoundly vitamin D deficient. Also iron deficient. UA suggestive of active infection with culture pending. He feels swelling in left arm and abdomen is somewhat improved     11/29 - UOP 2700 mL in the past 24 hours. Admit weight 127 kg. Current bed weight 122 kg.     11/30 - Diuresing well. UOP 4200 mL. He is sitting in chair watching TV on room air. No complaints.     Review of patient's allergies indicates:  No Known Allergies  Current Facility-Administered Medications   Medication Frequency    acetaminophen tablet 650 mg Q6H PRN    aluminum-magnesium hydroxide-simethicone 200-200-20 mg/5 mL suspension 30 mL QID PRN    atenoloL tablet 50 mg BID    cefdinir capsule 300 mg Q12H    dextrose 10% bolus 125 mL 125 mL PRN    dextrose 10%  bolus 125 mL 125 mL PRN    dextrose 10% bolus 250 mL 250 mL PRN    dextrose 10% bolus 250 mL 250 mL PRN    doxazosin tablet 2 mg QHS    DULoxetine DR capsule 20 mg BID    enoxaparin injection 40 mg Daily    ergocalciferol capsule 50,000 Units Q72H    glucagon (human recombinant) injection 1 mg PRN    glucose chewable tablet 16 g PRN    glucose chewable tablet 24 g PRN    hydrALAZINE injection 10 mg Q4H PRN    insulin aspart U-100 injection 0-5 Units QID (AC + HS) PRN    insulin detemir U-100 injection 16 Units QHS    labetaloL injection 10 mg Q2H PRN    levETIRAcetam tablet 500 mg TID    lipase-protease-amylase 12,000-38,000-60,000 units per capsule 6 capsule TID    melatonin tablet 6 mg Nightly PRN    miconazole NITRATE 2 % top powder BID    morphine injection 2 mg Q4H PRN    naloxone 0.4 mg/mL injection 0.02 mg PRN    NIFEdipine 24 hr tablet 90 mg Daily    ondansetron injection 4 mg Q4H PRN    oxyCODONE immediate release tablet 10 mg Q4H PRN    perflutren lipid microspheres injection 1.4 mL Once    polyethylene glycol packet 17 g BID PRN    pregabalin capsule 75 mg QHS    prochlorperazine injection Soln 5 mg Q6H PRN    simethicone chewable tablet 80 mg QID PRN    sodium bicarbonate tablet 1,300 mg BID    [START ON 12/1/2023] torsemide tablet 40 mg Daily    And    [START ON 12/1/2023] torsemide tablet 20 mg Daily    white petrolatum 41 % ointment PRN    white petrolatum 41 % ointment BID    zinc oxide-cod liver oil 40 % paste BID       Objective:     Vital Signs (Most Recent):  Temp: 97.9 °F (36.6 °C) (11/30/23 1141)  Pulse: 62 (11/30/23 1141)  Resp: 18 (11/30/23 1419)  BP: (!) 171/94 (11/30/23 1141)  SpO2: 99 % (11/30/23 1141) Vital Signs (24h Range):  Temp:  [97.5 °F (36.4 °C)-98.3 °F (36.8 °C)] 97.9 °F (36.6 °C)  Pulse:  [62-82] 62  Resp:  [16-20] 18  SpO2:  [97 %-100 %] 99 %  BP: (152-197)/() 171/94     Weight: 127 kg (280 lb) (11/26/23 1128)  Body mass index is 42.93 kg/m².  Body surface area is 2.46  meters squared.    I/O last 3 completed shifts:  In: 150 [P.O.:150]  Out: 6900 [Urine:6900]    Physical Exam  Constitutional:       General: He is not in acute distress.     Appearance: He is ill-appearing.   HENT:      Head: Atraumatic.      Nose: Nose normal.      Mouth/Throat:      Mouth: Mucous membranes are moist.   Eyes:      Extraocular Movements: Extraocular movements intact.      Conjunctiva/sclera: Conjunctivae normal.   Cardiovascular:      Rate and Rhythm: Normal rate and regular rhythm.      Pulses: Normal pulses.   Pulmonary:      Effort: Pulmonary effort is normal.      Breath sounds: Normal breath sounds.   Abdominal:      General: There is distension.      Palpations: Abdomen is soft.      Comments: Distended with ascites   Genitourinary:     Comments: Urinary catheter with light yellow clear urine   Musculoskeletal:         General: Swelling present.      Cervical back: Normal range of motion and neck supple.      Comments: 4+ pitting anasarca, L BKA stump covered in dressing, extremities have softened and showing some improvement    Skin:     General: Skin is warm and dry.   Neurological:      Mental Status: He is alert and oriented to person, place, and time.         Significant Labs:sureBMP:   Recent Labs   Lab 11/27/23  0401 11/28/23  0420 11/30/23  0408      < > 142   K 4.7   < > 4.6   CO2 19*   < > 20*   BUN 32.1*   < > 36.3*   CREATININE 2.80*   < > 2.53*   CALCIUM 6.3*   < > 7.2*   MG 1.30*  --   --     < > = values in this interval not displayed.       CBC:   Recent Labs   Lab 11/28/23  0420   WBC 9.89   RBC 3.04*   HGB 8.7*   HCT 27.5*      MCV 90.5   MCH 28.6   MCHC 31.6*       Microbiology Results (last 7 days)       Procedure Component Value Units Date/Time    Blood Culture [[id number]]  (Normal) Collected: 11/25/23 1344    Order Status: Completed Specimen: Blood Updated: 11/30/23 1401     CULTURE, BLOOD ([facility]) No Growth At 48 Hours    Blood Culture [[id number]]  (Normal)  Collected: 11/25/23 1344    Order Status: Completed Specimen: Blood Updated: 11/30/23 1401     CULTURE, BLOOD ([facility]) No Growth At 48 Hours    Urine culture [[id number]]  (Abnormal)  (Susceptibility) Collected: 11/27/23 1047    Order Status: Completed Specimen: Urine Updated: 11/30/23 0726     Urine Culture >/= 100,000 colonies/ml Escherichia coli ESBL          Specimen (24h ago, onward)      None          Recent Labs   Lab 11/27/23  1047   PROTEINUA 2+*   BACTERIA Many*   LEUKOCYTESUR 2+*   UROBILINOGEN 0.2         Significant Imaging:  No new imaging    Assessment/Plan:   FABIO s/t acute infection and hypervolemia   CKD IIIa   -biopsy proven in April 2023 diabetic nodular sclerosis   -Nephrotic range proteinuria         -March 2023 - - 10.9 g        -Now 5.9 g   Anasarca   S/p Fall with resulting back pain. Remote back surgery   Cirrhosis of the liver  DM I onset age 14 with long standing history of noncompliance. Hgb A1c improving.      -Trial oral diuretics with Torsemide and Metolazone   Antibiotics changed to Omnicef per primary   -Defer iron replacement until UTI treatment completed.   -Continue ergocalciferol 50,000 units q72 hours   -Ok to resume HCTZ, ACEi/ARB from renal standpoint.           SUSAN Zabala  Nephrology  [facility]

## 2023-12-01 PROBLEM — E44.0 MODERATE MALNUTRITION: Status: ACTIVE | Noted: 2023-12-01

## 2023-12-01 LAB
ANION GAP SERPL CALC-SCNC: 6 MEQ/L
BACTERIA BLD CULT: NORMAL
BACTERIA BLD CULT: NORMAL
BACTERIA UR CULT: ABNORMAL
BASOPHILS # BLD AUTO: 0.07 X10(3)/MCL
BASOPHILS NFR BLD AUTO: 0.8 %
BUN SERPL-MCNC: 37.8 MG/DL (ref 8.9–20.6)
CALCIUM SERPL-MCNC: 7.3 MG/DL (ref 8.4–10.2)
CHLORIDE SERPL-SCNC: 116 MMOL/L (ref 98–107)
CO2 SERPL-SCNC: 20 MMOL/L (ref 22–29)
CREAT SERPL-MCNC: 2.46 MG/DL (ref 0.73–1.18)
CREAT/UREA NIT SERPL: 15
EOSINOPHIL # BLD AUTO: 0.3 X10(3)/MCL (ref 0–0.9)
EOSINOPHIL NFR BLD AUTO: 3.2 %
ERYTHROCYTE [DISTWIDTH] IN BLOOD BY AUTOMATED COUNT: 13.7 % (ref 11.5–17)
GFR SERPLBLD CREATININE-BSD FMLA CKD-EPI: 32 MLS/MIN/1.73/M2
GLUCOSE SERPL-MCNC: 118 MG/DL (ref 74–100)
HCT VFR BLD AUTO: 26.6 % (ref 42–52)
HGB BLD-MCNC: 8.7 G/DL (ref 14–18)
IMM GRANULOCYTES # BLD AUTO: 0.03 X10(3)/MCL (ref 0–0.04)
IMM GRANULOCYTES NFR BLD AUTO: 0.3 %
LYMPHOCYTES # BLD AUTO: 2.41 X10(3)/MCL (ref 0.6–4.6)
LYMPHOCYTES NFR BLD AUTO: 25.9 %
MCH RBC QN AUTO: 28.9 PG (ref 27–31)
MCHC RBC AUTO-ENTMCNC: 32.7 G/DL (ref 33–36)
MCV RBC AUTO: 88.4 FL (ref 80–94)
MONOCYTES # BLD AUTO: 0.79 X10(3)/MCL (ref 0.1–1.3)
MONOCYTES NFR BLD AUTO: 8.5 %
NEUTROPHILS # BLD AUTO: 5.71 X10(3)/MCL (ref 2.1–9.2)
NEUTROPHILS NFR BLD AUTO: 61.3 %
NRBC BLD AUTO-RTO: 0 %
PLATELET # BLD AUTO: 231 X10(3)/MCL (ref 130–400)
PMV BLD AUTO: 11.7 FL (ref 7.4–10.4)
POCT GLUCOSE: 127 MG/DL (ref 70–110)
POCT GLUCOSE: 136 MG/DL (ref 70–110)
POCT GLUCOSE: 215 MG/DL (ref 70–110)
POTASSIUM SERPL-SCNC: 4.7 MMOL/L (ref 3.5–5.1)
RBC # BLD AUTO: 3.01 X10(6)/MCL (ref 4.7–6.1)
SODIUM SERPL-SCNC: 142 MMOL/L (ref 136–145)
WBC # SPEC AUTO: 9.31 X10(3)/MCL (ref 4.5–11.5)

## 2023-12-01 PROCEDURE — 27000207 HC ISOLATION

## 2023-12-01 PROCEDURE — 63600175 PHARM REV CODE 636 W HCPCS: Performed by: NURSE PRACTITIONER

## 2023-12-01 PROCEDURE — 25000003 PHARM REV CODE 250: Performed by: NURSE PRACTITIONER

## 2023-12-01 PROCEDURE — 80048 BASIC METABOLIC PNL TOTAL CA: CPT | Performed by: INTERNAL MEDICINE

## 2023-12-01 PROCEDURE — 25000003 PHARM REV CODE 250: Performed by: INTERNAL MEDICINE

## 2023-12-01 PROCEDURE — 36410 VNPNXR 3YR/> PHY/QHP DX/THER: CPT

## 2023-12-01 PROCEDURE — 85025 COMPLETE CBC W/AUTO DIFF WBC: CPT | Performed by: INTERNAL MEDICINE

## 2023-12-01 PROCEDURE — 21400001 HC TELEMETRY ROOM

## 2023-12-01 PROCEDURE — C1751 CATH, INF, PER/CENT/MIDLINE: HCPCS

## 2023-12-01 PROCEDURE — 63600175 PHARM REV CODE 636 W HCPCS: Performed by: INTERNAL MEDICINE

## 2023-12-01 PROCEDURE — P9047 ALBUMIN (HUMAN), 25%, 50ML: HCPCS | Mod: JZ,JG | Performed by: NURSE PRACTITIONER

## 2023-12-01 RX ORDER — ALBUMIN HUMAN 250 G/1000ML
12.5 SOLUTION INTRAVENOUS EVERY 12 HOURS
Status: COMPLETED | OUTPATIENT
Start: 2023-12-01 | End: 2023-12-01

## 2023-12-01 RX ADMIN — WHITE PETROLATUM: 1.75 OINTMENT TOPICAL at 09:12

## 2023-12-01 RX ADMIN — LEVETIRACETAM 500 MG: 500 TABLET, FILM COATED ORAL at 03:12

## 2023-12-01 RX ADMIN — DOXAZOSIN 2 MG: 1 TABLET ORAL at 08:12

## 2023-12-01 RX ADMIN — LEVETIRACETAM 500 MG: 500 TABLET, FILM COATED ORAL at 08:12

## 2023-12-01 RX ADMIN — PANCRELIPASE 6 CAPSULE: 60000; 12000; 38000 CAPSULE, DELAYED RELEASE PELLETS ORAL at 09:12

## 2023-12-01 RX ADMIN — MORPHINE SULFATE 2 MG: 4 INJECTION, SOLUTION INTRAMUSCULAR; INTRAVENOUS at 12:12

## 2023-12-01 RX ADMIN — ATENOLOL 50 MG: 50 TABLET ORAL at 09:12

## 2023-12-01 RX ADMIN — MICONAZOLE NITRATE: 20 POWDER TOPICAL at 09:12

## 2023-12-01 RX ADMIN — ATENOLOL 50 MG: 50 TABLET ORAL at 08:12

## 2023-12-01 RX ADMIN — SODIUM BICARBONATE 650 MG TABLET 1300 MG: at 08:12

## 2023-12-01 RX ADMIN — ALBUMIN (HUMAN) 12.5 G: 12.5 SOLUTION INTRAVENOUS at 08:12

## 2023-12-01 RX ADMIN — CEFDINIR 300 MG: 300 CAPSULE ORAL at 09:12

## 2023-12-01 RX ADMIN — DULOXETINE HYDROCHLORIDE 20 MG: 20 CAPSULE, DELAYED RELEASE ORAL at 09:12

## 2023-12-01 RX ADMIN — OXYCODONE HYDROCHLORIDE 10 MG: 5 TABLET ORAL at 03:12

## 2023-12-01 RX ADMIN — PANCRELIPASE 6 CAPSULE: 60000; 12000; 38000 CAPSULE, DELAYED RELEASE PELLETS ORAL at 03:12

## 2023-12-01 RX ADMIN — FUROSEMIDE 15 MG/HR: 10 INJECTION, SOLUTION INTRAMUSCULAR; INTRAVENOUS at 11:12

## 2023-12-01 RX ADMIN — OXYCODONE HYDROCHLORIDE 10 MG: 5 TABLET ORAL at 05:12

## 2023-12-01 RX ADMIN — ENOXAPARIN SODIUM 40 MG: 40 INJECTION SUBCUTANEOUS at 04:12

## 2023-12-01 RX ADMIN — NIFEDIPINE 90 MG: 30 TABLET, FILM COATED, EXTENDED RELEASE ORAL at 09:12

## 2023-12-01 RX ADMIN — MORPHINE SULFATE 2 MG: 4 INJECTION, SOLUTION INTRAMUSCULAR; INTRAVENOUS at 04:12

## 2023-12-01 RX ADMIN — METOLAZONE 5 MG: 2.5 TABLET ORAL at 09:12

## 2023-12-01 RX ADMIN — Medication: at 09:12

## 2023-12-01 RX ADMIN — MORPHINE SULFATE 2 MG: 4 INJECTION, SOLUTION INTRAMUSCULAR; INTRAVENOUS at 11:12

## 2023-12-01 RX ADMIN — INSULIN DETEMIR 16 UNITS: 100 INJECTION, SOLUTION SUBCUTANEOUS at 09:12

## 2023-12-01 RX ADMIN — PREGABALIN 75 MG: 50 CAPSULE ORAL at 08:12

## 2023-12-01 RX ADMIN — CEFDINIR 300 MG: 300 CAPSULE ORAL at 08:12

## 2023-12-01 RX ADMIN — ERGOCALCIFEROL 50000 UNITS: 1.25 CAPSULE ORAL at 09:12

## 2023-12-01 RX ADMIN — OXYCODONE HYDROCHLORIDE 10 MG: 5 TABLET ORAL at 01:12

## 2023-12-01 RX ADMIN — ALBUMIN (HUMAN) 12.5 G: 12.5 SOLUTION INTRAVENOUS at 10:12

## 2023-12-01 RX ADMIN — OXYCODONE HYDROCHLORIDE 10 MG: 5 TABLET ORAL at 08:12

## 2023-12-01 RX ADMIN — FUROSEMIDE 15 MG/HR: 10 INJECTION, SOLUTION INTRAMUSCULAR; INTRAVENOUS at 10:12

## 2023-12-01 RX ADMIN — SODIUM BICARBONATE 650 MG TABLET 1300 MG: at 09:12

## 2023-12-01 RX ADMIN — OXYCODONE HYDROCHLORIDE 10 MG: 5 TABLET ORAL at 10:12

## 2023-12-01 RX ADMIN — LEVETIRACETAM 500 MG: 500 TABLET, FILM COATED ORAL at 09:12

## 2023-12-01 NOTE — PROGRESS NOTES
[facility]  Nephrology  Progress Note    Patient Name: Kamran Huerta  MRN: [id number]  Admission Date: 11/25/2023  Hospital Length of Stay: 6 days  Attending Provider: Asia Hodge MD   Primary Care Physician: Ailyn Reynolds MD  Principal Problem:<principal problem not specified>      Subjective:   HPI: This is a 48-year-old AAM seen by our service in April for nephrotic syndrome and FABIO. At that time he underwent renal biopsy revealing advanced diabetic nodular sclerosis. He was diuresed aggressively and discharged with Cr 1.8 and ability to ambulate in the trotter without oxygen or distress. Since that time he has undergone L BKA s/t gangrenous wound. He presented to the ED 11/25 s/p fall from wheelchair with resulting back pain. Neurosurgery undergoing further workup.   He was noted to have FABIO and marked hypervolemia for which nephrology has been consulted. He endorses straining to urinate which he attributes to penile and scrotal edema. Renal US without evidence of hydronephrosis but does reveal layering debris in the bladder. Unsure of dry weight    Interval History:   11/28 - urine output excellent via escobar catheter with TID IV lasix. Profoundly vitamin D deficient. Also iron deficient. UA suggestive of active infection with culture pending. He feels swelling in left arm and abdomen is somewhat improved     11/29 - UOP 2700 mL in the past 24 hours. Admit weight 127 kg. Current bed weight 122 kg.     11/30 - Diuresing well. UOP 4200 mL. He is sitting in chair watching TV on room air. No complaints.     12/1 - - Unable to obtain quality imaging from MRI due to degree of edema. Renal function stable. Weights not documented.     Review of patient's allergies indicates:  No Known Allergies  Current Facility-Administered Medications   Medication Frequency    acetaminophen tablet 650 mg Q6H PRN    albumin human 25% bottle 12.5 g Q12H    aluminum-magnesium  hydroxide-simethicone 200-200-20 mg/5 mL suspension 30 mL QID PRN    atenoloL tablet 50 mg BID    cefdinir capsule 300 mg Q12H    dextrose 10% bolus 125 mL 125 mL PRN    dextrose 10% bolus 125 mL 125 mL PRN    dextrose 10% bolus 250 mL 250 mL PRN    dextrose 10% bolus 250 mL 250 mL PRN    doxazosin tablet 2 mg QHS    DULoxetine DR capsule 20 mg BID    enoxaparin injection 40 mg Daily    ergocalciferol capsule 50,000 Units Q72H    furosemide (LASIX) 200 mg in sodium chloride 0.9% SolP 100 mL continuous infusion (conc: 2 mg/mL) Continuous    glucagon (human recombinant) injection 1 mg PRN    glucose chewable tablet 16 g PRN    glucose chewable tablet 24 g PRN    hydrALAZINE injection 10 mg Q4H PRN    insulin aspart U-100 injection 0-5 Units QID (AC + HS) PRN    insulin detemir U-100 injection 16 Units QHS    labetaloL injection 10 mg Q2H PRN    levETIRAcetam tablet 500 mg TID    lipase-protease-amylase 12,000-38,000-60,000 units per capsule 6 capsule TID    melatonin tablet 6 mg Nightly PRN    metOLazone tablet 5 mg Daily    miconazole NITRATE 2 % top powder BID    morphine injection 2 mg Q4H PRN    naloxone 0.4 mg/mL injection 0.02 mg PRN    NIFEdipine 24 hr tablet 90 mg Daily    ondansetron injection 4 mg Q4H PRN    oxyCODONE immediate release tablet 10 mg Q4H PRN    perflutren lipid microspheres injection 1.4 mL Once    polyethylene glycol packet 17 g BID PRN    pregabalin capsule 75 mg QHS    prochlorperazine injection Soln 5 mg Q6H PRN    simethicone chewable tablet 80 mg QID PRN    sodium bicarbonate tablet 1,300 mg BID    white petrolatum 41 % ointment PRN    white petrolatum 41 % ointment BID    zinc oxide-cod liver oil 40 % paste BID       Objective:     Vital Signs (Most Recent):  Temp: 97.3 °F (36.3 °C) (12/01/23 0757)  Pulse: (!) 59 (12/01/23 0757)  Resp: 18 (12/01/23 0757)  BP: 125/79 (12/01/23 0757)  SpO2: 98 % (12/01/23 0757) Vital Signs (24h Range):  Temp:  [97.3 °F (36.3 °C)-98 °F (36.7 °C)] 97.3 °F  (36.3 °C)  Pulse:  [59-69] 59  Resp:  [16-18] 18  SpO2:  [97 %-100 %] 98 %  BP: (119-194)/() 125/79     Weight: 127 kg (280 lb) (11/26/23 1128)  Body mass index is 42.93 kg/m².  Body surface area is 2.46 meters squared.    I/O last 3 completed shifts:  In: 420 [P.O.:420]  Out: 6750 [Urine:6750]    Physical Exam  Constitutional:       General: He is not in acute distress.     Appearance: He is ill-appearing.   HENT:      Head: Atraumatic.      Nose: Nose normal.      Mouth/Throat:      Mouth: Mucous membranes are moist.   Eyes:      Extraocular Movements: Extraocular movements intact.      Conjunctiva/sclera: Conjunctivae normal.   Cardiovascular:      Rate and Rhythm: Normal rate and regular rhythm.      Pulses: Normal pulses.   Pulmonary:      Effort: Pulmonary effort is normal.      Breath sounds: Normal breath sounds.   Abdominal:      General: There is distension.      Palpations: Abdomen is soft.      Comments: Distended with ascites   Genitourinary:     Comments: Urinary catheter with light yellow clear urine   Musculoskeletal:         General: Swelling present.      Cervical back: Normal range of motion and neck supple.      Comments: 4+ pitting anasarca, L BKA stump covered in dressing, extremities have softened and showing some improvement    Skin:     General: Skin is warm and dry.   Neurological:      Mental Status: He is alert and oriented to person, place, and time.         Significant Labs:sureBMP:   Recent Labs   Lab 11/27/23  0401 11/28/23  0420 12/01/23  0730      < > 142   K 4.7   < > 4.7   CO2 19*   < > 20*   BUN 32.1*   < > 37.8*   CREATININE 2.80*   < > 2.46*   CALCIUM 6.3*   < > 7.3*   MG 1.30*  --   --     < > = values in this interval not displayed.       CBC:   Recent Labs   Lab 11/28/23  0420   WBC 9.89   RBC 3.04*   HGB 8.7*   HCT 27.5*      MCV 90.5   MCH 28.6   MCHC 31.6*       Microbiology Results (last 7 days)       Procedure Component Value Units Date/Time    Blood  Culture [[id number]]  (Normal) Collected: 11/25/23 1344    Order Status: Completed Specimen: Blood Updated: 11/30/23 1401     CULTURE, BLOOD ([facility]) No Growth At 48 Hours    Blood Culture [[id number]]  (Normal) Collected: 11/25/23 1344    Order Status: Completed Specimen: Blood Updated: 11/30/23 1401     CULTURE, BLOOD ([facility]) No Growth At 48 Hours    Urine culture [[id number]]  (Abnormal)  (Susceptibility) Collected: 11/27/23 1047    Order Status: Completed Specimen: Urine Updated: 11/30/23 0726     Urine Culture >/= 100,000 colonies/ml Escherichia coli ESBL          Specimen (24h ago, onward)      None          Recent Labs   Lab 11/27/23 1047   PROTEINUA 2+*   BACTERIA Many*   LEUKOCYTESUR 2+*   UROBILINOGEN 0.2         Significant Imaging:  No new imaging    Assessment/Plan:   FABIO s/t acute infection and hypervolemia   CKD IIIa   -biopsy proven in April 2023 diabetic nodular sclerosis   -Nephrotic range proteinuria         -March 2023 - - 10.9 g        -Now 5.9 g   Anasarca   S/p Fall with resulting back pain. Remote back surgery   Cirrhosis of the liver  DM I onset age 14 with long standing history of noncompliance. Hgb A1c improving.      Due to inability to complete further evaluation of lumbar pain due to severity of edema I will transition back to IV diuretics and slightly increase the dose. Discussed with patient.   Decrease diuresis as appropriate.   Repeat lab tomorrow       SUSAN Zabala  Nephrology  [facility]

## 2023-12-01 NOTE — PT/OT/SLP PROGRESS
Physical Therapy Treatment    Patient Name:  Kamran Huerta   MRN:  [id number]      Attempted treatment but patient in too much pain to participate. I will attempt later if schedule permits.     12/01/2023

## 2023-12-01 NOTE — NURSING
[facility] - Observation Unit  Wound Care    Patient Name:  Kamran Huerta   MRN:  [id number]  Date: 12/1/2023  Diagnosis: <principal problem not specified>    History:     Past Medical History:   Diagnosis Date    CHF (congestive heart failure)     Chronic back pain     CVA (cerebral vascular accident) 01/2023    DM (diabetes mellitus)     HTN (hypertension)     Seizures        Social History     Socioeconomic History    Marital status: Single   Tobacco Use    Smoking status: Never    Smokeless tobacco: Never   Substance and Sexual Activity    Alcohol use: Not Currently    Drug use: Not Currently    Sexual activity: Not Currently     Social Determinants of Health     Social Connections: Unknown (11/16/2022)    Social Connection and Isolation Panel [NHANES]     Marital Status:        Precautions:     Allergies as of 11/25/2023    (No Known Allergies)       WOC Assessment Details/Treatment      12/01/23 1052   Pain Reassessment   Pain Rating Post Med Admin 4        Incision/Site 11/25/23 1531 Left Knee anterior   Date First Assessed/Time First Assessed: 11/25/23 1531   Side: Left  Location: (c) Knee  Orientation: anterior   Wound Image   (left old stump)   Dressing Appearance Intact;Dry   Drainage Amount Scant   Drainage Characteristics/Odor Serous;No odor   Appearance Pink;Moist   Red (%), Wound Tissue Color 100 %   Periwound Area Dry;Intact   Wound Edges Irregular   Wound Length (cm) 1 cm   Wound Width (cm) 10.5 cm   Wound Surface Area (cm^2) 10.5 cm^2   Care Cleansed with:;Sterile normal saline   Dressing Calcium alginate        Altered Skin Integrity 11/26/23 1639 Left posterior Buttocks Other (comment) Intact skin with non-blanchable redness of localized area   Date First Assessed/Time First Assessed: 11/26/23 1639   Altered Skin Integrity Present on Admission - Did Patient arrive to the hospital with altered skin?: yes  Side: Left  Orientation: posterior  Location: Buttocks  Is this  injury device related?: ...   Wound Image    Description of Altered Skin Integrity   (slightly pink red blanchable)   Dressing Appearance Open to air   Drainage Amount None   Appearance Pink;Dry   Tissue loss description Not applicable   Care Cleansed with:;Wound cleanser;Applied:;Skin Barrier     Re-eval of left stump old wd and buttock areas.   See new photos.   Improvement.   No changes to care at this time.     Continued low airloss support bed and q 2hr turning and right foot offloading and moisturizer.   Care concerns with nurse Rosario.    12/01/2023

## 2023-12-01 NOTE — NURSING
Notified per MRI tech that he wasn't able to see nothing at all on the scan. Tech states that the patient has too much edema to be able to get any images.

## 2023-12-01 NOTE — PROGRESS NOTES
[facility] Hospital Medicine Progress Note        Chief Complaint: Inpatient Follow-up for     HPI: 48 y.o. male who PMH includes CHF, chronic back pain wheelchair-bound, DM type 2, HTN, seizures, CVA, CKD stage 3, alcoholic liver disease with chronic pancreatitis, presents to the ED at [facility] on 11/25/2023 with a primary complaint of weakness, dizziness lightheadedness and fall out if his wheelchair striking his mouth; denies any LOC.  PT had back surgery years ago and reports has been wheelchair bound since then. No reports of seizures.  No CP, SOB, N/V/D, fever, chills cough congestion or any sick contacts. Labs reviewed demonstrated WBC 14.42, HH 12.2/37.2, sed rate 93 , CO2 18 Bun 25.7, Creat 2.31, glucose 52 Mg+ 1.0, , AST 36 reports of chest pain, CRP shortness a breath, nausea, vomiting, diarrhea5, fever., aefsxb21, cough, congestion, or any sick, contacts. B no reportsP of chest pain, 109.7; other indicis unremarkable. CXR impression reviewed demonstrated  no acute cardiopulmonary abnormality. CT of head without contrast impression reviewed demonstrated no appreciable acute intracranial abnormality. Ct maxillofacial without contrast impression reviewed demonstrated no appreciate acute traumatic osseous abnormality, bilateral mastoid effusions. CT cervical spine without contrast impression reviewed demonstrated no appreciable acute osseous abnormality by CT evaluation, degenerative changes at the cervical spine. CT thoracic spine without contrast impression reviewed demonstrated no acute osseous abnormality. CT of lumbar spine without contrast impression reviewed demonstrated no appreciable fracture or acute osseous abnormality, severe degenerative change at L5-S1 progressed, endplate sclerosis disc space narrowing and osseous erosions which may be degenerative or infectious/inflammatory, anasarca.   Initial VS /105 P 75 R 13 T 98.7F O2 saturation 98% on room  air. Pt received multiple doses of pain medication , D50 for hypoglycemia, 40 mg lasix,  and magnesium rider in the ED. Neurosurgery services consulted. Pt awaiting MRI. Pt is admitted to hospital medicine services for further managemen    Interval Hx:    Patient seen and examined still significant swelling in the legs      Objective/physical exam:  General: In no acute distress, afebrile  Chest: Clear to auscultation bilaterally  Heart: RRR, +S1, S2, no appreciable murmur  Abdomen: Soft, nontender, BS +  MSK: Warm,  3+ edema in bilateral lower extremity  Neurologic: Alert and oriented   VITAL SIGNS: 24 HRS MIN & MAX LAST   Temp  Min: 97.3 °F (36.3 °C)  Max: 98 °F (36.7 °C) 97.7 °F (36.5 °C)   BP  Min: 119/77  Max: 194/103 (!) 141/68   Pulse  Min: 59  Max: 69  61   Resp  Min: 16  Max: 18 18   SpO2  Min: 97 %  Max: 100 % 99 %     I have reviewed the following labs:  Recent Labs   Lab 11/27/23  0401 11/28/23  0420 12/01/23  0950   WBC 11.95* 9.89 9.31   RBC 3.66* 3.04* 3.01*   HGB 10.7* 8.7* 8.7*   HCT 32.9* 27.5* 26.6*   MCV 89.9 90.5 88.4   MCH 29.2 28.6 28.9   MCHC 32.5* 31.6* 32.7*   RDW 13.7 13.9 13.7    224 231   MPV 11.9* 12.1* 11.7*       Recent Labs   Lab 11/25/23  0316 11/26/23  0555 11/27/23  0401 11/28/23  0420 11/29/23  0819 11/30/23  0408 12/01/23  0730    143 139   < > 143 142 142   K 4.7 4.6 4.7   < > 4.4 4.6 4.7   CO2 18* 21* 19*   < > 20* 20* 20*   BUN 25.7* 28.2* 32.1*   < > 33.2* 36.3* 37.8*   CREATININE 2.31* 2.60* 2.80*   < > 2.64* 2.53* 2.46*   CALCIUM 7.2* 6.6* 6.3*   < > 6.7* 7.2* 7.3*   MG 1.00* 1.00* 1.30*  --   --   --   --    ALBUMIN 1.4* 1.2* 1.3*  --   --  1.7*  --    ALKPHOS 189* 157* 167*  --   --  108  --    ALT 41 30 31  --   --  15  --    AST 36* 30 26  --   --  16  --    BILITOT 0.2 0.3 0.2  --   --  0.3  --     < > = values in this interval not displayed.       Microbiology Results (last 7 days)       Procedure Component Value Units Date/Time    Blood Culture  [[id number]]  (Normal) Collected: 11/25/23 1344    Order Status: Completed Specimen: Blood Updated: 12/01/23 1125     CULTURE, BLOOD ([facility]) Final Report: At 5 days. No growth    Blood Culture [[id number]]  (Normal) Collected: 11/25/23 1344    Order Status: Completed Specimen: Blood Updated: 12/01/23 1125     CULTURE, BLOOD ([facility]) Final Report: At 5 days. No growth    Urine culture [[id number]]  (Abnormal)  (Susceptibility) Collected: 11/27/23 1047    Order Status: Completed Specimen: Urine Updated: 12/01/23 0828     Urine Culture >/= 100,000 colonies/ml Escherichia coli ESBL    Narrative:      Fosfomycin 0.25 sensitive             See below for Radiology    Scheduled Med:   albumin human 25%  12.5 g Intravenous Q12H    atenoloL  50 mg Oral BID    cefdinir  300 mg Oral Q12H    doxazosin  2 mg Oral QHS    DULoxetine  20 mg Oral BID    enoxparin  40 mg Subcutaneous Daily    ergocalciferol  50,000 Units Oral Q72H    insulin detemir U-100  16 Units Subcutaneous QHS    levETIRAcetam  500 mg Oral TID    lipase-protease-amylase 12,000-38,000-60,000 units  6 capsule Oral TID    metOLazone  5 mg Oral Daily    miconazole NITRATE 2 %   Topical (Top) BID    NIFEdipine  90 mg Oral Daily    perflutren lipid microspheres  1.4 mL Intravenous Once    pregabalin  75 mg Oral QHS    sodium bicarbonate  1,300 mg Oral BID    white petrolatum   Topical (Top) BID    zinc oxide-cod liver oil   Topical (Top) BID      Continuous Infusions:   furosemide (LASIX) 2 mg/mL continuous infusion (non-titrating) 15 mg/hr (12/01/23 1022)      PRN Meds:  acetaminophen, aluminum-magnesium hydroxide-simethicone, dextrose 10%, dextrose 10%, dextrose 10%, dextrose 10%, glucagon (human recombinant), glucose, glucose, hydrALAZINE, insulin aspart U-100, labetaloL, melatonin, morphine, naloxone, ondansetron, oxyCODONE, polyethylene glycol, prochlorperazine, simethicone, white petrolatum     Assessment/Plan:  Acute on chronic lower back pain- intractable- suspected  osteomyelitis  Dizziness, Fall- from wheel chair  Hypoglycemia- recurrent-resolved  Acute on chronic kidney disease stage III  Anasarca with volume overload  with peripheral edema  Hypertensive urgency at admission-resolving     HX: Wheelchair-bound, history of CVA with residual left-sided deficits, left BKA, chronic alcoholic liver disease, chronic seizures, T2 dm A1c 5.5     Plan:  Patient has been started back on IV Lasix and IV albumin  Continue with strict input and output   Blood pressure is better controlled   MRI of the L-spine was performed but they were not able to see much because there was too much of fluid    Creatinine of 2.46 today  Continue with other home medications that includes atenolol, doxazosin, duloxetine, Levemir 16 units, Keppra, losartan, metolazone, pregabalin  Started on Rocephin f urine cultures grew E coli and that is sensitive to Rocephin but switch to p.o. Omnicef     VTE prophylaxis: lovenox    Patient condition:  Stable/Fair/Guarded/ Serious/ Critical    Anticipated discharge and Disposition:         All diagnosis and differential diagnosis have been reviewed; assessment and plan has been documented; I have personally reviewed the labs and test results that are presently available; I have reviewed the patients medication list; I have reviewed the consulting providers response and recommendations. I have reviewed or attempted to review medical records based upon their availability    All of the patient's questions have been  addressed and answered. Patient's is agreeable to the above stated plan. I will continue to monitor closely and make adjustments to medical management as needed.  _____________________________________________________________________    Nutrition Status:    Radiology:  I have personally reviewed the following imaging and agree with the radiologist.     Echo    Left Ventricle: The left ventricle is normal in size. Increased wall   thickness. There is moderate  concentric hypertrophy. Normal wall motion.   There is normal systolic function with a visually estimated ejection   fraction of 60 - 65%. There is normal diastolic function.    Right Ventricle: Normal right ventricular cavity size. Systolic   function is normal.    Left Atrium: Left atrium is mildly dilated.    Mitral Valve: There is trace regurgitation.    Tricuspid Valve: There is trace regurgitation.      Asia Hodge MD   12/01/2023

## 2023-12-01 NOTE — PROGRESS NOTES
Inpatient Nutrition Assessment    Admit Date: 11/25/2023   Total duration of encounter: 6 days   Patient Age: 48 y.o.    Nutrition Recommendation/Prescription     Continue diabetic diet as tolerated  RD to order Boost Glucose Control daily to provide 250 kcal and 14 g protein per serving  Continue digestive enzymes  Daily weights  RD to monitor po intake and weight    Communication of Recommendations: reviewed with patient    Nutrition Assessment     Malnutrition Assessment/Nutrition-Focused Physical Exam    Malnutrition Context: chronic illness (12/01/23 1157)  Malnutrition Level: moderate (12/01/23 1157)  Energy Intake (Malnutrition): other (see comments) (does not meet criteria) (12/01/23 1157)  Weight Loss (Malnutrition): other (see comments) (does not meet criteria) (12/01/23 1157)              Muscle Mass (Malnutrition): mild depletion (12/01/23 1157)  [place] (Muscle Loss): mild depletion                       Fluid Accumulation (Malnutrition): severe (12/01/23 1157)        A minimum of two characteristics is recommended for diagnosis of either severe or non-severe malnutrition.    Chart Review    Reason Seen: length of stay    Malnutrition Screening Tool Results   Have you recently lost weight without trying?: No  Have you been eating poorly because of a decreased appetite?: Yes   MST Score: 1   Diagnosis:  Acute on chronic lower back pain- intractable- suspected osteomyelitis  Dizziness, Fall- from wheel chair  Hypoglycemia- recurrent-resolved  Acute on chronic kidney disease stage III  Anasarca with volume overload  with peripheral edema  Hypertensive urgency at admission-resolving    Relevant Medical History: CHF, chronic back pain wheelchair-bound, DM 2, HTN, seizures, CVA, CKD, alcoholic liver disease with chronic pancreatitis , L BKA    Nutrition-Related Medications: lasix in NaCl, albumin, ergocalciferol, insulin detemir 16 units, lipase-protease-amylase, sodium bicarbonate, zinc oxide, insulin  "aspart prn, zofran prn  Calorie Containing IV Medications: no significant kcals from medications at this time    Nutrition-Related Labs: : RBC-3.01, H/H-8.7/26.6, Cl-116, BUN-37.8, creat-2.46, glu-118, boogie-7.3      Nutrition Orders:   Diet diabetic  Dietary nutrition supplements Boost Glucose Control Vanilla; Daily    Appetite/Oral Intake: good/% of meals    Factors Affecting Nutritional Intake: abdominal distension and diarrhea    Food/Voodoo/Cultural Preferences: none reported    Food Allergies: no known food allergies    Wound(s):      Altered Skin Integrity 23 1639 Left posterior Buttocks Other (comment) Intact skin with non-blanchable redness of localized area-Tissue loss description: Not applicable     Last Bowel Movement: 23    Comments    : pt reports good appetite, denies decreased appetite prior admission. Complained of diarrhea, continue digestive enzymes. UBW ~215 lb with recent weight gain r/t fluid retention. Per notes, pt with 4+ pitting anasarca. Per NFPA, pt with mild muscle loss. Pt agreed to ONS daily.    Anthropometrics    Height: 5' 7.72" (172 cm) Height Method: Measured  Last Weight: 127 kg (280 lb) (23 1128) Weight Method: Bed Scale  BMI (Calculated): 42.9  BMI Classification: obese grade III (BMI >/=40)        Ideal Body Weight (IBW), Male: 152.32 lb     % Ideal Body Weight, Male (lb): 183.82 %                 Usual Body Weight (UBW), k.7 kg  % Usual Body Weight: 130.27     Usual Weight Provided By: patient and EMR weight history    Wt Readings from Last 5 Encounters:   23 127 kg (280 lb)   23 101.3 kg (223 lb 5.2 oz)   22 87 kg (191 lb 12.8 oz)   21 93.8 kg (206 lb 12.7 oz)   21 93.8 kg (206 lb 12.7 oz)     Weight Change(s) Since Admission:   Wt Readings from Last 1 Encounters:   23 1128 127 kg (280 lb)   23 1539 127 kg (280 lb)   23 127 kg (280 lb)   Admit Weight: 127 kg (280 lb) (23), " Weight Method: Stated    Estimated Needs    Weight Used For Calorie Calculations: 97.7 kg (215 lb 6.2 oz) (UBW used)  Energy Calorie Requirements (kcal): 7802-3812 kcal (1.0-1.1 stress factor)  Energy Need Method: Miner-St Jeor  Weight Used For Protein Calculations: 97.7 kg (215 lb 6.2 oz) (UBW used)  Protein Requirements: 78-98 g (0.8-1.0 g/kg)  Fluid Requirements (mL): 1827 ml (1 ml/kcal)  Temp (24hrs), Av.7 °F (36.5 °C), Min:97.3 °F (36.3 °C), Max:98 °F (36.7 °C)       Enteral Nutrition    Patient not receiving enteral nutrition at this time.    Parenteral Nutrition    Patient not receiving parenteral nutrition support at this time.    Evaluation of Received Nutrient Intake    Calories: meeting estimated needs  Protein: meeting estimated needs    Patient Education    Not applicable.    Nutrition Diagnosis     PES: Unintended weight gain related to  fluid retention as evidenced by 4+ pitting anasarca. (new)    PES: Malnutrition related to chronic illness as evidenced by mild muscle depletion and severe fluid accumulation. (new)     Interventions/Goals     Intervention(s): general/healthful diet, commercial beverage, prescription medication, and collaboration with other providers    Goal: Consume % of oral supplements by follow-up. (new)    Monitoring & Evaluation     Dietitian will monitor food and beverage intake, weight change, electrolyte/renal panel, glucose/endocrine profile, and gastrointestinal profile.    Nutrition Risk/Follow-Up: moderate (follow-up in 3-5 days)   Please consult if re-assessment needed sooner.

## 2023-12-02 LAB
ANION GAP SERPL CALC-SCNC: 7 MEQ/L
BASOPHILS # BLD AUTO: 0.05 X10(3)/MCL
BASOPHILS NFR BLD AUTO: 0.6 %
BUN SERPL-MCNC: 38.2 MG/DL (ref 8.9–20.6)
CALCIUM SERPL-MCNC: 7.4 MG/DL (ref 8.4–10.2)
CHLORIDE SERPL-SCNC: 112 MMOL/L (ref 98–107)
CO2 SERPL-SCNC: 24 MMOL/L (ref 22–29)
CREAT SERPL-MCNC: 2.39 MG/DL (ref 0.73–1.18)
CREAT/UREA NIT SERPL: 16
EOSINOPHIL # BLD AUTO: 0.25 X10(3)/MCL (ref 0–0.9)
EOSINOPHIL NFR BLD AUTO: 3.1 %
ERYTHROCYTE [DISTWIDTH] IN BLOOD BY AUTOMATED COUNT: 13.5 % (ref 11.5–17)
GFR SERPLBLD CREATININE-BSD FMLA CKD-EPI: 33 MLS/MIN/1.73/M2
GLUCOSE SERPL-MCNC: 117 MG/DL (ref 74–100)
HCT VFR BLD AUTO: 25.4 % (ref 42–52)
HGB BLD-MCNC: 8.2 G/DL (ref 14–18)
IMM GRANULOCYTES # BLD AUTO: 0.02 X10(3)/MCL (ref 0–0.04)
IMM GRANULOCYTES NFR BLD AUTO: 0.2 %
LYMPHOCYTES # BLD AUTO: 2.44 X10(3)/MCL (ref 0.6–4.6)
LYMPHOCYTES NFR BLD AUTO: 29.9 %
MCH RBC QN AUTO: 28.6 PG (ref 27–31)
MCHC RBC AUTO-ENTMCNC: 32.3 G/DL (ref 33–36)
MCV RBC AUTO: 88.5 FL (ref 80–94)
MONOCYTES # BLD AUTO: 0.71 X10(3)/MCL (ref 0.1–1.3)
MONOCYTES NFR BLD AUTO: 8.7 %
NEUTROPHILS # BLD AUTO: 4.7 X10(3)/MCL (ref 2.1–9.2)
NEUTROPHILS NFR BLD AUTO: 57.5 %
NRBC BLD AUTO-RTO: 0 %
PLATELET # BLD AUTO: 220 X10(3)/MCL (ref 130–400)
PMV BLD AUTO: 12.3 FL (ref 7.4–10.4)
POCT GLUCOSE: 113 MG/DL (ref 70–110)
POCT GLUCOSE: 124 MG/DL (ref 70–110)
POCT GLUCOSE: 177 MG/DL (ref 70–110)
POCT GLUCOSE: 202 MG/DL (ref 70–110)
POTASSIUM SERPL-SCNC: 4.6 MMOL/L (ref 3.5–5.1)
RBC # BLD AUTO: 2.87 X10(6)/MCL (ref 4.7–6.1)
SODIUM SERPL-SCNC: 143 MMOL/L (ref 136–145)
WBC # SPEC AUTO: 8.17 X10(3)/MCL (ref 4.5–11.5)

## 2023-12-02 PROCEDURE — 25000003 PHARM REV CODE 250: Performed by: INTERNAL MEDICINE

## 2023-12-02 PROCEDURE — 80048 BASIC METABOLIC PNL TOTAL CA: CPT | Performed by: NURSE PRACTITIONER

## 2023-12-02 PROCEDURE — 25000003 PHARM REV CODE 250: Performed by: NURSE PRACTITIONER

## 2023-12-02 PROCEDURE — 63600175 PHARM REV CODE 636 W HCPCS: Performed by: INTERNAL MEDICINE

## 2023-12-02 PROCEDURE — 27000207 HC ISOLATION

## 2023-12-02 PROCEDURE — 85025 COMPLETE CBC W/AUTO DIFF WBC: CPT | Performed by: NURSE PRACTITIONER

## 2023-12-02 PROCEDURE — 21400001 HC TELEMETRY ROOM

## 2023-12-02 PROCEDURE — 63600175 PHARM REV CODE 636 W HCPCS: Performed by: NURSE PRACTITIONER

## 2023-12-02 PROCEDURE — 63600175 PHARM REV CODE 636 W HCPCS: Mod: JZ,EC,JG | Performed by: NURSE PRACTITIONER

## 2023-12-02 RX ADMIN — DULOXETINE HYDROCHLORIDE 20 MG: 20 CAPSULE, DELAYED RELEASE ORAL at 09:12

## 2023-12-02 RX ADMIN — PANCRELIPASE 6 CAPSULE: 60000; 12000; 38000 CAPSULE, DELAYED RELEASE PELLETS ORAL at 02:12

## 2023-12-02 RX ADMIN — INSULIN DETEMIR 16 UNITS: 100 INJECTION, SOLUTION SUBCUTANEOUS at 09:12

## 2023-12-02 RX ADMIN — ATENOLOL 50 MG: 50 TABLET ORAL at 09:12

## 2023-12-02 RX ADMIN — PANCRELIPASE 6 CAPSULE: 60000; 12000; 38000 CAPSULE, DELAYED RELEASE PELLETS ORAL at 09:12

## 2023-12-02 RX ADMIN — OXYCODONE HYDROCHLORIDE 10 MG: 5 TABLET ORAL at 04:12

## 2023-12-02 RX ADMIN — MORPHINE SULFATE 2 MG: 4 INJECTION, SOLUTION INTRAMUSCULAR; INTRAVENOUS at 07:12

## 2023-12-02 RX ADMIN — Medication: at 08:12

## 2023-12-02 RX ADMIN — INSULIN ASPART 1 UNITS: 100 INJECTION, SOLUTION INTRAVENOUS; SUBCUTANEOUS at 09:12

## 2023-12-02 RX ADMIN — MORPHINE SULFATE 2 MG: 4 INJECTION, SOLUTION INTRAMUSCULAR; INTRAVENOUS at 09:12

## 2023-12-02 RX ADMIN — NIFEDIPINE 90 MG: 30 TABLET, FILM COATED, EXTENDED RELEASE ORAL at 08:12

## 2023-12-02 RX ADMIN — SODIUM BICARBONATE 650 MG TABLET 1300 MG: at 08:12

## 2023-12-02 RX ADMIN — MORPHINE SULFATE 2 MG: 4 INJECTION, SOLUTION INTRAMUSCULAR; INTRAVENOUS at 02:12

## 2023-12-02 RX ADMIN — Medication 6 MG: at 09:12

## 2023-12-02 RX ADMIN — ENOXAPARIN SODIUM 40 MG: 40 INJECTION SUBCUTANEOUS at 04:12

## 2023-12-02 RX ADMIN — ATENOLOL 50 MG: 50 TABLET ORAL at 08:12

## 2023-12-02 RX ADMIN — ERYTHROPOIETIN 20000 UNITS: 10000 INJECTION, SOLUTION INTRAVENOUS; SUBCUTANEOUS at 11:12

## 2023-12-02 RX ADMIN — PANCRELIPASE 6 CAPSULE: 60000; 12000; 38000 CAPSULE, DELAYED RELEASE PELLETS ORAL at 08:12

## 2023-12-02 RX ADMIN — METOLAZONE 5 MG: 2.5 TABLET ORAL at 08:12

## 2023-12-02 RX ADMIN — CEFDINIR 300 MG: 300 CAPSULE ORAL at 08:12

## 2023-12-02 RX ADMIN — WHITE PETROLATUM: 1.75 OINTMENT TOPICAL at 09:12

## 2023-12-02 RX ADMIN — MICONAZOLE NITRATE: 20 POWDER TOPICAL at 08:12

## 2023-12-02 RX ADMIN — Medication: at 09:12

## 2023-12-02 RX ADMIN — MORPHINE SULFATE 2 MG: 4 INJECTION, SOLUTION INTRAMUSCULAR; INTRAVENOUS at 04:12

## 2023-12-02 RX ADMIN — MICONAZOLE NITRATE 1 EACH: 20 POWDER TOPICAL at 09:12

## 2023-12-02 RX ADMIN — LEVETIRACETAM 500 MG: 500 TABLET, FILM COATED ORAL at 09:12

## 2023-12-02 RX ADMIN — OXYCODONE HYDROCHLORIDE 10 MG: 5 TABLET ORAL at 12:12

## 2023-12-02 RX ADMIN — WHITE PETROLATUM: 1.75 OINTMENT TOPICAL at 08:12

## 2023-12-02 RX ADMIN — SODIUM CHLORIDE 125 MG: 9 INJECTION, SOLUTION INTRAVENOUS at 12:12

## 2023-12-02 RX ADMIN — PREGABALIN 75 MG: 50 CAPSULE ORAL at 09:12

## 2023-12-02 RX ADMIN — OXYCODONE HYDROCHLORIDE 10 MG: 5 TABLET ORAL at 09:12

## 2023-12-02 RX ADMIN — DULOXETINE HYDROCHLORIDE 20 MG: 20 CAPSULE, DELAYED RELEASE ORAL at 08:12

## 2023-12-02 RX ADMIN — LEVETIRACETAM 500 MG: 500 TABLET, FILM COATED ORAL at 02:12

## 2023-12-02 RX ADMIN — CEFDINIR 300 MG: 300 CAPSULE ORAL at 09:12

## 2023-12-02 RX ADMIN — LEVETIRACETAM 500 MG: 500 TABLET, FILM COATED ORAL at 08:12

## 2023-12-02 RX ADMIN — OXYCODONE HYDROCHLORIDE 10 MG: 5 TABLET ORAL at 07:12

## 2023-12-02 RX ADMIN — DOXAZOSIN 2 MG: 1 TABLET ORAL at 09:12

## 2023-12-02 NOTE — PROGRESS NOTES
[facility] Hospital Medicine Progress Note        Chief Complaint: Inpatient Follow-up for     HPI: 48 y.o. male who PMH includes CHF, chronic back pain wheelchair-bound, DM type 2, HTN, seizures, CVA, CKD stage 3, alcoholic liver disease with chronic pancreatitis, presents to the ED at [facility] on 11/25/2023 with a primary complaint of weakness, dizziness lightheadedness and fall out if his wheelchair striking his mouth; denies any LOC.  PT had back surgery years ago and reports has been wheelchair bound since then. No reports of seizures.  No CP, SOB, N/V/D, fever, chills cough congestion or any sick contacts. Labs reviewed demonstrated WBC 14.42, HH 12.2/37.2, sed rate 93 , CO2 18 Bun 25.7, Creat 2.31, glucose 52 Mg+ 1.0, , AST 36 reports of chest pain, CRP shortness a breath, nausea, vomiting, diarrhea5, fever., brspby43, cough, congestion, or any sick, contacts. B no reportsP of chest pain, 109.7; other indicis unremarkable. CXR impression reviewed demonstrated  no acute cardiopulmonary abnormality. CT of head without contrast impression reviewed demonstrated no appreciable acute intracranial abnormality. Ct maxillofacial without contrast impression reviewed demonstrated no appreciate acute traumatic osseous abnormality, bilateral mastoid effusions. CT cervical spine without contrast impression reviewed demonstrated no appreciable acute osseous abnormality by CT evaluation, degenerative changes at the cervical spine. CT thoracic spine without contrast impression reviewed demonstrated no acute osseous abnormality. CT of lumbar spine without contrast impression reviewed demonstrated no appreciable fracture or acute osseous abnormality, severe degenerative change at L5-S1 progressed, endplate sclerosis disc space narrowing and osseous erosions which may be degenerative or infectious/inflammatory, anasarca.   Initial VS /105 P 75 R 13 T 98.7F O2 saturation 98% on room  air. Pt received multiple doses of pain medication , D50 for hypoglycemia, 40 mg lasix,  and magnesium rider in the ED. Neurosurgery services consulted. Pt awaiting MRI. Pt is admitted to hospital medicine services for further managemen    Interval Hx:    Patient seen and examined good urine output lower extremity swelling is improving    Objective/physical exam:  General: In no acute distress, afebrile  Chest: Clear to auscultation bilaterally  Heart: RRR, +S1, S2, no appreciable murmur  Abdomen: Soft, nontender, BS +  MSK: Warm,  3+ edema in bilateral lower extremity  Neurologic: Alert and oriented   VITAL SIGNS: 24 HRS MIN & MAX LAST   Temp  Min: 97.9 °F (36.6 °C)  Max: 98.4 °F (36.9 °C) 98 °F (36.7 °C)   BP  Min: 115/76  Max: 158/93 (!) 155/94   Pulse  Min: 61  Max: 69  65   Resp  Min: 16  Max: 20 18   SpO2  Min: 96 %  Max: 99 % 97 %     I have reviewed the following labs:  Recent Labs   Lab 11/28/23  0420 12/01/23  0950 12/02/23  0620   WBC 9.89 9.31 8.17   RBC 3.04* 3.01* 2.87*   HGB 8.7* 8.7* 8.2*   HCT 27.5* 26.6* 25.4*   MCV 90.5 88.4 88.5   MCH 28.6 28.9 28.6   MCHC 31.6* 32.7* 32.3*   RDW 13.9 13.7 13.5    231 220   MPV 12.1* 11.7* 12.3*       Recent Labs   Lab 11/26/23  0555 11/27/23  0401 11/28/23  0420 11/30/23  0408 12/01/23  0730 12/02/23  0620    139   < > 142 142 143   K 4.6 4.7   < > 4.6 4.7 4.6   CO2 21* 19*   < > 20* 20* 24   BUN 28.2* 32.1*   < > 36.3* 37.8* 38.2*   CREATININE 2.60* 2.80*   < > 2.53* 2.46* 2.39*   CALCIUM 6.6* 6.3*   < > 7.2* 7.3* 7.4*   MG 1.00* 1.30*  --   --   --   --    ALBUMIN 1.2* 1.3*  --  1.7*  --   --    ALKPHOS 157* 167*  --  108  --   --    ALT 30 31  --  15  --   --    AST 30 26  --  16  --   --    BILITOT 0.3 0.2  --  0.3  --   --     < > = values in this interval not displayed.       Microbiology Results (last 7 days)       Procedure Component Value Units Date/Time    Blood Culture [[id number]]  (Normal) Collected: 11/25/23 1344    Order Status:  Completed Specimen: Blood Updated: 12/01/23 1125     CULTURE, BLOOD ([facility]) Final Report: At 5 days. No growth    Blood Culture [[id number]]  (Normal) Collected: 11/25/23 1344    Order Status: Completed Specimen: Blood Updated: 12/01/23 1125     CULTURE, BLOOD ([facility]) Final Report: At 5 days. No growth    Urine culture [[id number]]  (Abnormal)  (Susceptibility) Collected: 11/27/23 1047    Order Status: Completed Specimen: Urine Updated: 12/01/23 0828     Urine Culture >/= 100,000 colonies/ml Escherichia coli ESBL    Narrative:      Fosfomycin 0.25 sensitive             See below for Radiology    Scheduled Med:   atenoloL  50 mg Oral BID    cefdinir  300 mg Oral Q12H    doxazosin  2 mg Oral QHS    DULoxetine  20 mg Oral BID    enoxparin  40 mg Subcutaneous Daily    ergocalciferol  50,000 Units Oral Q72H    ferric gluconate (FERRLECIT) 125 mg in sodium chloride 0.9% 100 mL IVPB  125 mg Intravenous Daily    insulin detemir U-100  16 Units Subcutaneous QHS    levETIRAcetam  500 mg Oral TID    lipase-protease-amylase 12,000-38,000-60,000 units  6 capsule Oral TID    metOLazone  5 mg Oral Daily    miconazole NITRATE 2 %   Topical (Top) BID    NIFEdipine  90 mg Oral Daily    perflutren lipid microspheres  1.4 mL Intravenous Once    pregabalin  75 mg Oral QHS    white petrolatum   Topical (Top) BID    zinc oxide-cod liver oil   Topical (Top) BID      Continuous Infusions:   furosemide (LASIX) 2 mg/mL continuous infusion (non-titrating) 15 mg/hr (12/01/23 6316)      PRN Meds:  acetaminophen, aluminum-magnesium hydroxide-simethicone, dextrose 10%, dextrose 10%, dextrose 10%, dextrose 10%, glucagon (human recombinant), glucose, glucose, hydrALAZINE, insulin aspart U-100, labetaloL, melatonin, morphine, naloxone, ondansetron, oxyCODONE, polyethylene glycol, prochlorperazine, simethicone, white petrolatum     Assessment/Plan:  Acute on chronic lower back pain- intractable- suspected osteomyelitis  Dizziness, Fall- from wheel  chair  Hypoglycemia- recurrent-resolved  Acute on chronic kidney disease stage III  Anasarca with volume overload  with peripheral edema  Hypertensive urgency at admission-resolving     HX: Wheelchair-bound, history of CVA with residual left-sided deficits, left BKA, chronic alcoholic liver disease, chronic seizures, T2 dm A1c 5.5     Plan:  On IV Lasix and albumin continue with strict input and output creatinine continues to improve this morning it is 2.3   MRI of the L-spine was performed but they were not able to see much because there was too much of fluid    Continue with other home medications that includes atenolol, doxazosin, duloxetine, Levemir 16 units, Keppra, metolazone, pregabalin  Started on Rocephin f urine cultures grew E coli and that is sensitive to Rocephin but switch to p.o. Omnicef     VTE prophylaxis: lovenox    Patient condition:  Stable/Fair/Guarded/ Serious/ Critical    Anticipated discharge and Disposition:         All diagnosis and differential diagnosis have been reviewed; assessment and plan has been documented; I have personally reviewed the labs and test results that are presently available; I have reviewed the patients medication list; I have reviewed the consulting providers response and recommendations. I have reviewed or attempted to review medical records based upon their availability    All of the patient's questions have been  addressed and answered. Patient's is agreeable to the above stated plan. I will continue to monitor closely and make adjustments to medical management as needed.  _____________________________________________________________________    Nutrition Status:    Radiology:  I have personally reviewed the following imaging and agree with the radiologist.     Echo    Left Ventricle: The left ventricle is normal in size. Increased wall   thickness. There is moderate concentric hypertrophy. Normal wall motion.   There is normal systolic function with a visually  estimated ejection   fraction of 60 - 65%. There is normal diastolic function.    Right Ventricle: Normal right ventricular cavity size. Systolic   function is normal.    Left Atrium: Left atrium is mildly dilated.    Mitral Valve: There is trace regurgitation.    Tricuspid Valve: There is trace regurgitation.      Asia Hodge MD   12/02/2023

## 2023-12-02 NOTE — PROGRESS NOTES
NEPHROLOGY PROGRESS NOTE      Patient Demographics:  Name:  Kamran Huerta  Age: 48 y.o.  MRN:  [id number]  Admission Date: 11/25/2023      Subjective:      Doing ok  Edema is better  Excellent output    Renal status is improved    Current Facility-Administered Medications   Medication Dose Route Frequency Provider Last Rate Last Admin    acetaminophen tablet 650 mg  650 mg Oral Q6H PRN Charlette Vela AGACNP-BC        aluminum-magnesium hydroxide-simethicone 200-200-20 mg/5 mL suspension 30 mL  30 mL Oral QID PRN Charlette Vela AGACNP-BC        atenoloL tablet 50 mg  50 mg Oral BID Charlette Vela AGACNP-BC   50 mg at 12/02/23 0835    cefdinir capsule 300 mg  300 mg Oral Q12H Asia Hodge MD   300 mg at 12/02/23 0834    dextrose 10% bolus 125 mL 125 mL  12.5 g Intravenous PRN Siomara Brown, FNP        dextrose 10% bolus 125 mL 125 mL  12.5 g Intravenous PRN Dieudonne Montemayor MD        dextrose 10% bolus 250 mL 250 mL  25 g Intravenous PRN Siomara Brown, FNP   Stopped at 11/25/23 1403    dextrose 10% bolus 250 mL 250 mL  25 g Intravenous PRN Dieudonne Montemayor MD        doxazosin tablet 2 mg  2 mg Oral QHS Leann Olsen MD   2 mg at 12/01/23 2043    DULoxetine DR capsule 20 mg  20 mg Oral BID Dieudonne Montemayor MD   20 mg at 12/02/23 0835    enoxaparin injection 40 mg  40 mg Subcutaneous Daily Charlette Vela AGACNP-BC   40 mg at 12/01/23 1635    ergocalciferol capsule 50,000 Units  50,000 Units Oral Q72H Jaqueline Avina AGNP   50,000 Units at 12/01/23 0901    furosemide (LASIX) 200 mg in sodium chloride 0.9% SolP 100 mL continuous infusion (conc: 2 mg/mL)  15 mg/hr Intravenous Continuous Jaqueline Avina AGNP 7.5 mL/hr at 12/01/23 2326 15 mg/hr at 12/01/23 2326    glucagon (human recombinant) injection 1 mg  1 mg Intramuscular PRN Dieudonne Montemayor MD        glucose chewable tablet 16 g  16 g Oral PRN Dieudonne Montemayor MD        glucose chewable tablet 24 g  24 g  Oral PRN Dieudonne Montemayor MD   24 g at 11/28/23 1057    hydrALAZINE injection 10 mg  10 mg Intravenous Q4H PRN Charlette Vela AGACNP-BC   10 mg at 11/25/23 0621    insulin aspart U-100 injection 0-5 Units  0-5 Units Subcutaneous QID (AC + HS) PRN Dieudonne Montemayor MD   1 Units at 11/30/23 2135    insulin detemir U-100 injection 16 Units  16 Units Subcutaneous QHS Dieudonne Montemayor MD   16 Units at 12/01/23 2101    labetaloL injection 10 mg  10 mg Intravenous Q2H PRN Charlette Vela AGACNP-BC        levETIRAcetam tablet 500 mg  500 mg Oral TID Charlette Vela AGACNP-BC   500 mg at 12/02/23 0835    lipase-protease-amylase 12,000-38,000-60,000 units per capsule 6 capsule  6 capsule Oral TID Dieudonne Montemayor MD   6 capsule at 12/02/23 0834    melatonin tablet 6 mg  6 mg Oral Nightly PRN Charlette Vela AGACNP-BC   6 mg at 11/30/23 2010    metOLazone tablet 5 mg  5 mg Oral Daily Jaqueline Avina AGNP   5 mg at 12/02/23 0835    miconazole NITRATE 2 % top powder   Topical (Top) BID Dieudonne Montemayor MD   Given at 12/02/23 0835    morphine injection 2 mg  2 mg Intravenous Q4H PRN Dieudonne Montemayor MD   2 mg at 12/02/23 0917    naloxone 0.4 mg/mL injection 0.02 mg  0.02 mg Intravenous PRN Charlette Vela AGACNP-BC        NIFEdipine 24 hr tablet 90 mg  90 mg Oral Daily Asia Hodge MD   90 mg at 12/02/23 0835    ondansetron injection 4 mg  4 mg Intravenous Q4H PRN Charlette Vela AGACNP-BC   4 mg at 11/25/23 0827    oxyCODONE immediate release tablet 10 mg  10 mg Oral Q4H PRN Siomara Brown FNP   10 mg at 12/02/23 0736    perflutren lipid microspheres injection 1.4 mL  1.4 mL Intravenous Once Jose Curry MD        polyethylene glycol packet 17 g  17 g Oral BID PRN [facility]lette [facility]ela [facility]        pregabalin capsule 75 mg  75 mg Oral QHS Dieudonne Montemayor MD   75 mg at 12/01/23 2042    prochlorperazine injection Soln 5 mg  5 mg Intravenous Q6H PRN Charlette Vela,  "AGACNP-BC        simethicone chewable tablet 80 mg  1 tablet Oral QID PRN Charlette Vela, AGACNP-BC        sodium bicarbonate tablet 1,300 mg  1,300 mg Oral BID Dieudonne Montemayor MD   1,300 mg at 12/02/23 0834    white petrolatum 41 % ointment   Topical (Top) PRN Siomara Brown, FNP        white petrolatum 41 % ointment   Topical (Top) BID Dieudonne Montemayor MD   Given at 12/02/23 0835    zinc oxide-cod liver oil 40 % paste   Topical (Top) BID Dieudonne Montemayor MD   Given at 12/02/23 0836           Review of Systems   Constitutional:  Positive for malaise/fatigue.   HENT: Negative.     Eyes: Negative.    Respiratory:  Positive for shortness of breath.    Cardiovascular:  Positive for leg swelling.   Gastrointestinal: Negative.    Genitourinary: Negative.    Musculoskeletal: Negative.    Skin: Negative.    Neurological:  Positive for weakness.         Objective:    BP (!) 158/93   Pulse 69   Temp 98.3 °F (36.8 °C) (Oral)   Resp 17   Ht 5' 7.72" (1.72 m)   Wt 127 kg (280 lb)   SpO2 99%   BMI 42.93 kg/m²       Intake/Output Summary (Last 24 hours) at 12/2/2023 1022  Last data filed at 12/2/2023 0832  Gross per 24 hour   Intake 480 ml   Output 5400 ml   Net -4920 ml             Physical Exam  Vitals reviewed.   Constitutional:       Appearance: Normal appearance.   HENT:      Head: Normocephalic and atraumatic.      Nose: Nose normal.   Eyes:      Extraocular Movements: Extraocular movements intact.      Pupils: Pupils are equal, round, and reactive to light.   Cardiovascular:      Rate and Rhythm: Normal rate and regular rhythm.      Pulses: Normal pulses.      Heart sounds: Normal heart sounds.   Pulmonary:      Effort: Pulmonary effort is normal.      Comments: Sl diminished bases  Abdominal:      General: Bowel sounds are normal.      Palpations: Abdomen is soft.   Musculoskeletal:         General: Normal range of motion.      Cervical back: Normal range of motion.      Right lower leg: No " edema.      Left lower leg: Edema present.      Comments: Some deconditioning   Skin:     General: Skin is warm and dry.   Neurological:      General: No focal deficit present.      Mental Status: He is alert and oriented to person, place, and time. Mental status is at baseline.   Psychiatric:         Mood and Affect: Mood normal.            Inpatient Diagnostics:  Recent Results (from the past 24 hour(s))   POCT glucose    Collection Time: 12/01/23 11:16 AM   Result Value Ref Range    POCT Glucose 127 (H) 70 - 110 mg/dL   POCT glucose    Collection Time: 12/01/23  7:50 PM   Result Value Ref Range    POCT Glucose 215 (H) 70 - 110 mg/dL   POCT glucose    Collection Time: 12/02/23  4:13 AM   Result Value Ref Range    POCT Glucose 124 (H) 70 - 110 mg/dL   Basic Metabolic Panel    Collection Time: 12/02/23  6:20 AM   Result Value Ref Range    Sodium Level 143 136 - 145 mmol/L    Potassium Level 4.6 3.5 - 5.1 mmol/L    Chloride 112 (H) 98 - 107 mmol/L    Carbon Dioxide 24 22 - 29 mmol/L    Glucose Level 117 (H) 74 - 100 mg/dL    Blood Urea Nitrogen 38.2 (H) 8.9 - 20.6 mg/dL    Creatinine 2.39 (H) 0.73 - 1.18 mg/dL    BUN/Creatinine Ratio 16     Calcium Level Total 7.4 (L) 8.4 - 10.2 mg/dL    Anion Gap 7.0 mEq/L    eGFR 33 mls/min/1.73/m2   CBC with Differential    Collection Time: 12/02/23  6:20 AM   Result Value Ref Range    WBC 8.17 4.50 - 11.50 x10(3)/mcL    RBC 2.87 (L) 4.70 - 6.10 x10(6)/mcL    Hgb 8.2 (L) 14.0 - 18.0 g/dL    Hct 25.4 (L) 42.0 - 52.0 %    MCV 88.5 80.0 - 94.0 fL    MCH 28.6 27.0 - 31.0 pg    MCHC 32.3 (L) 33.0 - 36.0 g/dL    RDW 13.5 11.5 - 17.0 %    Platelet 220 130 - 400 x10(3)/mcL    MPV 12.3 (H) 7.4 - 10.4 fL    Neut % 57.5 %    Lymph % 29.9 %    Mono % 8.7 %    Eos % 3.1 %    Basophil % 0.6 %    Lymph # 2.44 0.6 - 4.6 x10(3)/mcL    Neut # 4.70 2.1 - 9.2 x10(3)/mcL    Mono # 0.71 0.1 - 1.3 x10(3)/mcL    Eos # 0.25 0 - 0.9 x10(3)/mcL    Baso # 0.05 <=0.2 x10(3)/mcL    IG# 0.02 0 - 0.04 x10(3)/mcL     IG% 0.2 %    NRBC% 0.0 %     A/P--NE 12/02    1---FABIO/ Fluid Overload/ CKD III---Indices sl better today/ Excellent output  Baseline Creat 1.8 or so  2---Anasarca--Improved/ Cont Lasix gtt for now/ Zaroxolyn ordered  3---Type II DM--Cont same management  4---HTN---Controlled  5---ESBL E Coli UTI--Cont Cefdinir  6---Anemia secondary to Acute Illness---Fe studies show some Def--Follow H&H--Procrit today    IV--NS (100cc) + Lasix 200mg at 7.5cc/hr    ORDERS:  DC PO Bicarb  Ferrlecit  Procrit today  CBC/CMP in am          Geena Nettles DNP, ANP-C    12/2/2023

## 2023-12-03 LAB
ALBUMIN SERPL-MCNC: 1.7 G/DL (ref 3.5–5)
ALBUMIN/GLOB SERPL: 0.6 RATIO (ref 1.1–2)
ALP SERPL-CCNC: 93 UNIT/L (ref 40–150)
ALT SERPL-CCNC: 12 UNIT/L (ref 0–55)
AST SERPL-CCNC: 15 UNIT/L (ref 5–34)
BASOPHILS # BLD AUTO: 0.06 X10(3)/MCL
BASOPHILS NFR BLD AUTO: 0.7 %
BILIRUB SERPL-MCNC: 0.2 MG/DL
BUN SERPL-MCNC: 40.1 MG/DL (ref 8.9–20.6)
CALCIUM SERPL-MCNC: 7.1 MG/DL (ref 8.4–10.2)
CHLORIDE SERPL-SCNC: 111 MMOL/L (ref 98–107)
CO2 SERPL-SCNC: 24 MMOL/L (ref 22–29)
CREAT SERPL-MCNC: 2.32 MG/DL (ref 0.73–1.18)
EOSINOPHIL # BLD AUTO: 0.34 X10(3)/MCL (ref 0–0.9)
EOSINOPHIL NFR BLD AUTO: 3.8 %
ERYTHROCYTE [DISTWIDTH] IN BLOOD BY AUTOMATED COUNT: 13.6 % (ref 11.5–17)
GFR SERPLBLD CREATININE-BSD FMLA CKD-EPI: 34 MLS/MIN/1.73/M2
GLOBULIN SER-MCNC: 2.8 GM/DL (ref 2.4–3.5)
GLUCOSE SERPL-MCNC: 111 MG/DL (ref 74–100)
HCT VFR BLD AUTO: 26.6 % (ref 42–52)
HGB BLD-MCNC: 8.4 G/DL (ref 14–18)
IMM GRANULOCYTES # BLD AUTO: 0.04 X10(3)/MCL (ref 0–0.04)
IMM GRANULOCYTES NFR BLD AUTO: 0.4 %
LYMPHOCYTES # BLD AUTO: 2.93 X10(3)/MCL (ref 0.6–4.6)
LYMPHOCYTES NFR BLD AUTO: 32.6 %
MCH RBC QN AUTO: 28.6 PG (ref 27–31)
MCHC RBC AUTO-ENTMCNC: 31.6 G/DL (ref 33–36)
MCV RBC AUTO: 90.5 FL (ref 80–94)
MONOCYTES # BLD AUTO: 0.89 X10(3)/MCL (ref 0.1–1.3)
MONOCYTES NFR BLD AUTO: 9.9 %
NEUTROPHILS # BLD AUTO: 4.73 X10(3)/MCL (ref 2.1–9.2)
NEUTROPHILS NFR BLD AUTO: 52.6 %
NRBC BLD AUTO-RTO: 0 %
PLATELET # BLD AUTO: 231 X10(3)/MCL (ref 130–400)
PMV BLD AUTO: 12.1 FL (ref 7.4–10.4)
POCT GLUCOSE: 130 MG/DL (ref 70–110)
POCT GLUCOSE: 75 MG/DL (ref 70–110)
POCT GLUCOSE: 97 MG/DL (ref 70–110)
POCT GLUCOSE: 99 MG/DL (ref 70–110)
POTASSIUM SERPL-SCNC: 4.1 MMOL/L (ref 3.5–5.1)
PROT SERPL-MCNC: 4.5 GM/DL (ref 6.4–8.3)
RBC # BLD AUTO: 2.94 X10(6)/MCL (ref 4.7–6.1)
SODIUM SERPL-SCNC: 142 MMOL/L (ref 136–145)
WBC # SPEC AUTO: 8.99 X10(3)/MCL (ref 4.5–11.5)

## 2023-12-03 PROCEDURE — 25000003 PHARM REV CODE 250: Performed by: INTERNAL MEDICINE

## 2023-12-03 PROCEDURE — 25000003 PHARM REV CODE 250: Performed by: NURSE PRACTITIONER

## 2023-12-03 PROCEDURE — 63600175 PHARM REV CODE 636 W HCPCS: Performed by: NURSE PRACTITIONER

## 2023-12-03 PROCEDURE — 63600150 PHARM REV CODE 636: Performed by: NURSE PRACTITIONER

## 2023-12-03 PROCEDURE — 63600175 PHARM REV CODE 636 W HCPCS: Performed by: INTERNAL MEDICINE

## 2023-12-03 PROCEDURE — 27000207 HC ISOLATION

## 2023-12-03 PROCEDURE — 80053 COMPREHEN METABOLIC PANEL: CPT | Performed by: NURSE PRACTITIONER

## 2023-12-03 PROCEDURE — 85025 COMPLETE CBC W/AUTO DIFF WBC: CPT | Performed by: NURSE PRACTITIONER

## 2023-12-03 PROCEDURE — 21400001 HC TELEMETRY ROOM

## 2023-12-03 RX ORDER — FUROSEMIDE 10 MG/ML
40 INJECTION INTRAMUSCULAR; INTRAVENOUS
Status: DISCONTINUED | OUTPATIENT
Start: 2023-12-03 | End: 2023-12-04

## 2023-12-03 RX ADMIN — FUROSEMIDE 40 MG: 10 INJECTION, SOLUTION INTRAMUSCULAR; INTRAVENOUS at 10:12

## 2023-12-03 RX ADMIN — PANCRELIPASE 6 CAPSULE: 60000; 12000; 38000 CAPSULE, DELAYED RELEASE PELLETS ORAL at 08:12

## 2023-12-03 RX ADMIN — DULOXETINE HYDROCHLORIDE 20 MG: 20 CAPSULE, DELAYED RELEASE ORAL at 08:12

## 2023-12-03 RX ADMIN — ENOXAPARIN SODIUM 40 MG: 40 INJECTION SUBCUTANEOUS at 04:12

## 2023-12-03 RX ADMIN — MICONAZOLE NITRATE: 20 POWDER TOPICAL at 08:12

## 2023-12-03 RX ADMIN — LEVETIRACETAM 500 MG: 500 TABLET, FILM COATED ORAL at 08:12

## 2023-12-03 RX ADMIN — ATENOLOL 50 MG: 50 TABLET ORAL at 08:12

## 2023-12-03 RX ADMIN — MORPHINE SULFATE 2 MG: 4 INJECTION, SOLUTION INTRAMUSCULAR; INTRAVENOUS at 06:12

## 2023-12-03 RX ADMIN — OXYCODONE HYDROCHLORIDE 10 MG: 5 TABLET ORAL at 08:12

## 2023-12-03 RX ADMIN — FUROSEMIDE 15 MG/HR: 10 INJECTION, SOLUTION INTRAMUSCULAR; INTRAVENOUS at 07:12

## 2023-12-03 RX ADMIN — MORPHINE SULFATE 2 MG: 4 INJECTION, SOLUTION INTRAMUSCULAR; INTRAVENOUS at 11:12

## 2023-12-03 RX ADMIN — Medication: at 08:12

## 2023-12-03 RX ADMIN — PANCRELIPASE 6 CAPSULE: 60000; 12000; 38000 CAPSULE, DELAYED RELEASE PELLETS ORAL at 02:12

## 2023-12-03 RX ADMIN — WHITE PETROLATUM: 1.75 OINTMENT TOPICAL at 08:12

## 2023-12-03 RX ADMIN — OXYCODONE HYDROCHLORIDE 10 MG: 5 TABLET ORAL at 02:12

## 2023-12-03 RX ADMIN — SODIUM CHLORIDE 125 MG: 9 INJECTION, SOLUTION INTRAVENOUS at 09:12

## 2023-12-03 RX ADMIN — CEFDINIR 300 MG: 300 CAPSULE ORAL at 08:12

## 2023-12-03 RX ADMIN — Medication 6 MG: at 08:12

## 2023-12-03 RX ADMIN — DOXAZOSIN 2 MG: 1 TABLET ORAL at 08:12

## 2023-12-03 RX ADMIN — NIFEDIPINE 90 MG: 30 TABLET, FILM COATED, EXTENDED RELEASE ORAL at 08:12

## 2023-12-03 RX ADMIN — PREGABALIN 75 MG: 50 CAPSULE ORAL at 08:12

## 2023-12-03 RX ADMIN — MORPHINE SULFATE 2 MG: 4 INJECTION, SOLUTION INTRAMUSCULAR; INTRAVENOUS at 10:12

## 2023-12-03 RX ADMIN — MORPHINE SULFATE 2 MG: 4 INJECTION, SOLUTION INTRAMUSCULAR; INTRAVENOUS at 12:12

## 2023-12-03 RX ADMIN — OXYCODONE HYDROCHLORIDE 10 MG: 5 TABLET ORAL at 01:12

## 2023-12-03 RX ADMIN — LEVETIRACETAM 500 MG: 500 TABLET, FILM COATED ORAL at 02:12

## 2023-12-03 RX ADMIN — METOLAZONE 5 MG: 2.5 TABLET ORAL at 08:12

## 2023-12-03 NOTE — PROGRESS NOTES
[facility] Hospital Medicine Progress Note        Chief Complaint: Inpatient Follow-up for     HPI: 48 y.o. male who PMH includes CHF, chronic back pain wheelchair-bound, DM type 2, HTN, seizures, CVA, CKD stage 3, alcoholic liver disease with chronic pancreatitis, presents to the ED at [facility] on 11/25/2023 with a primary complaint of weakness, dizziness lightheadedness and fall out if his wheelchair striking his mouth; denies any LOC.  PT had back surgery years ago and reports has been wheelchair bound since then. No reports of seizures.  No CP, SOB, N/V/D, fever, chills cough congestion or any sick contacts. Labs reviewed demonstrated WBC 14.42, HH 12.2/37.2, sed rate 93 , CO2 18 Bun 25.7, Creat 2.31, glucose 52 Mg+ 1.0, , AST 36 reports of chest pain, CRP shortness a breath, nausea, vomiting, diarrhea5, fever., denntu56, cough, congestion, or any sick, contacts. B no reportsP of chest pain, 109.7; other indicis unremarkable. CXR impression reviewed demonstrated  no acute cardiopulmonary abnormality. CT of head without contrast impression reviewed demonstrated no appreciable acute intracranial abnormality. Ct maxillofacial without contrast impression reviewed demonstrated no appreciate acute traumatic osseous abnormality, bilateral mastoid effusions. CT cervical spine without contrast impression reviewed demonstrated no appreciable acute osseous abnormality by CT evaluation, degenerative changes at the cervical spine. CT thoracic spine without contrast impression reviewed demonstrated no acute osseous abnormality. CT of lumbar spine without contrast impression reviewed demonstrated no appreciable fracture or acute osseous abnormality, severe degenerative change at L5-S1 progressed, endplate sclerosis disc space narrowing and osseous erosions which may be degenerative or infectious/inflammatory, anasarca.   Initial VS /105 P 75 R 13 T 98.7F O2 saturation 98% on room  air. Pt received multiple doses of pain medication , D50 for hypoglycemia, 40 mg lasix,  and magnesium rider in the ED. Neurosurgery services consulted. Pt awaiting MRI. Pt is admitted to hospital medicine services for further managemen    Interval Hx:    Patient seen and examined no complaints this morning  Objective/physical exam:  General: In no acute distress, afebrile  Chest: Clear to auscultation bilaterally  Heart: RRR, +S1, S2, no appreciable murmur  Abdomen: Soft, nontender, BS +  MSK: Warm,  3+ edema in bilateral lower extremity  Neurologic: Alert and oriented   VITAL SIGNS: 24 HRS MIN & MAX LAST   Temp  Min: 97.5 °F (36.4 °C)  Max: 98.6 °F (37 °C) 98.1 °F (36.7 °C)   BP  Min: 132/82  Max: 167/83 (!) 166/97   Pulse  Min: 65  Max: 79  72   Resp  Min: 16  Max: 20 18   SpO2  Min: 96 %  Max: 99 % 96 %     I have reviewed the following labs:  Recent Labs   Lab 12/01/23  0950 12/02/23  0620 12/03/23  0530   WBC 9.31 8.17 8.99   RBC 3.01* 2.87* 2.94*   HGB 8.7* 8.2* 8.4*   HCT 26.6* 25.4* 26.6*   MCV 88.4 88.5 90.5   MCH 28.9 28.6 28.6   MCHC 32.7* 32.3* 31.6*   RDW 13.7 13.5 13.6    220 231   MPV 11.7* 12.3* 12.1*       Recent Labs   Lab 11/27/23  0401 11/28/23  0420 11/30/23  0408 12/01/23  0730 12/02/23  0620 12/03/23  0530      < > 142 142 143 142   K 4.7   < > 4.6 4.7 4.6 4.1   CO2 19*   < > 20* 20* 24 24   BUN 32.1*   < > 36.3* 37.8* 38.2* 40.1*   CREATININE 2.80*   < > 2.53* 2.46* 2.39* 2.32*   CALCIUM 6.3*   < > 7.2* 7.3* 7.4* 7.1*   MG 1.30*  --   --   --   --   --    ALBUMIN 1.3*  --  1.7*  --   --  1.7*   ALKPHOS 167*  --  108  --   --  93   ALT 31  --  15  --   --  12   AST 26  --  16  --   --  15   BILITOT 0.2  --  0.3  --   --  0.2    < > = values in this interval not displayed.       Microbiology Results (last 7 days)       Procedure Component Value Units Date/Time    Blood Culture [[id number]]  (Normal) Collected: 11/25/23 1344    Order Status: Completed Specimen: Blood Updated:  12/01/23 1125     CULTURE, BLOOD ([facility]) Final Report: At 5 days. No growth    Blood Culture [[id number]]  (Normal) Collected: 11/25/23 1344    Order Status: Completed Specimen: Blood Updated: 12/01/23 1125     CULTURE, BLOOD ([facility]) Final Report: At 5 days. No growth    Urine culture [[id number]]  (Abnormal)  (Susceptibility) Collected: 11/27/23 1047    Order Status: Completed Specimen: Urine Updated: 12/01/23 0828     Urine Culture >/= 100,000 colonies/ml Escherichia coli ESBL    Narrative:      Fosfomycin 0.25 sensitive             See below for Radiology    Scheduled Med:   atenoloL  50 mg Oral BID    cefdinir  300 mg Oral Q12H    doxazosin  2 mg Oral QHS    DULoxetine  20 mg Oral BID    enoxparin  40 mg Subcutaneous Daily    ergocalciferol  50,000 Units Oral Q72H    ferric gluconate (FERRLECIT) 125 mg in sodium chloride 0.9% 100 mL IVPB  125 mg Intravenous Daily    furosemide (LASIX) injection  40 mg Intravenous Q12H    insulin detemir U-100  16 Units Subcutaneous QHS    levETIRAcetam  500 mg Oral TID    lipase-protease-amylase 12,000-38,000-60,000 units  6 capsule Oral TID    metOLazone  5 mg Oral Daily    miconazole NITRATE 2 %   Topical (Top) BID    NIFEdipine  90 mg Oral Daily    perflutren lipid microspheres  1.4 mL Intravenous Once    pregabalin  75 mg Oral QHS    white petrolatum   Topical (Top) BID    zinc oxide-cod liver oil   Topical (Top) BID      Continuous Infusions:       PRN Meds:  acetaminophen, aluminum-magnesium hydroxide-simethicone, dextrose 10%, dextrose 10%, dextrose 10%, dextrose 10%, glucagon (human recombinant), glucose, glucose, hydrALAZINE, insulin aspart U-100, labetaloL, melatonin, morphine, naloxone, ondansetron, oxyCODONE, polyethylene glycol, prochlorperazine, simethicone, white petrolatum     Assessment/Plan:  Acute on chronic lower back pain- intractable- suspected osteomyelitis  Dizziness, Fall- from wheel chair  Hypoglycemia- recurrent-resolved  Acute on chronic kidney disease  stage III  Anasarca with volume overload  with peripheral edema  Hypertensive urgency at admission-resolving     HX: Wheelchair-bound, history of CVA with residual left-sided deficits, left BKA, chronic alcoholic liver disease, chronic seizures, T2 dm A1c 5.5     Plan:  Symptomatically patient is improving  On IV Lasix and albumin continue with strict input and output creatinine continues to improve this morning it is 2.3   MRI of the L-spine was performed but they were not able to see much because there was too much of fluid    Continue with other home medications that includes atenolol, doxazosin, duloxetine, Levemir 16 units, Keppra, metolazone, pregabalin  Started on Rocephin f urine cultures grew E coli and that is sensitive to Rocephin but switch to p.o. Omnicef     VTE prophylaxis: lovenox    Patient condition:  Stable/Fair/Guarded/ Serious/ Critical    Anticipated discharge and Disposition:         All diagnosis and differential diagnosis have been reviewed; assessment and plan has been documented; I have personally reviewed the labs and test results that are presently available; I have reviewed the patients medication list; I have reviewed the consulting providers response and recommendations. I have reviewed or attempted to review medical records based upon their availability    All of the patient's questions have been  addressed and answered. Patient's is agreeable to the above stated plan. I will continue to monitor closely and make adjustments to medical management as needed.  _____________________________________________________________________    Nutrition Status:    Radiology:  I have personally reviewed the following imaging and agree with the radiologist.     Echo    Left Ventricle: The left ventricle is normal in size. Increased wall   thickness. There is moderate concentric hypertrophy. Normal wall motion.   There is normal systolic function with a visually estimated ejection   fraction of 60 -  65%. There is normal diastolic function.    Right Ventricle: Normal right ventricular cavity size. Systolic   function is normal.    Left Atrium: Left atrium is mildly dilated.    Mitral Valve: There is trace regurgitation.    Tricuspid Valve: There is trace regurgitation.      Asia Hodge MD   12/03/2023

## 2023-12-03 NOTE — PROGRESS NOTES
NEPHROLOGY PROGRESS NOTE      Patient Demographics:  Name:  Kamran Huerta  Age: 48 y.o.  MRN:  [id number]  Admission Date: 11/25/2023      Subjective:      Doing ok  BRISK output last 24 hours    Renal status is about the same    Current Facility-Administered Medications   Medication Dose Route Frequency Provider Last Rate Last Admin    acetaminophen tablet 650 mg  650 mg Oral Q6H PRN Charlette Vela AGACNP-BC        aluminum-magnesium hydroxide-simethicone 200-200-20 mg/5 mL suspension 30 mL  30 mL Oral QID PRN Charlette Vela, AGACNP-BC        atenoloL tablet 50 mg  50 mg Oral BID Charlette Vela AGACNP-BC   50 mg at 12/03/23 0852    cefdinir capsule 300 mg  300 mg Oral Q12H Asia Hodge MD   300 mg at 12/03/23 0852    dextrose 10% bolus 125 mL 125 mL  12.5 g Intravenous PRN Siomara Brown, FNP        dextrose 10% bolus 125 mL 125 mL  12.5 g Intravenous PRN Dieudonne Montemayor MD        dextrose 10% bolus 250 mL 250 mL  25 g Intravenous PRN Siomara Brown, FNP   Stopped at 11/25/23 1403    dextrose 10% bolus 250 mL 250 mL  25 g Intravenous PRN Dieudonne Montemayor MD        doxazosin tablet 2 mg  2 mg Oral QHS Leann Olsen MD   2 mg at 12/02/23 2118    DULoxetine DR capsule 20 mg  20 mg Oral BID Dieudonne Montemayor MD   20 mg at 12/03/23 0852    enoxaparin injection 40 mg  40 mg Subcutaneous Daily Charlette Vela AGACNP-BC   40 mg at 12/02/23 1624    ergocalciferol capsule 50,000 Units  50,000 Units Oral Q72H Jaqueline Avina AGNP   50,000 Units at 12/01/23 0901    ferric gluconate (FERRLECIT) 125 mg in sodium chloride 0.9% 100 mL IVPB  125 mg Intravenous Daily Geena Nettles,  mL/hr at 12/03/23 0919 125 mg at 12/03/23 0919    furosemide (LASIX) 200 mg in sodium chloride 0.9% SolP 100 mL continuous infusion (conc: 2 mg/mL)  15 mg/hr Intravenous Continuous Jaqueline Avina AGNP 7.5 mL/hr at 12/03/23 0754 15 mg/hr at 12/03/23 0754    glucagon (human recombinant)  injection 1 mg  1 mg Intramuscular PRN Dieudonne Montemayor MD        glucose chewable tablet 16 g  16 g Oral PRN Dieudonne Montemayor MD        glucose chewable tablet 24 g  24 g Oral PRN Dieudonne Montemayor MD   24 g at 11/28/23 1057    hydrALAZINE injection 10 mg  10 mg Intravenous Q4H PRN Charlette Vela AGACNP-BC   10 mg at 11/25/23 0621    insulin aspart U-100 injection 0-5 Units  0-5 Units Subcutaneous QID (AC + HS) PRN Dieudonne Montemayor MD   1 Units at 12/02/23 2128    insulin detemir U-100 injection 16 Units  16 Units Subcutaneous QHS Dieudonne Montemayor MD   16 Units at 12/02/23 2128    labetaloL injection 10 mg  10 mg Intravenous Q2H PRN Charlette Vela AGACNP-BC        levETIRAcetam tablet 500 mg  500 mg Oral TID Charlette Vela AGACNP-BC   500 mg at 12/03/23 0851    lipase-protease-amylase 12,000-38,000-60,000 units per capsule 6 capsule  6 capsule Oral TID Dieudonne Montemayor MD   6 capsule at 12/03/23 0852    melatonin tablet 6 mg  6 mg Oral Nightly PRN Charlette Vela AGACNP-BC   6 mg at 12/02/23 2122    metOLazone tablet 5 mg  5 mg Oral Daily Jaqueline Avina, AGNP   5 mg at 12/03/23 0852    miconazole NITRATE 2 % top powder   Topical (Top) BID Dieudonne Montemayor MD   Given at 12/03/23 0852    morphine injection 2 mg  2 mg Intravenous Q4H PRN Dieudonne Montemayor MD   2 mg at 12/03/23 0657    naloxone 0.4 mg/mL injection 0.02 mg  0.02 mg Intravenous PRN Charlette Vela AGACNP-BC        NIFEdipine 24 hr tablet 90 mg  90 mg Oral Daily Asia Hodge MD   90 mg at 12/03/23 0851    ondansetron injection 4 mg  4 mg Intravenous Q4H PRN Charlette Vela AGACNP-BC   4 mg at 11/25/23 0827    oxyCODONE immediate release tablet 10 mg  10 mg Oral Q4H PRN Siomara Brown FNP   10 mg at 12/03/23 0851    perflutren lipid microspheres injection 1.4 mL  1.4 mL Intravenous Once Jose Curry MD        polyethylene glycol packet 17 g  17 g Oral BID PRN Charlette Vela AGACNP-BC         "pregabalin capsule 75 mg  75 mg Oral QHS Dieudonne Montemayor MD   75 mg at 12/02/23 2121    prochlorperazine injection Soln 5 mg  5 mg Intravenous Q6H PRN Charlette Vela AGACNP-BC        simethicone chewable tablet 80 mg  1 tablet Oral QID PRN Charlette Vela AGACNP-BC        white petrolatum 41 % ointment   Topical (Top) PRN Siomara Brown FNP        white petrolatum 41 % ointment   Topical (Top) BID Dieudonne Montemayor MD   Given at 12/03/23 0852    zinc oxide-cod liver oil 40 % paste   Topical (Top) BID Dieudonne Montemayor MD   Given at 12/03/23 0852           Review of Systems   Constitutional:  Positive for malaise/fatigue.   HENT: Negative.     Eyes: Negative.    Respiratory: Negative.     Cardiovascular:  Positive for leg swelling.   Gastrointestinal: Negative.    Genitourinary: Negative.    Musculoskeletal: Negative.    Skin: Negative.    Neurological:  Positive for weakness.         Objective:    BP (!) 161/88   Pulse 65   Temp 97.5 °F (36.4 °C) (Oral)   Resp 20   Ht 5' 7.72" (1.72 m)   Wt 127 kg (280 lb)   SpO2 98%   BMI 42.93 kg/m²       Intake/Output Summary (Last 24 hours) at 12/3/2023 0940  Last data filed at 12/3/2023 0858  Gross per 24 hour   Intake 360 ml   Output 3952 ml   Net -3592 ml             Physical Exam  Vitals reviewed.   Constitutional:       Appearance: Normal appearance.   HENT:      Head: Normocephalic and atraumatic.      Nose: Nose normal.   Eyes:      Extraocular Movements: Extraocular movements intact.   Cardiovascular:      Rate and Rhythm: Normal rate and regular rhythm.      Pulses: Normal pulses.      Heart sounds: Normal heart sounds.   Pulmonary:      Effort: Pulmonary effort is normal.      Breath sounds: Normal breath sounds.   Abdominal:      General: Bowel sounds are normal.      Palpations: Abdomen is soft.   Musculoskeletal:         General: Normal range of motion.      Cervical back: Normal range of motion.      Right lower leg: Edema present.      " Left lower leg: Edema present.      Comments: Sig deconditioning   Skin:     General: Skin is warm and dry.   Neurological:      General: No focal deficit present.      Mental Status: He is alert and oriented to person, place, and time. Mental status is at baseline.   Psychiatric:         Mood and Affect: Mood normal.            Inpatient Diagnostics:  Recent Results (from the past 24 hour(s))   POCT glucose    Collection Time: 12/02/23 11:30 AM   Result Value Ref Range    POCT Glucose 113 (H) 70 - 110 mg/dL   POCT glucose    Collection Time: 12/02/23  4:25 PM   Result Value Ref Range    POCT Glucose 177 (H) 70 - 110 mg/dL   POCT glucose    Collection Time: 12/02/23  7:31 PM   Result Value Ref Range    POCT Glucose 202 (H) 70 - 110 mg/dL   POCT glucose    Collection Time: 12/03/23  4:22 AM   Result Value Ref Range    POCT Glucose 130 (H) 70 - 110 mg/dL   Comprehensive Metabolic Panel    Collection Time: 12/03/23  5:30 AM   Result Value Ref Range    Sodium Level 142 136 - 145 mmol/L    Potassium Level 4.1 3.5 - 5.1 mmol/L    Chloride 111 (H) 98 - 107 mmol/L    Carbon Dioxide 24 22 - 29 mmol/L    Glucose Level 111 (H) 74 - 100 mg/dL    Blood Urea Nitrogen 40.1 (H) 8.9 - 20.6 mg/dL    Creatinine 2.32 (H) 0.73 - 1.18 mg/dL    Calcium Level Total 7.1 (L) 8.4 - 10.2 mg/dL    Protein Total 4.5 (L) 6.4 - 8.3 gm/dL    Albumin Level 1.7 (L) 3.5 - 5.0 g/dL    Globulin 2.8 2.4 - 3.5 gm/dL    Albumin/Globulin Ratio 0.6 (L) 1.1 - 2.0 ratio    Bilirubin Total 0.2 <=1.5 mg/dL    Alkaline Phosphatase 93 40 - 150 unit/L    Alanine Aminotransferase 12 0 - 55 unit/L    Aspartate Aminotransferase 15 5 - 34 unit/L    eGFR 34 mls/min/1.73/m2   CBC with Differential    Collection Time: 12/03/23  5:30 AM   Result Value Ref Range    WBC 8.99 4.50 - 11.50 x10(3)/mcL    RBC 2.94 (L) 4.70 - 6.10 x10(6)/mcL    Hgb 8.4 (L) 14.0 - 18.0 g/dL    Hct 26.6 (L) 42.0 - 52.0 %    MCV 90.5 80.0 - 94.0 fL    MCH 28.6 27.0 - 31.0 pg    MCHC 31.6 (L) 33.0 -  36.0 g/dL    RDW 13.6 11.5 - 17.0 %    Platelet 231 130 - 400 x10(3)/mcL    MPV 12.1 (H) 7.4 - 10.4 fL    Neut % 52.6 %    Lymph % 32.6 %    Mono % 9.9 %    Eos % 3.8 %    Basophil % 0.7 %    Lymph # 2.93 0.6 - 4.6 x10(3)/mcL    Neut # 4.73 2.1 - 9.2 x10(3)/mcL    Mono # 0.89 0.1 - 1.3 x10(3)/mcL    Eos # 0.34 0 - 0.9 x10(3)/mcL    Baso # 0.06 <=0.2 x10(3)/mcL    IG# 0.04 0 - 0.04 x10(3)/mcL    IG% 0.4 %    NRBC% 0.0 %     A/P--NE 12/03    1---FABIO/ Fluid Overload/ CKD III---Indices about the same today/ BRISK output  DC Lasix gtt  Baseline Creat 1.8 or so  2---Anasarca--Improved/ DC Lasix gtt for now/ Zaroxolyn ordered  3---Type II DM--Cont same management  4---HTN---Controlled  5---ESBL E Coli UTI--Cont Cefdinir  6---Anemia secondary to Acute Illness---Fe studies show some Def--Follow H&H--Procrit given yesterday     IV--NS (100cc) + Lasix 200mg at 7.5cc/hr     ORDERS:  DC Lasix gtt  Lasix IVP BID  CBC/CMP in jenny Nettles DNP, ANP-C    12/3/2023

## 2023-12-04 LAB
ALBUMIN SERPL-MCNC: 1.7 G/DL (ref 3.5–5)
ALBUMIN/GLOB SERPL: 0.6 RATIO (ref 1.1–2)
ALP SERPL-CCNC: 93 UNIT/L (ref 40–150)
ALT SERPL-CCNC: 15 UNIT/L (ref 0–55)
AST SERPL-CCNC: 20 UNIT/L (ref 5–34)
BASOPHILS # BLD AUTO: 0.04 X10(3)/MCL
BASOPHILS NFR BLD AUTO: 0.4 %
BILIRUB SERPL-MCNC: 0.3 MG/DL
BUN SERPL-MCNC: 39.3 MG/DL (ref 8.9–20.6)
CALCIUM SERPL-MCNC: 7.2 MG/DL (ref 8.4–10.2)
CHLORIDE SERPL-SCNC: 110 MMOL/L (ref 98–107)
CO2 SERPL-SCNC: 23 MMOL/L (ref 22–29)
CREAT SERPL-MCNC: 2.16 MG/DL (ref 0.73–1.18)
EOSINOPHIL # BLD AUTO: 0.31 X10(3)/MCL (ref 0–0.9)
EOSINOPHIL NFR BLD AUTO: 3.3 %
ERYTHROCYTE [DISTWIDTH] IN BLOOD BY AUTOMATED COUNT: 13.9 % (ref 11.5–17)
GFR SERPLBLD CREATININE-BSD FMLA CKD-EPI: 37 MLS/MIN/1.73/M2
GLOBULIN SER-MCNC: 2.7 GM/DL (ref 2.4–3.5)
GLUCOSE SERPL-MCNC: 113 MG/DL (ref 74–100)
HCT VFR BLD AUTO: 27.2 % (ref 42–52)
HGB BLD-MCNC: 9 G/DL (ref 14–18)
IMM GRANULOCYTES # BLD AUTO: 0.04 X10(3)/MCL (ref 0–0.04)
IMM GRANULOCYTES NFR BLD AUTO: 0.4 %
LYMPHOCYTES # BLD AUTO: 2.99 X10(3)/MCL (ref 0.6–4.6)
LYMPHOCYTES NFR BLD AUTO: 32.3 %
MCH RBC QN AUTO: 29.3 PG (ref 27–31)
MCHC RBC AUTO-ENTMCNC: 33.1 G/DL (ref 33–36)
MCV RBC AUTO: 88.6 FL (ref 80–94)
MONOCYTES # BLD AUTO: 0.79 X10(3)/MCL (ref 0.1–1.3)
MONOCYTES NFR BLD AUTO: 8.5 %
NEUTROPHILS # BLD AUTO: 5.1 X10(3)/MCL (ref 2.1–9.2)
NEUTROPHILS NFR BLD AUTO: 55.1 %
NRBC BLD AUTO-RTO: 0 %
PLATELET # BLD AUTO: 233 X10(3)/MCL (ref 130–400)
PMV BLD AUTO: 11.9 FL (ref 7.4–10.4)
POCT GLUCOSE: 122 MG/DL (ref 70–110)
POCT GLUCOSE: 133 MG/DL (ref 70–110)
POCT GLUCOSE: 166 MG/DL (ref 70–110)
POCT GLUCOSE: 183 MG/DL (ref 70–110)
POTASSIUM SERPL-SCNC: 4.4 MMOL/L (ref 3.5–5.1)
PROT SERPL-MCNC: 4.4 GM/DL (ref 6.4–8.3)
RBC # BLD AUTO: 3.07 X10(6)/MCL (ref 4.7–6.1)
SODIUM SERPL-SCNC: 140 MMOL/L (ref 136–145)
WBC # SPEC AUTO: 9.27 X10(3)/MCL (ref 4.5–11.5)

## 2023-12-04 PROCEDURE — 63600175 PHARM REV CODE 636 W HCPCS: Performed by: INTERNAL MEDICINE

## 2023-12-04 PROCEDURE — 25000003 PHARM REV CODE 250: Performed by: NURSE PRACTITIONER

## 2023-12-04 PROCEDURE — 80053 COMPREHEN METABOLIC PANEL: CPT | Performed by: NURSE PRACTITIONER

## 2023-12-04 PROCEDURE — 85025 COMPLETE CBC W/AUTO DIFF WBC: CPT | Performed by: NURSE PRACTITIONER

## 2023-12-04 PROCEDURE — 63600175 PHARM REV CODE 636 W HCPCS: Performed by: NURSE PRACTITIONER

## 2023-12-04 PROCEDURE — 25000003 PHARM REV CODE 250: Performed by: INTERNAL MEDICINE

## 2023-12-04 PROCEDURE — 27000207 HC ISOLATION

## 2023-12-04 PROCEDURE — 21400001 HC TELEMETRY ROOM

## 2023-12-04 RX ORDER — FUROSEMIDE 10 MG/ML
60 INJECTION INTRAMUSCULAR; INTRAVENOUS EVERY 8 HOURS
Status: DISCONTINUED | OUTPATIENT
Start: 2023-12-04 | End: 2023-12-12

## 2023-12-04 RX ORDER — METOLAZONE 2.5 MG/1
10 TABLET ORAL DAILY
Status: DISCONTINUED | OUTPATIENT
Start: 2023-12-05 | End: 2023-12-18

## 2023-12-04 RX ADMIN — SODIUM CHLORIDE 125 MG: 9 INJECTION, SOLUTION INTRAVENOUS at 08:12

## 2023-12-04 RX ADMIN — WHITE PETROLATUM: 1.75 OINTMENT TOPICAL at 10:12

## 2023-12-04 RX ADMIN — OXYCODONE HYDROCHLORIDE 10 MG: 5 TABLET ORAL at 01:12

## 2023-12-04 RX ADMIN — CEFDINIR 300 MG: 300 CAPSULE ORAL at 09:12

## 2023-12-04 RX ADMIN — METOLAZONE 5 MG: 2.5 TABLET ORAL at 09:12

## 2023-12-04 RX ADMIN — MORPHINE SULFATE 2 MG: 4 INJECTION, SOLUTION INTRAMUSCULAR; INTRAVENOUS at 08:12

## 2023-12-04 RX ADMIN — MORPHINE SULFATE 2 MG: 4 INJECTION, SOLUTION INTRAMUSCULAR; INTRAVENOUS at 10:12

## 2023-12-04 RX ADMIN — ATENOLOL 50 MG: 50 TABLET ORAL at 09:12

## 2023-12-04 RX ADMIN — Medication: at 10:12

## 2023-12-04 RX ADMIN — LEVETIRACETAM 500 MG: 500 TABLET, FILM COATED ORAL at 03:12

## 2023-12-04 RX ADMIN — DULOXETINE HYDROCHLORIDE 20 MG: 20 CAPSULE, DELAYED RELEASE ORAL at 09:12

## 2023-12-04 RX ADMIN — LEVETIRACETAM 500 MG: 500 TABLET, FILM COATED ORAL at 09:12

## 2023-12-04 RX ADMIN — MORPHINE SULFATE 2 MG: 4 INJECTION, SOLUTION INTRAMUSCULAR; INTRAVENOUS at 03:12

## 2023-12-04 RX ADMIN — FUROSEMIDE 60 MG: 10 INJECTION, SOLUTION INTRAMUSCULAR; INTRAVENOUS at 10:12

## 2023-12-04 RX ADMIN — WHITE PETROLATUM: 1.75 OINTMENT TOPICAL at 09:12

## 2023-12-04 RX ADMIN — MICONAZOLE NITRATE: 20 POWDER TOPICAL at 09:12

## 2023-12-04 RX ADMIN — MORPHINE SULFATE 2 MG: 4 INJECTION, SOLUTION INTRAMUSCULAR; INTRAVENOUS at 06:12

## 2023-12-04 RX ADMIN — FUROSEMIDE 60 MG: 10 INJECTION, SOLUTION INTRAMUSCULAR; INTRAVENOUS at 11:12

## 2023-12-04 RX ADMIN — PREGABALIN 75 MG: 50 CAPSULE ORAL at 09:12

## 2023-12-04 RX ADMIN — MORPHINE SULFATE 2 MG: 4 INJECTION, SOLUTION INTRAMUSCULAR; INTRAVENOUS at 01:12

## 2023-12-04 RX ADMIN — OXYCODONE HYDROCHLORIDE 10 MG: 5 TABLET ORAL at 03:12

## 2023-12-04 RX ADMIN — OXYCODONE HYDROCHLORIDE 10 MG: 5 TABLET ORAL at 06:12

## 2023-12-04 RX ADMIN — ENOXAPARIN SODIUM 40 MG: 40 INJECTION SUBCUTANEOUS at 04:12

## 2023-12-04 RX ADMIN — Medication: at 09:12

## 2023-12-04 RX ADMIN — OXYCODONE HYDROCHLORIDE 10 MG: 5 TABLET ORAL at 07:12

## 2023-12-04 RX ADMIN — PANCRELIPASE 6 CAPSULE: 60000; 12000; 38000 CAPSULE, DELAYED RELEASE PELLETS ORAL at 03:12

## 2023-12-04 RX ADMIN — MICONAZOLE NITRATE: 20 POWDER TOPICAL at 10:12

## 2023-12-04 RX ADMIN — ERGOCALCIFEROL 50000 UNITS: 1.25 CAPSULE ORAL at 09:12

## 2023-12-04 RX ADMIN — PANCRELIPASE 6 CAPSULE: 60000; 12000; 38000 CAPSULE, DELAYED RELEASE PELLETS ORAL at 09:12

## 2023-12-04 RX ADMIN — NIFEDIPINE 90 MG: 30 TABLET, FILM COATED, EXTENDED RELEASE ORAL at 09:12

## 2023-12-04 RX ADMIN — DOXAZOSIN 2 MG: 1 TABLET ORAL at 09:12

## 2023-12-04 RX ADMIN — OXYCODONE HYDROCHLORIDE 10 MG: 5 TABLET ORAL at 10:12

## 2023-12-04 RX ADMIN — INSULIN DETEMIR 16 UNITS: 100 INJECTION, SOLUTION SUBCUTANEOUS at 09:12

## 2023-12-04 NOTE — PROGRESS NOTES
[facility] - Observation Unit  Nephrology  Progress Note    Patient Name: Kamran Huerta  MRN: [id number]  Admission Date: 11/25/2023  Hospital Length of Stay: 9 days  Attending Provider: Asia Hodge MD   Primary Care Physician: Ailyn Reynolds MD  Principal Problem:<principal problem not specified>      Subjective:   HPI: This is a 48-year-old AAM seen by our service in April for nephrotic syndrome and FABIO. At that time he underwent renal biopsy revealing advanced diabetic nodular sclerosis. He was diuresed aggressively and discharged with Cr 1.8 and ability to ambulate in the trotter without oxygen or distress. Since that time he has undergone L BKA s/t gangrenous wound. He presented to the ED 11/25 s/p fall from wheelchair with resulting back pain. Neurosurgery undergoing further workup.   He was noted to have FABIO and marked hypervolemia for which nephrology has been consulted. He endorses straining to urinate which he attributes to penile and scrotal edema. Renal US without evidence of hydronephrosis but does reveal layering debris in the bladder. Unsure of dry weight    Interval History:   11/28 - urine output excellent via escobar catheter with TID IV lasix. Profoundly vitamin D deficient. Also iron deficient. UA suggestive of active infection with culture pending. He feels swelling in left arm and abdomen is somewhat improved     11/29 - UOP 2700 mL in the past 24 hours. Admit weight 127 kg. Current bed weight 122 kg.     11/30 - Diuresing well. UOP 4200 mL. He is sitting in chair watching TV on room air. No complaints.     12/1 - - Unable to obtain quality imaging from MRI due to degree of edema. Renal function stable. Weights not documented.     12/4 - - Bed weight 203#/92 kg. Excellent UOP with Lasix albumin infusion over the weekend. Still with marked hypervolemia.     Review of patient's allergies indicates:  No Known Allergies  Current Facility-Administered Medications   Medication  Frequency    acetaminophen tablet 650 mg Q6H PRN    aluminum-magnesium hydroxide-simethicone 200-200-20 mg/5 mL suspension 30 mL QID PRN    atenoloL tablet 50 mg BID    cefdinir capsule 300 mg Q12H    dextrose 10% bolus 125 mL 125 mL PRN    dextrose 10% bolus 125 mL 125 mL PRN    dextrose 10% bolus 250 mL 250 mL PRN    dextrose 10% bolus 250 mL 250 mL PRN    doxazosin tablet 2 mg QHS    DULoxetine DR capsule 20 mg BID    enoxaparin injection 40 mg Daily    ergocalciferol capsule 50,000 Units Q72H    furosemide injection 40 mg Q12H    glucagon (human recombinant) injection 1 mg PRN    glucose chewable tablet 16 g PRN    glucose chewable tablet 24 g PRN    hydrALAZINE injection 10 mg Q4H PRN    insulin aspart U-100 injection 0-5 Units QID (AC + HS) PRN    insulin detemir U-100 injection 16 Units QHS    labetaloL injection 10 mg Q2H PRN    levETIRAcetam tablet 500 mg TID    lipase-protease-amylase 12,000-38,000-60,000 units per capsule 6 capsule TID    melatonin tablet 6 mg Nightly PRN    metOLazone tablet 5 mg Daily    miconazole NITRATE 2 % top powder BID    morphine injection 2 mg Q4H PRN    naloxone 0.4 mg/mL injection 0.02 mg PRN    NIFEdipine 24 hr tablet 90 mg Daily    ondansetron injection 4 mg Q4H PRN    oxyCODONE immediate release tablet 10 mg Q4H PRN    perflutren lipid microspheres injection 1.4 mL Once    polyethylene glycol packet 17 g BID PRN    pregabalin capsule 75 mg QHS    prochlorperazine injection Soln 5 mg Q6H PRN    simethicone chewable tablet 80 mg QID PRN    white petrolatum 41 % ointment PRN    white petrolatum 41 % ointment BID    zinc oxide-cod liver oil 40 % paste BID       Objective:     Vital Signs (Most Recent):  Temp: 97.7 °F (36.5 °C) (12/04/23 1055)  Pulse: 63 (12/04/23 1055)  Resp: 18 (12/04/23 1055)  BP: (!) 158/82 (12/04/23 1055)  SpO2: 97 % (12/04/23 1055) Vital Signs (24h Range):  Temp:  [97.5 °F (36.4 °C)-98.1 °F (36.7 °C)] 97.7 °F (36.5 °C)  Pulse:  [60-68] 63  Resp:  [16-18]  18  SpO2:  [97 %-99 %] 97 %  BP: (106-164)/(60-99) 158/82     Weight: 127 kg (280 lb) (11/26/23 1128)  Body mass index is 42.93 kg/m².  Body surface area is 2.46 meters squared.    I/O last 3 completed shifts:  In: 2400 [P.O.:2400]  Out: 5952 [Urine:5950; Stool:2]    Physical Exam  Constitutional:       General: He is not in acute distress.     Appearance: He is ill-appearing.   HENT:      Head: Atraumatic.      Nose: Nose normal.      Mouth/Throat:      Mouth: Mucous membranes are moist.   Eyes:      Extraocular Movements: Extraocular movements intact.      Conjunctiva/sclera: Conjunctivae normal.   Cardiovascular:      Rate and Rhythm: Normal rate and regular rhythm.      Pulses: Normal pulses.   Pulmonary:      Effort: Pulmonary effort is normal.      Breath sounds: Normal breath sounds.   Abdominal:      General: There is distension.      Palpations: Abdomen is soft.      Comments: Distended with ascites   Genitourinary:     Comments: Urinary catheter with light yellow clear urine   Musculoskeletal:         General: Swelling present.      Cervical back: Normal range of motion and neck supple.      Comments: 4+ pitting anasarca, L BKA stump covered in dressing, extremities have softened and showing some improvement    Skin:     General: Skin is warm and dry.   Neurological:      Mental Status: He is alert and oriented to person, place, and time.         Significant Labs:sureBMP:   Recent Labs   Lab 12/04/23  0526      K 4.4   CO2 23   BUN 39.3*   CREATININE 2.16*   CALCIUM 7.2*       CBC:   Recent Labs   Lab 12/04/23  0805   WBC 9.27   RBC 3.07*   HGB 9.0*   HCT 27.2*      MCV 88.6   MCH 29.3   MCHC 33.1       Microbiology Results (last 7 days)       Procedure Component Value Units Date/Time    Blood Culture [[id number]]  (Normal) Collected: 11/25/23 1344    Order Status: Completed Specimen: Blood Updated: 12/01/23 1125     CULTURE, BLOOD ([facility]) Final Report: At 5 days. No growth    Blood Culture  "[[id number]]  (Normal) Collected: 11/25/23 1344    Order Status: Completed Specimen: Blood Updated: 12/01/23 1125     CULTURE, BLOOD ([facility]) Final Report: At 5 days. No growth    Urine culture [[id number]]  (Abnormal)  (Susceptibility) Collected: 11/27/23 1047    Order Status: Completed Specimen: Urine Updated: 12/01/23 0828     Urine Culture >/= 100,000 colonies/ml Escherichia coli ESBL    Narrative:      Fosfomycin 0.25 sensitive          Specimen (24h ago, onward)      None          No results for input(s): "COLORU", "CLARITYU", "SPECGRAV", "PHUR", "PROTEINUA", "GLUCOSEU", "BILIRUBINCON", "BLOODU", "WBCU", "RBCU", "BACTERIA", "MUCUS", "NITRITE", "LEUKOCYTESUR", "UROBILINOGEN", "HYALINECASTS" in the last 168 hours.      Significant Imaging:  No new imaging    Assessment/Plan:   FABIO s/t acute infection and hypervolemia   CKD IIIa   -biopsy proven in April 2023 diabetic nodular sclerosis   -Nephrotic range proteinuria         -March 2023 - - 10.9 g        -Now 5.9 g   Anasarca   S/p Fall with resulting back pain. Remote back surgery   Cirrhosis of the liver  DM I onset age 14 with long standing history of noncompliance. Hgb A1c improving.      -Unsure how much more fluid needs to be diuresed prior to being able to obtain useful MRI images. Increase Lasix to 60mg TID and continue Metolazone at 10mg daily.   Renal function stable. Estimate at least another 20 lbs of fluid on him.   Please track daily weight.   Likely unable to remove urinary catheter yet due to severe scrotal edema       SUSAN Zabala  Nephrology  [facility]  "

## 2023-12-04 NOTE — PROGRESS NOTES
[facility] Hospital Medicine Progress Note        Chief Complaint: Inpatient Follow-up for     HPI: 48 y.o. male who PMH includes CHF, chronic back pain wheelchair-bound, DM type 2, HTN, seizures, CVA, CKD stage 3, alcoholic liver disease with chronic pancreatitis, presents to the ED at [facility] on 11/25/2023 with a primary complaint of weakness, dizziness lightheadedness and fall out if his wheelchair striking his mouth; denies any LOC.  PT had back surgery years ago and reports has been wheelchair bound since then. No reports of seizures.  No CP, SOB, N/V/D, fever, chills cough congestion or any sick contacts. Labs reviewed demonstrated WBC 14.42, HH 12.2/37.2, sed rate 93 , CO2 18 Bun 25.7, Creat 2.31, glucose 52 Mg+ 1.0, , AST 36 reports of chest pain, CRP shortness a breath, nausea, vomiting, diarrhea5, fever., [id number], cough, congestion, or any sick, contacts. B no reportsP of chest pain, 109.7; other indicis unremarkable. CXR impression reviewed demonstrated  no acute cardiopulmonary abnormality. CT of head without contrast impression reviewed demonstrated no appreciable acute intracranial abnormality. Ct maxillofacial without contrast impression reviewed demonstrated no appreciate acute traumatic osseous abnormality, bilateral mastoid effusions. CT cervical spine without contrast impression reviewed demonstrated no appreciable acute osseous abnormality by CT evaluation, degenerative changes at the cervical spine. CT thoracic spine without contrast impression reviewed demonstrated no acute osseous abnormality. CT of lumbar spine without contrast impression reviewed demonstrated no appreciable fracture or acute osseous abnormality, severe degenerative change at L5-S1 progressed, endplate sclerosis disc space narrowing and osseous erosions which may be degenerative or infectious/inflammatory, anasarca.   Initial VS /105 P 75 R 13 T 98.7F O2 saturation 98% on room  air. Pt received multiple doses of pain medication , D50 for hypoglycemia, 40 mg lasix,  and magnesium rider in the ED. Neurosurgery services consulted. Pt awaiting MRI. Pt is admitted to hospital medicine services for further managemen    Interval Hx:    Patient seen and examined no complaints this morning urinating well  Objective/physical exam:  General: In no acute distress, afebrile  Chest: Clear to auscultation bilaterally  Heart: RRR, +S1, S2, no appreciable murmur  Abdomen: Soft, nontender, BS +  Positive scrotal edema  MSK: Warm,  3+ edema in bilateral lower extremity  Neurologic: Alert and oriented   VITAL SIGNS: 24 HRS MIN & MAX LAST   Temp  Min: 97.5 °F (36.4 °C)  Max: 98.1 °F (36.7 °C) 98 °F (36.7 °C)   BP  Min: 106/60  Max: 164/99 (!) 149/77   Pulse  Min: 62  Max: 68  65   Resp  Min: 16  Max: 18 18   SpO2  Min: 97 %  Max: 99 % 98 %     I have reviewed the following labs:  Recent Labs   Lab 12/02/23  0620 12/03/23  0530 12/04/23  0805   WBC 8.17 8.99 9.27   RBC 2.87* 2.94* 3.07*   HGB 8.2* 8.4* 9.0*   HCT 25.4* 26.6* 27.2*   MCV 88.5 90.5 88.6   MCH 28.6 28.6 29.3   MCHC 32.3* 31.6* 33.1   RDW 13.5 13.6 13.9    231 233   MPV 12.3* 12.1* 11.9*       Recent Labs   Lab 11/30/23  0408 12/01/23  0730 12/02/23  0620 12/03/23  0530 12/04/23  0526      < > 143 142 140   K 4.6   < > 4.6 4.1 4.4   CO2 20*   < > 24 24 23   BUN 36.3*   < > 38.2* 40.1* 39.3*   CREATININE 2.53*   < > 2.39* 2.32* 2.16*   CALCIUM 7.2*   < > 7.4* 7.1* 7.2*   ALBUMIN 1.7*  --   --  1.7* 1.7*   ALKPHOS 108  --   --  93 93   ALT 15  --   --  12 15   AST 16  --   --  15 20   BILITOT 0.3  --   --  0.2 0.3    < > = values in this interval not displayed.       Microbiology Results (last 7 days)       Procedure Component Value Units Date/Time    Blood Culture [[id number]]  (Normal) Collected: 11/25/23 1344    Order Status: Completed Specimen: Blood Updated: 12/01/23 1125     CULTURE, BLOOD ([facility]) Final Report: At 5 days. No growth     Blood Culture [[id number]]  (Normal) Collected: 11/25/23 1344    Order Status: Completed Specimen: Blood Updated: 12/01/23 1125     CULTURE, BLOOD ([facility]) Final Report: At 5 days. No growth    Urine culture [[id number]]  (Abnormal)  (Susceptibility) Collected: 11/27/23 1047    Order Status: Completed Specimen: Urine Updated: 12/01/23 0828     Urine Culture >/= 100,000 colonies/ml Escherichia coli ESBL    Narrative:      Fosfomycin 0.25 sensitive             See below for Radiology    Scheduled Med:   atenoloL  50 mg Oral BID    cefdinir  300 mg Oral Q12H    doxazosin  2 mg Oral QHS    DULoxetine  20 mg Oral BID    enoxparin  40 mg Subcutaneous Daily    ergocalciferol  50,000 Units Oral Q72H    furosemide (LASIX) injection  60 mg Intravenous Q8H    insulin detemir U-100  16 Units Subcutaneous QHS    levETIRAcetam  500 mg Oral TID    lipase-protease-amylase 12,000-38,000-60,000 units  6 capsule Oral TID    [START ON 12/5/2023] metOLazone  10 mg Oral Daily    miconazole NITRATE 2 %   Topical (Top) BID    NIFEdipine  90 mg Oral Daily    perflutren lipid microspheres  1.4 mL Intravenous Once    pregabalin  75 mg Oral QHS    white petrolatum   Topical (Top) BID    zinc oxide-cod liver oil   Topical (Top) BID      Continuous Infusions:       PRN Meds:  acetaminophen, aluminum-magnesium hydroxide-simethicone, dextrose 10%, dextrose 10%, dextrose 10%, dextrose 10%, glucagon (human recombinant), glucose, glucose, hydrALAZINE, insulin aspart U-100, labetaloL, melatonin, morphine, naloxone, ondansetron, oxyCODONE, polyethylene glycol, prochlorperazine, simethicone, white petrolatum     Assessment/Plan:  Acute on chronic lower back pain- intractable-  Dizziness, Fall- from wheel chair  Hypoglycemia- recurrent-resolved  Acute on chronic kidney disease stage III  Anasarca  Nephrotic range proteinuria  Cirrhosis of liver  Anasarca with volume overload  with peripheral edema  Hypertensive urgency at admission-resolving   ESBL E  coli in urine possibly asymptomatic bacteriuria  HX: Wheelchair-bound, history of CVA with residual left-sided deficits, left BKA, chronic alcoholic liver disease, chronic seizures, T2 dm A1c 5.5     Plan:  Symptomatically patient is improving  On IV Lasix and albumin continue with strict input and output creatinine continues to improve this morning it is 2.16   MRI of the L-spine was performed but they were not able to see much because there was too much of fluid    Continue with other home medications that includes atenolol, doxazosin, duloxetine, Levemir 16 units, Keppra, metolazone, pregabalin  Started on Rocephin and switched to Omnicef but urine culture grew ESBL so we will discontinue Omnicef patient's white cell count is normal he is afebrile so most likely this is asymptomatic bacteriuria but will repeat CBC again tomorrow if patient is spiking fever or if there is any change in mental status then we might consider treating  I will also repeat UA  Neurosurgery was consulted and following still we are not able to get good pictures of the MRI  This patient needs a repeat MRI of the L-spine  I will order it again for tomorrow   VTE prophylaxis: lovenox    Patient condition:  Stable/Fair/Guarded/ Serious/ Critical    Anticipated discharge and Disposition:         All diagnosis and differential diagnosis have been reviewed; assessment and plan has been documented; I have personally reviewed the labs and test results that are presently available; I have reviewed the patients medication list; I have reviewed the consulting providers response and recommendations. I have reviewed or attempted to review medical records based upon their availability    All of the patient's questions have been  addressed and answered. Patient's is agreeable to the above stated plan. I will continue to monitor closely and make adjustments to medical management as  needed.  _____________________________________________________________________    Nutrition Status:    Radiology:  I have personally reviewed the following imaging and agree with the radiologist.     Echo    Left Ventricle: The left ventricle is normal in size. Increased wall   thickness. There is moderate concentric hypertrophy. Normal wall motion.   There is normal systolic function with a visually estimated ejection   fraction of 60 - 65%. There is normal diastolic function.    Right Ventricle: Normal right ventricular cavity size. Systolic   function is normal.    Left Atrium: Left atrium is mildly dilated.    Mitral Valve: There is trace regurgitation.    Tricuspid Valve: There is trace regurgitation.      Asia Hodge MD   12/04/2023

## 2023-12-05 LAB
ANION GAP SERPL CALC-SCNC: 6 MEQ/L
BASOPHILS # BLD AUTO: 0.06 X10(3)/MCL
BASOPHILS NFR BLD AUTO: 0.6 %
BUN SERPL-MCNC: 44.3 MG/DL (ref 8.9–20.6)
CALCIUM SERPL-MCNC: 7.2 MG/DL (ref 8.4–10.2)
CHLORIDE SERPL-SCNC: 111 MMOL/L (ref 98–107)
CO2 SERPL-SCNC: 23 MMOL/L (ref 22–29)
CREAT SERPL-MCNC: 2.07 MG/DL (ref 0.73–1.18)
CREAT/UREA NIT SERPL: 21
EOSINOPHIL # BLD AUTO: 0.3 X10(3)/MCL (ref 0–0.9)
EOSINOPHIL NFR BLD AUTO: 3.2 %
ERYTHROCYTE [DISTWIDTH] IN BLOOD BY AUTOMATED COUNT: 13.7 % (ref 11.5–17)
GFR SERPLBLD CREATININE-BSD FMLA CKD-EPI: 39 MLS/MIN/1.73/M2
GLUCOSE SERPL-MCNC: 166 MG/DL (ref 74–100)
HCT VFR BLD AUTO: 26.7 % (ref 42–52)
HGB BLD-MCNC: 8.7 G/DL (ref 14–18)
IMM GRANULOCYTES # BLD AUTO: 0.03 X10(3)/MCL (ref 0–0.04)
IMM GRANULOCYTES NFR BLD AUTO: 0.3 %
LYMPHOCYTES # BLD AUTO: 2.93 X10(3)/MCL (ref 0.6–4.6)
LYMPHOCYTES NFR BLD AUTO: 31.4 %
MCH RBC QN AUTO: 29.5 PG (ref 27–31)
MCHC RBC AUTO-ENTMCNC: 32.6 G/DL (ref 33–36)
MCV RBC AUTO: 90.5 FL (ref 80–94)
MONOCYTES # BLD AUTO: 0.95 X10(3)/MCL (ref 0.1–1.3)
MONOCYTES NFR BLD AUTO: 10.2 %
NEUTROPHILS # BLD AUTO: 5.07 X10(3)/MCL (ref 2.1–9.2)
NEUTROPHILS NFR BLD AUTO: 54.3 %
NRBC BLD AUTO-RTO: 0 %
PLATELET # BLD AUTO: 259 X10(3)/MCL (ref 130–400)
PMV BLD AUTO: 11.8 FL (ref 7.4–10.4)
POCT GLUCOSE: 175 MG/DL (ref 70–110)
POCT GLUCOSE: 182 MG/DL (ref 70–110)
POCT GLUCOSE: 192 MG/DL (ref 70–110)
POCT GLUCOSE: 200 MG/DL (ref 70–110)
POTASSIUM SERPL-SCNC: 4.1 MMOL/L (ref 3.5–5.1)
RBC # BLD AUTO: 2.95 X10(6)/MCL (ref 4.7–6.1)
SODIUM SERPL-SCNC: 140 MMOL/L (ref 136–145)
WBC # SPEC AUTO: 9.34 X10(3)/MCL (ref 4.5–11.5)

## 2023-12-05 PROCEDURE — 97530 THERAPEUTIC ACTIVITIES: CPT | Mod: CQ

## 2023-12-05 PROCEDURE — 25000003 PHARM REV CODE 250: Performed by: INTERNAL MEDICINE

## 2023-12-05 PROCEDURE — 21400001 HC TELEMETRY ROOM

## 2023-12-05 PROCEDURE — 25000003 PHARM REV CODE 250: Performed by: NURSE PRACTITIONER

## 2023-12-05 PROCEDURE — 63600175 PHARM REV CODE 636 W HCPCS: Performed by: NURSE PRACTITIONER

## 2023-12-05 PROCEDURE — 63600175 PHARM REV CODE 636 W HCPCS: Performed by: INTERNAL MEDICINE

## 2023-12-05 PROCEDURE — 27000207 HC ISOLATION

## 2023-12-05 PROCEDURE — 85025 COMPLETE CBC W/AUTO DIFF WBC: CPT | Performed by: NURSE PRACTITIONER

## 2023-12-05 PROCEDURE — 80048 BASIC METABOLIC PNL TOTAL CA: CPT | Performed by: NURSE PRACTITIONER

## 2023-12-05 PROCEDURE — 97110 THERAPEUTIC EXERCISES: CPT | Mod: CO

## 2023-12-05 RX ADMIN — DULOXETINE HYDROCHLORIDE 20 MG: 20 CAPSULE, DELAYED RELEASE ORAL at 08:12

## 2023-12-05 RX ADMIN — PREGABALIN 75 MG: 50 CAPSULE ORAL at 08:12

## 2023-12-05 RX ADMIN — OXYCODONE HYDROCHLORIDE 10 MG: 5 TABLET ORAL at 10:12

## 2023-12-05 RX ADMIN — LEVETIRACETAM 500 MG: 500 TABLET, FILM COATED ORAL at 08:12

## 2023-12-05 RX ADMIN — NIFEDIPINE 90 MG: 30 TABLET, FILM COATED, EXTENDED RELEASE ORAL at 08:12

## 2023-12-05 RX ADMIN — ATENOLOL 50 MG: 50 TABLET ORAL at 08:12

## 2023-12-05 RX ADMIN — PANCRELIPASE 6 CAPSULE: 60000; 12000; 38000 CAPSULE, DELAYED RELEASE PELLETS ORAL at 03:12

## 2023-12-05 RX ADMIN — ENOXAPARIN SODIUM 40 MG: 40 INJECTION SUBCUTANEOUS at 05:12

## 2023-12-05 RX ADMIN — WHITE PETROLATUM: 1.75 OINTMENT TOPICAL at 08:12

## 2023-12-05 RX ADMIN — MORPHINE SULFATE 2 MG: 4 INJECTION, SOLUTION INTRAMUSCULAR; INTRAVENOUS at 09:12

## 2023-12-05 RX ADMIN — OXYCODONE HYDROCHLORIDE 10 MG: 5 TABLET ORAL at 05:12

## 2023-12-05 RX ADMIN — FUROSEMIDE 60 MG: 10 INJECTION, SOLUTION INTRAMUSCULAR; INTRAVENOUS at 10:12

## 2023-12-05 RX ADMIN — METOLAZONE 10 MG: 2.5 TABLET ORAL at 08:12

## 2023-12-05 RX ADMIN — OXYCODONE HYDROCHLORIDE 10 MG: 5 TABLET ORAL at 06:12

## 2023-12-05 RX ADMIN — PANCRELIPASE 6 CAPSULE: 60000; 12000; 38000 CAPSULE, DELAYED RELEASE PELLETS ORAL at 10:12

## 2023-12-05 RX ADMIN — MORPHINE SULFATE 2 MG: 4 INJECTION, SOLUTION INTRAMUSCULAR; INTRAVENOUS at 05:12

## 2023-12-05 RX ADMIN — FUROSEMIDE 60 MG: 10 INJECTION, SOLUTION INTRAMUSCULAR; INTRAVENOUS at 05:12

## 2023-12-05 RX ADMIN — MICONAZOLE NITRATE: 20 POWDER TOPICAL at 08:12

## 2023-12-05 RX ADMIN — LEVETIRACETAM 500 MG: 500 TABLET, FILM COATED ORAL at 03:12

## 2023-12-05 RX ADMIN — FUROSEMIDE 60 MG: 10 INJECTION, SOLUTION INTRAMUSCULAR; INTRAVENOUS at 03:12

## 2023-12-05 RX ADMIN — Medication: at 08:12

## 2023-12-05 RX ADMIN — OXYCODONE HYDROCHLORIDE 10 MG: 5 TABLET ORAL at 01:12

## 2023-12-05 RX ADMIN — OXYCODONE HYDROCHLORIDE 10 MG: 5 TABLET ORAL at 12:12

## 2023-12-05 RX ADMIN — INSULIN DETEMIR 16 UNITS: 100 INJECTION, SOLUTION SUBCUTANEOUS at 08:12

## 2023-12-05 RX ADMIN — DOXAZOSIN 2 MG: 1 TABLET ORAL at 08:12

## 2023-12-05 RX ADMIN — MORPHINE SULFATE 2 MG: 4 INJECTION, SOLUTION INTRAMUSCULAR; INTRAVENOUS at 08:12

## 2023-12-05 NOTE — PHYSICIAN QUERY
PT Name: Kamran Huerta  MR #: [id number]    DOCUMENTATION CLARIFICATION     CDS/:  Ford Pablo RN, CDS                   Contact Information:  [email or web address]    This form is a permanent document in the medical record.     Query Date: 2023    By submitting this query, we are merely seeking further clarification of documentation.. Please utilize your independent clinical judgment when addressing the question(s) below.    The medical record contains the following:   Indicators  Supporting Clinical Findings     Location in Medical Record   X Registered Dietician Diagnosis Malnutrition Level: Moderate          Malnutrition Context: chronic illness    Nutrition PN 12    Energy Intake      Weight Loss      Fat Loss     X Muscle Loss Mild Depletion   Nutrition PN    X Edema/Fluid Accumulation Severe   Nutrition PN     Reduced  Strength (by dynamometer)     X Weight, BMI, Usual Body Weight Last Weight: 127 kg (280 lb)     BMI (Calculated): 42.9     Usual Body Weight (UBW), k.7 kg    Nutrition PN     Delayed Wound Healing     X Acute or Chronic Illness Acute on chronic lower back pain- intractable- suspected osteomyelitis  Dizziness, Fall- from wheel chair  Hypoglycemia- recurrent-resolved  Acute on chronic kidney disease stage III  Anasarca with volume overload  with peripheral edema  Hypertensive urgency at admission  CHF  DM2  HTN  Seizures  CKD  L BKA   Nutrition PN     Social or Environmental Circumstances     X Treatment Recommendations:  Continue diabetic diet as tolerated  RD to order Boost Glucose Control daily to provide 250 kcal and 14 g protein per serving  Continue digestive enzymes  Daily weights  RD to monitor po intake and weight   Nutrition PN    X Other : pt reports good appetite, denies decreased appetite prior admission. Complained of diarrhea, continue digestive enzymes. UBW ~215 lb with recent weight gain r/t fluid retention.  Per notes, pt with 4+ pitting anasarca. Per NFPA, pt with mild muscle loss. Pt agreed to ONS daily.    PMHx: Obesity    Appearance: He is obese. He is ill-appearing    Nutrition PN 12/1              H&P 11/25    Neph Consult 11/27     Academy of Nutrition and Dietetics (Academy) and the American Society for Parenteral and Enteral Nutrition (A.S.P.E.N.) Clinical Characteristics to support Malnutrition      Criteria for mild malnutrition is defined as 1 characteristic outlined above within the established moderate or severe parameters.  A minimum of 2 out of the 6 characteristics noted above are recommended for a diagnosis of moderate or severe malnutrition.  Chronic illness/injury is a disease/condition lasting 3 months or longer.    The noted clinical guidelines are only system guidelines and do not replace the providers clinical judgment.    Provider, please clarify/confirm the nutrition diagnosis as noted above.    [  x] Moderate Malnutrition - a minimum of 2 of the 6 moderate malnutrition characteristics noted above      [  ] Malnutrition Ruled Out, Obesity Ruled In     [  ] Malnutrition Ruled Out     [  ] Other Nutritional Diagnosis (please specify): _______       Please document in your progress notes daily for the duration of treatment until resolved and  include in your discharge summary.      References:    BROWN Hernandez, & CHRISTOPHER Martinez (2022, April). Assessment and management of anorexia and cachexia in palliative care. Retrieved May 23, 2022, from [email or web address]/assessment-and-management-[email or web address]-anorexia-[email or web address]-cachexia-in-[email or web address]-care?[email or web address]=[email or web address]&source=see_link     JAGJIT Brown, PhD, RD, KRISTI, SANJUANA Woods, PhD, RN, ELEUTERIO Snyder MD, PhD, Armand WAGGONER A., MS, RD, CNSC, MICHELE Mackey, MS, RD, The Academy Malnutrition Work Group, The A.S.P.E.N. Board of Directors. (2012). Consensus Statement: Academy of Nutrition and Dietetics and American Society for Parenteral and Enteral Nutrition:  Characteristics Recommended for the Identification and Documentation of Adult Malnutrition (Undernutrition). Journal of Parenteral and Enteral Nutrition, 36(3), 275-287. doi:10.1177/3782840488524011     Form No. 05393

## 2023-12-05 NOTE — PROGRESS NOTES
Nephrology consult follow up note    HPI:      Kamran Huerta is a 48 y.o. male seen by our service in April for nephrotic syndrome and FABIO. At that time he underwent renal biopsy revealing advanced diabetic nodular sclerosis. He was diuresed aggressively and discharged with Cr 1.8 and ability to ambulate in the trotter without oxygen or distress. Since that time he has undergone L BKA s/t gangrenous wound. He presented to the ED 11/25 s/p fall from wheelchair with resulting back pain. Neurosurgery undergoing further workup.   He was noted to have FABIO and marked hypervolemia for which nephrology has been consulted.    Interval history:     No acute events overnight.  Patient denies shortness of breath.  Lower extremity edema has improved some, however, still present.  He is making excellent urine output, 3.7 L yesterday.  No chest pain, abdominal pain, nausea, vomiting.     Review of Systems:     Comprehensive 10pt ROS negative except as noted per history.    Past medical, family, surgical, and social history reviewed and unchanged from initial consult note.     Objective:       VITAL SIGNS: 24 HR MIN & MAX LAST    Temp  Min: 97.7 °F (36.5 °C)  Max: 98.9 °F (37.2 °C)  98.9 °F (37.2 °C)        BP  Min: 149/77  Max: 181/94  (!) 167/88     Pulse  Min: 58  Max: 66  (!) 58     Resp  Min: 14  Max: 18  18    SpO2  Min: 96 %  Max: 100 %  96 %      GEN:  Chronically ill-appearing AAM in NAD  CV: RRR +S1,S2 without murmur  PULM: CTAB, unlabored  ABD: Soft, NT/ND abdomen with NABS  EXT:  2+ dependent edema  SKIN: Warm and dry  PSYCH: Awake, alert and appropriately conversant.   Dialysis access:  No dialysis access            Component Value Date/Time     12/05/2023 0412     12/04/2023 0526     02/22/2021 1246    K 4.1 12/05/2023 0412    K 4.4 12/04/2023 0526    K 3.6 02/22/2021 1246    CHLORIDE 111 (H) 12/05/2023 0412    CHLORIDE 110 (H) 12/04/2023 0526    CHLORIDE 106 02/22/2021 1246    CO2 23 12/05/2023  "0412    CO2 23 12/04/2023 0526    CO2 26 02/22/2021 1246    BUN 44.3 (H) 12/05/2023 0412    BUN 39.3 (H) 12/04/2023 0526    BUN 15.0 02/22/2021 1246    CREATININE 2.07 (H) 12/05/2023 0412    CREATININE 2.16 (H) 12/04/2023 0526    CREATININE 0.98 02/22/2021 1246    CALCIUM 7.2 (L) 12/05/2023 0412    CALCIUM 7.2 (L) 12/04/2023 0526    CALCIUM 8.5 02/22/2021 1246    PHOS 5.3 (H) 11/27/2023 0401            Component Value Date/Time    WBC 9.34 12/05/2023 0412    WBC 9.27 12/04/2023 0805    HGB 8.7 (L) 12/05/2023 0412    HGB 9.0 (L) 12/04/2023 0805    HCT 26.7 (L) 12/05/2023 0412    HCT 27.2 (L) 12/04/2023 0805    HCT 43 11/13/2022 1428     12/05/2023 0412     12/04/2023 0805         Imaging reviewed      Assessment / Plan:       Active Hospital Problems    Diagnosis  POA    Moderate malnutrition [E44.0]  Yes     Patient meets ASPEN criteria for moderate malnutrition of chronic illness per RD assessment as evidenced by:  Energy Intake (Malnutrition): other (see comments) (does not meet criteria)  Weight Loss (Malnutrition): other (see comments) (does not meet criteria)     Muscle Mass (Malnutrition): mild depletion  Fluid Accumulation (Malnutrition): severe        A minimum of two characteristics is recommended for diagnosis of either severe or non-severe malnutrition.    Ht Readings from Last 1 Encounters:   12/01/23 5' 7.72" (1.72 m)     Wt Readings from Last 1 Encounters:   11/26/23 1128 127 kg (280 lb)   11/25/23 1539 127 kg (280 lb)   11/25/23 0627 127 kg (280 lb)   Body mass index is 42.93 kg/m².    Patient has been screened and assessed by RD. See nutrition recommendations documented in inpatient nutrition assessment. RD will continue to follow patient throughout hospitalization.          Resolved Hospital Problems   No resolved problems to display.       FABIO s/t acute infection and hypervolemia   CKD IIIa   -biopsy proven in April 2023 diabetic nodular sclerosis   -Nephrotic range proteinuria        "  -March 2023 - - 10.9 g        -Now 5.9 g   Anasarca   S/p Fall with resulting back pain. Remote back surgery   Cirrhosis of the liver  DM I onset age 14 with long standing history of noncompliance. Hgb A1c improving.      Plan:  Renal function relatively stable.  Patient is still appear significantly hypervolemic on exam, however, he is making good urine output with IV diuretics.  Continue IV Lasix 60 mg t.i.d., and metolazone 10 mg daily for now.  We will eventually need to transition him to p.o. diuretics when getting closer to going home.      Spike Mcintyre DO  Nephrology  [facility]  Clinic number: [phone]

## 2023-12-05 NOTE — PT/OT/SLP PROGRESS
Physical Therapy Treatment    Patient Name:  Kamran Huerta   MRN:  [id number]    Recommendations:     Discharge therapy intensity: Moderate Intensity Therapy   Discharge Equipment Recommendations: lift device  Barriers to discharge: Decreased caregiver support and Impaired mobility    Assessment:     Kamran Huerta is a 48 y.o. male admitted with a medical diagnosis of <principal problem not specified>.  He presents with the following impairments/functional limitations: weakness, impaired endurance, impaired self care skills, impaired functional mobility, impaired balance, decreased upper extremity function, decreased lower extremity function, pain.    Rehab Prognosis: Fair; patient would benefit from acute skilled PT services to address these deficits and reach maximum level of function.    Recent Surgery: * No surgery found *      Plan:     During this hospitalization, patient to be seen 3 x/week to address the identified rehab impairments via gait training, therapeutic activities, therapeutic exercises, neuromuscular re-education and progress toward the following goals:    Plan of Care Expires:  12/04/23    Subjective     Chief Complaint: none    Objective:     Communicated with nurse prior to session.  Patient found supine with   upon PT entry to room.     General Precautions: Standard, fall  Orthopedic Precautions: N/A  Braces: N/A  Respiratory Status: Room air  Blood Pressure:   Skin Integrity: Visible skin intact      Functional Mobility:  Hygiene performed due to BM. Min assist supine to sit and supervision sit balance. Mod assist SB to bedside chair and Independent with scooting in chair. Pt to perform pressure relief Q30 minutes.     Education Provided:  Role and goals of PT, transfer training, bed mobility, gait training, balance training, safety awareness, assistive device, strengthening exercises, and importance of participating in PT to return to PLOF.    Patient left up in chair with all  lines intact, call button in reach, and on gurjit pad . CNA made aware to assist BTB later today.     GOALS:   Multidisciplinary Problems       Physical Therapy Goals          Problem: Physical Therapy    Goal Priority Disciplines Outcome Goal Variances Interventions   Physical Therapy Goal     PT, PT/OT Ongoing, Progressing     Description: Pt will improve functional independence by performing:    Bed mobility: max A  Sit to stand: max A with rolling walker  Bed to chair t/f: max A with Stand pivot with rolling walker                       Time Tracking:     Billable Minutes: Therapeutic Activity 25    Treatment Type: Treatment  PT/PTA: PTA     Number of PTA visits since last PT visit: 2     12/05/2023

## 2023-12-05 NOTE — PROGRESS NOTES
[facility] Hospital Medicine Progress Note        Chief Complaint: Inpatient Follow-up for anasarca    HPI:   48 y.o. male who PMH includes CHF, chronic back pain wheelchair-bound, DM type 2, HTN, seizures, CVA, CKD stage 3, alcoholic liver disease with chronic pancreatitis, presents to the ED at [facility] on 11/25/2023 with a primary complaint of weakness, dizziness lightheadedness and fall out if his wheelchair striking his mouth; denies any LOC.  PT had back surgery years ago and reports has been wheelchair bound since then. No reports of seizures.  No CP, SOB, N/V/D, fever, chills cough congestion or any sick contacts. Labs reviewed demonstrated WBC 14.42, HH 12.2/37.2, sed rate 93 , CO2 18 Bun 25.7, Creat 2.31, glucose 52 Mg+ 1.0, , AST 36 reports of chest pain, CRP shortness a breath, nausea, vomiting, diarrhea5, fever., [id number], cough, congestion, or any sick, contacts. B no reportsP of chest pain, 109.7; other indicis unremarkable. CXR impression reviewed demonstrated  no acute cardiopulmonary abnormality. CT of head without contrast impression reviewed demonstrated no appreciable acute intracranial abnormality. Ct maxillofacial without contrast impression reviewed demonstrated no appreciate acute traumatic osseous abnormality, bilateral mastoid effusions. CT cervical spine without contrast impression reviewed demonstrated no appreciable acute osseous abnormality by CT evaluation, degenerative changes at the cervical spine. CT thoracic spine without contrast impression reviewed demonstrated no acute osseous abnormality. CT of lumbar spine without contrast impression reviewed demonstrated no appreciable fracture or acute osseous abnormality, severe degenerative change at L5-S1 progressed, endplate sclerosis disc space narrowing and osseous erosions which may be degenerative or infectious/inflammatory, anasarca.   Initial VS /105 P 75 R 13 T 98.7F O2 saturation  98% on room air. Pt received multiple doses of pain medication , D50 for hypoglycemia, 40 mg lasix,  and magnesium rider in the ED. Neurosurgery services consulted. Pt awaiting MRI. Pt is admitted to hospital medicine services for further management.    Nephrology was consulted and patient was started on Lasix albumin drip.  Segovia was inserted due to possible outlet obstruction in the setting of anasarca.  MRI of lumbar spine was not helpful due to significant fluid collection.      Interval Hx:   Afebrile.  Blood pressure elevated.  When seen at bedside he was comfortably resting.  Except for the pain he has no issues.  Tolerating diet, moving bowels.  No family members were seen at bedside.  Hyperglycemia continues.  Labs showed chronic anemia, stable platelets today with minimally improving BUN and serum creatinine.  Responding well with diuresis and enema improving gradually      Objective/physical exam:  Vitals:    12/05/23 0800 12/05/23 0845 12/05/23 0905 12/05/23 1216   BP: (!) 167/88 (!) 167/88  (!) 148/78   BP Location: Right forearm   Right forearm   Patient Position: Lying   Lying   Pulse: (!) 58   71   Resp: 18  18 18   Temp: 98.9 °F (37.2 °C)   98.3 °F (36.8 °C)   TempSrc: Oral   Oral   SpO2: 96%      Weight:       Height:         General: In no acute distress, afebrile  Respiratory: Clear to auscultation bilaterally  Cardiovascular: S1, S2, no appreciable murmur, pedal edema  Abdomen: Soft, nontender, BS +  : Segovia  Neurologic: Alert and oriented x4    Lab Results   Component Value Date     12/05/2023    K 4.1 12/05/2023    CO2 23 12/05/2023    BUN 44.3 (H) 12/05/2023    CREATININE 2.07 (H) 12/05/2023    CALCIUM 7.2 (L) 12/05/2023    ESTGFRAFRICA >105 02/22/2021    EGFRNONAA >60 01/20/2021      Lab Results   Component Value Date    ALT 15 12/04/2023    AST 20 12/04/2023    ALKPHOS 93 12/04/2023    BILITOT 0.3 12/04/2023      Lab Results   Component Value Date    WBC 9.34 12/05/2023    HGB 8.7  (L) 12/05/2023    HCT 26.7 (L) 12/05/2023    MCV 90.5 12/05/2023     12/05/2023           Medications:   atenoloL  50 mg Oral BID    doxazosin  2 mg Oral QHS    DULoxetine  20 mg Oral BID    enoxparin  40 mg Subcutaneous Daily    ergocalciferol  50,000 Units Oral Q72H    furosemide (LASIX) injection  60 mg Intravenous Q8H    insulin detemir U-100  16 Units Subcutaneous QHS    levETIRAcetam  500 mg Oral TID    lipase-protease-amylase 12,000-38,000-60,000 units  6 capsule Oral TID    metOLazone  10 mg Oral Daily    miconazole NITRATE 2 %   Topical (Top) BID    NIFEdipine  90 mg Oral Daily    perflutren lipid microspheres  1.4 mL Intravenous Once    pregabalin  75 mg Oral QHS    white petrolatum   Topical (Top) BID    zinc oxide-cod liver oil   Topical (Top) BID      acetaminophen, aluminum-magnesium hydroxide-simethicone, dextrose 10%, dextrose 10%, dextrose 10%, dextrose 10%, glucagon (human recombinant), glucose, glucose, hydrALAZINE, insulin aspart U-100, labetaloL, melatonin, morphine, naloxone, ondansetron, oxyCODONE, polyethylene glycol, prochlorperazine, simethicone, white petrolatum     Assessment/Plan:    Anasarca  Acute on chronic kidney disease stage III, Nephrotic range proteinuria  Cirrhosis of liver  Acute on chronic lower back pain- intractable  Dizziness, Fall- from wheel chair  Hypoglycemia- recurrent-resolved. T2 dm A1c 5.5  Hypertensive urgency at admission-resolving  ESBL E coli in urine- possibly asymptomatic bacteriuria    HX: Wheelchair-bound, history of CVA with residual left-sided deficits, left BKA, chronic alcoholic liver disease, chronic seizures    Plan:  -continue to monitor strict I's and O's, electrolytes.  Responding well with diuresis.  Continue metolazone and Lasix at current dose.  Nephrology following, appreciate assistance  -we will obtain MRI lumbar spine in a day or 2 as edema continues to improve  -keep Segovia in.  We will DC when appropriate  -encouraged to limit  morphine.  Keep p.o. analgesics as needed  -therapy as tolerated    Florencio Montemayor MD

## 2023-12-05 NOTE — PT/OT/SLP PROGRESS
Occupational Therapy   Treatment    Name: Kamran Huerta  MRN: [id number]    Recommendations:     Recommended therapy intensity at discharge: Moderate Intensity Therapy   Discharge Equipment Recommendations:  lift device  Barriers to discharge:       Assessment:     Kamran Huerta is a 48 y.o. male with a medical diagnosis of weakness, dizziness lightheadedness and fall out if his wheelchair. Pt is L BKA. Performance deficits affecting function are weakness, impaired endurance, impaired self care skills, impaired functional mobility, decreased lower extremity function, decreased upper extremity function. Pt UIC upon entry. Declined ADLs however agreeable to UE ex.     Rehab Prognosis:  Fair; patient would benefit from acute skilled OT services to address these deficits and reach maximum level of function.       Plan:     Patient to be seen 3 x/week to address the above listed problems via self-care/home management, therapeutic activities, therapeutic exercises  Plan of Care Expires: 12/25/23  Plan of Care Reviewed with: patient    Subjective     Pain/Comfort:  Pain Rating 1: 0/10    Objective:     Communicated with: RN prior to session.  Patient found up in chair with escobar catheter upon OT entry to room.    General Precautions: Standard, fall    Orthopedic Precautions:N/A  Braces: N/A  Respiratory Status: Room air     Occupational Performance:     Therapeutic Exercise:  Provided pt with theraband and printed HEP for UE exercises. Completed shoulder flex/ext, elbow flex/ext, chest pulls, shoulder diagonals 1x10. Educated on daily completion for BUE strengthening.     Therapeutic Positioning    OT interventions performed during the course of today's session in an effort to prevent and/or reduce acquired pressure injuries:   Education was provided on benefits of and recommendations for therapeutic positioning    Skin assessment: all bony prominences were assessed    Findings: no redness or breakdown  noted    [facility] 6 Click ADL:      Patient Education:  Patient provided with verbal education education regarding OT role/goals/POC.  Understanding was verbalized.      Patient left up in chair with all lines intact, call button in reach, and on gurjit pad    GOALS:   Multidisciplinary Problems       Occupational Therapy Goals          Problem: Occupational Therapy    Goal Priority Disciplines Outcome Interventions   Occupational Therapy Goal     OT, PT/OT Ongoing, Progressing    Description: Goals to be met by: 12/25/23     Patient will increase functional independence with ADLs by performing:  LTG: Pt will perform basic ADLs and ADL transfers with Modified independence using LRAD by discharge.    STG: to be met by 12/25/23    Pt will complete grooming sitting with  with SBA.  Pt will complete UB dressing with SBA.  Pt will complete LB dressing with mod assist using LRAD.  Pt will complete toileting with mod assist using LRAD.  Pt will complete functional mobility to/from bedside commode and toilet transfer with max assist using LRAD.                              Time Tracking:     OT Date of Treatment: 12/05/23  OT Start Time: 1342  OT Stop Time: 1359  OT Total Time (min): 17 min    Billable Minutes:Therapeutic Exercise 17    OT/ARIANA: ARIANA     Number of ARIANA visits since last OT visit: 2    12/5/2023

## 2023-12-06 LAB
ALBUMIN SERPL-MCNC: 1.7 G/DL (ref 3.5–5)
ALBUMIN/GLOB SERPL: 0.5 RATIO (ref 1.1–2)
ALP SERPL-CCNC: 92 UNIT/L (ref 40–150)
ALT SERPL-CCNC: 14 UNIT/L (ref 0–55)
AST SERPL-CCNC: 20 UNIT/L (ref 5–34)
BASOPHILS # BLD AUTO: 0.05 X10(3)/MCL
BASOPHILS NFR BLD AUTO: 0.5 %
BILIRUB SERPL-MCNC: 0.3 MG/DL
BUN SERPL-MCNC: 46.1 MG/DL (ref 8.9–20.6)
CALCIUM SERPL-MCNC: 7.3 MG/DL (ref 8.4–10.2)
CHLORIDE SERPL-SCNC: 110 MMOL/L (ref 98–107)
CO2 SERPL-SCNC: 24 MMOL/L (ref 22–29)
CREAT SERPL-MCNC: 2.07 MG/DL (ref 0.73–1.18)
EOSINOPHIL # BLD AUTO: 0.3 X10(3)/MCL (ref 0–0.9)
EOSINOPHIL NFR BLD AUTO: 3.2 %
ERYTHROCYTE [DISTWIDTH] IN BLOOD BY AUTOMATED COUNT: 13.8 % (ref 11.5–17)
GFR SERPLBLD CREATININE-BSD FMLA CKD-EPI: 39 MLS/MIN/1.73/M2
GLOBULIN SER-MCNC: 3.1 GM/DL (ref 2.4–3.5)
GLUCOSE SERPL-MCNC: 96 MG/DL (ref 74–100)
HCT VFR BLD AUTO: 29.1 % (ref 42–52)
HGB BLD-MCNC: 9.7 G/DL (ref 14–18)
IMM GRANULOCYTES # BLD AUTO: 0.03 X10(3)/MCL (ref 0–0.04)
IMM GRANULOCYTES NFR BLD AUTO: 0.3 %
LYMPHOCYTES # BLD AUTO: 2.4 X10(3)/MCL (ref 0.6–4.6)
LYMPHOCYTES NFR BLD AUTO: 25.6 %
MAGNESIUM SERPL-MCNC: 1 MG/DL (ref 1.6–2.6)
MCH RBC QN AUTO: 29.9 PG (ref 27–31)
MCHC RBC AUTO-ENTMCNC: 33.3 G/DL (ref 33–36)
MCV RBC AUTO: 89.8 FL (ref 80–94)
MONOCYTES # BLD AUTO: 0.98 X10(3)/MCL (ref 0.1–1.3)
MONOCYTES NFR BLD AUTO: 10.5 %
NEUTROPHILS # BLD AUTO: 5.6 X10(3)/MCL (ref 2.1–9.2)
NEUTROPHILS NFR BLD AUTO: 59.9 %
NRBC BLD AUTO-RTO: 0 %
PLATELET # BLD AUTO: 269 X10(3)/MCL (ref 130–400)
PMV BLD AUTO: 11.8 FL (ref 7.4–10.4)
POCT GLUCOSE: 129 MG/DL (ref 70–110)
POCT GLUCOSE: 147 MG/DL (ref 70–110)
POCT GLUCOSE: 148 MG/DL (ref 70–110)
POTASSIUM SERPL-SCNC: 4.2 MMOL/L (ref 3.5–5.1)
PROT SERPL-MCNC: 4.8 GM/DL (ref 6.4–8.3)
RBC # BLD AUTO: 3.24 X10(6)/MCL (ref 4.7–6.1)
SODIUM SERPL-SCNC: 141 MMOL/L (ref 136–145)
WBC # SPEC AUTO: 9.36 X10(3)/MCL (ref 4.5–11.5)

## 2023-12-06 PROCEDURE — 25000003 PHARM REV CODE 250: Performed by: INTERNAL MEDICINE

## 2023-12-06 PROCEDURE — 63600175 PHARM REV CODE 636 W HCPCS: Performed by: NURSE PRACTITIONER

## 2023-12-06 PROCEDURE — 21400001 HC TELEMETRY ROOM

## 2023-12-06 PROCEDURE — 27000207 HC ISOLATION

## 2023-12-06 PROCEDURE — 85025 COMPLETE CBC W/AUTO DIFF WBC: CPT | Performed by: INTERNAL MEDICINE

## 2023-12-06 PROCEDURE — 63600175 PHARM REV CODE 636 W HCPCS: Performed by: INTERNAL MEDICINE

## 2023-12-06 PROCEDURE — 25000003 PHARM REV CODE 250: Performed by: NURSE PRACTITIONER

## 2023-12-06 PROCEDURE — 83735 ASSAY OF MAGNESIUM: CPT | Performed by: INTERNAL MEDICINE

## 2023-12-06 PROCEDURE — 80053 COMPREHEN METABOLIC PANEL: CPT | Performed by: INTERNAL MEDICINE

## 2023-12-06 RX ADMIN — DOXAZOSIN 2 MG: 1 TABLET ORAL at 09:12

## 2023-12-06 RX ADMIN — FUROSEMIDE 60 MG: 10 INJECTION, SOLUTION INTRAMUSCULAR; INTRAVENOUS at 05:12

## 2023-12-06 RX ADMIN — MORPHINE SULFATE 2 MG: 4 INJECTION, SOLUTION INTRAMUSCULAR; INTRAVENOUS at 12:12

## 2023-12-06 RX ADMIN — WHITE PETROLATUM: 1.75 OINTMENT TOPICAL at 08:12

## 2023-12-06 RX ADMIN — FUROSEMIDE 60 MG: 10 INJECTION, SOLUTION INTRAMUSCULAR; INTRAVENOUS at 10:12

## 2023-12-06 RX ADMIN — PANCRELIPASE 3 CAPSULE: 24000; 76000; 120000 CAPSULE, DELAYED RELEASE PELLETS ORAL at 09:12

## 2023-12-06 RX ADMIN — LEVETIRACETAM 500 MG: 500 TABLET, FILM COATED ORAL at 08:12

## 2023-12-06 RX ADMIN — LEVETIRACETAM 500 MG: 500 TABLET, FILM COATED ORAL at 02:12

## 2023-12-06 RX ADMIN — OXYCODONE HYDROCHLORIDE 10 MG: 5 TABLET ORAL at 10:12

## 2023-12-06 RX ADMIN — ATENOLOL 50 MG: 50 TABLET ORAL at 09:12

## 2023-12-06 RX ADMIN — ATENOLOL 50 MG: 50 TABLET ORAL at 08:12

## 2023-12-06 RX ADMIN — PREGABALIN 75 MG: 50 CAPSULE ORAL at 09:12

## 2023-12-06 RX ADMIN — OXYCODONE HYDROCHLORIDE 10 MG: 5 TABLET ORAL at 12:12

## 2023-12-06 RX ADMIN — DULOXETINE HYDROCHLORIDE 20 MG: 20 CAPSULE, DELAYED RELEASE ORAL at 09:12

## 2023-12-06 RX ADMIN — LEVETIRACETAM 500 MG: 500 TABLET, FILM COATED ORAL at 09:12

## 2023-12-06 RX ADMIN — PANCRELIPASE 6 CAPSULE: 60000; 12000; 38000 CAPSULE, DELAYED RELEASE PELLETS ORAL at 08:12

## 2023-12-06 RX ADMIN — WHITE PETROLATUM: 1.75 OINTMENT TOPICAL at 09:12

## 2023-12-06 RX ADMIN — NIFEDIPINE 90 MG: 30 TABLET, FILM COATED, EXTENDED RELEASE ORAL at 08:12

## 2023-12-06 RX ADMIN — ENOXAPARIN SODIUM 40 MG: 40 INJECTION SUBCUTANEOUS at 04:12

## 2023-12-06 RX ADMIN — Medication: at 08:12

## 2023-12-06 RX ADMIN — MORPHINE SULFATE 2 MG: 4 INJECTION, SOLUTION INTRAMUSCULAR; INTRAVENOUS at 07:12

## 2023-12-06 RX ADMIN — MICONAZOLE NITRATE: 20 POWDER TOPICAL at 08:12

## 2023-12-06 RX ADMIN — OXYCODONE HYDROCHLORIDE 10 MG: 5 TABLET ORAL at 04:12

## 2023-12-06 RX ADMIN — METOLAZONE 10 MG: 2.5 TABLET ORAL at 08:12

## 2023-12-06 RX ADMIN — PANCRELIPASE 6 CAPSULE: 60000; 12000; 38000 CAPSULE, DELAYED RELEASE PELLETS ORAL at 02:12

## 2023-12-06 RX ADMIN — OXYCODONE HYDROCHLORIDE 10 MG: 5 TABLET ORAL at 07:12

## 2023-12-06 RX ADMIN — MICONAZOLE NITRATE: 20 POWDER TOPICAL at 09:12

## 2023-12-06 RX ADMIN — FUROSEMIDE 60 MG: 10 INJECTION, SOLUTION INTRAMUSCULAR; INTRAVENOUS at 02:12

## 2023-12-06 RX ADMIN — DULOXETINE HYDROCHLORIDE 20 MG: 20 CAPSULE, DELAYED RELEASE ORAL at 08:12

## 2023-12-06 RX ADMIN — INSULIN DETEMIR 16 UNITS: 100 INJECTION, SOLUTION SUBCUTANEOUS at 09:12

## 2023-12-06 RX ADMIN — MORPHINE SULFATE 2 MG: 4 INJECTION, SOLUTION INTRAMUSCULAR; INTRAVENOUS at 05:12

## 2023-12-06 NOTE — PT/OT/SLP PROGRESS
Physical Therapy Treatment    Patient Name:  Kamran Huerta   MRN:  [id number]    Pt stated that he was too fatigued from yesterdays treatment to participate today.     12/06/2023

## 2023-12-06 NOTE — PROGRESS NOTES
Inpatient Nutrition Assessment    Admit Date: 11/25/2023   Total duration of encounter: 11 days   Patient Age: 48 y.o.    Nutrition Recommendation/Prescription     Continue diabetic diet as tolerated  Continue Boost Glucose Control daily to provide 190 kcal and 16 g protein per serving  Continue digestive enzymes  Daily weights  RD to monitor po intake and weight    Communication of Recommendations: reviewed with patient    Nutrition Assessment     Malnutrition Assessment/Nutrition-Focused Physical Exam    Malnutrition Context: chronic illness (12/01/23 1157)  Malnutrition Level: moderate (12/01/23 1157)  Energy Intake (Malnutrition): other (see comments) (does not meet criteria) (12/01/23 1157)  Weight Loss (Malnutrition): other (see comments) (does not meet criteria) (12/01/23 1157)              Muscle Mass (Malnutrition): mild depletion (12/01/23 1157)  [place] (Muscle Loss): mild depletion                       Fluid Accumulation (Malnutrition): severe (12/01/23 1157)        A minimum of two characteristics is recommended for diagnosis of either severe or non-severe malnutrition.    Chart Review    Reason Seen: length of stay and follow-up    Malnutrition Screening Tool Results   Have you recently lost weight without trying?: No  Have you been eating poorly because of a decreased appetite?: Yes   MST Score: 1   Diagnosis:  Acute on chronic lower back pain- intractable- suspected osteomyelitis  Dizziness, Fall- from wheel chair  Hypoglycemia- recurrent-resolved  Acute on chronic kidney disease stage III  Anasarca with volume overload  with peripheral edema  Hypertensive urgency at admission-resolving    Relevant Medical History: CHF, chronic back pain wheelchair-bound, DM 2, HTN, seizures, CVA, CKD, alcoholic liver disease with chronic pancreatitis , L BKA    Nutrition-Related Medications: furosemide, insulin detemir, lipase-protease-amylase,     Calorie Containing IV Medications: no significant kcals from  "medications at this time    Nutrition-Related Labs:   : RBC-3.01, H/H-8.7/26.6, Cl-116, BUN-37.8, creat-2.46, glu-118, boogie-7.3  : Cl 110, BUN 46.1, Cr 2.07, GFR 39, Mag 1.00, Alb 1.7      Nutrition Orders:   Diet diabetic  Dietary nutrition supplements Boost Glucose Control Vanilla; Daily    Appetite/Oral Intake: good/% of meals    Factors Affecting Nutritional Intake: none identified    Food/Episcopal/Cultural Preferences: none reported    Food Allergies: no known food allergies    Wound(s):      Altered Skin Integrity 23 1639 Left posterior Buttocks Other (comment) Intact skin with non-blanchable redness of localized area-Tissue loss description: Not applicable     Last Bowel Movement: 23    Comments    23: pt reports good appetite, denies decreased appetite prior admission. Complained of diarrhea, continue digestive enzymes. UBW ~215 lb with recent weight gain r/t fluid retention. Per notes, pt with 4+ pitting anasarca. Per NFPA, pt with mild muscle loss. Pt agreed to ONS daily.    23: Patient reports good oral intake, denies nausea/vomiting/diarrhea/constipation. Drinking oral nutrition supplement daily.    Anthropometrics    Height: 5' 7.72" (172 cm) Height Method: Measured  Last Weight: 107.5 kg (237 lb) (23 0500) Weight Method: Bed Scale  BMI (Calculated): 36.3  BMI Classification: obese grade III (BMI >/=40)        Ideal Body Weight (IBW), Male: 152.32 lb     % Ideal Body Weight, Male (lb): 183.82 %                 Usual Body Weight (UBW), k.7 kg  % Usual Body Weight: 130.27     Usual Weight Provided By: patient and EMR weight history    Wt Readings from Last 5 Encounters:   23 107.5 kg (237 lb)   23 101.3 kg (223 lb 5.2 oz)   22 87 kg (191 lb 12.8 oz)   21 93.8 kg (206 lb 12.7 oz)   21 93.8 kg (206 lb 12.7 oz)     Weight Change(s) Since Admission:   Wt Readings from Last 1 Encounters:   23 0500 107.5 kg (237 lb)   23 " 0700 111.6 kg (246 lb)   23 1128 127 kg (280 lb)   23 1539 127 kg (280 lb)   23 0627 127 kg (280 lb)   Admit Weight: 127 kg (280 lb) (23 0627), Weight Method: Stated    Estimated Needs    Weight Used For Calorie Calculations: 97.7 kg (215 lb 6.2 oz) (UBW used)  Energy Calorie Requirements (kcal): 1713-6973 kcal (1.0-1.1 stress factor)  Energy Need Method: Starke-St Jeor  Weight Used For Protein Calculations: 97.7 kg (215 lb 6.2 oz) (UBW used)  Protein Requirements: 78-98 g (0.8-1.0 g/kg)  Fluid Requirements (mL): 1827 ml (1 ml/kcal)  Temp (24hrs), Av.8 °F (36.6 °C), Min:97.4 °F (36.3 °C), Max:98.3 °F (36.8 °C)       Enteral Nutrition    Patient not receiving enteral nutrition at this time.    Parenteral Nutrition    Patient not receiving parenteral nutrition support at this time.    Evaluation of Received Nutrient Intake    Calories: meeting estimated needs  Protein: meeting estimated needs    Patient Education    Not applicable.    Nutrition Diagnosis     PES: Unintended weight gain related to  fluid retention as evidenced by 4+ pitting anasarca. (active)    PES: Malnutrition related to chronic illness as evidenced by mild muscle depletion and severe fluid accumulation. (active)     Interventions/Goals     Intervention(s): general/healthful diet, commercial beverage, prescription medication, and collaboration with other providers    Goal: Consume % of oral supplements by follow-up. (goal met)    Monitoring & Evaluation     Dietitian will monitor food and beverage intake, weight change, electrolyte/renal panel, glucose/endocrine profile, and gastrointestinal profile.    Nutrition Risk/Follow-Up: moderate (follow-up in 3-5 days)   Please consult if re-assessment needed sooner.

## 2023-12-06 NOTE — PROGRESS NOTES
[facility] Hospital Medicine Progress Note        Chief Complaint: Inpatient Follow-up for anasarca    HPI:   48 y.o. male who PMH includes CHF, chronic back pain wheelchair-bound, DM type 2, HTN, seizures, CVA, CKD stage 3, alcoholic liver disease with chronic pancreatitis, presents to the ED at [facility] on 11/25/2023 with a primary complaint of weakness, dizziness lightheadedness and fall out if his wheelchair striking his mouth; denies any LOC.  PT had back surgery years ago and reports has been wheelchair bound since then. No reports of seizures.  No CP, SOB, N/V/D, fever, chills cough congestion or any sick contacts. Labs reviewed demonstrated WBC 14.42, HH 12.2/37.2, sed rate 93 , CO2 18 Bun 25.7, Creat 2.31, glucose 52 Mg+ 1.0, , AST 36 reports of chest pain, CRP shortness a breath, nausea, vomiting, diarrhea5, fever., [id number], cough, congestion, or any sick, contacts. B no reportsP of chest pain, 109.7; other indicis unremarkable. CXR impression reviewed demonstrated  no acute cardiopulmonary abnormality. CT of head without contrast impression reviewed demonstrated no appreciable acute intracranial abnormality. Ct maxillofacial without contrast impression reviewed demonstrated no appreciate acute traumatic osseous abnormality, bilateral mastoid effusions. CT cervical spine without contrast impression reviewed demonstrated no appreciable acute osseous abnormality by CT evaluation, degenerative changes at the cervical spine. CT thoracic spine without contrast impression reviewed demonstrated no acute osseous abnormality. CT of lumbar spine without contrast impression reviewed demonstrated no appreciable fracture or acute osseous abnormality, severe degenerative change at L5-S1 progressed, endplate sclerosis disc space narrowing and osseous erosions which may be degenerative or infectious/inflammatory, anasarca.   Initial VS /105 P 75 R 13 T 98.7F O2 saturation  98% on room air. Pt received multiple doses of pain medication , D50 for hypoglycemia, 40 mg lasix,  and magnesium rider in the ED. Neurosurgery services consulted. Pt awaiting MRI. Pt is admitted to hospital medicine services for further management.    Nephrology was consulted and patient was started on Lasix albumin drip.  Segovia was inserted due to possible outlet obstruction in the setting of anasarca.  MRI of lumbar spine was not helpful due to significant fluid collection.      Interval Hx:   Responding well with diuresis.  More than 20 L negative fluid balance since admission.  Hemodynamically stable.  Labs today show stable kidney function, stable hemoglobin.  Tolerating p.o..  MRI pending       Objective/physical exam:  Vitals:    12/06/23 0500 12/06/23 0521 12/06/23 0729 12/06/23 0742   BP:    (!) 151/88   BP Location:       Patient Position:       Pulse:    61   Resp:  18 16    Temp:    97.4 °F (36.3 °C)   TempSrc:    Oral   SpO2:    100%   Weight: 107.5 kg (237 lb)      Height:         General: In no acute distress, afebrile  Respiratory: Clear to auscultation bilaterally  Cardiovascular: S1, S2, no appreciable murmur, pedal edema  Abdomen: Soft, nontender, BS +  : Segovia  Neurologic: Alert and oriented x4    Lab Results   Component Value Date     12/06/2023    K 4.2 12/06/2023    CO2 24 12/06/2023    BUN 46.1 (H) 12/06/2023    CREATININE 2.07 (H) 12/06/2023    CALCIUM 7.3 (L) 12/06/2023    ESTGFRAFRICA >105 02/22/2021    EGFRNONAA >60 01/20/2021      Lab Results   Component Value Date    ALT 14 12/06/2023    AST 20 12/06/2023    ALKPHOS 92 12/06/2023    BILITOT 0.3 12/06/2023      Lab Results   Component Value Date    WBC 9.36 12/06/2023    HGB 9.7 (L) 12/06/2023    HCT 29.1 (L) 12/06/2023    MCV 89.8 12/06/2023     12/06/2023           Medications:   atenoloL  50 mg Oral BID    doxazosin  2 mg Oral QHS    DULoxetine  20 mg Oral BID    enoxparin  40 mg Subcutaneous Daily    ergocalciferol   50,000 Units Oral Q72H    furosemide (LASIX) injection  60 mg Intravenous Q8H    insulin detemir U-100  16 Units Subcutaneous QHS    levETIRAcetam  500 mg Oral TID    lipase-protease-amylase 12,000-38,000-60,000 units  6 capsule Oral TID    metOLazone  10 mg Oral Daily    miconazole NITRATE 2 %   Topical (Top) BID    NIFEdipine  90 mg Oral Daily    perflutren lipid microspheres  1.4 mL Intravenous Once    pregabalin  75 mg Oral QHS    white petrolatum   Topical (Top) BID    zinc oxide-cod liver oil   Topical (Top) BID      acetaminophen, aluminum-magnesium hydroxide-simethicone, dextrose 10%, dextrose 10%, dextrose 10%, dextrose 10%, glucagon (human recombinant), glucose, glucose, hydrALAZINE, insulin aspart U-100, labetaloL, melatonin, morphine, naloxone, ondansetron, oxyCODONE, polyethylene glycol, prochlorperazine, simethicone, white petrolatum     Assessment/Plan:    Anasarca  Acute on chronic kidney disease stage III, Nephrotic range proteinuria  Cirrhosis of liver  Acute on chronic lower back pain- intractable  Dizziness, Fall- from wheel chair  Hypoglycemia- recurrent-resolved. T2 dm A1c 5.5  Hypertensive urgency at admission-resolving  ESBL E coli in urine- possibly asymptomatic bacteriuria    HX: Wheelchair-bound, history of CVA with residual left-sided deficits, left BKA, chronic alcoholic liver disease, chronic seizures    Plan:  -responding well with current diuretic regimen.  Continue strict I's and o's monitoring.  Nephrology following.   -we will obtain MRI lumbar spine in a day or 2 as edema continues to improve  -keep Segovia in.  We will DC when appropriate.  Scrotal edema continues but improving  -encouraged to limit morphine.  Keep p.o. analgesics as needed  -therapy as tolerated    Florencio Montemayor MD

## 2023-12-07 LAB
ALBUMIN SERPL-MCNC: 1.6 G/DL (ref 3.5–5)
ALBUMIN/GLOB SERPL: 0.6 RATIO (ref 1.1–2)
ALP SERPL-CCNC: 88 UNIT/L (ref 40–150)
ALT SERPL-CCNC: 13 UNIT/L (ref 0–55)
AST SERPL-CCNC: 18 UNIT/L (ref 5–34)
BILIRUB SERPL-MCNC: 0.2 MG/DL
BUN SERPL-MCNC: 47.2 MG/DL (ref 8.9–20.6)
CALCIUM SERPL-MCNC: 6.8 MG/DL (ref 8.4–10.2)
CHLORIDE SERPL-SCNC: 109 MMOL/L (ref 98–107)
CO2 SERPL-SCNC: 25 MMOL/L (ref 22–29)
CREAT SERPL-MCNC: 2.19 MG/DL (ref 0.73–1.18)
GFR SERPLBLD CREATININE-BSD FMLA CKD-EPI: 37 MLS/MIN/1.73/M2
GLOBULIN SER-MCNC: 2.8 GM/DL (ref 2.4–3.5)
GLUCOSE SERPL-MCNC: 143 MG/DL (ref 74–100)
MAGNESIUM SERPL-MCNC: 1 MG/DL (ref 1.6–2.6)
POCT GLUCOSE: 141 MG/DL (ref 70–110)
POCT GLUCOSE: 144 MG/DL (ref 70–110)
POCT GLUCOSE: 185 MG/DL (ref 70–110)
POCT GLUCOSE: 226 MG/DL (ref 70–110)
POCT GLUCOSE: 68 MG/DL (ref 70–110)
POTASSIUM SERPL-SCNC: 3.9 MMOL/L (ref 3.5–5.1)
PROT SERPL-MCNC: 4.4 GM/DL (ref 6.4–8.3)
SODIUM SERPL-SCNC: 140 MMOL/L (ref 136–145)

## 2023-12-07 PROCEDURE — 25000003 PHARM REV CODE 250: Performed by: INTERNAL MEDICINE

## 2023-12-07 PROCEDURE — 83735 ASSAY OF MAGNESIUM: CPT | Performed by: INTERNAL MEDICINE

## 2023-12-07 PROCEDURE — 63600175 PHARM REV CODE 636 W HCPCS: Performed by: NURSE PRACTITIONER

## 2023-12-07 PROCEDURE — 25000003 PHARM REV CODE 250: Performed by: NURSE PRACTITIONER

## 2023-12-07 PROCEDURE — 80053 COMPREHEN METABOLIC PANEL: CPT | Performed by: INTERNAL MEDICINE

## 2023-12-07 PROCEDURE — 21400001 HC TELEMETRY ROOM

## 2023-12-07 PROCEDURE — 27000207 HC ISOLATION

## 2023-12-07 PROCEDURE — 63600175 PHARM REV CODE 636 W HCPCS: Performed by: INTERNAL MEDICINE

## 2023-12-07 RX ORDER — MAGNESIUM SULFATE HEPTAHYDRATE 40 MG/ML
2 INJECTION, SOLUTION INTRAVENOUS ONCE
Status: COMPLETED | OUTPATIENT
Start: 2023-12-07 | End: 2023-12-07

## 2023-12-07 RX ORDER — HYDROMORPHONE HYDROCHLORIDE 2 MG/1
2 TABLET ORAL ONCE
Status: COMPLETED | OUTPATIENT
Start: 2023-12-07 | End: 2023-12-08

## 2023-12-07 RX ADMIN — OXYCODONE HYDROCHLORIDE 10 MG: 5 TABLET ORAL at 06:12

## 2023-12-07 RX ADMIN — Medication: at 08:12

## 2023-12-07 RX ADMIN — LEVETIRACETAM 500 MG: 500 TABLET, FILM COATED ORAL at 08:12

## 2023-12-07 RX ADMIN — ATENOLOL 50 MG: 50 TABLET ORAL at 09:12

## 2023-12-07 RX ADMIN — OXYCODONE HYDROCHLORIDE 10 MG: 5 TABLET ORAL at 11:12

## 2023-12-07 RX ADMIN — ERGOCALCIFEROL 50000 UNITS: 1.25 CAPSULE ORAL at 09:12

## 2023-12-07 RX ADMIN — DOXAZOSIN 2 MG: 1 TABLET ORAL at 08:12

## 2023-12-07 RX ADMIN — ENOXAPARIN SODIUM 40 MG: 40 INJECTION SUBCUTANEOUS at 04:12

## 2023-12-07 RX ADMIN — Medication: at 03:12

## 2023-12-07 RX ADMIN — Medication: at 09:12

## 2023-12-07 RX ADMIN — DULOXETINE HYDROCHLORIDE 20 MG: 20 CAPSULE, DELAYED RELEASE ORAL at 09:12

## 2023-12-07 RX ADMIN — LEVETIRACETAM 500 MG: 500 TABLET, FILM COATED ORAL at 02:12

## 2023-12-07 RX ADMIN — WHITE PETROLATUM: 1.75 OINTMENT TOPICAL at 08:12

## 2023-12-07 RX ADMIN — OXYCODONE HYDROCHLORIDE 10 MG: 5 TABLET ORAL at 04:12

## 2023-12-07 RX ADMIN — PANCRELIPASE 3 CAPSULE: 24000; 76000; 120000 CAPSULE, DELAYED RELEASE PELLETS ORAL at 02:12

## 2023-12-07 RX ADMIN — MICONAZOLE NITRATE: 20 POWDER TOPICAL at 09:12

## 2023-12-07 RX ADMIN — MORPHINE SULFATE 2 MG: 4 INJECTION, SOLUTION INTRAMUSCULAR; INTRAVENOUS at 01:12

## 2023-12-07 RX ADMIN — FUROSEMIDE 60 MG: 10 INJECTION, SOLUTION INTRAMUSCULAR; INTRAVENOUS at 02:12

## 2023-12-07 RX ADMIN — LEVETIRACETAM 500 MG: 500 TABLET, FILM COATED ORAL at 09:12

## 2023-12-07 RX ADMIN — PANCRELIPASE 3 CAPSULE: 24000; 76000; 120000 CAPSULE, DELAYED RELEASE PELLETS ORAL at 09:12

## 2023-12-07 RX ADMIN — MORPHINE SULFATE 2 MG: 4 INJECTION, SOLUTION INTRAMUSCULAR; INTRAVENOUS at 09:12

## 2023-12-07 RX ADMIN — WHITE PETROLATUM: 1.75 OINTMENT TOPICAL at 09:12

## 2023-12-07 RX ADMIN — FUROSEMIDE 60 MG: 10 INJECTION, SOLUTION INTRAMUSCULAR; INTRAVENOUS at 09:12

## 2023-12-07 RX ADMIN — MICONAZOLE NITRATE: 20 POWDER TOPICAL at 08:12

## 2023-12-07 RX ADMIN — FUROSEMIDE 60 MG: 10 INJECTION, SOLUTION INTRAMUSCULAR; INTRAVENOUS at 04:12

## 2023-12-07 RX ADMIN — NIFEDIPINE 90 MG: 30 TABLET, FILM COATED, EXTENDED RELEASE ORAL at 09:12

## 2023-12-07 RX ADMIN — OXYCODONE HYDROCHLORIDE 10 MG: 5 TABLET ORAL at 08:12

## 2023-12-07 RX ADMIN — METOLAZONE 10 MG: 2.5 TABLET ORAL at 09:12

## 2023-12-07 RX ADMIN — INSULIN DETEMIR 16 UNITS: 100 INJECTION, SOLUTION SUBCUTANEOUS at 08:12

## 2023-12-07 RX ADMIN — ATENOLOL 50 MG: 50 TABLET ORAL at 08:12

## 2023-12-07 RX ADMIN — PANCRELIPASE 3 CAPSULE: 24000; 76000; 120000 CAPSULE, DELAYED RELEASE PELLETS ORAL at 08:12

## 2023-12-07 RX ADMIN — PREGABALIN 75 MG: 50 CAPSULE ORAL at 08:12

## 2023-12-07 RX ADMIN — MAGNESIUM SULFATE HEPTAHYDRATE 2 G: 40 INJECTION, SOLUTION INTRAVENOUS at 11:12

## 2023-12-07 RX ADMIN — DULOXETINE HYDROCHLORIDE 20 MG: 20 CAPSULE, DELAYED RELEASE ORAL at 08:12

## 2023-12-07 NOTE — NURSING
[facility] - Observation Unit  Wound Care    Patient Name:  Kamran Huerta   MRN:  [id number]  Date: 12/7/2023  Diagnosis: <principal problem not specified>    History:     Past Medical History:   Diagnosis Date    CHF (congestive heart failure)     Chronic back pain     CVA (cerebral vascular accident) 01/2023    DM (diabetes mellitus)     HTN (hypertension)     Seizures        Social History     Socioeconomic History    Marital status: Single   Tobacco Use    Smoking status: Never    Smokeless tobacco: Never   Substance and Sexual Activity    Alcohol use: Not Currently    Drug use: Not Currently    Sexual activity: Not Currently     Social Determinants of Health     Social Connections: Unknown (11/16/2022)    Social Connection and Isolation Panel [NHANES]     Marital Status:        Precautions:     Allergies as of 11/25/2023    (No Known Allergies)       WOC Assessment Details/Treatment      12/07/23 1100        Incision/Site 11/25/23 1531 Left Knee anterior   Date First Assessed/Time First Assessed: 11/25/23 1531   Side: Left  Location: (c) Knee  Orientation: anterior   Wound Image    Drainage Amount Scant   Drainage Characteristics/Odor Serosanguineous;No odor   Appearance Pink;Red;Moist   Red (%), Wound Tissue Color 100 %   Periwound Area Dry;Intact   Wound Edges Approximated   Wound Length (cm) 10 cm   Wound Width (cm) 0.4 cm   Wound Surface Area (cm^2) 4 cm^2   Care Cleansed with:;Wound cleanser   Dressing Calcium alginate;Gauze     Re eval of left stump breakdown-see photo-now almost fully granulated-just sm areas at either end.  Redressed with aquacel ag.   Will recheck next week if still in hospital.  12/07/2023

## 2023-12-07 NOTE — PROGRESS NOTES
Nephrology consult follow up note    HPI:      Kamran Huerta is a 48 y.o. male seen by our service in April for nephrotic syndrome and FABIO. At that time he underwent renal biopsy revealing advanced diabetic nodular sclerosis. He was diuresed aggressively and discharged with Cr 1.8 and ability to ambulate in the trotter without oxygen or distress. Since that time he has undergone L BKA s/t gangrenous wound. He presented to the ED 11/25 s/p fall from wheelchair with resulting back pain. Neurosurgery undergoing further workup.   He was noted to have FABIO and marked hypervolemia for which nephrology has been consulted.    Interval history:     No acute events overnight.  No new complaints.  Lower extremity edema improving.  No chest pain, shortness of breath, nausea, vomiting.  Making good urine output, 2 L yesterday     Review of Systems:     Comprehensive 10pt ROS negative except as noted per history.    Past medical, family, surgical, and social history reviewed and unchanged from initial consult note.     Objective:       VITAL SIGNS: 24 HR MIN & MAX LAST    Temp  Min: 96.7 °F (35.9 °C)  Max: 98.8 °F (37.1 °C)  97.5 °F (36.4 °C)        BP  Min: 127/69  Max: 169/97  (!) 153/78     Pulse  Min: 55  Max: 66  (!) 55     Resp  Min: 16  Max: 18  17    SpO2  Min: 98 %  Max: 100 %  100 %      GEN:  Chronically ill-appearing AAM in NAD  CV: RRR +S1,S2 without murmur  PULM: CTAB, unlabored  ABD: Soft, NT/ND abdomen with NABS  EXT:  2+ dependent edema  SKIN: Warm and dry  PSYCH: Awake, alert and appropriately conversant.   Dialysis access:  No dialysis access            Component Value Date/Time     12/07/2023 0644     12/06/2023 0819     02/22/2021 1246    K 3.9 12/07/2023 0644    K 4.2 12/06/2023 0819    K 3.6 02/22/2021 1246    CHLORIDE 109 (H) 12/07/2023 0644    CHLORIDE 110 (H) 12/06/2023 0819    CHLORIDE 106 02/22/2021 1246    CO2 25 12/07/2023 0644    CO2 24 12/06/2023 0819    CO2 26 02/22/2021 1246  "   BUN 47.2 (H) 12/07/2023 0644    BUN 46.1 (H) 12/06/2023 0819    BUN 15.0 02/22/2021 1246    CREATININE 2.19 (H) 12/07/2023 0644    CREATININE 2.07 (H) 12/06/2023 0819    CREATININE 0.98 02/22/2021 1246    CALCIUM 6.8 (LL) 12/07/2023 0644    CALCIUM 7.3 (L) 12/06/2023 0819    CALCIUM 8.5 02/22/2021 1246    PHOS 5.3 (H) 11/27/2023 0401            Component Value Date/Time    WBC 9.36 12/06/2023 0819    WBC 9.34 12/05/2023 0412    HGB 9.7 (L) 12/06/2023 0819    HGB 8.7 (L) 12/05/2023 0412    HCT 29.1 (L) 12/06/2023 0819    HCT 26.7 (L) 12/05/2023 0412    HCT 43 11/13/2022 1428     12/06/2023 0819     12/05/2023 0412         Imaging reviewed      Assessment / Plan:       Active Hospital Problems    Diagnosis  POA    Moderate malnutrition [E44.0]  Yes     Patient meets ASPEN criteria for moderate malnutrition of chronic illness per RD assessment as evidenced by:  Energy Intake (Malnutrition): other (see comments) (does not meet criteria)  Weight Loss (Malnutrition): other (see comments) (does not meet criteria)     Muscle Mass (Malnutrition): mild depletion  Fluid Accumulation (Malnutrition): severe        A minimum of two characteristics is recommended for diagnosis of either severe or non-severe malnutrition.    Ht Readings from Last 1 Encounters:   12/01/23 5' 7.72" (1.72 m)     Wt Readings from Last 1 Encounters:   11/26/23 1128 127 kg (280 lb)   11/25/23 1539 127 kg (280 lb)   11/25/23 0627 127 kg (280 lb)   Body mass index is 42.93 kg/m².    Patient has been screened and assessed by RD. See nutrition recommendations documented in inpatient nutrition assessment. RD will continue to follow patient throughout hospitalization.          Resolved Hospital Problems   No resolved problems to display.       FABIO s/t acute infection and hypervolemia   CKD IIIa   -biopsy proven in April 2023 diabetic nodular sclerosis   -Nephrotic range proteinuria         -March 2023 - - 10.9 g        -Now 5.9 g   Anasarca "   S/p Fall with resulting back pain. Remote back surgery   Cirrhosis of the liver  DM I onset age 14 with long standing history of noncompliance. Hgb A1c improving.      Plan:  Renal function relatively stable.  Continues to make good urine output and responding to diuretics.  Hopefully patient can get MRI today.  Continue IV Lasix 60 mg t.i.d., and metolazone 10 mg daily for now.  We will switch Lasix over to p.o. when patient is getting closer to discharge.      Spike Mcintyre DO  Nephrology  [facility]  Clinic number: [phone]

## 2023-12-07 NOTE — PROGRESS NOTES
[facility] Hospital Medicine Progress Note        Chief Complaint: Inpatient Follow-up for anasarca    HPI:   48 y.o. male who PMH includes CHF, chronic back pain wheelchair-bound, DM type 2, HTN, seizures, CVA, CKD stage 3, alcoholic liver disease with chronic pancreatitis, presents to the ED at [facility] on 11/25/2023 with a primary complaint of weakness, dizziness lightheadedness and fall out if his wheelchair striking his mouth; denies any LOC.  PT had back surgery years ago and reports has been wheelchair bound since then. No reports of seizures.  No CP, SOB, N/V/D, fever, chills cough congestion or any sick contacts. Labs reviewed demonstrated WBC 14.42, HH 12.2/37.2, sed rate 93 , CO2 18 Bun 25.7, Creat 2.31, glucose 52 Mg+ 1.0, , AST 36 reports of chest pain, CRP shortness a breath, nausea, vomiting, diarrhea5, fever., [id number], cough, congestion, or any sick, contacts. B no reportsP of chest pain, 109.7; other indicis unremarkable. CXR impression reviewed demonstrated  no acute cardiopulmonary abnormality. CT of head without contrast impression reviewed demonstrated no appreciable acute intracranial abnormality. Ct maxillofacial without contrast impression reviewed demonstrated no appreciate acute traumatic osseous abnormality, bilateral mastoid effusions. CT cervical spine without contrast impression reviewed demonstrated no appreciable acute osseous abnormality by CT evaluation, degenerative changes at the cervical spine. CT thoracic spine without contrast impression reviewed demonstrated no acute osseous abnormality. CT of lumbar spine without contrast impression reviewed demonstrated no appreciable fracture or acute osseous abnormality, severe degenerative change at L5-S1 progressed, endplate sclerosis disc space narrowing and osseous erosions which may be degenerative or infectious/inflammatory, anasarca.   Initial VS /105 P 75 R 13 T 98.7F O2 saturation  98% on room air. Pt received multiple doses of pain medication , D50 for hypoglycemia, 40 mg lasix,  and magnesium rider in the ED. Neurosurgery services consulted. Pt awaiting MRI. Pt is admitted to hospital medicine services for further management.    Nephrology was consulted and patient was started on Lasix albumin drip.  Segovia was inserted due to possible outlet obstruction in the setting of anasarca.  MRI of lumbar spine was not helpful due to significant fluid collection.      Interval Hx:     Minimally elevated blood pressure.  He was alert, comfortably resting.  Except for the pain he has no issues.  Responding well with diuresis.  Awaiting MRI.  Low magnesium noted    Objective/physical exam:  Vitals:    12/07/23 0425 12/07/23 0611 12/07/23 0634 12/07/23 0757   BP: 127/69   (!) 153/78   Pulse: (!) 59   (!) 55   Resp: 16  16 18   Temp: 97.9 °F (36.6 °C)   97.5 °F (36.4 °C)   TempSrc: Oral   Oral   SpO2: 99%   100%   Weight:  104.3 kg (230 lb)     Height:         General: In no acute distress, afebrile  Respiratory: Clear to auscultation bilaterally  Cardiovascular: S1, S2, no appreciable murmur, pedal edema  Abdomen: Soft, nontender, BS +  : Segovia  Neurologic: Alert and oriented x4    Lab Results   Component Value Date     12/07/2023    K 3.9 12/07/2023    CO2 25 12/07/2023    BUN 47.2 (H) 12/07/2023    CREATININE 2.19 (H) 12/07/2023    CALCIUM 6.8 (LL) 12/07/2023    ESTGFRAFRICA >105 02/22/2021    EGFRNONAA >60 01/20/2021      Lab Results   Component Value Date    ALT 13 12/07/2023    AST 18 12/07/2023    ALKPHOS 88 12/07/2023    BILITOT 0.2 12/07/2023      Lab Results   Component Value Date    WBC 9.36 12/06/2023    HGB 9.7 (L) 12/06/2023    HCT 29.1 (L) 12/06/2023    MCV 89.8 12/06/2023     12/06/2023           Medications:   atenoloL  50 mg Oral BID    doxazosin  2 mg Oral QHS    DULoxetine  20 mg Oral BID    enoxparin  40 mg Subcutaneous Daily    ergocalciferol  50,000 Units Oral Q72H     furosemide (LASIX) injection  60 mg Intravenous Q8H    insulin detemir U-100  16 Units Subcutaneous QHS    levETIRAcetam  500 mg Oral TID    lipase-protease-amylase 24,000-76,000-120,000 units  3 capsule Oral TID    metOLazone  10 mg Oral Daily    miconazole NITRATE 2 %   Topical (Top) BID    NIFEdipine  90 mg Oral Daily    perflutren lipid microspheres  1.4 mL Intravenous Once    pregabalin  75 mg Oral QHS    white petrolatum   Topical (Top) BID    zinc oxide-cod liver oil   Topical (Top) BID      acetaminophen, aluminum-magnesium hydroxide-simethicone, dextrose 10%, dextrose 10%, dextrose 10%, dextrose 10%, glucagon (human recombinant), glucose, glucose, hydrALAZINE, insulin aspart U-100, labetaloL, melatonin, morphine, naloxone, ondansetron, oxyCODONE, polyethylene glycol, prochlorperazine, simethicone, white petrolatum     Assessment/Plan:    Anasarca  Acute on chronic kidney disease stage III, Nephrotic range proteinuria  Cirrhosis of liver  Acute on chronic lower back pain- intractable  Dizziness, Fall- from wheel chair  Hypoglycemia- recurrent-resolved. T2 dm A1c 5.5  Hypertensive urgency at admission-resolving  ESBL E coli in urine- possibly asymptomatic bacteriuria    HX: Wheelchair-bound, history of CVA with residual left-sided deficits, left BKA, chronic alcoholic liver disease, chronic seizures    Plan:  -attempt to get MRI today.  Add Dilaudid and morphine to be used during MRI to reduce anxiety and pain.    -nephrology on board.  Continue current diuretic regimen.  Strict I's and o's, electrolyte monitoring.  Replace magnesium  -keep Segovia in.  DC when ready.  Scrotal edema improving  -therapy as tolerated    Florencio Montemayor MD

## 2023-12-07 NOTE — PT/OT/SLP PROGRESS
Attempted OT session this AM, pt politely declined participation 2/2 stomach cramping and having Bms. Will hold and f/u as appropriate.

## 2023-12-08 LAB
ALBUMIN SERPL-MCNC: 1.5 G/DL (ref 3.5–5)
BUN SERPL-MCNC: 48.2 MG/DL (ref 8.9–20.6)
CALCIUM SERPL-MCNC: 7 MG/DL (ref 8.4–10.2)
CHLORIDE SERPL-SCNC: 107 MMOL/L (ref 98–107)
CO2 SERPL-SCNC: 24 MMOL/L (ref 22–29)
CREAT SERPL-MCNC: 2.14 MG/DL (ref 0.73–1.18)
GFR SERPLBLD CREATININE-BSD FMLA CKD-EPI: 38 MLS/MIN/1.73/M2
GLUCOSE SERPL-MCNC: 165 MG/DL (ref 74–100)
MAGNESIUM SERPL-MCNC: 1.2 MG/DL (ref 1.6–2.6)
PHOSPHATE SERPL-MCNC: 4.2 MG/DL (ref 2.3–4.7)
POCT GLUCOSE: 101 MG/DL (ref 70–110)
POCT GLUCOSE: 165 MG/DL (ref 70–110)
POCT GLUCOSE: 212 MG/DL (ref 70–110)
POTASSIUM SERPL-SCNC: 3.9 MMOL/L (ref 3.5–5.1)
SODIUM SERPL-SCNC: 137 MMOL/L (ref 136–145)

## 2023-12-08 PROCEDURE — 25000003 PHARM REV CODE 250: Performed by: NURSE PRACTITIONER

## 2023-12-08 PROCEDURE — 25000003 PHARM REV CODE 250: Performed by: INTERNAL MEDICINE

## 2023-12-08 PROCEDURE — 63600175 PHARM REV CODE 636 W HCPCS: Performed by: NURSE PRACTITIONER

## 2023-12-08 PROCEDURE — 83735 ASSAY OF MAGNESIUM: CPT | Performed by: INTERNAL MEDICINE

## 2023-12-08 PROCEDURE — 63600175 PHARM REV CODE 636 W HCPCS: Performed by: INTERNAL MEDICINE

## 2023-12-08 PROCEDURE — 80069 RENAL FUNCTION PANEL: CPT | Performed by: STUDENT IN AN ORGANIZED HEALTH CARE EDUCATION/TRAINING PROGRAM

## 2023-12-08 PROCEDURE — 21400001 HC TELEMETRY ROOM

## 2023-12-08 PROCEDURE — 27000207 HC ISOLATION

## 2023-12-08 RX ORDER — LANOLIN ALCOHOL/MO/W.PET/CERES
400 CREAM (GRAM) TOPICAL 2 TIMES DAILY
Status: DISCONTINUED | OUTPATIENT
Start: 2023-12-08 | End: 2023-12-22 | Stop reason: HOSPADM

## 2023-12-08 RX ORDER — MORPHINE SULFATE 4 MG/ML
2 INJECTION, SOLUTION INTRAMUSCULAR; INTRAVENOUS EVERY 6 HOURS PRN
Status: DISCONTINUED | OUTPATIENT
Start: 2023-12-08 | End: 2023-12-10

## 2023-12-08 RX ORDER — MAGNESIUM SULFATE HEPTAHYDRATE 40 MG/ML
2 INJECTION, SOLUTION INTRAVENOUS ONCE
Status: COMPLETED | OUTPATIENT
Start: 2023-12-08 | End: 2023-12-08

## 2023-12-08 RX ADMIN — NIFEDIPINE 90 MG: 30 TABLET, FILM COATED, EXTENDED RELEASE ORAL at 08:12

## 2023-12-08 RX ADMIN — Medication: at 09:12

## 2023-12-08 RX ADMIN — LEVETIRACETAM 500 MG: 500 TABLET, FILM COATED ORAL at 09:12

## 2023-12-08 RX ADMIN — WHITE PETROLATUM: 1.75 OINTMENT TOPICAL at 08:12

## 2023-12-08 RX ADMIN — INSULIN DETEMIR 16 UNITS: 100 INJECTION, SOLUTION SUBCUTANEOUS at 08:12

## 2023-12-08 RX ADMIN — OXYCODONE HYDROCHLORIDE 10 MG: 5 TABLET ORAL at 02:12

## 2023-12-08 RX ADMIN — OXYCODONE HYDROCHLORIDE 10 MG: 5 TABLET ORAL at 03:12

## 2023-12-08 RX ADMIN — FUROSEMIDE 60 MG: 10 INJECTION, SOLUTION INTRAMUSCULAR; INTRAVENOUS at 09:12

## 2023-12-08 RX ADMIN — ENOXAPARIN SODIUM 40 MG: 40 INJECTION SUBCUTANEOUS at 04:12

## 2023-12-08 RX ADMIN — DULOXETINE HYDROCHLORIDE 20 MG: 20 CAPSULE, DELAYED RELEASE ORAL at 09:12

## 2023-12-08 RX ADMIN — ATENOLOL 50 MG: 50 TABLET ORAL at 08:12

## 2023-12-08 RX ADMIN — MORPHINE SULFATE 2 MG: 4 INJECTION, SOLUTION INTRAMUSCULAR; INTRAVENOUS at 08:12

## 2023-12-08 RX ADMIN — LEVETIRACETAM 500 MG: 500 TABLET, FILM COATED ORAL at 02:12

## 2023-12-08 RX ADMIN — PANCRELIPASE 3 CAPSULE: 24000; 76000; 120000 CAPSULE, DELAYED RELEASE PELLETS ORAL at 02:12

## 2023-12-08 RX ADMIN — FUROSEMIDE 60 MG: 10 INJECTION, SOLUTION INTRAMUSCULAR; INTRAVENOUS at 05:12

## 2023-12-08 RX ADMIN — FUROSEMIDE 60 MG: 10 INJECTION, SOLUTION INTRAMUSCULAR; INTRAVENOUS at 02:12

## 2023-12-08 RX ADMIN — OXYCODONE HYDROCHLORIDE 10 MG: 5 TABLET ORAL at 11:12

## 2023-12-08 RX ADMIN — HYDROMORPHONE HYDROCHLORIDE 2 MG: 2 TABLET ORAL at 06:12

## 2023-12-08 RX ADMIN — MORPHINE SULFATE 2 MG: 4 INJECTION, SOLUTION INTRAMUSCULAR; INTRAVENOUS at 12:12

## 2023-12-08 RX ADMIN — Medication 400 MG: at 09:12

## 2023-12-08 RX ADMIN — PREGABALIN 75 MG: 50 CAPSULE ORAL at 09:12

## 2023-12-08 RX ADMIN — OXYCODONE HYDROCHLORIDE 10 MG: 5 TABLET ORAL at 08:12

## 2023-12-08 RX ADMIN — MICONAZOLE NITRATE: 20 POWDER TOPICAL at 08:12

## 2023-12-08 RX ADMIN — LEVETIRACETAM 500 MG: 500 TABLET, FILM COATED ORAL at 08:12

## 2023-12-08 RX ADMIN — Medication 400 MG: at 12:12

## 2023-12-08 RX ADMIN — PANCRELIPASE 3 CAPSULE: 24000; 76000; 120000 CAPSULE, DELAYED RELEASE PELLETS ORAL at 08:12

## 2023-12-08 RX ADMIN — METOLAZONE 10 MG: 2.5 TABLET ORAL at 08:12

## 2023-12-08 RX ADMIN — MAGNESIUM SULFATE HEPTAHYDRATE 2 G: 40 INJECTION, SOLUTION INTRAVENOUS at 01:12

## 2023-12-08 RX ADMIN — DULOXETINE HYDROCHLORIDE 20 MG: 20 CAPSULE, DELAYED RELEASE ORAL at 08:12

## 2023-12-08 RX ADMIN — ATENOLOL 50 MG: 50 TABLET ORAL at 09:12

## 2023-12-08 RX ADMIN — DOXAZOSIN 2 MG: 1 TABLET ORAL at 09:12

## 2023-12-08 RX ADMIN — PANCRELIPASE 3 CAPSULE: 24000; 76000; 120000 CAPSULE, DELAYED RELEASE PELLETS ORAL at 09:12

## 2023-12-08 RX ADMIN — INSULIN ASPART 2 UNITS: 100 INJECTION, SOLUTION INTRAVENOUS; SUBCUTANEOUS at 04:12

## 2023-12-08 NOTE — PT/OT/SLP PROGRESS
Physical Therapy      Patient Name:  Kamran Huerta   MRN:  [id number]    Patient not seen today secondary to Patient unwilling to participate, Pain. Will follow-up as schedule permits.

## 2023-12-08 NOTE — PROGRESS NOTES
[facility] Hospital Medicine Progress Note        Chief Complaint: Inpatient Follow-up for anasarca    HPI:   48 y.o. male who PMH includes CHF, chronic back pain wheelchair-bound, DM type 2, HTN, seizures, CVA, CKD stage 3, alcoholic liver disease with chronic pancreatitis, presents to the ED at [facility] on 11/25/2023 with a primary complaint of weakness, dizziness lightheadedness and fall out if his wheelchair striking his mouth; denies any LOC.  PT had back surgery years ago and reports has been wheelchair bound since then. No reports of seizures.  No CP, SOB, N/V/D, fever, chills cough congestion or any sick contacts. Labs reviewed demonstrated WBC 14.42, HH 12.2/37.2, sed rate 93 , CO2 18 Bun 25.7, Creat 2.31, glucose 52 Mg+ 1.0, , AST 36 reports of chest pain, CRP shortness a breath, nausea, vomiting, diarrhea5, fever., [id number], cough, congestion, or any sick, contacts. B no reportsP of chest pain, 109.7; other indicis unremarkable. CXR impression reviewed demonstrated  no acute cardiopulmonary abnormality. CT of head without contrast impression reviewed demonstrated no appreciable acute intracranial abnormality. Ct maxillofacial without contrast impression reviewed demonstrated no appreciate acute traumatic osseous abnormality, bilateral mastoid effusions. CT cervical spine without contrast impression reviewed demonstrated no appreciable acute osseous abnormality by CT evaluation, degenerative changes at the cervical spine. CT thoracic spine without contrast impression reviewed demonstrated no acute osseous abnormality. CT of lumbar spine without contrast impression reviewed demonstrated no appreciable fracture or acute osseous abnormality, severe degenerative change at L5-S1 progressed, endplate sclerosis disc space narrowing and osseous erosions which may be degenerative or infectious/inflammatory, anasarca.   Initial VS /105 P 75 R 13 T 98.7F O2 saturation  98% on room air. Pt received multiple doses of pain medication , D50 for hypoglycemia, 40 mg lasix,  and magnesium rider in the ED. Neurosurgery services consulted. Pt awaiting MRI. Pt is admitted to hospital medicine services for further management.    Nephrology was consulted and patient was started on Lasix albumin drip.  Segovia was inserted due to possible outlet obstruction in the setting of anasarca.  MRI of lumbar spine was not helpful due to significant fluid collection.      Interval Hx:     Blood pressure elevated.  He has no new complaints or concerns except for the back pain.  Currently awaiting MRI.  Doing well on room air.  Responding well with diuresis.  BUN and serum creatinine stable.  Low Mag    Objective/physical exam:  Vitals:    12/08/23 0010 12/08/23 0300 12/08/23 0408 12/08/23 0500   BP:   (!) 149/88    Pulse:   61    Resp: 20 16 20    Temp:   97.6 °F (36.4 °C)    TempSrc:   Oral    SpO2:   99%    Weight:    106.1 kg (234 lb)   Height:         General: In no acute distress, afebrile  Respiratory: Clear to auscultation bilaterally  Cardiovascular: S1, S2, no appreciable murmur, pedal edema  Abdomen: Soft, nontender, BS +  : Segovia  Neurologic: Alert and oriented x4    Lab Results   Component Value Date     12/08/2023    K 3.9 12/08/2023    CO2 24 12/08/2023    BUN 48.2 (H) 12/08/2023    CREATININE 2.14 (H) 12/08/2023    CALCIUM 7.0 (L) 12/08/2023    ESTGFRAFRICA >105 02/22/2021    EGFRNONAA >60 01/20/2021      Lab Results   Component Value Date    ALT 13 12/07/2023    AST 18 12/07/2023    ALKPHOS 88 12/07/2023    BILITOT 0.2 12/07/2023      Lab Results   Component Value Date    WBC 9.36 12/06/2023    HGB 9.7 (L) 12/06/2023    HCT 29.1 (L) 12/06/2023    MCV 89.8 12/06/2023     12/06/2023           Medications:   atenoloL  50 mg Oral BID    doxazosin  2 mg Oral QHS    DULoxetine  20 mg Oral BID    enoxparin  40 mg Subcutaneous Daily    ergocalciferol  50,000 Units Oral Q72H     furosemide (LASIX) injection  60 mg Intravenous Q8H    HYDROmorphone  2 mg Oral Once    insulin detemir U-100  16 Units Subcutaneous QHS    levETIRAcetam  500 mg Oral TID    lipase-protease-amylase 24,000-76,000-120,000 units  3 capsule Oral TID    metOLazone  10 mg Oral Daily    miconazole NITRATE 2 %   Topical (Top) BID    NIFEdipine  90 mg Oral Daily    perflutren lipid microspheres  1.4 mL Intravenous Once    pregabalin  75 mg Oral QHS    white petrolatum   Topical (Top) BID    zinc oxide-cod liver oil   Topical (Top) BID      acetaminophen, aluminum-magnesium hydroxide-simethicone, dextrose 10%, dextrose 10%, glucagon (human recombinant), glucose, glucose, hydrALAZINE, insulin aspart U-100, labetaloL, melatonin, morphine, naloxone, ondansetron, oxyCODONE, polyethylene glycol, prochlorperazine, simethicone, white petrolatum     Assessment/Plan:    Anasarca  Acute on chronic kidney disease stage III, Nephrotic range proteinuria  Cirrhosis of liver  Acute on chronic lower back pain- intractable  Dizziness, Fall- from wheel chair  Hypoglycemia- recurrent-resolved. T2 dm A1c 5.5  Hypertensive urgency at admission-resolving  ESBL E coli in urine- possibly asymptomatic bacteriuria    HX: Wheelchair-bound, history of CVA with residual left-sided deficits, left BKA, chronic alcoholic liver disease, chronic seizures    Plan:  -Pending MRI. Use PO PRN Dilaudid and morphine to be used during MRI to reduce anxiety and pain.    -nephrology on board.  Continue current diuretic regimen.  Strict I's and o's, electrolyte monitoring.  Replace magnesium  -keep Segovia in.  DC when ready.  Scrotal edema improving  -therapy as tolerated    Tayx      Dieudonne Montemayor MD

## 2023-12-08 NOTE — PLAN OF CARE
Problem: Adult Inpatient Plan of Care  Goal: Plan of Care Review  Outcome: Ongoing, Progressing  Goal: Patient-Specific Goal (Individualized)  Outcome: Ongoing, Progressing  Goal: Absence of Hospital-Acquired Illness or Injury  Outcome: Ongoing, Progressing  Goal: Optimal Comfort and Wellbeing  Outcome: Ongoing, Progressing  Goal: Readiness for Transition of Care  Outcome: Ongoing, Progressing     Problem: Bariatric Environmental Safety  Goal: Safety Maintained with Care  Outcome: Ongoing, Progressing     Problem: Skin Injury Risk Increased  Goal: Skin Health and Integrity  Outcome: Ongoing, Progressing     Problem: Fall Injury Risk  Goal: Absence of Fall and Fall-Related Injury  Outcome: Ongoing, Progressing     Problem: Impaired Wound Healing  Goal: Optimal Wound Healing  Outcome: Ongoing, Progressing     Problem: Infection  Goal: Absence of Infection Signs and Symptoms  Outcome: Ongoing, Progressing     Problem: Hyperglycemia  Goal: Blood Glucose Level Within Targeted Range  Outcome: Ongoing, Progressing     Problem: Adult Inpatient Plan of Care  Goal: Plan of Care Review  Outcome: Ongoing, Progressing  Goal: Patient-Specific Goal (Individualized)  Outcome: Ongoing, Progressing  Goal: Absence of Hospital-Acquired Illness or Injury  Outcome: Ongoing, Progressing  Goal: Optimal Comfort and Wellbeing  Outcome: Ongoing, Progressing  Goal: Readiness for Transition of Care  Outcome: Ongoing, Progressing     Problem: Bariatric Environmental Safety  Goal: Safety Maintained with Care  Outcome: Ongoing, Progressing     Problem: Skin Injury Risk Increased  Goal: Skin Health and Integrity  Outcome: Ongoing, Progressing     Problem: Fall Injury Risk  Goal: Absence of Fall and Fall-Related Injury  Outcome: Ongoing, Progressing     Problem: Impaired Wound Healing  Goal: Optimal Wound Healing  Outcome: Ongoing, Progressing     Problem: Infection  Goal: Absence of Infection Signs and Symptoms  Outcome: Ongoing, Progressing      Problem: Hyperglycemia  Goal: Blood Glucose Level Within Targeted Range  Outcome: Ongoing, Progressing

## 2023-12-08 NOTE — PROGRESS NOTES
Nephrology consult follow up note    HPI:      Kamran Huerta is a 48 y.o. male seen by our service in April for nephrotic syndrome and FABIO. At that time he underwent renal biopsy revealing advanced diabetic nodular sclerosis. He was diuresed aggressively and discharged with Cr 1.8 and ability to ambulate in the trotter without oxygen or distress. Since that time he has undergone L BKA s/t gangrenous wound. He presented to the ED 11/25 s/p fall from wheelchair with resulting back pain. Neurosurgery undergoing further workup.   He was noted to have FABIO and marked hypervolemia for which nephrology has been consulted.    Interval history:     No acute events overnight.  No new complaints.  MRI was not done yesterday.  Patient continues to make good urine output 2 L yesterday.  No new complaints.  Lower extremity edema is about the same.  No chest pain, shortness of breath, nausea, vomiting.     Review of Systems:     Comprehensive 10pt ROS negative except as noted per history.    Past medical, family, surgical, and social history reviewed and unchanged from initial consult note.     Objective:       VITAL SIGNS: 24 HR MIN & MAX LAST    Temp  Min: 97.6 °F (36.4 °C)  Max: 98.3 °F (36.8 °C)  97.7 °F (36.5 °C)        BP  Min: 147/61  Max: 183/96  (!) 183/96     Pulse  Min: 59  Max: 62  61     Resp  Min: 16  Max: 20  20    SpO2  Min: 97 %  Max: 100 %  100 %      GEN:  Chronically ill-appearing AAM in NAD  CV: RRR +S1,S2 without murmur  PULM: CTAB, unlabored  ABD: Soft, NT/ND abdomen with NABS  EXT:  2+ dependent edema  SKIN: Warm and dry  PSYCH: Awake, alert and appropriately conversant.   Dialysis access:  No dialysis access            Component Value Date/Time     12/08/2023 0542     12/07/2023 0644     02/22/2021 1246    K 3.9 12/08/2023 0542    K 3.9 12/07/2023 0644    K 3.6 02/22/2021 1246    CHLORIDE 107 12/08/2023 0542    CHLORIDE 109 (H) 12/07/2023 0644    CHLORIDE 106 02/22/2021 1246    CO2 24  "12/08/2023 0542    CO2 25 12/07/2023 0644    CO2 26 02/22/2021 1246    BUN 48.2 (H) 12/08/2023 0542    BUN 47.2 (H) 12/07/2023 0644    BUN 15.0 02/22/2021 1246    CREATININE 2.14 (H) 12/08/2023 0542    CREATININE 2.19 (H) 12/07/2023 0644    CREATININE 0.98 02/22/2021 1246    CALCIUM 7.0 (L) 12/08/2023 0542    CALCIUM 6.8 (LL) 12/07/2023 0644    CALCIUM 8.5 02/22/2021 1246    PHOS 4.2 12/08/2023 0542            Component Value Date/Time    WBC 9.36 12/06/2023 0819    WBC 9.34 12/05/2023 0412    HGB 9.7 (L) 12/06/2023 0819    HGB 8.7 (L) 12/05/2023 0412    HCT 29.1 (L) 12/06/2023 0819    HCT 26.7 (L) 12/05/2023 0412    HCT 43 11/13/2022 1428     12/06/2023 0819     12/05/2023 0412         Imaging reviewed      Assessment / Plan:       Active Hospital Problems    Diagnosis  POA    Moderate malnutrition [E44.0]  Yes     Patient meets ASPEN criteria for moderate malnutrition of chronic illness per RD assessment as evidenced by:  Energy Intake (Malnutrition): other (see comments) (does not meet criteria)  Weight Loss (Malnutrition): other (see comments) (does not meet criteria)     Muscle Mass (Malnutrition): mild depletion  Fluid Accumulation (Malnutrition): severe        A minimum of two characteristics is recommended for diagnosis of either severe or non-severe malnutrition.    Ht Readings from Last 1 Encounters:   12/01/23 5' 7.72" (1.72 m)     Wt Readings from Last 1 Encounters:   11/26/23 1128 127 kg (280 lb)   11/25/23 1539 127 kg (280 lb)   11/25/23 0627 127 kg (280 lb)   Body mass index is 42.93 kg/m².    Patient has been screened and assessed by RD. See nutrition recommendations documented in inpatient nutrition assessment. RD will continue to follow patient throughout hospitalization.          Resolved Hospital Problems   No resolved problems to display.       FABIO s/t acute infection and hypervolemia   CKD IIIa   -biopsy proven in April 2023 diabetic nodular sclerosis   -Nephrotic range " proteinuria         -March 2023 - - 10.9 g        -Now 5.9 g   Anasarca   S/p Fall with resulting back pain. Remote back surgery   Cirrhosis of the liver  DM I onset age 14 with long standing history of noncompliance. Hgb A1c improving.      Plan:  Renal function stable.  Making good urine output.  Continue IV Lasix 60 mg t.i.d., and metolazone 10 mg daily for now.  Still waiting for MRI.  When patient gets closer to discharge we can switch him over to p.o. diuretics.      Spike Mcintyre DO  Nephrology  [facility]  Clinic number: [phone]

## 2023-12-09 LAB
ALBUMIN SERPL-MCNC: 1.6 G/DL (ref 3.5–5)
BUN SERPL-MCNC: 49.2 MG/DL (ref 8.9–20.6)
CALCIUM SERPL-MCNC: 7 MG/DL (ref 8.4–10.2)
CHLORIDE SERPL-SCNC: 106 MMOL/L (ref 98–107)
CO2 SERPL-SCNC: 24 MMOL/L (ref 22–29)
CREAT SERPL-MCNC: 2.2 MG/DL (ref 0.73–1.18)
GFR SERPLBLD CREATININE-BSD FMLA CKD-EPI: 36 MLS/MIN/1.73/M2
GLUCOSE SERPL-MCNC: 93 MG/DL (ref 74–100)
MAGNESIUM SERPL-MCNC: 1.4 MG/DL (ref 1.6–2.6)
PHOSPHATE SERPL-MCNC: 4.5 MG/DL (ref 2.3–4.7)
POCT GLUCOSE: 185 MG/DL (ref 70–110)
POCT GLUCOSE: 296 MG/DL (ref 70–110)
POTASSIUM SERPL-SCNC: 4 MMOL/L (ref 3.5–5.1)
SODIUM SERPL-SCNC: 139 MMOL/L (ref 136–145)

## 2023-12-09 PROCEDURE — 25000003 PHARM REV CODE 250: Performed by: NURSE PRACTITIONER

## 2023-12-09 PROCEDURE — 63600175 PHARM REV CODE 636 W HCPCS: Performed by: NURSE PRACTITIONER

## 2023-12-09 PROCEDURE — 25000003 PHARM REV CODE 250: Performed by: INTERNAL MEDICINE

## 2023-12-09 PROCEDURE — 63600175 PHARM REV CODE 636 W HCPCS: Performed by: INTERNAL MEDICINE

## 2023-12-09 PROCEDURE — 27000207 HC ISOLATION

## 2023-12-09 PROCEDURE — 80069 RENAL FUNCTION PANEL: CPT | Performed by: STUDENT IN AN ORGANIZED HEALTH CARE EDUCATION/TRAINING PROGRAM

## 2023-12-09 PROCEDURE — 21400001 HC TELEMETRY ROOM

## 2023-12-09 PROCEDURE — 83735 ASSAY OF MAGNESIUM: CPT | Performed by: INTERNAL MEDICINE

## 2023-12-09 RX ORDER — ACETAMINOPHEN 500 MG
1000 TABLET ORAL ONCE
Status: DISCONTINUED | OUTPATIENT
Start: 2023-12-09 | End: 2023-12-09

## 2023-12-09 RX ADMIN — FUROSEMIDE 60 MG: 10 INJECTION, SOLUTION INTRAMUSCULAR; INTRAVENOUS at 02:12

## 2023-12-09 RX ADMIN — DULOXETINE HYDROCHLORIDE 20 MG: 20 CAPSULE, DELAYED RELEASE ORAL at 08:12

## 2023-12-09 RX ADMIN — WHITE PETROLATUM: 1.75 OINTMENT TOPICAL at 09:12

## 2023-12-09 RX ADMIN — MORPHINE SULFATE 2 MG: 4 INJECTION, SOLUTION INTRAMUSCULAR; INTRAVENOUS at 11:12

## 2023-12-09 RX ADMIN — PANCRELIPASE 3 CAPSULE: 24000; 76000; 120000 CAPSULE, DELAYED RELEASE PELLETS ORAL at 08:12

## 2023-12-09 RX ADMIN — ENOXAPARIN SODIUM 40 MG: 40 INJECTION SUBCUTANEOUS at 05:12

## 2023-12-09 RX ADMIN — ATENOLOL 50 MG: 50 TABLET ORAL at 08:12

## 2023-12-09 RX ADMIN — OXYCODONE HYDROCHLORIDE 10 MG: 5 TABLET ORAL at 08:12

## 2023-12-09 RX ADMIN — OXYCODONE HYDROCHLORIDE 10 MG: 5 TABLET ORAL at 02:12

## 2023-12-09 RX ADMIN — METOLAZONE 10 MG: 2.5 TABLET ORAL at 08:12

## 2023-12-09 RX ADMIN — Medication: at 09:12

## 2023-12-09 RX ADMIN — MICONAZOLE NITRATE: 20 POWDER TOPICAL at 09:12

## 2023-12-09 RX ADMIN — LEVETIRACETAM 500 MG: 500 TABLET, FILM COATED ORAL at 08:12

## 2023-12-09 RX ADMIN — NIFEDIPINE 90 MG: 30 TABLET, FILM COATED, EXTENDED RELEASE ORAL at 08:12

## 2023-12-09 RX ADMIN — Medication: at 08:12

## 2023-12-09 RX ADMIN — MORPHINE SULFATE 2 MG: 4 INJECTION, SOLUTION INTRAMUSCULAR; INTRAVENOUS at 05:12

## 2023-12-09 RX ADMIN — PREGABALIN 75 MG: 50 CAPSULE ORAL at 08:12

## 2023-12-09 RX ADMIN — INSULIN ASPART 1 UNITS: 100 INJECTION, SOLUTION INTRAVENOUS; SUBCUTANEOUS at 09:12

## 2023-12-09 RX ADMIN — DOXAZOSIN 2 MG: 1 TABLET ORAL at 08:12

## 2023-12-09 RX ADMIN — WHITE PETROLATUM: 1.75 OINTMENT TOPICAL at 08:12

## 2023-12-09 RX ADMIN — LEVETIRACETAM 500 MG: 500 TABLET, FILM COATED ORAL at 02:12

## 2023-12-09 RX ADMIN — MICONAZOLE NITRATE: 20 POWDER TOPICAL at 08:12

## 2023-12-09 RX ADMIN — Medication 400 MG: at 08:12

## 2023-12-09 RX ADMIN — PANCRELIPASE 3 CAPSULE: 24000; 76000; 120000 CAPSULE, DELAYED RELEASE PELLETS ORAL at 03:12

## 2023-12-09 RX ADMIN — ACETAMINOPHEN 1000 MG: 500 TABLET ORAL at 11:12

## 2023-12-09 RX ADMIN — INSULIN DETEMIR 16 UNITS: 100 INJECTION, SOLUTION SUBCUTANEOUS at 09:12

## 2023-12-09 RX ADMIN — FUROSEMIDE 60 MG: 10 INJECTION, SOLUTION INTRAMUSCULAR; INTRAVENOUS at 09:12

## 2023-12-09 NOTE — PROGRESS NOTES
[facility] Hospital Medicine Progress Note        Chief Complaint: Inpatient Follow-up for anasarca    HPI: 48 y.o. male who PMH includes CHF, chronic back pain wheelchair-bound, DM type 2, HTN, seizures, CVA, CKD stage 3, alcoholic liver disease with chronic pancreatitis, presents to the ED at [facility] on 11/25/2023 with a primary complaint of weakness, dizziness lightheadedness and fall out if his wheelchair striking his mouth; denies any LOC.  PT had back surgery years ago and reports has been wheelchair bound since then. No reports of seizures.  No CP, SOB, N/V/D, fever, chills cough congestion or any sick contacts. Labs reviewed demonstrated WBC 14.42, HH 12.2/37.2, sed rate 93 , CO2 18 Bun 25.7, Creat 2.31, glucose 52 Mg+ 1.0, , AST 36 reports of chest pain, CRP shortness a breath, nausea, vomiting, diarrhea5, fever., [id number], cough, congestion, or any sick, contacts. B no reportsP of chest pain, 109.7; other indicis unremarkable. CXR impression reviewed demonstrated  no acute cardiopulmonary abnormality. CT of head without contrast impression reviewed demonstrated no appreciable acute intracranial abnormality. Ct maxillofacial without contrast impression reviewed demonstrated no appreciate acute traumatic osseous abnormality, bilateral mastoid effusions. CT cervical spine without contrast impression reviewed demonstrated no appreciable acute osseous abnormality by CT evaluation, degenerative changes at the cervical spine. CT thoracic spine without contrast impression reviewed demonstrated no acute osseous abnormality. CT of lumbar spine without contrast impression reviewed demonstrated no appreciable fracture or acute osseous abnormality, severe degenerative change at L5-S1 progressed, endplate sclerosis disc space narrowing and osseous erosions which may be degenerative or infectious/inflammatory, anasarca.   Initial VS /105 P 75 R 13 T 98.7F O2 saturation 98%  on room air. Pt received multiple doses of pain medication , D50 for hypoglycemia, 40 mg lasix,  and magnesium rider in the ED. Neurosurgery services consulted. Pt awaiting MRI. Pt is admitted to hospital medicine services for further management.     Nephrology was consulted and patient was started on Lasix albumin drip.  Segovia was inserted due to possible outlet obstruction in the setting of anasarca.  MRI of lumbar spine was not helpful due to significant fluid collection.       Interval Hx:   Awaiting MRI, Nephrology following.  On IV Lasix.     Patient was seen and examined, continues to have back pain.  No acute overnight events reported.    Chart was reviewed, T-max of 98.6°, blood pressure okay, blood sugar 200s as of yesterday, most recent labs reviewed      Objective/physical exam:  General: In no acute distress, afebrile  Chest: Clear to auscultation bilaterally  Heart: RRR, +S1, S2, no appreciable murmur  Abdomen: Soft, nontender, BS +  MSK: Warm, no lower extremity edema  Neurologic: Alert and oriented     VITAL SIGNS: 24 HRS MIN & MAX LAST   Temp  Min: 97.6 °F (36.4 °C)  Max: 98.6 °F (37 °C) 97.6 °F (36.4 °C)   BP  Min: 125/80  Max: 170/97 133/84   Pulse  Min: 53  Max: 66  (!) 53   Resp  Min: 18  Max: 18 18   SpO2  Min: 99 %  Max: 100 % 100 %     I have reviewed the following labs:  Recent Labs   Lab 12/04/23  0805 12/05/23  0412 12/06/23  0819   WBC 9.27 9.34 9.36   RBC 3.07* 2.95* 3.24*   HGB 9.0* 8.7* 9.7*   HCT 27.2* 26.7* 29.1*   MCV 88.6 90.5 89.8   MCH 29.3 29.5 29.9   MCHC 33.1 32.6* 33.3   RDW 13.9 13.7 13.8    259 269   MPV 11.9* 11.8* 11.8*     Recent Labs   Lab 12/04/23  0526 12/05/23  0412 12/06/23  0819 12/07/23  0644 12/08/23  0542      < > 141 140 137   K 4.4   < > 4.2 3.9 3.9   CO2 23   < > 24 25 24   BUN 39.3*   < > 46.1* 47.2* 48.2*   CREATININE 2.16*   < > 2.07* 2.19* 2.14*   CALCIUM 7.2*   < > 7.3* 6.8* 7.0*   MG  --   --  1.00* 1.00* 1.20*   ALBUMIN 1.7*  --  1.7* 1.6*  1.5*   ALKPHOS 93  --  92 88  --    ALT 15  --  14 13  --    AST 20  --  20 18  --    BILITOT 0.3  --  0.3 0.2  --     < > = values in this interval not displayed.     Microbiology Results (last 7 days)       ** No results found for the last 168 hours. **             See below for Radiology    Scheduled Med:   acetaminophen  1,000 mg Oral Once    atenoloL  50 mg Oral BID    doxazosin  2 mg Oral QHS    DULoxetine  20 mg Oral BID    enoxparin  40 mg Subcutaneous Daily    ergocalciferol  50,000 Units Oral Q72H    furosemide (LASIX) injection  60 mg Intravenous Q8H    insulin detemir U-100  16 Units Subcutaneous QHS    levETIRAcetam  500 mg Oral TID    lipase-protease-amylase 24,000-76,000-120,000 units  3 capsule Oral TID    magnesium oxide  400 mg Oral BID    metOLazone  10 mg Oral Daily    miconazole NITRATE 2 %   Topical (Top) BID    NIFEdipine  90 mg Oral Daily    perflutren lipid microspheres  1.4 mL Intravenous Once    pregabalin  75 mg Oral QHS    white petrolatum   Topical (Top) BID    zinc oxide-cod liver oil   Topical (Top) BID      Continuous Infusions:     PRN Meds:  acetaminophen, aluminum-magnesium hydroxide-simethicone, dextrose 10%, dextrose 10%, glucagon (human recombinant), glucose, glucose, hydrALAZINE, insulin aspart U-100, labetaloL, melatonin, morphine, naloxone, ondansetron, oxyCODONE, polyethylene glycol, prochlorperazine, simethicone, white petrolatum     Assessment/Plan:  Anasarca  Acute on chronic kidney disease stage III, Nephrotic range proteinuria  Cirrhosis of liver  Acute on chronic lower back pain- intractable  Dizziness, Fall- from wheel chair  Hypoglycemia- recurrent-resolved. T2 dm A1c 5.5  Hypertensive urgency at admission-resolving  ESBL E coli in urine- possibly asymptomatic bacteriuria     HX: Wheelchair-bound, history of CVA with residual left-sided deficits, left BKA, chronic alcoholic liver disease, chronic seizures     Plan:  Nephrology on board.  Continue current diuretic  regimen.  Strict I's and o's, electrolyte monitoring.  Follow up BMP. Segovia in.  DC when ready.  Scrotal edema improving  Neurosurgery evaluated the patient, Pending MRI. Use PO PRN Dilaudid and morphine to be used during MRI to reduce anxiety and pain.    Therapy services following, recommended moderate intensity  Continue supportive care, appropriate home medications, monitor blood sugars, currently on sliding scale and long-acting insulin 16 units at night.  Fall precautions, decubitus precautions    VTE prophylaxis:  Lovenox      Anticipated discharge and Disposition:  To be decided    All diagnosis and differential diagnosis have been reviewed; assessment and plan has been documented; I have personally reviewed the labs and test results that are presently available; I have reviewed the patients medication list; I have reviewed the consulting providers response and recommendations. I have reviewed or attempted to review medical records based upon their availability    All of the patient's questions have been  addressed and answered. Patient's is agreeable to the above stated plan. I will continue to monitor closely and make adjustments to medical management as needed.  _____________________________________________________________________    Nutrition Status:    Radiology:  I have personally reviewed the following imaging and agree with the radiologist.     Echo    Left Ventricle: The left ventricle is normal in size. Increased wall   thickness. There is moderate concentric hypertrophy. Normal wall motion.   There is normal systolic function with a visually estimated ejection   fraction of 60 - 65%. There is normal diastolic function.    Right Ventricle: Normal right ventricular cavity size. Systolic   function is normal.    Left Atrium: Left atrium is mildly dilated.    Mitral Valve: There is trace regurgitation.    Tricuspid Valve: There is trace regurgitation.      Michelle Camacho MD   12/09/2023

## 2023-12-10 LAB
ANION GAP SERPL CALC-SCNC: 7 MEQ/L
BUN SERPL-MCNC: 50.1 MG/DL (ref 8.9–20.6)
CALCIUM SERPL-MCNC: 7.2 MG/DL (ref 8.4–10.2)
CHLORIDE SERPL-SCNC: 106 MMOL/L (ref 98–107)
CO2 SERPL-SCNC: 25 MMOL/L (ref 22–29)
CREAT SERPL-MCNC: 2.23 MG/DL (ref 0.73–1.18)
CREAT/UREA NIT SERPL: 22
GFR SERPLBLD CREATININE-BSD FMLA CKD-EPI: 36 MLS/MIN/1.73/M2
GLUCOSE SERPL-MCNC: 132 MG/DL (ref 74–100)
POCT GLUCOSE: 148 MG/DL (ref 70–110)
POCT GLUCOSE: 172 MG/DL (ref 70–110)
POCT GLUCOSE: 217 MG/DL (ref 70–110)
POCT GLUCOSE: 270 MG/DL (ref 70–110)
POCT GLUCOSE: 97 MG/DL (ref 70–110)
POTASSIUM SERPL-SCNC: 3.9 MMOL/L (ref 3.5–5.1)
SODIUM SERPL-SCNC: 138 MMOL/L (ref 136–145)

## 2023-12-10 PROCEDURE — 25000003 PHARM REV CODE 250: Performed by: INTERNAL MEDICINE

## 2023-12-10 PROCEDURE — 63600175 PHARM REV CODE 636 W HCPCS: Performed by: NURSE PRACTITIONER

## 2023-12-10 PROCEDURE — 80048 BASIC METABOLIC PNL TOTAL CA: CPT | Performed by: INTERNAL MEDICINE

## 2023-12-10 PROCEDURE — 27000207 HC ISOLATION

## 2023-12-10 PROCEDURE — 25000003 PHARM REV CODE 250: Performed by: NURSE PRACTITIONER

## 2023-12-10 PROCEDURE — 21400001 HC TELEMETRY ROOM

## 2023-12-10 PROCEDURE — 63600175 PHARM REV CODE 636 W HCPCS: Performed by: INTERNAL MEDICINE

## 2023-12-10 RX ORDER — MORPHINE SULFATE 4 MG/ML
2 INJECTION, SOLUTION INTRAMUSCULAR; INTRAVENOUS EVERY 12 HOURS PRN
Status: DISCONTINUED | OUTPATIENT
Start: 2023-12-10 | End: 2023-12-11

## 2023-12-10 RX ORDER — POTASSIUM CHLORIDE 20 MEQ/1
20 TABLET, EXTENDED RELEASE ORAL DAILY
Status: COMPLETED | OUTPATIENT
Start: 2023-12-10 | End: 2023-12-11

## 2023-12-10 RX ADMIN — OXYCODONE HYDROCHLORIDE 10 MG: 5 TABLET ORAL at 03:12

## 2023-12-10 RX ADMIN — OXYCODONE HYDROCHLORIDE 10 MG: 5 TABLET ORAL at 09:12

## 2023-12-10 RX ADMIN — OXYCODONE HYDROCHLORIDE 10 MG: 5 TABLET ORAL at 08:12

## 2023-12-10 RX ADMIN — DULOXETINE HYDROCHLORIDE 20 MG: 20 CAPSULE, DELAYED RELEASE ORAL at 09:12

## 2023-12-10 RX ADMIN — Medication 400 MG: at 08:12

## 2023-12-10 RX ADMIN — LEVETIRACETAM 500 MG: 500 TABLET, FILM COATED ORAL at 09:12

## 2023-12-10 RX ADMIN — PANCRELIPASE 3 CAPSULE: 24000; 76000; 120000 CAPSULE, DELAYED RELEASE PELLETS ORAL at 09:12

## 2023-12-10 RX ADMIN — LEVETIRACETAM 500 MG: 500 TABLET, FILM COATED ORAL at 08:12

## 2023-12-10 RX ADMIN — INSULIN ASPART 1 UNITS: 100 INJECTION, SOLUTION INTRAVENOUS; SUBCUTANEOUS at 09:12

## 2023-12-10 RX ADMIN — LEVETIRACETAM 500 MG: 500 TABLET, FILM COATED ORAL at 03:12

## 2023-12-10 RX ADMIN — ATENOLOL 50 MG: 50 TABLET ORAL at 08:12

## 2023-12-10 RX ADMIN — ATENOLOL 50 MG: 50 TABLET ORAL at 09:12

## 2023-12-10 RX ADMIN — WHITE PETROLATUM: 1.75 OINTMENT TOPICAL at 08:12

## 2023-12-10 RX ADMIN — Medication: at 09:12

## 2023-12-10 RX ADMIN — FUROSEMIDE 60 MG: 10 INJECTION, SOLUTION INTRAMUSCULAR; INTRAVENOUS at 09:12

## 2023-12-10 RX ADMIN — Medication 400 MG: at 09:12

## 2023-12-10 RX ADMIN — MORPHINE SULFATE 2 MG: 4 INJECTION, SOLUTION INTRAMUSCULAR; INTRAVENOUS at 01:12

## 2023-12-10 RX ADMIN — Medication: at 08:12

## 2023-12-10 RX ADMIN — PANCRELIPASE 3 CAPSULE: 24000; 76000; 120000 CAPSULE, DELAYED RELEASE PELLETS ORAL at 03:12

## 2023-12-10 RX ADMIN — FUROSEMIDE 60 MG: 10 INJECTION, SOLUTION INTRAMUSCULAR; INTRAVENOUS at 03:12

## 2023-12-10 RX ADMIN — MICONAZOLE NITRATE: 20 POWDER TOPICAL at 09:12

## 2023-12-10 RX ADMIN — MICONAZOLE NITRATE: 20 POWDER TOPICAL at 08:12

## 2023-12-10 RX ADMIN — NIFEDIPINE 90 MG: 30 TABLET, FILM COATED, EXTENDED RELEASE ORAL at 08:12

## 2023-12-10 RX ADMIN — WHITE PETROLATUM: 1.75 OINTMENT TOPICAL at 09:12

## 2023-12-10 RX ADMIN — FUROSEMIDE 60 MG: 10 INJECTION, SOLUTION INTRAMUSCULAR; INTRAVENOUS at 06:12

## 2023-12-10 RX ADMIN — INSULIN DETEMIR 16 UNITS: 100 INJECTION, SOLUTION SUBCUTANEOUS at 09:12

## 2023-12-10 RX ADMIN — DULOXETINE HYDROCHLORIDE 20 MG: 20 CAPSULE, DELAYED RELEASE ORAL at 08:12

## 2023-12-10 RX ADMIN — PANCRELIPASE 3 CAPSULE: 24000; 76000; 120000 CAPSULE, DELAYED RELEASE PELLETS ORAL at 08:12

## 2023-12-10 RX ADMIN — ENOXAPARIN SODIUM 40 MG: 40 INJECTION SUBCUTANEOUS at 04:12

## 2023-12-10 RX ADMIN — PREGABALIN 75 MG: 50 CAPSULE ORAL at 09:12

## 2023-12-10 RX ADMIN — ERGOCALCIFEROL 50000 UNITS: 1.25 CAPSULE ORAL at 08:12

## 2023-12-10 RX ADMIN — METOLAZONE 10 MG: 2.5 TABLET ORAL at 08:12

## 2023-12-10 RX ADMIN — MORPHINE SULFATE 2 MG: 4 INJECTION, SOLUTION INTRAMUSCULAR; INTRAVENOUS at 12:12

## 2023-12-10 RX ADMIN — DOXAZOSIN 2 MG: 1 TABLET ORAL at 09:12

## 2023-12-10 RX ADMIN — POTASSIUM CHLORIDE 20 MEQ: 1500 TABLET, EXTENDED RELEASE ORAL at 12:12

## 2023-12-10 NOTE — PROGRESS NOTES
[facility] Hospital Medicine Progress Note        Chief Complaint: Inpatient Follow-up for anasarca    HPI: 48 y.o. male who PMH includes CHF, chronic back pain wheelchair-bound, DM type 2, HTN, seizures, CVA, CKD stage 3, alcoholic liver disease with chronic pancreatitis, presents to the ED at [facility] on 11/25/2023 with a primary complaint of weakness, dizziness lightheadedness and fall out if his wheelchair striking his mouth; denies any LOC.  PT had back surgery years ago and reports has been wheelchair bound since then. No reports of seizures.  No CP, SOB, N/V/D, fever, chills cough congestion or any sick contacts. Labs reviewed demonstrated WBC 14.42, HH 12.2/37.2, sed rate 93 , CO2 18 Bun 25.7, Creat 2.31, glucose 52 Mg+ 1.0, , AST 36 reports of chest pain, CRP shortness a breath, nausea, vomiting, diarrhea5, fever., [id number], cough, congestion, or any sick, contacts. B no reportsP of chest pain, 109.7; other indicis unremarkable. CXR impression reviewed demonstrated  no acute cardiopulmonary abnormality. CT of head without contrast impression reviewed demonstrated no appreciable acute intracranial abnormality. Ct maxillofacial without contrast impression reviewed demonstrated no appreciate acute traumatic osseous abnormality, bilateral mastoid effusions. CT cervical spine without contrast impression reviewed demonstrated no appreciable acute osseous abnormality by CT evaluation, degenerative changes at the cervical spine. CT thoracic spine without contrast impression reviewed demonstrated no acute osseous abnormality. CT of lumbar spine without contrast impression reviewed demonstrated no appreciable fracture or acute osseous abnormality, severe degenerative change at L5-S1 progressed, endplate sclerosis disc space narrowing and osseous erosions which may be degenerative or infectious/inflammatory, anasarca.   Initial VS /105 P 75 R 13 T 98.7F O2 saturation 98%  on room air. Pt received multiple doses of pain medication , D50 for hypoglycemia, 40 mg lasix,  and magnesium rider in the ED. Neurosurgery services consulted. Pt awaiting MRI. Pt is admitted to hospital medicine services for further management.     Nephrology was consulted and patient was started on Lasix albumin drip.  Segovia was inserted due to possible outlet obstruction in the setting of anasarca.  MRI of lumbar spine was not helpful due to significant fluid collection.       .Nephrology following.  Recommended to continue IV Lasix 60 mg t.i.d. and metolazone 10 mg daily for now and when patient gets closer to discharge switch to oral    Interval Hx:   Could not pull up the MRI report, unable to reach out to MRI department.      Patient was seen and examined, continues to have intermittent back pain.  No acute overnight events reported.    Chart was reviewed, T-max of 98.2°, blood pressure okay, blood sugar 200s as of yesterday, most recent labs reviewed      Objective/physical exam:  General: In no acute distress, afebrile  Chest: Clear to auscultation bilaterally  Heart: RRR, +S1, S2, no appreciable murmur  Abdomen: Soft, nontender, BS +  MSK: Warm, no lower extremity edema  Neurologic: Alert and oriented     VITAL SIGNS: 24 HRS MIN & MAX LAST   Temp  Min: 97.4 °F (36.3 °C)  Max: 98.2 °F (36.8 °C) 97.6 °F (36.4 °C)   BP  Min: 125/74  Max: 171/102 127/84   Pulse  Min: 54  Max: 68  60   Resp  Min: 16  Max: 18 18   SpO2  Min: 97 %  Max: 100 % 97 %     I have reviewed the following labs:  Recent Labs   Lab 12/04/23  0805 12/05/23  0412 12/06/23  0819   WBC 9.27 9.34 9.36   RBC 3.07* 2.95* 3.24*   HGB 9.0* 8.7* 9.7*   HCT 27.2* 26.7* 29.1*   MCV 88.6 90.5 89.8   MCH 29.3 29.5 29.9   MCHC 33.1 32.6* 33.3   RDW 13.9 13.7 13.8    259 269   MPV 11.9* 11.8* 11.8*     Recent Labs   Lab 12/04/23  0526 12/05/23  0412 12/06/23  0819 12/07/23  0644 12/08/23  0542 12/09/23  0557 12/10/23  0553      < > 141 140  137 139 138   K 4.4   < > 4.2 3.9 3.9 4.0 3.9   CO2 23   < > 24 25 24 24 25   BUN 39.3*   < > 46.1* 47.2* 48.2* 49.2* 50.1*   CREATININE 2.16*   < > 2.07* 2.19* 2.14* 2.20* 2.23*   CALCIUM 7.2*   < > 7.3* 6.8* 7.0* 7.0* 7.2*   MG  --    < > 1.00* 1.00* 1.20* 1.40*  --    ALBUMIN 1.7*  --  1.7* 1.6* 1.5* 1.6*  --    ALKPHOS 93  --  92 88  --   --   --    ALT 15  --  14 13  --   --   --    AST 20  --  20 18  --   --   --    BILITOT 0.3  --  0.3 0.2  --   --   --     < > = values in this interval not displayed.     Microbiology Results (last 7 days)       ** No results found for the last 168 hours. **             See below for Radiology    Scheduled Med:   atenoloL  50 mg Oral BID    doxazosin  2 mg Oral QHS    DULoxetine  20 mg Oral BID    enoxparin  40 mg Subcutaneous Daily    ergocalciferol  50,000 Units Oral Q72H    furosemide (LASIX) injection  60 mg Intravenous Q8H    insulin detemir U-100  16 Units Subcutaneous QHS    levETIRAcetam  500 mg Oral TID    lipase-protease-amylase 24,000-76,000-120,000 units  3 capsule Oral TID    magnesium oxide  400 mg Oral BID    metOLazone  10 mg Oral Daily    miconazole NITRATE 2 %   Topical (Top) BID    NIFEdipine  90 mg Oral Daily    perflutren lipid microspheres  1.4 mL Intravenous Once    pregabalin  75 mg Oral QHS    white petrolatum   Topical (Top) BID    zinc oxide-cod liver oil   Topical (Top) BID      Continuous Infusions:     PRN Meds:  acetaminophen, aluminum-magnesium hydroxide-simethicone, dextrose 10%, dextrose 10%, glucagon (human recombinant), glucose, glucose, hydrALAZINE, insulin aspart U-100, labetaloL, melatonin, morphine, naloxone, ondansetron, oxyCODONE, polyethylene glycol, prochlorperazine, simethicone, white petrolatum     Assessment/Plan:  Anasarca  Acute on chronic kidney disease stage III, Nephrotic range proteinuria  Cirrhosis of liver  Acute on chronic lower back pain- intractable  Dizziness, Fall- from wheel chair  Hypoglycemia- recurrent-resolved.  T2 dm A1c 5.5  Hypertensive urgency at admission-resolving  ESBL E coli in urine- possibly asymptomatic bacteriuria     HX: Wheelchair-bound, history of CVA with residual left-sided deficits, left BKA, chronic alcoholic liver disease, chronic seizures     Plan:  Nephrology on board.  Continue current diuretic regimen.  Strict I's and o's, electrolyte monitoring.  Follow up BMP. Segovia in.  DC when ready.  Scrotal edema improving  Neurosurgery evaluated the patient, Pending MRI. Use PO PRN Dilaudid and morphine to be used during MRI to reduce anxiety and pain.    Therapy services following, recommended moderate intensity  Continue supportive care, appropriate home medications, monitor blood sugars, currently on sliding scale and long-acting insulin 16 units at night.  Fall precautions, decubitus precautions    VTE prophylaxis:  Lovenox      Anticipated discharge and Disposition:  To be decided, pending MRI, neurosurgery recommendations and Nephrology's clearance.    All diagnosis and differential diagnosis have been reviewed; assessment and plan has been documented; I have personally reviewed the labs and test results that are presently available; I have reviewed the patients medication list; I have reviewed the consulting providers response and recommendations. I have reviewed or attempted to review medical records based upon their availability    All of the patient's questions have been  addressed and answered. Patient's is agreeable to the above stated plan. I will continue to monitor closely and make adjustments to medical management as needed.  _____________________________________________________________________    Nutrition Status:    Radiology:  I have personally reviewed the following imaging and agree with the radiologist.     Echo    Left Ventricle: The left ventricle is normal in size. Increased wall   thickness. There is moderate concentric hypertrophy. Normal wall motion.   There is normal systolic  function with a visually estimated ejection   fraction of 60 - 65%. There is normal diastolic function.    Right Ventricle: Normal right ventricular cavity size. Systolic   function is normal.    Left Atrium: Left atrium is mildly dilated.    Mitral Valve: There is trace regurgitation.    Tricuspid Valve: There is trace regurgitation.      Michelle Camacho MD   12/10/2023

## 2023-12-11 LAB
ANION GAP SERPL CALC-SCNC: 8 MEQ/L
BUN SERPL-MCNC: 50.9 MG/DL (ref 8.9–20.6)
CALCIUM SERPL-MCNC: 7.5 MG/DL (ref 8.4–10.2)
CHLORIDE SERPL-SCNC: 105 MMOL/L (ref 98–107)
CO2 SERPL-SCNC: 25 MMOL/L (ref 22–29)
CREAT SERPL-MCNC: 2.28 MG/DL (ref 0.73–1.18)
CREAT/UREA NIT SERPL: 22
GFR SERPLBLD CREATININE-BSD FMLA CKD-EPI: 35 MLS/MIN/1.73/M2
GLUCOSE SERPL-MCNC: 179 MG/DL (ref 74–100)
MAGNESIUM SERPL-MCNC: 1.4 MG/DL (ref 1.6–2.6)
POCT GLUCOSE: 137 MG/DL (ref 70–110)
POCT GLUCOSE: 202 MG/DL (ref 70–110)
POCT GLUCOSE: 246 MG/DL (ref 70–110)
POTASSIUM SERPL-SCNC: 4.2 MMOL/L (ref 3.5–5.1)
SODIUM SERPL-SCNC: 138 MMOL/L (ref 136–145)

## 2023-12-11 PROCEDURE — 80048 BASIC METABOLIC PNL TOTAL CA: CPT | Performed by: INTERNAL MEDICINE

## 2023-12-11 PROCEDURE — 25000003 PHARM REV CODE 250: Performed by: INTERNAL MEDICINE

## 2023-12-11 PROCEDURE — 25000003 PHARM REV CODE 250: Performed by: NURSE PRACTITIONER

## 2023-12-11 PROCEDURE — 83735 ASSAY OF MAGNESIUM: CPT | Performed by: INTERNAL MEDICINE

## 2023-12-11 PROCEDURE — 63600175 PHARM REV CODE 636 W HCPCS: Performed by: INTERNAL MEDICINE

## 2023-12-11 PROCEDURE — 21400001 HC TELEMETRY ROOM

## 2023-12-11 PROCEDURE — 63600175 PHARM REV CODE 636 W HCPCS: Performed by: NURSE PRACTITIONER

## 2023-12-11 PROCEDURE — 27000207 HC ISOLATION

## 2023-12-11 RX ORDER — MORPHINE SULFATE 4 MG/ML
1 INJECTION, SOLUTION INTRAMUSCULAR; INTRAVENOUS EVERY 12 HOURS PRN
Status: DISCONTINUED | OUTPATIENT
Start: 2023-12-11 | End: 2023-12-12

## 2023-12-11 RX ORDER — MAGNESIUM SULFATE HEPTAHYDRATE 40 MG/ML
2 INJECTION, SOLUTION INTRAVENOUS ONCE
Status: COMPLETED | OUTPATIENT
Start: 2023-12-11 | End: 2023-12-11

## 2023-12-11 RX ADMIN — PANCRELIPASE 3 CAPSULE: 24000; 76000; 120000 CAPSULE, DELAYED RELEASE PELLETS ORAL at 02:12

## 2023-12-11 RX ADMIN — OXYCODONE HYDROCHLORIDE 10 MG: 5 TABLET ORAL at 01:12

## 2023-12-11 RX ADMIN — FUROSEMIDE 60 MG: 10 INJECTION, SOLUTION INTRAMUSCULAR; INTRAVENOUS at 10:12

## 2023-12-11 RX ADMIN — MORPHINE SULFATE 2 MG: 4 INJECTION, SOLUTION INTRAMUSCULAR; INTRAVENOUS at 04:12

## 2023-12-11 RX ADMIN — INSULIN DETEMIR 16 UNITS: 100 INJECTION, SOLUTION SUBCUTANEOUS at 10:12

## 2023-12-11 RX ADMIN — LEVETIRACETAM 500 MG: 500 TABLET, FILM COATED ORAL at 08:12

## 2023-12-11 RX ADMIN — ATENOLOL 50 MG: 50 TABLET ORAL at 08:12

## 2023-12-11 RX ADMIN — DOXAZOSIN 2 MG: 1 TABLET ORAL at 08:12

## 2023-12-11 RX ADMIN — OXYCODONE HYDROCHLORIDE 10 MG: 5 TABLET ORAL at 08:12

## 2023-12-11 RX ADMIN — Medication 400 MG: at 08:12

## 2023-12-11 RX ADMIN — MORPHINE SULFATE 2 MG: 4 INJECTION, SOLUTION INTRAMUSCULAR; INTRAVENOUS at 12:12

## 2023-12-11 RX ADMIN — PANCRELIPASE 3 CAPSULE: 24000; 76000; 120000 CAPSULE, DELAYED RELEASE PELLETS ORAL at 08:12

## 2023-12-11 RX ADMIN — FUROSEMIDE 60 MG: 10 INJECTION, SOLUTION INTRAMUSCULAR; INTRAVENOUS at 01:12

## 2023-12-11 RX ADMIN — WHITE PETROLATUM: 1.75 OINTMENT TOPICAL at 08:12

## 2023-12-11 RX ADMIN — INSULIN ASPART 2 UNITS: 100 INJECTION, SOLUTION INTRAVENOUS; SUBCUTANEOUS at 04:12

## 2023-12-11 RX ADMIN — Medication: at 08:12

## 2023-12-11 RX ADMIN — MICONAZOLE NITRATE: 20 POWDER TOPICAL at 08:12

## 2023-12-11 RX ADMIN — LEVETIRACETAM 500 MG: 500 TABLET, FILM COATED ORAL at 02:12

## 2023-12-11 RX ADMIN — PREGABALIN 75 MG: 50 CAPSULE ORAL at 08:12

## 2023-12-11 RX ADMIN — FUROSEMIDE 60 MG: 10 INJECTION, SOLUTION INTRAMUSCULAR; INTRAVENOUS at 05:12

## 2023-12-11 RX ADMIN — DULOXETINE HYDROCHLORIDE 20 MG: 20 CAPSULE, DELAYED RELEASE ORAL at 08:12

## 2023-12-11 RX ADMIN — MAGNESIUM SULFATE HEPTAHYDRATE 2 G: 40 INJECTION, SOLUTION INTRAVENOUS at 11:12

## 2023-12-11 RX ADMIN — METOLAZONE 10 MG: 2.5 TABLET ORAL at 08:12

## 2023-12-11 RX ADMIN — POTASSIUM CHLORIDE 20 MEQ: 1500 TABLET, EXTENDED RELEASE ORAL at 08:12

## 2023-12-11 RX ADMIN — NIFEDIPINE 90 MG: 30 TABLET, FILM COATED, EXTENDED RELEASE ORAL at 08:12

## 2023-12-11 RX ADMIN — TRAZODONE HYDROCHLORIDE 25 MG: 50 TABLET ORAL at 08:12

## 2023-12-11 RX ADMIN — ENOXAPARIN SODIUM 40 MG: 40 INJECTION SUBCUTANEOUS at 04:12

## 2023-12-11 NOTE — PT/OT/SLP PROGRESS
Physical Therapy      Patient Name:  Kamran Huerta   MRN:  [id number]    Patient not seen today secondary to pending MRI results. Will follow-up as appropriate.

## 2023-12-11 NOTE — PROGRESS NOTES
Inpatient Nutrition Assessment    Admit Date: 11/25/2023   Total duration of encounter: 16 days   Patient Age: 48 y.o.    Nutrition Recommendation/Prescription     Continue diabetic diet as tolerated  Continue Boost Glucose Control daily to provide 190 kcal and 16 g protein per serving  Continue digestive enzymes  Daily weights  RD to monitor po intake and weight    Communication of Recommendations: reviewed with patient    Nutrition Assessment     Malnutrition Assessment/Nutrition-Focused Physical Exam    Malnutrition Context: chronic illness (12/01/23 1157)  Malnutrition Level: moderate (12/01/23 1157)  Energy Intake (Malnutrition): other (see comments) (does not meet criteria) (12/01/23 1157)  Weight Loss (Malnutrition): other (see comments) (does not meet criteria) (12/01/23 1157)              Muscle Mass (Malnutrition): mild depletion (12/01/23 1157)  [place] (Muscle Loss): mild depletion                       Fluid Accumulation (Malnutrition): severe (12/01/23 1157)        A minimum of two characteristics is recommended for diagnosis of either severe or non-severe malnutrition.    Chart Review    Reason Seen: length of stay and follow-up    Malnutrition Screening Tool Results   Have you recently lost weight without trying?: No  Have you been eating poorly because of a decreased appetite?: Yes   MST Score: 1   Diagnosis:  Acute on chronic lower back pain- intractable- suspected osteomyelitis  Dizziness, Fall- from wheel chair  Hypoglycemia- recurrent-resolved  Acute on chronic kidney disease stage III  Anasarca with volume overload  with peripheral edema  Hypertensive urgency at admission-resolving    Relevant Medical History: CHF, chronic back pain wheelchair-bound, DM 2, HTN, seizures, CVA, CKD, alcoholic liver disease with chronic pancreatitis , L BKA    Nutrition-Related Medications: furosemide, insulin detemir, lipase-protease-amylase, KCL    Calorie Containing IV Medications: no significant kcals  "from medications at this time    Nutrition-Related Labs:   : RBC-3.01, H/H-8.7/26.6, Cl-116, BUN-37.8, creat-2.46, glu-118, boogie-7.3  : Cl 110, BUN 46.1, Cr 2.07, GFR 39, Mag 1.00, Alb 1.7  : BUN 50.9, Crea 2.28, GFR 35, Gluc 179, Mag 1.40    Nutrition Orders:   Diet diabetic  Dietary nutrition supplements Boost Glucose Control Vanilla; Daily    Appetite/Oral Intake: good/% of meals    Factors Affecting Nutritional Intake: none identified    Food/Zoroastrian/Cultural Preferences: none reported    Food Allergies: no known food allergies    Wound(s):      Altered Skin Integrity 23 1639 Left posterior Buttocks Other (comment) Intact skin with non-blanchable redness of localized area-Tissue loss description: Not applicable     Last Bowel Movement: 12/10/23    Comments    23: pt reports good appetite, denies decreased appetite prior admission. Complained of diarrhea, continue digestive enzymes. UBW ~215 lb with recent weight gain r/t fluid retention. Per notes, pt with 4+ pitting anasarca. Per NFPA, pt with mild muscle loss. Pt agreed to ONS daily.    23: Patient reports good oral intake, denies nausea/vomiting/diarrhea/constipation. Drinking oral nutrition supplement daily.    23: Patient reports good oral intake, drinking oral nutrition supplement daily.     Anthropometrics    Height: 5' 7.72" (172 cm) Height Method: Measured  Last Weight: 106.1 kg (234 lb) (23 0500) Weight Method: Bed Scale  BMI (Calculated): 35.9  BMI Classification: obese grade III (BMI >/=40)        Ideal Body Weight (IBW), Male: 152.32 lb     % Ideal Body Weight, Male (lb): 183.82 %                 Usual Body Weight (UBW), k.7 kg  % Usual Body Weight: 130.27     Usual Weight Provided By: patient and EMR weight history    Wt Readings from Last 5 Encounters:   23 106.1 kg (234 lb)   23 101.3 kg (223 lb 5.2 oz)   11/13/22 87 kg (191 lb 12.8 oz)   21 93.8 kg (206 lb 12.7 oz)   21 " 93.8 kg (206 lb 12.7 oz)     Weight Change(s) Since Admission:   Wt Readings from Last 1 Encounters:   23 0500 106.1 kg (234 lb)   23 0611 104.3 kg (230 lb)   23 0500 107.5 kg (237 lb)   23 0700 111.6 kg (246 lb)   23 1128 127 kg (280 lb)   23 1539 127 kg (280 lb)   23 0627 127 kg (280 lb)   Admit Weight: 127 kg (280 lb) (23 06), Weight Method: Stated    Estimated Needs    Weight Used For Calorie Calculations: 97.7 kg (215 lb 6.2 oz) (UBW used)  Energy Calorie Requirements (kcal): 9149-4186 kcal (1.0-1.1 stress factor)  Energy Need Method: [place]-St Jeor  Weight Used For Protein Calculations: 97.7 kg (215 lb 6.2 oz) (UBW used)  Protein Requirements: 78-98 g (0.8-1.0 g/kg)  Fluid Requirements (mL): 1827 ml (1 ml/kcal)  Temp (24hrs), Av.8 °F (36.6 °C), Min:97.6 °F (36.4 °C), Max:98.3 °F (36.8 °C)       Enteral Nutrition    Patient not receiving enteral nutrition at this time.    Parenteral Nutrition    Patient not receiving parenteral nutrition support at this time.    Evaluation of Received Nutrient Intake    Calories: meeting estimated needs  Protein: meeting estimated needs    Patient Education    Not applicable.    Nutrition Diagnosis     PES: Unintended weight gain related to  fluid retention as evidenced by 4+ pitting anasarca. (active)    PES: Malnutrition related to chronic illness as evidenced by mild muscle depletion and severe fluid accumulation. (active)     Interventions/Goals     Intervention(s): general/healthful diet, commercial beverage, prescription medication, and collaboration with other providers    Goal: Consume % of oral supplements by follow-up. (goal met)    Monitoring & Evaluation     Dietitian will monitor food and beverage intake, weight change, electrolyte/renal panel, glucose/endocrine profile, and gastrointestinal profile.    Nutrition Risk/Follow-Up: moderate (follow-up in 3-5 days)   Please consult if re-assessment needed  sooner.

## 2023-12-11 NOTE — PROGRESS NOTES
[facility] Hospital Medicine Progress Note        Chief Complaint: Inpatient Follow-up for Anasarca    HPI: 48 y.o. male who PMH includes CHF, chronic back pain wheelchair-bound, DM type 2, HTN, seizures, CVA, CKD stage 3, alcoholic liver disease with chronic pancreatitis, presents to the ED at [facility] on 11/25/2023 with a primary complaint of weakness, dizziness lightheadedness and fall out if his wheelchair striking his mouth; denies any LOC.  PT had back surgery years ago and reports has been wheelchair bound since then. No reports of seizures.  No CP, SOB, N/V/D, fever, chills cough congestion or any sick contacts. Labs reviewed demonstrated WBC 14.42, HH 12.2/37.2, sed rate 93 , CO2 18 Bun 25.7, Creat 2.31, glucose 52 Mg+ 1.0, , AST 36 reports of chest pain, CRP shortness a breath, nausea, vomiting, diarrhea5, fever., gsqcec21, cough, congestion, or any sick, contacts. B no reportsP of chest pain, 109.7; other indicis unremarkable. CXR impression reviewed demonstrated  no acute cardiopulmonary abnormality. CT of head without contrast impression reviewed demonstrated no appreciable acute intracranial abnormality. Ct maxillofacial without contrast impression reviewed demonstrated no appreciate acute traumatic osseous abnormality, bilateral mastoid effusions. CT cervical spine without contrast impression reviewed demonstrated no appreciable acute osseous abnormality by CT evaluation, degenerative changes at the cervical spine. CT thoracic spine without contrast impression reviewed demonstrated no acute osseous abnormality. CT of lumbar spine without contrast impression reviewed demonstrated no appreciable fracture or acute osseous abnormality, severe degenerative change at L5-S1 progressed, endplate sclerosis disc space narrowing and osseous erosions which may be degenerative or infectious/inflammatory, anasarca.   Initial VS /105 P 75 R 13 T 98.7F O2 saturation 98%  on room air. Pt received multiple doses of pain medication , D50 for hypoglycemia, 40 mg lasix,  and magnesium rider in the ED. Neurosurgery services consulted. Pt awaiting MRI. Pt is admitted to hospital medicine services for further management.     Nephrology was consulted and patient was started on Lasix albumin drip.  Segovia was inserted due to possible outlet obstruction in the setting of anasarca.  MRI of lumbar spine was not helpful due to significant fluid collection.     Nephrology following.  Recommended to continue IV Lasix 60 mg t.i.d. and metolazone 10 mg daily for now and when patient gets closer to discharge switch to oral    Interval Hx:   Could not pull up the MRI report from results 12/8, reached MRI Dept for more clarity, said they were going to get back    Patient was seen and examined, continues to have intermittent back pain.  No acute overnight events reported.    Chart was reviewed, T-max of 98.3°, blood pressure ok, blood sugar under 200s,most recent labs reviewed. Cr around 2s. Mag low      Objective/physical exam:  General: In no acute distress, afebrile  Chest: Clear to auscultation bilaterally  Heart: RRR, +S1, S2, no appreciable murmur  Abdomen: Soft, nontender, BS +  MSK: Warm,  edema improving, Left BKA  Neurologic: Alert and oriented,moves all extremitis     VITAL SIGNS: 24 HRS MIN & MAX LAST   Temp  Min: 97.6 °F (36.4 °C)  Max: 98.3 °F (36.8 °C) 98.3 °F (36.8 °C)   BP  Min: 108/77  Max: 179/76 136/76   Pulse  Min: 56  Max: 68  68   Resp  Min: 15  Max: 19 19   SpO2  Min: 98 %  Max: 100 % 98 %     I have reviewed the following labs:  Recent Labs   Lab 12/05/23  0412 12/06/23  0819   WBC 9.34 9.36   RBC 2.95* 3.24*   HGB 8.7* 9.7*   HCT 26.7* 29.1*   MCV 90.5 89.8   MCH 29.5 29.9   MCHC 32.6* 33.3   RDW 13.7 13.8    269   MPV 11.8* 11.8*     Recent Labs   Lab 12/06/23  0819 12/07/23  0644 12/08/23  0542 12/09/23  0557 12/10/23  0553 12/11/23  0735    140 137 139 138 138   K  4.2 3.9 3.9 4.0 3.9 4.2   CO2 24 25 24 24 25 25   BUN 46.1* 47.2* 48.2* 49.2* 50.1* 50.9*   CREATININE 2.07* 2.19* 2.14* 2.20* 2.23* 2.28*   CALCIUM 7.3* 6.8* 7.0* 7.0* 7.2* 7.5*   MG 1.00* 1.00* 1.20* 1.40*  --  1.40*   ALBUMIN 1.7* 1.6* 1.5* 1.6*  --   --    ALKPHOS 92 88  --   --   --   --    ALT 14 13  --   --   --   --    AST 20 18  --   --   --   --    BILITOT 0.3 0.2  --   --   --   --      Microbiology Results (last 7 days)       ** No results found for the last 168 hours. **             See below for Radiology    Scheduled Med:   atenoloL  50 mg Oral BID    doxazosin  2 mg Oral QHS    DULoxetine  20 mg Oral BID    enoxparin  40 mg Subcutaneous Daily    ergocalciferol  50,000 Units Oral Q72H    furosemide (LASIX) injection  60 mg Intravenous Q8H    insulin detemir U-100  16 Units Subcutaneous QHS    levETIRAcetam  500 mg Oral TID    lipase-protease-amylase 24,000-76,000-120,000 units  3 capsule Oral TID    magnesium oxide  400 mg Oral BID    metOLazone  10 mg Oral Daily    miconazole NITRATE 2 %   Topical (Top) BID    NIFEdipine  90 mg Oral Daily    perflutren lipid microspheres  1.4 mL Intravenous Once    pregabalin  75 mg Oral QHS    white petrolatum   Topical (Top) BID    zinc oxide-cod liver oil   Topical (Top) BID      Continuous Infusions:     PRN Meds:  acetaminophen, aluminum-magnesium hydroxide-simethicone, dextrose 10%, dextrose 10%, glucagon (human recombinant), glucose, glucose, hydrALAZINE, insulin aspart U-100, labetaloL, melatonin, morphine, naloxone, ondansetron, oxyCODONE, polyethylene glycol, prochlorperazine, simethicone, white petrolatum     Assessment/Plan:  Anasarca  Acute on chronic kidney disease stage III, Nephrotic range proteinuria  Cirrhosis of liver  Acute on chronic lower back pain- intractable  Dizziness, Fall- from wheel chair  Hypoglycemia- recurrent-resolved. T2 dm A1c 5.5  Hypertensive urgency at admission-resolving  ESBL E coli in urine- possibly asymptomatic  bacteriuria  Hypomagnesemia     HX: Wheelchair-bound, history of CVA with residual left-sided deficits, left BKA, chronic alcoholic liver disease, chronic seizures     Plan:  Nephrology followed.  Recommended to continue IV Lasix 60 mg t.i.d. and metolazone 10 mg daily for now and when patient gets closer to discharge switch to oral.  Monitor electrolytes and replete while on IV diuretics  Neurosurgery evaluated the patient, Pending MRI for recommendations (attempting to reach out to MRI department to follow up on MRI report ,unsure if it was done). Analgesics p.r.n.  Therapy services following, recommended moderate intensity  Continue supportive care, appropriate home medications, monitor blood sugars, currently on sliding scale and long-acting insulin at night.  Watch for hypoglycemia  Fall precautions, decubitus precautions    VTE prophylaxis:  Lovenox      Anticipated discharge and Disposition:  To be decided, pending MRI, neurosurgery recommendations     All diagnosis and differential diagnosis have been reviewed; assessment and plan has been documented; I have personally reviewed the labs and test results that are presently available; I have reviewed the patients medication list; I have reviewed the consulting providers response and recommendations. I have reviewed or attempted to review medical records based upon their availability    All of the patient's questions have been  addressed and answered. Patient's is agreeable to the above stated plan. I will continue to monitor closely and make adjustments to medical management as needed.  _____________________________________________________________________    Nutrition Status:    Radiology:  I have personally reviewed the following imaging and agree with the radiologist.     Echo    Left Ventricle: The left ventricle is normal in size. Increased wall   thickness. There is moderate concentric hypertrophy. Normal wall motion.   There is normal systolic function  with a visually estimated ejection   fraction of 60 - 65%. There is normal diastolic function.    Right Ventricle: Normal right ventricular cavity size. Systolic   function is normal.    Left Atrium: Left atrium is mildly dilated.    Mitral Valve: There is trace regurgitation.    Tricuspid Valve: There is trace regurgitation.      Michelle Camacho MD   12/11/2023

## 2023-12-11 NOTE — PROGRESS NOTES
Nephrology consult follow up note    HPI:      Kamran Huerta is a 48 y.o. male seen by our service in April for nephrotic syndrome and FABIO. At that time he underwent renal biopsy revealing advanced diabetic nodular sclerosis. He was diuresed aggressively and discharged with Cr 1.8 and ability to ambulate in the trotter without oxygen or distress. Since that time he has undergone L BKA s/t gangrenous wound. He presented to the ED 11/25 s/p fall from wheelchair with resulting back pain. Neurosurgery undergoing further workup.   He was noted to have FABIO and marked hypervolemia for which nephrology has been consulted.    Interval history:     Continues with IV diuretics until MRI can be accomplished. Admit weight 127 kg, today 100 kg.      Review of Systems:     Comprehensive 10pt ROS negative except as noted per history.    Past medical, family, surgical, and social history reviewed and unchanged from initial consult note.     Objective:       VITAL SIGNS: 24 HR MIN & MAX LAST    Temp  Min: 97.6 °F (36.4 °C)  Max: 98.3 °F (36.8 °C)  97.7 °F (36.5 °C)        BP  Min: 108/77  Max: 179/76  (!) 134/90     Pulse  Min: 56  Max: 68  (!) 56     Resp  Min: 15  Max: 21  (!) 21    SpO2  Min: 98 %  Max: 100 %  98 %      GEN:  Chronically ill-appearing AAM in NAD  CV: RRR +S1,S2 without murmur  PULM: CTAB, unlabored  ABD: Soft, NT/ND abdomen with NABS  EXT:  1+ dependent edema  SKIN: Warm and dry  PSYCH: Awake, alert and appropriately conversant.   Dialysis access:  No dialysis access            Component Value Date/Time     12/11/2023 0735     12/10/2023 0553     02/22/2021 1246    K 4.2 12/11/2023 0735    K 3.9 12/10/2023 0553    K 3.6 02/22/2021 1246    CHLORIDE 105 12/11/2023 0735    CHLORIDE 106 12/10/2023 0553    CHLORIDE 106 02/22/2021 1246    CO2 25 12/11/2023 0735    CO2 25 12/10/2023 0553    CO2 26 02/22/2021 1246    BUN 50.9 (H) 12/11/2023 0735    BUN 50.1 (H) 12/10/2023 0553    BUN 15.0 02/22/2021  1246    CREATININE 2.28 (H) 12/11/2023 0735    CREATININE 2.23 (H) 12/10/2023 0553    CREATININE 0.98 02/22/2021 1246    CALCIUM 7.5 (L) 12/11/2023 0735    CALCIUM 7.2 (L) 12/10/2023 0553    CALCIUM 8.5 02/22/2021 1246    PHOS 4.5 12/09/2023 0557            Component Value Date/Time    WBC 9.36 12/06/2023 0819    WBC 9.34 12/05/2023 0412    HGB 9.7 (L) 12/06/2023 0819    HGB 8.7 (L) 12/05/2023 0412    HCT 29.1 (L) 12/06/2023 0819    HCT 26.7 (L) 12/05/2023 0412    HCT 43 11/13/2022 1428     12/06/2023 0819     12/05/2023 0412         Imaging reviewed      Assessment / Plan:     FABIO s/t acute infection and hypervolemia   CKD IIIa   -biopsy proven in April 2023 diabetic nodular sclerosis   -Nephrotic range proteinuria         -March 2023 - - 10.9 g        -Now 5.9 g   Anasarca   S/p Fall with resulting back pain. Remote back surgery   Cirrhosis of the liver  DM I onset age 14 with long standing history of noncompliance. Hgb A1c improving.      Plan:  Once MRI is successfully obtained he can transition to oral diuretics   For now, continue TID Lasix and Metolazone

## 2023-12-12 LAB
ANION GAP SERPL CALC-SCNC: 8 MEQ/L
BUN SERPL-MCNC: 52.3 MG/DL (ref 8.9–20.6)
CALCIUM SERPL-MCNC: 7.4 MG/DL (ref 8.4–10.2)
CHLORIDE SERPL-SCNC: 106 MMOL/L (ref 98–107)
CO2 SERPL-SCNC: 26 MMOL/L (ref 22–29)
CREAT SERPL-MCNC: 2.29 MG/DL (ref 0.73–1.18)
CREAT/UREA NIT SERPL: 23
GFR SERPLBLD CREATININE-BSD FMLA CKD-EPI: 35 MLS/MIN/1.73/M2
GLUCOSE SERPL-MCNC: 227 MG/DL (ref 74–100)
MAGNESIUM SERPL-MCNC: 1.7 MG/DL (ref 1.6–2.6)
POCT GLUCOSE: 181 MG/DL (ref 70–110)
POCT GLUCOSE: 245 MG/DL (ref 70–110)
POCT GLUCOSE: 265 MG/DL (ref 70–110)
POTASSIUM SERPL-SCNC: 4.5 MMOL/L (ref 3.5–5.1)
SODIUM SERPL-SCNC: 140 MMOL/L (ref 136–145)

## 2023-12-12 PROCEDURE — 25000003 PHARM REV CODE 250: Performed by: INTERNAL MEDICINE

## 2023-12-12 PROCEDURE — 63600175 PHARM REV CODE 636 W HCPCS: Performed by: INTERNAL MEDICINE

## 2023-12-12 PROCEDURE — 80048 BASIC METABOLIC PNL TOTAL CA: CPT | Performed by: INTERNAL MEDICINE

## 2023-12-12 PROCEDURE — 27000207 HC ISOLATION

## 2023-12-12 PROCEDURE — 97110 THERAPEUTIC EXERCISES: CPT

## 2023-12-12 PROCEDURE — 63600175 PHARM REV CODE 636 W HCPCS: Performed by: NURSE PRACTITIONER

## 2023-12-12 PROCEDURE — 97530 THERAPEUTIC ACTIVITIES: CPT | Mod: CQ

## 2023-12-12 PROCEDURE — 25000003 PHARM REV CODE 250: Performed by: NURSE PRACTITIONER

## 2023-12-12 PROCEDURE — 21400001 HC TELEMETRY ROOM

## 2023-12-12 PROCEDURE — 97535 SELF CARE MNGMENT TRAINING: CPT

## 2023-12-12 PROCEDURE — 83735 ASSAY OF MAGNESIUM: CPT | Performed by: INTERNAL MEDICINE

## 2023-12-12 PROCEDURE — 97110 THERAPEUTIC EXERCISES: CPT | Mod: CQ

## 2023-12-12 RX ORDER — TORSEMIDE 20 MG/1
40 TABLET ORAL 2 TIMES DAILY
Status: DISCONTINUED | OUTPATIENT
Start: 2023-12-12 | End: 2023-12-21

## 2023-12-12 RX ADMIN — INSULIN DETEMIR 16 UNITS: 100 INJECTION, SOLUTION SUBCUTANEOUS at 10:12

## 2023-12-12 RX ADMIN — OXYCODONE HYDROCHLORIDE 10 MG: 5 TABLET ORAL at 09:12

## 2023-12-12 RX ADMIN — Medication 400 MG: at 09:12

## 2023-12-12 RX ADMIN — METHYLNALTREXONE BROMIDE 7.6 MG: 12 INJECTION, SOLUTION SUBCUTANEOUS at 04:12

## 2023-12-12 RX ADMIN — MICONAZOLE NITRATE: 20 POWDER TOPICAL at 09:12

## 2023-12-12 RX ADMIN — LEVETIRACETAM 500 MG: 500 TABLET, FILM COATED ORAL at 09:12

## 2023-12-12 RX ADMIN — Medication: at 09:12

## 2023-12-12 RX ADMIN — ATENOLOL 50 MG: 50 TABLET ORAL at 09:12

## 2023-12-12 RX ADMIN — INSULIN ASPART 1 UNITS: 100 INJECTION, SOLUTION INTRAVENOUS; SUBCUTANEOUS at 10:12

## 2023-12-12 RX ADMIN — WHITE PETROLATUM: 1.75 OINTMENT TOPICAL at 09:12

## 2023-12-12 RX ADMIN — NIFEDIPINE 90 MG: 30 TABLET, FILM COATED, EXTENDED RELEASE ORAL at 09:12

## 2023-12-12 RX ADMIN — DOXAZOSIN 2 MG: 1 TABLET ORAL at 09:12

## 2023-12-12 RX ADMIN — OXYCODONE HYDROCHLORIDE 10 MG: 5 TABLET ORAL at 12:12

## 2023-12-12 RX ADMIN — DULOXETINE HYDROCHLORIDE 20 MG: 20 CAPSULE, DELAYED RELEASE ORAL at 09:12

## 2023-12-12 RX ADMIN — LEVETIRACETAM 500 MG: 500 TABLET, FILM COATED ORAL at 03:12

## 2023-12-12 RX ADMIN — PANCRELIPASE 3 CAPSULE: 24000; 76000; 120000 CAPSULE, DELAYED RELEASE PELLETS ORAL at 09:12

## 2023-12-12 RX ADMIN — OXYCODONE HYDROCHLORIDE 10 MG: 5 TABLET ORAL at 08:12

## 2023-12-12 RX ADMIN — Medication 6 MG: at 09:12

## 2023-12-12 RX ADMIN — OXYCODONE HYDROCHLORIDE 10 MG: 5 TABLET ORAL at 06:12

## 2023-12-12 RX ADMIN — FUROSEMIDE 60 MG: 10 INJECTION, SOLUTION INTRAMUSCULAR; INTRAVENOUS at 08:12

## 2023-12-12 RX ADMIN — MORPHINE SULFATE 1 MG: 4 INJECTION, SOLUTION INTRAMUSCULAR; INTRAVENOUS at 04:12

## 2023-12-12 RX ADMIN — PANCRELIPASE 3 CAPSULE: 24000; 76000; 120000 CAPSULE, DELAYED RELEASE PELLETS ORAL at 03:12

## 2023-12-12 RX ADMIN — PREGABALIN 75 MG: 50 CAPSULE ORAL at 09:12

## 2023-12-12 RX ADMIN — POLYETHYLENE GLYCOL 3350 17 G: 17 POWDER, FOR SOLUTION ORAL at 03:12

## 2023-12-12 RX ADMIN — ENOXAPARIN SODIUM 40 MG: 40 INJECTION SUBCUTANEOUS at 04:12

## 2023-12-12 RX ADMIN — FUROSEMIDE 60 MG: 10 INJECTION, SOLUTION INTRAMUSCULAR; INTRAVENOUS at 04:12

## 2023-12-12 RX ADMIN — INSULIN ASPART 3 UNITS: 100 INJECTION, SOLUTION INTRAVENOUS; SUBCUTANEOUS at 04:12

## 2023-12-12 RX ADMIN — METOLAZONE 10 MG: 2.5 TABLET ORAL at 09:12

## 2023-12-12 NOTE — PROGRESS NOTES
[facility] Hospital Medicine Progress Note        Chief Complaint: Inpatient Follow-up for Anasarca    HPI: 48 y.o. male who PMH includes CHF, chronic back pain wheelchair-bound, DM type 2, HTN, seizures, CVA, CKD stage 3, alcoholic liver disease with chronic pancreatitis, presents to the ED at [facility] on 11/25/2023 with a primary complaint of weakness, dizziness lightheadedness and fall out if his wheelchair striking his mouth; denies any LOC.  PT had back surgery years ago and reports has been wheelchair bound since then. No reports of seizures.  No CP, SOB, N/V/D, fever, chills cough congestion or any sick contacts. Labs reviewed demonstrated WBC 14.42, HH 12.2/37.2, sed rate 93 , CO2 18 Bun 25.7, Creat 2.31, glucose 52 Mg+ 1.0, , AST 36 reports of chest pain, CRP shortness a breath, nausea, vomiting, diarrhea5, fever., [id number], cough, congestion, or any sick, contacts. B no reportsP of chest pain, 109.7; other indicis unremarkable. CXR impression reviewed demonstrated  no acute cardiopulmonary abnormality. CT of head without contrast impression reviewed demonstrated no appreciable acute intracranial abnormality. Ct maxillofacial without contrast impression reviewed demonstrated no appreciate acute traumatic osseous abnormality, bilateral mastoid effusions. CT cervical spine without contrast impression reviewed demonstrated no appreciable acute osseous abnormality by CT evaluation, degenerative changes at the cervical spine. CT thoracic spine without contrast impression reviewed demonstrated no acute osseous abnormality. CT of lumbar spine without contrast impression reviewed demonstrated no appreciable fracture or acute osseous abnormality, severe degenerative change at L5-S1 progressed, endplate sclerosis disc space narrowing and osseous erosions which may be degenerative or infectious/inflammatory, anasarca.   Initial VS /105 P 75 R 13 T 98.7F O2 saturation 98%  on room air. Pt received multiple doses of pain medication , D50 for hypoglycemia, 40 mg lasix,  and magnesium rider in the ED. Neurosurgery services consulted. Pt awaiting MRI. Pt is admitted to hospital medicine services for further management.  Nephrology was consulted and patient was started on Lasix albumin drip.  Segovia was inserted due to possible outlet obstruction in the setting of anasarca.  MRI of lumbar spine was not helpful due to significant fluid collection.  Nephrology following.  Recommended to continue IV Lasix 60 mg t.i.d. and metolazone 10 mg daily for now and when patient gets closer to discharge switch to oral    Interval Hx:   Patient is sitting in bed.  Discussed we have reordered his MRI.  Patient reported he went down and went through the machine the other night.  Informed patient that MRI department has confirmed that his MRI was not done at the time.  Patient then asked for pain medication, wanting to have IV pain medication all the time.  Informed patient that there is no indication for IV pain medication and he will get oral pain medication.  He continued to argue for IV morphine or oral Dilaudid.  I politely declined, noted patient has been getting and asking for oxycodone 10 mg every 4-6 hourly  No family is at bedside  Case was discussed with patient's nurse and  on the floor    Objective/physical exam:  General: In no acute distress, afebrile, more truncal obesity  Chest: Clear to auscultation bilaterally  Heart: RRR, +S1, S2, no appreciable murmur  Abdomen: Soft, nontender, BS +  MSK: Warm,  edema improving, Left BKA  Neurologic: Alert and oriented, moves all extremitis     VITAL SIGNS: 24 HRS MIN & MAX LAST   Temp  Min: 97.4 °F (36.3 °C)  Max: 98 °F (36.7 °C) 97.7 °F (36.5 °C)   BP  Min: 119/73  Max: 176/86 119/73   Pulse  Min: 56  Max: 64  (!) 59   Resp  Min: 14  Max: 21 18   SpO2  Min: 95 %  Max: 100 % 98 %     I have reviewed the following labs:  Recent Labs   Lab  12/06/23  0819   WBC 9.36   RBC 3.24*   HGB 9.7*   HCT 29.1*   MCV 89.8   MCH 29.9   MCHC 33.3   RDW 13.8      MPV 11.8*       Recent Labs   Lab 12/06/23  0819 12/07/23  0644 12/08/23  0542 12/09/23  0557 12/10/23  0553 12/11/23  0735 12/12/23  0421    140 137 139 138 138 140   K 4.2 3.9 3.9 4.0 3.9 4.2 4.5   CO2 24 25 24 24 25 25 26   BUN 46.1* 47.2* 48.2* 49.2* 50.1* 50.9* 52.3*   CREATININE 2.07* 2.19* 2.14* 2.20* 2.23* 2.28* 2.29*   CALCIUM 7.3* 6.8* 7.0* 7.0* 7.2* 7.5* 7.4*   MG 1.00* 1.00* 1.20* 1.40*  --  1.40* 1.70   ALBUMIN 1.7* 1.6* 1.5* 1.6*  --   --   --    ALKPHOS 92 88  --   --   --   --   --    ALT 14 13  --   --   --   --   --    AST 20 18  --   --   --   --   --    BILITOT 0.3 0.2  --   --   --   --   --        Microbiology Results (last 7 days)       ** No results found for the last 168 hours. **           See below for Radiology    Scheduled Med:   atenoloL  50 mg Oral BID    doxazosin  2 mg Oral QHS    DULoxetine  20 mg Oral BID    enoxparin  40 mg Subcutaneous Daily    ergocalciferol  50,000 Units Oral Q72H    furosemide (LASIX) injection  60 mg Intravenous Q8H    insulin detemir U-100  16 Units Subcutaneous QHS    levETIRAcetam  500 mg Oral TID    lipase-protease-amylase 24,000-76,000-120,000 units  3 capsule Oral TID    magnesium oxide  400 mg Oral BID    metOLazone  10 mg Oral Daily    miconazole NITRATE 2 %   Topical (Top) BID    NIFEdipine  90 mg Oral Daily    perflutren lipid microspheres  1.4 mL Intravenous Once    pregabalin  75 mg Oral QHS    white petrolatum   Topical (Top) BID    zinc oxide-cod liver oil   Topical (Top) BID      Continuous Infusions:     PRN Meds:  acetaminophen, aluminum-magnesium hydroxide-simethicone, dextrose 10%, dextrose 10%, glucagon (human recombinant), glucose, glucose, hydrALAZINE, insulin aspart U-100, labetaloL, melatonin, morphine, naloxone, ondansetron, oxyCODONE, polyethylene glycol, prochlorperazine, simethicone, white petrolatum      Assessment/Plan:  Anasarca- improving  Acute on chronic kidney disease stage III, Nephrotic range proteinuria  Cirrhosis of liver  Acute on chronic lower back pain- intractable- patient refused MRI 12/12  Dizziness, Fall- from wheel chair  Hypoglycemia in  T2 DM-  A1c 5.5- resolved  Hypertensive urgency at admission-resolving  ESBL E coli in urine- possibly asymptomatic bacteriuria  Hypomagnesemia- replaced  HX: Wheelchair-bound, history of CVA with residual left-sided deficits, left BKA, chronic alcoholic liver disease, chronic seizures  Moderate Malnutrition    Nephrology followed.  Noted furosemide changed to torsemide 40 mg p.o. b.i.d. and metolazone 10 mg daily continued   Monitor electrolytes and replete , keep potassium greater than 4 and magnesium greater than 2  Patient complained of intractable acute on chronic back pain, Neurosurgery evaluated the patient, Pending MRI for recommendations , I spoke with MRI department, informed me that patient fell off the list given MRI was not done for 4 days.  Recommended ordering as stat so they can do it today sometime.  Ordered  Later MRI department came to pick him up and patient refused MRI stating he is in pain    Analgesics p.r.n. p.o. pain medication.  Patient was asking for IV Dilaudid, IV morphine or p.o. Dilaudid.  Informed patient that not indicated  Hypoglycemia resolved, now hyperglycemia with blood glucose greater than 250.  Increase Levemir to 16 units b.i.d.  Monitor blood sugars, currently on sliding scale and long-acting insulin at night.   Therapy services following, recommended moderate intensity  Case management on board for SNF, pending MRI  Continue supportive care, appropriate home medications  Fall precautions, decubitus precautions  Morning CBC, BMP and Mag ordered    VTE prophylaxis:  Lovenox    Anticipated discharge and Disposition:  SNF, pending MRI, neurosurgery recommendations     All diagnosis and differential diagnosis have been  reviewed; assessment and plan has been documented; I have personally reviewed the labs and test results that are presently available; I have reviewed the patients medication list; I have reviewed the consulting providers response and recommendations. I have reviewed or attempted to review medical records based upon their availability    All of the patient's questions have been  addressed and answered. Patient's is agreeable to the above stated plan. I will continue to monitor closely and make adjustments to medical management as needed.  _____________________________________________________________________    Nutrition Status:  Patient meets ASPEN criteria for moderate malnutrition of chronic illness per RD assessment as evidenced by:  Energy Intake (Malnutrition): other (see comments) (does not meet criteria)  Weight Loss (Malnutrition): other (see comments) (does not meet criteria)     Muscle Mass (Malnutrition): mild depletion  Fluid Accumulation (Malnutrition): severe        A minimum of two characteristics is recommended for diagnosis of either severe or non-severe malnutrition.    Radiology:  I have personally reviewed the following imaging and agree with the radiologist.     Echo    Left Ventricle: The left ventricle is normal in size. Increased wall   thickness. There is moderate concentric hypertrophy. Normal wall motion.   There is normal systolic function with a visually estimated ejection   fraction of 60 - 65%. There is normal diastolic function.    Right Ventricle: Normal right ventricular cavity size. Systolic   function is normal.    Left Atrium: Left atrium is mildly dilated.    Mitral Valve: There is trace regurgitation.    Tricuspid Valve: There is trace regurgitation.    Kyrie Bro MD  Department of Hospital Medicine   [facility]   12/12/2023

## 2023-12-12 NOTE — PT/OT/SLP PROGRESS
Occupational Therapy   Treatment    Name: Kamran Huerta  MRN: [id number]  Admitting Diagnosis:  <principal problem not specified>       Recommendations:     Recommended therapy intensity at discharge: Moderate Intensity Therapy   Discharge Equipment Recommendations:  lift device  Barriers to discharge:       Assessment:     Kamran Huerta is a 48 y.o. male with a medical diagnosis of <principal problem not specified>.  He presents with good participation. Performance deficits affecting function are weakness, impaired endurance, impaired self care skills, impaired functional mobility.     Rehab Prognosis:  Good; patient would benefit from acute skilled OT services to address these deficits and reach maximum level of function.       Plan:     Patient to be seen 3 x/week to address the above listed problems via self-care/home management, therapeutic activities, therapeutic exercises  Plan of Care Expires: 12/25/23  Plan of Care Reviewed with: patient    Subjective     Pain/Comfort:   No report of pain    Objective:     Communicated with: RN prior to session.  Patient found up in chair with  PICC, escobar catheter, gurjit pad upon OT entry to room.    General Precautions: Standard, contact, fall, seizure    Orthopedic Precautions:N/A  Braces: N/A  Respiratory Status: Room air    Functional Mobility/Transfers:  Functional Mobility: patient declined at this time, requested to wait and gurjit back to bed later this afternoon/evening    Activities of Daily Living:  Grooming: stand by assistance to brush teeth, wash face, comb hair  Upper Body Dressing: stand by assistance to change gown    Therapeutic Exercise:  Red theraband UE exercises. 10 reps x 3 with rest breaks required. Complaint of left shoulder pain with exercises.    Therapeutic Positioning    OT interventions performed during the course of today's session in an effort to prevent and/or reduce acquired pressure injuries:   Education was provided on benefits  of and recommendations for therapeutic positioning    Patient Education:  Patient provided with verbal education education regarding OT role/goals/POC, fall prevention, safety awareness, and pressure ulcer prevention.  Understanding was verbalized, however additional teaching warranted.      Patient left up in chair with all lines intact, call button in reach, RN notified, and gurjit pad under patient.    GOALS:   Multidisciplinary Problems       Occupational Therapy Goals          Problem: Occupational Therapy    Goal Priority Disciplines Outcome Interventions   Occupational Therapy Goal     OT, PT/OT Ongoing, Progressing    Description: Goals to be met by: 12/25/23     Patient will increase functional independence with ADLs by performing:  LTG: Pt will perform basic ADLs and ADL transfers with Modified independence using LRAD by discharge.    STG: to be met by 12/25/23    Pt will complete grooming sitting with  with SBA.  Pt will complete UB dressing with SBA.  Pt will complete LB dressing with mod assist using LRAD.  Pt will complete toileting with mod assist using LRAD.  Pt will complete functional mobility to/from bedside commode and toilet transfer with max assist using LRAD.                              Time Tracking:     OT Date of Treatment: 12/12/23  OT Start Time: 1315  OT Stop Time: 1340  OT Total Time (min): 25 min    Billable Minutes:Self Care/Home Management 13  Therapeutic Exercise 12    OT/ARIANA: OT     Number of ARIANA visits since last OT visit: 3    12/12/2023

## 2023-12-12 NOTE — PT/OT/SLP PROGRESS
Physical Therapy Treatment    Patient Name:  Kamran Huerta   MRN:  [id number]    Recommendations:     Discharge therapy intensity: Moderate Intensity Therapy   Discharge Equipment Recommendations: lift device  Barriers to discharge: None    Assessment:     Kamran Huerta is a 48 y.o. male admitted with a medical diagnosis of <principal problem not specified>.  He presents with the following impairments/functional limitations: weakness, impaired endurance, impaired self care skills, impaired functional mobility, impaired balance, decreased upper extremity function, decreased lower extremity function, pain .    Rehab Prognosis: Fair; patient would benefit from acute skilled PT services to address these deficits and reach maximum level of function.    Recent Surgery: * No surgery found *      Plan:     During this hospitalization, patient to be seen 3 x/week to address the identified rehab impairments via gait training, therapeutic activities, therapeutic exercises, neuromuscular re-education and progress toward the following goals:    Plan of Care Expires:  12/04/23    Subjective     Chief Complaint: back pain    Objective:     Communicated with nurse prior to session.  Patient found left sidelying with   upon PT entry to room.     General Precautions: Standard, fall  Orthopedic Precautions: N/A  Braces: N/A  Respiratory Status:   Blood Pressure:   Skin Integrity: Visible skin intact      Functional Mobility:  SBA to get EOB and slide board transfer to bedside chair with min assist to hold board to prevent from sliding. Pt performed LE PRE's to increase strenth, ROM, and endurance to improve overall independence. Attempted pull to stand using bed rail but unsuccessful.     Education Provided:  Role and goals of PT, transfer training, bed mobility, gait training, balance training, safety awareness, assistive device, strengthening exercises, and importance of participating in PT to return to PLOF.    Patient  left up in chair with call button in reach and on gurjit pad ..    GOALS:   Multidisciplinary Problems       Physical Therapy Goals          Problem: Physical Therapy    Goal Priority Disciplines Outcome Goal Variances Interventions   Physical Therapy Goal     PT, PT/OT Ongoing, Progressing     Description: Pt will improve functional independence by performing:    Bed mobility: max A  Sit to stand: max A with rolling walker  Bed to chair t/f: max A with Stand pivot with rolling walker                       Time Tracking:     Billable Minutes: Therapeutic Activity 12 and Therapeutic Exercise 13    Treatment Type: Treatment  PT/PTA: PTA     Number of PTA visits since last PT visit: 3     12/12/2023

## 2023-12-12 NOTE — PROGRESS NOTES
Nephrology consult follow up note    HPI:      Kamran Huerta is a 48 y.o. male seen by our service in April for nephrotic syndrome and FABIO. At that time he underwent renal biopsy revealing advanced diabetic nodular sclerosis. He was diuresed aggressively and discharged with Cr 1.8 and ability to ambulate in the trotter without oxygen or distress. Since that time he has undergone L BKA s/t gangrenous wound. He presented to the ED 11/25 s/p fall from wheelchair with resulting back pain. Neurosurgery undergoing further workup.   He was noted to have FABIO and marked hypervolemia for which nephrology has been consulted.    Interval history:     Plan was to continues with IV diuretics until MRI could be accomplished. MRI images were poor due to severity of edema according to radiology. Physically appears markedly different than admission. He was hardly able to move his left arm against gravity and is now sitting in chair supporting his trunk off the back of the chair. Admit weight 127 kg, today 100 kg. C/O constipation.      Review of Systems:     Comprehensive 10pt ROS negative except as noted per history.    Past medical, family, surgical, and social history reviewed and unchanged from initial consult note.     Objective:       VITAL SIGNS: 24 HR MIN & MAX LAST    Temp  Min: 97.3 °F (36.3 °C)  Max: 98 °F (36.7 °C)  97.3 °F (36.3 °C)        BP  Min: 119/73  Max: 176/86  (!) 159/98     Pulse  Min: 57  Max: 64  63     Resp  Min: 14  Max: 18  18    SpO2  Min: 95 %  Max: 100 %  99 %      GEN:  Chronically ill-appearing AAM in NAD  CV: RRR +S1,S2 without murmur  PULM: CTAB, unlabored  ABD: Soft, NT/ND abdomen with NABS  EXT:  1+ dependent edema  SKIN: Warm and dry  PSYCH: Awake, alert and appropriately conversant.   Dialysis access:  No dialysis access            Component Value Date/Time     12/12/2023 0421     12/11/2023 0735     02/22/2021 1246    K 4.5 12/12/2023 0421    K 4.2 12/11/2023 0735    K 3.6  02/22/2021 1246    CHLORIDE 106 12/12/2023 0421    CHLORIDE 105 12/11/2023 0735    CHLORIDE 106 02/22/2021 1246    CO2 26 12/12/2023 0421    CO2 25 12/11/2023 0735    CO2 26 02/22/2021 1246    BUN 52.3 (H) 12/12/2023 0421    BUN 50.9 (H) 12/11/2023 0735    BUN 15.0 02/22/2021 1246    CREATININE 2.29 (H) 12/12/2023 0421    CREATININE 2.28 (H) 12/11/2023 0735    CREATININE 0.98 02/22/2021 1246    CALCIUM 7.4 (L) 12/12/2023 0421    CALCIUM 7.5 (L) 12/11/2023 0735    CALCIUM 8.5 02/22/2021 1246    PHOS 4.5 12/09/2023 0557            Component Value Date/Time    WBC 9.36 12/06/2023 0819    WBC 9.34 12/05/2023 0412    HGB 9.7 (L) 12/06/2023 0819    HGB 8.7 (L) 12/05/2023 0412    HCT 29.1 (L) 12/06/2023 0819    HCT 26.7 (L) 12/05/2023 0412    HCT 43 11/13/2022 1428     12/06/2023 0819     12/05/2023 0412     Imaging reviewed      Assessment / Plan:     FABIO s/t acute infection and hypervolemia   CKD IIIa   -biopsy proven in April 2023 diabetic nodular sclerosis   -Nephrotic range proteinuria         -March 2023 - - 10.9 g        -Now 5.9 g   Anasarca   S/p Fall with resulting back pain. Remote back surgery   Cirrhosis of the liver  DM I onset age 14 with long standing history of noncompliance. Hgb A1c improving.      Plan:  60 pound loss noted thus far. Transition to oral diuretics with Torsemide 40mg BID and Metolazone 10mg daily.   Relistor x1 for constipation.   We will alter dose based on response.

## 2023-12-13 LAB
ANION GAP SERPL CALC-SCNC: 8 MEQ/L
BUN SERPL-MCNC: 61.7 MG/DL (ref 8.9–20.6)
CALCIUM SERPL-MCNC: 7.6 MG/DL (ref 8.4–10.2)
CHLORIDE SERPL-SCNC: 105 MMOL/L (ref 98–107)
CO2 SERPL-SCNC: 24 MMOL/L (ref 22–29)
CREAT SERPL-MCNC: 2.36 MG/DL (ref 0.73–1.18)
CREAT/UREA NIT SERPL: 26
ERYTHROCYTE [DISTWIDTH] IN BLOOD BY AUTOMATED COUNT: 14.1 % (ref 11.5–17)
GFR SERPLBLD CREATININE-BSD FMLA CKD-EPI: 34 MLS/MIN/1.73/M2
GLUCOSE SERPL-MCNC: 168 MG/DL (ref 74–100)
HCT VFR BLD AUTO: 28.9 % (ref 42–52)
HGB BLD-MCNC: 9.5 G/DL (ref 14–18)
MAGNESIUM SERPL-MCNC: 1.7 MG/DL (ref 1.6–2.6)
MCH RBC QN AUTO: 29.1 PG (ref 27–31)
MCHC RBC AUTO-ENTMCNC: 32.9 G/DL (ref 33–36)
MCV RBC AUTO: 88.7 FL (ref 80–94)
NRBC BLD AUTO-RTO: 0 %
PLATELET # BLD AUTO: 247 X10(3)/MCL (ref 130–400)
PMV BLD AUTO: 11.7 FL (ref 7.4–10.4)
POCT GLUCOSE: 173 MG/DL (ref 70–110)
POCT GLUCOSE: 76 MG/DL (ref 70–110)
POTASSIUM SERPL-SCNC: 4.3 MMOL/L (ref 3.5–5.1)
RBC # BLD AUTO: 3.26 X10(6)/MCL (ref 4.7–6.1)
SODIUM SERPL-SCNC: 137 MMOL/L (ref 136–145)
WBC # SPEC AUTO: 10.52 X10(3)/MCL (ref 4.5–11.5)

## 2023-12-13 PROCEDURE — 80048 BASIC METABOLIC PNL TOTAL CA: CPT | Performed by: INTERNAL MEDICINE

## 2023-12-13 PROCEDURE — 63600175 PHARM REV CODE 636 W HCPCS: Performed by: INTERNAL MEDICINE

## 2023-12-13 PROCEDURE — 25000003 PHARM REV CODE 250: Performed by: NURSE PRACTITIONER

## 2023-12-13 PROCEDURE — 25000003 PHARM REV CODE 250: Performed by: INTERNAL MEDICINE

## 2023-12-13 PROCEDURE — 85027 COMPLETE CBC AUTOMATED: CPT | Performed by: INTERNAL MEDICINE

## 2023-12-13 PROCEDURE — 21400001 HC TELEMETRY ROOM

## 2023-12-13 PROCEDURE — 83735 ASSAY OF MAGNESIUM: CPT | Performed by: INTERNAL MEDICINE

## 2023-12-13 PROCEDURE — 63600175 PHARM REV CODE 636 W HCPCS: Performed by: NURSE PRACTITIONER

## 2023-12-13 PROCEDURE — 27000207 HC ISOLATION

## 2023-12-13 RX ORDER — MORPHINE SULFATE 4 MG/ML
2 INJECTION, SOLUTION INTRAMUSCULAR; INTRAVENOUS ONCE
Status: COMPLETED | OUTPATIENT
Start: 2023-12-13 | End: 2023-12-14

## 2023-12-13 RX ADMIN — OXYCODONE HYDROCHLORIDE 10 MG: 5 TABLET ORAL at 02:12

## 2023-12-13 RX ADMIN — Medication: at 09:12

## 2023-12-13 RX ADMIN — TORSEMIDE 40 MG: 20 TABLET ORAL at 08:12

## 2023-12-13 RX ADMIN — Medication: at 10:12

## 2023-12-13 RX ADMIN — LEVETIRACETAM 500 MG: 500 TABLET, FILM COATED ORAL at 09:12

## 2023-12-13 RX ADMIN — PREGABALIN 75 MG: 50 CAPSULE ORAL at 09:12

## 2023-12-13 RX ADMIN — Medication 400 MG: at 09:12

## 2023-12-13 RX ADMIN — DULOXETINE HYDROCHLORIDE 20 MG: 20 CAPSULE, DELAYED RELEASE ORAL at 09:12

## 2023-12-13 RX ADMIN — ATENOLOL 50 MG: 50 TABLET ORAL at 09:12

## 2023-12-13 RX ADMIN — OXYCODONE HYDROCHLORIDE 10 MG: 5 TABLET ORAL at 10:12

## 2023-12-13 RX ADMIN — TORSEMIDE 40 MG: 20 TABLET ORAL at 05:12

## 2023-12-13 RX ADMIN — INSULIN DETEMIR 16 UNITS: 100 INJECTION, SOLUTION SUBCUTANEOUS at 09:12

## 2023-12-13 RX ADMIN — PANCRELIPASE 3 CAPSULE: 24000; 76000; 120000 CAPSULE, DELAYED RELEASE PELLETS ORAL at 09:12

## 2023-12-13 RX ADMIN — MICONAZOLE NITRATE: 20 POWDER TOPICAL at 09:12

## 2023-12-13 RX ADMIN — INSULIN DETEMIR 16 UNITS: 100 INJECTION, SOLUTION SUBCUTANEOUS at 10:12

## 2023-12-13 RX ADMIN — MICONAZOLE NITRATE: 20 POWDER TOPICAL at 10:12

## 2023-12-13 RX ADMIN — METOLAZONE 10 MG: 2.5 TABLET ORAL at 09:12

## 2023-12-13 RX ADMIN — ENOXAPARIN SODIUM 40 MG: 40 INJECTION SUBCUTANEOUS at 05:12

## 2023-12-13 RX ADMIN — OXYCODONE HYDROCHLORIDE 10 MG: 5 TABLET ORAL at 05:12

## 2023-12-13 RX ADMIN — PANCRELIPASE 3 CAPSULE: 24000; 76000; 120000 CAPSULE, DELAYED RELEASE PELLETS ORAL at 02:12

## 2023-12-13 RX ADMIN — ERGOCALCIFEROL 50000 UNITS: 1.25 CAPSULE ORAL at 08:12

## 2023-12-13 RX ADMIN — OXYCODONE HYDROCHLORIDE 10 MG: 5 TABLET ORAL at 06:12

## 2023-12-13 RX ADMIN — WHITE PETROLATUM: 1.75 OINTMENT TOPICAL at 10:12

## 2023-12-13 RX ADMIN — Medication 6 MG: at 09:12

## 2023-12-13 RX ADMIN — OXYCODONE HYDROCHLORIDE 10 MG: 5 TABLET ORAL at 09:12

## 2023-12-13 RX ADMIN — NIFEDIPINE 90 MG: 30 TABLET, FILM COATED, EXTENDED RELEASE ORAL at 09:12

## 2023-12-13 RX ADMIN — WHITE PETROLATUM: 1.75 OINTMENT TOPICAL at 09:12

## 2023-12-13 RX ADMIN — LEVETIRACETAM 500 MG: 500 TABLET, FILM COATED ORAL at 02:12

## 2023-12-13 RX ADMIN — DOXAZOSIN 2 MG: 1 TABLET ORAL at 09:12

## 2023-12-13 NOTE — PROGRESS NOTES
[facility] Hospital Medicine Progress Note        Chief Complaint: Inpatient Follow-up for Anasarca    HPI: 48 y.o. male who PMH includes CHF, chronic back pain wheelchair-bound, DM type 2, HTN, seizures, CVA, CKD stage 3, alcoholic liver disease with chronic pancreatitis, presents to the ED at [facility] on 11/25/2023 with a primary complaint of weakness, dizziness lightheadedness and fall out if his wheelchair striking his mouth; denies any LOC.  PT had back surgery years ago and reports has been wheelchair bound since then. No reports of seizures.  No CP, SOB, N/V/D, fever, chills cough congestion or any sick contacts. Labs reviewed demonstrated WBC 14.42, HH 12.2/37.2, sed rate 93 , CO2 18 Bun 25.7, Creat 2.31, glucose 52 Mg+ 1.0, , AST 36 reports of chest pain, CRP shortness a breath, nausea, vomiting, diarrhea5, fever., [id number], cough, congestion, or any sick, contacts. B no reportsP of chest pain, 109.7; other indicis unremarkable. CXR impression reviewed demonstrated  no acute cardiopulmonary abnormality. CT of head without contrast impression reviewed demonstrated no appreciable acute intracranial abnormality. Ct maxillofacial without contrast impression reviewed demonstrated no appreciate acute traumatic osseous abnormality, bilateral mastoid effusions. CT cervical spine without contrast impression reviewed demonstrated no appreciable acute osseous abnormality by CT evaluation, degenerative changes at the cervical spine. CT thoracic spine without contrast impression reviewed demonstrated no acute osseous abnormality. CT of lumbar spine without contrast impression reviewed demonstrated no appreciable fracture or acute osseous abnormality, severe degenerative change at L5-S1 progressed, endplate sclerosis disc space narrowing and osseous erosions which may be degenerative or infectious/inflammatory, anasarca.   Initial VS /105 P 75 R 13 T 98.7F O2 saturation 98%  on room air. Pt received multiple doses of pain medication , D50 for hypoglycemia, 40 mg lasix,  and magnesium rider in the ED. Neurosurgery services consulted. Pt awaiting MRI. Pt is admitted to hospital medicine services for further management.  Nephrology was consulted and patient was started on Lasix albumin drip.  Segovia was inserted due to possible outlet obstruction in the setting of anasarca.  MRI of lumbar spine was not helpful due to significant fluid collection.  Nephrology following.  Recommended to continue IV Lasix 60 mg t.i.d. and metolazone 10 mg daily for now and when patient gets closer to discharge switch to oral    Interval Hx:   Patient is lying in bed.  I asked patient why he refused MRI yesterday inpatient reported he was constipated and could not lie down flat because his belly was hurting.  He is agreeable to do it today whenever there schedule allows  No family is at bedside  Case was discussed with patient's nurse and  on the floor    Objective/physical exam:  General: In no acute distress, afebrile, more truncal obesity, large neck circumference  Chest: Clear to auscultation bilaterally anteriorly  Heart: RRR, +S1, S2, no appreciable murmur  Abdomen: Soft, nontender, BS +  MSK: Warm,  edema improving, Left BKA  Neurologic: Alert and oriented, moves all extremitis     VITAL SIGNS: 24 HRS MIN & MAX LAST   Temp  Min: 97.3 °F (36.3 °C)  Max: 98 °F (36.7 °C) 97.8 °F (36.6 °C)   BP  Min: 119/73  Max: 159/107 128/64   Pulse  Min: 59  Max: 72  65   Resp  Min: 16  Max: 21 18   SpO2  Min: 97 %  Max: 99 % 99 %     I have reviewed the following labs:  Recent Labs   Lab 12/06/23  0819 12/13/23  0429   WBC 9.36 10.52   RBC 3.24* 3.26*   HGB 9.7* 9.5*   HCT 29.1* 28.9*   MCV 89.8 88.7   MCH 29.9 29.1   MCHC 33.3 32.9*   RDW 13.8 14.1    247   MPV 11.8* 11.7*       Recent Labs   Lab 12/06/23  0819 12/07/23  0644 12/08/23  0542 12/09/23  0557 12/10/23  0553 12/11/23  0735 12/12/23  0421  12/13/23  0429    140 137 139   < > 138 140 137   K 4.2 3.9 3.9 4.0   < > 4.2 4.5 4.3   CO2 24 25 24 24   < > 25 26 24   BUN 46.1* 47.2* 48.2* 49.2*   < > 50.9* 52.3* 61.7*   CREATININE 2.07* 2.19* 2.14* 2.20*   < > 2.28* 2.29* 2.36*   CALCIUM 7.3* 6.8* 7.0* 7.0*   < > 7.5* 7.4* 7.6*   MG 1.00* 1.00* 1.20* 1.40*  --  1.40* 1.70 1.70   ALBUMIN 1.7* 1.6* 1.5* 1.6*  --   --   --   --    ALKPHOS 92 88  --   --   --   --   --   --    ALT 14 13  --   --   --   --   --   --    AST 20 18  --   --   --   --   --   --    BILITOT 0.3 0.2  --   --   --   --   --   --     < > = values in this interval not displayed.       Microbiology Results (last 7 days)       ** No results found for the last 168 hours. **           See below for Radiology    Scheduled Med:   atenoloL  50 mg Oral BID    doxazosin  2 mg Oral QHS    DULoxetine  20 mg Oral BID    enoxparin  40 mg Subcutaneous Daily    ergocalciferol  50,000 Units Oral Q72H    insulin detemir U-100  16 Units Subcutaneous BID    levETIRAcetam  500 mg Oral TID    lipase-protease-amylase 24,000-76,000-120,000 units  3 capsule Oral TID    magnesium oxide  400 mg Oral BID    metOLazone  10 mg Oral Daily    miconazole NITRATE 2 %   Topical (Top) BID    NIFEdipine  90 mg Oral Daily    perflutren lipid microspheres  1.4 mL Intravenous Once    pregabalin  75 mg Oral QHS    torsemide  40 mg Oral BID loop    white petrolatum   Topical (Top) BID    zinc oxide-cod liver oil   Topical (Top) BID      Continuous Infusions:     PRN Meds:  acetaminophen, aluminum-magnesium hydroxide-simethicone, dextrose 10%, dextrose 10%, glucagon (human recombinant), glucose, glucose, hydrALAZINE, insulin aspart U-100, labetaloL, melatonin, naloxone, ondansetron, oxyCODONE, polyethylene glycol, simethicone, white petrolatum     Assessment/Plan:  Anasarca- improving  Acute on chronic kidney disease stage III, Nephrotic range proteinuria  Cirrhosis of liver  Acute on chronic lower back pain- intractable-  patient refused MRI 12/12  Dizziness, Fall- from wheel chair  Hypoglycemia in  T2 DM-  A1c 5.5- resolved  Hypertensive urgency at admission-resolving  ESBL E coli in urine- possibly asymptomatic bacteriuria  Hypomagnesemia- replaced  HX: Wheelchair-bound, history of CVA with residual left-sided deficits, left BKA, chronic alcoholic liver disease, chronic seizures  Moderate Malnutrition    Nephrology followed.  Noted furosemide changed to torsemide 40 mg p.o. b.i.d. and metolazone 10 mg daily continued   Monitor electrolytes and replete , keep potassium greater than 4 and magnesium greater than 2  Patient complained of intractable acute on chronic back pain, Neurosurgery evaluated the patient, Pending MRI for recommendations , I spoke with MRI department, informed me that patient fell off the list given MRI was not done for 4 days.  Recommended ordering as stat so they can do it soon.  Later MRI department came to pick him up twice and patient refused MRI stating he was constipated, today tells me that he is ready to undergo MRI and he has not refusing  Had a good BM with relistor x 1  Analgesics p.r.n. p.o. pain medication.  Patient again asking for IV Dilaudid, IV morphine or p.o. Dilaudid.  Informed patient that are not indicated  Hypoglycemia resolved, now hyperglycemia with blood glucose greater than 250.  Increase Levemir to 16 units b.i.d.  Monitor blood sugars, currently on sliding scale and long-acting insulin at night.   Therapy services following, recommended moderate intensity  Case management on board for SNF, pending MRI  Continue supportive care, appropriate home medications  Fall precautions, decubitus precautions  Morning labs stable, repeat friday    VTE prophylaxis:  Lovenox    Anticipated discharge and Disposition:  SNF, pending MRI, neurosurgery recommendations     All diagnosis and differential diagnosis have been reviewed; assessment and plan has been documented; I have personally reviewed the  labs and test results that are presently available; I have reviewed the patients medication list; I have reviewed the consulting providers response and recommendations. I have reviewed or attempted to review medical records based upon their availability    All of the patient's questions have been  addressed and answered. Patient's is agreeable to the above stated plan. I will continue to monitor closely and make adjustments to medical management as needed.  _____________________________________________________________________    Nutrition Status:  Patient meets ASPEN criteria for moderate malnutrition of chronic illness per RD assessment as evidenced by:  Energy Intake (Malnutrition): other (see comments) (does not meet criteria)  Weight Loss (Malnutrition): other (see comments) (does not meet criteria)     Muscle Mass (Malnutrition): mild depletion  Fluid Accumulation (Malnutrition): severe        A minimum of two characteristics is recommended for diagnosis of either severe or non-severe malnutrition.    Radiology:  I have personally reviewed the following imaging and agree with the radiologist.     Echo    Left Ventricle: The left ventricle is normal in size. Increased wall   thickness. There is moderate concentric hypertrophy. Normal wall motion.   There is normal systolic function with a visually estimated ejection   fraction of 60 - 65%. There is normal diastolic function.    Right Ventricle: Normal right ventricular cavity size. Systolic   function is normal.    Left Atrium: Left atrium is mildly dilated.    Mitral Valve: There is trace regurgitation.    Tricuspid Valve: There is trace regurgitation.    Kyrie Bro MD  Department of Hospital Medicine   [facility]   12/13/2023

## 2023-12-13 NOTE — NURSING
"Pt refusing MRI. Saying, "I'm in too much pain to sit there for 15 minutes." Education was given on why he needs this scan and pain medication was also provided before hand. MD was also notified of pt refusal.     "

## 2023-12-13 NOTE — NURSING
[facility] - Observation Unit  Wound Care    Patient Name:  Kamran Huerta   MRN:  [id number]  Date: 12/13/2023  Diagnosis: <principal problem not specified>    History:     Past Medical History:   Diagnosis Date    CHF (congestive heart failure)     Chronic back pain     CVA (cerebral vascular accident) 01/2023    DM (diabetes mellitus)     HTN (hypertension)     Seizures        Social History     Socioeconomic History    Marital status: Single   Tobacco Use    Smoking status: Never    Smokeless tobacco: Never   Substance and Sexual Activity    Alcohol use: Not Currently    Drug use: Not Currently    Sexual activity: Not Currently     Social Determinants of Health     Social Connections: Unknown (11/16/2022)    Social Connection and Isolation Panel [NHANES]     Marital Status:        Precautions:     Allergies as of 11/25/2023    (No Known Allergies)       WO Assessment Details/Treatment      12/13/23 1311        Incision/Site 11/25/23 1531 Left Knee anterior   Date First Assessed/Time First Assessed: 11/25/23 1531   Side: Left  Location: (c) Knee  Orientation: anterior   Wound Image    Dressing Appearance Dry;Intact   Drainage Amount None   Appearance   (scabbed over)   Red (%), Wound Tissue Color 100 %   Periwound Area Dry;Intact   Wound Edges Approximated   Wound Length (cm)   (wound scabbed over.)   Care Cleansed with:;Sterile normal saline   Dressing Calcium alginate;Gauze        Altered Skin Integrity 11/26/23 1639 Left posterior Buttocks Other (comment) Intact skin with non-blanchable redness of localized area   Date First Assessed/Time First Assessed: 11/26/23 1639   Altered Skin Integrity Present on Admission - Did Patient arrive to the hospital with altered skin?: yes  Side: Left  Orientation: posterior  Location: Buttocks  Is this injury device related?: ...   Wound Image   (buttock crease clear)   Dressing Appearance Open to air;Dry   Drainage Amount None   Appearance  Flores;Intact;Dry   Tissue loss description Not applicable   Care Cleansed with:;Wound cleanser;Applied:;Skin Barrier   Positioning   Body Position weight shifting   Head of Bed (HOB) Positioning HOB at 30-45 degrees   Positioning/Transfer Devices pillows;in use     Recheck of left stump.  Areas that were denuded have scaled over.  Continued the alginate for now.  Redressed.     His buttock s were clear-He is moving better on his own in the bed as well.  He remains on his low airloss bed and assisted turning in bed with staff q 2hrs.        12/13/2023

## 2023-12-14 LAB
POCT GLUCOSE: 139 MG/DL (ref 70–110)
POCT GLUCOSE: 164 MG/DL (ref 70–110)
POCT GLUCOSE: 170 MG/DL (ref 70–110)
POCT GLUCOSE: 173 MG/DL (ref 70–110)
POCT GLUCOSE: 234 MG/DL (ref 70–110)
POCT GLUCOSE: 64 MG/DL (ref 70–110)
POCT GLUCOSE: 75 MG/DL (ref 70–110)

## 2023-12-14 PROCEDURE — 63600175 PHARM REV CODE 636 W HCPCS: Performed by: INTERNAL MEDICINE

## 2023-12-14 PROCEDURE — 25000003 PHARM REV CODE 250: Performed by: NURSE PRACTITIONER

## 2023-12-14 PROCEDURE — 25000003 PHARM REV CODE 250: Performed by: INTERNAL MEDICINE

## 2023-12-14 PROCEDURE — 21400001 HC TELEMETRY ROOM

## 2023-12-14 PROCEDURE — 27000207 HC ISOLATION

## 2023-12-14 PROCEDURE — 63600175 PHARM REV CODE 636 W HCPCS: Performed by: NURSE PRACTITIONER

## 2023-12-14 RX ADMIN — OXYCODONE HYDROCHLORIDE 10 MG: 5 TABLET ORAL at 08:12

## 2023-12-14 RX ADMIN — PANCRELIPASE 3 CAPSULE: 24000; 76000; 120000 CAPSULE, DELAYED RELEASE PELLETS ORAL at 08:12

## 2023-12-14 RX ADMIN — HYDRALAZINE HYDROCHLORIDE 10 MG: 20 INJECTION, SOLUTION INTRAMUSCULAR; INTRAVENOUS at 04:12

## 2023-12-14 RX ADMIN — INSULIN DETEMIR 16 UNITS: 100 INJECTION, SOLUTION SUBCUTANEOUS at 09:12

## 2023-12-14 RX ADMIN — Medication 400 MG: at 09:12

## 2023-12-14 RX ADMIN — LEVETIRACETAM 500 MG: 500 TABLET, FILM COATED ORAL at 08:12

## 2023-12-14 RX ADMIN — DULOXETINE HYDROCHLORIDE 20 MG: 20 CAPSULE, DELAYED RELEASE ORAL at 08:12

## 2023-12-14 RX ADMIN — ATENOLOL 50 MG: 50 TABLET ORAL at 08:12

## 2023-12-14 RX ADMIN — TORSEMIDE 40 MG: 20 TABLET ORAL at 09:12

## 2023-12-14 RX ADMIN — DOXAZOSIN 2 MG: 1 TABLET ORAL at 08:12

## 2023-12-14 RX ADMIN — WHITE PETROLATUM: 1.75 OINTMENT TOPICAL at 09:12

## 2023-12-14 RX ADMIN — INSULIN ASPART 2 UNITS: 100 INJECTION, SOLUTION INTRAVENOUS; SUBCUTANEOUS at 05:12

## 2023-12-14 RX ADMIN — LEVETIRACETAM 500 MG: 500 TABLET, FILM COATED ORAL at 09:12

## 2023-12-14 RX ADMIN — OXYCODONE HYDROCHLORIDE 10 MG: 5 TABLET ORAL at 11:12

## 2023-12-14 RX ADMIN — ENOXAPARIN SODIUM 40 MG: 40 INJECTION SUBCUTANEOUS at 04:12

## 2023-12-14 RX ADMIN — PREGABALIN 75 MG: 50 CAPSULE ORAL at 08:12

## 2023-12-14 RX ADMIN — Medication: at 08:12

## 2023-12-14 RX ADMIN — OXYCODONE HYDROCHLORIDE 10 MG: 5 TABLET ORAL at 02:12

## 2023-12-14 RX ADMIN — OXYCODONE HYDROCHLORIDE 10 MG: 5 TABLET ORAL at 04:12

## 2023-12-14 RX ADMIN — MICONAZOLE NITRATE: 20 POWDER TOPICAL at 08:12

## 2023-12-14 RX ADMIN — DULOXETINE HYDROCHLORIDE 20 MG: 20 CAPSULE, DELAYED RELEASE ORAL at 09:12

## 2023-12-14 RX ADMIN — NIFEDIPINE 90 MG: 30 TABLET, FILM COATED, EXTENDED RELEASE ORAL at 09:12

## 2023-12-14 RX ADMIN — WHITE PETROLATUM: 1.75 OINTMENT TOPICAL at 08:12

## 2023-12-14 RX ADMIN — OXYCODONE HYDROCHLORIDE 10 MG: 5 TABLET ORAL at 06:12

## 2023-12-14 RX ADMIN — PANCRELIPASE 3 CAPSULE: 24000; 76000; 120000 CAPSULE, DELAYED RELEASE PELLETS ORAL at 09:12

## 2023-12-14 RX ADMIN — Medication 400 MG: at 08:12

## 2023-12-14 RX ADMIN — TORSEMIDE 40 MG: 20 TABLET ORAL at 04:12

## 2023-12-14 RX ADMIN — INSULIN DETEMIR 16 UNITS: 100 INJECTION, SOLUTION SUBCUTANEOUS at 08:12

## 2023-12-14 RX ADMIN — LEVETIRACETAM 500 MG: 500 TABLET, FILM COATED ORAL at 02:12

## 2023-12-14 RX ADMIN — MICONAZOLE NITRATE: 20 POWDER TOPICAL at 09:12

## 2023-12-14 RX ADMIN — METOLAZONE 10 MG: 2.5 TABLET ORAL at 09:12

## 2023-12-14 RX ADMIN — Medication: at 09:12

## 2023-12-14 RX ADMIN — MORPHINE SULFATE 2 MG: 4 INJECTION, SOLUTION INTRAMUSCULAR; INTRAVENOUS at 01:12

## 2023-12-14 RX ADMIN — ATENOLOL 50 MG: 50 TABLET ORAL at 09:12

## 2023-12-14 RX ADMIN — PANCRELIPASE 3 CAPSULE: 24000; 76000; 120000 CAPSULE, DELAYED RELEASE PELLETS ORAL at 02:12

## 2023-12-14 NOTE — PROGRESS NOTES
[facility] Hospital Medicine Progress Note        Chief Complaint: Inpatient Follow-up for Anasarca    HPI: 48 y.o. male who PMH includes CHF, chronic back pain wheelchair-bound, DM type 2, HTN, seizures, CVA, CKD stage 3, alcoholic liver disease with chronic pancreatitis, presents to the ED at [facility] on 11/25/2023 with a primary complaint of weakness, dizziness lightheadedness and fall out if his wheelchair striking his mouth; denies any LOC.  PT had back surgery years ago and reports has been wheelchair bound since then. No reports of seizures.  No CP, SOB, N/V/D, fever, chills cough congestion or any sick contacts. Labs reviewed demonstrated WBC 14.42, HH 12.2/37.2, sed rate 93 , CO2 18 Bun 25.7, Creat 2.31, glucose 52 Mg+ 1.0, , AST 36 reports of chest pain, CRP shortness a breath, nausea, vomiting, diarrhea5, fever., [id number], cough, congestion, or any sick, contacts. B no reportsP of chest pain, 109.7; other indicis unremarkable. CXR impression reviewed demonstrated  no acute cardiopulmonary abnormality. CT of head without contrast impression reviewed demonstrated no appreciable acute intracranial abnormality. Ct maxillofacial without contrast impression reviewed demonstrated no appreciate acute traumatic osseous abnormality, bilateral mastoid effusions. CT cervical spine without contrast impression reviewed demonstrated no appreciable acute osseous abnormality by CT evaluation, degenerative changes at the cervical spine. CT thoracic spine without contrast impression reviewed demonstrated no acute osseous abnormality. CT of lumbar spine without contrast impression reviewed demonstrated no appreciable fracture or acute osseous abnormality, severe degenerative change at L5-S1 progressed, endplate sclerosis disc space narrowing and osseous erosions which may be degenerative or infectious/inflammatory, anasarca.   Initial VS /105 P 75 R 13 T 98.7F O2 saturation 98%  on room air. Pt received multiple doses of pain medication , D50 for hypoglycemia, 40 mg lasix,  and magnesium rider in the ED. Neurosurgery services consulted. Pt awaiting MRI. Pt is admitted to hospital medicine services for further management.  Nephrology was consulted and patient was started on Lasix albumin drip.  Segovia was inserted due to possible outlet obstruction in the setting of anasarca.  MRI of lumbar spine was not helpful due to significant fluid collection.  Nephrology following.  Recommended to continue IV Lasix 60 mg t.i.d. and metolazone 10 mg daily for now and when patient gets closer to discharge switch to oral    Interval Hx:   Patient is sleeping comfortably.  Did not wake up to my calling on examination.  No family is at bedside  Case was discussed with patient's nurse and  on the floor    Objective/physical exam:  General: In no acute distress, afebrile, more truncal obesity, large neck circumference  Chest: Clear to auscultation bilaterally anteriorly  Heart: RRR, +S1, S2, no appreciable murmur  Abdomen: Soft, nontender, BS +  MSK: Warm,  edema improving, Left BKA  Neurologic:  Comfortably asleep    VITAL SIGNS: 24 HRS MIN & MAX LAST   Temp  Min: 97.4 °F (36.3 °C)  Max: 98 °F (36.7 °C) 97.5 °F (36.4 °C)   BP  Min: 138/83  Max: 165/94 (!) 150/85   Pulse  Min: 53  Max: 73  73   Resp  Min: 11  Max: 22 12   SpO2  Min: 97 %  Max: 100 % 97 %     I have reviewed the following labs:  Recent Labs   Lab 12/13/23 0429   WBC 10.52   RBC 3.26*   HGB 9.5*   HCT 28.9*   MCV 88.7   MCH 29.1   MCHC 32.9*   RDW 14.1      MPV 11.7*       Recent Labs   Lab 12/08/23  0542 12/09/23  0557 12/10/23  0553 12/11/23  0735 12/12/23  0421 12/13/23 0429    139   < > 138 140 137   K 3.9 4.0   < > 4.2 4.5 4.3   CO2 24 24   < > 25 26 24   BUN 48.2* 49.2*   < > 50.9* 52.3* 61.7*   CREATININE 2.14* 2.20*   < > 2.28* 2.29* 2.36*   CALCIUM 7.0* 7.0*   < > 7.5* 7.4* 7.6*   MG 1.20* 1.40*  --  1.40*  1.70 1.70   ALBUMIN 1.5* 1.6*  --   --   --   --     < > = values in this interval not displayed.       Microbiology Results (last 7 days)       ** No results found for the last 168 hours. **           See below for Radiology    Scheduled Med:   atenoloL  50 mg Oral BID    doxazosin  2 mg Oral QHS    DULoxetine  20 mg Oral BID    enoxparin  40 mg Subcutaneous Daily    ergocalciferol  50,000 Units Oral Q72H    insulin detemir U-100  16 Units Subcutaneous BID    levETIRAcetam  500 mg Oral TID    lipase-protease-amylase 24,000-76,000-120,000 units  3 capsule Oral TID    magnesium oxide  400 mg Oral BID    metOLazone  10 mg Oral Daily    miconazole NITRATE 2 %   Topical (Top) BID    morphine  2 mg Intravenous Once    NIFEdipine  90 mg Oral Daily    perflutren lipid microspheres  1.4 mL Intravenous Once    pregabalin  75 mg Oral QHS    torsemide  40 mg Oral BID loop    white petrolatum   Topical (Top) BID    zinc oxide-cod liver oil   Topical (Top) BID      Continuous Infusions:     PRN Meds:  acetaminophen, aluminum-magnesium hydroxide-simethicone, dextrose 10%, dextrose 10%, glucagon (human recombinant), glucose, glucose, hydrALAZINE, insulin aspart U-100, labetaloL, melatonin, naloxone, ondansetron, oxyCODONE, polyethylene glycol, simethicone, white petrolatum     Assessment/Plan:  Anasarca- improved  Acute on chronic kidney disease stage III, Nephrotic range proteinuria  Cirrhosis of liver  Acute on chronic lower back pain- intractable--> severe canal stenosis at L5-S1 and mild canal stenosis at L4-L5 per MRI on 12/14  Dizziness, Fall- from wheel chair  Hypoglycemia in  T2 DM-  A1c 5.5- resolved  Hypertensive urgency at admission-resolving  ESBL E coli in urine- possibly asymptomatic bacteriuria  Hypomagnesemia- replaced  HX: Wheelchair-bound, history of CVA with residual left-sided deficits, left BKA, chronic alcoholic liver disease, chronic seizures  Moderate Malnutrition      Nephrology followed.  Noted furosemide  changed to torsemide 40 mg p.o. b.i.d. and metolazone 10 mg daily continued   Monitor electrolytes and replete, keep potassium greater than 4 and magnesium greater than 2  Patient complained of intractable acute on chronic back pain, Neurosurgery evaluated the patient, MRI lumbar done on 12/14-large disc protrusion at L5-S1 with severe narrowing of the canal, mild narrowing of the spinal canal at L4-L5  I have informed Neurosurgery regarding the MRI results, awaiting recommendations  Constipation resolved with relistor x 1  Analgesics p.r.n. p.o. pain medication.  Patient again asking for IV Dilaudid, IV morphine or p.o. Dilaudid.  Informed patient that are not indicated  Hypoglycemia resolved, now hyperglycemia with blood glucose greater than 250.  Increase Levemir to 16 units b.i.d.  Monitor blood sugars, currently on sliding scale and long-acting insulin at night.   Therapy services following, recommended moderate intensity  Case management on board for SNF, pending MRI  Continue supportive care, appropriate home medications  Fall precautions, decubitus precautions  Morning CBC, BMP, Mag ordered    VTE prophylaxis:  Lovenox    Anticipated discharge and Disposition:  SNF, pending neurosurgery recommendations     All diagnosis and differential diagnosis have been reviewed; assessment and plan has been documented; I have personally reviewed the labs and test results that are presently available; I have reviewed the patients medication list; I have reviewed the consulting providers response and recommendations. I have reviewed or attempted to review medical records based upon their availability    All of the patient's questions have been  addressed and answered. Patient's is agreeable to the above stated plan. I will continue to monitor closely and make adjustments to medical management as needed.  _____________________________________________________________________    Nutrition Status:  Patient meets ASPEN criteria  for moderate malnutrition of chronic illness per RD assessment as evidenced by:  Energy Intake (Malnutrition): other (see comments) (does not meet criteria)  Weight Loss (Malnutrition): other (see comments) (does not meet criteria)     Muscle Mass (Malnutrition): mild depletion  Fluid Accumulation (Malnutrition): severe        A minimum of two characteristics is recommended for diagnosis of either severe or non-severe malnutrition.    Radiology:  I have personally reviewed the following imaging and agree with the radiologist.     Echo    Left Ventricle: The left ventricle is normal in size. Increased wall   thickness. There is moderate concentric hypertrophy. Normal wall motion.   There is normal systolic function with a visually estimated ejection   fraction of 60 - 65%. There is normal diastolic function.    Right Ventricle: Normal right ventricular cavity size. Systolic   function is normal.    Left Atrium: Left atrium is mildly dilated.    Mitral Valve: There is trace regurgitation.    Tricuspid Valve: There is trace regurgitation.    Kyrie Bro MD  Department of Hospital Medicine   [facility]   12/14/2023

## 2023-12-14 NOTE — PROGRESS NOTES
Pt off floor for MRI. Chart reviewed. Voiding well with oral regimen. Continue and repeat lab tomorrow.

## 2023-12-15 LAB
ANION GAP SERPL CALC-SCNC: 6 MEQ/L
BUN SERPL-MCNC: 65.7 MG/DL (ref 8.9–20.6)
CALCIUM SERPL-MCNC: 7.8 MG/DL (ref 8.4–10.2)
CHLORIDE SERPL-SCNC: 105 MMOL/L (ref 98–107)
CO2 SERPL-SCNC: 25 MMOL/L (ref 22–29)
CREAT SERPL-MCNC: 2.26 MG/DL (ref 0.73–1.18)
CREAT/UREA NIT SERPL: 29
ERYTHROCYTE [DISTWIDTH] IN BLOOD BY AUTOMATED COUNT: 13.8 % (ref 11.5–17)
GFR SERPLBLD CREATININE-BSD FMLA CKD-EPI: 35 MLS/MIN/1.73/M2
GLUCOSE SERPL-MCNC: 150 MG/DL (ref 74–100)
HCT VFR BLD AUTO: 29.8 % (ref 42–52)
HGB BLD-MCNC: 9.8 G/DL (ref 14–18)
MAGNESIUM SERPL-MCNC: 1.7 MG/DL (ref 1.6–2.6)
MCH RBC QN AUTO: 29.1 PG (ref 27–31)
MCHC RBC AUTO-ENTMCNC: 32.9 G/DL (ref 33–36)
MCV RBC AUTO: 88.4 FL (ref 80–94)
NRBC BLD AUTO-RTO: 0 %
PLATELET # BLD AUTO: 245 X10(3)/MCL (ref 130–400)
PMV BLD AUTO: 11.8 FL (ref 7.4–10.4)
POCT GLUCOSE: 140 MG/DL (ref 70–110)
POCT GLUCOSE: 143 MG/DL (ref 70–110)
POCT GLUCOSE: 143 MG/DL (ref 70–110)
POCT GLUCOSE: 173 MG/DL (ref 70–110)
POCT GLUCOSE: 84 MG/DL (ref 70–110)
POTASSIUM SERPL-SCNC: 4.5 MMOL/L (ref 3.5–5.1)
RBC # BLD AUTO: 3.37 X10(6)/MCL (ref 4.7–6.1)
SODIUM SERPL-SCNC: 136 MMOL/L (ref 136–145)
WBC # SPEC AUTO: 10.32 X10(3)/MCL (ref 4.5–11.5)

## 2023-12-15 PROCEDURE — 63600175 PHARM REV CODE 636 W HCPCS: Performed by: NURSE PRACTITIONER

## 2023-12-15 PROCEDURE — 25000003 PHARM REV CODE 250: Performed by: INTERNAL MEDICINE

## 2023-12-15 PROCEDURE — 25000003 PHARM REV CODE 250: Performed by: NURSE PRACTITIONER

## 2023-12-15 PROCEDURE — 83735 ASSAY OF MAGNESIUM: CPT | Performed by: INTERNAL MEDICINE

## 2023-12-15 PROCEDURE — 27000207 HC ISOLATION

## 2023-12-15 PROCEDURE — 85027 COMPLETE CBC AUTOMATED: CPT | Performed by: INTERNAL MEDICINE

## 2023-12-15 PROCEDURE — 97535 SELF CARE MNGMENT TRAINING: CPT | Mod: CO

## 2023-12-15 PROCEDURE — 21400001 HC TELEMETRY ROOM

## 2023-12-15 PROCEDURE — 63600175 PHARM REV CODE 636 W HCPCS: Performed by: INTERNAL MEDICINE

## 2023-12-15 PROCEDURE — 80048 BASIC METABOLIC PNL TOTAL CA: CPT | Performed by: INTERNAL MEDICINE

## 2023-12-15 RX ADMIN — NALOXEGOL OXALATE 25 MG: 25 TABLET, FILM COATED ORAL at 10:12

## 2023-12-15 RX ADMIN — Medication: at 09:12

## 2023-12-15 RX ADMIN — DULOXETINE HYDROCHLORIDE 20 MG: 20 CAPSULE, DELAYED RELEASE ORAL at 09:12

## 2023-12-15 RX ADMIN — PREGABALIN 75 MG: 50 CAPSULE ORAL at 09:12

## 2023-12-15 RX ADMIN — ATENOLOL 50 MG: 50 TABLET ORAL at 09:12

## 2023-12-15 RX ADMIN — OXYCODONE HYDROCHLORIDE 10 MG: 5 TABLET ORAL at 10:12

## 2023-12-15 RX ADMIN — INSULIN DETEMIR 16 UNITS: 100 INJECTION, SOLUTION SUBCUTANEOUS at 09:12

## 2023-12-15 RX ADMIN — TORSEMIDE 40 MG: 20 TABLET ORAL at 09:12

## 2023-12-15 RX ADMIN — PANCRELIPASE 3 CAPSULE: 24000; 76000; 120000 CAPSULE, DELAYED RELEASE PELLETS ORAL at 09:12

## 2023-12-15 RX ADMIN — POLYETHYLENE GLYCOL 3350 17 G: 17 POWDER, FOR SOLUTION ORAL at 06:12

## 2023-12-15 RX ADMIN — OXYCODONE HYDROCHLORIDE 10 MG: 5 TABLET ORAL at 06:12

## 2023-12-15 RX ADMIN — POLYETHYLENE GLYCOL 3350 17 G: 17 POWDER, FOR SOLUTION ORAL at 09:12

## 2023-12-15 RX ADMIN — NIFEDIPINE 90 MG: 30 TABLET, FILM COATED, EXTENDED RELEASE ORAL at 09:12

## 2023-12-15 RX ADMIN — OXYCODONE HYDROCHLORIDE 10 MG: 5 TABLET ORAL at 02:12

## 2023-12-15 RX ADMIN — LEVETIRACETAM 500 MG: 500 TABLET, FILM COATED ORAL at 09:12

## 2023-12-15 RX ADMIN — Medication 400 MG: at 09:12

## 2023-12-15 RX ADMIN — TORSEMIDE 40 MG: 20 TABLET ORAL at 04:12

## 2023-12-15 RX ADMIN — ENOXAPARIN SODIUM 40 MG: 40 INJECTION SUBCUTANEOUS at 04:12

## 2023-12-15 RX ADMIN — Medication: at 11:12

## 2023-12-15 RX ADMIN — METOLAZONE 10 MG: 2.5 TABLET ORAL at 09:12

## 2023-12-15 RX ADMIN — MICONAZOLE NITRATE: 20 POWDER TOPICAL at 09:12

## 2023-12-15 RX ADMIN — MICONAZOLE NITRATE: 20 POWDER TOPICAL at 10:12

## 2023-12-15 RX ADMIN — LEVETIRACETAM 500 MG: 500 TABLET, FILM COATED ORAL at 02:12

## 2023-12-15 RX ADMIN — WHITE PETROLATUM: 1.75 OINTMENT TOPICAL at 10:12

## 2023-12-15 RX ADMIN — DOXAZOSIN 2 MG: 1 TABLET ORAL at 09:12

## 2023-12-15 RX ADMIN — PANCRELIPASE 3 CAPSULE: 24000; 76000; 120000 CAPSULE, DELAYED RELEASE PELLETS ORAL at 02:12

## 2023-12-15 RX ADMIN — OXYCODONE HYDROCHLORIDE 10 MG: 5 TABLET ORAL at 01:12

## 2023-12-15 NOTE — PROGRESS NOTES
[facility] Hospital Medicine Progress Note        Chief Complaint: Inpatient Follow-up for Anasarca    HPI: 48 y.o. male who PMH includes CHF, chronic back pain wheelchair-bound, DM type 2, HTN, seizures, CVA, CKD stage 3, alcoholic liver disease with chronic pancreatitis, presents to the ED at [facility] on 11/25/2023 with a primary complaint of weakness, dizziness lightheadedness and fall out if his wheelchair striking his mouth; denies any LOC.  PT had back surgery years ago and reports has been wheelchair bound since then. No reports of seizures.  No CP, SOB, N/V/D, fever, chills cough congestion or any sick contacts. Labs reviewed demonstrated WBC 14.42, HH 12.2/37.2, sed rate 93 , CO2 18 Bun 25.7, Creat 2.31, glucose 52 Mg+ 1.0, , AST 36 reports of chest pain, CRP shortness a breath, nausea, vomiting, diarrhea5, fever., [id number], cough, congestion, or any sick, contacts. B no reportsP of chest pain, 109.7; other indicis unremarkable. CXR impression reviewed demonstrated  no acute cardiopulmonary abnormality. CT of head without contrast impression reviewed demonstrated no appreciable acute intracranial abnormality. Ct maxillofacial without contrast impression reviewed demonstrated no appreciate acute traumatic osseous abnormality, bilateral mastoid effusions. CT cervical spine without contrast impression reviewed demonstrated no appreciable acute osseous abnormality by CT evaluation, degenerative changes at the cervical spine. CT thoracic spine without contrast impression reviewed demonstrated no acute osseous abnormality. CT of lumbar spine without contrast impression reviewed demonstrated no appreciable fracture or acute osseous abnormality, severe degenerative change at L5-S1 progressed, endplate sclerosis disc space narrowing and osseous erosions which may be degenerative or infectious/inflammatory, anasarca.   Initial VS /105 P 75 R 13 T 98.7F O2 saturation 98%  on room air. Pt received multiple doses of pain medication , D50 for hypoglycemia, 40 mg lasix,  and magnesium rider in the ED. Neurosurgery services consulted. Pt awaiting MRI. Pt is admitted to hospital medicine services for further management.  Nephrology was consulted and patient was started on Lasix albumin drip.  Segovia was inserted due to possible outlet obstruction in the setting of anasarca.  MRI of lumbar spine was not helpful due to significant fluid collection.  Nephrology following.  Recommended to continue IV Lasix 60 mg t.i.d. and metolazone 10 mg daily for now and when patient gets closer to discharge switch to oral    Interval Hx:   Patient is sleeping comfortably.  Did not wake up to my calling or examination.  No family is at bedside  Case was discussed with patient's nurse and  on the floor    Objective/physical exam:  General: In no acute distress, afebrile, more truncal obesity, large neck circumference  Chest: Clear to auscultation bilaterally anteriorly  Heart: RRR, +S1, S2, no appreciable murmur  Abdomen: Soft, nontender, BS +  MSK: Warm,  edema improving, Left BKA  Neurologic:  Comfortably asleep    VITAL SIGNS: 24 HRS MIN & MAX LAST   Temp  Min: 97.7 °F (36.5 °C)  Max: 98.2 °F (36.8 °C) 98.2 °F (36.8 °C)   BP  Min: 145/86  Max: 170/84 (!) 160/89   Pulse  Min: 58  Max: 70  66   Resp  Min: 16  Max: 20 16   SpO2  Min: 99 %  Max: 99 % 99 %     I have reviewed the following labs:  Recent Labs   Lab 12/13/23  0429 12/15/23  0456   WBC 10.52 10.32   RBC 3.26* 3.37*   HGB 9.5* 9.8*   HCT 28.9* 29.8*   MCV 88.7 88.4   MCH 29.1 29.1   MCHC 32.9* 32.9*   RDW 14.1 13.8    245   MPV 11.7* 11.8*       Recent Labs   Lab 12/09/23  0557 12/10/23  0553 12/12/23  0421 12/13/23  0429 12/15/23  0457      < > 140 137 136   K 4.0   < > 4.5 4.3 4.5   CO2 24   < > 26 24 25   BUN 49.2*   < > 52.3* 61.7* 65.7*   CREATININE 2.20*   < > 2.29* 2.36* 2.26*   CALCIUM 7.0*   < > 7.4* 7.6* 7.8*   MG  1.40*   < > 1.70 1.70 1.70   ALBUMIN 1.6*  --   --   --   --     < > = values in this interval not displayed.       Microbiology Results (last 7 days)       ** No results found for the last 168 hours. **           See below for Radiology    Scheduled Med:   atenoloL  50 mg Oral BID    doxazosin  2 mg Oral QHS    DULoxetine  20 mg Oral BID    enoxparin  40 mg Subcutaneous Daily    ergocalciferol  50,000 Units Oral Q72H    insulin detemir U-100  16 Units Subcutaneous BID    levETIRAcetam  500 mg Oral TID    lipase-protease-amylase 24,000-76,000-120,000 units  3 capsule Oral TID    magnesium oxide  400 mg Oral BID    metOLazone  10 mg Oral Daily    miconazole NITRATE 2 %   Topical (Top) BID    NIFEdipine  90 mg Oral Daily    perflutren lipid microspheres  1.4 mL Intravenous Once    pregabalin  75 mg Oral QHS    torsemide  40 mg Oral BID loop    white petrolatum   Topical (Top) BID    zinc oxide-cod liver oil   Topical (Top) BID      Continuous Infusions:     PRN Meds:  acetaminophen, aluminum-magnesium hydroxide-simethicone, dextrose 10%, dextrose 10%, glucagon (human recombinant), glucose, glucose, hydrALAZINE, insulin aspart U-100, labetaloL, melatonin, naloxone, ondansetron, oxyCODONE, polyethylene glycol, simethicone, white petrolatum     Assessment/Plan:  Anasarca- improved  Acute on chronic kidney disease stage III, Nephrotic range proteinuria  Cirrhosis of liver  Acute on chronic lower back pain- intractable--> severe canal stenosis at L5-S1 and mild canal stenosis at L4-L5 per MRI on 12/14  Dizziness, Fall- from wheel chair  Hypoglycemia in  T2 DM-  A1c 5.5- resolved  Hypertensive urgency at admission-resolving  ESBL E coli in urine- possibly asymptomatic bacteriuria  Hypomagnesemia- replaced  HX: Wheelchair-bound, history of CVA with residual left-sided deficits, left BKA, chronic alcoholic liver disease, chronic seizures  Moderate Malnutrition      Nephrology followed.  Continue torsemide 40 mg p.o. b.i.d.  and metolazone 10 mg daily  Monitor electrolytes and replete, keep potassium greater than 4 and magnesium greater than 2  Patient complained of intractable acute on chronic back pain, Neurosurgery evaluated the patient, MRI lumbar done on 12/14-large disc protrusion at L5-S1 with severe narrowing of the canal, mild narrowing of the spinal canal at L4-L5  I have informed Neurosurgery regarding the MRI results, awaiting recommendations  Constipation resolved with relistor x 1  Analgesics p.r.n. p.o. pain medication.  Patient again asking for IV Dilaudid, IV morphine or p.o. Dilaudid.  Informed patient that are not indicated  Hypoglycemia resolved, now hyperglycemia with blood glucose greater than 250.  Increase Levemir to 16 units b.i.d.  Monitor blood sugars, currently on sliding scale and long-acting insulin at night.   Therapy services following, recommended moderate intensity  Case management on board for SNF, pending MRI  Continue supportive care, appropriate home medications  Fall precautions, decubitus precautions  Morning lab stable, repeat Sunday    VTE prophylaxis:  Lovenox    Anticipated discharge and Disposition:  SNF, pending neurosurgery recommendations     All diagnosis and differential diagnosis have been reviewed; assessment and plan has been documented; I have personally reviewed the labs and test results that are presently available; I have reviewed the patients medication list; I have reviewed the consulting providers response and recommendations. I have reviewed or attempted to review medical records based upon their availability    All of the patient's questions have been  addressed and answered. Patient's is agreeable to the above stated plan. I will continue to monitor closely and make adjustments to medical management as needed.  _____________________________________________________________________    Nutrition Status:  Patient meets ASPEN criteria for moderate malnutrition of chronic illness  per RD assessment as evidenced by:  Energy Intake (Malnutrition): other (see comments) (does not meet criteria)  Weight Loss (Malnutrition): other (see comments) (does not meet criteria)     Muscle Mass (Malnutrition): mild depletion  Fluid Accumulation (Malnutrition): severe        A minimum of two characteristics is recommended for diagnosis of either severe or non-severe malnutrition.    Radiology:  I have personally reviewed the following imaging and agree with the radiologist.     MRI Lumbar Spine Without Contrast  Narrative: EXAMINATION:  MRI LUMBAR SPINE WITHOUT CONTRAST    CLINICAL HISTORY:  Low back pain;    TECHNIQUE:  Multiplanar multisequence MR images of the lumbar spine are obtained without contrast.    COMPARISON:  CT lumbar spine dated 11/25/2023, MRI lumbar spine dated 01/16/2021    FINDINGS:  Evaluation is limited by artifact and motion.  There is grade 1 retrolisthesis of L5 over S1.  The vertebral body heights are maintained.  There is suspected mild endplate edema at L5-S1, nonspecific.  There are postoperative changes of prior partial left-sided L5-S1 laminectomy.    The conus terminates at the level of L1.    At L4-5, there is disc bulge, facet hypertrophy and mild narrowing of the spinal canal.  There is mild-to-moderate left neural foraminal narrowing    At L5-S1, there is grade 1 retrolisthesis of L5 with suspected large central disc protrusion measuring 9 mm in AP dimension and facet hypertrophy with severe narrowing of the canal and impingement on the descending bilateral S1 nerve roots, left greater than right.  There is moderate to severe bilateral foraminal stenosis.    There is subcutaneous edema in the posterior paraspinal soft tissues.  There is no definite visible drainable fluid collection.  Impression: 1. Suspected large disc protrusion at L5-S1 with severe narrowing of the canal.  2. Mild narrowing of the spinal canal at L4-5.  3. Multilevel neural foraminal stenoses as  described.    Electronically signed by: Mirela Granados  Date:    12/14/2023  Time:    15:01    Kyrie Bro MD  Department of Hospital Medicine   [facility]   12/15/2023

## 2023-12-15 NOTE — PROGRESS NOTES
Nephrology consult follow up note    HPI:      Kamran Huerta is a 48 y.o. male seen by our service in April for nephrotic syndrome and FABIO. At that time he underwent renal biopsy revealing advanced diabetic nodular sclerosis. He was diuresed aggressively and discharged with Cr 1.8 and ability to ambulate in the trotter without oxygen or distress. Since that time he has undergone L BKA s/t gangrenous wound. He presented to the ED 11/25 s/p fall from wheelchair with resulting back pain. Neurosurgery undergoing further workup.   He was noted to have FABIO and marked hypervolemia for which nephrology has been consulted.    Interval history:     No acute events overnight.  Patient making good urine output, 3.4 L yesterday with p.o. diuretics.  MRI done yesterday showed suspected large disc protrusion at L5-S1 with severe narrowing of the canal.  No chest pain, nausea, vomiting.  Shortness of breath and lower extremity edema improving.     Review of Systems:     Comprehensive 10pt ROS negative except as noted per history.    Past medical, family, surgical, and social history reviewed and unchanged from initial consult note.     Objective:       VITAL SIGNS: 24 HR MIN & MAX LAST    Temp  Min: 97.7 °F (36.5 °C)  Max: 98.2 °F (36.8 °C)  97.7 °F (36.5 °C)        BP  Min: 130/85  Max: 170/84  130/85     Pulse  Min: 58  Max: 70  67     Resp  Min: 16  Max: 20  16    SpO2  Min: 98 %  Max: 99 %  98 %      GEN:  Chronically ill-appearing AAM in NAD  CV: RRR +S1,S2 without murmur  PULM: CTAB, unlabored  ABD: Soft, NT/ND abdomen with NABS  EXT:  1-2+ dependent edema  SKIN: Warm and dry  PSYCH: Awake, alert and appropriately conversant.   Dialysis access:  No dialysis access            Component Value Date/Time     12/15/2023 0457     12/13/2023 0429     02/22/2021 1246    K 4.5 12/15/2023 0457    K 4.3 12/13/2023 0429    K 3.6 02/22/2021 1246    CHLORIDE 105 12/15/2023 0457    CHLORIDE 105 12/13/2023 0429     "CHLORIDE 106 02/22/2021 1246    CO2 25 12/15/2023 0457    CO2 24 12/13/2023 0429    CO2 26 02/22/2021 1246    BUN 65.7 (H) 12/15/2023 0457    BUN 61.7 (H) 12/13/2023 0429    BUN 15.0 02/22/2021 1246    CREATININE 2.26 (H) 12/15/2023 0457    CREATININE 2.36 (H) 12/13/2023 0429    CREATININE 0.98 02/22/2021 1246    CALCIUM 7.8 (L) 12/15/2023 0457    CALCIUM 7.6 (L) 12/13/2023 0429    CALCIUM 8.5 02/22/2021 1246    PHOS 4.5 12/09/2023 0557            Component Value Date/Time    WBC 10.32 12/15/2023 0456    WBC 10.52 12/13/2023 0429    HGB 9.8 (L) 12/15/2023 0456    HGB 9.5 (L) 12/13/2023 0429    HCT 29.8 (L) 12/15/2023 0456    HCT 28.9 (L) 12/13/2023 0429    HCT 43 11/13/2022 1428     12/15/2023 0456     12/13/2023 0429         Imaging reviewed      Assessment / Plan:       Active Hospital Problems    Diagnosis  POA    Moderate malnutrition [E44.0]  Yes     Patient meets ASPEN criteria for moderate malnutrition of chronic illness per RD assessment as evidenced by:  Energy Intake (Malnutrition): other (see comments) (does not meet criteria)  Weight Loss (Malnutrition): other (see comments) (does not meet criteria)     Muscle Mass (Malnutrition): mild depletion  Fluid Accumulation (Malnutrition): severe        A minimum of two characteristics is recommended for diagnosis of either severe or non-severe malnutrition.    Ht Readings from Last 1 Encounters:   12/01/23 5' 7.72" (1.72 m)     Wt Readings from Last 1 Encounters:   11/26/23 1128 127 kg (280 lb)   11/25/23 1539 127 kg (280 lb)   11/25/23 0627 127 kg (280 lb)   Body mass index is 42.93 kg/m².    Patient has been screened and assessed by RD. See nutrition recommendations documented in inpatient nutrition assessment. RD will continue to follow patient throughout hospitalization.          Resolved Hospital Problems   No resolved problems to display.       FABIO s/t acute infection and hypervolemia   CKD IIIa   -biopsy proven in April 2023 diabetic nodular " sclerosis   -Nephrotic range proteinuria         -March 2023 - - 10.9 g        -Now 5.9 g   Anasarca   S/p Fall with resulting back pain. Remote back surgery   Cirrhosis of the liver  DM I onset age 14 with long standing history of noncompliance. Hgb A1c improving.   Severe spinal stenosis - neurosurgery evaluating     Plan:  Renal function stable.  Making good urine output.  Continue p.o. diuretics with torsemide 40 mg b.i.d., and metolazone 10 mg daily.  Overall stable from a Nephrology standpoint.  We will check again on him on Monday.  Call with questions over the weekend.      Spike Mcintyre DO  Nephrology  [facility]  Clinic number: [phone]

## 2023-12-15 NOTE — PROGRESS NOTES
Inpatient Nutrition Assessment    Admit Date: 11/25/2023   Total duration of encounter: 20 days   Patient Age: 48 y.o.    Nutrition Recommendation/Prescription     Continue diabetic diet as tolerated  Continue Boost Glucose Control daily to provide 190 kcal and 16 g protein per serving  Continue digestive enzymes  Daily weights  RD to monitor po intake and weight    Communication of Recommendations: reviewed with caregiver    Nutrition Assessment     Malnutrition Assessment/Nutrition-Focused Physical Exam    Malnutrition Context: chronic illness (12/01/23 1157)  Malnutrition Level: moderate (12/01/23 1157)  Energy Intake (Malnutrition): other (see comments) (does not meet criteria) (12/01/23 1157)  Weight Loss (Malnutrition): other (see comments) (does not meet criteria) (12/01/23 1157)              Muscle Mass (Malnutrition): mild depletion (12/01/23 1157)  [place] (Muscle Loss): mild depletion                       Fluid Accumulation (Malnutrition): severe (12/01/23 1157)        A minimum of two characteristics is recommended for diagnosis of either severe or non-severe malnutrition.    Chart Review    Reason Seen: length of stay and follow-up    Malnutrition Screening Tool Results   Have you recently lost weight without trying?: No  Have you been eating poorly because of a decreased appetite?: Yes   MST Score: 1   Diagnosis:  Acute on chronic lower back pain- intractable- suspected osteomyelitis  Dizziness, Fall- from wheel chair  Hypoglycemia- recurrent-resolved  Acute on chronic kidney disease stage III  Anasarca with volume overload  with peripheral edema  Hypertensive urgency at admission-resolving    Relevant Medical History: CHF, chronic back pain wheelchair-bound, DM 2, HTN, seizures, CVA, CKD, alcoholic liver disease with chronic pancreatitis , L BKA    Nutrition-Related Medications: insulin detemir, lipase-protease-amylase, magnesium oxide    Calorie Containing IV Medications: no significant kcals  "from medications at this time    Nutrition-Related Labs:   : RBC-3.01, H/H-8.7/26.6, Cl-116, BUN-37.8, creat-2.46, glu-118, boogie-7.3  : Cl 110, BUN 46.1, Cr 2.07, GFR 39, Mag 1.00, Alb 1.7  : BUN 50.9, Crea 2.28, GFR 35, Gluc 179, Mag 1.40  12/15: BUN 65.7, Crea 2.26, GFR 35, Gluc 150    Nutrition Orders:   Diet diabetic Cardiac  Dietary nutrition supplements Boost Glucose Control Vanilla; Daily    Appetite/Oral Intake: good/% of meals    Factors Affecting Nutritional Intake: none identified    Food/Catholic/Cultural Preferences: none reported    Food Allergies: no known food allergies    Wound(s):      Altered Skin Integrity 23 1639 Left posterior Buttocks Other (comment) Intact skin with non-blanchable redness of localized area-Tissue loss description: Not applicable     Last Bowel Movement: 12/15/23    Comments    23: pt reports good appetite, denies decreased appetite prior admission. Complained of diarrhea, continue digestive enzymes. UBW ~215 lb with recent weight gain r/t fluid retention. Per notes, pt with 4+ pitting anasarca. Per NFPA, pt with mild muscle loss. Pt agreed to ONS daily.    23: Patient reports good oral intake, denies nausea/vomiting/diarrhea/constipation. Drinking oral nutrition supplement daily.    23: Patient reports good oral intake, drinking oral nutrition supplement daily.     12/15/23: Patient sleeping during rounding attempts. Caregiver reports good oral intake, drinking nutrition supplements.     Anthropometrics    Height: 5' 7.72" (172 cm) Height Method: Measured  Last Weight: 98.4 kg (217 lb) (12/15/23 0556) Weight Method: Bed Scale  BMI (Calculated): 33.3  BMI Classification: obese grade III (BMI >/=40)        Ideal Body Weight (IBW), Male: 152.32 lb     % Ideal Body Weight, Male (lb): 183.82 %                 Usual Body Weight (UBW), k.7 kg  % Usual Body Weight: 130.27     Usual Weight Provided By: patient and EMR weight history    Wt " Readings from Last 5 Encounters:   12/15/23 98.4 kg (217 lb)   23 101.3 kg (223 lb 5.2 oz)   22 87 kg (191 lb 12.8 oz)   21 93.8 kg (206 lb 12.7 oz)   21 93.8 kg (206 lb 12.7 oz)     Weight Change(s) Since Admission:   Wt Readings from Last 1 Encounters:   12/15/23 0556 98.4 kg (217 lb)   23 0526 99.8 kg (220 lb)   23 0611 101.6 kg (224 lb)   23 1409 100.4 kg (221 lb 7.2 oz)   23 0500 106.1 kg (234 lb)   23 0611 104.3 kg (230 lb)   23 0500 107.5 kg (237 lb)   23 0700 111.6 kg (246 lb)   23 1128 127 kg (280 lb)   23 1539 127 kg (280 lb)   23 0627 127 kg (280 lb)   Admit Weight: 127 kg (280 lb) (23 06), Weight Method: Stated    Estimated Needs    Weight Used For Calorie Calculations: 97.7 kg (215 lb 6.2 oz) (UBW used)  Energy Calorie Requirements (kcal): 7161-4241 kcal (1.0-1.1 stress factor)  Energy Need Method: [facility]  Weight Used For Protein Calculations: 97.7 kg (215 lb 6.2 oz) (UBW used)  Protein Requirements: 78-98 g (0.8-1.0 g/kg)  Fluid Requirements (mL): 1827 ml (1 ml/kcal)  Temp (24hrs), Av.8 °F (36.6 °C), Min:97.6 °F (36.4 °C), Max:98.2 °F (36.8 °C)       Enteral Nutrition    Patient not receiving enteral nutrition at this time.    Parenteral Nutrition    Patient not receiving parenteral nutrition support at this time.    Evaluation of Received Nutrient Intake    Calories: meeting estimated needs  Protein: meeting estimated needs    Patient Education    Not applicable.    Nutrition Diagnosis     PES: Unintended weight gain related to  fluid retention as evidenced by 4+ pitting anasarca. (active)    PES: Malnutrition related to chronic illness as evidenced by mild muscle depletion and severe fluid accumulation. (active)     Interventions/Goals     Intervention(s): general/healthful diet, commercial beverage, prescription medication, and collaboration with other providers    Goal: Consume % of oral  supplements by follow-up. (goal met)    Monitoring & Evaluation     Dietitian will monitor food and beverage intake, weight change, electrolyte/renal panel, glucose/endocrine profile, and gastrointestinal profile.    Nutrition Risk/Follow-Up: moderate (follow-up in 3-5 days)   Please consult if re-assessment needed sooner.

## 2023-12-15 NOTE — PT/OT/SLP PROGRESS
Occupational Therapy   Treatment    Name: Kamran Huerta  MRN: [id number]  Admitting Diagnosis: weakness, dizziness lightheadedness, and fall out of wheelchair          Recommendations:     Recommended therapy intensity at discharge: Moderate Intensity Therapy   Discharge Equipment Recommendations:  lift device  Barriers to discharge:       Assessment:     Kamran Huerta is a 48 y.o. male with a medical diagnosis of  weakness, dizziness, lightheadedness and fall out of wheelchair. Performance deficits affecting function are weakness, impaired endurance, impaired self care skills, impaired functional mobility, decreased lower extremity function, decreased safety awareness. Pt MRI from 12/14 revealed suspected large disc protrusion at L5-S1 with severe narrowing of the canal.  And mild narrowing of the spinal canal at L4-5. Messaged MD asking if lumbar brace is needed for mobility, reports she is waiting for neuro sx. Pt agreeable to bed level ax with bed in chair position today.     Rehab Prognosis:  Good; patient would benefit from acute skilled OT services to address these deficits and reach maximum level of function.       Plan:     Patient to be seen 3 x/week to address the above listed problems via self-care/home management, therapeutic activities, therapeutic exercises  Plan of Care Expires: 12/25/23  Plan of Care Reviewed with: patient    Subjective     Pain/Comfort:  Pain Rating 1: 0/10    Objective:     Communicated with: RN prior to session.  Patient found supine with telemetry, escobar catheter upon OT entry to room.    General Precautions: Standard, contact, fall, seizure    Orthopedic Precautions:N/A  Braces: N/A  Respiratory Status: Room air     Occupational Performance:     Activities of Daily Living:  Grooming: completed oral hygiene with bed in chair position with set-up A or SPV.     Therapeutic Exercise:  Performed UE ex with theraband 1x10 of shoulder flex/ext, elbow flex/ext, chest pulls,  and horizontal abd/add.     Therapeutic Positioning    OT interventions performed during the course of today's session in an effort to prevent and/or reduce acquired pressure injuries:   Education was provided on benefits of and recommendations for therapeutic positioning       Patient Education:  Patient provided with verbal education education regarding OT role/goals/POC.  Understanding was verbalized.      Patient left supine with all lines intact and call button in reach    GOALS:   Multidisciplinary Problems       Occupational Therapy Goals          Problem: Occupational Therapy    Goal Priority Disciplines Outcome Interventions   Occupational Therapy Goal     OT, PT/OT Ongoing, Progressing    Description: Goals to be met by: 12/25/23     Patient will increase functional independence with ADLs by performing:  LTG: Pt will perform basic ADLs and ADL transfers with Modified independence using LRAD by discharge.    STG: to be met by 12/25/23    Pt will complete grooming sitting with  with SBA.  Pt will complete UB dressing with SBA.  Pt will complete LB dressing with mod assist using LRAD.  Pt will complete toileting with mod assist using LRAD.  Pt will complete functional mobility to/from bedside commode and toilet transfer with max assist using LRAD.                              Time Tracking:     OT Date of Treatment: 12/15/23  OT Start Time: 1114  OT Stop Time: 1128  OT Total Time (min): 14 min    Billable Minutes:Self Care/Home Management 14    OT/ARIANA: ARIANA     Number of ARIANA visits since last OT visit: 4    12/15/2023

## 2023-12-16 LAB
POCT GLUCOSE: 140 MG/DL (ref 70–110)
POCT GLUCOSE: 147 MG/DL (ref 70–110)
POCT GLUCOSE: 197 MG/DL (ref 70–110)
POCT GLUCOSE: 219 MG/DL (ref 70–110)
POCT GLUCOSE: 237 MG/DL (ref 70–110)

## 2023-12-16 PROCEDURE — 25000003 PHARM REV CODE 250: Performed by: NURSE PRACTITIONER

## 2023-12-16 PROCEDURE — 21400001 HC TELEMETRY ROOM

## 2023-12-16 PROCEDURE — 25000003 PHARM REV CODE 250: Performed by: INTERNAL MEDICINE

## 2023-12-16 PROCEDURE — 27000207 HC ISOLATION

## 2023-12-16 PROCEDURE — 63600175 PHARM REV CODE 636 W HCPCS: Performed by: INTERNAL MEDICINE

## 2023-12-16 PROCEDURE — 63600175 PHARM REV CODE 636 W HCPCS: Performed by: NURSE PRACTITIONER

## 2023-12-16 RX ADMIN — METOLAZONE 10 MG: 2.5 TABLET ORAL at 08:12

## 2023-12-16 RX ADMIN — NALOXEGOL OXALATE 25 MG: 25 TABLET, FILM COATED ORAL at 08:12

## 2023-12-16 RX ADMIN — NIFEDIPINE 90 MG: 30 TABLET, FILM COATED, EXTENDED RELEASE ORAL at 08:12

## 2023-12-16 RX ADMIN — INSULIN DETEMIR 16 UNITS: 100 INJECTION, SOLUTION SUBCUTANEOUS at 08:12

## 2023-12-16 RX ADMIN — MICONAZOLE NITRATE: 20 POWDER TOPICAL at 08:12

## 2023-12-16 RX ADMIN — TORSEMIDE 40 MG: 20 TABLET ORAL at 08:12

## 2023-12-16 RX ADMIN — DOXAZOSIN 2 MG: 1 TABLET ORAL at 08:12

## 2023-12-16 RX ADMIN — ENOXAPARIN SODIUM 40 MG: 40 INJECTION SUBCUTANEOUS at 04:12

## 2023-12-16 RX ADMIN — LEVETIRACETAM 500 MG: 500 TABLET, FILM COATED ORAL at 08:12

## 2023-12-16 RX ADMIN — DULOXETINE HYDROCHLORIDE 20 MG: 20 CAPSULE, DELAYED RELEASE ORAL at 08:12

## 2023-12-16 RX ADMIN — OXYCODONE HYDROCHLORIDE 10 MG: 5 TABLET ORAL at 02:12

## 2023-12-16 RX ADMIN — OXYCODONE HYDROCHLORIDE 10 MG: 5 TABLET ORAL at 08:12

## 2023-12-16 RX ADMIN — ERGOCALCIFEROL 50000 UNITS: 1.25 CAPSULE ORAL at 08:12

## 2023-12-16 RX ADMIN — Medication 400 MG: at 08:12

## 2023-12-16 RX ADMIN — ATENOLOL 50 MG: 50 TABLET ORAL at 08:12

## 2023-12-16 RX ADMIN — WHITE PETROLATUM: 1.75 OINTMENT TOPICAL at 08:12

## 2023-12-16 RX ADMIN — OXYCODONE HYDROCHLORIDE 10 MG: 5 TABLET ORAL at 06:12

## 2023-12-16 RX ADMIN — TORSEMIDE 40 MG: 20 TABLET ORAL at 04:12

## 2023-12-16 RX ADMIN — METHYLNALTREXONE BROMIDE 7.2 MG: 12 INJECTION, SOLUTION SUBCUTANEOUS at 10:12

## 2023-12-16 RX ADMIN — Medication: at 08:12

## 2023-12-16 RX ADMIN — PANCRELIPASE 3 CAPSULE: 24000; 76000; 120000 CAPSULE, DELAYED RELEASE PELLETS ORAL at 08:12

## 2023-12-16 RX ADMIN — HYDRALAZINE HYDROCHLORIDE 10 MG: 20 INJECTION, SOLUTION INTRAMUSCULAR; INTRAVENOUS at 03:12

## 2023-12-16 RX ADMIN — LEVETIRACETAM 500 MG: 500 TABLET, FILM COATED ORAL at 02:12

## 2023-12-16 RX ADMIN — OXYCODONE HYDROCHLORIDE 10 MG: 5 TABLET ORAL at 10:12

## 2023-12-16 RX ADMIN — INSULIN ASPART 2 UNITS: 100 INJECTION, SOLUTION INTRAVENOUS; SUBCUTANEOUS at 04:12

## 2023-12-16 RX ADMIN — PREGABALIN 75 MG: 50 CAPSULE ORAL at 08:12

## 2023-12-16 RX ADMIN — LABETALOL HYDROCHLORIDE 10 MG: 5 INJECTION, SOLUTION INTRAVENOUS at 12:12

## 2023-12-16 RX ADMIN — PANCRELIPASE 3 CAPSULE: 24000; 76000; 120000 CAPSULE, DELAYED RELEASE PELLETS ORAL at 02:12

## 2023-12-16 NOTE — PROGRESS NOTES
[facility] Hospital Medicine Progress Note        Chief Complaint: Inpatient Follow-up for Anasarca    HPI: 48 y.o. male who PMH includes CHF, chronic back pain wheelchair-bound, DM type 2, HTN, seizures, CVA, CKD stage 3, alcoholic liver disease with chronic pancreatitis, presents to the ED at [facility] on 11/25/2023 with a primary complaint of weakness, dizziness lightheadedness and fall out if his wheelchair striking his mouth; denies any LOC.  PT had back surgery years ago and reports has been wheelchair bound since then. No reports of seizures.  No CP, SOB, N/V/D, fever, chills cough congestion or any sick contacts. Labs reviewed demonstrated WBC 14.42, HH 12.2/37.2, sed rate 93 , CO2 18 Bun 25.7, Creat 2.31, glucose 52 Mg+ 1.0, , AST 36 reports of chest pain, CRP shortness a breath, nausea, vomiting, diarrhea5, fever., [id number], cough, congestion, or any sick, contacts. B no reportsP of chest pain, 109.7; other indicis unremarkable. CXR impression reviewed demonstrated  no acute cardiopulmonary abnormality. CT of head without contrast impression reviewed demonstrated no appreciable acute intracranial abnormality. Ct maxillofacial without contrast impression reviewed demonstrated no appreciate acute traumatic osseous abnormality, bilateral mastoid effusions. CT cervical spine without contrast impression reviewed demonstrated no appreciable acute osseous abnormality by CT evaluation, degenerative changes at the cervical spine. CT thoracic spine without contrast impression reviewed demonstrated no acute osseous abnormality. CT of lumbar spine without contrast impression reviewed demonstrated no appreciable fracture or acute osseous abnormality, severe degenerative change at L5-S1 progressed, endplate sclerosis disc space narrowing and osseous erosions which may be degenerative or infectious/inflammatory, anasarca.   Initial VS /105 P 75 R 13 T 98.7F O2 saturation 98%  on room air. Pt received multiple doses of pain medication , D50 for hypoglycemia, 40 mg lasix,  and magnesium rider in the ED. Neurosurgery services consulted. Pt awaiting MRI. Pt is admitted to hospital medicine services for further management.  Nephrology was consulted and patient was started on Lasix albumin drip.  Segovia was inserted due to possible outlet obstruction in the setting of anasarca.  MRI of lumbar spine was not helpful due to significant fluid collection.  Nephrology following.  Recommended to continue IV Lasix 60 mg t.i.d. and metolazone 10 mg daily for now and when patient gets closer to discharge switch to oral    Interval Hx:   Patient is laying in bed, again complaining of pain with constipation and wanting IV pain medication.  Explain to patient that opioid medications are not for constipation related pain.    Discussed will order Relistor   Discussed finding of his MRI lumbar and that Dr. Ramsay will make a decision on Tuesday   No family is at bedside  Case was discussed with patient's nurse on the floor    Objective/physical exam:  General: In no acute distress, afebrile, more truncal obesity, large neck circumference  Chest: Clear to auscultation bilaterally anteriorly  Heart: RRR, +S1, S2, no appreciable murmur  Abdomen: Soft, nontender, BS +  MSK: Warm,  edema improving, Left BKA  Neurologic:  Awake alert and oriented    VITAL SIGNS: 24 HRS MIN & MAX LAST   Temp  Min: 97.3 °F (36.3 °C)  Max: 97.9 °F (36.6 °C) 97.3 °F (36.3 °C)   BP  Min: 125/62  Max: 151/95 131/86   Pulse  Min: 59  Max: 69  69   Resp  Min: 16  Max: 20 16   SpO2  Min: 97 %  Max: 99 % 97 %     I have reviewed the following labs:  Recent Labs   Lab 12/13/23  0429 12/15/23  0456   WBC 10.52 10.32   RBC 3.26* 3.37*   HGB 9.5* 9.8*   HCT 28.9* 29.8*   MCV 88.7 88.4   MCH 29.1 29.1   MCHC 32.9* 32.9*   RDW 14.1 13.8    245   MPV 11.7* 11.8*       Recent Labs   Lab 12/12/23  0421 12/13/23  0429 12/15/23  0457     137 136   K 4.5 4.3 4.5   CO2 26 24 25   BUN 52.3* 61.7* 65.7*   CREATININE 2.29* 2.36* 2.26*   CALCIUM 7.4* 7.6* 7.8*   MG 1.70 1.70 1.70       Microbiology Results (last 7 days)       ** No results found for the last 168 hours. **           See below for Radiology    Scheduled Med:   atenoloL  50 mg Oral BID    doxazosin  2 mg Oral QHS    DULoxetine  20 mg Oral BID    enoxparin  40 mg Subcutaneous Daily    ergocalciferol  50,000 Units Oral Q72H    insulin detemir U-100  16 Units Subcutaneous BID    levETIRAcetam  500 mg Oral TID    lipase-protease-amylase 24,000-76,000-120,000 units  3 capsule Oral TID    magnesium oxide  400 mg Oral BID    metOLazone  10 mg Oral Daily    miconazole NITRATE 2 %   Topical (Top) BID    naloxegoL  25 mg Oral Daily    NIFEdipine  90 mg Oral Daily    perflutren lipid microspheres  1.4 mL Intravenous Once    pregabalin  75 mg Oral QHS    torsemide  40 mg Oral BID loop    white petrolatum   Topical (Top) BID    zinc oxide-cod liver oil   Topical (Top) BID      Continuous Infusions:     PRN Meds:  acetaminophen, aluminum-magnesium hydroxide-simethicone, dextrose 10%, dextrose 10%, glucagon (human recombinant), glucose, glucose, hydrALAZINE, insulin aspart U-100, labetaloL, naloxone, ondansetron, oxyCODONE, polyethylene glycol, simethicone, white petrolatum     Assessment/Plan:  Anasarca- improved  Acute on chronic kidney disease stage III, Nephrotic range proteinuria  Cirrhosis of liver  Acute on chronic lower back pain- intractable--> severe canal stenosis at L5-S1 and mild canal stenosis at L4-L5 per MRI on 12/14  Dizziness, Fall- from wheel chair  Hypoglycemia in  T2 DM-  A1c 5.5- resolved  Hypertensive urgency at admission-resolving  ESBL E coli in urine- possibly asymptomatic bacteriuria  Hypomagnesemia- replaced  HX: Wheelchair-bound, history of CVA with residual left-sided deficits, left BKA, chronic alcoholic liver disease, chronic seizures  Moderate Malnutrition      Nephrology  followed.  Continue torsemide 40 mg p.o. b.i.d. and metolazone 10 mg daily  Monitor electrolytes and replete, keep potassium greater than 4 and magnesium greater than 2  Patient complained of intractable acute on chronic back pain, Neurosurgery evaluated the patient, MRI lumbar done on 12/14-large disc protrusion at L5-S1 with severe narrowing of the canal, mild narrowing of the spinal canal at L4-L5  Reached out to Neurosurgery, informed Dr. Ramsay will review it on Tuesday and make a decision then  Patient again complains of severe pain due to constipation, ordered Relistor x1  Analgesics p.r.n. p.o. pain medication.  Patient again asking for IV Dilaudid, IV morphine or p.o. Dilaudid.  Informed patient that are not indicated  Hypoglycemia resolved, now hyperglycemia with blood glucose greater than 250.  Increase Levemir to 16 units b.i.d.  Monitor blood sugars, currently on sliding scale and long-acting insulin at night.   Continue diabetic cardiac diet  Therapy services following, recommended moderate intensity, awaiting Neurosurgery recommendations  Case management on board for SNF, awaiting Neurosurgery recommendation  Continue supportive care, appropriate home medications  Fall precautions, decubitus precautions  Repeat labs Monday    VTE prophylaxis:  Lovenox    Anticipated discharge and Disposition:  SNF, pending neurosurgery recommendations     All diagnosis and differential diagnosis have been reviewed; assessment and plan has been documented; I have personally reviewed the labs and test results that are presently available; I have reviewed the patients medication list; I have reviewed the consulting providers response and recommendations. I have reviewed or attempted to review medical records based upon their availability    All of the patient's questions have been  addressed and answered. Patient's is agreeable to the above stated plan. I will continue to monitor closely and make adjustments to medical  management as needed.  _____________________________________________________________________    Nutrition Status:  Patient meets ASPEN criteria for moderate malnutrition of chronic illness per RD assessment as evidenced by:  Energy Intake (Malnutrition): other (see comments) (does not meet criteria)  Weight Loss (Malnutrition): other (see comments) (does not meet criteria)     Muscle Mass (Malnutrition): mild depletion  Fluid Accumulation (Malnutrition): severe        A minimum of two characteristics is recommended for diagnosis of either severe or non-severe malnutrition.    Radiology:  I have personally reviewed the following imaging and agree with the radiologist.     MRI Lumbar Spine Without Contrast  Narrative: EXAMINATION:  MRI LUMBAR SPINE WITHOUT CONTRAST    CLINICAL HISTORY:  Low back pain;    TECHNIQUE:  Multiplanar multisequence MR images of the lumbar spine are obtained without contrast.    COMPARISON:  CT lumbar spine dated 11/25/2023, MRI lumbar spine dated 01/16/2021    FINDINGS:  Evaluation is limited by artifact and motion.  There is grade 1 retrolisthesis of L5 over S1.  The vertebral body heights are maintained.  There is suspected mild endplate edema at L5-S1, nonspecific.  There are postoperative changes of prior partial left-sided L5-S1 laminectomy.    The conus terminates at the level of L1.    At L4-5, there is disc bulge, facet hypertrophy and mild narrowing of the spinal canal.  There is mild-to-moderate left neural foraminal narrowing    At L5-S1, there is grade 1 retrolisthesis of L5 with suspected large central disc protrusion measuring 9 mm in AP dimension and facet hypertrophy with severe narrowing of the canal and impingement on the descending bilateral S1 nerve roots, left greater than right.  There is moderate to severe bilateral foraminal stenosis.    There is subcutaneous edema in the posterior paraspinal soft tissues.  There is no definite visible drainable fluid  collection.  Impression: 1. Suspected large disc protrusion at L5-S1 with severe narrowing of the canal.  2. Mild narrowing of the spinal canal at L4-5.  3. Multilevel neural foraminal stenoses as described.    Electronically signed by: Mirela Granados  Date:    12/14/2023  Time:    15:01    Kyrie Bro MD  Department of Hospital Medicine   [facility]   12/16/2023

## 2023-12-17 LAB
POCT GLUCOSE: 152 MG/DL (ref 70–110)
POCT GLUCOSE: 157 MG/DL (ref 70–110)
POCT GLUCOSE: 183 MG/DL (ref 70–110)
POCT GLUCOSE: 53 MG/DL (ref 70–110)

## 2023-12-17 PROCEDURE — 25000003 PHARM REV CODE 250: Performed by: INTERNAL MEDICINE

## 2023-12-17 PROCEDURE — 63600175 PHARM REV CODE 636 W HCPCS: Performed by: INTERNAL MEDICINE

## 2023-12-17 PROCEDURE — 25000003 PHARM REV CODE 250: Performed by: NURSE PRACTITIONER

## 2023-12-17 PROCEDURE — 21400001 HC TELEMETRY ROOM

## 2023-12-17 PROCEDURE — 27000207 HC ISOLATION

## 2023-12-17 PROCEDURE — 63600175 PHARM REV CODE 636 W HCPCS: Performed by: NURSE PRACTITIONER

## 2023-12-17 RX ORDER — POLYETHYLENE GLYCOL 3350 17 G/17G
17 POWDER, FOR SOLUTION ORAL 2 TIMES DAILY
Status: DISCONTINUED | OUTPATIENT
Start: 2023-12-17 | End: 2023-12-22 | Stop reason: HOSPADM

## 2023-12-17 RX ORDER — DICYCLOMINE HYDROCHLORIDE 10 MG/1
10 CAPSULE ORAL 3 TIMES DAILY PRN
Status: DISPENSED | OUTPATIENT
Start: 2023-12-17 | End: 2023-12-19

## 2023-12-17 RX ADMIN — OXYCODONE HYDROCHLORIDE 10 MG: 5 TABLET ORAL at 01:12

## 2023-12-17 RX ADMIN — WHITE PETROLATUM: 1.75 OINTMENT TOPICAL at 09:12

## 2023-12-17 RX ADMIN — Medication 16 G: at 11:12

## 2023-12-17 RX ADMIN — LEVETIRACETAM 500 MG: 500 TABLET, FILM COATED ORAL at 09:12

## 2023-12-17 RX ADMIN — TORSEMIDE 40 MG: 20 TABLET ORAL at 09:12

## 2023-12-17 RX ADMIN — PREGABALIN 75 MG: 50 CAPSULE ORAL at 09:12

## 2023-12-17 RX ADMIN — METOLAZONE 10 MG: 2.5 TABLET ORAL at 09:12

## 2023-12-17 RX ADMIN — OXYCODONE HYDROCHLORIDE 10 MG: 5 TABLET ORAL at 09:12

## 2023-12-17 RX ADMIN — Medication: at 09:12

## 2023-12-17 RX ADMIN — MICONAZOLE NITRATE: 20 POWDER TOPICAL at 09:12

## 2023-12-17 RX ADMIN — Medication 400 MG: at 09:12

## 2023-12-17 RX ADMIN — TORSEMIDE 40 MG: 20 TABLET ORAL at 05:12

## 2023-12-17 RX ADMIN — OXYCODONE HYDROCHLORIDE 10 MG: 5 TABLET ORAL at 05:12

## 2023-12-17 RX ADMIN — INSULIN DETEMIR 16 UNITS: 100 INJECTION, SOLUTION SUBCUTANEOUS at 09:12

## 2023-12-17 RX ADMIN — PANCRELIPASE 3 CAPSULE: 24000; 76000; 120000 CAPSULE, DELAYED RELEASE PELLETS ORAL at 03:12

## 2023-12-17 RX ADMIN — DULOXETINE HYDROCHLORIDE 20 MG: 20 CAPSULE, DELAYED RELEASE ORAL at 09:12

## 2023-12-17 RX ADMIN — ENOXAPARIN SODIUM 40 MG: 40 INJECTION SUBCUTANEOUS at 05:12

## 2023-12-17 RX ADMIN — POLYETHYLENE GLYCOL 3350 17 G: 17 POWDER, FOR SOLUTION ORAL at 12:12

## 2023-12-17 RX ADMIN — PANCRELIPASE 3 CAPSULE: 24000; 76000; 120000 CAPSULE, DELAYED RELEASE PELLETS ORAL at 09:12

## 2023-12-17 RX ADMIN — LEVETIRACETAM 500 MG: 500 TABLET, FILM COATED ORAL at 03:12

## 2023-12-17 RX ADMIN — ATENOLOL 50 MG: 50 TABLET ORAL at 09:12

## 2023-12-17 RX ADMIN — NALOXEGOL OXALATE 25 MG: 25 TABLET, FILM COATED ORAL at 09:12

## 2023-12-17 RX ADMIN — POLYETHYLENE GLYCOL 3350 17 G: 17 POWDER, FOR SOLUTION ORAL at 09:12

## 2023-12-17 RX ADMIN — DOXAZOSIN 2 MG: 1 TABLET ORAL at 09:12

## 2023-12-17 RX ADMIN — OXYCODONE HYDROCHLORIDE 10 MG: 5 TABLET ORAL at 12:12

## 2023-12-17 RX ADMIN — DICYCLOMINE HYDROCHLORIDE 10 MG: 10 CAPSULE ORAL at 01:12

## 2023-12-17 RX ADMIN — NIFEDIPINE 90 MG: 30 TABLET, FILM COATED, EXTENDED RELEASE ORAL at 09:12

## 2023-12-17 NOTE — PROGRESS NOTES
[facility] Hospital Medicine Progress Note        Chief Complaint: Inpatient Follow-up for Anasarca    HPI: 48 y.o. male who PMH includes CHF, chronic back pain wheelchair-bound, DM type 2, HTN, seizures, CVA, CKD stage 3, alcoholic liver disease with chronic pancreatitis, presents to the ED at [facility] on 11/25/2023 with a primary complaint of weakness, dizziness lightheadedness and fall out if his wheelchair striking his mouth; denies any LOC.  PT had back surgery years ago and reports has been wheelchair bound since then. No reports of seizures.  No CP, SOB, N/V/D, fever, chills cough congestion or any sick contacts. Labs reviewed demonstrated WBC 14.42, HH 12.2/37.2, sed rate 93 , CO2 18 Bun 25.7, Creat 2.31, glucose 52 Mg+ 1.0, , AST 36 reports of chest pain, CRP shortness a breath, nausea, vomiting, diarrhea5, fever., [id number], cough, congestion, or any sick, contacts. B no reportsP of chest pain, 109.7; other indicis unremarkable. CXR impression reviewed demonstrated  no acute cardiopulmonary abnormality. CT of head without contrast impression reviewed demonstrated no appreciable acute intracranial abnormality. Ct maxillofacial without contrast impression reviewed demonstrated no appreciate acute traumatic osseous abnormality, bilateral mastoid effusions. CT cervical spine without contrast impression reviewed demonstrated no appreciable acute osseous abnormality by CT evaluation, degenerative changes at the cervical spine. CT thoracic spine without contrast impression reviewed demonstrated no acute osseous abnormality. CT of lumbar spine without contrast impression reviewed demonstrated no appreciable fracture or acute osseous abnormality, severe degenerative change at L5-S1 progressed, endplate sclerosis disc space narrowing and osseous erosions which may be degenerative or infectious/inflammatory, anasarca.   Initial VS /105 P 75 R 13 T 98.7F O2 saturation 98%  on room air. Pt received multiple doses of pain medication , D50 for hypoglycemia, 40 mg lasix,  and magnesium rider in the ED. Neurosurgery services consulted. Pt awaiting MRI. Pt is admitted to hospital medicine services for further management.  Nephrology was consulted and patient was started on Lasix albumin drip.  Segovia was inserted due to possible outlet obstruction in the setting of anasarca.  MRI of lumbar spine was not helpful due to significant fluid collection.  Nephrology following.  Recommended to continue IV Lasix 60 mg t.i.d. and metolazone 10 mg daily for now and when patient gets closer to discharge switch to oral    Interval Hx:   Patient is laying in bed, again complaining of of abdominal pain and asking for pain medication.  I asked patient if his constipation has resolved, he stated he had bowel movement but he thinks he needs to go more.  Informed patient again that for constipation pain we do not give IV pain medications.  Discussed will start him on MiraLax b.i.d. to prevent further constipation  Later patient reported to nurse that his upper and lower abdomen are cramping.  Ordered Bentyl No family is at bedside  Case was discussed with patient's nurse on the floor    Objective/physical exam:  General: In no acute distress, afebrile, more truncal obesity, large neck circumference  Chest: Clear to auscultation bilaterally anteriorly  Heart: RRR, +S1, S2, no appreciable murmur  Abdomen: Soft, nontender, BS +  MSK: Warm,  edema improving, Left BKA  Neurologic:  Awake alert and oriented    VITAL SIGNS: 24 HRS MIN & MAX LAST   Temp  Min: 97.8 °F (36.6 °C)  Max: 98.1 °F (36.7 °C) 97.8 °F (36.6 °C)   BP  Min: 145/83  Max: 171/93 (!) 153/83   Pulse  Min: 67  Max: 82  82   Resp  Min: 16  Max: 22 16   SpO2  Min: 95 %  Max: 99 % 99 %     I have reviewed the following labs:  Recent Labs   Lab 12/13/23  0429 12/15/23  0456   WBC 10.52 10.32   RBC 3.26* 3.37*   HGB 9.5* 9.8*   HCT 28.9* 29.8*   MCV 88.7  88.4   MCH 29.1 29.1   MCHC 32.9* 32.9*   RDW 14.1 13.8    245   MPV 11.7* 11.8*       Recent Labs   Lab 12/12/23  0421 12/13/23  0429 12/15/23  0457    137 136   K 4.5 4.3 4.5   CO2 26 24 25   BUN 52.3* 61.7* 65.7*   CREATININE 2.29* 2.36* 2.26*   CALCIUM 7.4* 7.6* 7.8*   MG 1.70 1.70 1.70       Microbiology Results (last 7 days)       ** No results found for the last 168 hours. **           See below for Radiology    Scheduled Med:   atenoloL  50 mg Oral BID    doxazosin  2 mg Oral QHS    DULoxetine  20 mg Oral BID    enoxparin  40 mg Subcutaneous Daily    ergocalciferol  50,000 Units Oral Q72H    insulin detemir U-100  16 Units Subcutaneous BID    levETIRAcetam  500 mg Oral TID    lipase-protease-amylase 24,000-76,000-120,000 units  3 capsule Oral TID    magnesium oxide  400 mg Oral BID    metOLazone  10 mg Oral Daily    miconazole NITRATE 2 %   Topical (Top) BID    naloxegoL  25 mg Oral Daily    NIFEdipine  90 mg Oral Daily    perflutren lipid microspheres  1.4 mL Intravenous Once    pregabalin  75 mg Oral QHS    torsemide  40 mg Oral BID loop    white petrolatum   Topical (Top) BID    zinc oxide-cod liver oil   Topical (Top) BID      Continuous Infusions:     PRN Meds:  acetaminophen, aluminum-magnesium hydroxide-simethicone, dextrose 10%, dextrose 10%, glucagon (human recombinant), glucose, glucose, hydrALAZINE, insulin aspart U-100, labetaloL, naloxone, ondansetron, oxyCODONE, polyethylene glycol, simethicone, white petrolatum     Assessment/Plan:  Anasarca- improved  Acute on chronic kidney disease stage III, Nephrotic range proteinuria  Cirrhosis of liver  Acute on chronic lower back pain- intractable--> severe canal stenosis at L5-S1 and mild canal stenosis at L4-L5 per MRI on 12/14  Dizziness, Fall- from wheel chair  Hypoglycemia in  T2 DM-  A1c 5.5- resolved  Hypertensive urgency at admission-resolving  ESBL E coli in urine- possibly asymptomatic bacteriuria  Hypomagnesemia- replaced  HX:  Wheelchair-bound, history of CVA with residual left-sided deficits, left BKA, chronic alcoholic liver disease, chronic seizures  Moderate Malnutrition      Nephrology followed.  Continue torsemide 40 mg p.o. b.i.d. and metolazone 10 mg daily  Monitor electrolytes and replete, keep potassium greater than 4 and magnesium greater than 2  Patient complained of intractable acute on chronic back pain, Neurosurgery evaluated the patient, MRI lumbar done on 12/14-large disc protrusion at L5-S1 with severe narrowing of the canal, mild narrowing of the spinal canal at L4-L5  Reached out to Neurosurgery, informed Dr. Ramsay will review it on Tuesday and make a decision then  Constipation resolved with Relistor x1, start MiraLax b.i.d.  Complained of abdominal cramping, ordered Bentyl  Analgesics p.r.n. p.o. pain medication.  Patient again asking for IV Dilaudid, IV morphine or p.o. Dilaudid.  Informed patient that are not indicated  Hypoglycemia resolved, now hyperglycemia with blood glucose greater than 250.  Increase Levemir to 16 units b.i.d.  Monitor blood sugars, currently on sliding scale and long-acting insulin at night.   Continue diabetic cardiac diet  Therapy services following, recommended moderate intensity, awaiting Neurosurgery recommendations  Case management on board for SNF, awaiting Neurosurgery recommendation  Continue supportive care, appropriate home medications  Fall precautions, decubitus precautions  Morning CBC, renal functions, mag ordered      VTE prophylaxis:  Lovenox    Anticipated discharge and Disposition:  SNF, pending neurosurgery recommendations     All diagnosis and differential diagnosis have been reviewed; assessment and plan has been documented; I have personally reviewed the labs and test results that are presently available; I have reviewed the patients medication list; I have reviewed the consulting providers response and recommendations. I have reviewed or attempted to review medical  records based upon their availability    All of the patient's questions have been  addressed and answered. Patient's is agreeable to the above stated plan. I will continue to monitor closely and make adjustments to medical management as needed.  _____________________________________________________________________    Nutrition Status:  Patient meets ASPEN criteria for moderate malnutrition of chronic illness per RD assessment as evidenced by:  Energy Intake (Malnutrition): other (see comments) (does not meet criteria)  Weight Loss (Malnutrition): other (see comments) (does not meet criteria)     Muscle Mass (Malnutrition): mild depletion  Fluid Accumulation (Malnutrition): severe        A minimum of two characteristics is recommended for diagnosis of either severe or non-severe malnutrition.    Radiology:  I have personally reviewed the following imaging and agree with the radiologist.     MRI Lumbar Spine Without Contrast  Narrative: EXAMINATION:  MRI LUMBAR SPINE WITHOUT CONTRAST    CLINICAL HISTORY:  Low back pain;    TECHNIQUE:  Multiplanar multisequence MR images of the lumbar spine are obtained without contrast.    COMPARISON:  CT lumbar spine dated 11/25/2023, MRI lumbar spine dated 01/16/2021    FINDINGS:  Evaluation is limited by artifact and motion.  There is grade 1 retrolisthesis of L5 over S1.  The vertebral body heights are maintained.  There is suspected mild endplate edema at L5-S1, nonspecific.  There are postoperative changes of prior partial left-sided L5-S1 laminectomy.    The conus terminates at the level of L1.    At L4-5, there is disc bulge, facet hypertrophy and mild narrowing of the spinal canal.  There is mild-to-moderate left neural foraminal narrowing    At L5-S1, there is grade 1 retrolisthesis of L5 with suspected large central disc protrusion measuring 9 mm in AP dimension and facet hypertrophy with severe narrowing of the canal and impingement on the descending bilateral S1 nerve  roots, left greater than right.  There is moderate to severe bilateral foraminal stenosis.    There is subcutaneous edema in the posterior paraspinal soft tissues.  There is no definite visible drainable fluid collection.  Impression: 1. Suspected large disc protrusion at L5-S1 with severe narrowing of the canal.  2. Mild narrowing of the spinal canal at L4-5.  3. Multilevel neural foraminal stenoses as described.    Electronically signed by: Mirela Granados  Date:    12/14/2023  Time:    15:01    Kyrie Bro MD  Department of Hospital Medicine   [facility]   12/17/2023

## 2023-12-18 LAB
ALBUMIN SERPL-MCNC: 1.8 G/DL (ref 3.5–5)
BASOPHILS # BLD AUTO: 0.06 X10(3)/MCL
BASOPHILS NFR BLD AUTO: 0.7 %
BUN SERPL-MCNC: 69.8 MG/DL (ref 8.9–20.6)
CALCIUM SERPL-MCNC: 7.8 MG/DL (ref 8.4–10.2)
CHLORIDE SERPL-SCNC: 104 MMOL/L (ref 98–107)
CO2 SERPL-SCNC: 25 MMOL/L (ref 22–29)
CREAT SERPL-MCNC: 2.4 MG/DL (ref 0.73–1.18)
EOSINOPHIL # BLD AUTO: 0.27 X10(3)/MCL (ref 0–0.9)
EOSINOPHIL NFR BLD AUTO: 3 %
ERYTHROCYTE [DISTWIDTH] IN BLOOD BY AUTOMATED COUNT: 13.6 % (ref 11.5–17)
GFR SERPLBLD CREATININE-BSD FMLA CKD-EPI: 33 MLS/MIN/1.73/M2
GLUCOSE SERPL-MCNC: 282 MG/DL (ref 74–100)
HCT VFR BLD AUTO: 28.1 % (ref 42–52)
HGB BLD-MCNC: 9.1 G/DL (ref 14–18)
IMM GRANULOCYTES # BLD AUTO: 0.03 X10(3)/MCL (ref 0–0.04)
IMM GRANULOCYTES NFR BLD AUTO: 0.3 %
LYMPHOCYTES # BLD AUTO: 2.84 X10(3)/MCL (ref 0.6–4.6)
LYMPHOCYTES NFR BLD AUTO: 32 %
MAGNESIUM SERPL-MCNC: 1.8 MG/DL (ref 1.6–2.6)
MCH RBC QN AUTO: 28.9 PG (ref 27–31)
MCHC RBC AUTO-ENTMCNC: 32.4 G/DL (ref 33–36)
MCV RBC AUTO: 89.2 FL (ref 80–94)
MONOCYTES # BLD AUTO: 0.76 X10(3)/MCL (ref 0.1–1.3)
MONOCYTES NFR BLD AUTO: 8.6 %
NEUTROPHILS # BLD AUTO: 4.92 X10(3)/MCL (ref 2.1–9.2)
NEUTROPHILS NFR BLD AUTO: 55.4 %
NRBC BLD AUTO-RTO: 0 %
PHOSPHATE SERPL-MCNC: 5.1 MG/DL (ref 2.3–4.7)
PLATELET # BLD AUTO: 239 X10(3)/MCL (ref 130–400)
PMV BLD AUTO: 12.3 FL (ref 7.4–10.4)
POCT GLUCOSE: 157 MG/DL (ref 70–110)
POCT GLUCOSE: 192 MG/DL (ref 70–110)
POCT GLUCOSE: 261 MG/DL (ref 70–110)
POCT GLUCOSE: 295 MG/DL (ref 70–110)
POTASSIUM SERPL-SCNC: 4.3 MMOL/L (ref 3.5–5.1)
RBC # BLD AUTO: 3.15 X10(6)/MCL (ref 4.7–6.1)
SODIUM SERPL-SCNC: 137 MMOL/L (ref 136–145)
WBC # SPEC AUTO: 8.88 X10(3)/MCL (ref 4.5–11.5)

## 2023-12-18 PROCEDURE — 25000003 PHARM REV CODE 250: Performed by: NURSE PRACTITIONER

## 2023-12-18 PROCEDURE — 83735 ASSAY OF MAGNESIUM: CPT | Performed by: INTERNAL MEDICINE

## 2023-12-18 PROCEDURE — 25000003 PHARM REV CODE 250: Performed by: INTERNAL MEDICINE

## 2023-12-18 PROCEDURE — 27000207 HC ISOLATION

## 2023-12-18 PROCEDURE — 21400001 HC TELEMETRY ROOM

## 2023-12-18 PROCEDURE — 63600175 PHARM REV CODE 636 W HCPCS: Performed by: INTERNAL MEDICINE

## 2023-12-18 PROCEDURE — 80069 RENAL FUNCTION PANEL: CPT | Performed by: STUDENT IN AN ORGANIZED HEALTH CARE EDUCATION/TRAINING PROGRAM

## 2023-12-18 PROCEDURE — 85025 COMPLETE CBC W/AUTO DIFF WBC: CPT | Performed by: STUDENT IN AN ORGANIZED HEALTH CARE EDUCATION/TRAINING PROGRAM

## 2023-12-18 PROCEDURE — 63600175 PHARM REV CODE 636 W HCPCS: Performed by: NURSE PRACTITIONER

## 2023-12-18 RX ORDER — METOLAZONE 2.5 MG/1
5 TABLET ORAL DAILY
Status: DISCONTINUED | OUTPATIENT
Start: 2023-12-19 | End: 2023-12-19

## 2023-12-18 RX ADMIN — DICYCLOMINE HYDROCHLORIDE 10 MG: 10 CAPSULE ORAL at 11:12

## 2023-12-18 RX ADMIN — ATENOLOL 50 MG: 50 TABLET ORAL at 09:12

## 2023-12-18 RX ADMIN — Medication 16 G: at 05:12

## 2023-12-18 RX ADMIN — DULOXETINE HYDROCHLORIDE 20 MG: 20 CAPSULE, DELAYED RELEASE ORAL at 09:12

## 2023-12-18 RX ADMIN — PANCRELIPASE 3 CAPSULE: 24000; 76000; 120000 CAPSULE, DELAYED RELEASE PELLETS ORAL at 09:12

## 2023-12-18 RX ADMIN — INSULIN DETEMIR 16 UNITS: 100 INJECTION, SOLUTION SUBCUTANEOUS at 11:12

## 2023-12-18 RX ADMIN — NALOXEGOL OXALATE 25 MG: 25 TABLET, FILM COATED ORAL at 09:12

## 2023-12-18 RX ADMIN — PANCRELIPASE 3 CAPSULE: 24000; 76000; 120000 CAPSULE, DELAYED RELEASE PELLETS ORAL at 03:12

## 2023-12-18 RX ADMIN — METOLAZONE 10 MG: 2.5 TABLET ORAL at 09:12

## 2023-12-18 RX ADMIN — PREGABALIN 75 MG: 50 CAPSULE ORAL at 09:12

## 2023-12-18 RX ADMIN — INSULIN ASPART 1 UNITS: 100 INJECTION, SOLUTION INTRAVENOUS; SUBCUTANEOUS at 11:12

## 2023-12-18 RX ADMIN — INSULIN DETEMIR 16 UNITS: 100 INJECTION, SOLUTION SUBCUTANEOUS at 09:12

## 2023-12-18 RX ADMIN — LEVETIRACETAM 500 MG: 500 TABLET, FILM COATED ORAL at 09:12

## 2023-12-18 RX ADMIN — OXYCODONE HYDROCHLORIDE 10 MG: 5 TABLET ORAL at 01:12

## 2023-12-18 RX ADMIN — WHITE PETROLATUM: 1.75 OINTMENT TOPICAL at 10:12

## 2023-12-18 RX ADMIN — POLYETHYLENE GLYCOL 3350 17 G: 17 POWDER, FOR SOLUTION ORAL at 09:12

## 2023-12-18 RX ADMIN — INSULIN ASPART 3 UNITS: 100 INJECTION, SOLUTION INTRAVENOUS; SUBCUTANEOUS at 04:12

## 2023-12-18 RX ADMIN — POLYETHYLENE GLYCOL 3350 17 G: 17 POWDER, FOR SOLUTION ORAL at 03:12

## 2023-12-18 RX ADMIN — MICONAZOLE NITRATE: 20 POWDER TOPICAL at 10:12

## 2023-12-18 RX ADMIN — Medication 400 MG: at 09:12

## 2023-12-18 RX ADMIN — MICONAZOLE NITRATE: 20 POWDER TOPICAL at 09:12

## 2023-12-18 RX ADMIN — OXYCODONE HYDROCHLORIDE 10 MG: 5 TABLET ORAL at 06:12

## 2023-12-18 RX ADMIN — DOXAZOSIN 2 MG: 1 TABLET ORAL at 09:12

## 2023-12-18 RX ADMIN — ENOXAPARIN SODIUM 40 MG: 40 INJECTION SUBCUTANEOUS at 05:12

## 2023-12-18 RX ADMIN — LEVETIRACETAM 500 MG: 500 TABLET, FILM COATED ORAL at 03:12

## 2023-12-18 RX ADMIN — WHITE PETROLATUM: 1.75 OINTMENT TOPICAL at 09:12

## 2023-12-18 RX ADMIN — Medication: at 09:12

## 2023-12-18 RX ADMIN — Medication: at 10:12

## 2023-12-18 RX ADMIN — OXYCODONE HYDROCHLORIDE 10 MG: 5 TABLET ORAL at 10:12

## 2023-12-18 RX ADMIN — OXYCODONE HYDROCHLORIDE 10 MG: 5 TABLET ORAL at 03:12

## 2023-12-18 RX ADMIN — OXYCODONE HYDROCHLORIDE 10 MG: 5 TABLET ORAL at 11:12

## 2023-12-18 RX ADMIN — TORSEMIDE 40 MG: 20 TABLET ORAL at 09:12

## 2023-12-18 RX ADMIN — TORSEMIDE 40 MG: 20 TABLET ORAL at 05:12

## 2023-12-18 RX ADMIN — NIFEDIPINE 90 MG: 30 TABLET, FILM COATED, EXTENDED RELEASE ORAL at 09:12

## 2023-12-18 NOTE — PROGRESS NOTES
[facility] Hospital Medicine Progress Note        Chief Complaint: Inpatient Follow-up for Anasarca    HPI: 48 y.o. male who PMH includes CHF, chronic back pain wheelchair-bound, DM type 2, HTN, seizures, CVA, CKD stage 3, alcoholic liver disease with chronic pancreatitis, presents to the ED at [facility] on 11/25/2023 with a primary complaint of weakness, dizziness lightheadedness and fall out if his wheelchair striking his mouth; denies any LOC.  PT had back surgery years ago and reports has been wheelchair bound since then. No reports of seizures.  No CP, SOB, N/V/D, fever, chills cough congestion or any sick contacts. Labs reviewed demonstrated WBC 14.42, HH 12.2/37.2, sed rate 93 , CO2 18 Bun 25.7, Creat 2.31, glucose 52 Mg+ 1.0, , AST 36 reports of chest pain, CRP shortness a breath, nausea, vomiting, diarrhea5, fever., [id number], cough, congestion, or any sick, contacts. B no reportsP of chest pain, 109.7; other indicis unremarkable. CXR impression reviewed demonstrated  no acute cardiopulmonary abnormality. CT of head without contrast impression reviewed demonstrated no appreciable acute intracranial abnormality. Ct maxillofacial without contrast impression reviewed demonstrated no appreciate acute traumatic osseous abnormality, bilateral mastoid effusions. CT cervical spine without contrast impression reviewed demonstrated no appreciable acute osseous abnormality by CT evaluation, degenerative changes at the cervical spine. CT thoracic spine without contrast impression reviewed demonstrated no acute osseous abnormality. CT of lumbar spine without contrast impression reviewed demonstrated no appreciable fracture or acute osseous abnormality, severe degenerative change at L5-S1 progressed, endplate sclerosis disc space narrowing and osseous erosions which may be degenerative or infectious/inflammatory, anasarca.   Initial VS /105 P 75 R 13 T 98.7F O2 saturation 98%  on room air. Pt received multiple doses of pain medication , D50 for hypoglycemia, 40 mg lasix,  and magnesium rider in the ED. Neurosurgery services consulted. Pt awaiting MRI. Pt is admitted to hospital medicine services for further management.  Nephrology was consulted and patient was started on Lasix albumin drip.  Segovia was inserted due to possible outlet obstruction in the setting of anasarca.  MRI of lumbar spine was not helpful due to significant fluid collection.  Nephrology following.  Recommended to continue IV Lasix 60 mg t.i.d. and metolazone 10 mg daily for now and when patient gets closer to discharge switch to oral    Interval Hx:   Patient is laying in bed, reports his abdominal pain was resolved with bentyl.  No more constipation.    Inquired about his surgical plan, informed awaiting Dr. Devendra byrnes and recommendation   No family is at bedside   Case was discussed with patient's nurse on the floor    Objective/physical exam:  General: In no acute distress, afebrile, more truncal obesity, large neck circumference  Chest: Clear to auscultation bilaterally anteriorly  Heart: RRR, +S1, S2, no appreciable murmur  Abdomen: Soft, nontender, BS +  MSK: Warm,  edema improving, Left BKA  Neurologic:  Awake alert and oriented    VITAL SIGNS: 24 HRS MIN & MAX LAST   Temp  Min: 97.3 °F (36.3 °C)  Max: 98.4 °F (36.9 °C) 97.8 °F (36.6 °C)   BP  Min: 132/76  Max: 174/77 136/83   Pulse  Min: 58  Max: 73  (!) 58   Resp  Min: 16  Max: 18 16   SpO2  Min: 97 %  Max: 99 % 98 %     I have reviewed the following labs:  Recent Labs   Lab 12/13/23  0429 12/15/23  0456 12/18/23  0448   WBC 10.52 10.32 8.88   RBC 3.26* 3.37* 3.15*   HGB 9.5* 9.8* 9.1*   HCT 28.9* 29.8* 28.1*   MCV 88.7 88.4 89.2   MCH 29.1 29.1 28.9   MCHC 32.9* 32.9* 32.4*   RDW 14.1 13.8 13.6    245 239   MPV 11.7* 11.8* 12.3*       Recent Labs   Lab 12/13/23  0429 12/15/23  0457 12/18/23  0448    136 137   K 4.3 4.5 4.3   CO2 24 25 25   BUN  61.7* 65.7* 69.8*   CREATININE 2.36* 2.26* 2.40*   CALCIUM 7.6* 7.8* 7.8*   MG 1.70 1.70 1.80   ALBUMIN  --   --  1.8*       Microbiology Results (last 7 days)       ** No results found for the last 168 hours. **           See below for Radiology    Scheduled Med:   atenoloL  50 mg Oral BID    doxazosin  2 mg Oral QHS    DULoxetine  20 mg Oral BID    enoxparin  40 mg Subcutaneous Daily    ergocalciferol  50,000 Units Oral Q72H    insulin detemir U-100  16 Units Subcutaneous BID    levETIRAcetam  500 mg Oral TID    lipase-protease-amylase 24,000-76,000-120,000 units  3 capsule Oral TID    magnesium oxide  400 mg Oral BID    metOLazone  10 mg Oral Daily    miconazole NITRATE 2 %   Topical (Top) BID    naloxegoL  25 mg Oral Daily    NIFEdipine  90 mg Oral Daily    perflutren lipid microspheres  1.4 mL Intravenous Once    polyethylene glycol  17 g Oral BID    pregabalin  75 mg Oral QHS    torsemide  40 mg Oral BID loop    white petrolatum   Topical (Top) BID    zinc oxide-cod liver oil   Topical (Top) BID      Continuous Infusions:     PRN Meds:  acetaminophen, aluminum-magnesium hydroxide-simethicone, dextrose 10%, dextrose 10%, dicyclomine, glucagon (human recombinant), glucose, glucose, hydrALAZINE, insulin aspart U-100, labetaloL, naloxone, ondansetron, oxyCODONE, simethicone, white petrolatum     Assessment/Plan:  Anasarca- improved  Acute on chronic kidney disease stage III, Nephrotic range proteinuria  Cirrhosis of liver  Acute on chronic lower back pain- intractable--> severe canal stenosis at L5-S1 and mild canal stenosis at L4-L5 per MRI on 12/14  Dizziness, Fall- from wheel chair  Hypoglycemia in  T2 DM-  A1c 5.5- resolved  Hypertensive urgency at admission-resolving  ESBL E coli in urine- possibly asymptomatic bacteriuria  Hypomagnesemia- replaced  HX: Wheelchair-bound, history of CVA with residual left-sided deficits, left BKA, chronic alcoholic liver disease, chronic seizures  Moderate  Malnutrition      Nephrology followed.  Continue torsemide 40 mg p.o. b.i.d. and metolazone decreased to 5 mg daily per Nephrology with recommendation to discharge him on the same dose.  Patient to follow up with his primary nephrologist Dr. Bari payne in [place] upon discharge  Monitor electrolytes and replete, keep potassium greater than 4 and magnesium greater than 2  Patient complained of intractable acute on chronic back pain, Neurosurgery evaluated the patient, MRI lumbar done on 12/14-large disc protrusion at L5-S1 with severe narrowing of the canal, mild narrowing of the spinal canal at L4-L5  Reached out to Neurosurgery, informed Dr. Ramsay will review it on Tuesday and make a decision then  Constipation resolved with Relistor x1, start MiraLax b.i.d.  Complained of abdominal cramping, ordered Bentyl--> much improved   Analgesics p.r.n. p.o. pain medication.  Patient again asking for IV Dilaudid, IV morphine or p.o. Dilaudid.  Informed patient that are not indicated  Hypoglycemia resolved, now hyperglycemia with blood glucose greater than 250.  Increase Levemir to 16 units b.i.d.  Monitor blood sugars, currently on sliding scale and long-acting insulin at night.   Continue diabetic cardiac diet  Therapy services following, recommended moderate intensity, awaiting Neurosurgery recommendations  Case management on board for SNF, awaiting Neurosurgery recommendation  Continue supportive care, appropriate home medications  Fall precautions, decubitus precautions  Morning BMP ordered      VTE prophylaxis:  Lovenox    Anticipated discharge and Disposition:  SNF, pending neurosurgery recommendations     All diagnosis and differential diagnosis have been reviewed; assessment and plan has been documented; I have personally reviewed the labs and test results that are presently available; I have reviewed the patients medication list; I have reviewed the consulting providers response and recommendations. I have  reviewed or attempted to review medical records based upon their availability    All of the patient's questions have been  addressed and answered. Patient's is agreeable to the above stated plan. I will continue to monitor closely and make adjustments to medical management as needed.  _____________________________________________________________________    Nutrition Status:  Patient meets ASPEN criteria for moderate malnutrition of chronic illness per RD assessment as evidenced by:  Energy Intake (Malnutrition): other (see comments) (does not meet criteria)  Weight Loss (Malnutrition): other (see comments) (does not meet criteria)     Muscle Mass (Malnutrition): mild depletion  Fluid Accumulation (Malnutrition): severe        A minimum of two characteristics is recommended for diagnosis of either severe or non-severe malnutrition.    Radiology:  I have personally reviewed the following imaging and agree with the radiologist.     MRI Lumbar Spine Without Contrast  Narrative: EXAMINATION:  MRI LUMBAR SPINE WITHOUT CONTRAST    CLINICAL HISTORY:  Low back pain;    TECHNIQUE:  Multiplanar multisequence MR images of the lumbar spine are obtained without contrast.    COMPARISON:  CT lumbar spine dated 11/25/2023, MRI lumbar spine dated 01/16/2021    FINDINGS:  Evaluation is limited by artifact and motion.  There is grade 1 retrolisthesis of L5 over S1.  The vertebral body heights are maintained.  There is suspected mild endplate edema at L5-S1, nonspecific.  There are postoperative changes of prior partial left-sided L5-S1 laminectomy.    The conus terminates at the level of L1.    At L4-5, there is disc bulge, facet hypertrophy and mild narrowing of the spinal canal.  There is mild-to-moderate left neural foraminal narrowing    At L5-S1, there is grade 1 retrolisthesis of L5 with suspected large central disc protrusion measuring 9 mm in AP dimension and facet hypertrophy with severe narrowing of the canal and impingement  on the descending bilateral S1 nerve roots, left greater than right.  There is moderate to severe bilateral foraminal stenosis.    There is subcutaneous edema in the posterior paraspinal soft tissues.  There is no definite visible drainable fluid collection.  Impression: 1. Suspected large disc protrusion at L5-S1 with severe narrowing of the canal.  2. Mild narrowing of the spinal canal at L4-5.  3. Multilevel neural foraminal stenoses as described.    Electronically signed by: Mirela Granados  Date:    12/14/2023  Time:    15:01    Kyrie Bro MD  Department of Hospital Medicine   [facility]   12/18/2023

## 2023-12-18 NOTE — PROGRESS NOTES
Nephrology consult follow up note    HPI:      Kamran Huerta is a 48 y.o. male seen by our service in April for nephrotic syndrome and FABIO. At that time he underwent renal biopsy revealing advanced diabetic nodular sclerosis. He was diuresed aggressively and discharged with Cr 1.8 and ability to ambulate in the trotter without oxygen or distress. Since that time he has undergone L BKA s/t gangrenous wound. He presented to the ED 11/25 s/p fall from wheelchair with resulting back pain. Neurosurgery undergoing further workup.   He was noted to have FABIO and marked hypervolemia for which nephrology has been consulted.    Interval history:     No acute events overnight.  Patient making good urine output, 3.4 L yesterday with p.o. diuretics.  MRI done yesterday showed suspected large disc protrusion at L5-S1 with severe narrowing of the canal.  No chest pain, nausea, vomiting.  Shortness of breath and lower extremity edema improving.    12/18 - Pt voiding well with twice daily Torsemide and metolazone daily. Renal function relatively stable.  Waiting for recommendation from neurosurgery if any intervention needs to be done for his back problems.  No shortness of breath.  Adequate appetite.         Objective:       VITAL SIGNS: 24 HR MIN & MAX LAST    Temp  Min: 97.3 °F (36.3 °C)  Max: 98.4 °F (36.9 °C)  97.8 °F (36.6 °C)        BP  Min: 132/76  Max: 174/77  136/83     Pulse  Min: 58  Max: 73  (!) 58     Resp  Min: 16  Max: 18  16    SpO2  Min: 97 %  Max: 99 %  98 %      GEN:  Moderately developed and nourished.  No acute distress noted.  CV: RRR without rub or gallop.  PULM: CTAB, unlabored  ABD: Soft, NT/ND abdomen with NABS  EXT:  1-2+ dependent edema, LLE amputation   SKIN: Warm and dry  PSYCH: Awake, alert and appropriately conversant.               Component Value Date/Time     12/18/2023 0448     12/15/2023 0457     02/22/2021 1246    K 4.3 12/18/2023 0448    K 4.5 12/15/2023 0457    K 3.6  02/22/2021 1246    CHLORIDE 104 12/18/2023 0448    CHLORIDE 105 12/15/2023 0457    CHLORIDE 106 02/22/2021 1246    CO2 25 12/18/2023 0448    CO2 25 12/15/2023 0457    CO2 26 02/22/2021 1246    BUN 69.8 (H) 12/18/2023 0448    BUN 65.7 (H) 12/15/2023 0457    BUN 15.0 02/22/2021 1246    CREATININE 2.40 (H) 12/18/2023 0448    CREATININE 2.26 (H) 12/15/2023 0457    CREATININE 0.98 02/22/2021 1246    CALCIUM 7.8 (L) 12/18/2023 0448    CALCIUM 7.8 (L) 12/15/2023 0457    CALCIUM 8.5 02/22/2021 1246    PHOS 5.1 (H) 12/18/2023 0448            Component Value Date/Time    WBC 8.88 12/18/2023 0448    WBC 10.32 12/15/2023 0456    HGB 9.1 (L) 12/18/2023 0448    HGB 9.8 (L) 12/15/2023 0456    HCT 28.1 (L) 12/18/2023 0448    HCT 29.8 (L) 12/15/2023 0456    HCT 43 11/13/2022 1428     12/18/2023 0448     12/15/2023 0456         Imaging reviewed      Assessment / Plan:     FABIO s/t acute infection and hypervolemia   CKD IIIa   -biopsy proven in April 2023 diabetic nodular sclerosis   -Nephrotic range proteinuria         -March 2023 - - 10.9 g        -Now 5.9 g   Anasarca   S/p Fall with resulting back pain. Remote back surgery   Cirrhosis of the liver  DM I onset age 14 with long standing history of noncompliance. Hgb A1c improving.   Severe spinal stenosis - neurosurgery evaluating     Plan:  Continue Torsemide 40mg BID and decrease Metolazone slightly to 5mg daily.   From the renal standpoint of view he can be discharged to home on this regimen of diuretics.  Also he is being followed by Dr. Bari Alonso nephrologist in [place], [place].  Make sure he gets his appointment over there.

## 2023-12-19 LAB
ANION GAP SERPL CALC-SCNC: 7 MEQ/L
BUN SERPL-MCNC: 74.6 MG/DL (ref 8.9–20.6)
CALCIUM SERPL-MCNC: 8.1 MG/DL (ref 8.4–10.2)
CHLORIDE SERPL-SCNC: 104 MMOL/L (ref 98–107)
CO2 SERPL-SCNC: 25 MMOL/L (ref 22–29)
CREAT SERPL-MCNC: 2.52 MG/DL (ref 0.73–1.18)
CREAT/UREA NIT SERPL: 30
GFR SERPLBLD CREATININE-BSD FMLA CKD-EPI: 31 MLS/MIN/1.73/M2
GLUCOSE SERPL-MCNC: 280 MG/DL (ref 74–100)
POCT GLUCOSE: 121 MG/DL (ref 70–110)
POCT GLUCOSE: 124 MG/DL (ref 70–110)
POCT GLUCOSE: 216 MG/DL (ref 70–110)
POCT GLUCOSE: 231 MG/DL (ref 70–110)
POCT GLUCOSE: 257 MG/DL (ref 70–110)
POTASSIUM SERPL-SCNC: 4.4 MMOL/L (ref 3.5–5.1)
SODIUM SERPL-SCNC: 136 MMOL/L (ref 136–145)

## 2023-12-19 PROCEDURE — 25000003 PHARM REV CODE 250: Performed by: NURSE PRACTITIONER

## 2023-12-19 PROCEDURE — 63600175 PHARM REV CODE 636 W HCPCS: Performed by: INTERNAL MEDICINE

## 2023-12-19 PROCEDURE — 63600175 PHARM REV CODE 636 W HCPCS: Performed by: NURSE PRACTITIONER

## 2023-12-19 PROCEDURE — 25000003 PHARM REV CODE 250: Performed by: INTERNAL MEDICINE

## 2023-12-19 PROCEDURE — 27000207 HC ISOLATION

## 2023-12-19 PROCEDURE — 80048 BASIC METABOLIC PNL TOTAL CA: CPT | Performed by: INTERNAL MEDICINE

## 2023-12-19 PROCEDURE — 21400001 HC TELEMETRY ROOM

## 2023-12-19 PROCEDURE — 97110 THERAPEUTIC EXERCISES: CPT | Mod: CO

## 2023-12-19 RX ADMIN — INSULIN DETEMIR 16 UNITS: 100 INJECTION, SOLUTION SUBCUTANEOUS at 09:12

## 2023-12-19 RX ADMIN — INSULIN ASPART 2 UNITS: 100 INJECTION, SOLUTION INTRAVENOUS; SUBCUTANEOUS at 11:12

## 2023-12-19 RX ADMIN — PANCRELIPASE 3 CAPSULE: 24000; 76000; 120000 CAPSULE, DELAYED RELEASE PELLETS ORAL at 09:12

## 2023-12-19 RX ADMIN — WHITE PETROLATUM: 1.75 OINTMENT TOPICAL at 09:12

## 2023-12-19 RX ADMIN — Medication 400 MG: at 08:12

## 2023-12-19 RX ADMIN — Medication: at 09:12

## 2023-12-19 RX ADMIN — POLYETHYLENE GLYCOL 3350 17 G: 17 POWDER, FOR SOLUTION ORAL at 09:12

## 2023-12-19 RX ADMIN — DICYCLOMINE HYDROCHLORIDE 10 MG: 10 CAPSULE ORAL at 09:12

## 2023-12-19 RX ADMIN — NALOXEGOL OXALATE 25 MG: 25 TABLET, FILM COATED ORAL at 09:12

## 2023-12-19 RX ADMIN — ATENOLOL 50 MG: 50 TABLET ORAL at 09:12

## 2023-12-19 RX ADMIN — MICONAZOLE NITRATE: 20 POWDER TOPICAL at 09:12

## 2023-12-19 RX ADMIN — OXYCODONE HYDROCHLORIDE 10 MG: 5 TABLET ORAL at 08:12

## 2023-12-19 RX ADMIN — Medication 400 MG: at 09:12

## 2023-12-19 RX ADMIN — NIFEDIPINE 90 MG: 30 TABLET, FILM COATED, EXTENDED RELEASE ORAL at 09:12

## 2023-12-19 RX ADMIN — ATENOLOL 50 MG: 50 TABLET ORAL at 08:12

## 2023-12-19 RX ADMIN — PANCRELIPASE 3 CAPSULE: 24000; 76000; 120000 CAPSULE, DELAYED RELEASE PELLETS ORAL at 02:12

## 2023-12-19 RX ADMIN — TORSEMIDE 40 MG: 20 TABLET ORAL at 09:12

## 2023-12-19 RX ADMIN — LEVETIRACETAM 500 MG: 500 TABLET, FILM COATED ORAL at 08:12

## 2023-12-19 RX ADMIN — DULOXETINE HYDROCHLORIDE 20 MG: 20 CAPSULE, DELAYED RELEASE ORAL at 09:12

## 2023-12-19 RX ADMIN — LEVETIRACETAM 500 MG: 500 TABLET, FILM COATED ORAL at 09:12

## 2023-12-19 RX ADMIN — DOXAZOSIN 2 MG: 1 TABLET ORAL at 08:12

## 2023-12-19 RX ADMIN — INSULIN ASPART 3 UNITS: 100 INJECTION, SOLUTION INTRAVENOUS; SUBCUTANEOUS at 07:12

## 2023-12-19 RX ADMIN — DULOXETINE HYDROCHLORIDE 20 MG: 20 CAPSULE, DELAYED RELEASE ORAL at 08:12

## 2023-12-19 RX ADMIN — ERGOCALCIFEROL 50000 UNITS: 1.25 CAPSULE ORAL at 09:12

## 2023-12-19 RX ADMIN — HYDRALAZINE HYDROCHLORIDE 10 MG: 20 INJECTION, SOLUTION INTRAMUSCULAR; INTRAVENOUS at 02:12

## 2023-12-19 RX ADMIN — OXYCODONE HYDROCHLORIDE 10 MG: 5 TABLET ORAL at 11:12

## 2023-12-19 RX ADMIN — PANCRELIPASE 3 CAPSULE: 24000; 76000; 120000 CAPSULE, DELAYED RELEASE PELLETS ORAL at 08:12

## 2023-12-19 RX ADMIN — OXYCODONE HYDROCHLORIDE 10 MG: 5 TABLET ORAL at 04:12

## 2023-12-19 RX ADMIN — OXYCODONE HYDROCHLORIDE 10 MG: 5 TABLET ORAL at 01:12

## 2023-12-19 RX ADMIN — OXYCODONE HYDROCHLORIDE 10 MG: 5 TABLET ORAL at 07:12

## 2023-12-19 RX ADMIN — TORSEMIDE 40 MG: 20 TABLET ORAL at 04:12

## 2023-12-19 RX ADMIN — ENOXAPARIN SODIUM 40 MG: 40 INJECTION SUBCUTANEOUS at 04:12

## 2023-12-19 RX ADMIN — LEVETIRACETAM 500 MG: 500 TABLET, FILM COATED ORAL at 02:12

## 2023-12-19 RX ADMIN — PREGABALIN 75 MG: 50 CAPSULE ORAL at 08:12

## 2023-12-19 NOTE — PT/OT/SLP PROGRESS
Occupational Therapy   Treatment    Name: Kamran Huerta  MRN: [id number]       Recommendations:     Recommended therapy intensity at discharge: Moderate Intensity Therapy   Discharge Equipment Recommendations:  lift device  Barriers to discharge:       Assessment:     Kmaran Huerta is a 48 y.o. male with a medical diagnosis of weakness, dizziness, lightheadedness and fall out of wheelchair. Performance deficits affecting function are weakness, impaired endurance, impaired self care skills, impaired functional mobility. Did not attempt OOB mobility this AM 2/2 waiting on neurosx recs following MRI findings. MD reached out this PM, stated pt is ok to continue mobilizing and no need for brace.     Rehab Prognosis:  Fair; patient would benefit from acute skilled OT services to address these deficits and reach maximum level of function.       Plan:     Patient to be seen 3 x/week to address the above listed problems via self-care/home management, therapeutic activities, therapeutic exercises  Plan of Care Expires: 12/25/23  Plan of Care Reviewed with: patient    Subjective     Pain/Comfort:  Location - Orientation 1: generalized  Location 1: back  Pain Addressed 1: Reposition    Objective:     Communicated with: RN prior to session.  Patient found supine with telemetry, escobar catheter upon OT entry to room.    General Precautions: Standard, contact, fall, seizure    Orthopedic Precautions:N/A  Braces: N/A  Respiratory Status: Room air     Occupational Performance:     Bed Mobility:    Did not occur. Min A to reposition in bed at end of session.     Therapeutic Exercise:  Performed UE ex with theraband 1x15 of shoulder flex/ext, elbow flex/ext, chest pulls, and horizontal abd/add. Rest breaks needed.     Therapeutic Positioning    OT interventions performed during the course of today's session in an effort to prevent and/or reduce acquired pressure injuries:   Education was provided on benefits of and  recommendations for therapeutic positioning    Patient Education:  Patient provided with verbal education education regarding OT role/goals/POC.  Understanding was verbalized.      Patient left supine with all lines intact and call button in reach    GOALS:   Multidisciplinary Problems       Occupational Therapy Goals          Problem: Occupational Therapy    Goal Priority Disciplines Outcome Interventions   Occupational Therapy Goal     OT, PT/OT Ongoing, Progressing    Description: Goals to be met by: 12/25/23     Patient will increase functional independence with ADLs by performing:  LTG: Pt will perform basic ADLs and ADL transfers with Modified independence using LRAD by discharge.    STG: to be met by 12/25/23    Pt will complete grooming sitting with  with SBA.  Pt will complete UB dressing with SBA.  Pt will complete LB dressing with mod assist using LRAD.  Pt will complete toileting with mod assist using LRAD.  Pt will complete functional mobility to/from bedside commode and toilet transfer with max assist using LRAD.                              Time Tracking:     OT Date of Treatment: 12/19/23  OT Start Time: 1121  OT Stop Time: 1132  OT Total Time (min): 11 min    Billable Minutes:Therapeutic Exercise 11    OT/ARIANA: ARIANA     Number of ARIANA visits since last OT visit: 5    12/19/2023

## 2023-12-19 NOTE — PROGRESS NOTES
[facility] Hospital Medicine Progress Note        Chief Complaint: Inpatient Follow-up for Anasarca    HPI: 48 y.o. male who PMH includes CHF, chronic back pain wheelchair-bound, DM type 2, HTN, seizures, CVA, CKD stage 3, alcoholic liver disease with chronic pancreatitis, presents to the ED at [facility] on 11/25/2023 with a primary complaint of weakness, dizziness lightheadedness and fall out if his wheelchair striking his mouth; denies any LOC.  PT had back surgery years ago and reports has been wheelchair bound since then. No reports of seizures.  No CP, SOB, N/V/D, fever, chills cough congestion or any sick contacts. Labs reviewed demonstrated WBC 14.42, HH 12.2/37.2, sed rate 93 , CO2 18 Bun 25.7, Creat 2.31, glucose 52 Mg+ 1.0, , AST 36 reports of chest pain, CRP shortness a breath, nausea, vomiting, diarrhea5, fever., [id number], cough, congestion, or any sick, contacts. B no reportsP of chest pain, 109.7; other indicis unremarkable. CXR impression reviewed demonstrated  no acute cardiopulmonary abnormality. CT of head without contrast impression reviewed demonstrated no appreciable acute intracranial abnormality. Ct maxillofacial without contrast impression reviewed demonstrated no appreciate acute traumatic osseous abnormality, bilateral mastoid effusions. CT cervical spine without contrast impression reviewed demonstrated no appreciable acute osseous abnormality by CT evaluation, degenerative changes at the cervical spine. CT thoracic spine without contrast impression reviewed demonstrated no acute osseous abnormality. CT of lumbar spine without contrast impression reviewed demonstrated no appreciable fracture or acute osseous abnormality, severe degenerative change at L5-S1 progressed, endplate sclerosis disc space narrowing and osseous erosions which may be degenerative or infectious/inflammatory, anasarca.   Initial VS /105 P 75 R 13 T 98.7F O2 saturation 98%  on room air. Pt received multiple doses of pain medication , D50 for hypoglycemia, 40 mg lasix,  and magnesium rider in the ED. Neurosurgery services consulted. Pt awaiting MRI. Pt is admitted to hospital medicine services for further management.  Nephrology was consulted and patient was started on Lasix albumin drip.  Segovia was inserted due to possible outlet obstruction in the setting of anasarca.  MRI of lumbar spine was not helpful due to significant fluid collection.  Nephrology following.  Recommended to continue IV Lasix 60 mg t.i.d. and metolazone 10 mg daily for now and when patient gets closer to discharge switch to oral    Interval Hx:   Patient is laying in bed, reports his abdominal pain was resolved with bentyl.  No more constipation.    Inquired if neurosurgery will come see him today and make recommendation.  Discuss we are waiting for his rounding  Patient reported he sure wishes he does not have to undergo surgery  No family is at bedside   Case was discussed with patient's nurse on the floor    Objective/physical exam:  General: In no acute distress, afebrile, more truncal obesity, large neck circumference  Chest: Clear to auscultation bilaterally anteriorly  Heart: RRR, +S1, S2, no appreciable murmur  Abdomen: Soft, nontender, BS +  MSK: Warm,  edema improving, Left BKA  Neurologic:  Awake alert and oriented    VITAL SIGNS: 24 HRS MIN & MAX LAST   Temp  Min: 97.7 °F (36.5 °C)  Max: 98.4 °F (36.9 °C) 98.1 °F (36.7 °C)   BP  Min: 135/78  Max: 172/93 135/78   Pulse  Min: 63  Max: 75  75   Resp  Min: 12  Max: 18 14   SpO2  Min: 97 %  Max: 99 % 98 %     I have reviewed the following labs:  Recent Labs   Lab 12/13/23  0429 12/15/23  0456 12/18/23  0448   WBC 10.52 10.32 8.88   RBC 3.26* 3.37* 3.15*   HGB 9.5* 9.8* 9.1*   HCT 28.9* 29.8* 28.1*   MCV 88.7 88.4 89.2   MCH 29.1 29.1 28.9   MCHC 32.9* 32.9* 32.4*   RDW 14.1 13.8 13.6    245 239   MPV 11.7* 11.8* 12.3*       Recent Labs   Lab  12/13/23  0429 12/15/23  0457 12/18/23  0448 12/19/23  0516    136 137 136   K 4.3 4.5 4.3 4.4   CO2 24 25 25 25   BUN 61.7* 65.7* 69.8* 74.6*   CREATININE 2.36* 2.26* 2.40* 2.52*   CALCIUM 7.6* 7.8* 7.8* 8.1*   MG 1.70 1.70 1.80  --    ALBUMIN  --   --  1.8*  --        Microbiology Results (last 7 days)       ** No results found for the last 168 hours. **           See below for Radiology    Scheduled Med:   atenoloL  50 mg Oral BID    doxazosin  2 mg Oral QHS    DULoxetine  20 mg Oral BID    enoxparin  40 mg Subcutaneous Daily    ergocalciferol  50,000 Units Oral Q72H    insulin detemir U-100  16 Units Subcutaneous BID    levETIRAcetam  500 mg Oral TID    lipase-protease-amylase 24,000-76,000-120,000 units  3 capsule Oral TID    magnesium oxide  400 mg Oral BID    metOLazone  5 mg Oral Daily    miconazole NITRATE 2 %   Topical (Top) BID    naloxegoL  25 mg Oral Daily    NIFEdipine  90 mg Oral Daily    perflutren lipid microspheres  1.4 mL Intravenous Once    polyethylene glycol  17 g Oral BID    pregabalin  75 mg Oral QHS    torsemide  40 mg Oral BID loop    white petrolatum   Topical (Top) BID    zinc oxide-cod liver oil   Topical (Top) BID      Continuous Infusions:     PRN Meds:  acetaminophen, aluminum-magnesium hydroxide-simethicone, dextrose 10%, dextrose 10%, dicyclomine, glucagon (human recombinant), glucose, glucose, hydrALAZINE, insulin aspart U-100, labetaloL, naloxone, ondansetron, oxyCODONE, simethicone, white petrolatum     Assessment/Plan:  Anasarca- improved  Acute on chronic kidney disease stage III, Nephrotic range proteinuria  Cirrhosis of liver  Acute on chronic lower back pain- intractable--> severe canal stenosis at L5-S1 and mild canal stenosis at L4-L5 per MRI on 12/14  Dizziness, Fall- from wheel chair  Hypoglycemia in  T2 DM-  A1c 5.5- resolved  Hypertensive urgency at admission-resolving  ESBL E coli in urine- possibly asymptomatic bacteriuria  Hypomagnesemia- replaced  HX:  Wheelchair-bound, history of CVA with residual left-sided deficits, left BKA, chronic alcoholic liver disease, chronic seizures  Moderate Malnutrition      Nephrology followed.  Continue torsemide 40 mg p.o. b.i.d. and metolazone decreased to 5 mg daily per Nephrology with recommendation to discharge him on the same dose.  Patient to follow up with his primary nephrologist Dr. Bari payne in [place] upon discharge  Monitor electrolytes and replete, keep potassium greater than 4 and magnesium greater than 2  Patient complained of intractable acute on chronic back pain, Neurosurgery evaluated the patient, MRI lumbar done on 12/14-large disc protrusion at L5-S1 with severe narrowing of the canal, mild narrowing of the spinal canal at L4-L5  Reached out to Neurosurgery, informed Dr. Ramsay will review it on Tuesday and make a decision then  I reached out to Dr. Ramsay today, awaiting his response  Constipation resolved with Relistor x1, start MiraLax b.i.d.  Complained of abdominal cramping, ordered Bentyl x2 days--> much improved   Analgesics p.r.n. p.o. pain medication.  Patient again asking for IV Dilaudid, IV morphine or p.o. Dilaudid.  Informed patient that are not indicated  Hypoglycemia resolved, now hyperglycemia with blood glucose greater than 250.  Increase Levemir to 16 units b.i.d.  Monitor blood sugars, currently on sliding scale and long-acting insulin at night.   Continue diabetic cardiac diet  Therapy services following, recommended moderate intensity, awaiting Neurosurgery recommendations  Case management on board for SNF, awaiting Neurosurgery recommendation  Continue supportive care, appropriate home medications  Fall precautions, decubitus precautions  Morning CBC BMP ordered      VTE prophylaxis:  Lovenox    Anticipated discharge and Disposition:  SNF, pending neurosurgery recommendations     All diagnosis and differential diagnosis have been reviewed; assessment and plan has been documented; I  have personally reviewed the labs and test results that are presently available; I have reviewed the patients medication list; I have reviewed the consulting providers response and recommendations. I have reviewed or attempted to review medical records based upon their availability    All of the patient's questions have been  addressed and answered. Patient's is agreeable to the above stated plan. I will continue to monitor closely and make adjustments to medical management as needed.  _____________________________________________________________________    Nutrition Status:  Patient meets ASPEN criteria for moderate malnutrition of chronic illness per RD assessment as evidenced by:  Energy Intake (Malnutrition): other (see comments) (does not meet criteria)  Weight Loss (Malnutrition): other (see comments) (does not meet criteria)     Muscle Mass (Malnutrition): mild depletion  Fluid Accumulation (Malnutrition): severe        A minimum of two characteristics is recommended for diagnosis of either severe or non-severe malnutrition.    Radiology:  I have personally reviewed the following imaging and agree with the radiologist.     MRI Lumbar Spine Without Contrast  Narrative: EXAMINATION:  MRI LUMBAR SPINE WITHOUT CONTRAST    CLINICAL HISTORY:  Low back pain;    TECHNIQUE:  Multiplanar multisequence MR images of the lumbar spine are obtained without contrast.    COMPARISON:  CT lumbar spine dated 11/25/2023, MRI lumbar spine dated 01/16/2021    FINDINGS:  Evaluation is limited by artifact and motion.  There is grade 1 retrolisthesis of L5 over S1.  The vertebral body heights are maintained.  There is suspected mild endplate edema at L5-S1, nonspecific.  There are postoperative changes of prior partial left-sided L5-S1 laminectomy.    The conus terminates at the level of L1.    At L4-5, there is disc bulge, facet hypertrophy and mild narrowing of the spinal canal.  There is mild-to-moderate left neural foraminal  narrowing    At L5-S1, there is grade 1 retrolisthesis of L5 with suspected large central disc protrusion measuring 9 mm in AP dimension and facet hypertrophy with severe narrowing of the canal and impingement on the descending bilateral S1 nerve roots, left greater than right.  There is moderate to severe bilateral foraminal stenosis.    There is subcutaneous edema in the posterior paraspinal soft tissues.  There is no definite visible drainable fluid collection.  Impression: 1. Suspected large disc protrusion at L5-S1 with severe narrowing of the canal.  2. Mild narrowing of the spinal canal at L4-5.  3. Multilevel neural foraminal stenoses as described.    Electronically signed by: Mirela Granados  Date:    12/14/2023  Time:    15:01    Kyrie Bro MD  Department of Hospital Medicine   [facility]   12/19/2023

## 2023-12-19 NOTE — PROGRESS NOTES
Nephrology consult follow up note    HPI:      Kamran Huerta is a 48 y.o. male seen by our service in April for nephrotic syndrome and FABIO. At that time he underwent renal biopsy revealing advanced diabetic nodular sclerosis. He was diuresed aggressively and discharged with Cr 1.8 and ability to ambulate in the trotter without oxygen or distress. Since that time he has undergone L BKA s/t gangrenous wound. He presented to the ED 11/25 s/p fall from wheelchair with resulting back pain. Neurosurgery undergoing further workup. MRI showed suspected large disc protrusion at L5-S1 with severe narrowing of the canal. He was noted to have FABIO and marked hypervolemia for which nephrology has been consulted.    Interval history:     No acute events overnight.  Patient continues to make excellent UOP, 3.7L yesterday. LE edema has improved. No CP, SOB, abd pain, N/V.      Review of Systems:     Comprehensive 10pt ROS negative except as noted per history.    Past medical, family, surgical, and social history reviewed and unchanged from initial consult note.     Objective:       VITAL SIGNS: 24 HR MIN & MAX LAST    Temp  Min: 97.4 °F (36.3 °C)  Max: 98.4 °F (36.9 °C)  97.4 °F (36.3 °C)        BP  Min: 135/78  Max: 172/93  (!) 141/88     Pulse  Min: 63  Max: 75  67     Resp  Min: 12  Max: 18  18    SpO2  Min: 96 %  Max: 99 %  96 %      GEN:  Chronically ill-appearing AAM in NAD  CV: RRR +S1,S2 without murmur  PULM: CTAB, unlabored  ABD: Soft, NT/ND abdomen with NABS  EXT:  1+ BLE edema  SKIN: Warm and dry  PSYCH: Awake, alert and appropriately conversant.   Dialysis access:  No dialysis access            Component Value Date/Time     12/19/2023 0516     12/18/2023 0448     02/22/2021 1246    K 4.4 12/19/2023 0516    K 4.3 12/18/2023 0448    K 3.6 02/22/2021 1246    CHLORIDE 104 12/19/2023 0516    CHLORIDE 104 12/18/2023 0448    CHLORIDE 106 02/22/2021 1246    CO2 25 12/19/2023 0516    CO2 25 12/18/2023 0448     "CO2 26 02/22/2021 1246    BUN 74.6 (H) 12/19/2023 0516    BUN 69.8 (H) 12/18/2023 0448    BUN 15.0 02/22/2021 1246    CREATININE 2.52 (H) 12/19/2023 0516    CREATININE 2.40 (H) 12/18/2023 0448    CREATININE 0.98 02/22/2021 1246    CALCIUM 8.1 (L) 12/19/2023 0516    CALCIUM 7.8 (L) 12/18/2023 0448    CALCIUM 8.5 02/22/2021 1246    PHOS 5.1 (H) 12/18/2023 0448            Component Value Date/Time    WBC 8.88 12/18/2023 0448    WBC 10.32 12/15/2023 0456    HGB 9.1 (L) 12/18/2023 0448    HGB 9.8 (L) 12/15/2023 0456    HCT 28.1 (L) 12/18/2023 0448    HCT 29.8 (L) 12/15/2023 0456    HCT 43 11/13/2022 1428     12/18/2023 0448     12/15/2023 0456         Imaging reviewed      Assessment / Plan:       Active Hospital Problems    Diagnosis  POA    Moderate malnutrition [E44.0]  Yes     Patient meets ASPEN criteria for moderate malnutrition of chronic illness per RD assessment as evidenced by:  Energy Intake (Malnutrition): other (see comments) (does not meet criteria)  Weight Loss (Malnutrition): other (see comments) (does not meet criteria)     Muscle Mass (Malnutrition): mild depletion  Fluid Accumulation (Malnutrition): severe        A minimum of two characteristics is recommended for diagnosis of either severe or non-severe malnutrition.    Ht Readings from Last 1 Encounters:   12/01/23 5' 7.72" (1.72 m)     Wt Readings from Last 1 Encounters:   11/26/23 1128 127 kg (280 lb)   11/25/23 1539 127 kg (280 lb)   11/25/23 0627 127 kg (280 lb)   Body mass index is 42.93 kg/m².    Patient has been screened and assessed by RD. See nutrition recommendations documented in inpatient nutrition assessment. RD will continue to follow patient throughout hospitalization.          Resolved Hospital Problems   No resolved problems to display.       FABIO s/t acute infection and hypervolemia   CKD IIIa   -biopsy proven in April 2023 diabetic nodular sclerosis   -Nephrotic range proteinuria         -March 2023 - - 10.9 g        " -Now 5.9 g   Anasarca   S/p Fall with resulting back pain. Remote back surgery   Cirrhosis of the liver  DM I onset age 14 with long standing history of noncompliance. Hgb A1c improving.   Severe spinal stenosis - per NSGY     Plan:  Renal function worsening. Pt continues to have good UOP, and edema is improving. Will decrease diuretics. Stop metolazone and continue torsemide 40mg bid. Labs in AM.       Spike Mcintyre DO  Nephrology  [facility]  Clinic number: [phone]

## 2023-12-20 LAB
ALBUMIN SERPL-MCNC: 1.9 G/DL (ref 3.5–5)
BUN SERPL-MCNC: 74.1 MG/DL (ref 8.9–20.6)
CALCIUM SERPL-MCNC: 7.8 MG/DL (ref 8.4–10.2)
CHLORIDE SERPL-SCNC: 102 MMOL/L (ref 98–107)
CO2 SERPL-SCNC: 25 MMOL/L (ref 22–29)
CREAT SERPL-MCNC: 2.52 MG/DL (ref 0.73–1.18)
ERYTHROCYTE [DISTWIDTH] IN BLOOD BY AUTOMATED COUNT: 13.1 % (ref 11.5–17)
GFR SERPLBLD CREATININE-BSD FMLA CKD-EPI: 31 MLS/MIN/1.73/M2
GLUCOSE SERPL-MCNC: 300 MG/DL (ref 74–100)
HCT VFR BLD AUTO: 30.7 % (ref 42–52)
HGB BLD-MCNC: 9.7 G/DL (ref 14–18)
MCH RBC QN AUTO: 28.7 PG (ref 27–31)
MCHC RBC AUTO-ENTMCNC: 31.6 G/DL (ref 33–36)
MCV RBC AUTO: 90.8 FL (ref 80–94)
NRBC BLD AUTO-RTO: 0 %
PHOSPHATE SERPL-MCNC: 5.8 MG/DL (ref 2.3–4.7)
PLATELET # BLD AUTO: 270 X10(3)/MCL (ref 130–400)
PMV BLD AUTO: 12 FL (ref 7.4–10.4)
POCT GLUCOSE: 245 MG/DL (ref 70–110)
POCT GLUCOSE: 247 MG/DL (ref 70–110)
POCT GLUCOSE: 254 MG/DL (ref 70–110)
POCT GLUCOSE: 292 MG/DL (ref 70–110)
POCT GLUCOSE: 333 MG/DL (ref 70–110)
POTASSIUM SERPL-SCNC: 4.5 MMOL/L (ref 3.5–5.1)
RBC # BLD AUTO: 3.38 X10(6)/MCL (ref 4.7–6.1)
SODIUM SERPL-SCNC: 136 MMOL/L (ref 136–145)
WBC # SPEC AUTO: 8.56 X10(3)/MCL (ref 4.5–11.5)

## 2023-12-20 PROCEDURE — 25000003 PHARM REV CODE 250: Performed by: INTERNAL MEDICINE

## 2023-12-20 PROCEDURE — 25000003 PHARM REV CODE 250: Performed by: NURSE PRACTITIONER

## 2023-12-20 PROCEDURE — 21400001 HC TELEMETRY ROOM

## 2023-12-20 PROCEDURE — 63600175 PHARM REV CODE 636 W HCPCS: Performed by: NURSE PRACTITIONER

## 2023-12-20 PROCEDURE — 63600175 PHARM REV CODE 636 W HCPCS: Performed by: INTERNAL MEDICINE

## 2023-12-20 PROCEDURE — 85027 COMPLETE CBC AUTOMATED: CPT | Performed by: INTERNAL MEDICINE

## 2023-12-20 PROCEDURE — 97168 OT RE-EVAL EST PLAN CARE: CPT

## 2023-12-20 PROCEDURE — 27000207 HC ISOLATION

## 2023-12-20 PROCEDURE — 80069 RENAL FUNCTION PANEL: CPT | Performed by: STUDENT IN AN ORGANIZED HEALTH CARE EDUCATION/TRAINING PROGRAM

## 2023-12-20 PROCEDURE — 97530 THERAPEUTIC ACTIVITIES: CPT | Mod: CQ

## 2023-12-20 RX ORDER — HYDRALAZINE HYDROCHLORIDE 25 MG/1
25 TABLET, FILM COATED ORAL EVERY 12 HOURS
Status: DISCONTINUED | OUTPATIENT
Start: 2023-12-20 | End: 2023-12-21

## 2023-12-20 RX ADMIN — INSULIN ASPART 3 UNITS: 100 INJECTION, SOLUTION INTRAVENOUS; SUBCUTANEOUS at 02:12

## 2023-12-20 RX ADMIN — WHITE PETROLATUM: 1.75 OINTMENT TOPICAL at 09:12

## 2023-12-20 RX ADMIN — PREGABALIN 75 MG: 50 CAPSULE ORAL at 10:12

## 2023-12-20 RX ADMIN — OXYCODONE HYDROCHLORIDE 10 MG: 5 TABLET ORAL at 09:12

## 2023-12-20 RX ADMIN — LEVETIRACETAM 500 MG: 500 TABLET, FILM COATED ORAL at 09:12

## 2023-12-20 RX ADMIN — Medication 400 MG: at 10:12

## 2023-12-20 RX ADMIN — DULOXETINE HYDROCHLORIDE 20 MG: 20 CAPSULE, DELAYED RELEASE ORAL at 10:12

## 2023-12-20 RX ADMIN — MICONAZOLE NITRATE: 20 POWDER TOPICAL at 09:12

## 2023-12-20 RX ADMIN — LEVETIRACETAM 500 MG: 500 TABLET, FILM COATED ORAL at 10:12

## 2023-12-20 RX ADMIN — INSULIN DETEMIR 18 UNITS: 100 INJECTION, SOLUTION SUBCUTANEOUS at 09:12

## 2023-12-20 RX ADMIN — TORSEMIDE 40 MG: 20 TABLET ORAL at 06:12

## 2023-12-20 RX ADMIN — Medication: at 09:12

## 2023-12-20 RX ADMIN — OXYCODONE HYDROCHLORIDE 10 MG: 5 TABLET ORAL at 01:12

## 2023-12-20 RX ADMIN — DOXAZOSIN 2 MG: 1 TABLET ORAL at 10:12

## 2023-12-20 RX ADMIN — Medication 400 MG: at 09:12

## 2023-12-20 RX ADMIN — INSULIN DETEMIR 16 UNITS: 100 INJECTION, SOLUTION SUBCUTANEOUS at 09:12

## 2023-12-20 RX ADMIN — INSULIN ASPART 2 UNITS: 100 INJECTION, SOLUTION INTRAVENOUS; SUBCUTANEOUS at 08:12

## 2023-12-20 RX ADMIN — HYDRALAZINE HYDROCHLORIDE 25 MG: 25 TABLET, FILM COATED ORAL at 10:12

## 2023-12-20 RX ADMIN — ATENOLOL 50 MG: 50 TABLET ORAL at 10:12

## 2023-12-20 RX ADMIN — NALOXEGOL OXALATE 25 MG: 25 TABLET, FILM COATED ORAL at 09:12

## 2023-12-20 RX ADMIN — PANCRELIPASE 3 CAPSULE: 24000; 76000; 120000 CAPSULE, DELAYED RELEASE PELLETS ORAL at 09:12

## 2023-12-20 RX ADMIN — OXYCODONE HYDROCHLORIDE 10 MG: 5 TABLET ORAL at 05:12

## 2023-12-20 RX ADMIN — DULOXETINE HYDROCHLORIDE 20 MG: 20 CAPSULE, DELAYED RELEASE ORAL at 09:12

## 2023-12-20 RX ADMIN — NIFEDIPINE 90 MG: 30 TABLET, FILM COATED, EXTENDED RELEASE ORAL at 09:12

## 2023-12-20 RX ADMIN — INSULIN ASPART 3 UNITS: 100 INJECTION, SOLUTION INTRAVENOUS; SUBCUTANEOUS at 05:12

## 2023-12-20 RX ADMIN — POLYETHYLENE GLYCOL 3350 17 G: 17 POWDER, FOR SOLUTION ORAL at 09:12

## 2023-12-20 RX ADMIN — LEVETIRACETAM 500 MG: 500 TABLET, FILM COATED ORAL at 02:12

## 2023-12-20 RX ADMIN — OXYCODONE HYDROCHLORIDE 10 MG: 5 TABLET ORAL at 02:12

## 2023-12-20 RX ADMIN — PANCRELIPASE 3 CAPSULE: 24000; 76000; 120000 CAPSULE, DELAYED RELEASE PELLETS ORAL at 04:12

## 2023-12-20 RX ADMIN — OXYCODONE HYDROCHLORIDE 10 MG: 5 TABLET ORAL at 06:12

## 2023-12-20 RX ADMIN — ENOXAPARIN SODIUM 40 MG: 40 INJECTION SUBCUTANEOUS at 06:12

## 2023-12-20 RX ADMIN — OXYCODONE HYDROCHLORIDE 10 MG: 5 TABLET ORAL at 10:12

## 2023-12-20 RX ADMIN — TORSEMIDE 40 MG: 20 TABLET ORAL at 09:12

## 2023-12-20 RX ADMIN — PANCRELIPASE 3 CAPSULE: 24000; 76000; 120000 CAPSULE, DELAYED RELEASE PELLETS ORAL at 10:12

## 2023-12-20 NOTE — PT/OT/SLP PROGRESS
Physical Therapy Treatment    Patient Name:  Kamran Huerta   MRN:  [id number]    Recommendations:     Discharge therapy intensity: Moderate Intensity Therapy   Discharge Equipment Recommendations: lift device  Barriers to discharge: Impaired mobility    Assessment:     Kamran Huerta is a 48 y.o. male admitted with a medical diagnosis of <principal problem not specified>.  He presents with the following impairments/functional limitations: weakness, impaired endurance, impaired self care skills, impaired functional mobility, impaired balance, decreased upper extremity function, decreased lower extremity function, pain.    Rehab Prognosis: Good; patient would benefit from acute skilled PT services to address these deficits and reach maximum level of function.    Recent Surgery: * No surgery found *      Plan:     During this hospitalization, patient to be seen 3 x/week to address the identified rehab impairments via gait training, therapeutic activities, therapeutic exercises, neuromuscular re-education and progress toward the following goals:    Plan of Care Expires:  12/04/23    Subjective     Chief Complaint: I want to stand    Objective:     Communicated with nurse prior to session.  Patient found supine with   upon PT entry to room.     General Precautions: Standard, fall  Orthopedic Precautions: N/A  Braces: N/A  Respiratory Status: Room air  Blood Pressure:   Skin Integrity: Visible skin intact      Functional Mobility:  SBA to get EOB   Min assist witih SB transfers to bedside chair.   Chair push ups in dept 3 x 5 reps  Pt able stand x 2 trials with max assist x 3. Maintained static standing ~8 seconds each.    Education Provided:  Role and goals of PT, transfer training, bed mobility, gait training, balance training, safety awareness, assistive device, strengthening exercises, and importance of participating in PT to return to PLOF.      Patient left up in chair with all lines intact, call button in  reach, and w/c push up for pressure relief Q30-40 minutes ..    GOALS:   Multidisciplinary Problems       Physical Therapy Goals          Problem: Physical Therapy    Goal Priority Disciplines Outcome Goal Variances Interventions   Physical Therapy Goal     PT, PT/OT Ongoing, Progressing     Description: Pt will improve functional independence by performing:    Bed mobility: max A  Sit to stand: max A with rolling walker  Bed to chair t/f: max A with Stand pivot with rolling walker                       Time Tracking:     Billable Minutes: Therapeutic Activity 38    Treatment Type: Treatment  PT/PTA: PTA     Number of PTA visits since last PT visit: 4     12/20/2023

## 2023-12-20 NOTE — PROGRESS NOTES
Nephrology consult follow up note    HPI:      Kamran Huerta is a 48 y.o. male seen by our service in April for nephrotic syndrome and FABIO. At that time he underwent renal biopsy revealing advanced diabetic nodular sclerosis. He was diuresed aggressively and discharged with Cr 1.8 and ability to ambulate in the trotter without oxygen or distress. Since that time he has undergone L BKA s/t gangrenous wound. He presented to the ED 11/25 s/p fall from wheelchair with resulting back pain. Neurosurgery undergoing further workup. MRI showed suspected large disc protrusion at L5-S1 with severe narrowing of the canal. He was noted to have FABIO and marked hypervolemia for which nephrology has been consulted.    Interval history:     No acute events overnight.  Great UOP. Patient sitting in chair in good spirits freely moving all extremities against gravity. Being transported to gym for therapy on room air.   Review of Systems:     Comprehensive 10pt ROS negative except as noted per history.    Past medical, family, surgical, and social history reviewed and unchanged from initial consult note.     Objective:       VITAL SIGNS: 24 HR MIN & MAX LAST    Temp  Min: 98 °F (36.7 °C)  Max: 98.6 °F (37 °C)  98.6 °F (37 °C)        BP  Min: 124/67  Max: 170/92  (!) 170/92     Pulse  Min: 56  Max: 72  (!) 56     Resp  Min: 17  Max: 21  19    SpO2  Min: 93 %  Max: 100 %  98 %      GEN:  Chronically ill-appearing AAM in NAD  CV: RRR +S1,S2 without murmur  PULM: unlabored, RA  ABD: Soft, NT/ND abdomen with NABS  EXT:  LLE amputation  SKIN: Warm and dry  PSYCH: Awake, alert and appropriately conversant.   Dialysis access:  No dialysis access            Component Value Date/Time     12/20/2023 0528     12/19/2023 0516     02/22/2021 1246    K 4.5 12/20/2023 0528    K 4.4 12/19/2023 0516    K 3.6 02/22/2021 1246    CHLORIDE 102 12/20/2023 0528    CHLORIDE 104 12/19/2023 0516    CHLORIDE 106 02/22/2021 1246    CO2 25  12/20/2023 0528    CO2 25 12/19/2023 0516    CO2 26 02/22/2021 1246    BUN 74.1 (H) 12/20/2023 0528    BUN 74.6 (H) 12/19/2023 0516    BUN 15.0 02/22/2021 1246    CREATININE 2.52 (H) 12/20/2023 0528    CREATININE 2.52 (H) 12/19/2023 0516    CREATININE 0.98 02/22/2021 1246    CALCIUM 7.8 (L) 12/20/2023 0528    CALCIUM 8.1 (L) 12/19/2023 0516    CALCIUM 8.5 02/22/2021 1246    PHOS 5.8 (H) 12/20/2023 0528            Component Value Date/Time    WBC 8.56 12/20/2023 0528    WBC 8.88 12/18/2023 0448    HGB 9.7 (L) 12/20/2023 0528    HGB 9.1 (L) 12/18/2023 0448    HCT 30.7 (L) 12/20/2023 0528    HCT 28.1 (L) 12/18/2023 0448    HCT 43 11/13/2022 1428     12/20/2023 0528     12/18/2023 0448         Imaging reviewed      Assessment / Plan:   FABIO s/t acute infection and hypervolemia   CKD IIIa   -biopsy proven in April 2023 diabetic nodular sclerosis   -Nephrotic range proteinuria         -March 2023 - - 10.9 g        -Now 5.9 g   Anasarca   S/p Fall with resulting back pain. Remote back surgery   Cirrhosis of the liver  DM I onset age 14 with long standing history of noncompliance. Hgb A1c improving.   Severe spinal stenosis - per NSGY. Recommends medication titration and follow up in 3 months      Plan:  Continue twice daily Torsemide.   OK to dc from renal standpoint  If he remains inpatient we will continue to monitor renal function and response to diuretics.

## 2023-12-20 NOTE — PROGRESS NOTES
Inpatient Nutrition Assessment    Admit Date: 11/25/2023   Total duration of encounter: 25 days   Patient Age: 48 y.o.    Nutrition Recommendation/Prescription     Continue diabetic diet as tolerated  Continue Boost Glucose Control daily to provide 190 kcal and 16 g protein per serving  Continue digestive enzymes with meals.   Daily weights  RD to monitor po intake and weight  Medical management of glucose per MD for more optimal glucose control.     Communication of Recommendations: reviewed with patient    Nutrition Assessment     Malnutrition Assessment/Nutrition-Focused Physical Exam    Malnutrition Context: chronic illness (12/01/23 1157)  Malnutrition Level: moderate (12/01/23 1157)  Energy Intake (Malnutrition): other (see comments) (does not meet criteria) (12/01/23 1157)  Weight Loss (Malnutrition): other (see comments) (does not meet criteria) (12/01/23 1157)              Muscle Mass (Malnutrition): mild depletion (12/01/23 1157)  [place] (Muscle Loss): mild depletion                       Fluid Accumulation (Malnutrition): severe (12/01/23 1157)        A minimum of two characteristics is recommended for diagnosis of either severe or non-severe malnutrition.    Chart Review    Reason Seen: length of stay and follow-up    Malnutrition Screening Tool Results   Have you recently lost weight without trying?: No  Have you been eating poorly because of a decreased appetite?: Yes   MST Score: 1   Diagnosis:  Acute on chronic lower back pain- intractable- suspected osteomyelitis  Dizziness, Fall- from wheel chair  Hypoglycemia- recurrent-resolved  Acute on chronic kidney disease stage III  Anasarca with volume overload  with peripheral edema  Hypertensive urgency at admission-resolving    Relevant Medical History: CHF, chronic back pain wheelchair-bound, DM 2, HTN, seizures, CVA, CKD, alcoholic liver disease with chronic pancreatitis , L BKA    Nutrition-Related Medications: 16 units insulin detemir BID,  "lipase-protease-amylase TID, ergocalciferol, torsemide    Calorie Containing IV Medications: no significant kcals from medications at this time    Nutrition-Related Labs:   12/1: RBC-3.01, H/H-8.7/26.6, Cl-116, BUN-37.8, creat-2.46, glu-118, boogie-7.3  12/6: Cl 110, BUN 46.1, Cr 2.07, GFR 39, Mag 1.00, Alb 1.7  12/11: BUN 50.9, Crea 2.28, GFR 35, Gluc 179, Mag 1.40  12/15: BUN 65.7, Crea 2.26, GFR 35, Gluc 150  12/20: Glu 300, GFR 31    Nutrition Orders:   Diet diabetic Cardiac  Dietary nutrition supplements Boost Glucose Control Vanilla; Daily    Appetite/Oral Intake: good/% of meals    Factors Affecting Nutritional Intake: none identified    Food/[place]/Cultural Preferences: none reported    Food Allergies: no known food allergies    Wound(s):      Altered Skin Integrity 11/26/23 1639 Left posterior Buttocks Other (comment) Intact skin with non-blanchable redness of localized area-Tissue loss description: Not applicable     Last Bowel Movement: 12/19/23    Comments    12/1/23: pt reports good appetite, denies decreased appetite prior admission. Complained of diarrhea, continue digestive enzymes. UBW ~215 lb with recent weight gain r/t fluid retention. Per notes, pt with 4+ pitting anasarca. Per NFPA, pt with mild muscle loss. Pt agreed to ONS daily.    12/6/23: Patient reports good oral intake, denies nausea/vomiting/diarrhea/constipation. Drinking oral nutrition supplement daily.    12/11/23: Patient reports good oral intake, drinking oral nutrition supplement daily.     12/15/23: Patient sleeping during rounding attempts. Caregiver reports good oral intake, drinking nutrition supplements.     12/20/23: Pt reports good po intake/appetite; drinking Boost.     Anthropometrics    Height: 5' 7.72" (172 cm) Height Method: Measured  Last Weight: 92.5 kg (204 lb) (12/20/23 0528) Weight Method: Bed Scale  BMI (Calculated): 31.3  BMI Classification: obese grade III (BMI >/=40)        Ideal Body Weight (IBW), Male: " 152.32 lb     % Ideal Body Weight, Male (lb): 183.82 %                 Usual Body Weight (UBW), k.7 kg  % Usual Body Weight: 130.27     Usual Weight Provided By: patient and EMR weight history    Wt Readings from Last 5 Encounters:   23 92.5 kg (204 lb)   23 101.3 kg (223 lb 5.2 oz)   22 87 kg (191 lb 12.8 oz)   21 93.8 kg (206 lb 12.7 oz)   21 93.8 kg (206 lb 12.7 oz)     Weight Change(s) Since Admission:   Wt Readings from Last 1 Encounters:   23 0552 92.5 kg (204 lb)   23 0440 88.5 kg (195 lb)   23 0500 92.5 kg (204 lb)   23 0519 93.9 kg (207 lb)   23 0600 94.8 kg (209 lb)   12/15/23 0556 98.4 kg (217 lb)   23 0526 99.8 kg (220 lb)   23 0611 101.6 kg (224 lb)   23 1409 100.4 kg (221 lb 7.2 oz)   23 0500 106.1 kg (234 lb)   23 0611 104.3 kg (230 lb)   23 0500 107.5 kg (237 lb)   23 0700 111.6 kg (246 lb)   23 1128 127 kg (280 lb)   23 1539 127 kg (280 lb)   23 0627 127 kg (280 lb)   Admit Weight: 127 kg (280 lb) (23 0627), Weight Method: Stated  23: 92.5 kg    Estimated Needs    Weight Used For Calorie Calculations: 97.7 kg (215 lb 6.2 oz) (UBW used)  Energy Calorie Requirements (kcal): 6610-2910 kcal (1.0-1.1 stress factor)  [facility]  Weight Used For Protein Calculations: 97.7 kg (215 lb 6.2 oz) (UBW used)  Protein Requirements: 78-98 g (0.8-1.0 g/kg)  Fluid Requirements (mL): 1827 ml (1 ml/kcal)  Temp (24hrs), Av.3 °F (36.8 °C), Min:98 °F (36.7 °C), Max:98.6 °F (37 °C)       Enteral Nutrition    Patient not receiving enteral nutrition at this time.    Parenteral Nutrition    Patient not receiving parenteral nutrition support at this time.    Evaluation of Received Nutrient Intake    Calories: meeting estimated needs  Protein: meeting estimated needs    Patient Education    Not applicable.    Nutrition Diagnosis     PES: Unintended weight gain  related to  fluid retention as evidenced by 4+ pitting anasarca. (active)    PES: Malnutrition related to chronic illness as evidenced by mild muscle depletion and severe fluid accumulation. (active)     Interventions/Goals     Intervention(s): general/healthful diet, commercial beverage, prescription medication, and collaboration with other providers    Goal: Consume % of oral supplements by follow-up. (goal met)    Monitoring & Evaluation     Dietitian will monitor food and beverage intake, weight change, electrolyte/renal panel, glucose/endocrine profile, and gastrointestinal profile.    Nutrition Risk/Follow-Up: moderate (follow-up in 3-5 days)   Please consult if re-assessment needed sooner.

## 2023-12-20 NOTE — PT/OT/SLP RE-EVAL
Occupational Therapy   Re-evaluation    Name: Kamran Huerta  MRN: [id number]  Admitting Diagnosis: moderate malnutrition  Recent Surgery: * No surgery found *      Recommendations:     Discharge therapy intensity: Moderate Intensity Therapy   Discharge Equipment Recommendations:  lift device  Barriers to discharge:       Assessment: pt presents from the NH with PMH of CVA with L side weakness, L BKA.  He presents with good effort with activity, improving significantly with strength, mobility and ADL's, he also presents with the following performance deficits affecting function: weakness, impaired endurance, impaired self care skills, impaired functional mobility, impaired balance, decreased upper extremity function, decreased lower extremity function.    Rehab Prognosis: good; patient would benefit from acute skilled OT services to address these deficits and reach maximum level of function.       Plan:     Patient to be seen 3 x/week to address the above listed problems via self-care/home management, therapeutic activities, therapeutic exercises  Plan of Care Expires: 01/17/24  Plan of Care Reviewed with: patient    Subjective     Chief Complaint: none stated  Patient/Family Comments/goals: continue to improve with strength and mobility and independence.     Pain/Comfort:  Pain Rating 1: 0/10    Patients cultural, spiritual, Ho[place]ism conflicts given the current situation: no    Objective:     OT communicated with nsg prior to session.      Patient was found up in chair with peripheral IV upon OT entry to room.    General Precautions: Standard, contact, fall, seizure  Orthopedic Precautions: N/A  Braces: N/A    Vital Signs:     Bed Mobility:        Functional Mobility/Transfers:    Functional Mobility: able to lift bottom from chair x 5 reps    Activities of Daily Living:  [place]/donning socks seated up in chair with max assist    AMPAC 6 Click ADL:  AMPAC Total Score:      Functional  Cognition:  intact    Visual Perceptual Skills:  intact    Upper Extremity Function:  Right Upper Extremity:   5/5    Left Upper Extremity:  4/5    Balance:   Good seated unsupported    Additional Treatment:  Performed BUE theraband exercises seated up in chair, triceps dips x5 reps      Patient Education:  Patient provided with verbal education education regarding OT role/goals/POC, safety awareness, Discharge/DME recommendations, and pressure ulcer prevention.  Understanding was verbalized.     Patient left up in chair with call button in reach    GOALS:   Multidisciplinary Problems       Occupational Therapy Goals          Problem: Occupational Therapy    Goal Priority Disciplines Outcome Interventions   Occupational Therapy Goal     OT, PT/OT Ongoing, Progressing    Description: Goals to be met by: 12/25/23     Patient will increase functional independence with ADLs by performing:  LTG: Pt will perform basic ADLs and ADL transfers with Modified independence using LRAD by discharge.    STG: to be met by 12/25/23    Pt will complete grooming sitting with  with SBA.  Pt will complete UB dressing with SBA.  Pt will complete LB dressing with mod assist using LRAD.  Pt will complete toileting with mod assist using LRAD.  Pt will complete functional mobility to/from bedside commode and toilet transfer with max assist using LRAD.                              History:     Past Medical History:   Diagnosis Date    CHF (congestive heart failure)     Chronic back pain     CVA (cerebral vascular accident) 01/2023    DM (diabetes mellitus)     HTN (hypertension)     Seizures          Past Surgical History:   Procedure Laterality Date    BACK SURGERY      EGD, WITH CLOSED BIOPSY  3/15/2023    Procedure: EGD, WITH CLOSED BIOPSY;  Surgeon: Tj Faith MD;  Location: [facility] ENDOSCOPY;  Service: Gastroenterology;;    ESOPHAGOGASTRODUODENOSCOPY N/A 3/15/2023    Procedure: EGD;  Surgeon: Tj Faith MD;  Location: [facility] ENDOSCOPY;  Service: Gastroenterology;  Laterality: N/A;    TOE AMPUTATION Right 3/24/2023    Procedure: AMPUTATION, TOE;  Surgeon: Emre Vazquez DPM;  Location: [facility];  Service: Podiatry;  Laterality: Right;       Time Tracking:     OT Date of Treatment: 12/20/23  OT Start Time: 1044  OT Stop Time: 1054  OT Total Time (min): 10 min    Billable Minutes:re-eval 10 min  12/20/2023

## 2023-12-20 NOTE — PROGRESS NOTES
Patient seen and examined  Complains of low back pain and intermittent leg pain. No sensory changes.    MRI L spine reviewed.  Final MRI report:  At L4-5, there is disc bulge, facet hypertrophy and mild narrowing of the spinal canal.  There is mild-to-moderate left neural foraminal narrowing  At L5-S1, there is grade 1 retrolisthesis of L5 with suspected large central disc protrusion measuring 9 mm in AP dimension and facet hypertrophy with severe narrowing of the canal and impingement on the descending bilateral S1 nerve roots, left greater than right.  There is moderate to severe bilateral foraminal stenosis.    L BKA with good strength proximally  RLE:  4+/5  Sensation appears intact to light touch    Recommend  Continue medical management with Lyrica/Duloxetine, titrate up as needed  Outpatient referral to pain management for consideration of interventional options  Will schedule follow-up in 3 months in neurosurgery clinic    Justo Ramsay MD  Neurosurgery

## 2023-12-20 NOTE — PROGRESS NOTES
[facility] Hospital Medicine Progress Note        Chief Complaint: Inpatient Follow-up for Anasarca    HPI: 48 y.o. male who PMH includes CHF, chronic back pain wheelchair-bound, DM type 2, HTN, seizures, CVA, CKD stage 3, alcoholic liver disease with chronic pancreatitis, presents to the ED at [facility] on 11/25/2023 with a primary complaint of weakness, dizziness lightheadedness and fall out if his wheelchair striking his mouth; denies any LOC.  PT had back surgery years ago and reports has been wheelchair bound since then. No reports of seizures.  No CP, SOB, N/V/D, fever, chills cough congestion or any sick contacts. Labs reviewed demonstrated WBC 14.42, HH 12.2/37.2, sed rate 93 , CO2 18 Bun 25.7, Creat 2.31, glucose 52 Mg+ 1.0, , AST 36 reports of chest pain, CRP shortness a breath, nausea, vomiting, diarrhea5, fever., [id number], cough, congestion, or any sick, contacts. B no reportsP of chest pain, 109.7; other indicis unremarkable. CXR impression reviewed demonstrated  no acute cardiopulmonary abnormality. CT of head without contrast impression reviewed demonstrated no appreciable acute intracranial abnormality. Ct maxillofacial without contrast impression reviewed demonstrated no appreciate acute traumatic osseous abnormality, bilateral mastoid effusions. CT cervical spine without contrast impression reviewed demonstrated no appreciable acute osseous abnormality by CT evaluation, degenerative changes at the cervical spine. CT thoracic spine without contrast impression reviewed demonstrated no acute osseous abnormality. CT of lumbar spine without contrast impression reviewed demonstrated no appreciable fracture or acute osseous abnormality, severe degenerative change at L5-S1 progressed, endplate sclerosis disc space narrowing and osseous erosions which may be degenerative or infectious/inflammatory, anasarca.   Initial VS /105 P 75 R 13 T 98.7F O2 saturation 98%  on room air. Pt received multiple doses of pain medication , D50 for hypoglycemia, 40 mg lasix,  and magnesium rider in the ED. Neurosurgery services consulted. Pt awaiting MRI. Pt is admitted to hospital medicine services for further management.  Nephrology was consulted and patient was started on Lasix albumin drip.  Segovia was inserted due to possible outlet obstruction in the setting of anasarca.  MRI of lumbar spine was not helpful due to significant fluid collection.  Nephrology following.  Recommended to continue IV Lasix 60 mg t.i.d. and metolazone 10 mg daily for now and when patient gets closer to discharge switch to oral    Interval Hx:   Neurosurgery evaluated deficient, recommended to continue the medical management and consider outpatient referral to pain management for consideration of interventional options.  Recommended scheduling follow up in 3 months and neurosurgery clinic.    Chart was reviewed, afebrile, blood pressure slightly on the higher side, most recent labs were reviewed.  Blood sugars on the higher side    Objective/physical exam:  General: In no acute distress, afebrile, more truncal obesity, large neck circumference  Chest: Clear to auscultation bilaterally anteriorly  Heart: RRR, +S1, S2, no appreciable murmur  Abdomen: Soft, nontender, BS +  MSK: Warm,  edema improving, Left BKA  Neurologic:  Awake alert and oriented    VITAL SIGNS: 24 HRS MIN & MAX LAST   Temp  Min: 98 °F (36.7 °C)  Max: 98.6 °F (37 °C) 98.1 °F (36.7 °C)   BP  Min: 124/67  Max: 170/92 (!) 160/92   Pulse  Min: 56  Max: 72  62   Resp  Min: 17  Max: 21 18   SpO2  Min: 97 %  Max: 100 % 98 %     I have reviewed the following labs:  Recent Labs   Lab 12/15/23  0456 12/18/23  0448 12/20/23  0528   WBC 10.32 8.88 8.56   RBC 3.37* 3.15* 3.38*   HGB 9.8* 9.1* 9.7*   HCT 29.8* 28.1* 30.7*   MCV 88.4 89.2 90.8   MCH 29.1 28.9 28.7   MCHC 32.9* 32.4* 31.6*   RDW 13.8 13.6 13.1    239 270   MPV 11.8* 12.3* 12.0*     Recent  Labs   Lab 12/15/23  0457 12/18/23  0448 12/19/23  0516 12/20/23  0528    137 136 136   K 4.5 4.3 4.4 4.5   CO2 25 25 25 25   BUN 65.7* 69.8* 74.6* 74.1*   CREATININE 2.26* 2.40* 2.52* 2.52*   CALCIUM 7.8* 7.8* 8.1* 7.8*   MG 1.70 1.80  --   --    ALBUMIN  --  1.8*  --  1.9*     Microbiology Results (last 7 days)       ** No results found for the last 168 hours. **           See below for Radiology    Scheduled Med:   atenoloL  50 mg Oral BID    doxazosin  2 mg Oral QHS    DULoxetine  20 mg Oral BID    enoxparin  40 mg Subcutaneous Daily    ergocalciferol  50,000 Units Oral Q72H    insulin detemir U-100  16 Units Subcutaneous BID    levETIRAcetam  500 mg Oral TID    lipase-protease-amylase 24,000-76,000-120,000 units  3 capsule Oral TID    magnesium oxide  400 mg Oral BID    miconazole NITRATE 2 %   Topical (Top) BID    naloxegoL  25 mg Oral Daily    NIFEdipine  90 mg Oral Daily    perflutren lipid microspheres  1.4 mL Intravenous Once    polyethylene glycol  17 g Oral BID    pregabalin  75 mg Oral QHS    torsemide  40 mg Oral BID loop    white petrolatum   Topical (Top) BID    zinc oxide-cod liver oil   Topical (Top) BID      Continuous Infusions:     PRN Meds:  acetaminophen, aluminum-magnesium hydroxide-simethicone, dextrose 10%, dextrose 10%, glucagon (human recombinant), glucose, glucose, hydrALAZINE, insulin aspart U-100, labetaloL, naloxone, ondansetron, oxyCODONE, simethicone, white petrolatum     Assessment/Plan:  Anasarca- improved  Acute on chronic kidney disease stage III, Nephrotic range proteinuria  Cirrhosis of liver  Acute on chronic lower back pain- intractable--> severe canal stenosis at L5-S1 and mild canal stenosis at L4-L5 per MRI on 12/14  Dizziness, Fall- from wheel chair  Hypoglycemia in  T2 DM-  A1c 5.5- resolved  Hypertensive urgency at admission-resolving  ESBL E coli in urine- possibly asymptomatic bacteriuria  Hypomagnesemia- replaced  HX: Wheelchair-bound, history of CVA with  residual left-sided deficits, left BKA, chronic alcoholic liver disease, chronic seizures  Moderate Malnutrition      Nephrology followed.  Continue torsemide 40 mg p.o. b.i.d. daily per Nephrology with recommendation to discharge him on the same dose.  Patient to follow up with his primary nephrologist Dr. Bari payne in [place] upon discharge  Monitor electrolytes and replete, keep potassium greater than 4 and magnesium greater than 2  Patient complained of intractable acute on chronic back pain, Neurosurgery evaluated the patient, MRI lumbar done on 12/14-large disc protrusion at L5-S1 with severe narrowing of the canal, mild narrowing of the spinal canal at L4-L5  Neurosurgery recommended to continue medical management, consider outpatient follow up, consider referral to pain management  Constipation resolved with Relistor x1, start MiraLax b.i.d.  Complained of abdominal cramping, ordered Bentyl x2 days--> much improved   Analgesics p.r.n. p.o. pain medication.  Patient again asking for IV Dilaudid, IV morphine or p.o. Dilaudid.  Informed patient that are not indicated  Hypoglycemia resolved, now hyperglycemia with blood glucose greater than 250.  Increase Levemir [id number] b.i.d. monitor blood sugars  Therapy services following, recommended moderate intensity  Case management on board for SNF  Continue supportive care, appropriate home medications  Fall precautions, decubitus precautions      VTE prophylaxis:  Lovenox    Anticipated discharge and Disposition:  SNF, pending neurosurgery recommendations     All diagnosis and differential diagnosis have been reviewed; assessment and plan has been documented; I have personally reviewed the labs and test results that are presently available; I have reviewed the patients medication list; I have reviewed the consulting providers response and recommendations. I have reviewed or attempted to review medical records based upon their availability    All of the  patient's questions have been  addressed and answered. Patient's is agreeable to the above stated plan. I will continue to monitor closely and make adjustments to medical management as needed.  _____________________________________________________________________    Nutrition Status:  Patient meets ASPEN criteria for moderate malnutrition of chronic illness per RD assessment as evidenced by:  Energy Intake (Malnutrition): other (see comments) (does not meet criteria)  Weight Loss (Malnutrition): other (see comments) (does not meet criteria)     Muscle Mass (Malnutrition): mild depletion  Fluid Accumulation (Malnutrition): severe        A minimum of two characteristics is recommended for diagnosis of either severe or non-severe malnutrition.    Radiology:  I have personally reviewed the following imaging and agree with the radiologist.     MRI Lumbar Spine Without Contrast  Narrative: EXAMINATION:  MRI LUMBAR SPINE WITHOUT CONTRAST    CLINICAL HISTORY:  Low back pain;    TECHNIQUE:  Multiplanar multisequence MR images of the lumbar spine are obtained without contrast.    COMPARISON:  CT lumbar spine dated 11/25/2023, MRI lumbar spine dated 01/16/2021    FINDINGS:  Evaluation is limited by artifact and motion.  There is grade 1 retrolisthesis of L5 over S1.  The vertebral body heights are maintained.  There is suspected mild endplate edema at L5-S1, nonspecific.  There are postoperative changes of prior partial left-sided L5-S1 laminectomy.    The conus terminates at the level of L1.    At L4-5, there is disc bulge, facet hypertrophy and mild narrowing of the spinal canal.  There is mild-to-moderate left neural foraminal narrowing    At L5-S1, there is grade 1 retrolisthesis of L5 with suspected large central disc protrusion measuring 9 mm in AP dimension and facet hypertrophy with severe narrowing of the canal and impingement on the descending bilateral S1 nerve roots, left greater than right.  There is moderate to  severe bilateral foraminal stenosis.    There is subcutaneous edema in the posterior paraspinal soft tissues.  There is no definite visible drainable fluid collection.  Impression: 1. Suspected large disc protrusion at L5-S1 with severe narrowing of the canal.  2. Mild narrowing of the spinal canal at L4-5.  3. Multilevel neural foraminal stenoses as described.    Electronically signed by: Mirela Granados  Date:    12/14/2023  Time:    15:01    Michelle Camacho MD  Department of Hospital Medicine   [facility]   12/20/2023

## 2023-12-21 LAB
ANION GAP SERPL CALC-SCNC: 9 MEQ/L
BUN SERPL-MCNC: 77.1 MG/DL (ref 8.9–20.6)
CALCIUM SERPL-MCNC: 7.6 MG/DL (ref 8.4–10.2)
CHLORIDE SERPL-SCNC: 102 MMOL/L (ref 98–107)
CO2 SERPL-SCNC: 24 MMOL/L (ref 22–29)
CREAT SERPL-MCNC: 2.64 MG/DL (ref 0.73–1.18)
CREAT/UREA NIT SERPL: 29
GFR SERPLBLD CREATININE-BSD FMLA CKD-EPI: 29 MLS/MIN/1.73/M2
GLUCOSE SERPL-MCNC: 312 MG/DL (ref 74–100)
POCT GLUCOSE: 195 MG/DL (ref 70–110)
POCT GLUCOSE: 248 MG/DL (ref 70–110)
POCT GLUCOSE: 289 MG/DL (ref 70–110)
POTASSIUM SERPL-SCNC: 4.4 MMOL/L (ref 3.5–5.1)
SODIUM SERPL-SCNC: 135 MMOL/L (ref 136–145)

## 2023-12-21 PROCEDURE — 25000003 PHARM REV CODE 250: Performed by: NURSE PRACTITIONER

## 2023-12-21 PROCEDURE — 27000207 HC ISOLATION

## 2023-12-21 PROCEDURE — 25000003 PHARM REV CODE 250: Performed by: INTERNAL MEDICINE

## 2023-12-21 PROCEDURE — 63600175 PHARM REV CODE 636 W HCPCS: Performed by: INTERNAL MEDICINE

## 2023-12-21 PROCEDURE — 80048 BASIC METABOLIC PNL TOTAL CA: CPT | Performed by: INTERNAL MEDICINE

## 2023-12-21 PROCEDURE — 21400001 HC TELEMETRY ROOM

## 2023-12-21 PROCEDURE — 63600175 PHARM REV CODE 636 W HCPCS: Performed by: NURSE PRACTITIONER

## 2023-12-21 RX ORDER — HYDRALAZINE HYDROCHLORIDE 25 MG/1
25 TABLET, FILM COATED ORAL EVERY 6 HOURS
Status: DISCONTINUED | OUTPATIENT
Start: 2023-12-22 | End: 2023-12-22

## 2023-12-21 RX ORDER — TORSEMIDE 20 MG/1
40 TABLET ORAL DAILY
Status: DISCONTINUED | OUTPATIENT
Start: 2023-12-22 | End: 2023-12-22 | Stop reason: HOSPADM

## 2023-12-21 RX ORDER — HYDRALAZINE HYDROCHLORIDE 25 MG/1
25 TABLET, FILM COATED ORAL EVERY 8 HOURS
Status: DISCONTINUED | OUTPATIENT
Start: 2023-12-21 | End: 2023-12-21

## 2023-12-21 RX ADMIN — LEVETIRACETAM 500 MG: 500 TABLET, FILM COATED ORAL at 09:12

## 2023-12-21 RX ADMIN — Medication: at 09:12

## 2023-12-21 RX ADMIN — NIFEDIPINE 90 MG: 30 TABLET, FILM COATED, EXTENDED RELEASE ORAL at 09:12

## 2023-12-21 RX ADMIN — ENOXAPARIN SODIUM 40 MG: 40 INJECTION SUBCUTANEOUS at 05:12

## 2023-12-21 RX ADMIN — LEVETIRACETAM 500 MG: 500 TABLET, FILM COATED ORAL at 03:12

## 2023-12-21 RX ADMIN — OXYCODONE HYDROCHLORIDE 10 MG: 5 TABLET ORAL at 01:12

## 2023-12-21 RX ADMIN — HYDRALAZINE HYDROCHLORIDE 25 MG: 25 TABLET, FILM COATED ORAL at 11:12

## 2023-12-21 RX ADMIN — ATENOLOL 50 MG: 50 TABLET ORAL at 09:12

## 2023-12-21 RX ADMIN — DULOXETINE HYDROCHLORIDE 20 MG: 20 CAPSULE, DELAYED RELEASE ORAL at 09:12

## 2023-12-21 RX ADMIN — INSULIN ASPART 3 UNITS: 100 INJECTION, SOLUTION INTRAVENOUS; SUBCUTANEOUS at 06:12

## 2023-12-21 RX ADMIN — DULOXETINE HYDROCHLORIDE 20 MG: 20 CAPSULE, DELAYED RELEASE ORAL at 10:12

## 2023-12-21 RX ADMIN — POLYETHYLENE GLYCOL 3350 17 G: 17 POWDER, FOR SOLUTION ORAL at 09:12

## 2023-12-21 RX ADMIN — OXYCODONE HYDROCHLORIDE 10 MG: 5 TABLET ORAL at 10:12

## 2023-12-21 RX ADMIN — PANCRELIPASE 3 CAPSULE: 24000; 76000; 120000 CAPSULE, DELAYED RELEASE PELLETS ORAL at 03:12

## 2023-12-21 RX ADMIN — INSULIN ASPART 1 UNITS: 100 INJECTION, SOLUTION INTRAVENOUS; SUBCUTANEOUS at 08:12

## 2023-12-21 RX ADMIN — INSULIN DETEMIR 20 UNITS: 100 INJECTION, SOLUTION SUBCUTANEOUS at 09:12

## 2023-12-21 RX ADMIN — OXYCODONE HYDROCHLORIDE 10 MG: 5 TABLET ORAL at 06:12

## 2023-12-21 RX ADMIN — OXYCODONE HYDROCHLORIDE 10 MG: 5 TABLET ORAL at 09:12

## 2023-12-21 RX ADMIN — DOXAZOSIN 2 MG: 1 TABLET ORAL at 09:12

## 2023-12-21 RX ADMIN — OXYCODONE HYDROCHLORIDE 10 MG: 5 TABLET ORAL at 03:12

## 2023-12-21 RX ADMIN — NALOXEGOL OXALATE 25 MG: 25 TABLET, FILM COATED ORAL at 09:12

## 2023-12-21 RX ADMIN — PREGABALIN 75 MG: 50 CAPSULE ORAL at 09:12

## 2023-12-21 RX ADMIN — PANCRELIPASE 3 CAPSULE: 24000; 76000; 120000 CAPSULE, DELAYED RELEASE PELLETS ORAL at 10:12

## 2023-12-21 RX ADMIN — TORSEMIDE 40 MG: 20 TABLET ORAL at 09:12

## 2023-12-21 RX ADMIN — Medication 400 MG: at 09:12

## 2023-12-21 RX ADMIN — MICONAZOLE NITRATE: 20 POWDER TOPICAL at 09:12

## 2023-12-21 RX ADMIN — WHITE PETROLATUM: 1.75 OINTMENT TOPICAL at 09:12

## 2023-12-21 RX ADMIN — INSULIN DETEMIR 18 UNITS: 100 INJECTION, SOLUTION SUBCUTANEOUS at 09:12

## 2023-12-21 RX ADMIN — PANCRELIPASE 3 CAPSULE: 24000; 76000; 120000 CAPSULE, DELAYED RELEASE PELLETS ORAL at 09:12

## 2023-12-21 RX ADMIN — HYDRALAZINE HYDROCHLORIDE 25 MG: 25 TABLET, FILM COATED ORAL at 09:12

## 2023-12-21 NOTE — PROGRESS NOTES
Inpatient Nutrition Assessment    Admit Date: 11/25/2023   Total duration of encounter: 26 days   Patient Age: 48 y.o.    Nutrition Recommendation/Prescription     Continue diabetic diet as tolerated  Continue Boost Glucose Control daily to provide 190 kcal and 16 g protein per serving  Continue digestive enzymes with meals.   Daily weights  RD to monitor po intake and weight  Medical management of glucose per MD for more optimal glucose control.     Communication of Recommendations: reviewed with patient    Nutrition Assessment     Malnutrition Assessment/Nutrition-Focused Physical Exam    Malnutrition Context: chronic illness (12/01/23 1157)  Malnutrition Level: moderate (12/01/23 1157)  Energy Intake (Malnutrition): other (see comments) (does not meet criteria) (12/01/23 1157)  Weight Loss (Malnutrition): other (see comments) (does not meet criteria) (12/01/23 1157)              Muscle Mass (Malnutrition): mild depletion (12/01/23 1157)  [place] (Muscle Loss): mild depletion                       Fluid Accumulation (Malnutrition): severe (12/01/23 1157)        A minimum of two characteristics is recommended for diagnosis of either severe or non-severe malnutrition.    Chart Review    Reason Seen: length of stay and follow-up    Malnutrition Screening Tool Results   Have you recently lost weight without trying?: No  Have you been eating poorly because of a decreased appetite?: Yes   MST Score: 1   Diagnosis:  Acute on chronic lower back pain- intractable- suspected osteomyelitis  Dizziness, Fall- from wheel chair  Hypoglycemia- recurrent-resolved  Acute on chronic kidney disease stage III  Anasarca with volume overload  with peripheral edema  Hypertensive urgency at admission-resolving    Relevant Medical History: CHF, chronic back pain wheelchair-bound, DM 2, HTN, seizures, CVA, CKD, alcoholic liver disease with chronic pancreatitis , L BKA    Nutrition-Related Medications: 16 units insulin detemir BID,  "lipase-protease-amylase TID, ergocalciferol, torsemide    Calorie Containing IV Medications: no significant kcals from medications at this time    Nutrition-Related Labs:   12/1: RBC-3.01, H/H-8.7/26.6, Cl-116, BUN-37.8, creat-2.46, glu-118, boogie-7.3  12/6: Cl 110, BUN 46.1, Cr 2.07, GFR 39, Mag 1.00, Alb 1.7  12/11: BUN 50.9, Crea 2.28, GFR 35, Gluc 179, Mag 1.40  12/15: BUN 65.7, Crea 2.26, GFR 35, Gluc 150  12/20: Glu 300, GFR 31  12/21: Na 135, Glu 312, GFR 29    Nutrition Orders:   Diet diabetic Cardiac  Dietary nutrition supplements Boost Glucose Control Vanilla; Daily    Appetite/Oral Intake: good/% of meals    Factors Affecting Nutritional Intake: none identified    Food/Yazdanism/Cultural Preferences: none reported    Food Allergies: no known food allergies    Wound(s):      Altered Skin Integrity 11/26/23 1639 Left posterior Buttocks Other (comment) Intact skin with non-blanchable redness of localized area-Tissue loss description: Not applicable     Last Bowel Movement: 12/19/23    Comments    12/1/23: pt reports good appetite, denies decreased appetite prior admission. Complained of diarrhea, continue digestive enzymes. UBW ~215 lb with recent weight gain r/t fluid retention. Per notes, pt with 4+ pitting anasarca. Per NFPA, pt with mild muscle loss. Pt agreed to ONS daily.    12/6/23: Patient reports good oral intake, denies nausea/vomiting/diarrhea/constipation. Drinking oral nutrition supplement daily.    12/11/23: Patient reports good oral intake, drinking oral nutrition supplement daily.     12/15/23: Patient sleeping during rounding attempts. Caregiver reports good oral intake, drinking nutrition supplements.     12/20/23: Pt reports good po intake/appetite; drinking Boost.     12/21/23: Good po intake continues; noted glucose >300.    Anthropometrics    Height: 5' 7.72" (172 cm) Height Method: Measured  Last Weight: 91.6 kg (202 lb) (12/21/23 0114) Weight Method: Bed Scale  BMI (Calculated): " 31  BMI Classification: obese grade III (BMI >/=40)        Ideal Body Weight (IBW), Male: 152.32 lb     % Ideal Body Weight, Male (lb): 183.82 %                 Usual Body Weight (UBW), k.7 kg  % Usual Body Weight: 130.27     Usual Weight Provided By: patient and EMR weight history    Wt Readings from Last 5 Encounters:   23 91.6 kg (202 lb)   23 101.3 kg (223 lb 5.2 oz)   22 87 kg (191 lb 12.8 oz)   21 93.8 kg (206 lb 12.7 oz)   21 93.8 kg (206 lb 12.7 oz)     Weight Change(s) Since Admission:   Wt Readings from Last 1 Encounters:   23 0429 91.6 kg (202 lb)   23 0552 92.5 kg (204 lb)   23 0440 88.5 kg (195 lb)   23 0500 92.5 kg (204 lb)   23 0519 93.9 kg (207 lb)   23 0600 94.8 kg (209 lb)   12/15/23 0556 98.4 kg (217 lb)   23 0526 99.8 kg (220 lb)   23 0611 101.6 kg (224 lb)   23 1409 100.4 kg (221 lb 7.2 oz)   23 0500 106.1 kg (234 lb)   23 0611 104.3 kg (230 lb)   23 0500 107.5 kg (237 lb)   23 0700 111.6 kg (246 lb)   23 1128 127 kg (280 lb)   23 1539 127 kg (280 lb)   23 0627 127 kg (280 lb)   Admit Weight: 127 kg (280 lb) (23 0627), Weight Method: Stated  23: 92.5 kg  23: 91.6 kg, monitoring     Estimated Needs    Weight Used For Calorie Calculations: 97.7 kg (215 lb 6.2 oz) (UBW used)  Energy Calorie Requirements (kcal): 2671-7710 kcal (1.0-1.1 stress factor)  Energy Need Method: Nazlini- Jeor  Weight Used For Protein Calculations: 97.7 kg (215 lb 6.2 oz) (UBW used)  Protein Requirements: 78-98 g (0.8-1.0 g/kg)  Fluid Requirements (mL): 1827 ml (1 ml/kcal)  Temp (24hrs), Av.1 °F (36.7 °C), Min:97.5 °F (36.4 °C), Max:98.4 °F (36.9 °C)       Enteral Nutrition    Patient not receiving enteral nutrition at this time.    Parenteral Nutrition    Patient not receiving parenteral nutrition support at this time.    Evaluation of Received Nutrient  Intake    Calories: meeting estimated needs  Protein: meeting estimated needs    Patient Education    Not applicable.    Nutrition Diagnosis     PES: Unintended weight gain related to  fluid retention as evidenced by 4+ pitting anasarca. (active)    PES: Malnutrition related to chronic illness as evidenced by mild muscle depletion and severe fluid accumulation. (active)     Interventions/Goals     Intervention(s): general/healthful diet, commercial beverage, prescription medication, and collaboration with other providers    Goal: Consume % of oral supplements by follow-up. (goal met)    Monitoring & Evaluation     Dietitian will monitor food and beverage intake, weight change, electrolyte/renal panel, glucose/endocrine profile, and gastrointestinal profile.    Nutrition Risk/Follow-Up: moderate (follow-up in 3-5 days)   Please consult if re-assessment needed sooner.

## 2023-12-21 NOTE — PT/OT/SLP PROGRESS
Physical Therapy Treatment    Patient Name:  Kamran Huerta   MRN:  [id number]    Pt declined treatment.    12/21/2023

## 2023-12-21 NOTE — PROGRESS NOTES
Nephrology consult follow up note    HPI:      Kamran Huerta is a 48 y.o. male seen by our service in April for nephrotic syndrome and FABIO. At that time he underwent renal biopsy revealing advanced diabetic nodular sclerosis. He was diuresed aggressively and discharged with Cr 1.8 and ability to ambulate in the trotter without oxygen or distress. Since that time he has undergone L BKA s/t gangrenous wound. He presented to the ED 11/25 s/p fall from wheelchair with resulting back pain. Neurosurgery undergoing further workup. MRI showed suspected large disc protrusion at L5-S1 with severe narrowing of the canal. He was noted to have FABIO and marked hypervolemia for which nephrology has been consulted.    Interval history:     No acute events overnight.  Continues to make excellent urine output, 3 L yesterday.  Lower extremity edema has significantly improved.  No chest pain, shortness of breath, nausea, vomiting.    Review of Systems:     Comprehensive 10pt ROS negative except as noted per history.    Past medical, family, surgical, and social history reviewed and unchanged from initial consult note.     Objective:       VITAL SIGNS: 24 HR MIN & MAX LAST    Temp  Min: 97.5 °F (36.4 °C)  Max: 98.4 °F (36.9 °C)  97.5 °F (36.4 °C)        BP  Min: 128/79  Max: 157/85  (!) 151/88     Pulse  Min: 63  Max: 73  73     Resp  Min: 18  Max: 20  18    SpO2  Min: 95 %  Max: 100 %  95 %      GEN:  Chronically ill-appearing AAM in NAD  CV: RRR +S1,S2 without murmur  PULM: unlabored, RA  ABD: Soft, NT/ND abdomen with NABS  EXT:  LLE amputation, trace right lower extremity edema  SKIN: Warm and dry  PSYCH: Awake, alert and appropriately conversant.   Dialysis access:  No dialysis access            Component Value Date/Time     (L) 12/21/2023 0515     12/20/2023 0528     02/22/2021 1246    K 4.4 12/21/2023 0515    K 4.5 12/20/2023 0528    K 3.6 02/22/2021 1246    CHLORIDE 102 12/21/2023 0515    CHLORIDE 102  12/20/2023 0528    CHLORIDE 106 02/22/2021 1246    CO2 24 12/21/2023 0515    CO2 25 12/20/2023 0528    CO2 26 02/22/2021 1246    BUN 77.1 (H) 12/21/2023 0515    BUN 74.1 (H) 12/20/2023 0528    BUN 15.0 02/22/2021 1246    CREATININE 2.64 (H) 12/21/2023 0515    CREATININE 2.52 (H) 12/20/2023 0528    CREATININE 0.98 02/22/2021 1246    CALCIUM 7.6 (L) 12/21/2023 0515    CALCIUM 7.8 (L) 12/20/2023 0528    CALCIUM 8.5 02/22/2021 1246    PHOS 5.8 (H) 12/20/2023 0528            Component Value Date/Time    WBC 8.56 12/20/2023 0528    WBC 8.88 12/18/2023 0448    HGB 9.7 (L) 12/20/2023 0528    HGB 9.1 (L) 12/18/2023 0448    HCT 30.7 (L) 12/20/2023 0528    HCT 28.1 (L) 12/18/2023 0448    HCT 43 11/13/2022 1428     12/20/2023 0528     12/18/2023 0448         Imaging reviewed      Assessment / Plan:   FABIO s/t acute infection and hypervolemia   CKD IIIa   -biopsy proven in April 2023 diabetic nodular sclerosis   -Nephrotic range proteinuria         -March 2023 - - 10.9 g        -Now 5.9 g   Anasarca   S/p Fall with resulting back pain. Remote back surgery   Cirrhosis of the liver  DM I onset age 14 with long standing history of noncompliance. Hgb A1c improving.   Severe spinal stenosis - per NSGY. Recommends medication titration and follow up in 3 months      Plan:  Renal function worsening.  Concerned about over-diuresis.  Edema has significantly improved.  Decrease torsemide to 40 mg once a day.

## 2023-12-21 NOTE — PT/OT/SLP PROGRESS
Attempted OT session this PM however pt declined to participate 2/2 stomach pain. Will f/u as schedule permits.

## 2023-12-21 NOTE — NURSING
[facility] - Observation Unit  Wound Care    Patient Name:  Kamran Huerta   MRN:  [id number]  Date: 12/21/2023  Diagnosis: <principal problem not specified>    History:     Past Medical History:   Diagnosis Date    CHF (congestive heart failure)     Chronic back pain     CVA (cerebral vascular accident) 01/2023    DM (diabetes mellitus)     HTN (hypertension)     Seizures        Social History     Socioeconomic History    Marital status: Single   Tobacco Use    Smoking status: Never    Smokeless tobacco: Never   Substance and Sexual Activity    Alcohol use: Not Currently    Drug use: Not Currently    Sexual activity: Not Currently     Social Determinants of Health     Social Connections: Unknown (11/16/2022)    Social Connection and Isolation Panel [NHANES]     Marital Status:        Precautions:     Allergies as of 11/25/2023    (No Known Allergies)       WOC Assessment Details/Treatment      12/21/23 1001   Pain/Comfort/Sleep   POSS (Pasero Opioid-Induced Sed Scale) 1 - Awake and alert   Pain Reassessment   Pain Rating Prior to Med Admin 8        Incision/Site 03/20/23 0943 Left Other (see comments) posterior other (see comments)   Date First Assessed/Time First Assessed: 03/20/23 0943   Present Prior to Hospital Arrival?: No  Side: Left  Location: (c) Other (see comments)  Orientation: posterior  Incision Type: (c) other (see comments)   Wound Image   (left stump)   Dressing Appearance Intact;Dry   Drainage Amount None   Appearance Pink;Red;Dry   Red (%), Wound Tissue Color 100 %   Periwound Area Dry;Intact   Wound Edges Irregular   Wound Length (cm) 0.5 cm   Wound Width (cm) 2 cm   Wound Surface Area (cm^2) 1 cm^2   Care Cleansed with:;Sterile normal saline   Dressing Calcium alginate;Gauze     Re eval of left stump wd-almost completely regranulated-see new photo.  No change to care-continue with the alginate daily.  Case mgnt working on placement.      12/21/2023

## 2023-12-22 VITALS
WEIGHT: 201 LBS | HEART RATE: 66 BPM | OXYGEN SATURATION: 100 % | BODY MASS INDEX: 30.46 KG/M2 | DIASTOLIC BLOOD PRESSURE: 80 MMHG | TEMPERATURE: 98 F | HEIGHT: 68 IN | RESPIRATION RATE: 20 BRPM | SYSTOLIC BLOOD PRESSURE: 148 MMHG

## 2023-12-22 PROBLEM — R60.1 ANASARCA: Status: ACTIVE | Noted: 2023-12-22

## 2023-12-22 PROBLEM — M48.00 SPINAL STENOSIS: Status: ACTIVE | Noted: 2023-12-22

## 2023-12-22 LAB
ALBUMIN SERPL-MCNC: 1.8 G/DL (ref 3.5–5)
BUN SERPL-MCNC: 78.5 MG/DL (ref 8.9–20.6)
CALCIUM SERPL-MCNC: 7.6 MG/DL (ref 8.4–10.2)
CHLORIDE SERPL-SCNC: 106 MMOL/L (ref 98–107)
CO2 SERPL-SCNC: 24 MMOL/L (ref 22–29)
CREAT SERPL-MCNC: 2.51 MG/DL (ref 0.73–1.18)
GFR SERPLBLD CREATININE-BSD FMLA CKD-EPI: 31 MLS/MIN/1.73/M2
GLUCOSE SERPL-MCNC: 308 MG/DL (ref 74–100)
PHOSPHATE SERPL-MCNC: 5.3 MG/DL (ref 2.3–4.7)
POCT GLUCOSE: 198 MG/DL (ref 70–110)
POCT GLUCOSE: 302 MG/DL (ref 70–110)
POCT GLUCOSE: 314 MG/DL (ref 70–110)
POTASSIUM SERPL-SCNC: 4.5 MMOL/L (ref 3.5–5.1)
SODIUM SERPL-SCNC: 136 MMOL/L (ref 136–145)

## 2023-12-22 PROCEDURE — 63600175 PHARM REV CODE 636 W HCPCS: Performed by: INTERNAL MEDICINE

## 2023-12-22 PROCEDURE — 63600175 PHARM REV CODE 636 W HCPCS: Performed by: NURSE PRACTITIONER

## 2023-12-22 PROCEDURE — 25000003 PHARM REV CODE 250: Performed by: NURSE PRACTITIONER

## 2023-12-22 PROCEDURE — 25000003 PHARM REV CODE 250: Performed by: INTERNAL MEDICINE

## 2023-12-22 PROCEDURE — 25000003 PHARM REV CODE 250: Performed by: STUDENT IN AN ORGANIZED HEALTH CARE EDUCATION/TRAINING PROGRAM

## 2023-12-22 PROCEDURE — 80069 RENAL FUNCTION PANEL: CPT | Performed by: STUDENT IN AN ORGANIZED HEALTH CARE EDUCATION/TRAINING PROGRAM

## 2023-12-22 RX ORDER — HYDRALAZINE HYDROCHLORIDE 50 MG/1
50 TABLET, FILM COATED ORAL EVERY 8 HOURS
Qty: 90 TABLET | Refills: 1
Start: 2023-12-22

## 2023-12-22 RX ORDER — ONDANSETRON 4 MG/1
4 TABLET, ORALLY DISINTEGRATING ORAL EVERY 12 HOURS PRN
Qty: 20 TABLET | Refills: 0
Start: 2023-12-22

## 2023-12-22 RX ORDER — PREGABALIN 75 MG/1
75 CAPSULE ORAL NIGHTLY
Qty: 30 CAPSULE | Refills: 1 | Status: SHIPPED | OUTPATIENT
Start: 2023-12-22

## 2023-12-22 RX ORDER — AMOXICILLIN 250 MG
1 CAPSULE ORAL DAILY PRN
Qty: 60 TABLET | Refills: 0
Start: 2023-12-22

## 2023-12-22 RX ORDER — LEVETIRACETAM 500 MG/1
500 TABLET ORAL 2 TIMES DAILY
Qty: 60 TABLET | Refills: 11 | Status: SHIPPED | OUTPATIENT
Start: 2023-12-22 | End: 2024-12-21

## 2023-12-22 RX ORDER — SIMETHICONE 80 MG
80 TABLET,CHEWABLE ORAL 4 TIMES DAILY PRN
Qty: 30 TABLET | Refills: 0
Start: 2023-12-22

## 2023-12-22 RX ORDER — POLYETHYLENE GLYCOL 3350 17 G/17G
17 POWDER, FOR SOLUTION ORAL DAILY PRN
Qty: 30 PACKET | Refills: 0
Start: 2023-12-22

## 2023-12-22 RX ORDER — OXYCODONE HYDROCHLORIDE 10 MG/1
10 TABLET ORAL EVERY 8 HOURS PRN
Qty: 18 TABLET | Refills: 0
Start: 2023-12-22 | End: 2023-12-28

## 2023-12-22 RX ORDER — HYDRALAZINE HYDROCHLORIDE 50 MG/1
50 TABLET, FILM COATED ORAL EVERY 8 HOURS
Status: DISCONTINUED | OUTPATIENT
Start: 2023-12-22 | End: 2023-12-22 | Stop reason: HOSPADM

## 2023-12-22 RX ORDER — OXYCODONE HYDROCHLORIDE 5 MG/1
10 TABLET ORAL EVERY 8 HOURS PRN
Status: DISCONTINUED | OUTPATIENT
Start: 2023-12-22 | End: 2023-12-22 | Stop reason: HOSPADM

## 2023-12-22 RX ORDER — ACETAMINOPHEN 325 MG/1
650 TABLET ORAL EVERY 8 HOURS PRN
Qty: 30 TABLET | Refills: 0
Start: 2023-12-22

## 2023-12-22 RX ADMIN — INSULIN ASPART 4 UNITS: 100 INJECTION, SOLUTION INTRAVENOUS; SUBCUTANEOUS at 05:12

## 2023-12-22 RX ADMIN — ERGOCALCIFEROL 50000 UNITS: 1.25 CAPSULE ORAL at 09:12

## 2023-12-22 RX ADMIN — Medication: at 09:12

## 2023-12-22 RX ADMIN — OXYCODONE HYDROCHLORIDE 10 MG: 5 TABLET ORAL at 01:12

## 2023-12-22 RX ADMIN — LEVETIRACETAM 500 MG: 500 TABLET, FILM COATED ORAL at 09:12

## 2023-12-22 RX ADMIN — DULOXETINE HYDROCHLORIDE 20 MG: 20 CAPSULE, DELAYED RELEASE ORAL at 09:12

## 2023-12-22 RX ADMIN — ATENOLOL 50 MG: 50 TABLET ORAL at 09:12

## 2023-12-22 RX ADMIN — OXYCODONE HYDROCHLORIDE 10 MG: 5 TABLET ORAL at 03:12

## 2023-12-22 RX ADMIN — HYDRALAZINE HYDROCHLORIDE 25 MG: 25 TABLET, FILM COATED ORAL at 12:12

## 2023-12-22 RX ADMIN — OXYCODONE HYDROCHLORIDE 10 MG: 5 TABLET ORAL at 05:12

## 2023-12-22 RX ADMIN — HYDRALAZINE HYDROCHLORIDE 25 MG: 25 TABLET, FILM COATED ORAL at 05:12

## 2023-12-22 RX ADMIN — INSULIN DETEMIR 20 UNITS: 100 INJECTION, SOLUTION SUBCUTANEOUS at 09:12

## 2023-12-22 RX ADMIN — TORSEMIDE 40 MG: 20 TABLET ORAL at 09:12

## 2023-12-22 RX ADMIN — NIFEDIPINE 90 MG: 30 TABLET, FILM COATED, EXTENDED RELEASE ORAL at 09:12

## 2023-12-22 RX ADMIN — OXYCODONE HYDROCHLORIDE 10 MG: 5 TABLET ORAL at 09:12

## 2023-12-22 RX ADMIN — PANCRELIPASE 3 CAPSULE: 24000; 76000; 120000 CAPSULE, DELAYED RELEASE PELLETS ORAL at 09:12

## 2023-12-22 RX ADMIN — WHITE PETROLATUM: 1.75 OINTMENT TOPICAL at 09:12

## 2023-12-22 RX ADMIN — Medication 400 MG: at 09:12

## 2023-12-22 RX ADMIN — LEVETIRACETAM 500 MG: 500 TABLET, FILM COATED ORAL at 03:12

## 2023-12-22 RX ADMIN — PANCRELIPASE 3 CAPSULE: 24000; 76000; 120000 CAPSULE, DELAYED RELEASE PELLETS ORAL at 05:12

## 2023-12-22 RX ADMIN — ENOXAPARIN SODIUM 40 MG: 40 INJECTION SUBCUTANEOUS at 05:12

## 2023-12-22 RX ADMIN — MICONAZOLE NITRATE: 20 POWDER TOPICAL at 09:12

## 2023-12-22 RX ADMIN — NALOXEGOL OXALATE 25 MG: 25 TABLET, FILM COATED ORAL at 09:12

## 2023-12-22 NOTE — PLAN OF CARE
Problem: Adult Inpatient Plan of Care  Goal: Plan of Care Review  Outcome: Ongoing, Progressing  Goal: Absence of Hospital-Acquired Illness or Injury  Outcome: Ongoing, Progressing  Goal: Optimal Comfort and Wellbeing  Outcome: Ongoing, Progressing     Problem: Fall Injury Risk  Goal: Absence of Fall and Fall-Related Injury  Outcome: Ongoing, Progressing     Problem: Pain Acute  Goal: Acceptable Pain Control and Functional Ability  Outcome: Ongoing, Progressing

## 2023-12-22 NOTE — PHYSICIAN QUERY
PT Name: Kamran Huerta  MR #: [id number]     DOCUMENTATION CLARIFICATION     CDS/:  Ford Pablo RN, CDS                   Contact Information:  [email or web address]    This form is a permanent document in the medical record.    Query Date: December 22, 2023    By submitting this query, we are merely seeking further clarification of documentation.  Please utilize your independent clinical judgment when addressing the question(s) below.    The Medical Record contains the following:   Indicator Supporting Clinical Findings     Location in Medical Record    Kidney (Renal) Insufficiency     X Kidney (Renal) Failure/Injury FABIO s/t acute infection and hypervolemia    Neph PN 12/22    Nephrotoxic Agents     X BUN/Creatinine           GFR BUN 25.7  Creatinine 2.31  GFR 34    BUN 32.1  Creatinine 2.80  GFR 27    BUN 36.3  Creatinine 2.53  GFR 31    BUN 38.2  Creatinine 2.39  GFR 33    BUN 77.1  Creatinine 2.64  GFR 29   Labs 11/25        Labs 11/27        Labs 11/30        Labs 12/2        Labs 12/21      Urine: Casts         Eosinophils     X Urine Output urine output excellent via escobar catheter with TID IV lasix Neph PN 11/28      Dehydration      Nausea/Vomiting      Dialysis/CRRT     X Treatment IV Lasix 60mg tid  Metolazone 10mg daily   Neph PN 12/8     X Other CKD IIIa      -biopsy proven in April 2023 diabetic nodular sclerosis      -Nephrotic range proteinuria         -March 2023 - - 10.9 g        -Now 5.9 g    Neph PN 12/22       [facility] approved diagnostic criteria for acute kidney injury is based on KDIGO criteria:    An increase in serum creatinine > 0.3mg/dl within 48 hours  OR  Increase in serum creatinine to > 1.5x baseline, which is known or presumed to have occurred within the prior 7 days  OR  Urine volume <0.5 ml/kg/hr for 6 hours       The clinical guidelines noted above are only a system guideline. It does not replace the providers clinical judgment.     Provider, please  "clarify the diagnosis of "FABIO" associated with above clinical findings.     [    ] Acute Kidney Failure/Injury with Tubular Necrosis  - Damage to the tubule cells of the kidney. Common triggers: shock, hypotension, IV contrast, rhabdomyolysis, medications; Expected to take more than 72 hours for renal function to return to baseline     [ x   ] Acute Kidney Failure/Injury       [    ] Other Acute Kidney Failure/Injury (please specify): ____________       [    ] Other (please specify): _______________________________       Please document in your progress notes daily for the duration of treatment until resolved and include in your discharge summary.    References:   KDIGO Clinical Practice Guideline for Acute Kidney Injury. (2012, March). Retrieved October 21, 2020, from https://kdigo.org/wp-content/uploads/2016/10/PKFXP-0939-SZT-Guideline-English.pdf    JEAN-PIERRE Enamorado MD, ELEUTERIO Mari MD, & CHANTEL Pollard MD. (1960). Renal medullary necrosis [Abstract]. The American Journal of Medicine, 29(1), 132-156. Doi:https://www.sciencedirect.com/science/article/abs/pii/4557643444529815    CHANTEL Murphy MD, & ALEC Hernandez MD, MS. (2020, June 18). Definition and staging of chronic kidney disease in adults ([id number] ELEUTERIO Ruiz MD, ScD & [id number] JAGJIT Soto MD, MSc, Eds.). Retrieved October 21, 2020, from https://www.[place].[employer]/contents/definition-and-staging-of-chronic-kidney-disease-in-adults?search=ckd%20staging&source=search_result&selectedTitle=1~150&usage_type=default&display_rank=1     CHRISTOPHER Hay MD, FACP. (2015, Cony 15). Acute kidney injury revisited. Retrieved October 21, 2020, from https://acphospitalist.org/archives/2015/06/coding-acute-kidney-injury.htm    CUBA Munoz MD. (2019, July). Renal Cortical Necrosis. Retrieved October 21, 2020, from https://www.[employer]/professional/genitourinary-disorders/renovascular-disorders/renal-cortical-necrosis    Form No. 81659  "

## 2023-12-22 NOTE — PROGRESS NOTES
[facility] Hospital Medicine Progress Note        Chief Complaint: Inpatient Follow-up for Anasarca    HPI: 48 y.o. male who PMH includes CHF, chronic back pain wheelchair-bound, DM type 2, HTN, seizures, CVA, CKD stage 3, alcoholic liver disease with chronic pancreatitis, presents to the ED at [facility] on 11/25/2023 with a primary complaint of weakness, dizziness lightheadedness and fall out if his wheelchair striking his mouth; denies any LOC.  PT had back surgery years ago and reports has been wheelchair bound since then. No reports of seizures.  No CP, SOB, N/V/D, fever, chills cough congestion or any sick contacts. Labs reviewed demonstrated WBC 14.42, HH 12.2/37.2, sed rate 93 , CO2 18 Bun 25.7, Creat 2.31, glucose 52 Mg+ 1.0, , AST 36 reports of chest pain, CRP shortness a breath, nausea, vomiting, diarrhea5, fever., [id number], cough, congestion, or any sick, contacts. B no reportsP of chest pain, 109.7; other indicis unremarkable. CXR impression reviewed demonstrated  no acute cardiopulmonary abnormality. CT of head without contrast impression reviewed demonstrated no appreciable acute intracranial abnormality. Ct maxillofacial without contrast impression reviewed demonstrated no appreciate acute traumatic osseous abnormality, bilateral mastoid effusions. CT cervical spine without contrast impression reviewed demonstrated no appreciable acute osseous abnormality by CT evaluation, degenerative changes at the cervical spine. CT thoracic spine without contrast impression reviewed demonstrated no acute osseous abnormality. CT of lumbar spine without contrast impression reviewed demonstrated no appreciable fracture or acute osseous abnormality, severe degenerative change at L5-S1 progressed, endplate sclerosis disc space narrowing and osseous erosions which may be degenerative or infectious/inflammatory, anasarca.   Initial VS /105 P 75 R 13 T 98.7F O2 saturation 98%  on room air. Pt received multiple doses of pain medication , D50 for hypoglycemia, 40 mg lasix,  and magnesium rider in the ED. Neurosurgery services consulted. Pt awaiting MRI. Pt is admitted to hospital medicine services for further management.  Nephrology was consulted and patient was started on Lasix albumin drip.  Segovia was inserted due to possible outlet obstruction in the setting of anasarca.  MRI of lumbar spine was not helpful due to significant fluid collection.  Nephrology following.  Recommended to continue IV Lasix 60 mg t.i.d. and metolazone 10 mg daily for now and when patient gets closer to discharge switch to oral    Neurosurgery evaluated deficient, recommended to continue the medical management and consider outpatient referral to pain management for consideration of interventional options.  Recommended scheduling follow up in 3 months and neurosurgery clinic.    Interval Hx:   Awaiting placement.  No acute overnight events reported.    Patient was seen and examined, denied any major complaints .    Chart was reviewed, afebrile, blood pressure slightly on the higher side, most recent labs were reviewed.  Blood sugars on the higher side    Objective/physical exam:  General: In no acute distress, afebrile, more truncal obesity, large neck circumference  Chest: Clear to auscultation bilaterally anteriorly  Heart: RRR, +S1, S2, no appreciable murmur  Abdomen: Soft, nontender, BS +  MSK: Warm,  edema improving, Left BKA  Neurologic:  Awake alert and oriented    VITAL SIGNS: 24 HRS MIN & MAX LAST   Temp  Min: 97.5 °F (36.4 °C)  Max: 98.7 °F (37.1 °C) 98.7 °F (37.1 °C)   BP  Min: 128/79  Max: 170/83 (!) 170/83   Pulse  Min: 60  Max: 78  78   Resp  Min: 18  Max: 20 18   SpO2  Min: 95 %  Max: 100 % 96 %     I have reviewed the following labs:  Recent Labs   Lab 12/15/23  0456 12/18/23  0448 12/20/23  0528   WBC 10.32 8.88 8.56   RBC 3.37* 3.15* 3.38*   HGB 9.8* 9.1* 9.7*   HCT 29.8* 28.1* 30.7*   MCV 88.4  89.2 90.8   MCH 29.1 28.9 28.7   MCHC 32.9* 32.4* 31.6*   RDW 13.8 13.6 13.1    239 270   MPV 11.8* 12.3* 12.0*     Recent Labs   Lab 12/15/23  0457 12/18/23  0448 12/19/23  0516 12/20/23  0528 12/21/23  0515    137 136 136 135*   K 4.5 4.3 4.4 4.5 4.4   CO2 25 25 25 25 24   BUN 65.7* 69.8* 74.6* 74.1* 77.1*   CREATININE 2.26* 2.40* 2.52* 2.52* 2.64*   CALCIUM 7.8* 7.8* 8.1* 7.8* 7.6*   MG 1.70 1.80  --   --   --    ALBUMIN  --  1.8*  --  1.9*  --      Microbiology Results (last 7 days)       ** No results found for the last 168 hours. **           See below for Radiology    Scheduled Med:   atenoloL  50 mg Oral BID    doxazosin  2 mg Oral QHS    DULoxetine  20 mg Oral BID    enoxparin  40 mg Subcutaneous Daily    ergocalciferol  50,000 Units Oral Q72H    hydrALAZINE  25 mg Oral Q8H    insulin detemir U-100  18 Units Subcutaneous BID    levETIRAcetam  500 mg Oral TID    lipase-protease-amylase 24,000-76,000-120,000 units  3 capsule Oral TID    magnesium oxide  400 mg Oral BID    miconazole NITRATE 2 %   Topical (Top) BID    naloxegoL  25 mg Oral Daily    NIFEdipine  90 mg Oral Daily    perflutren lipid microspheres  1.4 mL Intravenous Once    polyethylene glycol  17 g Oral BID    pregabalin  75 mg Oral QHS    [START ON 12/22/2023] torsemide  40 mg Oral Daily    white petrolatum   Topical (Top) BID    zinc oxide-cod liver oil   Topical (Top) BID      Continuous Infusions:     PRN Meds:  acetaminophen, aluminum-magnesium hydroxide-simethicone, dextrose 10%, dextrose 10%, glucagon (human recombinant), glucose, glucose, hydrALAZINE, insulin aspart U-100, labetaloL, naloxone, ondansetron, oxyCODONE, simethicone, white petrolatum     Assessment/Plan:  Anasarca- improved  Acute on chronic kidney disease stage III, Nephrotic range proteinuria  Cirrhosis of liver  Acute on chronic lower back pain- intractable--> severe canal stenosis at L5-S1 and mild canal stenosis at L4-L5 per MRI on 12/14  Dizziness, Fall-  from wheel chair  Hypoglycemia in  T2 DM-  A1c 5.5- resolved  Hypertensive urgency at admission-resolving  ESBL E coli in urine- possibly asymptomatic bacteriuria  Hypomagnesemia- replaced  HX: Wheelchair-bound, history of CVA with residual left-sided deficits, left BKA, chronic alcoholic liver disease, chronic seizures  Moderate Malnutrition      Plan     Nephrology followed.  Continue torsemide 40 mg p.o. b.i.d. daily per Nephrology with recommendation to discharge him on the same dose.  Patient to follow up with his primary nephrologist Dr. Bari payne in [place] upon discharge  Monitor electrolytes and replete, keep potassium greater than 4 and magnesium greater than 2  Patient complained of intractable acute on chronic back pain, Neurosurgery evaluated the patient, MRI lumbar done on 12/14-large disc protrusion at L5-S1 with severe narrowing of the canal, mild narrowing of the spinal canal at L4-L5  Neurosurgery recommended to continue medical management, consider outpatient follow up, consider referral to pain management  Constipation resolved with Relistor x1, start MiraLax b.i.d.  Complained of abdominal cramping, ordered Bentyl x2 days--> much improved   Analgesics p.r.n. p.o. pain medication.  Patient again asking for IV Dilaudid, IV morphine or p.o. Dilaudid.  Informed patient that are not indicated  Hypoglycemia resolved, now hyperglycemia with blood glucose greater than 250.  Increase Levemir to 20units b.i.d. monitor blood sugars  Continue appropriate  home medications, adjust antihypertensive regimen  Fall precautions, decubitus precautions  Therapy services following, recommended moderate intensity  Case management on board     VTE prophylaxis:  Lovenox    Anticipated discharge and Disposition:  SNF, pending neurosurgery recommendations     All diagnosis and differential diagnosis have been reviewed; assessment and plan has been documented; I have personally reviewed the labs and test results that  are presently available; I have reviewed the patients medication list; I have reviewed the consulting providers response and recommendations. I have reviewed or attempted to review medical records based upon their availability    All of the patient's questions have been  addressed and answered. Patient's is agreeable to the above stated plan. I will continue to monitor closely and make adjustments to medical management as needed.  _____________________________________________________________________    Nutrition Status:  Patient meets ASPEN criteria for moderate malnutrition of chronic illness per RD assessment as evidenced by:  Energy Intake (Malnutrition): other (see comments) (does not meet criteria)  Weight Loss (Malnutrition): other (see comments) (does not meet criteria)     Muscle Mass (Malnutrition): mild depletion  Fluid Accumulation (Malnutrition): severe        A minimum of two characteristics is recommended for diagnosis of either severe or non-severe malnutrition.    Radiology:  I have personally reviewed the following imaging and agree with the radiologist.     MRI Lumbar Spine Without Contrast  Narrative: EXAMINATION:  MRI LUMBAR SPINE WITHOUT CONTRAST    CLINICAL HISTORY:  Low back pain;    TECHNIQUE:  Multiplanar multisequence MR images of the lumbar spine are obtained without contrast.    COMPARISON:  CT lumbar spine dated 11/25/2023, MRI lumbar spine dated 01/16/2021    FINDINGS:  Evaluation is limited by artifact and motion.  There is grade 1 retrolisthesis of L5 over S1.  The vertebral body heights are maintained.  There is suspected mild endplate edema at L5-S1, nonspecific.  There are postoperative changes of prior partial left-sided L5-S1 laminectomy.    The conus terminates at the level of L1.    At L4-5, there is disc bulge, facet hypertrophy and mild narrowing of the spinal canal.  There is mild-to-moderate left neural foraminal narrowing    At L5-S1, there is grade 1 retrolisthesis of L5  with suspected large central disc protrusion measuring 9 mm in AP dimension and facet hypertrophy with severe narrowing of the canal and impingement on the descending bilateral S1 nerve roots, left greater than right.  There is moderate to severe bilateral foraminal stenosis.    There is subcutaneous edema in the posterior paraspinal soft tissues.  There is no definite visible drainable fluid collection.  Impression: 1. Suspected large disc protrusion at L5-S1 with severe narrowing of the canal.  2. Mild narrowing of the spinal canal at L4-5.  3. Multilevel neural foraminal stenoses as described.    Electronically signed by: Mirela Granados  Date:    12/14/2023  Time:    15:01    Michelle Camacho MD  Department of Hospital Medicine   [facility]   12/21/2023

## 2023-12-22 NOTE — PROGRESS NOTES
Nephrology consult follow up note    HPI:      Kamran Huerta is a 48 y.o. male seen by our service in April for nephrotic syndrome and FABIO. At that time he underwent renal biopsy revealing advanced diabetic nodular sclerosis. He was diuresed aggressively and discharged with Cr 1.8 and ability to ambulate in the trotter without oxygen or distress. Since that time he has undergone L BKA s/t gangrenous wound. He presented to the ED 11/25 s/p fall from wheelchair with resulting back pain. Neurosurgery undergoing further workup. MRI showed suspected large disc protrusion at L5-S1 with severe narrowing of the canal. He was noted to have FABIO and marked hypervolemia for which nephrology has been consulted.    Interval history:     No acute events overnight.  Making excellent urine output, 3.2 L yesterday.  Lower extremity edema continues to improve.  He denies chest pain, shortness of breath, nausea, vomiting.    Review of Systems:     Comprehensive 10pt ROS negative except as noted per history.    Past medical, family, surgical, and social history reviewed and unchanged from initial consult note.     Objective:       VITAL SIGNS: 24 HR MIN & MAX LAST    Temp  Min: 97.8 °F (36.6 °C)  Max: 98.7 °F (37.1 °C)  97.8 °F (36.6 °C)        BP  Min: 139/81  Max: 170/83  (!) 149/66     Pulse  Min: 60  Max: 78  69     Resp  Min: 17  Max: 18  18    SpO2  Min: 94 %  Max: 100 %  99 %      GEN:  Chronically ill-appearing AAM in NAD  CV: RRR +S1,S2 without murmur  PULM: unlabored, RA  ABD: Soft, NT/ND abdomen with NABS  EXT:  LLE amputation, trace right lower extremity edema  SKIN: Warm and dry  PSYCH: Awake, alert and appropriately conversant.   Dialysis access:  No dialysis access            Component Value Date/Time     12/22/2023 0451     (L) 12/21/2023 0515     02/22/2021 1246    K 4.5 12/22/2023 0451    K 4.4 12/21/2023 0515    K 3.6 02/22/2021 1246    CHLORIDE 106 12/22/2023 0451    CHLORIDE 102 12/21/2023 0515     CHLORIDE 106 02/22/2021 1246    CO2 24 12/22/2023 0451    CO2 24 12/21/2023 0515    CO2 26 02/22/2021 1246    BUN 78.5 (H) 12/22/2023 0451    BUN 77.1 (H) 12/21/2023 0515    BUN 15.0 02/22/2021 1246    CREATININE 2.51 (H) 12/22/2023 0451    CREATININE 2.64 (H) 12/21/2023 0515    CREATININE 0.98 02/22/2021 1246    CALCIUM 7.6 (L) 12/22/2023 0451    CALCIUM 7.6 (L) 12/21/2023 0515    CALCIUM 8.5 02/22/2021 1246    PHOS 5.3 (H) 12/22/2023 0451            Component Value Date/Time    WBC 8.56 12/20/2023 0528    WBC 8.88 12/18/2023 0448    HGB 9.7 (L) 12/20/2023 0528    HGB 9.1 (L) 12/18/2023 0448    HCT 30.7 (L) 12/20/2023 0528    HCT 28.1 (L) 12/18/2023 0448    HCT 43 11/13/2022 1428     12/20/2023 0528     12/18/2023 0448         Imaging reviewed      Assessment / Plan:   FABIO s/t acute infection and hypervolemia   CKD IIIa   -biopsy proven in April 2023 diabetic nodular sclerosis   -Nephrotic range proteinuria         -March 2023 - - 10.9 g        -Now 5.9 g   Anasarca   S/p Fall with resulting back pain. Remote back surgery   Cirrhosis of the liver  DM I onset age 14 with long standing history of noncompliance. Hgb A1c improving.   Severe spinal stenosis - per NSGY. Recommends medication titration and follow up in 3 months      Plan:  Renal function improved today.  Continue decreased dose of diuretics with torsemide 40 mg daily.  Okay to discharge from Nephrology standpoint whenever ready.  He will need to follow up with Dr. Garrett in [place].    We will sign off at this time.  Please call if we can be of further assistance.

## 2023-12-22 NOTE — PLAN OF CARE
Pt has been accepted to [facility]. Rehab today  Dr. García is accepting  call Katarzyna [phone] charge nurse.  Rehab will transport

## 2023-12-22 NOTE — PROGRESS NOTES
[facility] Hospital Medicine Progress Note        Chief Complaint: Inpatient Follow-up for Anasarca    HPI: 48 y.o. male who PMH includes CHF, chronic back pain wheelchair-bound, DM type 2, HTN, seizures, CVA, CKD stage 3, alcoholic liver disease with chronic pancreatitis, presents to the ED at [facility] on 11/25/2023 with a primary complaint of weakness, dizziness lightheadedness and fall out if his wheelchair striking his mouth; denies any LOC.  PT had back surgery years ago and reports has been wheelchair bound since then. No reports of seizures.  No CP, SOB, N/V/D, fever, chills cough congestion or any sick contacts. Labs reviewed demonstrated WBC 14.42, HH 12.2/37.2, sed rate 93 , CO2 18 Bun 25.7, Creat 2.31, glucose 52 Mg+ 1.0, , AST 36 reports of chest pain, CRP shortness a breath, nausea, vomiting, diarrhea5, fever., [id number], cough, congestion, or any sick, contacts. B no reportsP of chest pain, 109.7; other indicis unremarkable. CXR impression reviewed demonstrated  no acute cardiopulmonary abnormality. CT of head without contrast impression reviewed demonstrated no appreciable acute intracranial abnormality. Ct maxillofacial without contrast impression reviewed demonstrated no appreciate acute traumatic osseous abnormality, bilateral mastoid effusions. CT cervical spine without contrast impression reviewed demonstrated no appreciable acute osseous abnormality by CT evaluation, degenerative changes at the cervical spine. CT thoracic spine without contrast impression reviewed demonstrated no acute osseous abnormality. CT of lumbar spine without contrast impression reviewed demonstrated no appreciable fracture or acute osseous abnormality, severe degenerative change at L5-S1 progressed, endplate sclerosis disc space narrowing and osseous erosions which may be degenerative or infectious/inflammatory, anasarca.   Initial VS /105 P 75 R 13 T 98.7F O2 saturation 98%  on room air. Pt received multiple doses of pain medication , D50 for hypoglycemia, 40 mg lasix,  and magnesium rider in the ED. Neurosurgery services consulted. Pt awaiting MRI. Pt is admitted to hospital medicine services for further management.  Nephrology was consulted and patient was started on Lasix albumin drip.  Segovia was inserted due to possible outlet obstruction in the setting of anasarca.  MRI of lumbar spine was not helpful due to significant fluid collection.  Nephrology following.  Recommended to continue IV Lasix 60 mg t.i.d. and metolazone 10 mg daily for now and when patient gets closer to discharge switch to oral    Neurosurgery evaluated deficient, recommended to continue the medical management and consider outpatient referral to pain management for consideration of interventional options.  Recommended scheduling follow up in 3 months and neurosurgery clinic.    Interval Hx:   Awaiting placement.  No acute overnight events reported.    Patient was seen and examined, denied any major complaints .    Chart was reviewed, afebrile, blood pressure slightly on the higher side, most recent labs were reviewed.  Blood sugars on the higher side-300 AM    Objective/physical exam:  General: In no acute distress, afebrile, more truncal obesity, large neck circumference  Chest: Clear to auscultation bilaterally anteriorly  Heart: RRR, +S1, S2, no appreciable murmur  Abdomen: Soft, nontender, BS +  MSK: Warm,  edema improving, Left BKA  Neurologic:  Awake alert and oriented    VITAL SIGNS: 24 HRS MIN & MAX LAST   Temp  Min: 97.6 °F (36.4 °C)  Max: 98.7 °F (37.1 °C) 97.6 °F (36.4 °C)   BP  Min: 139/81  Max: 170/83 (!) 159/88   Pulse  Min: 66  Max: 78  67   Resp  Min: 16  Max: 18 16   SpO2  Min: 94 %  Max: 100 % 96 %     I have reviewed the following labs:  Recent Labs   Lab 12/18/23  0448 12/20/23  0528   WBC 8.88 8.56   RBC 3.15* 3.38*   HGB 9.1* 9.7*   HCT 28.1* 30.7*   MCV 89.2 90.8   MCH 28.9 28.7   MCHC  32.4* 31.6*   RDW 13.6 13.1    270   MPV 12.3* 12.0*     Recent Labs   Lab 12/18/23  0448 12/19/23  0516 12/20/23  0528 12/21/23  0515 12/22/23  0451      < > 136 135* 136   K 4.3   < > 4.5 4.4 4.5   CO2 25   < > 25 24 24   BUN 69.8*   < > 74.1* 77.1* 78.5*   CREATININE 2.40*   < > 2.52* 2.64* 2.51*   CALCIUM 7.8*   < > 7.8* 7.6* 7.6*   MG 1.80  --   --   --   --    ALBUMIN 1.8*  --  1.9*  --  1.8*    < > = values in this interval not displayed.     Microbiology Results (last 7 days)       ** No results found for the last 168 hours. **           See below for Radiology    Scheduled Med:   atenoloL  50 mg Oral BID    doxazosin  2 mg Oral QHS    DULoxetine  20 mg Oral BID    enoxparin  40 mg Subcutaneous Daily    ergocalciferol  50,000 Units Oral Q72H    hydrALAZINE  25 mg Oral Q6H    insulin detemir U-100  22 Units Subcutaneous BID    levETIRAcetam  500 mg Oral TID    lipase-protease-amylase 24,000-76,000-120,000 units  3 capsule Oral TID    magnesium oxide  400 mg Oral BID    miconazole NITRATE 2 %   Topical (Top) BID    naloxegoL  25 mg Oral Daily    NIFEdipine  90 mg Oral Daily    perflutren lipid microspheres  1.4 mL Intravenous Once    polyethylene glycol  17 g Oral BID    pregabalin  75 mg Oral QHS    torsemide  40 mg Oral Daily    white petrolatum   Topical (Top) BID    zinc oxide-cod liver oil   Topical (Top) BID      Continuous Infusions:     PRN Meds:  acetaminophen, aluminum-magnesium hydroxide-simethicone, dextrose 10%, dextrose 10%, glucagon (human recombinant), glucose, glucose, hydrALAZINE, insulin aspart U-100, labetaloL, naloxone, ondansetron, oxyCODONE, simethicone, white petrolatum     Assessment/Plan:  Anasarca- improved  Acute on chronic kidney disease stage III, Nephrotic range proteinuria  Cirrhosis of liver  Acute on chronic lower back pain- intractable--> severe canal stenosis at L5-S1 and mild canal stenosis at L4-L5 per MRI on 12/14  Dizziness, Fall- from wheel  chair  Hypoglycemia in  T2 DM-  A1c 5.5- resolved  Hypertensive urgency at admission-resolving  ESBL E coli in urine- possibly asymptomatic bacteriuria  Hypomagnesemia- replaced  Moderate Malnutrition  HX: Wheelchair-bound, history of CVA with residual left-sided deficits, left BKA, chronic alcoholic liver disease, chronic seizures      Plan     Nephrology followed.  Continue torsemide 40 mg p.o. b.i.d. daily per Nephrology with recommendation to discharge him on the same dose.  Patient to follow up with his primary nephrologist Dr. Bari payne in [place] upon discharge  Patient complained of intractable acute on chronic back pain, Neurosurgery evaluated the patient, MRI lumbar done on 12/14-large disc protrusion at L5-S1 with severe narrowing of the canal, mild narrowing of the spinal canal at L4-L5.Neurosurgery recommended to continue medical management, consider outpatient follow up, consider referral to pain management  Analgesics p.r.n. p.o. pain medication.  Patient again asking for IV Dilaudid, IV morphine or p.o. Dilaudid.  Informed patient that are not indicated  Constipation resolved with Relistor x1, start MiraLax b.i.d.  Hypoglycemia resolved, now hyperglycemia with blood glucose greater than 250.  Increase Levemir to 22units b.i.d. monitor blood sugars  Continue appropriate  home medications, adjust antihypertensive regimen  Fall precautions, decubitus precautions  Therapy services following, recommended moderate intensity  Case management on board     VTE prophylaxis:  Lovenox    Anticipated discharge and Disposition:  SNF, awaits placement    All diagnosis and differential diagnosis have been reviewed; assessment and plan has been documented; I have personally reviewed the labs and test results that are presently available; I have reviewed the patients medication list; I have reviewed the consulting providers response and recommendations. I have reviewed or attempted to review medical records based  upon their availability    All of the patient's questions have been  addressed and answered. Patient's is agreeable to the above stated plan. I will continue to monitor closely and make adjustments to medical management as needed.  _____________________________________________________________________    Nutrition Status:  Patient meets ASPEN criteria for moderate malnutrition of chronic illness per RD assessment as evidenced by:  Energy Intake (Malnutrition): other (see comments) (does not meet criteria)  Weight Loss (Malnutrition): other (see comments) (does not meet criteria)     Muscle Mass (Malnutrition): mild depletion  Fluid Accumulation (Malnutrition): severe        A minimum of two characteristics is recommended for diagnosis of either severe or non-severe malnutrition.    Radiology:  I have personally reviewed the following imaging and agree with the radiologist.     MRI Lumbar Spine Without Contrast  Narrative: EXAMINATION:  MRI LUMBAR SPINE WITHOUT CONTRAST    CLINICAL HISTORY:  Low back pain;    TECHNIQUE:  Multiplanar multisequence MR images of the lumbar spine are obtained without contrast.    COMPARISON:  CT lumbar spine dated 11/25/2023, MRI lumbar spine dated 01/16/2021    FINDINGS:  Evaluation is limited by artifact and motion.  There is grade 1 retrolisthesis of L5 over S1.  The vertebral body heights are maintained.  There is suspected mild endplate edema at L5-S1, nonspecific.  There are postoperative changes of prior partial left-sided L5-S1 laminectomy.    The conus terminates at the level of L1.    At L4-5, there is disc bulge, facet hypertrophy and mild narrowing of the spinal canal.  There is mild-to-moderate left neural foraminal narrowing    At L5-S1, there is grade 1 retrolisthesis of L5 with suspected large central disc protrusion measuring 9 mm in AP dimension and facet hypertrophy with severe narrowing of the canal and impingement on the descending bilateral S1 nerve roots, left  greater than right.  There is moderate to severe bilateral foraminal stenosis.    There is subcutaneous edema in the posterior paraspinal soft tissues.  There is no definite visible drainable fluid collection.  Impression: 1. Suspected large disc protrusion at L5-S1 with severe narrowing of the canal.  2. Mild narrowing of the spinal canal at L4-5.  3. Multilevel neural foraminal stenoses as described.    Electronically signed by: Mirela Granados  Date:    12/14/2023  Time:    15:01    Michelle Camacho MD  Department of Hospital Medicine   [facility]   12/22/2023

## 2023-12-23 NOTE — DISCHARGE SUMMARY
[facility] Hospital Medicine Discharge Summary    Admit Date: 11/25/2023  Discharge Date and Time: 12/22/23  Admitting Physician:  Team  Discharging Physician: Michelle Camacho MD.  Primary Care Physician: Ailyn Reynolds MD  Consults: Neurosurgery    Discharge Diagnoses:  Anasarca- improved  Acute on chronic kidney disease stage III, Nephrotic range proteinuria  Cirrhosis of liver  Acute on chronic lower back pain- intractable--> severe canal stenosis at L5-S1 and mild canal stenosis at L4-L5 per MRI on 12/14  Dizziness, Fall- from wheel chair  Hypoglycemia in  T2 DM-  A1c 5.5- resolved  Hypertensive urgency at admission-resolving  ESBL E coli in urine- possibly asymptomatic bacteriuria  Hypomagnesemia- replaced  Moderate Malnutrition  HX: Wheelchair-bound, history of CVA with residual left-sided deficits, left BKA, chronic alcoholic liver disease, chronic seizures       Hospital Course:   48 y.o. male who PMH includes CHF, chronic back pain wheelchair-bound, DM type 2, HTN, seizures, CVA, CKD stage 3, alcoholic liver disease with chronic pancreatitis, presents to the ED at [facility] on 11/25/2023 with a primary complaint of weakness, dizziness lightheadedness and fall out if his wheelchair striking his mouth; denies any LOC.  PT had back surgery years ago and reports has been wheelchair bound since then. No reports of seizures.  No CP, SOB, N/V/D, fever, chills cough congestion or any sick contacts. Labs reviewed demonstrated WBC 14.42, HH 12.2/37.2, sed rate 93 , CO2 18 Bun 25.7, Creat 2.31, glucose 52 Mg+ 1.0, , AST 36 reports of chest pain, CRP shortness a breath, nausea, vomiting, diarrhea5, fever., [id number], cough, congestion, or any sick, contacts. B no reportsP of chest pain, 109.7; other indicis unremarkable. CXR impression reviewed demonstrated  no acute cardiopulmonary abnormality. CT of head without contrast impression reviewed demonstrated no appreciable acute  intracranial abnormality. Ct maxillofacial without contrast impression reviewed demonstrated no appreciate acute traumatic osseous abnormality, bilateral mastoid effusions. CT cervical spine without contrast impression reviewed demonstrated no appreciable acute osseous abnormality by CT evaluation, degenerative changes at the cervical spine. CT thoracic spine without contrast impression reviewed demonstrated no acute osseous abnormality. CT of lumbar spine without contrast impression reviewed demonstrated no appreciable fracture or acute osseous abnormality, severe degenerative change at L5-S1 progressed, endplate sclerosis disc space narrowing and osseous erosions which may be degenerative or infectious/inflammatory, anasarca.   Initial VS /105 P 75 R 13 T 98.7F O2 saturation 98% on room air. Pt received multiple doses of pain medication , D50 for hypoglycemia, 40 mg lasix,  and magnesium rider in the ED. Neurosurgery services consulted. Pt awaiting MRI. Pt is admitted to hospital medicine services for further management.    Nephrology was consulted and patient was started on Lasix albumin drip.  Segovia was inserted due to possible outlet obstruction in the setting of anasarca.  MRI of lumbar spine was not helpful due to significant fluid collection.Nephrology followed.  Recommended to continue IV Lasix 60 mg t.i.d. and metolazone 10 mg daily for now and when patient gets closer to discharge switch to oral.     Eventually able to get MRI.Neurosurgery evaluated , recommended to continue the medical management and consider outpatient referral to pain management for consideration of interventional options.  Recommended scheduling follow up in 3 months and neurosurgery clinic.    Nephrology optimized the regimen eventually for discharge.     Case management found acceptance.Pt was seen and examined on the day of discharge. Patient was stable on discharge,all questions were answered and emphasized the importance of  follow ups.    Vitals:  VITAL SIGNS: 24 HRS MIN & MAX LAST   No data recorded 97.5 °F (36.4 °C)   No data recorded (!) 148/80   No data recorded  66   No data recorded 20   No data recorded 100 %       Physical Exam:  General: In no acute distress, afebrile  Chest: Clear to auscultation bilaterally anteriorly  Heart: RRR, +S1, S2, no appreciable murmur  Abdomen: Soft, nontender, BS +  MSK: Warm,  edema improved, Left BKA  Neurologic:  Awake alert and oriented    Procedures Performed: see full chart    Significant Diagnostic Studies: See Full reports for all details    Recent Labs   Lab 12/18/23 0448 12/20/23 0528   WBC 8.88 8.56   RBC 3.15* 3.38*   HGB 9.1* 9.7*   HCT 28.1* 30.7*   MCV 89.2 90.8   MCH 28.9 28.7   MCHC 32.4* 31.6*   RDW 13.6 13.1    270   MPV 12.3* 12.0*       Recent Labs   Lab 12/18/23 0448 12/19/23  0516 12/20/23  0528 12/21/23  0515 12/22/23  0451      < > 136 135* 136   K 4.3   < > 4.5 4.4 4.5   CO2 25   < > 25 24 24   BUN 69.8*   < > 74.1* 77.1* 78.5*   CREATININE 2.40*   < > 2.52* 2.64* 2.51*   CALCIUM 7.8*   < > 7.8* 7.6* 7.6*   MG 1.80  --   --   --   --    ALBUMIN 1.8*  --  1.9*  --  1.8*    < > = values in this interval not displayed.        Microbiology Results (last 7 days)       ** No results found for the last 168 hours. **             MRI Lumbar Spine Without Contrast  Narrative: EXAMINATION:  MRI LUMBAR SPINE WITHOUT CONTRAST    CLINICAL HISTORY:  Low back pain;    TECHNIQUE:  Multiplanar multisequence MR images of the lumbar spine are obtained without contrast.    COMPARISON:  CT lumbar spine dated 11/25/2023, MRI lumbar spine dated 01/16/2021    FINDINGS:  Evaluation is limited by artifact and motion.  There is grade 1 retrolisthesis of L5 over S1.  The vertebral body heights are maintained.  There is suspected mild endplate edema at L5-S1, nonspecific.  There are postoperative changes of prior partial left-sided L5-S1 laminectomy.    The conus terminates at the  level of L1.    At L4-5, there is disc bulge, facet hypertrophy and mild narrowing of the spinal canal.  There is mild-to-moderate left neural foraminal narrowing    At L5-S1, there is grade 1 retrolisthesis of L5 with suspected large central disc protrusion measuring 9 mm in AP dimension and facet hypertrophy with severe narrowing of the canal and impingement on the descending bilateral S1 nerve roots, left greater than right.  There is moderate to severe bilateral foraminal stenosis.    There is subcutaneous edema in the posterior paraspinal soft tissues.  There is no definite visible drainable fluid collection.  Impression: 1. Suspected large disc protrusion at L5-S1 with severe narrowing of the canal.  2. Mild narrowing of the spinal canal at L4-5.  3. Multilevel neural foraminal stenoses as described.    Electronically signed by: Mirela Granados  Date:    12/14/2023  Time:    15:01         Medication List        START taking these medications      acetaminophen 325 MG tablet  Commonly known as: TYLENOL  Take 2 tablets (650 mg total) by mouth every 8 (eight) hours as needed for Pain or Temperature greater than (100.4).     hydrALAZINE 50 MG tablet  Commonly known as: APRESOLINE  Take 1 tablet (50 mg total) by mouth every 8 (eight) hours. Avoid SBP<100     insulin detemir U-100 100 unit/mL injection  Commonly known as: Levemir  Inject 21 Units into the skin 2 (two) times daily.     ondansetron 4 MG Tbdl  Commonly known as: ZOFRAN-ODT  Take 1 tablet (4 mg total) by mouth every 12 (twelve) hours as needed (nausea).     oxyCODONE 10 mg Tab immediate release tablet  Commonly known as: ROXICODONE  Take 1 tablet (10 mg total) by mouth every 8 (eight) hours as needed for Pain.     polyethylene glycol 17 gram Pwpk  Commonly known as: GLYCOLAX  Take 17 g by mouth daily as needed for Constipation.     senna-docusate 8.6-50 mg 8.6-50 mg per tablet  Commonly known as: SENNA PLUS  Take 1 tablet by mouth daily as needed for  "Constipation.     simethicone 80 MG chewable tablet  Commonly known as: MYLICON  Take 1 tablet (80 mg total) by mouth 4 (four) times daily as needed for Flatulence.            CHANGE how you take these medications      * levETIRAcetam 500 MG Tab  Commonly known as: KEPPRA  Take 1 tablet (500 mg total) by mouth 2 (two) times daily.  What changed: when to take this     * levETIRAcetam 500 MG Tab  Commonly known as: KEPPRA  Take 1 tablet (500 mg total) by mouth 2 (two) times daily.  What changed: You were already taking a medication with the same name, and this prescription was added. Make sure you understand how and when to take each.     NIFEdipine 60 MG (OSM) 24 hr tablet  Commonly known as: PROCARDIA-XL  Take 2 tablets (120 mg total) by mouth once daily.  What changed: how much to take     * pregabalin 25 MG capsule  Commonly known as: LYRICA  What changed: Another medication with the same name was changed. Make sure you understand how and when to take each.     * pregabalin 75 MG capsule  Commonly known as: LYRICA  Take 1 capsule (75 mg total) by mouth every evening.  What changed: when to take this           * This list has 4 medication(s) that are the same as other medications prescribed for you. Read the directions carefully, and ask your doctor or other care provider to review them with you.                CONTINUE taking these medications      atenoloL 50 MG tablet  Commonly known as: TENORMIN     BD VEO INSULIN SYR (HALF UNIT) 0.3 mL 31 gauge x 15/64" Syrg  Generic drug: insulin syr/ndl U100 half aaron     doxazosin 2 MG tablet  Commonly known as: CARDURA  Take 1 tablet (2 mg total) by mouth every evening.     DULoxetine 20 MG capsule  Commonly known as: CYMBALTA     gabapentin 400 MG capsule  Commonly known as: NEURONTIN     insulin aspart U-100 100 unit/mL injection  Commonly known as: NovoLOG  Inject 1-10 Units into the skin before meals and at bedtime as needed for High Blood Sugar.   "   lipase-protease-amylase 12,000-38,000-60,000 units Cpdr  Commonly known as: CREON  Take 6 capsules by mouth 3 (three) times daily.     miconazole NITRATE 2 % 2 % top powder  Commonly known as: MICOTIN  Apply topically 2 (two) times daily. for 7 days     oxyCODONE-acetaminophen  mg per tablet  Commonly known as: PERCOCET     PRENATAL VITAMIN PLUS LOW IRON 27 mg iron- 1 mg Tab  Generic drug: PNV,calcium 72-iron-folic acid     tiZANidine 4 MG tablet  Commonly known as: ZANAFLEX     torsemide 40 mg Tab  Take 40 mg by mouth once daily.            STOP taking these medications      benazepriL 40 MG tablet  Commonly known as: LOTENSIN     bumetanide 1 MG tablet  Commonly known as: BUMEX     insulin glargine 100 units/mL SubQ pen     insulin NPH-insulin regular (70/30) 100 unit/mL (70-30) injection     insulin regular 100 unit/mL injection     loperamide 2 mg capsule  Commonly known as: IMODIUM     losartan 100 MG tablet  Commonly known as: COZAAR     losartan-hydrochlorothiazide 100-25 mg 100-25 mg per tablet  Commonly known as: HYZAAR     metoprolol tartrate 25 MG tablet  Commonly known as: LOPRESSOR     sodium bicarbonate 650 MG tablet               Where to Get Your Medications        These medications were sent to [facility] - [place], [place] [address] [facility] [place] [address]      Phone: [phone]   levETIRAcetam 500 MG Tab  pregabalin 75 MG capsule       Information about where to get these medications is not yet available    Ask your nurse or doctor about these medications  acetaminophen 325 MG tablet  hydrALAZINE 50 MG tablet  insulin detemir U-100 100 unit/mL injection  ondansetron 4 MG Tbdl  oxyCODONE 10 mg Tab immediate release tablet  polyethylene glycol 17 gram Pwpk  senna-docusate 8.6-50 mg 8.6-50 mg per tablet  simethicone 80 MG chewable tablet          Explained in detail to the patient about the discharge plan, medications, and follow-up visits. Pt understands and  agrees with the treatment plan  Discharge Disposition: Rehab Facility   Discharged Condition: stable  Diet-    Medications Per DC med rec  Activities as tolerated   Follow-up Information       Ailyn Reynolds MD Follow up.    Specialty: Family Medicine  Contact information:  [address] [facility] [place] [address]  [phone]               [facility] Follow up.    Contact information:  [address]             OUT PATIENT PAIN MGMT. Schedule an appointment as soon as possible for a visit.                           For further questions contact hospitalist office    Discharge time 33 minutes    For worsening symptoms, chest pain, shortness of breath, increased abdominal pain, high grade fever, stroke or stroke like symptoms, immediately go to the nearest Emergency Room or call 911 as soon as possible.      Michelle Sanches M.D, on 12/23/2023. at 5:57 PM.

## 2023-12-28 ENCOUNTER — TELEPHONE (OUTPATIENT)
Dept: NEUROSURGERY | Facility: CLINIC | Age: 48
End: 2023-12-28
Payer: MEDICARE

## 2023-12-28 NOTE — TELEPHONE ENCOUNTER
Per Dr. Ramsay' instructions the patient has been scheduled for a 3 mnth hosp f/u-no new imaging w/ Kat[facility] on 3/18/24 @ 1pm. The pt has been notified of this.

## 2024-01-09 ENCOUNTER — HOSPITAL ENCOUNTER (EMERGENCY)
Facility: HOSPITAL | Age: 49
Discharge: HOME | End: 2024-01-09
Attending: EMERGENCY MEDICINE
Payer: MEDICAID

## 2024-01-09 VITALS
WEIGHT: 200 LBS | OXYGEN SATURATION: 100 % | RESPIRATION RATE: 18 BRPM | TEMPERATURE: 98 F | HEART RATE: 71 BPM | HEIGHT: 67 IN | DIASTOLIC BLOOD PRESSURE: 95 MMHG | BODY MASS INDEX: 31.39 KG/M2 | SYSTOLIC BLOOD PRESSURE: 175 MMHG

## 2024-01-09 DIAGNOSIS — R19.5 OCCULT BLOOD POSITIVE STOOL: ICD-10-CM

## 2024-01-09 DIAGNOSIS — R33.8 ACUTE URINARY RETENTION: ICD-10-CM

## 2024-01-09 DIAGNOSIS — N18.9 CHRONIC KIDNEY DISEASE, UNSPECIFIED CKD STAGE: Primary | ICD-10-CM

## 2024-01-09 DIAGNOSIS — N39.0 ACUTE UTI: ICD-10-CM

## 2024-01-09 DIAGNOSIS — D64.9 ANEMIA, UNSPECIFIED TYPE: ICD-10-CM

## 2024-01-09 DIAGNOSIS — R10.84 GENERALIZED ABDOMINAL PAIN: ICD-10-CM

## 2024-01-09 LAB
ALBUMIN SERPL-MCNC: 2.1 G/DL (ref 3.5–5)
ALBUMIN/GLOB SERPL: 0.6 RATIO (ref 1.1–2)
ALP SERPL-CCNC: 75 UNIT/L (ref 40–150)
ALT SERPL-CCNC: 20 UNIT/L (ref 0–55)
APPEARANCE UR: ABNORMAL
AST SERPL-CCNC: 18 UNIT/L (ref 5–34)
BACTERIA #/AREA URNS AUTO: ABNORMAL /HPF
BASOPHILS # BLD AUTO: 0.04 X10(3)/MCL
BASOPHILS NFR BLD AUTO: 0.5 %
BILIRUB SERPL-MCNC: 0.2 MG/DL
BILIRUB UR QL STRIP.AUTO: NEGATIVE
BUN SERPL-MCNC: 46.8 MG/DL (ref 8.9–20.6)
CALCIUM SERPL-MCNC: 7.3 MG/DL (ref 8.4–10.2)
CHLORIDE SERPL-SCNC: 113 MMOL/L (ref 98–107)
CO2 SERPL-SCNC: 18 MMOL/L (ref 22–29)
COLOR UR AUTO: ABNORMAL
CREAT SERPL-MCNC: 2.63 MG/DL (ref 0.73–1.18)
EOSINOPHIL # BLD AUTO: 0.27 X10(3)/MCL (ref 0–0.9)
EOSINOPHIL NFR BLD AUTO: 3.3 %
ERYTHROCYTE [DISTWIDTH] IN BLOOD BY AUTOMATED COUNT: 13.4 % (ref 11.5–17)
GFR SERPLBLD CREATININE-BSD FMLA CKD-EPI: 29 MLS/MIN/1.73/M2
GLOBULIN SER-MCNC: 3.3 GM/DL (ref 2.4–3.5)
GLUCOSE SERPL-MCNC: 113 MG/DL (ref 74–100)
GLUCOSE UR QL STRIP.AUTO: NORMAL
GROUP & RH: NORMAL
HCT VFR BLD AUTO: 27.5 % (ref 42–52)
HGB BLD-MCNC: 8.6 G/DL (ref 14–18)
IMM GRANULOCYTES # BLD AUTO: 0.02 X10(3)/MCL (ref 0–0.04)
IMM GRANULOCYTES NFR BLD AUTO: 0.2 %
INDIRECT COOMBS: NORMAL
KETONES UR QL STRIP.AUTO: NEGATIVE
LEUKOCYTE ESTERASE UR QL STRIP.AUTO: 25
LIPASE SERPL-CCNC: 4 U/L
LYMPHOCYTES # BLD AUTO: 2.29 X10(3)/MCL (ref 0.6–4.6)
LYMPHOCYTES NFR BLD AUTO: 28.2 %
MCH RBC QN AUTO: 28.2 PG (ref 27–31)
MCHC RBC AUTO-ENTMCNC: 31.3 G/DL (ref 33–36)
MCV RBC AUTO: 90.2 FL (ref 80–94)
MONOCYTES # BLD AUTO: 1.04 X10(3)/MCL (ref 0.1–1.3)
MONOCYTES NFR BLD AUTO: 12.8 %
MUCOUS THREADS URNS QL MICRO: ABNORMAL /LPF
NEUTROPHILS # BLD AUTO: 4.45 X10(3)/MCL (ref 2.1–9.2)
NEUTROPHILS NFR BLD AUTO: 55 %
NITRITE UR QL STRIP.AUTO: NEGATIVE
NRBC BLD AUTO-RTO: 0 %
PH UR STRIP.AUTO: 6 [PH]
PLATELET # BLD AUTO: 204 X10(3)/MCL (ref 130–400)
PMV BLD AUTO: 11.4 FL (ref 7.4–10.4)
POTASSIUM SERPL-SCNC: 4.8 MMOL/L (ref 3.5–5.1)
PROT SERPL-MCNC: 5.4 GM/DL (ref 6.4–8.3)
PROT UR QL STRIP.AUTO: ABNORMAL
RBC # BLD AUTO: 3.05 X10(6)/MCL (ref 4.7–6.1)
RBC #/AREA URNS AUTO: ABNORMAL /HPF
RBC UR QL AUTO: ABNORMAL
SODIUM SERPL-SCNC: 138 MMOL/L (ref 136–145)
SP GR UR STRIP.AUTO: 1.01 (ref 1–1.03)
SPECIMEN OUTDATE: NORMAL
SQUAMOUS #/AREA URNS LPF: ABNORMAL /HPF
UROBILINOGEN UR STRIP-ACNC: NORMAL
WBC # SPEC AUTO: 8.11 X10(3)/MCL (ref 4.5–11.5)
WBC #/AREA URNS AUTO: ABNORMAL /HPF

## 2024-01-09 PROCEDURE — 96365 THER/PROPH/DIAG IV INF INIT: CPT

## 2024-01-09 PROCEDURE — 86850 RBC ANTIBODY SCREEN: CPT | Performed by: EMERGENCY MEDICINE

## 2024-01-09 PROCEDURE — 81001 URINALYSIS AUTO W/SCOPE: CPT | Performed by: NURSE PRACTITIONER

## 2024-01-09 PROCEDURE — 83690 ASSAY OF LIPASE: CPT | Performed by: NURSE PRACTITIONER

## 2024-01-09 PROCEDURE — 99284 EMERGENCY DEPT VISIT MOD MDM: CPT | Mod: 25

## 2024-01-09 PROCEDURE — 63600175 PHARM REV CODE 636 W HCPCS: Performed by: EMERGENCY MEDICINE

## 2024-01-09 PROCEDURE — 80053 COMPREHEN METABOLIC PANEL: CPT | Performed by: NURSE PRACTITIONER

## 2024-01-09 PROCEDURE — 85025 COMPLETE CBC W/AUTO DIFF WBC: CPT | Performed by: NURSE PRACTITIONER

## 2024-01-09 PROCEDURE — 25000003 PHARM REV CODE 250: Performed by: EMERGENCY MEDICINE

## 2024-01-09 RX ORDER — FERROUS SULFATE 324(65)MG
324 TABLET, DELAYED RELEASE (ENTERIC COATED) ORAL DAILY
Qty: 30 TABLET | Refills: 0 | Status: SHIPPED | OUTPATIENT
Start: 2024-01-09 | End: 2024-02-08

## 2024-01-09 RX ORDER — TAMSULOSIN HYDROCHLORIDE 0.4 MG/1
0.4 CAPSULE ORAL DAILY
Qty: 10 CAPSULE | Refills: 0 | Status: SHIPPED | OUTPATIENT
Start: 2024-01-09 | End: 2024-01-09

## 2024-01-09 RX ORDER — TAMSULOSIN HYDROCHLORIDE 0.4 MG/1
0.4 CAPSULE ORAL DAILY
Qty: 10 CAPSULE | Refills: 0 | Status: SHIPPED | OUTPATIENT
Start: 2024-01-09 | End: 2025-01-08

## 2024-01-09 RX ORDER — CIPROFLOXACIN 500 MG/1
500 TABLET ORAL 2 TIMES DAILY
Qty: 14 TABLET | Refills: 0 | Status: SHIPPED | OUTPATIENT
Start: 2024-01-09 | End: 2024-01-09 | Stop reason: ALTCHOICE

## 2024-01-09 RX ORDER — CIPROFLOXACIN 500 MG/1
500 TABLET ORAL
Status: DISCONTINUED | OUTPATIENT
Start: 2024-01-09 | End: 2024-01-09

## 2024-01-09 RX ORDER — PANTOPRAZOLE SODIUM 40 MG/1
40 TABLET, DELAYED RELEASE ORAL DAILY
Qty: 30 TABLET | Refills: 11 | Status: SHIPPED | OUTPATIENT
Start: 2024-01-09 | End: 2025-01-08

## 2024-01-09 RX ORDER — CIPROFLOXACIN 500 MG/1
500 TABLET ORAL 2 TIMES DAILY
Qty: 14 TABLET | Refills: 0 | Status: SHIPPED | OUTPATIENT
Start: 2024-01-09 | End: 2024-01-09

## 2024-01-09 RX ADMIN — GENTAMICIN SULFATE 379.6 MG: 40 INJECTION, SOLUTION INTRAMUSCULAR; INTRAVENOUS at 10:01

## 2024-01-09 NOTE — ED PROVIDER NOTES
Encounter Date: 1/9/2024    SCRIBE #1 NOTE: I, Lori Solitario, am scribing for, and in the presence of,  Marty Doss MD. I have scribed the entire note.       History     Chief Complaint   Patient presents with    Abdominal Pain     [facility]/t abdominal pain, abdominal distention, and decreased urinary output.      48 year old male with a hx of CHF, CVA, DM, HTN, and seizures [facility] pain. Pt is bed bound. Pt states he has been experiencing diffuse abdominal pain and distention today. Pt also notes that he has not been able to urinate. Report states that the physical therapy center were unable to place a escobar catheter due to scrotal swelling. Pt states his last BM was today. Pt was brought here for further evaluation.     The history is provided by the patient. No  was used.     Review of patient's allergies indicates:  No Known Allergies  Past Medical History:   Diagnosis Date    CHF (congestive heart failure)     Chronic back pain     CVA (cerebral vascular accident) 01/2023    DM (diabetes mellitus)     HTN (hypertension)     Seizures      Past Surgical History:   Procedure Laterality Date    BACK SURGERY      EGD, WITH CLOSED BIOPSY  3/15/2023    Procedure: EGD, WITH CLOSED BIOPSY;  Surgeon: Tj Faith MD;  Location: [facility] ENDOSCOPY;  Service: Gastroenterology;;    ESOPHAGOGASTRODUODENOSCOPY N/A 3/15/2023    Procedure: EGD;  Surgeon: Tj Faith MD;  Location: [facility] ENDOSCOPY;  Service: Gastroenterology;  Laterality: N/A;    TOE AMPUTATION Right 3/24/2023    Procedure: AMPUTATION, TOE;  Surgeon: Emre Vazquez DPM;  Location: [facility] OR;  Service: Podiatry;  Laterality: Right;     No family history on file.  Social History     Tobacco Use    Smoking status: Never    Smokeless tobacco: Never   Substance Use Topics    Alcohol use: Not Currently    Drug use: Not Currently     Review of  Systems   Constitutional:  Negative for chills and fever.   Respiratory:  Negative for cough and shortness of breath.    Cardiovascular:  Negative for chest pain.   Gastrointestinal:  Positive for abdominal distention and abdominal pain. Negative for nausea and vomiting.   Genitourinary:  Positive for decreased urine volume and scrotal swelling.   Musculoskeletal:  Negative for myalgias.   Neurological:  Negative for syncope.   All other systems reviewed and are negative.      Physical Exam     Initial Vitals [01/09/24 1610]   BP Pulse Resp Temp SpO2   137/89 68 18 97.9 °F (36.6 °C) 99 %      MAP       --         Physical Exam    Nursing note and vitals reviewed.  Constitutional: He appears well-developed and well-nourished. No distress.   HENT:   Head: Normocephalic and atraumatic.   Cardiovascular:  Normal rate.           Pulmonary/Chest: No respiratory distress. He has no wheezes. He has no rhonchi. He exhibits no tenderness.   Abdominal: Abdomen is soft. He exhibits distension. There is generalized abdominal tenderness. There is no rebound and no guarding.   Genitourinary:    Genitourinary Comments: No crepitus, induration, or erythema to the perineal or scrotal region. Blood in diaper but none at meatus. Rectal exam done. Yellow stool hemoccult positive.      Musculoskeletal:         General: Normal range of motion.      Comments: Grade 1 skin breakdown to left buttock. Left BKA     Neurological: He is alert and oriented to person, place, and time.   Skin: Skin is warm and dry.   Psychiatric: He has a normal mood and affect.         ED Course   Procedures  Labs Reviewed   COMPREHENSIVE METABOLIC PANEL - Abnormal; Notable for the following components:       Result Value    Chloride 113 (*)     Carbon Dioxide 18 (*)     Glucose Level 113 (*)     Blood Urea Nitrogen 46.8 (*)     Creatinine 2.63 (*)     Calcium Level Total 7.3 (*)     Protein Total 5.4 (*)     Albumin Level 2.1 (*)     Albumin/Globulin Ratio 0.6  (*)     All other components within normal limits   URINALYSIS, REFLEX TO URINE CULTURE - Abnormal; Notable for the following components:    Appearance, UA Turbid (*)     Protein, UA 2+ (*)     Blood, UA Trace (*)     Leukocyte Esterase, UA 25 (*)     WBC, UA 6-10 (*)     Bacteria, UA Many (*)     Mucous, UA Occasional (*)     All other components within normal limits   CBC WITH DIFFERENTIAL - Abnormal; Notable for the following components:    RBC 3.05 (*)     Hgb 8.6 (*)     Hct 27.5 (*)     MCHC 31.3 (*)     MPV 11.4 (*)     All other components within normal limits   LIPASE - Normal   CBC W/ AUTO DIFFERENTIAL    Narrative:     The following orders were created for panel order CBC Auto Differential.  Procedure                               Abnormality         Status                     ---------                               -----------         ------                     CBC with Differential[[id number]]       Abnormal            Final result                 Please view results for these tests on the individual orders.   TYPE & SCREEN          Imaging Results    None          Medications   ciprofloxacin HCl tablet 500 mg (has no administration in time range)   gentamicin (GARAMYCIN) 379.6 mg in sodium chloride 0.9% 100 mL IVPB (has no administration in time range)     Medical Decision Making  The differential diagnosis includes, but is not limited to, urinary retention, UTI, CKD, constipation, GI bleed, CHF, anemia, or volume overload.       Amount and/or Complexity of Data Reviewed  Labs: ordered.    Risk  OTC drugs.  Prescription drug management.  Decision regarding hospitalization.            Scribe Attestation:   Scribe #1: I performed the above scribed service and the documentation accurately describes the services I performed. I attest to the accuracy of the note.    Attending Attestation:           Physician Attestation for Scribe:  Physician Attestation Statement for Scribe #1: I, Marty Doss MD,  reviewed documentation, as scribed by Lori Solitario in my presence, and it is both accurate and complete.             ED Course as of 01/09/24 2128 Tue Jan 09, 2024 2124 Repeat exam abdominal pain resolved no longer distended. [LF]   2127 Was not susceptible to ciprofloxacin.  Discussed with pharmacist who recommends 1 in done gentamicin protocol which we have ordered [LF]      ED Course User Index  [LF] Marty Doss MD                           Clinical Impression:  Final diagnoses:  [N18.9] Chronic kidney disease, unspecified CKD stage (Primary)  [R33.8] Acute urinary retention  [R10.84] Generalized abdominal pain  [D64.9] Anemia, unspecified type  [R19.5] Occult blood positive stool  [N39.0] Acute UTI          ED Disposition Condition    Discharge Stable          ED Prescriptions       Medication Sig Dispense Start Date End Date Auth. Provider    ciprofloxacin HCl (CIPRO) 500 MG tablet  (Status: Discontinued) Take 1 tablet (500 mg total) by mouth 2 (two) times daily. for 7 days 14 tablet 1/9/2024 1/9/2024 Marty Doss MD    tamsulosin (FLOMAX) 0.4 mg Cap  (Status: Discontinued) Take 1 capsule (0.4 mg total) by mouth once daily. 10 capsule 1/9/2024 1/9/2024 Marty Doss MD    ciprofloxacin HCl (CIPRO) 500 MG tablet  (Status: Discontinued) Take 1 tablet (500 mg total) by mouth 2 (two) times daily. for 7 days 14 tablet 1/9/2024 1/9/2024 Marty Doss MD    tamsulosin (FLOMAX) 0.4 mg Cap Take 1 capsule (0.4 mg total) by mouth once daily. 10 capsule 1/9/2024 1/8/2025 Marty Doss MD    pantoprazole (PROTONIX) 40 MG tablet Take 1 tablet (40 mg total) by mouth once daily. 30 tablet 1/9/2024 1/8/2025 Marty Doss MD    ferrous sulfate 324 mg (65 mg iron) TbEC Take 1 tablet (324 mg total) by mouth once daily. 30 tablet 1/9/2024 2/8/2024 Marty Doss MD          Follow-up Information       Follow up With Specialties Details Why Contact Info    Ailyn Reynolds MD [facility]    [address] [facility] [address]  [phone]               Marty Doss MD  01/09/24 6010

## 2024-07-09 NOTE — PLAN OF CARE
Problem: Adult Inpatient Plan of Care  Goal: Plan of Care Review  Outcome: Ongoing, Progressing  Goal: Patient-Specific Goal (Individualized)  Outcome: Ongoing, Progressing  Goal: Absence of Hospital-Acquired Illness or Injury  Outcome: Ongoing, Progressing  Goal: Optimal Comfort and Wellbeing  Outcome: Ongoing, Progressing  Goal: Readiness for Transition of Care  Outcome: Ongoing, Progressing     Problem: Bariatric Environmental Safety  Goal: Safety Maintained with Care  Outcome: Ongoing, Progressing     Problem: Skin Injury Risk Increased  Goal: Skin Health and Integrity  Outcome: Ongoing, Progressing     Problem: Fall Injury Risk  Goal: Absence of Fall and Fall-Related Injury  Outcome: Ongoing, Progressing     Problem: Impaired Wound Healing  Goal: Optimal Wound Healing  Outcome: Ongoing, Progressing     Problem: Infection  Goal: Absence of Infection Signs and Symptoms  Outcome: Ongoing, Progressing     Problem: Hyperglycemia  Goal: Blood Glucose Level Within Targeted Range  Outcome: Ongoing, Progressing      decreased step length/decreased weight-shifting ability

## (undated) DEVICE — SYR 10CC LUER LOCK

## (undated) DEVICE — FORCEP BX LG CAP 2.8MMX240CM

## (undated) DEVICE — GLOVE PROTEXIS HYDROGEL SZ7.5

## (undated) DEVICE — SPONGE GAUZE 16PLY 4X4

## (undated) DEVICE — NDL SAFETY 25G X 1.5 ECLIPSE

## (undated) DEVICE — STOCKINET 4INX48

## (undated) DEVICE — GAUZE SPONGE 4X4 12PLY

## (undated) DEVICE — BLADE MICRO STR COARSE 9.0MM

## (undated) DEVICE — DRESSING N ADH OIL EMUL 3X3

## (undated) DEVICE — SOL IRRI STRL WATER 1000ML

## (undated) DEVICE — BAG LABGUARD BIOHAZARD 6X9IN

## (undated) DEVICE — COLLECTION SPECIMEN NEPTUNE

## (undated) DEVICE — DRAPE EXTREMITY HVY DTY REINF

## (undated) DEVICE — TRAY SKIN SCRUB WET PREMIUM

## (undated) DEVICE — ELECTRODE PATIENT RETURN DISP

## (undated) DEVICE — TIP SUCTION YANKAUER

## (undated) DEVICE — KIT CANIST SUCTION 1200CC

## (undated) DEVICE — SPONGE LAP STRL 18X18IN

## (undated) DEVICE — Device

## (undated) DEVICE — SPONGE DERMACEA GAUZE 4X4

## (undated) DEVICE — SUT BLK MONO 3-0 CUT 18IN F

## (undated) DEVICE — CONTAINER SPECIMEN SCREW 4OZ

## (undated) DEVICE — WRAP SELF ADH. COBAN 4X5YD

## (undated) DEVICE — KIT SURGICAL TURNOVER

## (undated) DEVICE — TUBING O2 FEMALE CONN 13FT

## (undated) DEVICE — KIT SURGICAL COLON .25 1.1OZ